# Patient Record
Sex: MALE | Race: NATIVE HAWAIIAN OR OTHER PACIFIC ISLANDER | Employment: OTHER | URBAN - METROPOLITAN AREA
[De-identification: names, ages, dates, MRNs, and addresses within clinical notes are randomized per-mention and may not be internally consistent; named-entity substitution may affect disease eponyms.]

---

## 2019-01-18 ENCOUNTER — OFFICE VISIT (OUTPATIENT)
Dept: INTERNAL MEDICINE CLINIC | Age: 49
End: 2019-01-18

## 2019-01-18 VITALS
DIASTOLIC BLOOD PRESSURE: 86 MMHG | HEIGHT: 66 IN | HEART RATE: 97 BPM | RESPIRATION RATE: 16 BRPM | SYSTOLIC BLOOD PRESSURE: 163 MMHG | TEMPERATURE: 98.7 F | OXYGEN SATURATION: 98 % | WEIGHT: 151.5 LBS | BODY MASS INDEX: 24.35 KG/M2

## 2019-01-18 DIAGNOSIS — M22.2X1 PATELLOFEMORAL SYNDROME OF RIGHT KNEE: ICD-10-CM

## 2019-01-18 DIAGNOSIS — Z23 ENCOUNTER FOR IMMUNIZATION: ICD-10-CM

## 2019-01-18 DIAGNOSIS — M76.51 PATELLAR TENDONITIS OF RIGHT KNEE: Primary | ICD-10-CM

## 2019-01-18 DIAGNOSIS — J32.0 MAXILLARY SINUSITIS, UNSPECIFIED CHRONICITY: ICD-10-CM

## 2019-01-18 RX ORDER — KETOCONAZOLE 20 MG/G
CREAM TOPICAL DAILY
COMMUNITY
End: 2019-02-22

## 2019-01-18 RX ORDER — AZITHROMYCIN 250 MG/1
TABLET, FILM COATED ORAL
Qty: 6 TAB | Refills: 0 | Status: SHIPPED | OUTPATIENT
Start: 2019-01-18 | End: 2019-01-23

## 2019-01-18 RX ORDER — METHOTREXATE 2.5 MG/1
20 TABLET ORAL
Refills: 0 | COMMUNITY
Start: 2019-01-09 | End: 2019-03-27 | Stop reason: ALTCHOICE

## 2019-01-18 RX ORDER — FOLIC ACID 1 MG/1
1 TABLET ORAL DAILY
Refills: 4 | COMMUNITY
Start: 2018-12-29 | End: 2019-06-20

## 2019-01-18 NOTE — PROGRESS NOTES
Chief Complaint   Patient presents with   [facility] Patient     Patient states that he was suffering with a cold the last week. Patient also states that he has a history of tendonitis and would like to speak with Dr. Nancy Dey about running marathon this year. he is a 51y.o. year old male who presents for evaluation of congestion for 3 weeks. no nausea and no vomiting . No congestion and productive cough. Over-the-counter remedies including none   . Hx Asthma:  no  Smoker:  no  Contacts with similar infections: no   Recent travel:no       Reviewed and agree with Nurse Note and duplicated in this note. Reviewed PmHx, RxHx, FmHx, SocHx, AllgHx and updated and dated in the chart.     Family History   Problem Relation Age of Onset    Asthma Neg Hx     Cancer Neg Hx     Diabetes Neg Hx     Heart Disease Neg Hx     Hypertension Neg Hx     Stroke Neg Hx        Past Medical History:   Diagnosis Date    Adopted       Social History     Socioeconomic History    Marital status:      Spouse name: Not on file    Number of children: Not on file    Years of education: Not on file    Highest education level: Not on file   Tobacco Use    Smoking status: Never Smoker    Smokeless tobacco: Never Used   Substance and Sexual Activity    Alcohol use: Yes     Comment: 6 BEERS WEEKLY    Drug use: No    Sexual activity: Not Currently        Review of Systems - negative except as listed above      Objective:     Vitals:    01/18/19 1500   BP: 163/86   Pulse: 97   Resp: 16   Temp: 98.7 °F (37.1 °C)   TempSrc: Oral   SpO2: 98%   Weight: 151 lb 8 oz (68.7 kg)   Height: 5' 6\" (1.676 m)       Physical Examination: General appearance - alert, well appearing, and in no distress  Eyes - pupils equal and reactive, extraocular eye movements intact  Ears - bilateral TM's and external ear canals normal  Nose - normal and patent, no erythema, discharge or polyps  Mouth - mucous membranes moist, pharynx normal without lesions  Neck - supple, no significant adenopathy  Chest - clear to auscultation, no wheezes, rales or rhonchi, symmetric air entry  Heart - normal rate, regular rhythm, normal S1, S2, no murmurs, rubs, clicks or gallops  Abdomen - soft, nontender, nondistended, no masses or organomegaly  Musculoskeletal - no joint tenderness, deformity or swelling  Extremities - right knee exam:  The patient'sright Knee  is  normal to inspection. The ROM is normal and there is flexion to Normal Effusion is: mild The joint exhibits Negative warmth and Crepitus is: mild. The Pacheco test is:  negative Joint Line Tenderness is  negative . The [facility] Test is negative  and the Lachman is  negative. The Anterior Drawer is negative. The Posterior Drawer is:  negative.  Valgus Stress (for MCL) is:  normal . Varus Stress (for LCL) is  normal  . The Chelly Test is negative and the Apprehension Sign:  negative  Patellar Grind negativet    Skin - normal coloration and turgor, no rashes, no suspicious skin lesions noted  Indications for study: right knee pain   Limited musculoskeletal ultrasound examination was performed on the anterior right knee with the following findings: Quadriceps tendon - long:  normal echogenic, linearly compact fibrillar appearance   Quadriceps tendon - trans:  normal echogenic, tessellate compact fibrillar pancake-like appearance   Suprapatellar recess - long: Mild effusion; normal appearing suprapatellar fat pads   Patellar tendon - long:  normal echogenic, linearly compact fibrillar appearance; normal appearing Hoffa fat pad, hypoechoic region noted at the origin  Patellar tendon - trans:  normal echogenic, tessellate compact fibrillar pancake-like appearance     Impression: Postsurgical changes and patellar tendinitis noted at the origin of the patellar tendon    Scanned and Interpreted by Maddison Hauser MD [facility]      Assessment/ Plan:   URI likely sinusitis  Patient will do over-the-counter recommendations and if no resolution will start antibiotic as prescribed  Patient also has right patellar tendinitis and patellofemoral syndrome  Home exercise program given to patient  Patellofemoral brace recommended for patient  Patient return to clinic for x-rays if no resolution. Follow-up Disposition:  Return if symptoms worsen or fail to improve, for Complete Physical.          I have discussed the diagnosis with the patient and the intended plan as seen in the above orders. The patient has received an after-visit summary and questions were answered concerning future plans. Medication Side Effects and Warnings were discussed with patient,  Patient Labs were reviewed and or requested, and  Patient Past Records were reviewed and or requested  yes       Pt agrees to call or return to clinic and/or go to closest ER with any worsening of symptoms. This may include, but not limited to increased fever (>100.4) with NSAIDS or Tylenol, increased edema, confusion, rash, worsening of presenting symptoms.

## 2019-02-22 ENCOUNTER — OFFICE VISIT (OUTPATIENT)
Dept: RHEUMATOLOGY | Age: 49
End: 2019-02-22

## 2019-02-22 VITALS
DIASTOLIC BLOOD PRESSURE: 90 MMHG | SYSTOLIC BLOOD PRESSURE: 142 MMHG | BODY MASS INDEX: 23.78 KG/M2 | TEMPERATURE: 99 F | WEIGHT: 148 LBS | HEART RATE: 71 BPM | RESPIRATION RATE: 16 BRPM | HEIGHT: 66 IN | OXYGEN SATURATION: 98 %

## 2019-02-22 DIAGNOSIS — L40.50 PSORIATIC ARTHRITIS (HCC): ICD-10-CM

## 2019-02-22 DIAGNOSIS — Z79.60 LONG-TERM USE OF IMMUNOSUPPRESSANT MEDICATION: Primary | ICD-10-CM

## 2019-02-22 RX ORDER — FLUOCINONIDE 1 MG/G
CREAM TOPICAL DAILY
Qty: 30 G | Refills: 5 | Status: SHIPPED | OUTPATIENT
Start: 2019-02-22 | End: 2020-06-01 | Stop reason: SDUPTHER

## 2019-02-22 RX ORDER — TRIAMCINOLONE ACETONIDE 1 MG/G
OINTMENT TOPICAL
COMMUNITY

## 2019-02-22 NOTE — PROGRESS NOTES
REASON FOR VISIT This is the initial evaluation for Mr. Jose Alejandro Burden a 51 y.o.  male for question of psoriatic arthritis. The patient is referred to the [facility] at the request of self. HISTORY OF PRESENT ILLNESS This is a 51 y.o. male. Review of records from [address] [place] [address] (Dr. Tollie Cabot):  
10/18: WBC 4.1, Hb 15.2, Plt 194, CMP Normal, CRP, ESR negative 1/16: Hep C negative, HLA b27 negative, quant gold negative 1/16: CXR normal 
Patient with hx of psoriatic arthritis on methotrexate 20 mg oral weekly. Per records the patient developed back pain in summer 2015. He also had pain, swelling and stiffness in left V digit with some pain and swelling in third toe of the left foot. Anti-inflammatories were helpful. He also developed scaliness of skin over knees. MHAQ: 0 Pain scale: 5/100 The patient notes he has done well with methotrexate 20 mg oral weekly. Occasionally he has some stiffness and soreness. No constant pain anywhere. NO stiffness except 5 minutes in the morning. No swelling in any of the joints. Psoriasis is okay. Knees is still active. He does not see a dermatologist.  He is not using topical currently. NO eye issues. He has had blood in stool lately and is having a colonoscopy soon anyway. REVIEW OF SYSTEMS A 15 point review of systems was performed and summarized below. The questionnaire was reviewed with the patient and scanned into the patient's medical record. General: denies recent weight gain, recent weight loss, fatigue, weakness, fever, night sweats Musculoskeletal: endorses  joint pain, joint swelling, morning stiffness (lasting 5 minutes)denies, muscle pain Ears:  denies ringing in ears, loss of hearing, deafness Eyes:  denies pain, redness, loss of vision, double vision, blurred vision, dryness, foreign body sensation Mouth:  denies sore tongue, oral ulcers, bleeding gums, loss of taste, dryness, increased dental caries Nose:  denies nosebleeds, loss of smell, nasal ulcers Throat:  denies frequent sore throats, hoarseness, difficulty in swallowing, pain in jaw while chewing Neck:  denies swollen glands, tender glands Cardiopulmonary:  denies pain in chest, irregular heart beat, sudden changes in heart beat, shortness of breath, difficulty breathing at night, dry cough, productive cough, coughing of blood, wheezing Gastrointestinal: endorses  blood in stoolsdenies nausea, heartburn, stomach pain relieved by food, vomiting of blood/\"coffee grounds\", jaundice, increasing constipation, persistent diarrhea,, black stools Genitourinary: denies nocturia, difficult urination, pain or burning on urination, blood in urine, cloudy urine, pus in urine, genital discharge, frequent urination, vaginal dryness, rash/ulcers, sexual difficulties Hematologic: denies anemia, bleeding tendency, blood clots Skin:  denies easy bruising, sun sensitive, rash, redness, hives, skin tightness, nodules/bumps, hair loss, color changes of hands or feet in the cold (Raynaud's), nailbed changes, mechanics hands Neurologic:  denies headaches, dizziness, muscle weakness, numbness or tingling in hands/feet, memory loss Psychiatric:  denies depression, excessive worries, PTSD, Bipolar Sleep:  denies poor sleep (5-9 hours), denies snoring, apnea, daytime somnolence, difficulty falling asleep, difficulty staying asleep PAST MEDICAL HISTORY Past Medical History:  
Diagnosis Date  Adopted  Arthritis  Psoriatic arthritis ([facility] [address] Past Surgical History:  
Procedure Laterality Date  HX GI    
 EGD  HX HEENT    
 WISDOM TEETH  
 HX ORTHOPAEDIC    
 HX TONSILLECTOMY FAMILY HISTORY Family History Adopted: Yes  
Problem Relation Age of Onset  Asthma Neg Hx  Cancer Neg Hx  Diabetes Neg Hx   
 Heart Disease Neg Hx  Hypertension Neg Hx  Stroke Neg Hx SOCIAL HISTORY Social History Tobacco Use  Smoking status: Never Smoker  Smokeless tobacco: Never Used Substance Use Topics  Alcohol use: Yes Comment: 6 BEERS WEEKLY  Drug use: No  
 
On vacations drinks more than 6 beers weekly MEDICATIONS Current Outpatient Medications Medication Sig Dispense Refill  triamcinolone acetonide (KENALOG) 0.1 % ointment Apply  to affected area two (2) times daily as needed for Skin Irritation. use thin layer  fluocinonide (VANOS) 0.1 % topical cream Apply  to affected area daily. 30 g 5  
 methotrexate (RHEUMATREX) 2.5 mg tablet Take 20 mg by mouth every Monday. 0  
 folic acid (FOLVITE) 1 mg tablet Take 1 mg by mouth daily. 4  
 omeprazole (PRILOSEC) 20 mg capsule Take 20 mg by mouth daily. ALLERGIES No Known Allergies PHYSICAL EXAMINATION Visit Vitals /90 (BP 1 Location: Right arm, BP Patient Position: Sitting) Pulse 71 Temp 99 °F (37.2 °C) Resp 16 Ht 5' 6\" (1.676 m) Wt 148 lb (67.1 kg) SpO2 98% BMI 23.89 kg/m² Body mass index is 23.89 kg/m². General: NAD HEENT: PERRL, anicteric, non-injected sclerae; oropharynx without ulcers, erythema, or exudate. Moist mucous membranes. Lymphatic: No cervical or axillary lymphadenopathy. Cardiovascular: S1, S2,no R/M/G Pulmonary: CTA b/l. No wheezes/rales/rhonchi. Abdominal: Soft,NTND, + BS. Skin: No rash, nodules, or periungual changes. Psoriatic plaques on b/l elbows, left knee and left shin Neuro: Alert; able to carry normal conversation Musculoskeletal:  
NO swollen joints Occiput to Wall test: normal 
Chest Expansion (abnormal if <2.5 cm expansion): normal 
Modified Schober test (<15 cm abnormal):  normal 
Global ROM of spine: normal 
MATT test: normal 
Enthesitis: normal 
 
DATA REVIEW Prior medical records were reviewed and if applicable are summarized as below: 
 
Labs:  
10/18: WBC 4.1, Hb 15.2, Plt 194, CMP Normal, CRP, ESR negative 1/16: Hep C negative, HLA b27 negative, quant gold negative 1/16: CXR normal 
 
Imaging: N/A 
 
ASSESSMENT AND PLAN A 51 y.o. male with hx of psoriatic arthritis on methotrexate 20 mg oral weekly and daily folic acid presents to establish care for psoriatic arthritis. Patient is doing well on this medication regimen and his arthritis is well controlled. His skin is still mildly active # Psoriatic arthritis: appears to be well controlled 
- for now continue methotrexate 20 mg oral weekly with daily folic acid - obtain xrays of hands, feet, SI joints to establish baseline - ESR, CRP # Psoriasis:  
- will prescribe topical steroid # blood in stool: 
- to get a colonoscopy next month # Alcohol use: we had a long discussion about his methotrexate use and alcohol use. He likes to drink and he drinks even more when he is socializing. This is important to him. I explained to him the detrimental effects of using methotrexate and alcohol together. He understood this. I counseled him that now we have many other medications for psoriatic arthritis which will control his disease if he prefers to not cut down on alcohol. I would prefer that he decrease his alcohol use but if the patient prefers to not do that, we can discuss other therapies such as Shaina Betzaida, cosentyx stelera etc at next visit. # Medication Toxicity Monitoring: 
- cbc, cmp every 3 months. Ordered today 
- Hepatitis B, C: ordered today - Quant gold: ordered today - Immunizations: UTD 
- bone health: will discuss at next visit # HCM: 
- flu vaccine 1/2019 The patient voiced understanding of the aforementioned assessment and plan. Summary of plan was provided in the After Visit Summary patient instructions. I also provided education about MyChart setup and utility. Mr. Chantelle Santiago has a reminder for a \"due or due soon\" health maintenance.  I have asked that he contact his primary care provider for follow-up on this health maintenance. TODAY'S ORDERS Orders Placed This Encounter  QUANTIFERON-TB GOLD PLUS  
 XR HAND RT MIN 3 V  
 XR HAND LT MIN 3 V  
 XR FOOT RT MIN 3 V  
 XR FOOT LT MIN 3 V  
 XR SI JTS MIN 3 V  
 METABOLIC PANEL, COMPREHENSIVE  
 CBC WITH AUTOMATED DIFF  
 SED RATE (ESR)  C REACTIVE PROTEIN, QT  
 CHRONIC HEPATITIS PANEL  
 triamcinolone acetonide (KENALOG) 0.1 % ointment  fluocinonide (VANOS) 0.1 % topical cream  
 
Future Appointments Date Time Provider Shannan Rodriguez 3/25/2019  3:00 PM Mica Bey MD [address]  
3/27/2019  9:30 AM Florinda Soria MD [facility] Ken Morillo MD 
 
Adult Rheumatology [facility], [address] [place] [address] Road Phone [phone] Fax [phone]

## 2019-02-22 NOTE — PROGRESS NOTES
Chief Complaint Patient presents with  Arthritis  Joint Pain  
  fingers and toes 1. Have you been to the ER, urgent care clinic since your last visit? Hospitalized since your last visit? No 
 
2. Have you seen or consulted any other health care providers outside of the [address] [place] [address] since your last visit? Include any pap smears or colon screening. Yes Where: Dr Dharmesh Duran, Rheumatology

## 2019-02-26 LAB
ALBUMIN SERPL-MCNC: 4.5 G/DL (ref 3.5–5.5)
ALBUMIN/GLOB SERPL: 2 {RATIO} (ref 1.2–2.2)
ALP SERPL-CCNC: 59 IU/L (ref 39–117)
ALT SERPL-CCNC: 16 IU/L (ref 0–44)
AST SERPL-CCNC: 16 IU/L (ref 0–40)
BASOPHILS # BLD AUTO: 0 X10E3/UL (ref 0–0.2)
BASOPHILS NFR BLD AUTO: 0 %
BILIRUB SERPL-MCNC: 0.5 MG/DL (ref 0–1.2)
BUN SERPL-MCNC: 9 MG/DL (ref 6–24)
BUN/CREAT SERPL: 9 (ref 9–20)
CALCIUM SERPL-MCNC: 9.1 MG/DL (ref 8.7–10.2)
CHLORIDE SERPL-SCNC: 104 MMOL/L (ref 96–106)
CO2 SERPL-SCNC: 27 MMOL/L (ref 20–29)
COMMENT, 144067: NORMAL
CREAT SERPL-MCNC: 0.98 MG/DL (ref 0.76–1.27)
CRP SERPL-MCNC: 0.3 MG/L (ref 0–4.9)
EOSINOPHIL # BLD AUTO: 0.1 X10E3/UL (ref 0–0.4)
EOSINOPHIL NFR BLD AUTO: 3 %
ERYTHROCYTE [DISTWIDTH] IN BLOOD BY AUTOMATED COUNT: 14 % (ref 12.3–15.4)
ERYTHROCYTE [SEDIMENTATION RATE] IN BLOOD BY WESTERGREN METHOD: 4 MM/HR (ref 0–15)
GLOBULIN SER CALC-MCNC: 2.3 G/DL (ref 1.5–4.5)
GLUCOSE SERPL-MCNC: 75 MG/DL (ref 65–99)
HBV CORE AB SERPL QL IA: NEGATIVE
HBV CORE IGM SERPL QL IA: NEGATIVE
HBV E AB SERPL QL IA: NEGATIVE
HBV E AG SERPL QL IA: NEGATIVE
HBV SURFACE AB SER QL: NON REACTIVE
HBV SURFACE AG SERPL QL IA: NEGATIVE
HCT VFR BLD AUTO: 42.7 % (ref 37.5–51)
HCV AB S/CO SERPL IA: <0.1 S/CO RATIO (ref 0–0.9)
HGB BLD-MCNC: 14.3 G/DL (ref 13–17.7)
IMM GRANULOCYTES # BLD AUTO: 0 X10E3/UL (ref 0–0.1)
IMM GRANULOCYTES NFR BLD AUTO: 0 %
LYMPHOCYTES # BLD AUTO: 1.2 X10E3/UL (ref 0.7–3.1)
LYMPHOCYTES NFR BLD AUTO: 30 %
MCH RBC QN AUTO: 32.4 PG (ref 26.6–33)
MCHC RBC AUTO-ENTMCNC: 33.5 G/DL (ref 31.5–35.7)
MCV RBC AUTO: 97 FL (ref 79–97)
MONOCYTES # BLD AUTO: 0.3 X10E3/UL (ref 0.1–0.9)
MONOCYTES NFR BLD AUTO: 7 %
NEUTROPHILS # BLD AUTO: 2.3 X10E3/UL (ref 1.4–7)
NEUTROPHILS NFR BLD AUTO: 60 %
PLATELET # BLD AUTO: 205 X10E3/UL (ref 150–379)
POTASSIUM SERPL-SCNC: 4.2 MMOL/L (ref 3.5–5.2)
PROT SERPL-MCNC: 6.8 G/DL (ref 6–8.5)
RBC # BLD AUTO: 4.42 X10E6/UL (ref 4.14–5.8)
SODIUM SERPL-SCNC: 145 MMOL/L (ref 134–144)
WBC # BLD AUTO: 3.9 X10E3/UL (ref 3.4–10.8)

## 2019-02-27 ENCOUNTER — DOCUMENTATION ONLY (OUTPATIENT)
Dept: RHEUMATOLOGY | Age: 49
End: 2019-02-27

## 2019-02-27 LAB
GAMMA INTERFERON BACKGROUND BLD IA-ACNC: 0.03 IU/ML
M TB IFN-G BLD-IMP: NEGATIVE
M TB IFN-G CD4+ BCKGRND COR BLD-ACNC: 0.05 IU/ML
MITOGEN IGNF BLD-ACNC: >10 IU/ML
QUANTIFERON INCUBATION, QF1T: NORMAL
QUANTIFERON TB2 AG: 0.03 IU/ML
SERVICE CMNT-IMP: NORMAL

## 2019-02-27 RX ORDER — TRIAMCINOLONE ACETONIDE 1 MG/G
OINTMENT TOPICAL 2 TIMES DAILY
Qty: 30 G | Refills: 3 | Status: SHIPPED | OUTPATIENT
Start: 2019-02-27 | End: 2019-03-27

## 2019-02-27 NOTE — PROGRESS NOTES
Prior Auth for Fluocinonide 0.1% Cream has been denied by [employer]. Dr. Mariangel Win notifed.

## 2019-03-25 ENCOUNTER — OFFICE VISIT (OUTPATIENT)
Dept: INTERNAL MEDICINE CLINIC | Age: 49
End: 2019-03-25

## 2019-03-25 VITALS
SYSTOLIC BLOOD PRESSURE: 141 MMHG | WEIGHT: 149.6 LBS | HEART RATE: 81 BPM | DIASTOLIC BLOOD PRESSURE: 92 MMHG | TEMPERATURE: 98.7 F | OXYGEN SATURATION: 97 % | BODY MASS INDEX: 24.15 KG/M2

## 2019-03-25 DIAGNOSIS — R03.0 ELEVATED BP WITHOUT DIAGNOSIS OF HYPERTENSION: ICD-10-CM

## 2019-03-25 DIAGNOSIS — Z12.11 COLON CANCER SCREENING: ICD-10-CM

## 2019-03-25 DIAGNOSIS — Z00.00 VISIT FOR WELL MAN HEALTH CHECK: Primary | ICD-10-CM

## 2019-03-25 NOTE — PATIENT INSTRUCTIONS
DASH diet: Healthy eating to lower your blood pressure  The DASH diet emphasizes portion size, eating a variety of foods and getting the right amount of nutrients. Discover how DASH can improve your health and lower your blood pressure. DASH stands for Dietary Approaches to Stop Hypertension. The DASH diet is a lifelong approach to healthy eating that's designed to help treat or prevent high blood pressure (hypertension). The DASH diet encourages you to reduce the sodium in your diet and eat a variety of foods rich in nutrients that help lower blood pressure, such as potassium, calcium and magnesium. By following the DASH diet, you may be able to reduce your blood pressure by a few points in just two weeks. Over time, your blood pressure could drop by eight to 14 points, which can make a significant difference in your health risks. Because the DASH diet is a healthy way of eating, it offers health benefits besides just lowering blood pressure. The DASH diet may offer protection against osteoporosis, cancer, heart disease, stroke and diabetes. And while the DASH diet is not a weight-loss program, you may indeed lose unwanted pounds because it can help guide you toward healthier meals and snacks. DASH diet: Sodium levels  A key goal of the DASH diet is reducing how much sodium you eat, since sodium can dramatically increase blood pressure in people who are sensitive to its effects. In addition to the standard DASH diet, there is also a lower sodium version of the diet. You can choose the version of the diet that meets your health needs:   Standard DASH diet. You can consume up to 2,300 milligrams (mg) of sodium a day. Lower sodium DASH diet. You can consume up to 1,500 mg of sodium a day. Both versions of the DASH diet aim to reduce the amount of sodium in your diet compared with what you might get in a more traditional diet, which can amount to a whopping 3,500 mg of sodium a day or more.  That level is far beyond the recommendation of the 2005 Dietary Guidelines for Americans of a maximum of 2,300 mg of sodium a day. Studies show that the lower sodium version of the DASH diet is especially helpful in lowering blood pressure for adults who are middle-aged or older, for -Americans and for those who already have high blood pressure. If you aren't sure which version of the DASH diet is best for you, talk to your doctor. DASH diet: What to eat  Both sodium versions of the DASH diet include lots of whole grains, fruits, vegetables and low-fat dairy products. The DASH diet also includes some fish, poultry and legumes. You can eat red meat, sweets and fats in small amounts. The DASH diet is low in saturated fat, cholesterol and total fat. Here's a look at the recommended servings from each food group for the 2,[id number]-day DASH diet. Grains (6 to 8 servings a day)  Grains include bread, cereal, rice and pasta. Examples of one serving of grains include 1 slice whole-wheat bread, 1 ounce (oz.) dry cereal, or 1/2 cup cooked cereal, rice or pasta. Focus on whole grains because they have more fiber and nutrients than do refined grains. For instance, use brown rice instead of white rice, whole-wheat pasta instead of regular pasta and whole-grain bread instead of white bread. Look for products labeled \"100 percent whole grain\" or \"100 percent whole wheat. \"   Grains are naturally low in fat, so avoid spreading on butter or adding cream and cheese sauces. Vegetables (4 to 5 servings a day)  Tomatoes, carrots, broccoli, sweet potatoes, greens and other vegetables are full of fiber, vitamins, and such minerals as potassium and magnesium. Examples of one serving include 1 cup raw leafy green vegetables or 1/2 cup cut-up raw or cooked vegetables.    Don't think of vegetables only as side dishes -- a hearty blend of vegetables served over brown rice or whole-wheat noodles can serve as the main dish for a meal. Fresh or frozen vegetables are both good choices. When buying frozen and canned vegetables, choose those labeled as low sodium or without added salt. To increase the number of servings you fit in daily, be creative. In a stir-schumacher, for instance, cut the amount of meat in half and double up on the vegetables. Fruits (4 to 5 servings a day)  Many fruits need little preparation to become a healthy part of a meal or snack. Like vegetables, they're packed with fiber, potassium and magnesium and are typically low in fat -- exceptions include avocados and coconuts. Examples of one serving include 1 medium fruit or 1/2 cup fresh, frozen or canned fruit. Have a piece of fruit with meals and one as a snack, then round out your day with a dessert of fresh fruits topped with a splash of low-fat yogurt. Leave on edible peels whenever possible. The peels of apples, pears and most fruits with pits add interesting texture to recipes and contain healthy nutrients and fiber. Remember that citrus fruits and juice, such as grapefruit, can interact with certain medications, so check with your doctor or pharmacist to see if they're OK for you. Dairy (2 to 3 servings a day)  Milk, yogurt, cheese and other dairy products are major sources of calcium, vitamin D and protein. But the key is to make sure that you choose dairy products that are low-fat or fat-free because otherwise they can be a major source of fat. Examples of one serving include 1 cup skim or 1% milk, 1 cup yogurt or 1 1/2 oz. cheese. Low-fat or fat-free frozen yogurt can help you boost the amount of dairy products you eat while offering a sweet treat. Add fruit for a healthy twist.   If you have trouble digesting dairy products, choose lactose-free products or consider taking an over-the-counter product that contains the enzyme lactase, which can reduce or prevent the symptoms of lactose intolerance.    Go easy on regular and even fat-free cheeses because they are typically high in sodium. Lean meat, poultry and fish (6 or fewer servings a day)  Meat can be a rich source of protein, B vitamins, iron and zinc. But because even lean varieties contain fat and cholesterol, don't make them a mainstay of your diet -- cut back typical meat portions by one-third or one-half and pile on the vegetables instead. Examples of one serving include 1 oz. cooked skinless poultry, seafood or lean meat, 1 egg, or 1 oz. water-packed, no-salt-added canned tuna. Trim away skin and fat from meat and then broil, grill, roast or poach instead of frying. Eat heart-healthy fish, such as salmon, herring and tuna. These types of fish are high in omega-3 fatty acids, which can help lower your total cholesterol. Nuts, seeds and legumes (4 to 5 servings a week)   Almonds, sunflower seeds, kidney beans, peas, lentils and other foods in this family are good sources of magnesium, potassium and protein. They're also full of fiber and phytochemicals, which are plant compounds that may protect against some cancers and cardiovascular disease. Serving sizes are small and are intended to be consumed weekly because these foods are high in calories. Examples of one serving include 1/3 cup (1 1/2 oz.) nuts, 2 tablespoons seeds or 1/2 cup cooked beans or peas. Nuts sometimes get a bad rap because of their fat content, but they contain healthy types of fat -- monounsaturated fat and omega-3 fatty acids. They're high in calories, however, so eat them in moderation. Try adding them to stir-fries, salads or cereals. Soybean-based products, such as tofu and tempeh, can be a good alternative to meat because they contain all of the amino acids your body needs to make a complete protein, just like meat. They also contain isoflavones, a type of natural plant compound (phytochemical) that has been shown to have some health benefits.   Fats and oils (2 to 3 servings a day)  Fat helps your body absorb essential vitamins and helps your body's immune system. But too much fat increases your risk of heart disease, diabetes and obesity. The DASH diet strives for a healthy balance by providing 30 percent or less of daily calories from fat, with a focus on the healthier unsaturated fats. Examples of one serving include 1 teaspoon soft margarine, 1 tablespoon low-fat mayonnaise or 2 tablespoons light salad dressing. Saturated fat and trans fat are the main dietary culprits in raising your blood cholesterol and increasing your risk of coronary artery disease. DASH helps keep your daily saturated fat to less than 10 percent of your total calories by limiting use of meat, butter, cheese, whole milk, cream and eggs in your diet, along with foods made from lard, solid shortenings, and palm and coconut oils. Avoid trans fat, commonly found in such processed foods as crackers, baked goods and fried items. Read food labels on margarine and salad dressing so that you can choose those that are lowest in saturated fat and free of trans fat. Sweets (5 or fewer a week)  You don't have to banish sweets entirely while following the DASH diet -- just go easy on them. Examples of one serving include 1 tablespoon sugar, jelly or jam, 1/2 cup sorbet or 1 cup (8 oz.) lemonade. When you eat sweets, choose those that are fat-free or low-fat, such as sorbets, fruit ices, jelly beans, hard candy, braulio crackers or low-fat cookies. Artificial sweeteners such as aspartame (NutraSweet, Equal) and sucralose (Splenda) may help satisfy your sweet tooth while sparing the sugar. But remember that you still must use them sensibly. It's OK to swap a diet cola for a regular cola, but not in place of a more nutritious beverage such as low-fat milk or even plain water. Cut back on added sugar, which has no nutritional value but can pack on calories. DASH diet: Alcohol and caffeine  Drinking too much alcohol can increase blood pressure.  The DASH diet recommends that men limit alcohol to two or fewer drinks a day and women one or less. The DASH diet doesn't address caffeine consumption. The influence of caffeine on blood pressure remains unclear. But caffeine can cause your blood pressure to rise at least temporarily. If you already have high blood pressure or if you think caffeine is affecting your blood pressure, talk to your doctor about your caffeine consumption. DASH diet and weight loss  The DASH diet is not designed to promote weight loss, but it can be used as part of an overall weight-loss strategy. The DASH diet is based on a diet of about 2,000 calories a day. If you're trying to lose weight, though, you may want to eat around 1,600 a day. You may need to adjust your serving goals based on your health or individual circumstances -- something your health care team can help you decide. Tips to cut back on sodium  The foods at the core of the DASH diet are naturally low in sodium. So just by following the DASH diet, you're likely to reduce your sodium intake. You also can cut back on sodium in your diet by:   Using sodium-free spices or flavorings with your food instead of salt   Not adding salt when cooking rice, pasta or hot cereal   Rinsing canned foods to remove some of the sodium   Buying foods labeled \"no salt added,\" \"sodium-free,\" \"low sodium\" or \"very low sodium\"  One teaspoon of table salt has about 2,300 mg of sodium, and 2/3 teaspoon of table salt has about 1,500 mg of sodium. When you read food labels, you may be surprised at just how much sodium some processed foods contain. Even low-fat soups, canned vegetables, ready-to-eat cereals and sliced turkey from the local deli -- all foods you may have considered healthy -- often have lots of sodium. You may not notice a difference in taste when you choose low-sodium food and beverages.  If things seem too bland, gradually introduce low-sodium foods and cut back on table salt until you reach your sodium goal. That'll give your palate time to adjust. It can take several weeks for your taste buds to get used to less salty foods. Putting the pieces of the DASH diet together   Try these strategies to get started on the DASH diet:   Change gradually. To boost your success, avoid dramatic changes in your eating approach. Instead, change one or two things at a time. If you now eat only one or two servings of fruits or vegetables a day, try to add a serving at lunch and one at dinner. Rather than switching to all whole grains, start by making one or two of your grain servings whole grains. Increasing fruits, vegetables and whole grains gradually can also help prevent bloating or diarrhea that may occur if you aren't used to eating a diet with lots of fiber. You can also try over-the-counter products to help reduce gas from beans and vegetables. Forgive yourself if you backslide. Everyone slips, especially when learning something new. Remember that changing your lifestyle is a long-term process. Find out what triggered your setback and then just  where you left off with the DASH diet. Reward successes. Reward yourself with a nonfood treat for your accomplishments. Add physical activity. To boost your blood pressure lowering efforts even more, consider increasing your physical activity in addition to following the DASH diet. Combining both the DASH diet and physical activity makes it more likely that you'll reduce your blood pressure. Get support if you need it. If you're having trouble sticking to your diet, talk to your doctor or dietitian about it. You might get some tips that will help you stick to the DASH diet. Remember, healthy eating isn't an all-or-nothing proposition. What's most important is that, on average, you eat healthier foods with plenty of variety -- both to keep your diet nutritious and to avoid boredom or extremes. And with the DASH diet, you can have both.

## 2019-03-25 NOTE — PROGRESS NOTES
No chief complaint on file. he is a 47y.o. year old male who presents for CPE. Complete Physical Exam Questions:    1. Do you follow a low fat diet?  no  2. Are you up to date on your Tdap (<10 years)? Unknown  3. Have you ever had a Pneumovax vaccine (>65)? Unknown   PCV13 Unknown   PPSV23 Unknown  4. Have you had Zoster vaccine (>60)? Not applicable  5. Have you had the HPV - Gardasil (13- 26)? Not applicable  6. Do you follow an exercise program?  no  7. Do you smoke?  no If > 65 and smoker, have you had a abdominal aortic aneurysm ultrasound screen? Unknown  8. Do you consider yourself overweight?  yes  9. Is there a family history of CAD< age 48? Unknown  10. Is there a family history of Cancer? Unknown  11. Do you know your Cancer risks? Yes  12. Have you had a colonoscopy? No  13. Have you been tested for HIV or other STI's? Unknown HIV today(18-66 y/o)? No   14. Have you had an EKG in the last five years(>50)? No  15. Have you had a PSA test done this year (50-69)? Unknown    Other complaints: none, blood in stool    Reviewed and agree with Nurse Note and duplicated in this note. Reviewed PmHx, RxHx, FmHx, SocHx, AllgHx and updated and dated in the chart.     Family History   Adopted: Yes   Problem Relation Age of Onset    Asthma Neg Hx     Cancer Neg Hx     Diabetes Neg Hx     Heart Disease Neg Hx     Hypertension Neg Hx     Stroke Neg Hx        Past Medical History:   Diagnosis Date    Adopted     Arthritis     Psoriatic arthritis ([facility] [address]       Social History     Socioeconomic History    Marital status:      Spouse name: Not on file    Number of children: Not on file    Years of education: Not on file    Highest education level: Not on file   Tobacco Use    Smoking status: Never Smoker    Smokeless tobacco: Never Used   Substance and Sexual Activity    Alcohol use: Yes     Comment: 6 BEERS WEEKLY    Drug use: No    Sexual activity: Not Currently        Review of Systems - negative except as listed above      Objective:     Vitals:    03/25/19 1458   BP: (!) 148/93   Pulse: 81   Temp: 98.7 °F (37.1 °C)   SpO2: 97%   Weight: 149 lb 9.6 oz (67.9 kg)       Physical Examination: General appearance - alert, well appearing, and in no distress  Eyes - pupils equal and reactive, extraocular eye movements intact  Ears - bilateral TM's and external ear canals normal  Nose - normal and patent, no erythema, discharge or polyps  Mouth - mucous membranes moist, pharynx normal without lesions  Neck - supple, no significant adenopathy  Chest - clear to auscultation, no wheezes, rales or rhonchi, symmetric air entry  Heart - normal rate, regular rhythm, normal S1, S2, no murmurs, rubs, clicks or gallops  Abdomen - soft, nontender, nondistended, no masses or organomegaly  Neurological - alert, oriented, normal speech, no focal findings or movement disorder noted  Musculoskeletal - no joint tenderness, deformity or swelling  Extremities - peripheral pulses normal, no pedal edema, no clubbing or cyanosis  Skin - normal coloration and turgor, no rashes, no suspicious skin lesions noted      Assessment/ Plan:   Diagnoses and all orders for this visit:    1. Visit for well man health check  -     CBC W/O DIFF  -     METABOLIC PANEL, COMPREHENSIVE  -     LIPID PANEL  -     PSA W/ REFLX FREE PSA    2. Elevated BP without diagnosis of hypertension    3. Colon cancer screening  -     REFERRAL TO GASTROENTEROLOGY           Labs to be drawn: CBC, CMP, Lipid            I have discussed the diagnosis with the patient and the intended plan as seen in the above orders. The patient has received an after-visit summary and questions were answered concerning future plans.      Medication Side Effects and Warnings were discussed with patient,  Patient Labs were reviewed and or requested, and  Patient Past Records were reviewed and or requested  yes         Pt agrees to call or return to clinic and/or go to closest ER with any worsening of symptoms. This may include, but not limited to increased fever (>100.4) with NSAIDS or Tylenol, increased edema, confusion, rash, worsening of presenting symptoms.

## 2019-03-26 DIAGNOSIS — R73.9 ELEVATED BLOOD SUGAR: Primary | ICD-10-CM

## 2019-03-26 LAB
ALBUMIN SERPL-MCNC: 4.3 G/DL (ref 3.5–5.5)
ALBUMIN/GLOB SERPL: 1.7 {RATIO} (ref 1.2–2.2)
ALP SERPL-CCNC: 57 IU/L (ref 39–117)
ALT SERPL-CCNC: 19 IU/L (ref 0–44)
AST SERPL-CCNC: 19 IU/L (ref 0–40)
BILIRUB SERPL-MCNC: 0.4 MG/DL (ref 0–1.2)
BUN SERPL-MCNC: 11 MG/DL (ref 6–24)
BUN/CREAT SERPL: 10 (ref 9–20)
CALCIUM SERPL-MCNC: 9.3 MG/DL (ref 8.7–10.2)
CHLORIDE SERPL-SCNC: 100 MMOL/L (ref 96–106)
CHOLEST SERPL-MCNC: 217 MG/DL (ref 100–199)
CO2 SERPL-SCNC: 21 MMOL/L (ref 20–29)
CREAT SERPL-MCNC: 1.08 MG/DL (ref 0.76–1.27)
ERYTHROCYTE [DISTWIDTH] IN BLOOD BY AUTOMATED COUNT: 15.1 % (ref 12.3–15.4)
GLOBULIN SER CALC-MCNC: 2.6 G/DL (ref 1.5–4.5)
GLUCOSE SERPL-MCNC: 112 MG/DL (ref 65–99)
HCT VFR BLD AUTO: 42.3 % (ref 37.5–51)
HDLC SERPL-MCNC: 82 MG/DL
HGB BLD-MCNC: 14.6 G/DL (ref 13–17.7)
LDLC SERPL CALC-MCNC: 116 MG/DL (ref 0–99)
MCH RBC QN AUTO: 32.2 PG (ref 26.6–33)
MCHC RBC AUTO-ENTMCNC: 34.5 G/DL (ref 31.5–35.7)
MCV RBC AUTO: 93 FL (ref 79–97)
PLATELET # BLD AUTO: 188 X10E3/UL (ref 150–379)
POTASSIUM SERPL-SCNC: 4.1 MMOL/L (ref 3.5–5.2)
PROT SERPL-MCNC: 6.9 G/DL (ref 6–8.5)
PSA SERPL-MCNC: 3.2 NG/ML (ref 0–4)
RBC # BLD AUTO: 4.53 X10E6/UL (ref 4.14–5.8)
REFLEX CRITERIA: NORMAL
SODIUM SERPL-SCNC: 140 MMOL/L (ref 134–144)
TRIGL SERPL-MCNC: 94 MG/DL (ref 0–149)
VLDLC SERPL CALC-MCNC: 19 MG/DL (ref 5–40)
WBC # BLD AUTO: 4.3 X10E3/UL (ref 3.4–10.8)

## 2019-03-26 NOTE — PROGRESS NOTES
Your sugars are slightly elevated. Please return to clinic at your convenience for an additional lab draw to look at your 3-month average. Your \"Bad\" cholesterol (LDL and/or triglycerides) are elevated. Please eat a healthier diet as described below. In particular avoid fried, fatty and junk foods, while increasing fiber (fruits and vegetables). If you cannot increase fiber through diet, you can supplement with metamucil as directed on bottle daily. Also, make sure you are taking 1 to 2 grams of over the counter fish oil. Increase exercise to 5 times per week of cardio lasting at least 30 min's each (biking, walking, elliptical, swimming). Lets recheck the fasting (atleast eight hours) in 6 months. Mediterranean diet: Choose this heart-healthy diet option  The Mediterranean diet is a heart-healthy eating plan combining elements of Mediterranean-style cooking. Here's how to adopt the Mediterranean diet. If you're looking for a heart-healthy eating plan, the Mediterranean diet might be right for you. The Mediterranean diet incorporates the basics of healthy eating  plus a splash of flavorful olive oil and perhaps a glass of red wine  among other components characterizing the traditional cooking style of countries bordering the [facility]. Most healthy diets include fruits, vegetables, fish and whole grains, and limit unhealthy fats. While these parts of a healthy diet remain tried-and-true, subtle variations or differences in proportions of certain foods may make a difference in your risk of heart disease. Benefits of the [address] has shown that the traditional Mediterranean diet reduces the risk of heart disease.  In fact, a recent analysis of more than 1.5 million healthy adults demonstrated that following a Mediterranean diet was associated with a reduced risk of overall and cardiovascular mortality, a reduced incidence of cancer and cancer mortality, and a reduced incidence of Parkinson's and Alzheimer's diseases. For this reason, most if not all major scientific organizations encourage healthy adults to adapt a style of eating like that of the [address] for prevention of major chronic diseases. Key components of the Mediterranean diet  The Mediterranean diet emphasizes:   Getting plenty of exercise   Eating primarily plant-based foods, such as fruits and vegetables, whole grains, legumes and nuts   Replacing butter with healthy fats such as olive oil and canola oil   Using herbs and spices instead of salt to flavor foods   Limiting red meat to no more than a few times a month   Eating fish and poultry at least twice a week   Drinking red wine in moderation (optional)   The diet also recognizes the importance of enjoying meals with family and friends. Fruits, vegetables, nuts and grains  The Mediterranean diet traditionally includes fruits, vegetables, pasta and rice. For example, residents of [facility] eat very little red meat and average nine servings a day of antioxidant-rich fruits and vegetables. The Mediterranean diet has been associated with a lower level of oxidized low-density lipoprotein (LDL) cholesterol  the \"bad\" cholesterol that's more likely to build up deposits in your arteries. Nuts are another part of a healthy Mediterranean diet. Nuts are high in fat (approximately 80 percent of their calories come from fat), but most of the fat is not saturated. Because nuts are high in calories, they should not be eaten in large amounts  generally no more than a handful a day. For the best nutrition, avoid candied or honey-roasted and heavily salted nuts. Grains in the [address] [place] [address] region are typically whole grain and usually contain very few unhealthy trans fats, and bread is an important part of the diet there.  However, throughout the [address] [place] [address] region, bread is eaten plain or dipped in olive oil  not eaten with butter or margarines, which contain saturated or trans fats.

## 2019-03-27 ENCOUNTER — OFFICE VISIT (OUTPATIENT)
Dept: RHEUMATOLOGY | Age: 49
End: 2019-03-27

## 2019-03-27 VITALS
TEMPERATURE: 99 F | HEART RATE: 86 BPM | HEIGHT: 66 IN | OXYGEN SATURATION: 99 % | DIASTOLIC BLOOD PRESSURE: 100 MMHG | WEIGHT: 147 LBS | BODY MASS INDEX: 23.63 KG/M2 | SYSTOLIC BLOOD PRESSURE: 143 MMHG | RESPIRATION RATE: 20 BRPM

## 2019-03-27 DIAGNOSIS — Z79.60 LONG-TERM USE OF IMMUNOSUPPRESSANT MEDICATION: Primary | ICD-10-CM

## 2019-03-27 DIAGNOSIS — L40.9 PSORIASIS: ICD-10-CM

## 2019-03-27 DIAGNOSIS — L40.50 PSORIATIC ARTHRITIS (HCC): ICD-10-CM

## 2019-03-27 NOTE — PROGRESS NOTES
.  Chief Complaint   Patient presents with    Arthritis     1. Have you been to the ER, urgent care clinic since your last visit? Hospitalized since your last visit? No    2. Have you seen or consulted any other health care providers outside of the [address] [place] [address] since your last visit? Include any pap smears or colon screening.  No

## 2019-03-27 NOTE — PROGRESS NOTES
REASON FOR VISIT    Patient presents for a follow up visit. HISTORY OF DISEASE: Psoriatic Arthritis    Year of diagnosis: 2015  First visit with me: 2/2019  Cumulative disease manifestations:  Dactylitis, psoriasis, back pain  Positive serologies/labs:  Negative serologies/labs: HLA B27  Other co-morbidities: excessive alcohol use  Relapses:      Past treatment history:  Prednisone:   Non-biologic DMARD: Methotrexate (2015-present)  Biologic:   Other:     HISTORY OF PRESENT ILLNESS     MHAQ: 0  Pain scale: 1/10  The patient notes the joints are doing well. He had some pain in right hand briefly and now it has resolved. He still has the psoriasis plaques on b/l knees and shins. He has been using the cream and it helps slightly. It doesn't help too much. He is taking 20 mg of methotrexate a week with daily folic acid. Alcohol is important for him. He is drinking about 7 drinks a week. REVIEW OF SYSTEMS    A comprehensive review of systems was performed and pertinent results are documented in the HPI, review of systems is otherwise non-contributory.     PAST MEDICAL HISTORY    Past Medical History:   Diagnosis Date    Adopted     Arthritis     Psoriatic arthritis ([facility] [address]         Past Surgical History:   Procedure Laterality Date    HX GI      EGD    HX HEENT      WISDOM TEETH    HX ORTHOPAEDIC      HX TONSILLECTOMY         FAMILY HISTORY    Family History   Adopted: Yes   Problem Relation Age of Onset    Asthma Neg Hx     Cancer Neg Hx     Diabetes Neg Hx     Heart Disease Neg Hx     Hypertension Neg Hx     Stroke Neg Hx        SOCIAL HISTORY    Social History     Tobacco Use    Smoking status: Never Smoker    Smokeless tobacco: Never Used   Substance Use Topics    Alcohol use: Yes     Comment: 6 BEERS WEEKLY    Drug use: No     On vacations drinks more than 6 beers weekly    MEDICATIONS    Current Outpatient Medications   Medication Sig Dispense Refill    apremilast (OTEZLA) 30 mg tab Take 30 mg by mouth two (2) times a day. Indications: Psoriasis associated with Arthritis 180 Tab 2    apremilast (OTEZLA STARTER) 10 mg (4)-20 mg (4)-30 mg(19) DsPk Take 1 Package by mouth See Admin Instructions. Indications: Psoriasis associated with Arthritis 1 Package 0    triamcinolone acetonide (KENALOG) 0.1 % ointment Apply  to affected area two (2) times daily as needed for Skin Irritation. use thin layer      fluocinonide (VANOS) 0.1 % topical cream Apply  to affected area daily. 30 g 5    folic acid (FOLVITE) 1 mg tablet Take 1 mg by mouth daily. 4    omeprazole (PRILOSEC) 20 mg capsule Take 20 mg by mouth daily. ALLERGIES    No Known Allergies    PHYSICAL EXAMINATION    Visit Vitals  BP (!) 143/100 (BP 1 Location: Left arm, BP Patient Position: Sitting)   Pulse 86   Temp 99 °F (37.2 °C)   Resp 20   Ht 5' 6\" (1.676 m)   Wt 147 lb (66.7 kg)   SpO2 99%   BMI 23.73 kg/m²     Body mass index is 23.73 kg/m². General: NAD  HEENT: PERRL, anicteric, non-injected sclerae; oropharynx without ulcers, erythema, or exudate. Moist mucous membranes. Lymphatic: No cervical or axillary lymphadenopathy. Cardiovascular: S1, S2,no R/M/G  Pulmonary: CTA b/l. No wheezes/rales/rhonchi. Abdominal: Soft,NTND, + BS. Skin: No rash, nodules, or periungual changes.  Psoriatic plaques on b/l elbows,  B/l knees and b/l shins  Neuro: Alert; able to carry normal conversation    Musculoskeletal:   No swollen joints  Occiput to Wall test: normal  Chest Expansion (abnormal if <2.5 cm expansion): normal  Modified Schober test (<15 cm abnormal):  normal  Global ROM of spine: normal  MATT test: normal  Enthesitis: normal    DATA REVIEW    Prior medical records were reviewed and if applicable are summarized as below:    Labs:   2/2019: cbc, cmp unremarkable, ESR, CRP normal, HEpB, C, quant gold negative  10/18: WBC 4.1, Hb 15.2, Plt 194, CMP Normal, CRP, ESR negative  1/16: Hep C negative, HLA b27 negative, quant gold negative    Imaging:   SI Joints (2/2019): Personally reviewed images. Normal joints  Foot xrays (2/2019): Personally reviewed images. No erosions  Hand xrays (2/2019): Personally reviewed images. + DJD. No erosions  1/16: CXR normal    ASSESSMENT AND PLAN    A 47 y.o. male with hx of psoriatic arthritis on methotrexate 20 mg oral weekly and daily folic acid presents for a follow up visit. The patient is doing well but still has skin involvement with methotrexate. Further, the patient is drinking excessive amount of alcohol and he is not willing to decrease the use of alcohol. Given this,  I think at this time, it is prudent to put him on another  Medication given concern for hepatotoxicity. Given that patient has psoriasis with dactylitis, Yamile Beverley would be an ideal option for him. # Psoriatic arthritis:  As noted above, will plan on initiating the patient on Otezla. I discussed at length all the risks and benefits of the medication including the side effects.    Velasquez Null prescribed it including the starter pack. - continue methotrexate until he gets the Yamile Beverley. # Psoriasis:   - using topicals but still with active lesions. - as noted above, will plan to initiate patient on Yamile Beverley. # blood in stool:  - to get a colonoscopy next month    # Alcohol use:   - will plan on stopping methotrexate and initiating patient on Otezla instead. - he is not willing to cut down on his use of alcohol at this time. # Medication Toxicity Monitoring:  - cbc, cmp normal  - Hepatitis B, C: negative 2/2019  - Quant gold: negative 2/2019  - Immunizations: UTD  - bone health: will discuss at next visit    # HCM:  - flu vaccine 1/2019    The patient voiced understanding of the aforementioned assessment and plan. Summary of plan was provided in the After Visit Summary patient instructions. I also provided education about MyChart setup and utility. Mr. Odilon Moreno has a reminder for a \"due or due soon\" health maintenance. I have asked that he contact his primary care provider for follow-up on this health maintenance.     TODAY'S ORDERS    Orders Placed This Encounter    apremilast (OTEZLA) 30 mg tab    apremilast (OTEZLA STARTER) 10 mg (4)-20 mg (4)-30 mg(19) DsPk       Future Appointments   Date Time Provider Shannan Jennifer   4/29/2019  4:00 PM Filiberto Sanders MD [address] [facility]   6/24/2019 10:30 AM Renato Emanuel MD [address] [facility]       Lulu Weller MD    Adult Rheumatology   [facility]  A [facility] of [facility], [address] [facility], [address]   Phone [phone]  Fax [phone]

## 2019-03-28 ENCOUNTER — TELEPHONE (OUTPATIENT)
Dept: PHARMACY | Age: 49
End: 2019-03-28

## 2019-03-28 NOTE — TELEPHONE ENCOUNTER
[facility] at [address] [facility] Update      I left a message for the patient yesterday, 3/27/19, to call our pharmacy back. He returned our call this morning, 3/28/19. Our pharmacy team let him know that he needs to call his insurance company and let them know that they are his primary insurance. He said his wife had that same issue with their insurance and verbalized understanding on what he needs to do so that we can start working on the PA for his [place]. He is going to call us back after he calls his insurance company. Rajesh France, Pharm. D.   Pharmacist in [address] [facility] at [facility] [place]: [phone]

## 2019-03-29 ENCOUNTER — DOCUMENTATION ONLY (OUTPATIENT)
Dept: PHARMACY | Age: 49
End: 2019-03-29

## 2019-03-29 NOTE — PROGRESS NOTES
[facility] at [address] [facility] Update    Date: 3/29/19    Medication: Karel CAMACHO Pending (sent to Dr. Roseline White via CoverMyMeds)    Aniceto Eisenberg, PharmD  [facility] at [address], [address] [place] [address]   [address]  phone: [phone]   fax: [phone]

## 2019-04-02 ENCOUNTER — DOCUMENTATION ONLY (OUTPATIENT)
Dept: PHARMACY | Age: 49
End: 2019-04-02

## 2019-04-02 NOTE — PROGRESS NOTES
[facility] at [address] [facility] Update    Date: 4/2/19    Medication: [facility] Starter Pack and [facility] tablets    PA Pending - faxed signed PA to insurance    On 4/1/19 - patient called us back to let us know that he had called his insurance company to let them know that they were his primary insurance. I told him we were aware of that because we re-submitted the claim to insurance and saw that PA was required, which was a different rejection than before. I told him the form was sent to Dr. Jyotsna Marcano and she is reviewing and signing it now. He said that we were all on top of it! I told him that we appreciated him updating us.      Lalo Stevens, PharmD  [facility] at [facility],  Arnol Brooke   [address] [place] [address] Avenue  phone: [phone]   fax: [phone]

## 2019-04-03 ENCOUNTER — TELEPHONE (OUTPATIENT)
Dept: RHEUMATOLOGY | Age: 49
End: 2019-04-03

## 2019-04-03 NOTE — TELEPHONE ENCOUNTER
----- Message from Keely Banda sent at 4/2/2019  1:41 PM EDT -----  Regarding: FW: Dr. Jacy Elise  From [facility]  ----- Message -----  From: Umair Angelo  Sent: 4/2/2019   1:19 PM  To: [facility] Office Pool  Subject: Dr. Ale Santos; Medicaid) is requesting a call back from Dr. Radha Gordon or nurse in reference to pt medication \"Otezla\"; has clinical questions. Sarahy Vasquez best contact [phone] services; ref [phone].

## 2019-04-03 NOTE — TELEPHONE ENCOUNTER
Returned call to prior [facility] as requested, and spoke with Dagmar Covarrubias, and she transferred the call to [place], and she transferred to [facility] department where the call was dropped. Candicea Ashish has been denied, and Dr. Fernanda Mayfield wants the an appeal done. Will notify Mitchell Campos.

## 2019-04-05 ENCOUNTER — TELEPHONE (OUTPATIENT)
Dept: PHARMACY | Age: 49
End: 2019-04-05

## 2019-04-05 NOTE — TELEPHONE ENCOUNTER
[facility] at [address] [facility] Update    Date: 4/5/19    Medication: Idalia Reina     Patient called the pharmacy and spoke with Yamilet Bustamante, Specialty Pharmacy Liaison. He was concerned about the denial letter he received about his Idalia Reina from his insurance company. She told him Dr. Hoang Me is working on an appeal for that medication with his insurance company and we would keep him updated with what we find out. He said that we were \"on top of it. \"      Tasha You PharmD  [facility] at [facility], [address] [place] [address], [address]  phone: [phone]   fax: [phone]

## 2019-04-12 ENCOUNTER — DOCUMENTATION ONLY (OUTPATIENT)
Dept: PHARMACY | Age: 49
End: 2019-04-12

## 2019-04-26 ENCOUNTER — HOSPITAL ENCOUNTER (OUTPATIENT)
Age: 49
Setting detail: OUTPATIENT SURGERY
Discharge: HOME | End: 2019-04-26
Attending: SPECIALIST | Admitting: SPECIALIST
Payer: MEDICAID

## 2019-04-26 ENCOUNTER — ANESTHESIA EVENT (OUTPATIENT)
Dept: ENDOSCOPY | Age: 49
End: 2019-04-26
Payer: MEDICAID

## 2019-04-26 ENCOUNTER — ANESTHESIA (OUTPATIENT)
Dept: ENDOSCOPY | Age: 49
End: 2019-04-26
Payer: MEDICAID

## 2019-04-26 VITALS
RESPIRATION RATE: 16 BRPM | SYSTOLIC BLOOD PRESSURE: 94 MMHG | OXYGEN SATURATION: 98 % | WEIGHT: 147 LBS | DIASTOLIC BLOOD PRESSURE: 61 MMHG | HEART RATE: 68 BPM | TEMPERATURE: 97.5 F | HEIGHT: 68 IN | BODY MASS INDEX: 22.28 KG/M2

## 2019-04-26 LAB — CEA SERPL-MCNC: 3.6 NG/ML

## 2019-04-26 PROCEDURE — 76060000032 HC ANESTHESIA 0.5 TO 1 HR: Performed by: SPECIALIST

## 2019-04-26 PROCEDURE — 82378 CARCINOEMBRYONIC ANTIGEN: CPT

## 2019-04-26 PROCEDURE — 77030013992 HC SNR POLYP ENDOSC BSC -B: Performed by: SPECIALIST

## 2019-04-26 PROCEDURE — 74011250636 HC RX REV CODE- 250/636

## 2019-04-26 PROCEDURE — 77030009426 HC FCPS BIOP ENDOSC BSC -B: Performed by: SPECIALIST

## 2019-04-26 PROCEDURE — 74011250636 HC RX REV CODE- 250/636: Performed by: SPECIALIST

## 2019-04-26 PROCEDURE — 77030010937 HC CLP LIG BSC -I: Performed by: SPECIALIST

## 2019-04-26 PROCEDURE — 77030027957 HC TBNG IRR ENDOGTR BUSS -B: Performed by: SPECIALIST

## 2019-04-26 PROCEDURE — 76040000007: Performed by: SPECIALIST

## 2019-04-26 PROCEDURE — 88305 TISSUE EXAM BY PATHOLOGIST: CPT

## 2019-04-26 PROCEDURE — 36415 COLL VENOUS BLD VENIPUNCTURE: CPT

## 2019-04-26 RX ORDER — MIDAZOLAM HYDROCHLORIDE 1 MG/ML
.25-1 INJECTION, SOLUTION INTRAMUSCULAR; INTRAVENOUS
Status: DISCONTINUED | OUTPATIENT
Start: 2019-04-26 | End: 2019-04-26 | Stop reason: HOSPADM

## 2019-04-26 RX ORDER — SODIUM CHLORIDE 9 MG/ML
INJECTION, SOLUTION INTRAVENOUS
Status: DISCONTINUED | OUTPATIENT
Start: 2019-04-26 | End: 2019-04-26 | Stop reason: HOSPADM

## 2019-04-26 RX ORDER — NALOXONE HYDROCHLORIDE 0.4 MG/ML
0.4 INJECTION, SOLUTION INTRAMUSCULAR; INTRAVENOUS; SUBCUTANEOUS
Status: DISCONTINUED | OUTPATIENT
Start: 2019-04-26 | End: 2019-04-26 | Stop reason: HOSPADM

## 2019-04-26 RX ORDER — SODIUM CHLORIDE 0.9 % (FLUSH) 0.9 %
5-40 SYRINGE (ML) INJECTION AS NEEDED
Status: DISCONTINUED | OUTPATIENT
Start: 2019-04-26 | End: 2019-04-26 | Stop reason: HOSPADM

## 2019-04-26 RX ORDER — SODIUM CHLORIDE 9 MG/ML
50 INJECTION, SOLUTION INTRAVENOUS CONTINUOUS
Status: DISCONTINUED | OUTPATIENT
Start: 2019-04-26 | End: 2019-04-26 | Stop reason: HOSPADM

## 2019-04-26 RX ORDER — DEXTROMETHORPHAN/PSEUDOEPHED 2.5-7.5/.8
1.2 DROPS ORAL
Status: DISCONTINUED | OUTPATIENT
Start: 2019-04-26 | End: 2019-04-26 | Stop reason: HOSPADM

## 2019-04-26 RX ORDER — FLUMAZENIL 0.1 MG/ML
0.2 INJECTION INTRAVENOUS
Status: DISCONTINUED | OUTPATIENT
Start: 2019-04-26 | End: 2019-04-26 | Stop reason: HOSPADM

## 2019-04-26 RX ORDER — ATROPINE SULFATE 0.1 MG/ML
0.5 INJECTION INTRAVENOUS
Status: DISCONTINUED | OUTPATIENT
Start: 2019-04-26 | End: 2019-04-26 | Stop reason: HOSPADM

## 2019-04-26 RX ORDER — FENTANYL CITRATE 50 UG/ML
200 INJECTION, SOLUTION INTRAMUSCULAR; INTRAVENOUS
Status: DISCONTINUED | OUTPATIENT
Start: 2019-04-26 | End: 2019-04-26 | Stop reason: HOSPADM

## 2019-04-26 RX ORDER — LIDOCAINE HYDROCHLORIDE 20 MG/ML
INJECTION, SOLUTION EPIDURAL; INFILTRATION; INTRACAUDAL; PERINEURAL AS NEEDED
Status: DISCONTINUED | OUTPATIENT
Start: 2019-04-26 | End: 2019-04-26 | Stop reason: HOSPADM

## 2019-04-26 RX ORDER — SODIUM CHLORIDE 0.9 % (FLUSH) 0.9 %
5-40 SYRINGE (ML) INJECTION EVERY 8 HOURS
Status: DISCONTINUED | OUTPATIENT
Start: 2019-04-26 | End: 2019-04-26 | Stop reason: HOSPADM

## 2019-04-26 RX ORDER — PROPOFOL 10 MG/ML
INJECTION, EMULSION INTRAVENOUS AS NEEDED
Status: DISCONTINUED | OUTPATIENT
Start: 2019-04-26 | End: 2019-04-26 | Stop reason: HOSPADM

## 2019-04-26 RX ORDER — EPINEPHRINE 0.1 MG/ML
1 INJECTION INTRACARDIAC; INTRAVENOUS
Status: DISCONTINUED | OUTPATIENT
Start: 2019-04-26 | End: 2019-04-26 | Stop reason: HOSPADM

## 2019-04-26 RX ADMIN — PROPOFOL 50 MG: 10 INJECTION, EMULSION INTRAVENOUS at 16:59

## 2019-04-26 RX ADMIN — PROPOFOL 20 MG: 10 INJECTION, EMULSION INTRAVENOUS at 16:42

## 2019-04-26 RX ADMIN — PROPOFOL 130 MG: 10 INJECTION, EMULSION INTRAVENOUS at 16:37

## 2019-04-26 RX ADMIN — PROPOFOL 30 MG: 10 INJECTION, EMULSION INTRAVENOUS at 16:54

## 2019-04-26 RX ADMIN — SODIUM CHLORIDE: 9 INJECTION, SOLUTION INTRAVENOUS at 16:31

## 2019-04-26 RX ADMIN — PROPOFOL 50 MG: 10 INJECTION, EMULSION INTRAVENOUS at 17:04

## 2019-04-26 RX ADMIN — PROPOFOL 50 MG: 10 INJECTION, EMULSION INTRAVENOUS at 17:08

## 2019-04-26 RX ADMIN — PROPOFOL 30 MG: 10 INJECTION, EMULSION INTRAVENOUS at 16:52

## 2019-04-26 RX ADMIN — PROPOFOL 50 MG: 10 INJECTION, EMULSION INTRAVENOUS at 17:02

## 2019-04-26 RX ADMIN — PROPOFOL 50 MG: 10 INJECTION, EMULSION INTRAVENOUS at 16:40

## 2019-04-26 RX ADMIN — PROPOFOL 30 MG: 10 INJECTION, EMULSION INTRAVENOUS at 16:49

## 2019-04-26 RX ADMIN — LIDOCAINE HYDROCHLORIDE 40 MG: 20 INJECTION, SOLUTION EPIDURAL; INFILTRATION; INTRACAUDAL; PERINEURAL at 16:37

## 2019-04-26 RX ADMIN — PROPOFOL 50 MG: 10 INJECTION, EMULSION INTRAVENOUS at 16:46

## 2019-04-26 RX ADMIN — PROPOFOL 50 MG: 10 INJECTION, EMULSION INTRAVENOUS at 16:56

## 2019-04-26 RX ADMIN — PROPOFOL 50 MG: 10 INJECTION, EMULSION INTRAVENOUS at 16:44

## 2019-04-26 RX ADMIN — PROPOFOL 50 MG: 10 INJECTION, EMULSION INTRAVENOUS at 17:06

## 2019-04-26 NOTE — H&P
Colonoscopy History and Physical      The patient was seen and examined. Date of last colonoscopy: none, Polyps  No      The airway was assessed and documented. The problem list, past medical history, and medications were reviewed. Patient Active Problem List   Diagnosis Code    ACL tear S83.519A    Psoriatic arthritis ([facility] L40.50    Psoriasis L40.9     Social History     Socioeconomic History    Marital status:      Spouse name: Not on file    Number of children: Not on file    Years of education: Not on file    Highest education level: Not on file   Occupational History    Not on file   Social Needs    Financial resource strain: Not on file    Food insecurity:     Worry: Not on file     Inability: Not on file    Transportation needs:     Medical: Not on file     Non-medical: Not on file   Tobacco Use    Smoking status: Never Smoker    Smokeless tobacco: Never Used   Substance and Sexual Activity    Alcohol use: Yes     Comment: 6 BEERS WEEKLY    Drug use: No    Sexual activity: Not Currently   Lifestyle    Physical activity:     Days per week: Not on file     Minutes per session: Not on file    Stress: Not on file   Relationships    Social connections:     Talks on phone: Not on file     Gets together: Not on file     Attends Religion service: Not on file     Active member of club or organization: Not on file     Attends meetings of clubs or organizations: Not on file     Relationship status: Not on file    Intimate partner violence:     Fear of current or ex partner: Not on file     Emotionally abused: Not on file     Physically abused: Not on file     Forced sexual activity: Not on file   Other Topics Concern    Not on file   Social History Narrative    Not on file     Past Medical History:   Diagnosis Date    Adopted     Arthritis     Psoriatic arthritis ([facility]          Prior to Admission Medications   Prescriptions Last Dose Informant Patient Reported? Taking?    apremilast (OTEZLA STARTER) 10 mg (4)-20 mg (4)-30 mg(19) DsPk   No No   Sig: Take 1 Package by mouth See Admin Instructions. Indications: Psoriasis associated with Arthritis   apremilast (OTEZLA) 30 mg tab   No No   Sig: Take 30 mg by mouth two (2) times a day. Indications: Psoriasis associated with Arthritis   fluocinonide (VANOS) 0.1 % topical cream   No No   Sig: Apply  to affected area daily. folic acid (FOLVITE) 1 mg tablet   Yes No   Sig: Take 1 mg by mouth daily. omeprazole (PRILOSEC) 20 mg capsule 4/25/2019 at Unknown time  Yes Yes   Sig: Take 20 mg by mouth daily. triamcinolone acetonide (KENALOG) 0.1 % ointment   Yes No   Sig: Apply  to affected area two (2) times daily as needed for Skin Irritation. use thin layer      Facility-Administered Medications: None       The patient was seen and examined in the endoscopy suite. The airway was assessed and docuemented. The problem list and medications were reviewed. Chief complaint, history of present illness, and review of systems and Past medical History are positive for: blood in stools    The heart, lungs and mental status were satisfactory for the administration of sedation and for the procedure. I discussed with the patient the objectives, risks, consequences and alternatives to the procedure.      Plan: Endoscopic procedure with sedation     Chayo Pittman MD   4/26/2019  4:38 PM

## 2019-04-26 NOTE — ROUTINE PROCESS
Bennie Rubio  1970  [id number]    Situation:  Verbal report received from: Alem Nuñez RN  Procedure: Procedure(s):  COLONOSCOPY  INJECTION  ENDOSCOPIC POLYPECTOMY  RESOLUTION CLIP    Background:    Preoperative diagnosis: Colon Cancer Screen  Postoperative diagnosis: 1. Sigmoid colon polyp  2. Rectal colon mass    :  Dr. Giuliana Alatorre  Assistant(s): Endoscopy Technician-1: Mitzi Presley  Endoscopy RN-1: Mona Schulz  Endoscopy RN-Relief: Danika Zimmerman RN    Specimens:   ID Type Source Tests Collected by Time Destination   1 : sigmoid colon polyp Preservative Sigmoid  Dmitry Schumacher MD 4/26/2019 1649 Pathology   2 : polyp Preservative Colon, Nicholette Chain  Dmitry Schumacher MD 4/26/2019 1705 Pathology   3 : polyp Preservative Rectum  Dmitry Schumacher MD 4/26/2019 1709 Pathology   4 : Rectal Mass Preservative Rectum  Dmitry Schumacher MD 4/26/2019 1718 Pathology     H. Pylori  no    Assessment:  Intra-procedure medications   Anesthesia gave intra-procedure sedation and medications, see anesthesia flow sheet yes    Intravenous fluids: NS@ KVO     Vital signs stable     Abdominal assessment: round and soft     Recommendation:  Discharge patient per MD order.     Family or Friend   Permission to share finding with family or friend yes

## 2019-04-26 NOTE — PROCEDURES
[address]  [address] [facility], [address] [place] [address]                 Colonoscopy Procedure Note    Indications:   See Preoperative Diagnosis above  Referring Physician: Marleny Barber MD  Anesthesia/Sedation: MAC anesthesia Propofol  Endoscopist:  Dr. Dmitri Johansen  Assistant:  Endoscopy Technician-1: Chris Yeboah  Endoscopy RN-1: Maria Teresa Roberts  Endoscopy RN-Relief: Renetta Bello RN  Preoperative diagnosis: Colon Cancer Screen  Postoperative diagnosis: 1. Sigmoid colon polyp  2. Rectal colon mass    Procedure in Detail:  Informed consent was obtained for the procedure, including sedation. Risks of perforation, hemorrhage, adverse drug reaction, and aspiration were discussed. The patient was placed in the left lateral decubitus position. Based on the pre-procedure assessment, including review of the patient's medical history, medications, allergies, and review of systems, he had been deemed to be an appropriate candidate for moderate sedation; he was therefore sedated with the medications listed above. The patient was monitored continuously with ECG tracing, pulse oximetry, blood pressure monitoring, and direct observations. A rectal examination was performed. The [id number] was inserted into the rectum and advanced under direct vision to the cecum, which was identified by the ileocecal valve and appendiceal orifice. The quality of the colonic preparation was good. A careful inspection was made as the colonoscope was withdrawn, including a retroflexed view of the rectum; findings and interventions are described below. Appropriate photodocumentation was obtained. Findings:  Rectum: Last ulcerated 6-7 cm size malignant appearing mass seen at 10 cm from anal verge, biopsies done. Eight mm polyp in distal rectum removed with hot snare. Sigmoid: Ten mm sessile polyp removed with hot snare after submucosal injection of 3 ml of normal saline.  Single clip placed at polypectomy site Four mm polyp removed with hot biopsy forceps. Descending Colon: 8 mm sessile polyp removed with hot snare  Transverse Colon: normal  Ascending Colon: normal  Cecum: normal    Specimens:    rectal mass, colon polyps    EBL: None    Complications: None; patient tolerated the procedure well. Recommendations:     - Await pathology.      - CEA level, Colorectal surgery consult, EUS next week      Signed By: Meek Maldonado MD                        April 26, 2019

## 2019-04-26 NOTE — PERIOP NOTES

## 2019-04-26 NOTE — ANESTHESIA POSTPROCEDURE EVALUATION
Procedure(s): 
COLONOSCOPY INJECTION 
ENDOSCOPIC POLYPECTOMY RESOLUTION CLIP. MAC Anesthesia Post Evaluation Multimodal analgesia: multimodal analgesia not used between 6 hours prior to anesthesia start to PACU discharge Patient location during evaluation: PACU Patient participation: complete - patient participated Level of consciousness: awake Pain score: 0 Pain management: adequate Airway patency: patent Anesthetic complications: no 
Cardiovascular status: acceptable Respiratory status: acceptable Hydration status: acceptable Comments: I have evaluated the patient and meets criteria for discharge from PACU. Jef Olson MD 
 
 
 
Vitals Value Taken Time /67 4/26/2019  5:30 PM  
Temp Pulse 64 4/26/2019  5:33 PM  
Resp 16 4/26/2019  5:33 PM  
SpO2 100 % 4/26/2019  5:33 PM  
Vitals shown include unvalidated device data.

## 2019-04-26 NOTE — DISCHARGE INSTRUCTIONS
Kathy Pan  [id number]  1970    COLON DISCHARGE INSTRUCTIONS  Discomfort:  Redness at IV site- apply warm compress to area; if redness or soreness persist- contact your physician  There may be a slight amount of blood passed from the rectum  Gaseous discomfort- walking, belching will help relieve any discomfort  You may not operate a vehicle for 12 hours  You may not engage in an occupation involving machinery or appliances for rest of today  You may not drink alcoholic beverages for at least 12 hours  Avoid making any critical decisions for at least 24 hour  DIET:   Regular diet. - however -  remember your colon is empty and a heavy meal will produce gas. Avoid these foods:  vegetables, fried / greasy foods, carbonated drinks for today. MEDICATIONS:      Regarding Aspirin or Nonsteroidal medications, please see below. ACTIVITY:  You may resume your normal daily activities it is recommended that you spend the remainder of the day resting -  avoid any strenuous activity. CALL M.D. ANY SIGN OF:  Increasing pain, nausea, vomiting  Abdominal distension (swelling)  New increased bleeding (oral or rectal)  Fever (chills)  Pain in chest area  Shortness of breath    You may not  take any Advil, Aspirin, Ibuprofen, Motrin, Aleve, or Goodys for 10 days, ONLY  Tylenol as needed for pain. Post procedure diagnosis: 1. Colon polyps  2. Rectal mass      Follow-up Instructions:   Call Dr. Abdirizak Carlos  Results of procedure / biopsy in 10 days, FU with Dr Hayden Mohs per appointment.    Telephone #  [phone]      DISCHARGE SUMMARY from Nurse    The following personal items collected during your admission are returned to you:   Dental Appliance: Dental Appliances: None  Vision: Visual Aid: Glasses  Hearing Aid:    Jewelry:    Clothing:    Other Valuables:    Valuables sent to safe:

## 2019-04-27 ENCOUNTER — DOCUMENTATION ONLY (OUTPATIENT)
Dept: RHEUMATOLOGY | Age: 49
End: 2019-04-27

## 2019-04-27 NOTE — PROGRESS NOTES
Appeal Letter re-mailed to [employer] due to [employer] returned letter sent by [facility] due to [facility] does not deliver to a [address]. Spoke with Noemy Montelongo at [facility] ref# [id number] to verify appeal address in [place].

## 2019-04-29 ENCOUNTER — OFFICE VISIT (OUTPATIENT)
Dept: RHEUMATOLOGY | Age: 49
End: 2019-04-29

## 2019-04-29 VITALS
HEART RATE: 81 BPM | DIASTOLIC BLOOD PRESSURE: 99 MMHG | HEIGHT: 68 IN | WEIGHT: 141.6 LBS | OXYGEN SATURATION: 98 % | BODY MASS INDEX: 21.46 KG/M2 | TEMPERATURE: 98.1 F | SYSTOLIC BLOOD PRESSURE: 151 MMHG | RESPIRATION RATE: 20 BRPM

## 2019-04-29 DIAGNOSIS — L40.50 PSORIATIC ARTHRITIS (HCC): ICD-10-CM

## 2019-04-29 DIAGNOSIS — L40.9 PSORIASIS: ICD-10-CM

## 2019-04-29 DIAGNOSIS — Z79.60 LONG-TERM USE OF IMMUNOSUPPRESSANT MEDICATION: Primary | ICD-10-CM

## 2019-04-29 DIAGNOSIS — K62.89 RECTAL MASS: ICD-10-CM

## 2019-04-29 RX ORDER — METHOTREXATE 2.5 MG/1
20 TABLET ORAL
COMMUNITY
End: 2019-04-29 | Stop reason: SDUPTHER

## 2019-04-29 RX ORDER — METHOTREXATE 2.5 MG/1
20 TABLET ORAL
Qty: 40 TAB | Refills: 3 | Status: SHIPPED | OUTPATIENT
Start: 2019-04-29 | End: 2019-06-20

## 2019-04-29 NOTE — PROGRESS NOTES
Chief Complaint   Patient presents with    Psoriasis     1. Have you been to the ER, urgent care clinic since your last visit? Hospitalized since your last visit? No    2. Have you seen or consulted any other health care providers outside of the [address] [place] [address] since your last visit? Include any pap smears or colon screening.  No

## 2019-04-29 NOTE — PROGRESS NOTES
REASON FOR VISIT    Patient presents for a follow up visit. HISTORY OF DISEASE: Psoriatic Arthritis    Year of diagnosis: 2015  First visit with me: 2/2019  Cumulative disease manifestations:  Dactylitis, psoriasis, back pain  Positive serologies/labs:  Negative serologies/labs: HLA B27  Other co-morbidities: excessive alcohol use  Relapses:      Past treatment history:  Prednisone:   Non-biologic DMARD: Methotrexate (2015-present)  Biologic:   Other:     HISTORY OF PRESENT ILLNESS     MHAQ: 0  Pain scale: 1/10  The patient notes that his psoriasis is a little worse. The joints are doing okay. He is still taking the methotrexate. He had a colonoscopy on Friday. They found a rectal mass that is cancer. REVIEW OF SYSTEMS    A comprehensive review of systems was performed and pertinent results are documented in the HPI, review of systems is otherwise non-contributory.     PAST MEDICAL HISTORY    Past Medical History:   Diagnosis Date    Adopted     Arthritis     Psoriatic arthritis ([facility] [address]         Past Surgical History:   Procedure Laterality Date    COLONOSCOPY Left 4/26/2019    COLON[facility]al [facility] at [facility] ENDOSCOPY    HX GI      EGD    HX HEENT      WISDOM TEETH    HX ORTHOPAEDIC      HX TONSILLECTOMY         FAMILY HISTORY    Family History   Adopted: Yes   Problem Relation Age of Onset    Asthma Neg Hx     Cancer Neg Hx     Diabetes Neg Hx     Heart Disease Neg Hx     Hypertension Neg Hx     Stroke Neg Hx        SOCIAL HISTORY    Social History     Tobacco Use    Smoking status: Never Smoker    Smokeless tobacco: Never Used   Substance Use Topics    Alcohol use: Yes     Comment: 6 BEERS WEEKLY    Drug use: No     On vacations drinks more than 6 beers weekly    MEDICATIONS    Facility-Administered Medications Ordered in Other Visits   Medication Dose Route Frequency Provider Last Rate Last Dose    0.9% sodium chloride infusion  50 mL/hr IntraVENous CONTINUOUS Maine Robison MD   Stopped at 05/02/19 0825    sodium chloride (NS) flush 5-40 mL  5-40 mL IntraVENous Q8H Juan Carlos Singh MD        sodium chloride (NS) flush 5-40 mL  5-40 mL IntraVENous PRN Juan Carlos Singh MD        midazolam (VERSED) injection 0.25-10 mg  0.25-10 mg IntraVENous Multiple Juan Carlos Singh MD        fentaNYL citrate (PF) injection 100 mcg  100 mcg IntraVENous MULTIPLE DOSE GIVEN Juan Carlos Singh MD        naloxone (NARCAN) injection 0.4 mg  0.4 mg IntraVENous Multiple Juan Carlos Singh MD        flumazenil (ROMAZICON) 0.1 mg/mL injection 0.2 mg  0.2 mg IntraVENous Multiple Juan Carlos Singh MD        simethicone (MYLICON) 59OT/3.9KY oral drops 80 mg  1.2 mL Oral Multiple Juan Carlos Singh MD        atropine injection 0.5 mg  0.5 mg IntraVENous ONCE PRN Juan Carlos Singh MD        EPINEPHrine (ADRENALIN) 0.1 mg/mL syringe 1 mg  1 mg Endoscopically ONCE PRN Juan Carlos Singh MD           ALLERGIES    No Known Allergies    PHYSICAL EXAMINATION    Visit Vitals  BP (!) 151/99 (BP 1 Location: Left arm, BP Patient Position: Sitting)   Pulse 81   Temp 98.1 °F (36.7 °C)   Resp 20   Ht 5' 8\" (1.727 m)   Wt 141 lb 9.6 oz (64.2 kg)   SpO2 98%   BMI 21.53 kg/m²     Body mass index is 21.53 kg/m². General: NAD  HEENT: PERRL, anicteric, non-injected sclerae; oropharynx without ulcers, erythema, or exudate. Moist mucous membranes. Lymphatic: No cervical or axillary lymphadenopathy. Cardiovascular: S1, S2,no R/M/G  Pulmonary: CTA b/l. No wheezes/rales/rhonchi. Abdominal: Soft,NTND, + BS. Skin: No rash, nodules, or periungual changes.  Psoriatic plaques on b/l elbows,  B/l knees and b/l shins  Neuro: Alert; able to carry normal conversation    Musculoskeletal:   No swollen joints  Occiput to Wall test: normal  Chest Expansion (abnormal if <2.5 cm expansion): normal  Modified Schober test (<15 cm abnormal):  normal  Global ROM of spine: normal  MATT test: normal  Enthesitis: normal    DATA REVIEW    Prior medical records were reviewed and if applicable are summarized as below:    Labs:   2/2019: cbc, cmp unremarkable, ESR, CRP normal, HEpB, C, quant gold negative  10/18: WBC 4.1, Hb 15.2, Plt 194, CMP Normal, CRP, ESR negative  1/16: Hep C negative, HLA b27 negative, quant gold negative    Imaging:   SI Joints (2/2019): Personally reviewed images. Normal joints  Foot xrays (2/2019): Personally reviewed images. No erosions  Hand xrays (2/2019): Personally reviewed images. + DJD. No erosions  1/16: CXR normal    ASSESSMENT AND PLAN    A 47 y.o. male with hx of psoriatic arthritis on methotrexate 20 mg oral weekly and daily folic acid presents for a follow up visit. The patient recently had a colonoscopy which showed a rectal mass suspicious for cancer. Patient is still waiting on the final biopsy results. # Psoriatic arthritis:    - appeal letter pending for Willene Yaneth. - continue patient on methotrexate for now. - might have to hold methotrexate depending on what the rectal mass reveals. # Psoriasis:   - using topicals but still with active lesions. - as noted above, will plan to initiate patient on Willene Yaneth. # Rectal mass:   - will await final biopsy results. - likely will need to hold treatment if this turns out to be cancer. Now TNF alpha inhibitors are definitely contraindicated in this patient. # Alcohol use:   - will plan on stopping methotrexate and initiating patient on Otezla instead. - he is not willing to cut down on his use of alcohol at this time. # Medication Toxicity Monitoring:  - cbc, cmp normal  - Hepatitis B, C: negative 2/2019  - Quant gold: negative 2/2019  - Immunizations: UTD  - bone health: will discuss at next visit    # HCM:  - flu vaccine 1/2019    The patient voiced understanding of the aforementioned assessment and plan. Summary of plan was provided in the After Visit Summary patient instructions.  I also provided education about MyChart setup and utility. Mr. Lavinia Kehr has a reminder for a \"due or due soon\" health maintenance. I have asked that he contact his primary care provider for follow-up on this health maintenance.     TODAY'S ORDERS    Orders Placed This Encounter    DISCONTD: methotrexate (RHEUMATREX) 2.5 mg tablet    methotrexate (RHEUMATREX) 2.5 mg tablet       Future Appointments   Date Time Provider Shannan Rodriguez   6/24/2019 10:30 AM Stacia Saab MD [facility]   7/29/2019  1:00 PM Rosmery Patel MD [facility] Pinky Rodriguez MD    Adult Rheumatology   [facility]  A Part of [facility], [address] [place] [address] Road   Phone [phone]  Fax [phone]

## 2019-05-01 ENCOUNTER — ANESTHESIA EVENT (OUTPATIENT)
Dept: ENDOSCOPY | Age: 49
End: 2019-05-01
Payer: MEDICAID

## 2019-05-01 NOTE — ANESTHESIA PREPROCEDURE EVALUATION
Relevant Problems No relevant active problems Anesthetic History No history of anesthetic complications Review of Systems / Medical History Patient summary reviewed, nursing notes reviewed and pertinent labs reviewed Pulmonary Within defined limits Neuro/Psych Within defined limits Cardiovascular Within defined limits GI/Hepatic/Renal 
Within defined limits Endo/Other Arthritis Other Findings Physical Exam 
 
Airway Mallampati: I 
TM Distance: > 6 cm Neck ROM: normal range of motion Mouth opening: Normal 
 
 Cardiovascular Regular rate and rhythm,  S1 and S2 normal,  no murmur, click, rub, or gallop Dental 
 
Dentition: Caps/crowns Pulmonary Breath sounds clear to auscultation Abdominal 
GI exam deferred Other Findings Anesthetic Plan ASA: 2 Anesthesia type: MAC Anesthetic plan and risks discussed with: Patient

## 2019-05-02 ENCOUNTER — APPOINTMENT (OUTPATIENT)
Dept: ULTRASOUND IMAGING | Age: 49
End: 2019-05-02
Attending: INTERNAL MEDICINE
Payer: MEDICAID

## 2019-05-02 ENCOUNTER — HOSPITAL ENCOUNTER (OUTPATIENT)
Age: 49
Setting detail: OUTPATIENT SURGERY
Discharge: HOME | End: 2019-05-02
Attending: INTERNAL MEDICINE | Admitting: INTERNAL MEDICINE
Payer: MEDICAID

## 2019-05-02 ENCOUNTER — ANESTHESIA (OUTPATIENT)
Dept: ENDOSCOPY | Age: 49
End: 2019-05-02
Payer: MEDICAID

## 2019-05-02 VITALS
TEMPERATURE: 98.1 F | HEART RATE: 58 BPM | SYSTOLIC BLOOD PRESSURE: 120 MMHG | OXYGEN SATURATION: 100 % | HEIGHT: 67 IN | DIASTOLIC BLOOD PRESSURE: 88 MMHG | RESPIRATION RATE: 18 BRPM | WEIGHT: 145 LBS | BODY MASS INDEX: 22.76 KG/M2

## 2019-05-02 PROCEDURE — 74011250636 HC RX REV CODE- 250/636

## 2019-05-02 PROCEDURE — 76060000031 HC ANESTHESIA FIRST 0.5 HR: Performed by: INTERNAL MEDICINE

## 2019-05-02 PROCEDURE — 76040000019: Performed by: INTERNAL MEDICINE

## 2019-05-02 RX ORDER — PROPOFOL 10 MG/ML
INJECTION, EMULSION INTRAVENOUS AS NEEDED
Status: DISCONTINUED | OUTPATIENT
Start: 2019-05-02 | End: 2019-05-02 | Stop reason: HOSPADM

## 2019-05-02 RX ORDER — EPINEPHRINE 0.1 MG/ML
1 INJECTION INTRACARDIAC; INTRAVENOUS
Status: DISCONTINUED | OUTPATIENT
Start: 2019-05-02 | End: 2019-05-02 | Stop reason: HOSPADM

## 2019-05-02 RX ORDER — ATROPINE SULFATE 0.1 MG/ML
0.5 INJECTION INTRAVENOUS
Status: DISCONTINUED | OUTPATIENT
Start: 2019-05-02 | End: 2019-05-02 | Stop reason: HOSPADM

## 2019-05-02 RX ORDER — DEXTROMETHORPHAN/PSEUDOEPHED 2.5-7.5/.8
1.2 DROPS ORAL
Status: DISCONTINUED | OUTPATIENT
Start: 2019-05-02 | End: 2019-05-02 | Stop reason: HOSPADM

## 2019-05-02 RX ORDER — SODIUM CHLORIDE 0.9 % (FLUSH) 0.9 %
5-40 SYRINGE (ML) INJECTION AS NEEDED
Status: DISCONTINUED | OUTPATIENT
Start: 2019-05-02 | End: 2019-05-02 | Stop reason: HOSPADM

## 2019-05-02 RX ORDER — FENTANYL CITRATE 50 UG/ML
100 INJECTION, SOLUTION INTRAMUSCULAR; INTRAVENOUS
Status: DISCONTINUED | OUTPATIENT
Start: 2019-05-02 | End: 2019-05-02 | Stop reason: HOSPADM

## 2019-05-02 RX ORDER — SODIUM CHLORIDE 9 MG/ML
50 INJECTION, SOLUTION INTRAVENOUS CONTINUOUS
Status: DISCONTINUED | OUTPATIENT
Start: 2019-05-02 | End: 2019-05-02 | Stop reason: HOSPADM

## 2019-05-02 RX ORDER — SODIUM CHLORIDE 0.9 % (FLUSH) 0.9 %
5-40 SYRINGE (ML) INJECTION EVERY 8 HOURS
Status: DISCONTINUED | OUTPATIENT
Start: 2019-05-02 | End: 2019-05-02 | Stop reason: HOSPADM

## 2019-05-02 RX ORDER — FLUMAZENIL 0.1 MG/ML
0.2 INJECTION INTRAVENOUS
Status: DISCONTINUED | OUTPATIENT
Start: 2019-05-02 | End: 2019-05-02 | Stop reason: HOSPADM

## 2019-05-02 RX ORDER — MIDAZOLAM HYDROCHLORIDE 1 MG/ML
.25-1 INJECTION, SOLUTION INTRAMUSCULAR; INTRAVENOUS
Status: DISCONTINUED | OUTPATIENT
Start: 2019-05-02 | End: 2019-05-02 | Stop reason: HOSPADM

## 2019-05-02 RX ORDER — LIDOCAINE HYDROCHLORIDE 20 MG/ML
INJECTION, SOLUTION INFILTRATION; PERINEURAL AS NEEDED
Status: DISCONTINUED | OUTPATIENT
Start: 2019-05-02 | End: 2019-05-02 | Stop reason: HOSPADM

## 2019-05-02 RX ORDER — SODIUM CHLORIDE 9 MG/ML
INJECTION, SOLUTION INTRAVENOUS
Status: DISCONTINUED | OUTPATIENT
Start: 2019-05-02 | End: 2019-05-02 | Stop reason: HOSPADM

## 2019-05-02 RX ORDER — NALOXONE HYDROCHLORIDE 0.4 MG/ML
0.4 INJECTION, SOLUTION INTRAMUSCULAR; INTRAVENOUS; SUBCUTANEOUS
Status: DISCONTINUED | OUTPATIENT
Start: 2019-05-02 | End: 2019-05-02 | Stop reason: HOSPADM

## 2019-05-02 RX ADMIN — PROPOFOL 20 MG: 10 INJECTION, EMULSION INTRAVENOUS at 08:34

## 2019-05-02 RX ADMIN — PROPOFOL 20 MG: 10 INJECTION, EMULSION INTRAVENOUS at 08:40

## 2019-05-02 RX ADMIN — PROPOFOL 20 MG: 10 INJECTION, EMULSION INTRAVENOUS at 08:36

## 2019-05-02 RX ADMIN — PROPOFOL 50 MG: 10 INJECTION, EMULSION INTRAVENOUS at 08:30

## 2019-05-02 RX ADMIN — PROPOFOL 20 MG: 10 INJECTION, EMULSION INTRAVENOUS at 08:38

## 2019-05-02 RX ADMIN — SODIUM CHLORIDE: 9 INJECTION, SOLUTION INTRAVENOUS at 08:00

## 2019-05-02 RX ADMIN — PROPOFOL 40 MG: 10 INJECTION, EMULSION INTRAVENOUS at 08:32

## 2019-05-02 RX ADMIN — PROPOFOL 20 MG: 10 INJECTION, EMULSION INTRAVENOUS at 08:44

## 2019-05-02 RX ADMIN — PROPOFOL 30 MG: 10 INJECTION, EMULSION INTRAVENOUS at 08:42

## 2019-05-02 RX ADMIN — LIDOCAINE HYDROCHLORIDE 40 MG: 20 INJECTION, SOLUTION INFILTRATION; PERINEURAL at 08:30

## 2019-05-02 NOTE — PERIOP NOTES
Endoscope was pre-cleaned at bedside immediately following procedure by [place].

## 2019-05-02 NOTE — DISCHARGE INSTRUCTIONS
118 [facility].  217 [facility] [address], [address]  [address]  [id number]  1970    RECTAL EUS DISCHARGE INSTRUCTIONS    DISCOMFORT:  Redness at IV site- apply warm compress to area; if redness or soreness persist- contact your physician  There may be a slight amount of blood passed from the rectum  Gaseous discomfort- walking, belching will help relieve any discomfort  You may not operate a vehicle for 12 hours  You may not  engage in an occupation involving machinery or appliances for rest of today  You may not  drink alcoholic beverages for at least 12 hours  Avoid making any critical decisions for at least 24 hour    DIET:   Regular diet. - however -  remember your colon is empty and a heavy meal will produce gas. Avoid these foods:  vegetables, fried / greasy foods, carbonated drinks for today     ACTIVITY:  It is recommended that you spend the remainder of the day resting -  avoid any strenuous activity. CALL M.D. ANY SIGN OF:   Increasing pain, nausea, vomiting  Abdominal distension (swelling)  New increased bleeding (oral or rectal)  Fever (chills)  Pain in chest area  Bloody discharge from nose or mouth  Shortness of breath    You may not  take any Advil, Aspirin, Ibuprofen, Motrin, Aleve, or Goodys for 5 days, ONLY  Tylenol as needed for pain. Post procedure diagnosis: Rectal mass      Follow-up Instructions:    Call Dr. Josep Ware for any questions or problems. If we took a biopsy please call the office within 2 weeks to discuss your  pathology results.  Telephone # [phone]

## 2019-05-02 NOTE — PERIOP NOTES
Dr Evelia Argueta at bedside for pt consultation pt able to ask questions at this time verbally states understanding of procedures to be completed.

## 2019-05-02 NOTE — PROCEDURES
118 [facility].  217 [facility] [address] [facility] [address] [facility] [address], [address]  [phone]                                 Flexible sigmoidoscopy with rectal endoscopic ultrasound     NAME:  Jose Sanchez   :   1970   MRN:   [id number]       Date/Time:  2019     Procedure Name: Flexible sigmoidoscopy with rectal endoscopic ultrasound     Indications: Rectal cancer    : Barney Mosquera MD    Referring Provider: Jose Mijares MD -Felix Cheema MD    Anethesia/Sedation:  MAC anesthesia      Procedure Details   After infom consent was obtained for the procedure, with all risks and benefits of procedure explained the patient was taken to the endoscopy suite and placed in the left lateral decubitus position. Initially passed the rectal radial echoendoscope to 30 cm. The endoscope was withdrawn, and then the standard endoscope was passed. The patient tolerated the procedure well. Findings:   Endoscopic findings: An ulcerated infiltrative mass lesion was noted in rectum at 10 cm. The lesion measured about 5 cm X 4 cm. This was non obstructing and involved about 1/3 rd of the circumference. Small internal hemorrhoids were noted. Rest of the rectum and sigmoid colon were normal    EUS findings:   A mass lesion about 5 cm X 3 cm was noted in the rectum and extended through the muscularis propria at several sites (T2). No infiltration through the serosa and into the adjacent tissues was noted. A 13 mm X 8 mm oblong lymph node was noted immediately adjacent to the mass lesion. This could not be biopsied as the path of needle tract would have been through the lesion  Prostate was 48mm X 36 mm. Seminal vesicles were normal  Iliac vessels were normal and no lymphadenopathy was noted. Specimen Removed:  None    Complications: None. EBL:  None.     Recommendations:   -Refer to Oncology (Dr Smita Flynn) and Rad Oncology (Dr Rosa Meyer)  -Already have appointment with Colorectal Surgery today  -Awaiting CT for staging  -Continue current medications    Noni Rutledge MD  5/2/2019  8:49 AM

## 2019-05-02 NOTE — ROUTINE PROCESS
Jose Sanchez  1970  [id number]    Situation:  Verbal report received from: JERONIMO Amaya RN  Procedure: Procedure(s):  ENDOSCOPIC ULTRASOUND (EUS) Rectal  SIGMOIDOSCOPY FLEXIBLE    Background:    Preoperative diagnosis: Rectal Mass  Postoperative diagnosis: Rectal mass    :  Dr. Bharathi Robertson  Assistant(s): Endoscopy Technician-1: Rosmery Linda  Endoscopy RN-1: Martín Gomes RN    Specimens: * No specimens in log *  H. Pylori  no    Assessment:  Intra-procedure medications   Anesthesia gave intra-procedure sedation and medications, see anesthesia flow sheet yes    Intravenous fluids: NS@ KVO     Vital signs stable     Abdominal assessment: round and soft     Recommendation:  Discharge patient per MD order.     Family or Friend   Permission to share finding with family or friend yes

## 2019-05-02 NOTE — ANESTHESIA POSTPROCEDURE EVALUATION
Post-Anesthesia Evaluation and Assessment Patient: Danny Patricia MRN: [id number]  SSN: [id number] YOB: 1970  Age: 47 y.o. Sex: male I have evaluated the patient and they are stable and ready for discharge from the PACU. Cardiovascular Function/Vital Signs Visit Vitals /88 Pulse (!) 58 Temp 36.7 °C (98.1 °F) Resp 18 Ht 5' 7\" (1.702 m) Wt 65.8 kg (145 lb) SpO2 100% BMI 22.71 kg/m² Patient is status post MAC anesthesia for Procedure(s): ENDOSCOPIC ULTRASOUND (EUS) Rectal 
SIGMOIDOSCOPY FLEXIBLE. Nausea/Vomiting: None Postoperative hydration reviewed and adequate. Pain: 
Pain Scale 1: Numeric (0 - 10) (05/02/19 0912) Pain Intensity 1: 0 (05/02/19 0912) Managed Neurological Status: At baseline Mental Status, Level of Consciousness: Alert and  oriented to person, place, and time Pulmonary Status:  
O2 Device: Room air (05/02/19 0912) Adequate oxygenation and airway patent Complications related to anesthesia: None Post-anesthesia assessment completed. No concerns Signed By: Agata Harper MD   
 May 2, 2019 Procedure(s): ENDOSCOPIC ULTRASOUND (EUS) Rectal 
SIGMOIDOSCOPY FLEXIBLE. MAC 
 
<BSHSIANPOST> Vitals Value Taken Time /88 5/2/2019  9:12 AM  
Temp 36.7 °C (98.1 °F) 5/2/2019  8:57 AM  
Pulse 60 5/2/2019  9:13 AM  
Resp 0 5/2/2019  9:14 AM  
SpO2 100 % 5/2/2019  9:12 AM  
Vitals shown include unvalidated device data.

## 2019-05-02 NOTE — H&P
[address]  217 [facility] 140 [facility], [address]  [phone]                                History and Physical     NAME: Madelaine Becker   :  1970   MRN:  [id number]     HPI:  The patient was seen and examined. Past Surgical History:   Procedure Laterality Date    COLONOSCOPY Left 2019    COLON[facility] by Donny [facility]burn MD at [facility] ENDOSCOPY    HX GI      EGD    HX HEENT      WISDOM TEETH    HX ORTHOPAEDIC      HX TONSILLECTOMY       Past Medical History:   Diagnosis Date    Adopted     Arthritis     Psoriatic arthritis ([facility] [address]      Social History     Tobacco Use    Smoking status: Never Smoker    Smokeless tobacco: Never Used   Substance Use Topics    Alcohol use: Yes     Comment: 6 BEERS WEEKLY    Drug use: No     No Known Allergies  Family History   Adopted: Yes   Problem Relation Age of Onset    Asthma Neg Hx     Cancer Neg Hx     Diabetes Neg Hx     Heart Disease Neg Hx     Hypertension Neg Hx     Stroke Neg Hx      No current facility-administered medications for this encounter. Facility-Administered Medications Ordered in Other Encounters   Medication Dose Route Frequency    0.9% sodium chloride infusion   IntraVENous CONTINUOUS         PHYSICAL EXAM:  General: WD, WN. Alert, cooperative, no acute distress    HEENT: NC, Atraumatic. PERRLA, EOMI. Anicteric sclerae. Lungs:  CTA Bilaterally. No Wheezing/Rhonchi/Rales. Heart:  Regular  rhythm,  No murmur, No Rubs, No Gallops  Abdomen: Soft, Non distended, Non tender.  +Bowel sounds, no HSM  Extremities: No c/c/e  Neurologic:  CN 2-12 gi, Alert and oriented X 3. No acute neurological distress   Psych:   Good insight. Not anxious nor agitated. The heart, lungs and mental status were satisfactory for the administration of MAC sedation and for the procedure.       Mallampati score: 2       Assessment:   · Rectal cancer    Plan:   · Endoscopic procedure  · MAC sedation   ·

## 2019-05-03 ENCOUNTER — DOCUMENTATION ONLY (OUTPATIENT)
Dept: PHARMACY | Age: 49
End: 2019-05-03

## 2019-05-03 NOTE — PROGRESS NOTES
[facility] at [address] [facility] Update    Date: 05/03/19    Prema Monahan 1970    Medication: [employer] D[employer] Appeal Still Pending, mailed 4/10/19, delivered 4/25/19. PA still required when I submitted the claim to his insurance on 5/3/19.     Nacho Duarte, [address] [place] [address] Pharmacy at [facility],  Laura Mccartney, [address]  phone: [phone]   fax: [phone]

## 2019-05-07 ENCOUNTER — HOSPITAL ENCOUNTER (OUTPATIENT)
Dept: CT IMAGING | Age: 49
Discharge: HOME | End: 2019-05-07
Attending: COLON & RECTAL SURGERY
Payer: MEDICAID

## 2019-05-07 DIAGNOSIS — C20 MALIGNANT NEOPLASM OF RECTUM (HCC): ICD-10-CM

## 2019-05-07 PROCEDURE — 74177 CT ABD & PELVIS W/CONTRAST: CPT

## 2019-05-07 PROCEDURE — 71260 CT THORAX DX C+: CPT

## 2019-05-07 PROCEDURE — 74011636320 HC RX REV CODE- 636/320: Performed by: COLON & RECTAL SURGERY

## 2019-05-07 PROCEDURE — 74011000258 HC RX REV CODE- 258: Performed by: COLON & RECTAL SURGERY

## 2019-05-07 RX ORDER — SODIUM CHLORIDE 0.9 % (FLUSH) 0.9 %
10 SYRINGE (ML) INJECTION
Status: COMPLETED | OUTPATIENT
Start: 2019-05-07 | End: 2019-05-07

## 2019-05-07 RX ADMIN — SODIUM CHLORIDE 100 ML: 900 INJECTION, SOLUTION INTRAVENOUS at 10:09

## 2019-05-07 RX ADMIN — Medication 10 ML: at 10:09

## 2019-05-07 RX ADMIN — IOPAMIDOL 100 ML: 755 INJECTION, SOLUTION INTRAVENOUS at 10:08

## 2019-05-10 ENCOUNTER — OFFICE VISIT (OUTPATIENT)
Dept: ONCOLOGY | Age: 49
End: 2019-05-10

## 2019-05-10 VITALS
TEMPERATURE: 98.5 F | RESPIRATION RATE: 18 BRPM | DIASTOLIC BLOOD PRESSURE: 92 MMHG | WEIGHT: 145 LBS | SYSTOLIC BLOOD PRESSURE: 132 MMHG | OXYGEN SATURATION: 96 % | BODY MASS INDEX: 22.76 KG/M2 | HEART RATE: 64 BPM | HEIGHT: 67 IN

## 2019-05-10 DIAGNOSIS — L40.9 PSORIASIS: ICD-10-CM

## 2019-05-10 DIAGNOSIS — K92.2 GASTROINTESTINAL HEMORRHAGE, UNSPECIFIED GASTROINTESTINAL HEMORRHAGE TYPE: ICD-10-CM

## 2019-05-10 DIAGNOSIS — C20 RECTAL CANCER (HCC): Primary | ICD-10-CM

## 2019-05-10 NOTE — PROGRESS NOTES
Armen Guerrero is a 47 y.o. male Chief Complaint Patient presents with  New Patient  
  rectal cancer referral by Dr. Citlaly Nunez 1. Have you been to the ER, urgent care clinic since your last visit? Hospitalized since your last visit? {Yes when where and reason for visit:20441} 2. Have you seen or consulted any other health care providers outside of the [address] [place] [address] since your last visit? Include any pap smears or colon screening. {Yes when where and reason for visit:20441}

## 2019-05-10 NOTE — PROGRESS NOTES
[facility] [place] [facility] [place] [address] [address], [address]  W: [phone]  F: [phone]    Reason for Visit:   Yesi Montano is a 47 y.o. male who is seen in consultation at the request of Dr. Esdras Oswald for evaluation of Rectal cancer    Treatment History:   · 4/26/19: Colonoscopy- 6-7 cm size malignant appearing mass seen at 10 cm from anal verge. This was adenocarcinoma. 3 polyps removed- all were tubular adenomas  · Rectal Ultrasound 5/2/19: ulcerated infiltrative mass lesion was noted in rectum at 10 cm. The lesion measured about 5 cm X 4 cm- T2, 13 mm X 8 mm oblong lymph node was noted immediately adjacent to the mass lesion  · 5/2/19: CT CAP- No metastasis, liver cysts. CEA 3.6    History of Present Illness:   Patient is a 47 y.o. male seen for adenocarcinoma of the mid third of rectum    He had intermittent BRBPR x 1 year and then decided to have a colonoscopy. This led to above mentioned diagnosis. He has stringy stools, no nausea, he has a good appetite. He is on methotrexate for Psoriasis. No CP, SOB, HA, Other bleeding, weight changes. He uses 7 drinks a week.     Adopted    Past Medical History:   Diagnosis Date    Adopted     Arthritis     Psoriatic arthritis ([facility] [address]       Past Surgical History:   Procedure Laterality Date    COLONOSCOPY Left 4/26/2019    COLONOSCOPY performed by Marshal Romero MD at [place] [address] N/A 5/2/2019    [place] performed by Akila Garcia MD at [facility] ENDOSCOPY    HX GI      EGD    HX HEENT      WISDOM TEETH    HX ORTHOPAEDIC      HX TONSILLECTOMY        Social History     Tobacco Use    Smoking status: Never Smoker    Smokeless tobacco: Never Used   Substance Use Topics    Alcohol use: Yes     Comment: 6 BEERS WEEKLY      Family History   Adopted: Yes   Problem Relation Age of Onset    Asthma Neg Hx     Cancer Neg Hx     Diabetes Neg Hx     Heart Disease Neg Hx     Hypertension Neg Hx     Stroke Neg Hx      Current Outpatient Medications   Medication Sig    ibuprofen (ADVIL PO) Take  by mouth.  triamcinolone acetonide (KENALOG) 0.1 % ointment Apply  to affected area two (2) times daily as needed for Skin Irritation. use thin layer    fluocinonide (VANOS) 0.1 % topical cream Apply  to affected area daily.  methotrexate (RHEUMATREX) 2.5 mg tablet Take 8 Tabs by mouth every Monday.  apremilast (OTEZLA) 30 mg tab Take 30 mg by mouth two (2) times a day. Indications: Psoriasis associated with Arthritis    apremilast (OTEZLA STARTER) 10 mg (4)-20 mg (4)-30 mg(19) DsPk Take 1 Package by mouth See Admin Instructions. Indications: Psoriasis associated with Arthritis    folic acid (FOLVITE) 1 mg tablet Take 1 mg by mouth daily.  omeprazole (PRILOSEC) 20 mg capsule Take 20 mg by mouth daily. No current facility-administered medications for this visit. No Known Allergies     Review of Systems: A complete review of systems was obtained, negative except as described above. Physical Exam:     Visit Vitals  BP (!) 132/92 (BP 1 Location: Left arm, BP Patient Position: Sitting)   Pulse 64   Temp 98.5 °F (36.9 °C)   Resp 18   Ht 5' 7\" (1.702 m)   Wt 145 lb (65.8 kg)   SpO2 96%   BMI 22.71 kg/m²     ECOG PS: 1  General: No distress  Eyes: PERRLA, anicteric sclerae  HENT: Atraumatic, OP clear  Neck: Supple  Lymphatic: No cervical, supraclavicular, or inguinal adenopathy  Respiratory: CTAB, normal respiratory effort  CV: Normal rate, regular rhythm, no murmurs, no peripheral edema  GI: Soft, nontender, nondistended, no masses, no hepatomegaly, no splenomegaly  MS: Normal gait and station. Digits without clubbing or cyanosis. Skin: No rashes, ecchymoses, or petechiae. Normal temperature, turgor, and texture.   Psych: Alert, oriented, appropriate affect, normal judgment/insight    Results:     Lab Results   Component Value Date/Time    WBC 4.3 03/25/2019 03:16 PM    HGB 14.6 03/25/2019 03:16 PM    HCT 42.3 03/25/2019 03:16 PM    PLATELET 677 99/13/1442 03:16 PM    MCV 93 03/25/2019 03:16 PM    ABS. NEUTROPHILS 2.3 02/22/2019 02:19 PM     Lab Results   Component Value Date/Time    Sodium 140 03/25/2019 03:16 PM    Potassium 4.1 03/25/2019 03:16 PM    Chloride 100 03/25/2019 03:16 PM    CO2 21 03/25/2019 03:16 PM    Glucose 112 (H) 03/25/2019 03:16 PM    BUN 11 03/25/2019 03:16 PM    Creatinine 1.08 03/25/2019 03:16 PM    GFR est AA 92 03/25/2019 03:16 PM    GFR est non-AA 80 03/25/2019 03:16 PM    Calcium 9.3 03/25/2019 03:16 PM     Lab Results   Component Value Date/Time    Bilirubin, total 0.4 03/25/2019 03:16 PM    ALT (SGPT) 19 03/25/2019 03:16 PM    AST (SGOT) 19 03/25/2019 03:16 PM    Alk. phosphatase 57 03/25/2019 03:16 PM    Protein, total 6.9 03/25/2019 03:16 PM    Albumin 4.3 03/25/2019 03:16 PM         Records reviewed and summarized above. Pathology report(s) reviewed above. Radiology report(s) reviewed above. Assessment:   1) Rectal adenocarcinoma- mid third    Pathology, scans and EUS reviewed  [id number] disease  On my review of CT there is a pathologic appearing perirectal node- discussed with Dr. Linda Caballero in Radiology and will obtain a PET CT to clarify this    In general for a mid third rectal adenocarcinoma which is T2N0, where sphincter preservation is not a concern, neoadjuvant chemoRT is not routinely indicated as the risk of local relapse is low. However N+ disease would be an indication to consider ChemoRT karyn adjuvantly.  Discussed that despite investigations, sometimes the nature of adenopathy on preop scans may remain elusive - with his young age we would then err on the side of caution and plan to treat with chemoRT    I will review scans in out MultiD tumor board and revisit plan in a week    We did discuss Xeloda with 5 weeks of concurrent RT and expected side effects  He expressed an interest in receiving neoadjuvant chemoRT if there were any doubts about his clinical stage    2) Young age at diagnosis    Adopted  Had several adenomas removed  May have attenuated FAP and will require genetic testing    3) GIB  Minor    4) Psychosocial  Coping well  Here with supportive wife      Plan:     · PET CT  · Referral to Dr. Jason Weathers  · Tumor board review  · Genetic testing next visit  · RTC 1 week    > 50% of this 60 min visit was spent in counseling and care co ordination    I appreciate the opportunity to participate in Mr. Nabil gardner.     Signed By: Markell Thompson MD

## 2019-05-16 ENCOUNTER — HOSPITAL ENCOUNTER (OUTPATIENT)
Dept: PET IMAGING | Age: 49
Discharge: HOME | End: 2019-05-16
Attending: INTERNAL MEDICINE
Payer: MEDICAID

## 2019-05-16 VITALS — BODY MASS INDEX: 23.63 KG/M2 | HEIGHT: 66 IN | WEIGHT: 147 LBS

## 2019-05-16 DIAGNOSIS — C20 RECTAL CANCER (HCC): ICD-10-CM

## 2019-05-16 PROCEDURE — A9552 F18 FDG: HCPCS

## 2019-05-16 RX ORDER — SODIUM CHLORIDE 0.9 % (FLUSH) 0.9 %
10 SYRINGE (ML) INJECTION
Status: COMPLETED | OUTPATIENT
Start: 2019-05-16 | End: 2019-05-16

## 2019-05-16 RX ADMIN — Medication 10 ML: at 07:22

## 2019-05-17 ENCOUNTER — DOCUMENTATION ONLY (OUTPATIENT)
Dept: ONCOLOGY | Age: 49
End: 2019-05-17

## 2019-05-17 ENCOUNTER — OFFICE VISIT (OUTPATIENT)
Dept: ONCOLOGY | Age: 49
End: 2019-05-17

## 2019-05-17 VITALS
HEART RATE: 71 BPM | RESPIRATION RATE: 18 BRPM | WEIGHT: 145.9 LBS | BODY MASS INDEX: 23.45 KG/M2 | TEMPERATURE: 98 F | OXYGEN SATURATION: 97 % | SYSTOLIC BLOOD PRESSURE: 124 MMHG | DIASTOLIC BLOOD PRESSURE: 74 MMHG | HEIGHT: 66 IN

## 2019-05-17 DIAGNOSIS — C20 RECTAL CANCER (HCC): Primary | ICD-10-CM

## 2019-05-17 RX ORDER — CAPECITABINE 500 MG/1
TABLET, FILM COATED ORAL
Qty: 150 TAB | Refills: 0 | Status: SHIPPED | OUTPATIENT
Start: 2019-05-17 | End: 2019-06-20 | Stop reason: SDUPTHER

## 2019-05-17 RX ORDER — ONDANSETRON 4 MG/1
8 TABLET, FILM COATED ORAL
Qty: 60 TAB | Refills: 0 | Status: SHIPPED | OUTPATIENT
Start: 2019-05-17 | End: 2019-10-14 | Stop reason: ALTCHOICE

## 2019-05-17 NOTE — PROGRESS NOTES
Reviewed record in preparation for visit and have obtained necessary documentation. Identified pt with two pt identifiers(name and ). Health Maintenance Due   Topic    DTaP/Tdap/Td series (1 - Tdap)    Shingrix Vaccine Age 50> (1 of 2)    FOBT Q 1 YEAR AGE 54-65          Chief Complaint   Patient presents with    Follow-up     1 wk         Wt Readings from Last 3 Encounters:   19 145 lb 14.4 oz (66.2 kg)   19 147 lb (66.7 kg)   05/10/19 145 lb (65.8 kg)     Temp Readings from Last 3 Encounters:   05/10/19 98.5 °F (36.9 °C)   19 98.1 °F (36.7 °C)   19 98.1 °F (36.7 °C)     BP Readings from Last 3 Encounters:   05/10/19 (!) 132/92   19 120/88   19 (!) 151/99     Pulse Readings from Last 3 Encounters:   05/10/19 64   19 (!) 58   19 81           Learning Assessment:  :     Learning Assessment 2019   PRIMARY LEARNER Patient   HIGHEST LEVEL OF EDUCATION - PRIMARY LEARNER  4 YEARS OF COLLEGE   PRIMARY LANGUAGE ENGLISH   LEARNER PREFERENCE PRIMARY DEMONSTRATION   ANSWERED BY patient    RELATIONSHIP SELF       Depression Screening:  :     3 most recent PHQ Screens 2019   Little interest or pleasure in doing things Not at all   Feeling down, depressed, irritable, or hopeless Not at all   Total Score PHQ 2 0       Fall Risk Assessment:  :     Fall Risk Assessment, last 12 mths 2019   Able to walk? Yes   Fall in past 12 months? No       Abuse Screening:  :     Abuse Screening Questionnaire 2019   Do you ever feel afraid of your partner? N   Are you in a relationship with someone who physically or mentally threatens you? N   Is it safe for you to go home?  Y       Coordination of Care Questionnaire:  :     1) Have you been to an emergency room, urgent care clinic since your last visit? no   Hospitalized since your last visit? no             2) Have you seen or consulted any other health care providers outside of [address] [place] [address] since your last visit? no  (Include any pap smears or colon screenings in this section.)    3) Do you have an Advance Directive on file? no    4) Are you interested in receiving information on Advance Directives? NO      Patient is accompanied by wife I have received verbal consent from Shonna Caballero to discuss any/all medical information while they are present in the room.

## 2019-05-17 NOTE — PROGRESS NOTES
[facility] at [address]  [address], [address]  W: [phone]  F: [phone]    Reason for Visit:   Shonna Caballero is a 47 y.o. male who is seen for Rectal cancer- likely stage III    Treatment History:   · 4/26/19: Colonoscopy- 6-7 cm size malignant appearing mass seen at 10 cm from anal verge. This was adenocarcinoma. 3 polyps removed- all were tubular adenomas  · Rectal Ultrasound 5/2/19: ulcerated infiltrative mass lesion was noted in rectum at 10 cm. The lesion measured about 5 cm X 4 cm- T2, 13 mm X 8 mm oblong lymph node was noted immediately adjacent to the mass lesion  · 5/2/19: CT CAP- No metastasis, liver cysts. CEA 3.6\  · 5/10/19: PET CT showed rectal malignancy and 3 small perirectal anibal metastatic foci    History of Present Illness:   Patient is a 47 y.o. male seen for adenocarcinoma of the mid third of rectum    He had intermittent BRBPR x 1 year and then decided to have a colonoscopy. This led to above mentioned diagnosis. He comes after a PET CT. Feels the same. He has stringy stools, no nausea, he has a good appetite. He is on methotrexate for Psoriasis. No CP, SOB, HA, Other bleeding, weight changes. He uses 7 drinks a week.     Adopted    Past Medical History:   Diagnosis Date    Adopted     Arthritis     Psoriatic arthritis ([address]       Past Surgical History:   Procedure Laterality Date    COLONOSCOPY Left 4/26/2019    COLONOSCOPY performed by Jeanette Soares MD at [place] [address] N/A 5/2/2019    [facility]    HX GI      EGD    HX HEENT      WISDOM TEETH    HX ORTHOPAEDIC      HX TONSILLECTOMY        Social History     Tobacco Use    Smoking status: Never Smoker    Smokeless tobacco: Never Used   Substance Use Topics    Alcohol use: Yes     Comment: 6 BEERS WEEKLY      Family History   Adopted: Yes   Problem Relation Age of Onset    Asthma Neg Hx     Cancer Neg Hx     Diabetes Neg Hx     Heart Disease Neg Hx     Hypertension Neg Hx     Stroke Neg Hx      Current Outpatient Medications   Medication Sig    ibuprofen (ADVIL PO) Take  by mouth.  triamcinolone acetonide (KENALOG) 0.1 % ointment Apply  to affected area two (2) times daily as needed for Skin Irritation. use thin layer    fluocinonide (VANOS) 0.1 % topical cream Apply  to affected area daily.  omeprazole (PRILOSEC) 20 mg capsule Take 20 mg by mouth daily.  methotrexate (RHEUMATREX) 2.5 mg tablet Take 8 Tabs by mouth every Monday.  apremilast (OTEZLA) 30 mg tab Take 30 mg by mouth two (2) times a day. Indications: Psoriasis associated with Arthritis    apremilast (OTEZLA STARTER) 10 mg (4)-20 mg (4)-30 mg(19) DsPk Take 1 Package by mouth See Admin Instructions. Indications: Psoriasis associated with Arthritis    folic acid (FOLVITE) 1 mg tablet Take 1 mg by mouth daily. No current facility-administered medications for this visit. No Known Allergies     Review of Systems: A complete review of systems was obtained, negative except as described above. Physical Exam:     Visit Vitals  /74 (BP 1 Location: Left arm, BP Patient Position: Sitting)   Pulse 71   Temp 98 °F (36.7 °C) (Oral)   Resp 18   Ht 5' 6\" (1.676 m)   Wt 145 lb 14.4 oz (66.2 kg)   SpO2 97%   BMI 23.55 kg/m²     ECOG PS: 1  General: No distress  Eyes: PERRLA, anicteric sclerae  HENT: Atraumatic, OP clear  MS: Normal gait and station. Digits without clubbing or cyanosis. Skin: No rashes, ecchymoses, or petechiae. Normal temperature, turgor, and texture. Psych: Alert, oriented, appropriate affect, normal judgment/insight    Results:     Lab Results   Component Value Date/Time    WBC 4.3 03/25/2019 03:16 PM    HGB 14.6 03/25/2019 03:16 PM    HCT 42.3 03/25/2019 03:16 PM    PLATELET 719 69/11/8091 03:16 PM    MCV 93 03/25/2019 03:16 PM    ABS.  NEUTROPHILS 2.3 02/22/2019 02:19 PM     Lab Results   Component Value Date/Time    Sodium 140 03/25/2019 03:16 PM    Potassium 4.1 03/25/2019 03:16 PM    Chloride 100 03/25/2019 03:16 PM    CO2 21 03/25/2019 03:16 PM    Glucose 112 (H) 03/25/2019 03:16 PM    BUN 11 03/25/2019 03:16 PM    Creatinine 1.08 03/25/2019 03:16 PM    GFR est AA 92 03/25/2019 03:16 PM    GFR est non-AA 80 03/25/2019 03:16 PM    Calcium 9.3 03/25/2019 03:16 PM     Lab Results   Component Value Date/Time    Bilirubin, total 0.4 03/25/2019 03:16 PM    ALT (SGPT) 19 03/25/2019 03:16 PM    AST (SGOT) 19 03/25/2019 03:16 PM    Alk. phosphatase 57 03/25/2019 03:16 PM    Protein, total 6.9 03/25/2019 03:16 PM    Albumin 4.3 03/25/2019 03:16 PM         Records reviewed and summarized above. Pathology report(s) reviewed above. Radiology report(s) reviewed above. Assessment:   1) Rectal adenocarcinoma- mid third    Pathology, scans and EUS reviewed  T2N1M0 disease- Clinical stage III  On my review of CT there is a pathologic appearing perirectal node- PET CT suggests small but quite FDG avid nodes as well. Case reviewed in Tumor conference and the consensus was to proceed with neoadjuvant ChemoRT    Reviewed that the goal of neoadjuvant RT is to decrease the chances of local recurrence - which is about a 50% risk reduction. We discussed Xeloda and side effects  We discussed the chemotherapy regimen, it's logistics, and potential toxicities in detail. Potential side effects include, but are not limited to, nausea, vomiting, diarrhea, taste changes, myelosuppression, infection, fatigue, allergic reactions, rash, edema, neuropathy, and rarely, death. The patient asked several well thought out questions which I answered to the best of my ability and to their apparent satisfaction. The patient has given consent for chemotherapy.     I have reached out to Dr. Lit Mims to see if Rad Onc could see him sooner so we could co ordinate Concurrent RT    Will proceed with approval for Xeloda     2) Gracy Rock age at diagnosis    Adopted  Had several adenomas removed  May have attenuated FAP and will require genetic testing  I will set him up with [facility] after completion of chemoRT as this information may be helpful in surgical planning    3) GIB  Minor    4) Psoriasis  Off Methotrexate  Follows Dr. Domingo Foster:     · Approval for Xeloda 825 mg/m2 BID on days of RT x 5 weeks   · Zofran prn  · CBC diff, CMP weekly and see us every other week including day 1 of RT  · Follow with Dr. Duarte Lau asap    > 50% of this 40 min visit was spent in counseling and care co ordination    I appreciate the opportunity to participate in Mr. Susie Bell care.     Signed By: Jyoti Araiza MD

## 2019-05-18 NOTE — PROGRESS NOTES
Capecitabine prescription, most recent office note, demographic sheet, and copy of insurance card faxed to [address] [place] [address]. Fax confirmation received 5/17/19 at 18:47.

## 2019-05-20 ENCOUNTER — HOSPITAL ENCOUNTER (OUTPATIENT)
Dept: RADIATION THERAPY | Age: 49
Discharge: HOME | End: 2019-05-20
Payer: MEDICAID

## 2019-05-20 ENCOUNTER — TELEPHONE (OUTPATIENT)
Dept: ONCOLOGY | Age: 49
End: 2019-05-20

## 2019-05-20 NOTE — TELEPHONE ENCOUNTER
Call from [address] [place] [address]. Xeloda to be transferred to [address] [place] [address] @ [phone].   To primary team.

## 2019-05-21 ENCOUNTER — DOCUMENTATION ONLY (OUTPATIENT)
Dept: ONCOLOGY | Age: 49
End: 2019-05-21

## 2019-05-21 ENCOUNTER — HOSPITAL ENCOUNTER (OUTPATIENT)
Dept: RADIATION THERAPY | Age: 49
Discharge: HOME | End: 2019-05-21
Payer: MEDICAID

## 2019-05-21 PROCEDURE — 77470 SPECIAL RADIATION TREATMENT: CPT

## 2019-05-21 NOTE — PROGRESS NOTES
5/17/19 at 3:00 pm - Chemotherapy education packet was given to the patient and reviewed. Consent was also obtained. All questions and concerns were addressed.

## 2019-05-21 NOTE — TELEPHONE ENCOUNTER
Called [facility] and verified the patient's ID x 2 with Jhonny Casas. Jhonny Casas stated that they received all the information required from [address] [place] [address] and that [address]  ran an insurance check and the medication was rejected because it is not on formulary and a PA needs to be started with [facility] and that [facility] will contact the office but encouraged the office to start the PA process by contacting them. Informed Jhonny Casas that this office will initiate the PA process. Jhonny Casas verbalized understanding and denied any questions or concerns.

## 2019-05-23 ENCOUNTER — HOSPITAL ENCOUNTER (OUTPATIENT)
Dept: RADIATION THERAPY | Age: 49
Discharge: HOME | End: 2019-05-23

## 2019-05-23 ENCOUNTER — HOSPITAL ENCOUNTER (OUTPATIENT)
Dept: RADIATION THERAPY | Age: 49
Discharge: HOME | End: 2019-05-23
Payer: MEDICAID

## 2019-05-23 PROCEDURE — 77334 RADIATION TREATMENT AID(S): CPT

## 2019-05-23 PROCEDURE — 77300 RADIATION THERAPY DOSE PLAN: CPT

## 2019-05-23 PROCEDURE — 77295 3-D RADIOTHERAPY PLAN: CPT

## 2019-05-23 NOTE — TELEPHONE ENCOUNTER
Received a call from [address] and spoke to a staff member with the last name Asia Santana and verified the patient's ID x 2. Ms. Asia Santana stated that the pharmacist wanted to clarify the patient's prescription and the call was transferred. Spoke to [facility] and verified the patient's ID x 2.  Verified the patient's dose per Blanca Rubin NP prescription and Chrissy verbally read it back. Also, verified per Dr. Benitez Favorite note that radiation will be for five weeks and per radiation will be Monday through Friday. Inquired when the medication will be shipped to the patient and if the PA was received. [facility] verified that they received the PA and that it is good from 5/21/19 - 5/21/20 and that they will call the patient today 5/23/19 and once they talk to the patient they will be able to ship the medication the next day. [facility] denied any further questions or concerns.

## 2019-05-24 ENCOUNTER — TELEPHONE (OUTPATIENT)
Dept: ONCOLOGY | Age: 49
End: 2019-05-24

## 2019-05-24 NOTE — TELEPHONE ENCOUNTER
Patients wife, Dawood Sin, left a voicemail and stated that she would like a call back because patient has not received chemo medication.  # [phone]

## 2019-05-24 NOTE — TELEPHONE ENCOUNTER
Called [facility] at [phone] and verified the patient's ID x 2 with Elsie Cutler a patient care advocate. Informed Maryam that this office was following up on the patient's Capecitabine. Elsie Cutler stated that they spoke with the patient around 11:30 am today 5/24/19 and that it is packed, ready, and scheduled to ship out tonight for delivery tomorrow Saturday 5/25/19 by Jose Villa.

## 2019-05-24 NOTE — TELEPHONE ENCOUNTER
Called the patient's emergency contact, Sarah Vargas, and left a message that per [facility] the medication is packed and ready to be shipped out tonight 5/24/19 for delivery tomorrow Saturday 5/25/18 and to call Dr. Ashley Terrazas office Tuesday 5/28/19 between 8:00 am and 5:00 pm with any questions or concerns.

## 2019-05-28 ENCOUNTER — HOSPITAL ENCOUNTER (OUTPATIENT)
Dept: RADIATION THERAPY | Age: 49
Discharge: HOME | End: 2019-05-28
Payer: MEDICAID

## 2019-05-29 ENCOUNTER — TELEPHONE (OUTPATIENT)
Dept: INFUSION THERAPY | Age: 49
End: 2019-05-29

## 2019-05-29 ENCOUNTER — HOSPITAL ENCOUNTER (OUTPATIENT)
Dept: RADIATION THERAPY | Age: 49
Discharge: HOME | End: 2019-05-29
Payer: MEDICAID

## 2019-05-29 PROCEDURE — 77412 RADIATION TX DELIVERY LVL 3: CPT

## 2019-05-29 NOTE — TELEPHONE ENCOUNTER
Called the patient and left a message to call Dr. Marcos Connor office back at his earliest convenience tomorrow 5/30/19 between 8:00 am and 5:00 pm.  Called to inform the patient that per Gamal Marti NP he is able to skip his Capecitabine dose on 6/14/19 since he will not be receiving radiation. Also, called to inform the patient that Dr. Candace Machuca recommends that she see him the first, third, and fifth weeks of treatment with labs every two weeks.

## 2019-05-30 ENCOUNTER — HOSPITAL ENCOUNTER (OUTPATIENT)
Dept: RADIATION THERAPY | Age: 49
Discharge: HOME | End: 2019-05-30
Payer: MEDICAID

## 2019-05-30 PROCEDURE — 77412 RADIATION TX DELIVERY LVL 3: CPT

## 2019-05-31 ENCOUNTER — HOSPITAL ENCOUNTER (OUTPATIENT)
Dept: RADIATION THERAPY | Age: 49
Discharge: HOME | End: 2019-05-31
Payer: MEDICAID

## 2019-05-31 PROCEDURE — 77412 RADIATION TX DELIVERY LVL 3: CPT

## 2019-05-31 NOTE — TELEPHONE ENCOUNTER
Received a call from the patient and verified ID x 2. Informed the patient that per Dr. Mateus Jeffers and Sonia Fuchs NP if he is not having radiation on 6/14/19 then they recommend that he skip his chemotherapy that day. Also, informed the patient that they recommend that he be seen weeks one, three, and five of treatment with labs every two weeks however since he started this week then they recommend labs and an office visit next week and that the call will be transferred to the front office staff to schedule the [facility] and office appointments. The patient verbalized understanding and denied any further questions or concerns. The call was transferred to the front office staff.

## 2019-06-03 ENCOUNTER — HOSPITAL ENCOUNTER (OUTPATIENT)
Dept: RADIATION THERAPY | Age: 49
Discharge: HOME | End: 2019-06-03
Payer: MEDICAID

## 2019-06-03 PROCEDURE — 77417 THER RADIOLOGY PORT IMAGE(S): CPT

## 2019-06-03 PROCEDURE — 77336 RADIATION PHYSICS CONSULT: CPT

## 2019-06-03 PROCEDURE — 77412 RADIATION TX DELIVERY LVL 3: CPT

## 2019-06-04 ENCOUNTER — TELEPHONE (OUTPATIENT)
Dept: RHEUMATOLOGY | Age: 49
End: 2019-06-04

## 2019-06-04 ENCOUNTER — HOSPITAL ENCOUNTER (OUTPATIENT)
Dept: RADIATION THERAPY | Age: 49
Discharge: HOME | End: 2019-06-04
Payer: MEDICAID

## 2019-06-04 DIAGNOSIS — C20 RECTAL CANCER (HCC): Primary | ICD-10-CM

## 2019-06-04 PROCEDURE — 77412 RADIATION TX DELIVERY LVL 3: CPT

## 2019-06-04 NOTE — TELEPHONE ENCOUNTER
Keyona Jackson from [facility] patient does not have an eligible plan and they will not go any further in the appeal process. If you have any quesitons please call [phone].

## 2019-06-04 NOTE — PROGRESS NOTES
Per Dr. Sergio Adkins and Judah King, NP lab orders for a CBC with differential and complete metabolic panel were ordered and faxed to the [facility] at [facility]. 6/4/19 at 12:13 pm - Fax confirmation received.

## 2019-06-05 ENCOUNTER — HOSPITAL ENCOUNTER (OUTPATIENT)
Dept: RADIATION THERAPY | Age: 49
Discharge: HOME | End: 2019-06-05
Payer: MEDICAID

## 2019-06-05 LAB
ALBUMIN SERPL-MCNC: 4.5 G/DL (ref 3.5–5.5)
ALBUMIN/GLOB SERPL: 1.8 {RATIO} (ref 1.2–2.2)
ALP SERPL-CCNC: 52 IU/L (ref 39–117)
ALT SERPL-CCNC: 15 IU/L (ref 0–44)
AST SERPL-CCNC: 14 IU/L (ref 0–40)
BASOPHILS # BLD AUTO: 0 X10E3/UL (ref 0–0.2)
BASOPHILS NFR BLD AUTO: 0 %
BILIRUB SERPL-MCNC: 0.5 MG/DL (ref 0–1.2)
BUN SERPL-MCNC: 8 MG/DL (ref 6–24)
BUN/CREAT SERPL: 10 (ref 9–20)
CALCIUM SERPL-MCNC: 9.5 MG/DL (ref 8.7–10.2)
CHLORIDE SERPL-SCNC: 101 MMOL/L (ref 96–106)
CO2 SERPL-SCNC: 26 MMOL/L (ref 20–29)
CREAT SERPL-MCNC: 0.84 MG/DL (ref 0.76–1.27)
EOSINOPHIL # BLD AUTO: 0.2 X10E3/UL (ref 0–0.4)
EOSINOPHIL NFR BLD AUTO: 7 %
ERYTHROCYTE [DISTWIDTH] IN BLOOD BY AUTOMATED COUNT: 14.3 % (ref 12.3–15.4)
GLOBULIN SER CALC-MCNC: 2.5 G/DL (ref 1.5–4.5)
GLUCOSE SERPL-MCNC: 63 MG/DL (ref 65–99)
HCT VFR BLD AUTO: 45.5 % (ref 37.5–51)
HGB BLD-MCNC: 15.1 G/DL (ref 13–17.7)
IMM GRANULOCYTES # BLD AUTO: 0 X10E3/UL (ref 0–0.1)
IMM GRANULOCYTES NFR BLD AUTO: 0 %
LYMPHOCYTES # BLD AUTO: 0.7 X10E3/UL (ref 0.7–3.1)
LYMPHOCYTES NFR BLD AUTO: 25 %
MCH RBC QN AUTO: 32.2 PG (ref 26.6–33)
MCHC RBC AUTO-ENTMCNC: 33.2 G/DL (ref 31.5–35.7)
MCV RBC AUTO: 97 FL (ref 79–97)
MONOCYTES # BLD AUTO: 0.3 X10E3/UL (ref 0.1–0.9)
MONOCYTES NFR BLD AUTO: 12 %
NEUTROPHILS # BLD AUTO: 1.5 X10E3/UL (ref 1.4–7)
NEUTROPHILS NFR BLD AUTO: 56 %
PLATELET # BLD AUTO: 169 X10E3/UL (ref 150–450)
POTASSIUM SERPL-SCNC: 4.7 MMOL/L (ref 3.5–5.2)
PROT SERPL-MCNC: 7 G/DL (ref 6–8.5)
RBC # BLD AUTO: 4.69 X10E6/UL (ref 4.14–5.8)
SODIUM SERPL-SCNC: 141 MMOL/L (ref 134–144)
WBC # BLD AUTO: 2.7 X10E3/UL (ref 3.4–10.8)

## 2019-06-05 PROCEDURE — 77412 RADIATION TX DELIVERY LVL 3: CPT

## 2019-06-06 ENCOUNTER — HOSPITAL ENCOUNTER (OUTPATIENT)
Dept: RADIATION THERAPY | Age: 49
Discharge: HOME | End: 2019-06-06
Payer: MEDICAID

## 2019-06-06 ENCOUNTER — OFFICE VISIT (OUTPATIENT)
Dept: ONCOLOGY | Age: 49
End: 2019-06-06

## 2019-06-06 VITALS
BODY MASS INDEX: 23.85 KG/M2 | HEART RATE: 75 BPM | SYSTOLIC BLOOD PRESSURE: 134 MMHG | WEIGHT: 148.4 LBS | RESPIRATION RATE: 16 BRPM | TEMPERATURE: 99.1 F | DIASTOLIC BLOOD PRESSURE: 89 MMHG | HEIGHT: 66 IN | OXYGEN SATURATION: 98 %

## 2019-06-06 DIAGNOSIS — C20 RECTAL CANCER (HCC): Primary | ICD-10-CM

## 2019-06-06 PROCEDURE — 77412 RADIATION TX DELIVERY LVL 3: CPT

## 2019-06-06 NOTE — PROGRESS NOTES
[facility] [place] at [place] [address] [address], [address]  W: [phone]  F: [phone]    Reason for Visit:   Armen Guerrero is a 47 y.o. male who is seen for Rectal cancer- likely stage III    Treatment History:   · 4/26/19: Colonoscopy- 6-7 cm size malignant appearing mass seen at 10 cm from anal verge. This was adenocarcinoma. 3 polyps removed- all were tubular adenomas  · Rectal Ultrasound 5/2/19: ulcerated infiltrative mass lesion was noted in rectum at 10 cm. The lesion measured about 5 cm X 4 cm- T2, 13 mm X 8 mm oblong lymph node was noted immediately adjacent to the mass lesion  · 5/2/19: CT CAP- No metastasis, liver cysts. CEA 3.6  · 5/10/19: PET CT showed rectal malignancy and 3 small perirectal anibal metastatic foci  · 5/28/19: Xeloda + RT     History of Present Illness:   Patient is a 47 y.o. male seen for adenocarcinoma of the mid third of rectum    He had intermittent BRBPR x 1 year and then decided to have a colonoscopy. This led to above mentioned diagnosis. Comes in today for follow-up on chemoRT in which he started on 5/28/19. He feels well thus far. Has a \"heavy\" feeling in his abdomen that is difficult for him to describe. Says the discomfort is mild in nature. Denies nausea/vomiting and having regular BMs. Denies mouth sores. No longer on MTX for psoriasis. He has a good appetite and weight is stable. He uses 7 drinks a week.     Adopted    Past Medical History:   Diagnosis Date    Adopted     Arthritis     P[address]iatic arthritis ([facility] [address]       Past Surgical History:   Procedure Laterality Date    COLONOSCOPY Left 4/26/2019    COLONOSCOPY performed by Jp Jackson MD at [facility] N/A 5/2/2019    SIGMOIDOSCOPY FLEXIBLE performed by Elian Guerrero MD at [facility] ENDOSCOPY    HX GI      EGD    HX HEENT      WISDOM TEETH    HX ORTHOPAEDIC      HX TONSILLECTOMY        Social History     Tobacco Use    Smoking status: Never Smoker    Smokeless tobacco: Never Used   Substance Use Topics    Alcohol use: Yes     Comment: 6 BEERS WEEKLY      Family History   Adopted: Yes   Problem Relation Age of Onset    Asthma Neg Hx     Cancer Neg Hx     Diabetes Neg Hx     Heart Disease Neg Hx     Hypertension Neg Hx     Stroke Neg Hx      Current Outpatient Medications   Medication Sig    capecitabine (XELODA) 500 mg tablet Take 3 tabs (1,500mg) twice daily on days of radiation    ondansetron hcl (ZOFRAN) 4 mg tablet Take 2 Tabs by mouth every eight (8) hours as needed for Nausea.  ibuprofen (ADVIL PO) Take  by mouth.  triamcinolone acetonide (KENALOG) 0.1 % ointment Apply  to affected area two (2) times daily as needed for Skin Irritation. use thin layer    fluocinonide (VANOS) 0.1 % topical cream Apply  to affected area daily.  omeprazole (PRILOSEC) 20 mg capsule Take 20 mg by mouth daily.  methotrexate (RHEUMATREX) 2.5 mg tablet Take 8 Tabs by mouth every Monday.  apremilast (OTEZLA) 30 mg tab Take 30 mg by mouth two (2) times a day. Indications: Psoriasis associated with Arthritis    apremilast (OTEZLA STARTER) 10 mg (4)-20 mg (4)-30 mg(19) DsPk Take 1 Package by mouth See Admin Instructions. Indications: Psoriasis associated with Arthritis    folic acid (FOLVITE) 1 mg tablet Take 1 mg by mouth daily. No current facility-administered medications for this visit. No Known Allergies     Review of Systems: A complete review of systems was obtained, negative except as described above. Physical Exam:     Visit Vitals  /89 (BP 1 Location: Right arm, BP Patient Position: Sitting)   Pulse 75   Temp 99.1 °F (37.3 °C) (Oral)   Resp 16   Ht 5' 6\" (1.676 m)   Wt 148 lb 6.4 oz (67.3 kg)   SpO2 98%   BMI 23.95 kg/m²     ECOG PS: 1  General: No distress  Eyes: PERRLA, anicteric sclerae  HENT: Atraumatic, OP clear  Resp: CTAB, normal respiratory effort  CV: s1s2, no LE edema  MS: Normal gait and station. Digits without clubbing or cyanosis. Skin: No rashes, ecchymoses, or petechiae. Normal temperature, turgor, and texture. Psych: Alert, oriented, appropriate affect, normal judgment/insight    Results:     Lab Results   Component Value Date/Time    WBC 2.7 (L) 06/04/2019 12:07 PM    HGB 15.1 06/04/2019 12:07 PM    HCT 45.5 06/04/2019 12:07 PM    PLATELET 843 62/65/6517 12:07 PM    MCV 97 06/04/2019 12:07 PM    ABS. NEUTROPHILS 1.5 06/04/2019 12:07 PM     Lab Results   Component Value Date/Time    Sodium 141 06/04/2019 12:07 PM    Potassium 4.7 06/04/2019 12:07 PM    Chloride 101 06/04/2019 12:07 PM    CO2 26 06/04/2019 12:07 PM    Glucose 63 (L) 06/04/2019 12:07 PM    BUN 8 06/04/2019 12:07 PM    Creatinine 0.84 06/04/2019 12:07 PM    GFR est  06/04/2019 12:07 PM    GFR est non- 06/04/2019 12:07 PM    Calcium 9.5 06/04/2019 12:07 PM     Lab Results   Component Value Date/Time    Bilirubin, total 0.5 06/04/2019 12:07 PM    ALT (SGPT) 15 06/04/2019 12:07 PM    AST (SGOT) 14 06/04/2019 12:07 PM    Alk. phosphatase 52 06/04/2019 12:07 PM    Protein, total 7.0 06/04/2019 12:07 PM    Albumin 4.5 06/04/2019 12:07 PM     Records reviewed and summarized above. Pathology report(s) reviewed above. Radiology report(s) reviewed above. Assessment:   1) Rectal adenocarcinoma- mid third    Pathology, scans and EUS reviewed  T2N1M0 disease- Clinical stage III  On my review of CT there is a pathologic appearing perirectal node- PET CT suggests small but quite FDG avid nodes as well. Case reviewed in Tumor conference and the consensus was to proceed with neoadjuvant ChemoRT    Currently on week 2 of concurrent chemoRT with Xeloda. Tolerating well thus far with mild abdominal discomfort. Advised him to call us if this worsens. Labs 6/4/19 reviewed and stable.      2) Young age at diagnosis    Adopted  Had several adenomas removed  May have attenuated FAP and will require genetic testing  I will set him up with [facility] after completion of chemoRT as this information may be helpful in surgical planning    3) GIB  Minor    4) Psoriasis  Off Methotrexate  Follows Dr. Lorelei Myles:     · Continue week 2 of chemoRT with Xeloda 825 mg/m2 BID on days of RT x 5 weeks   · Zofran prn  · CBC diff, CMP weekly, given standing orders for lab colni   · Follow with rad onc as scheduled  · See us every 2 weeks     RTC in 2 weeks    I appreciate the opportunity to participate in Mr. Amie gardner.     Signed By: Bhavana Sauer MD

## 2019-06-06 NOTE — PROGRESS NOTES
Tony Chirinos is a 47 y.o. male here for follow up of rectal cancer. 1. Have you been to the ER, urgent care clinic since your last visit?: No.  Hospitalized since your last visit?: No.    2. Have you seen or consulted any other health care providers outside of the [address] [place] [address] since your last visit?   Include any pap smears or colon screening.: No.

## 2019-06-07 ENCOUNTER — HOSPITAL ENCOUNTER (OUTPATIENT)
Dept: RADIATION THERAPY | Age: 49
Discharge: HOME | End: 2019-06-07
Payer: MEDICAID

## 2019-06-07 PROCEDURE — 77412 RADIATION TX DELIVERY LVL 3: CPT

## 2019-06-10 ENCOUNTER — DOCUMENTATION ONLY (OUTPATIENT)
Dept: ONCOLOGY | Age: 49
End: 2019-06-10

## 2019-06-10 ENCOUNTER — HOSPITAL ENCOUNTER (OUTPATIENT)
Dept: RADIATION THERAPY | Age: 49
Discharge: HOME | End: 2019-06-10
Payer: MEDICAID

## 2019-06-10 PROCEDURE — 77412 RADIATION TX DELIVERY LVL 3: CPT

## 2019-06-10 PROCEDURE — 77336 RADIATION PHYSICS CONSULT: CPT

## 2019-06-10 PROCEDURE — 77417 THER RADIOLOGY PORT IMAGE(S): CPT

## 2019-06-10 NOTE — PROGRESS NOTES
This note will not be viewable in [address] [employer]  Social Work Navigator Encounter     Patient Name:  Tiffanie Burrell     Medical History: Rectal Cancer     Advance Directives:   Patient does not have an advanced medical directive, and did not express interest in completing one today. Narrative:   Patient called to explain that he is being switched from [address] to traditional Medicaid, and his \"chemo meds are being denied. \"  This  clarified that this medication is Xeloda. Spoke with him regarding this matter, and explained that this  would confer with colleague Alan Mcdaniel, Case Management Assistant/Financial Navigator as well as the financial navigators in the infusion center and call him back tomorrow. Patient is agreeable to this plan. Sent the patient a copy of the [facility] [employer] application. Barriers to Care: Insurance Challenges/Financial Barriers     Assessment/Action:  1. Continue to meet with the patient when she returns to the clinic for ongoing assessment of the patients adjustment to her diagnosis and treatment. 2.  Ongoing psychosocial support as desired by patient.       Plan/Referral:   Financial/Medication assistance referral     Connor Coleman LCSW

## 2019-06-11 ENCOUNTER — DOCUMENTATION ONLY (OUTPATIENT)
Dept: ONCOLOGY | Age: 49
End: 2019-06-11

## 2019-06-11 ENCOUNTER — HOSPITAL ENCOUNTER (OUTPATIENT)
Dept: RADIATION THERAPY | Age: 49
Discharge: HOME | End: 2019-06-11
Payer: MEDICAID

## 2019-06-11 PROCEDURE — 77412 RADIATION TX DELIVERY LVL 3: CPT

## 2019-06-11 NOTE — PROGRESS NOTES
This note will not be viewable in [address] [employer]  Social Work Navigator Encounter     Patient Name:  Kingston Staley     Medical History: Rectal Cancer     Advance Directives: Patient does not have an advanced medical directive, and did not express interest in completing one today. Narrative:   Patient was able to speak with [address] Case Management Assistant/Financial Navigator today via phone. The patient also visited today to provide a copy of his new Medicaid card. Spoke with him during that time, and learned that his Xeloda will be covered per patient. Offered continued support to the patient as needed. Barriers to Care:   Change in insurance    Assessment/Action:  1. Continue to meet with the patient when he returns to the clinic for ongoing assessment of the patients adjustment to his diagnosis and treatment. 2.  Ongoing psychosocial support as desired by patient.       Plan/Referral:   Insurance/Entitlements referral    Miki Dye LCSW

## 2019-06-12 ENCOUNTER — HOSPITAL ENCOUNTER (OUTPATIENT)
Dept: RADIATION THERAPY | Age: 49
Discharge: HOME | End: 2019-06-12
Payer: MEDICAID

## 2019-06-12 LAB
ALBUMIN SERPL-MCNC: 4.1 G/DL (ref 3.5–5.5)
ALBUMIN/GLOB SERPL: 1.7 {RATIO} (ref 1.2–2.2)
ALP SERPL-CCNC: 48 IU/L (ref 39–117)
ALT SERPL-CCNC: 15 IU/L (ref 0–44)
AST SERPL-CCNC: 16 IU/L (ref 0–40)
BASOPHILS # BLD AUTO: 0 X10E3/UL (ref 0–0.2)
BASOPHILS NFR BLD AUTO: 0 %
BILIRUB SERPL-MCNC: 0.3 MG/DL (ref 0–1.2)
BUN SERPL-MCNC: 12 MG/DL (ref 6–24)
BUN/CREAT SERPL: 16 (ref 9–20)
CALCIUM SERPL-MCNC: 9.1 MG/DL (ref 8.7–10.2)
CHLORIDE SERPL-SCNC: 102 MMOL/L (ref 96–106)
CO2 SERPL-SCNC: 26 MMOL/L (ref 20–29)
CREAT SERPL-MCNC: 0.77 MG/DL (ref 0.76–1.27)
EOSINOPHIL # BLD AUTO: 0.2 X10E3/UL (ref 0–0.4)
EOSINOPHIL NFR BLD AUTO: 6 %
ERYTHROCYTE [DISTWIDTH] IN BLOOD BY AUTOMATED COUNT: 14.5 % (ref 12.3–15.4)
GLOBULIN SER CALC-MCNC: 2.4 G/DL (ref 1.5–4.5)
GLUCOSE SERPL-MCNC: 110 MG/DL (ref 65–99)
HCT VFR BLD AUTO: 41 % (ref 37.5–51)
HGB BLD-MCNC: 14.1 G/DL (ref 13–17.7)
IMM GRANULOCYTES # BLD AUTO: 0 X10E3/UL (ref 0–0.1)
IMM GRANULOCYTES NFR BLD AUTO: 1 %
LYMPHOCYTES # BLD AUTO: 0.5 X10E3/UL (ref 0.7–3.1)
LYMPHOCYTES NFR BLD AUTO: 16 %
MCH RBC QN AUTO: 32.6 PG (ref 26.6–33)
MCHC RBC AUTO-ENTMCNC: 34.4 G/DL (ref 31.5–35.7)
MCV RBC AUTO: 95 FL (ref 79–97)
MONOCYTES # BLD AUTO: 0.3 X10E3/UL (ref 0.1–0.9)
MONOCYTES NFR BLD AUTO: 11 %
NEUTROPHILS # BLD AUTO: 2 X10E3/UL (ref 1.4–7)
NEUTROPHILS NFR BLD AUTO: 66 %
PLATELET # BLD AUTO: 140 X10E3/UL (ref 150–450)
POTASSIUM SERPL-SCNC: 4.1 MMOL/L (ref 3.5–5.2)
PROT SERPL-MCNC: 6.5 G/DL (ref 6–8.5)
RBC # BLD AUTO: 4.33 X10E6/UL (ref 4.14–5.8)
SODIUM SERPL-SCNC: 141 MMOL/L (ref 134–144)
WBC # BLD AUTO: 3 X10E3/UL (ref 3.4–10.8)

## 2019-06-12 PROCEDURE — 77412 RADIATION TX DELIVERY LVL 3: CPT

## 2019-06-13 ENCOUNTER — HOSPITAL ENCOUNTER (OUTPATIENT)
Dept: RADIATION THERAPY | Age: 49
Discharge: HOME | End: 2019-06-13
Payer: MEDICAID

## 2019-06-13 PROCEDURE — 77412 RADIATION TX DELIVERY LVL 3: CPT

## 2019-06-14 ENCOUNTER — HOSPITAL ENCOUNTER (OUTPATIENT)
Dept: RADIATION THERAPY | Age: 49
Discharge: HOME | End: 2019-06-14
Payer: MEDICAID

## 2019-06-17 ENCOUNTER — HOSPITAL ENCOUNTER (OUTPATIENT)
Dept: RADIATION THERAPY | Age: 49
Discharge: HOME | End: 2019-06-17
Payer: MEDICAID

## 2019-06-17 PROCEDURE — 77412 RADIATION TX DELIVERY LVL 3: CPT

## 2019-06-17 PROCEDURE — 77417 THER RADIOLOGY PORT IMAGE(S): CPT

## 2019-06-18 ENCOUNTER — HOSPITAL ENCOUNTER (OUTPATIENT)
Dept: RADIATION THERAPY | Age: 49
Discharge: HOME | End: 2019-06-18
Payer: MEDICAID

## 2019-06-18 PROCEDURE — 77336 RADIATION PHYSICS CONSULT: CPT

## 2019-06-18 PROCEDURE — 77412 RADIATION TX DELIVERY LVL 3: CPT

## 2019-06-19 ENCOUNTER — HOSPITAL ENCOUNTER (OUTPATIENT)
Dept: RADIATION THERAPY | Age: 49
Discharge: HOME | End: 2019-06-19
Payer: MEDICAID

## 2019-06-19 LAB
ALBUMIN SERPL-MCNC: 4.3 G/DL (ref 3.5–5.5)
ALBUMIN/GLOB SERPL: 2 {RATIO} (ref 1.2–2.2)
ALP SERPL-CCNC: 56 IU/L (ref 39–117)
ALT SERPL-CCNC: 14 IU/L (ref 0–44)
AST SERPL-CCNC: 17 IU/L (ref 0–40)
BASOPHILS # BLD AUTO: 0 X10E3/UL (ref 0–0.2)
BASOPHILS NFR BLD AUTO: 0 %
BILIRUB SERPL-MCNC: 0.3 MG/DL (ref 0–1.2)
BUN SERPL-MCNC: 14 MG/DL (ref 6–24)
BUN/CREAT SERPL: 16 (ref 9–20)
CALCIUM SERPL-MCNC: 9.1 MG/DL (ref 8.7–10.2)
CHLORIDE SERPL-SCNC: 103 MMOL/L (ref 96–106)
CO2 SERPL-SCNC: 24 MMOL/L (ref 20–29)
CREAT SERPL-MCNC: 0.87 MG/DL (ref 0.76–1.27)
EOSINOPHIL # BLD AUTO: 0.2 X10E3/UL (ref 0–0.4)
EOSINOPHIL NFR BLD AUTO: 6 %
ERYTHROCYTE [DISTWIDTH] IN BLOOD BY AUTOMATED COUNT: 15.2 % (ref 12.3–15.4)
GLOBULIN SER CALC-MCNC: 2.2 G/DL (ref 1.5–4.5)
GLUCOSE SERPL-MCNC: 116 MG/DL (ref 65–99)
HCT VFR BLD AUTO: 43.9 % (ref 37.5–51)
HGB BLD-MCNC: 15 G/DL (ref 13–17.7)
IMM GRANULOCYTES # BLD AUTO: 0 X10E3/UL (ref 0–0.1)
IMM GRANULOCYTES NFR BLD AUTO: 1 %
LYMPHOCYTES # BLD AUTO: 0.3 X10E3/UL (ref 0.7–3.1)
LYMPHOCYTES NFR BLD AUTO: 10 %
MCH RBC QN AUTO: 33.2 PG (ref 26.6–33)
MCHC RBC AUTO-ENTMCNC: 34.2 G/DL (ref 31.5–35.7)
MCV RBC AUTO: 97 FL (ref 79–97)
MONOCYTES # BLD AUTO: 0.4 X10E3/UL (ref 0.1–0.9)
MONOCYTES NFR BLD AUTO: 13 %
NEUTROPHILS # BLD AUTO: 2.2 X10E3/UL (ref 1.4–7)
NEUTROPHILS NFR BLD AUTO: 70 %
PLATELET # BLD AUTO: 146 X10E3/UL (ref 150–450)
POTASSIUM SERPL-SCNC: 4.5 MMOL/L (ref 3.5–5.2)
PROT SERPL-MCNC: 6.5 G/DL (ref 6–8.5)
RBC # BLD AUTO: 4.52 X10E6/UL (ref 4.14–5.8)
SODIUM SERPL-SCNC: 140 MMOL/L (ref 134–144)
WBC # BLD AUTO: 3.1 X10E3/UL (ref 3.4–10.8)

## 2019-06-19 PROCEDURE — 77412 RADIATION TX DELIVERY LVL 3: CPT

## 2019-06-20 ENCOUNTER — OFFICE VISIT (OUTPATIENT)
Dept: ONCOLOGY | Age: 49
End: 2019-06-20

## 2019-06-20 ENCOUNTER — HOSPITAL ENCOUNTER (OUTPATIENT)
Dept: RADIATION THERAPY | Age: 49
Discharge: HOME | End: 2019-06-20
Payer: MEDICAID

## 2019-06-20 ENCOUNTER — DOCUMENTATION ONLY (OUTPATIENT)
Dept: PHARMACY | Age: 49
End: 2019-06-20

## 2019-06-20 VITALS
BODY MASS INDEX: 23.91 KG/M2 | HEIGHT: 66 IN | SYSTOLIC BLOOD PRESSURE: 154 MMHG | HEART RATE: 64 BPM | DIASTOLIC BLOOD PRESSURE: 90 MMHG | WEIGHT: 148.8 LBS | OXYGEN SATURATION: 98 % | TEMPERATURE: 99.5 F

## 2019-06-20 DIAGNOSIS — C20 RECTAL CANCER (HCC): Primary | ICD-10-CM

## 2019-06-20 PROCEDURE — 77412 RADIATION TX DELIVERY LVL 3: CPT

## 2019-06-20 RX ORDER — CAPECITABINE 500 MG/1
TABLET, FILM COATED ORAL
Qty: 18 TAB | Refills: 0 | Status: SHIPPED | OUTPATIENT
Start: 2019-06-20 | End: 2019-06-21 | Stop reason: SDUPTHER

## 2019-06-20 RX ORDER — PROCHLORPERAZINE MALEATE 5 MG
5 TABLET ORAL
Qty: 20 TAB | Refills: 1 | Status: SHIPPED | OUTPATIENT
Start: 2019-06-20 | End: 2019-10-14 | Stop reason: ALTCHOICE

## 2019-06-20 RX ORDER — TAMSULOSIN HYDROCHLORIDE 0.4 MG/1
0.4 CAPSULE ORAL DAILY
COMMUNITY
End: 2020-03-02

## 2019-06-20 NOTE — PROGRESS NOTES
Jose Sanchez is a 47 y.o. male  Chief Complaint   Patient presents with    Follow-up     Rectal cancer      1. Have you been to the ER, urgent care clinic since your last visit? Hospitalized since your last visit? No   2. Have you seen or consulted any other health care providers outside of the [address] [place] [address] since your last visit?   No

## 2019-06-20 NOTE — PROGRESS NOTES
[facility] [place] [facility] [place] [address] [address], [address]  W: [phone]  F: [phone]    Reason for Visit:   Angel Braden is a 47 y.o. male who is seen for Rectal cancer- likely stage III    Treatment History:   · 4/26/19: Colonoscopy- 6-7 cm size malignant appearing mass seen at 10 cm from anal verge. This was adenocarcinoma. 3 polyps removed- all were tubular adenomas  · Rectal Ultrasound 5/2/19: ulcerated infiltrative mass lesion was noted in rectum at 10 cm. The lesion measured about 5 cm X 4 cm- T2, 13 mm X 8 mm oblong lymph node was noted immediately adjacent to the mass lesion  · 5/2/19: CT CAP- No metastasis, liver cysts. CEA 3.6  · 5/10/19: PET CT showed rectal malignancy and 3 small perirectal anibal metastatic foci  · 5/28/19: Xeloda + RT     History of Present Illness:   Patient is a 47 y.o. male seen for adenocarcinoma of the mid third of rectum    He had intermittent BRBPR x 1 year and then decided to have a colonoscopy. This led to above mentioned diagnosis. Comes in today for follow-up on chemoRT in which he started on 5/28/19. Today is 17 of 28 treatments planned. Has a \"heavy\" feeling in his abdomen that is difficult for him to describe. He had some mild nausea without emesis and took zofran with no relief. Has more frequent stools since diagnosis but not diarrhea. He takes an Imodium every other day which helps. He has a good appetite and weight is stable. Denies mouth sores. Has some soreness around his rectum and using aquaphor and baby wipes which helps. He uses 7 drinks a week.     Adopted    Past Medical History:   Diagnosis Date    Adopted     Arthritis     Psoriatic arthritis ([facility] [address]       Past Surgical History:   Procedure Laterality Date    COLONOSCOPY Left 4/26/2019    COLONOSCOPY performed by Yovani Arauz [facility] at [facility] N/A 5/2/2019    SIGMOIDOSCOPY FLEXIBLE performed by Letty Patel MD at [facility] ENDOSCOPY    HX GI      EGD    HX HEENT      WISDOM TEETH    HX ORTHOPAEDIC      HX TONSILLECTOMY        Social History     Tobacco Use    Smoking status: Never Smoker    Smokeless tobacco: Never Used   Substance Use Topics    Alcohol use: Yes     Comment: 6 BEERS WEEKLY      Family History   Adopted: Yes   Problem Relation Age of Onset    Asthma Neg Hx     Cancer Neg Hx     Diabetes Neg Hx     Heart Disease Neg Hx     Hypertension Neg Hx     Stroke Neg Hx      Current Outpatient Medications   Medication Sig    tamsulosin (FLOMAX) 0.4 mg capsule Take 0.4 mg by mouth daily.  capecitabine (XELODA) 500 mg tablet Take 3 tabs (1,500mg) twice daily on days of radiation    ondansetron hcl (ZOFRAN) 4 mg tablet Take 2 Tabs by mouth every eight (8) hours as needed for Nausea.  ibuprofen (ADVIL PO) Take  by mouth.  triamcinolone acetonide (KENALOG) 0.1 % ointment Apply  to affected area two (2) times daily as needed for Skin Irritation. use thin layer    fluocinonide (VANOS) 0.1 % topical cream Apply  to affected area daily.  omeprazole (PRILOSEC) 20 mg capsule Take 20 mg by mouth daily. No current facility-administered medications for this visit. No Known Allergies     Review of Systems: A complete review of systems was obtained, negative except as described above. Physical Exam:     Visit Vitals  /90   Pulse 64   Temp 99.5 °F (37.5 °C)   Ht 5' 6\" (1.676 m)   Wt 148 lb 12.8 oz (67.5 kg)   SpO2 98%   BMI 24.02 kg/m²     ECOG PS: 1  General: No distress  Eyes: PERRLA, anicteric sclerae  HENT: Atraumatic, OP clear  Resp: CTAB, normal respiratory effort  CV: s1s2, no LE edema  MS: Normal gait and station. Digits without clubbing or cyanosis. Skin: No rashes, ecchymoses, or petechiae. Normal temperature, turgor, and texture.   Psych: Alert, oriented, appropriate affect, normal judgment/insight    Results:     Lab Results   Component Value Date/Time    WBC 3.1 (L) 06/18/2019 11:42 AM    HGB 15.0 06/18/2019 11:42 AM    HCT 43.9 06/18/2019 11:42 AM    PLATELET 725 (L) 50/24/1180 11:42 AM    MCV 97 06/18/2019 11:42 AM    ABS. NEUTROPHILS 2.2 06/18/2019 11:42 AM     Lab Results   Component Value Date/Time    Sodium 140 06/18/2019 11:42 AM    Potassium 4.5 06/18/2019 11:42 AM    Chloride 103 06/18/2019 11:42 AM    CO2 24 06/18/2019 11:42 AM    Glucose 116 (H) 06/18/2019 11:42 AM    BUN 14 06/18/2019 11:42 AM    Creatinine 0.87 06/18/2019 11:42 AM    GFR est  06/18/2019 11:42 AM    GFR est non- 06/18/2019 11:42 AM    Calcium 9.1 06/18/2019 11:42 AM     Lab Results   Component Value Date/Time    Bilirubin, total 0.3 06/18/2019 11:42 AM    ALT (SGPT) 14 06/18/2019 11:42 AM    AST (SGOT) 17 06/18/2019 11:42 AM    Alk. phosphatase 56 06/18/2019 11:42 AM    Protein, total 6.5 06/18/2019 11:42 AM    Albumin 4.3 06/18/2019 11:42 AM     Records reviewed and summarized above. Pathology report(s) reviewed above. Radiology report(s) reviewed above. Assessment:   1) Rectal adenocarcinoma- mid third    Pathology, scans and EUS reviewed  T2N1M0 disease- Clinical stage III  On my review of CT there is a pathologic appearing perirectal node- PET CT suggests small but quite FDG avid nodes as well. Case reviewed in Tumor conference and the consensus was to proceed with neoadjuvant ChemoRT    Currently 17/28 treatments of concurrent chemoRT with Xeloda. Tolerating well thus far with mild abdominal discomfort. Advised him to call us if this worsens. Labs reviewed and stable. Grade 1 nausea without relief with Zofran. Will trial Compazine.      2) Sol Saupe age at diagnosis    Adopted  Had several adenomas removed  May have attenuated FAP and will require genetic testing  I will set him up with [facility] after completion of chemoRT as this information may be helpful in surgical planning    3) GIB  Minor    4) Psoriasis  Off Methotrexate  Follows Dr. Jason Putnam:     · Continue week 4 of chemoRT with Xeloda 825 mg/m2 BID on days of RT x 5 weeks   · Zofran prn  · CBC diff, CMP weekly, given standing orders for lab colin   · Follow with rad onc as scheduled  · Trial compazine 5mg q6 hours PRN for nausea     F/u 7/5/19     I appreciate the opportunity to participate in Mr. [facility] jj.     Signed By: Umm Frausto MD

## 2019-06-20 NOTE — PROGRESS NOTES
[facility] at [address] [facility] Update    Date: 6/17/19    Armen Guerrero 1970    Medication: Kike Eriksson Pack and Maintenance Prescriptions    Patient now has Medicaid (new ins) active as of 6/1/19 and no longer has [place], where an appeal had been pending for [place].     Due to recent changes in the patient's medical history, we will pursue a new PA for [place] when clinically appropriate and we are instructed to do so by Dr. Francis Mckeon, [address] [place] [address] at [facility],  Laura Cox   [place], [address]  phone: [phone]   fax: [phone]

## 2019-06-21 ENCOUNTER — HOSPITAL ENCOUNTER (OUTPATIENT)
Dept: RADIATION THERAPY | Age: 49
Discharge: HOME | End: 2019-06-21
Payer: MEDICAID

## 2019-06-21 PROCEDURE — 77412 RADIATION TX DELIVERY LVL 3: CPT

## 2019-06-21 RX ORDER — CAPECITABINE 500 MG/1
TABLET, FILM COATED ORAL
Qty: 18 TAB | Refills: 0 | Status: SHIPPED | OUTPATIENT
Start: 2019-06-21 | End: 2019-09-10

## 2019-06-24 ENCOUNTER — HOSPITAL ENCOUNTER (OUTPATIENT)
Dept: RADIATION THERAPY | Age: 49
Discharge: HOME | End: 2019-06-24
Payer: MEDICAID

## 2019-06-24 PROCEDURE — 77412 RADIATION TX DELIVERY LVL 3: CPT

## 2019-06-24 PROCEDURE — 77417 THER RADIOLOGY PORT IMAGE(S): CPT

## 2019-06-25 ENCOUNTER — HOSPITAL ENCOUNTER (OUTPATIENT)
Dept: RADIATION THERAPY | Age: 49
Discharge: HOME | End: 2019-06-25
Payer: MEDICAID

## 2019-06-25 PROCEDURE — 77412 RADIATION TX DELIVERY LVL 3: CPT

## 2019-06-25 PROCEDURE — 77336 RADIATION PHYSICS CONSULT: CPT

## 2019-06-26 ENCOUNTER — HOSPITAL ENCOUNTER (OUTPATIENT)
Dept: RADIATION THERAPY | Age: 49
Discharge: HOME | End: 2019-06-26
Payer: MEDICAID

## 2019-06-26 LAB
ALBUMIN SERPL-MCNC: 4.5 G/DL (ref 3.5–5.5)
ALBUMIN/GLOB SERPL: 2.1 {RATIO} (ref 1.2–2.2)
ALP SERPL-CCNC: 53 IU/L (ref 39–117)
ALT SERPL-CCNC: 19 IU/L (ref 0–44)
AST SERPL-CCNC: 21 IU/L (ref 0–40)
BASOPHILS # BLD AUTO: 0 X10E3/UL (ref 0–0.2)
BASOPHILS NFR BLD AUTO: 0 %
BILIRUB SERPL-MCNC: 0.6 MG/DL (ref 0–1.2)
BUN SERPL-MCNC: 10 MG/DL (ref 6–24)
BUN/CREAT SERPL: 11 (ref 9–20)
CALCIUM SERPL-MCNC: 9.2 MG/DL (ref 8.7–10.2)
CHLORIDE SERPL-SCNC: 103 MMOL/L (ref 96–106)
CO2 SERPL-SCNC: 24 MMOL/L (ref 20–29)
CREAT SERPL-MCNC: 0.93 MG/DL (ref 0.76–1.27)
EOSINOPHIL # BLD AUTO: 0.2 X10E3/UL (ref 0–0.4)
EOSINOPHIL NFR BLD AUTO: 5 %
ERYTHROCYTE [DISTWIDTH] IN BLOOD BY AUTOMATED COUNT: 15.2 % (ref 12.3–15.4)
GLOBULIN SER CALC-MCNC: 2.1 G/DL (ref 1.5–4.5)
GLUCOSE SERPL-MCNC: 96 MG/DL (ref 65–99)
HCT VFR BLD AUTO: 39.7 % (ref 37.5–51)
HGB BLD-MCNC: 13.5 G/DL (ref 13–17.7)
IMM GRANULOCYTES # BLD AUTO: 0.1 X10E3/UL (ref 0–0.1)
IMM GRANULOCYTES NFR BLD AUTO: 1 %
LYMPHOCYTES # BLD AUTO: 0.3 X10E3/UL (ref 0.7–3.1)
LYMPHOCYTES NFR BLD AUTO: 8 %
MCH RBC QN AUTO: 32.5 PG (ref 26.6–33)
MCHC RBC AUTO-ENTMCNC: 34 G/DL (ref 31.5–35.7)
MCV RBC AUTO: 96 FL (ref 79–97)
MONOCYTES # BLD AUTO: 0.5 X10E3/UL (ref 0.1–0.9)
MONOCYTES NFR BLD AUTO: 13 %
NEUTROPHILS # BLD AUTO: 2.8 X10E3/UL (ref 1.4–7)
NEUTROPHILS NFR BLD AUTO: 73 %
PLATELET # BLD AUTO: 139 X10E3/UL (ref 150–450)
POTASSIUM SERPL-SCNC: 4.1 MMOL/L (ref 3.5–5.2)
PROT SERPL-MCNC: 6.6 G/DL (ref 6–8.5)
RBC # BLD AUTO: 4.15 X10E6/UL (ref 4.14–5.8)
SODIUM SERPL-SCNC: 140 MMOL/L (ref 134–144)
WBC # BLD AUTO: 3.9 X10E3/UL (ref 3.4–10.8)

## 2019-06-26 PROCEDURE — 77412 RADIATION TX DELIVERY LVL 3: CPT

## 2019-06-27 ENCOUNTER — HOSPITAL ENCOUNTER (OUTPATIENT)
Dept: RADIATION THERAPY | Age: 49
Discharge: HOME | End: 2019-06-27
Payer: MEDICAID

## 2019-06-27 PROCEDURE — 77412 RADIATION TX DELIVERY LVL 3: CPT

## 2019-06-28 ENCOUNTER — HOSPITAL ENCOUNTER (OUTPATIENT)
Dept: RADIATION THERAPY | Age: 49
Discharge: HOME | End: 2019-06-28
Payer: MEDICAID

## 2019-06-28 PROCEDURE — 77412 RADIATION TX DELIVERY LVL 3: CPT

## 2019-07-01 ENCOUNTER — HOSPITAL ENCOUNTER (OUTPATIENT)
Dept: RADIATION THERAPY | Age: 49
Discharge: HOME | End: 2019-07-01
Payer: MEDICAID

## 2019-07-01 PROCEDURE — 77307 TELETHX ISODOSE PLAN CPLX: CPT

## 2019-07-01 PROCEDURE — 77334 RADIATION TREATMENT AID(S): CPT

## 2019-07-01 PROCEDURE — 77412 RADIATION TX DELIVERY LVL 3: CPT

## 2019-07-02 ENCOUNTER — HOSPITAL ENCOUNTER (OUTPATIENT)
Dept: RADIATION THERAPY | Age: 49
Discharge: HOME | End: 2019-07-02
Payer: MEDICAID

## 2019-07-02 ENCOUNTER — OFFICE VISIT (OUTPATIENT)
Dept: ONCOLOGY | Age: 49
End: 2019-07-02

## 2019-07-02 VITALS
HEART RATE: 72 BPM | RESPIRATION RATE: 14 BRPM | HEIGHT: 66 IN | OXYGEN SATURATION: 98 % | DIASTOLIC BLOOD PRESSURE: 87 MMHG | TEMPERATURE: 98.4 F | WEIGHT: 147.9 LBS | SYSTOLIC BLOOD PRESSURE: 124 MMHG | BODY MASS INDEX: 23.77 KG/M2

## 2019-07-02 DIAGNOSIS — C20 RECTAL CANCER (HCC): Primary | ICD-10-CM

## 2019-07-02 DIAGNOSIS — R50.9 FEVER, UNSPECIFIED FEVER CAUSE: ICD-10-CM

## 2019-07-02 PROCEDURE — 77336 RADIATION PHYSICS CONSULT: CPT

## 2019-07-02 PROCEDURE — 77412 RADIATION TX DELIVERY LVL 3: CPT

## 2019-07-02 RX ORDER — LEVOFLOXACIN 750 MG/1
750 TABLET ORAL DAILY
Qty: 5 TAB | Refills: 0 | Status: SHIPPED | OUTPATIENT
Start: 2019-07-02 | End: 2019-07-07

## 2019-07-02 NOTE — PROGRESS NOTES
[facility] [place] [facility] [place] [address] [address], [address]  W: [phone]  F: [phone]    Reason for Visit:   Fred Patel is a 47 y.o. male who is seen for Rectal cancer- likely stage III    Treatment History:   · 4/26/19: Colonoscopy- 6-7 cm size malignant appearing mass seen at 10 cm from anal verge. This was adenocarcinoma. 3 polyps removed- all were tubular adenomas  · Rectal Ultrasound 5/2/19: ulcerated infiltrative mass lesion was noted in rectum at 10 cm. The lesion measured about 5 cm X 4 cm- T2, 13 mm X 8 mm oblong lymph node was noted immediately adjacent to the mass lesion  · 5/2/19: CT CAP- No metastasis, liver cysts. CEA 3.6  · 5/10/19: PET CT showed rectal malignancy and 3 small perirectal anibal metastatic foci  · 5/28/19: Xeloda + RT     History of Present Illness:   Patient is a 47 y.o. male seen for adenocarcinoma of the mid third of rectum    He had intermittent BRBPR x 1 year and then decided to have a colonoscopy. This led to above mentioned diagnosis. Comes in today for interm visit due to report of fever. Patient states on Sunday evening he had a temperature of 101.5 and Monday night had a temperature of 100.5. Associated with malaise and chills. Has been taking his temperature since and has had no fever. Has no mouth sores. Denies SOB, cough. Denies dysuria. BM are at baseline. Denies nausea/vomiting. No sore throat or congestion. Today is day 25 of 28 planned treatments. He uses 7 drinks a week.     Adopted    Past Medical History:   Diagnosis Date    Adopted     Arthritis     Psoriatic arthritis ([facility] [address]       Past Surgical History:   Procedure Laterality Date    COLONOSCOPY Left 4/26/2019    [facility] N/A 5/2/2019    SIGMOIDOSCOPY FLEXIBLE performed by Cleola Seip, MD at [address] HX GI      EGD    HX HEENT      WISDOM TEETH    HX ORTHOPAEDIC      HX TONSILLECTOMY        Social History     Tobacco Use    Smoking status: Never Smoker    Smokeless tobacco: Never Used   Substance Use Topics    Alcohol use: Yes     Comment: 6 BEERS WEEKLY      Family History   Adopted: Yes   Problem Relation Age of Onset    Asthma Neg Hx     Cancer Neg Hx     Diabetes Neg Hx     Heart Disease Neg Hx     Hypertension Neg Hx     Stroke Neg Hx      Current Outpatient Medications   Medication Sig    capecitabine (XELODA) 500 mg tablet Take 3 tabs (1,500mg) twice daily on days of radiation    tamsulosin (FLOMAX) 0.4 mg capsule Take 0.4 mg by mouth daily.  prochlorperazine (COMPAZINE) 5 mg tablet Take 1 Tab by mouth every six (6) hours as needed for Nausea.  ondansetron hcl (ZOFRAN) 4 mg tablet Take 2 Tabs by mouth every eight (8) hours as needed for Nausea.  triamcinolone acetonide (KENALOG) 0.1 % ointment Apply  to affected area two (2) times daily as needed for Skin Irritation. use thin layer    fluocinonide (VANOS) 0.1 % topical cream Apply  to affected area daily.  omeprazole (PRILOSEC) 20 mg capsule Take 20 mg by mouth as needed. No current facility-administered medications for this visit. No Known Allergies     Review of Systems: A complete review of systems was obtained, negative except as described above. Physical Exam:     Visit Vitals  /87 (BP 1 Location: Left arm, BP Patient Position: Sitting)   Pulse 72   Temp 98.4 °F (36.9 °C) (Oral)   Resp 14   Ht 5' 6\" (1.676 m)   Wt 147 lb 14.4 oz (67.1 kg)   SpO2 98%   BMI 23.87 kg/m²     ECOG PS: 1  General: No distress  Eyes: PERRLA, anicteric sclerae  HENT: Atraumatic, OP clear  Resp: CTAB, normal respiratory effort  CV: s1s2, no LE edema  MS: Normal gait and station. Digits without clubbing or cyanosis. Skin: No rashes, ecchymoses, or petechiae. Normal temperature, turgor, and texture.   Psych: Alert, oriented, appropriate affect, normal judgment/insight    Results: Lab Results   Component Value Date/Time    WBC 3.9 06/25/2019 11:52 AM    HGB 13.5 06/25/2019 11:52 AM    HCT 39.7 06/25/2019 11:52 AM    PLATELET 104 (L) 69/76/2566 11:52 AM    MCV 96 06/25/2019 11:52 AM    ABS. NEUTROPHILS 2.8 06/25/2019 11:52 AM     Lab Results   Component Value Date/Time    Sodium 140 06/25/2019 11:52 AM    Potassium 4.1 06/25/2019 11:52 AM    Chloride 103 06/25/2019 11:52 AM    CO2 24 06/25/2019 11:52 AM    Glucose 96 06/25/2019 11:52 AM    BUN 10 06/25/2019 11:52 AM    Creatinine 0.93 06/25/2019 11:52 AM    GFR est  06/25/2019 11:52 AM    GFR est non-AA 95 06/25/2019 11:52 AM    Calcium 9.2 06/25/2019 11:52 AM     Lab Results   Component Value Date/Time    Bilirubin, total 0.6 06/25/2019 11:52 AM    ALT (SGPT) 19 06/25/2019 11:52 AM    AST (SGOT) 21 06/25/2019 11:52 AM    Alk. phosphatase 53 06/25/2019 11:52 AM    Protein, total 6.6 06/25/2019 11:52 AM    Albumin 4.5 06/25/2019 11:52 AM     Records reviewed and summarized above. Pathology report(s) reviewed above. Radiology report(s) reviewed above. Assessment:   1) Rectal adenocarcinoma- mid third    Pathology, scans and EUS reviewed  T2N1M0 disease- Clinical stage III  On my review of CT there is a pathologic appearing perirectal node- PET CT suggests small but quite FDG avid nodes as well. Case reviewed in Tumor conference and the consensus was to proceed with neoadjuvant ChemoRT    Currently 25/28 treatments of concurrent chemoRT with Xeloda.      Tolerating well thus far with mild abdominal discomfort and now fever of unknown etiology, see #5    2) Young age at diagnosis    Adopted  Had several adenomas removed  May have attenuated FAP and will require genetic testing  I will set [facility] up with [facility] after completion of chemoRT as this information may be helpful in surgical planning    3) GIB  Minor    4) Psoriasis  Off Methotrexate  Follows Dr. Mindy Pulliam    5) Fever  Unknown etiology  No obvious source of infection  Will send levofloxacin and advised him to call us with change in symptoms     Plan:     · Continue chemoRT day 25/28 treatments with Xeloda 825 mg/m2 BID on days of RT x 5 weeks   · Zofran prn  · CBC diff, CMP weekly, given standing orders for lab colin   · Follow with rad onc as scheduled  · Compazine 5mg q6 hours PRN for nausea   · Levofloxacin 750mg x 5 days   · Advised him to call us with worsening symptoms or continued fever    F/u in 1 month    I appreciate the opportunity to participate in Mr. Zuri gardner.     Signed By: Katie Arredondo MD

## 2019-07-02 NOTE — PROGRESS NOTES
Austin Alexandra is a 47 y.o. male      Chief Complaint   Patient presents with    Rectal Cancer     1. Have you been to the ER, urgent care clinic since your last visit? Hospitalized since your last visit? No    2. Have you seen or consulted any other health care providers outside of the [address] [place] [address] since your last visit? Include any pap smears or colon screening.  No

## 2019-07-03 ENCOUNTER — HOSPITAL ENCOUNTER (OUTPATIENT)
Dept: RADIATION THERAPY | Age: 49
Discharge: HOME | End: 2019-07-03
Payer: MEDICAID

## 2019-07-03 LAB
ALBUMIN SERPL-MCNC: 4.4 G/DL (ref 3.5–5.5)
ALBUMIN/GLOB SERPL: 1.9 {RATIO} (ref 1.2–2.2)
ALP SERPL-CCNC: 57 IU/L (ref 39–117)
ALT SERPL-CCNC: 23 IU/L (ref 0–44)
AST SERPL-CCNC: 24 IU/L (ref 0–40)
BASOPHILS # BLD AUTO: 0 X10E3/UL (ref 0–0.2)
BASOPHILS NFR BLD AUTO: 1 %
BILIRUB SERPL-MCNC: 0.5 MG/DL (ref 0–1.2)
BUN SERPL-MCNC: 6 MG/DL (ref 6–24)
BUN/CREAT SERPL: 6 (ref 9–20)
CALCIUM SERPL-MCNC: 9.3 MG/DL (ref 8.7–10.2)
CHLORIDE SERPL-SCNC: 105 MMOL/L (ref 96–106)
CO2 SERPL-SCNC: 24 MMOL/L (ref 20–29)
CREAT SERPL-MCNC: 0.95 MG/DL (ref 0.76–1.27)
EOSINOPHIL # BLD AUTO: 0.3 X10E3/UL (ref 0–0.4)
EOSINOPHIL NFR BLD AUTO: 7 %
ERYTHROCYTE [DISTWIDTH] IN BLOOD BY AUTOMATED COUNT: 16.2 % (ref 12.3–15.4)
GLOBULIN SER CALC-MCNC: 2.3 G/DL (ref 1.5–4.5)
GLUCOSE SERPL-MCNC: 89 MG/DL (ref 65–99)
HCT VFR BLD AUTO: 40.1 % (ref 37.5–51)
HGB BLD-MCNC: 13.9 G/DL (ref 13–17.7)
IMM GRANULOCYTES # BLD AUTO: 0 X10E3/UL (ref 0–0.1)
IMM GRANULOCYTES NFR BLD AUTO: 0 %
LYMPHOCYTES # BLD AUTO: 0.2 X10E3/UL (ref 0.7–3.1)
LYMPHOCYTES NFR BLD AUTO: 5 %
MCH RBC QN AUTO: 33.7 PG (ref 26.6–33)
MCHC RBC AUTO-ENTMCNC: 34.7 G/DL (ref 31.5–35.7)
MCV RBC AUTO: 97 FL (ref 79–97)
MONOCYTES # BLD AUTO: 0.6 X10E3/UL (ref 0.1–0.9)
MONOCYTES NFR BLD AUTO: 14 %
NEUTROPHILS # BLD AUTO: 3.3 X10E3/UL (ref 1.4–7)
NEUTROPHILS NFR BLD AUTO: 73 %
PLATELET # BLD AUTO: 149 X10E3/UL (ref 150–450)
POTASSIUM SERPL-SCNC: 4.1 MMOL/L (ref 3.5–5.2)
PROT SERPL-MCNC: 6.7 G/DL (ref 6–8.5)
RBC # BLD AUTO: 4.13 X10E6/UL (ref 4.14–5.8)
SODIUM SERPL-SCNC: 142 MMOL/L (ref 134–144)
WBC # BLD AUTO: 4.4 X10E3/UL (ref 3.4–10.8)

## 2019-07-03 PROCEDURE — 77417 THER RADIOLOGY PORT IMAGE(S): CPT

## 2019-07-03 PROCEDURE — 77412 RADIATION TX DELIVERY LVL 3: CPT

## 2019-07-05 ENCOUNTER — HOSPITAL ENCOUNTER (OUTPATIENT)
Dept: RADIATION THERAPY | Age: 49
Discharge: HOME | End: 2019-07-05
Payer: MEDICAID

## 2019-07-05 PROCEDURE — 77412 RADIATION TX DELIVERY LVL 3: CPT

## 2019-07-24 LAB
ALBUMIN SERPL-MCNC: 4.5 G/DL (ref 3.5–5.5)
ALBUMIN/GLOB SERPL: 1.9 {RATIO} (ref 1.2–2.2)
ALP SERPL-CCNC: 55 IU/L (ref 39–117)
ALT SERPL-CCNC: 20 IU/L (ref 0–44)
AST SERPL-CCNC: 20 IU/L (ref 0–40)
BASOPHILS # BLD AUTO: 0 X10E3/UL (ref 0–0.2)
BASOPHILS NFR BLD AUTO: 0 %
BILIRUB SERPL-MCNC: 0.4 MG/DL (ref 0–1.2)
BUN SERPL-MCNC: 9 MG/DL (ref 6–24)
BUN/CREAT SERPL: 9 (ref 9–20)
CALCIUM SERPL-MCNC: 9.6 MG/DL (ref 8.7–10.2)
CHLORIDE SERPL-SCNC: 103 MMOL/L (ref 96–106)
CO2 SERPL-SCNC: 23 MMOL/L (ref 20–29)
CREAT SERPL-MCNC: 1 MG/DL (ref 0.76–1.27)
EOSINOPHIL # BLD AUTO: 0.1 X10E3/UL (ref 0–0.4)
EOSINOPHIL NFR BLD AUTO: 2 %
ERYTHROCYTE [DISTWIDTH] IN BLOOD BY AUTOMATED COUNT: 18 % (ref 12.3–15.4)
GLOBULIN SER CALC-MCNC: 2.4 G/DL (ref 1.5–4.5)
GLUCOSE SERPL-MCNC: 117 MG/DL (ref 65–99)
HCT VFR BLD AUTO: 42.7 % (ref 37.5–51)
HGB BLD-MCNC: 14.6 G/DL (ref 13–17.7)
IMM GRANULOCYTES # BLD AUTO: 0 X10E3/UL (ref 0–0.1)
IMM GRANULOCYTES NFR BLD AUTO: 0 %
LYMPHOCYTES # BLD AUTO: 0.5 X10E3/UL (ref 0.7–3.1)
LYMPHOCYTES NFR BLD AUTO: 16 %
MCH RBC QN AUTO: 33.6 PG (ref 26.6–33)
MCHC RBC AUTO-ENTMCNC: 34.2 G/DL (ref 31.5–35.7)
MCV RBC AUTO: 98 FL (ref 79–97)
MONOCYTES # BLD AUTO: 0.3 X10E3/UL (ref 0.1–0.9)
MONOCYTES NFR BLD AUTO: 11 %
NEUTROPHILS # BLD AUTO: 2.1 X10E3/UL (ref 1.4–7)
NEUTROPHILS NFR BLD AUTO: 71 %
PLATELET # BLD AUTO: 165 X10E3/UL (ref 150–450)
POTASSIUM SERPL-SCNC: 3.9 MMOL/L (ref 3.5–5.2)
PROT SERPL-MCNC: 6.9 G/DL (ref 6–8.5)
RBC # BLD AUTO: 4.34 X10E6/UL (ref 4.14–5.8)
SODIUM SERPL-SCNC: 145 MMOL/L (ref 134–144)
WBC # BLD AUTO: 3 X10E3/UL (ref 3.4–10.8)

## 2019-07-29 ENCOUNTER — OFFICE VISIT (OUTPATIENT)
Dept: RHEUMATOLOGY | Age: 49
End: 2019-07-29

## 2019-07-29 VITALS
SYSTOLIC BLOOD PRESSURE: 136 MMHG | WEIGHT: 148.8 LBS | DIASTOLIC BLOOD PRESSURE: 90 MMHG | OXYGEN SATURATION: 97 % | HEART RATE: 77 BPM | RESPIRATION RATE: 18 BRPM | BODY MASS INDEX: 23.91 KG/M2 | HEIGHT: 66 IN | TEMPERATURE: 99.1 F

## 2019-07-29 DIAGNOSIS — L40.9 PSORIASIS: Primary | ICD-10-CM

## 2019-07-29 DIAGNOSIS — C20 RECTAL CANCER (HCC): ICD-10-CM

## 2019-07-29 DIAGNOSIS — L40.50 PSORIATIC ARTHRITIS (HCC): ICD-10-CM

## 2019-07-29 NOTE — PROGRESS NOTES
Chief Complaint   Patient presents with    Arthritis    Psoriasis     1. Have you been to the ER, urgent care clinic since your last visit? Hospitalized since your last visit? No    2. Have you seen or consulted any other health care providers outside of the [address] [place] [address] since your last visit? Include any pap smears or colon screening.  No

## 2019-07-29 NOTE — PATIENT INSTRUCTIONS
Please fill out your [address] you will receive after your visit in the mail or via [address]!

## 2019-07-29 NOTE — PROGRESS NOTES
REASON FOR VISIT    Patient presents for a follow up visit. HISTORY OF DISEASE: Psoriatic Arthritis    Year of diagnosis: 2015  First visit with me: 2/2019  Cumulative disease manifestations:  Dactylitis, psoriasis, back pain  Positive serologies/labs:  Negative serologies/labs: HLA B27  Other co-morbidities: excessive alcohol use; rectal cancer on chemo + RT  Relapses:      Past treatment history:  Prednisone:   Non-biologic DMARD: Methotrexate (2015-4/2019)  Biologic:   Other:     HISTORY OF PRESENT ILLNESS     MHAQ: 0  Pain scale: 1/10  The patient has finished first round of chemo/RT. Now he will have surgery then he might do a second round. He is meeting with a surgeon in a few week. The patient notes he has felt very well through all of this. He had stopped the methotrexate. He notes his joints are doing well. He has mild aches and pains. He has mild pain when when he wakes up. Psoriasis is the same. No back pain    REVIEW OF SYSTEMS    A comprehensive review of systems was performed and pertinent results are documented in the HPI, review of systems is otherwise non-contributory.     PAST MEDICAL HISTORY    Past Medical History:   Diagnosis Date    Adopted     Arthritis     P[facility]atic arthritis ([facility] [address]         Past Surgical History:   Procedure Laterality Date    COLONOSCOPY Left 4/26/2019    COLONOSCOPY performed by Nichole Berkowitz MD at [place] [address] N/A 5/2/2019    Wilmer Yonathan performed by Kvng Owen MD at [facility] ENDOSCOPY    HX GI      EGD    HX HEENT      WISDOM TEETH    HX ORTHOPAEDIC      HX TONSILLECTOMY         FAMILY HISTORY    Family History   Adopted: Yes   Problem Relation Age of Onset    Asthma Neg Hx     Cancer Neg Hx     Diabetes Neg Hx     Heart Disease Neg Hx     Hypertension Neg Hx     Stroke Neg Hx        SOCIAL HISTORY    Social History     Tobacco Use    Smoking status: Never Smoker    Smokeless tobacco: Never Used Substance Use Topics    Alcohol use: Yes     Comment: 6 BEERS WEEKLY    Drug use: No     On vacations drinks more than 6 beers weekly    MEDICATIONS    Current Outpatient Medications   Medication Sig Dispense Refill    tamsulosin (FLOMAX) 0.4 mg capsule Take 0.4 mg by mouth daily.  prochlorperazine (COMPAZINE) 5 mg tablet Take 1 Tab by mouth every six (6) hours as needed for Nausea. 20 Tab 1    triamcinolone acetonide (KENALOG) 0.1 % ointment Apply  to affected area two (2) times daily as needed for Skin Irritation. use thin layer      fluocinonide (VANOS) 0.1 % topical cream Apply  to affected area daily. 30 g 5    omeprazole (PRILOSEC) 20 mg capsule Take 20 mg by mouth as needed.  capecitabine (XELODA) 500 mg tablet Take 3 tabs (1,500mg) twice daily on days of radiation 18 Tab 0    ondansetron hcl (ZOFRAN) 4 mg tablet Take 2 Tabs by mouth every eight (8) hours as needed for Nausea. 60 Tab 0       ALLERGIES    No Known Allergies    PHYSICAL EXAMINATION    Visit Vitals  /90 (BP 1 Location: Right arm, BP Patient Position: Sitting)   Pulse 77   Temp 99.1 °F (37.3 °C) (Oral)   Resp 18   Ht 5' 6\" (1.676 m)   Wt 148 lb 12.8 oz (67.5 kg)   SpO2 97%   BMI 24.02 kg/m²     Body mass index is 24.02 kg/m². General: NAD  HEENT: PERRL, anicteric, non-injected sclerae; oropharynx without ulcers, erythema, or exudate. Moist mucous membranes. Lymphatic: No cervical or axillary lymphadenopathy. Cardiovascular: S1, S2,no R/M/G  Pulmonary: CTA b/l. No wheezes/rales/rhonchi. Abdominal: Soft,NTND, + BS. Skin: No rash, nodules, or periungual changes.  Psoriatic plaques B/l knees and b/l shins  Neuro: Alert; able to carry normal conversation    Musculoskeletal:   No swollen joints  Occiput to Wall test: normal  Chest Expansion (abnormal if <2.5 cm expansion): normal  Modified Schober test (<15 cm abnormal):  normal  Global ROM of spine: normal  MATT test: normal  Enthesitis: normal    DATA REVIEW    Prior medical records were reviewed and if applicable are summarized as below:    Labs:   2/2019: cbc, cmp unremarkable, ESR, CRP normal, HEpB, C, quant gold negative  10/18: WBC 4.1, Hb 15.2, Plt 194, CMP Normal, CRP, ESR negative  1/16: Hep C negative, HLA b27 negative, quant gold negative    Imaging:   SI Joints (2/2019): Personally reviewed images. Normal joints  Foot xrays (2/2019): Personally reviewed images. No erosions  Hand xrays (2/2019): Personally reviewed images. + DJD. No erosions  1/16: CXR normal    Pathology:  Rectal biopsy (4/2019): well differentiated invasive colonic adenocarcinoma    ASSESSMENT AND PLAN    A 47 y.o. male with hx of psoriatic arthritis not on treatment, rectal adenocarcinoma on chemo/RT presents for a follow up visit. His arthritis is under good control on no treatment currently. He has psoriasis plaques but is not using any topicals. Given that he has active malignancy and is being treated active will hold off on psoriatic arthritis treatment for now. # Psoriatic arthritis:    - suspend therapy while has active malignancy. # Psoriasis:   - advised him to use topicals actively    # Rectal cancer:   - awaiting surgeryt. # Alcohol use:   - he is not willing to cut down on his use of alcohol at this time. # Medication Toxicity Monitoring:  - cbc, cmp normal  - Hepatitis B, C: negative 2/2019  - Quant gold: negative 2/2019  - Immunizations: UTD  - bone health: will discuss at next visit    # HCM:  - flu vaccine 1/2019    The patient voiced understanding of the aforementioned assessment and plan. Summary of plan was provided in the After Visit Summary patient instructions. I also provided education about MyChart setup and utility. Mr. Judy Frey has a reminder for a \"due or due soon\" health maintenance. I have asked that he contact his primary care provider for follow-up on this health maintenance.     TODAY'S ORDERS    No orders of the defined types were placed in this encounter.       Future Appointments   Date Time Provider Shannan Rodriguez   7/30/2019 10:45 AM Meliza Dobbs  N Melissa Russell JOY [facility]   8/5/2019 10:00 AM RAD ONC THERAPY [address]   1/28/2020 10:20 AM Beatris Chang  Pinky Rodriguez MD    Adult Rheumatology   [facility]  A [facility] of [facility], [address] [place] [address]   Phone [phone]  Fax [phone]

## 2019-07-30 ENCOUNTER — OFFICE VISIT (OUTPATIENT)
Dept: ONCOLOGY | Age: 49
End: 2019-07-30

## 2019-07-30 VITALS
HEIGHT: 66 IN | WEIGHT: 150.9 LBS | TEMPERATURE: 98.6 F | SYSTOLIC BLOOD PRESSURE: 130 MMHG | DIASTOLIC BLOOD PRESSURE: 85 MMHG | HEART RATE: 82 BPM | BODY MASS INDEX: 24.25 KG/M2 | OXYGEN SATURATION: 97 %

## 2019-07-30 DIAGNOSIS — C20 RECTAL CANCER (HCC): Primary | ICD-10-CM

## 2019-07-30 NOTE — PROGRESS NOTES
Tana Becerril is a 47 y.o. male  Chief Complaint   Patient presents with    Follow-up     rectal cancer      1. Have you been to the ER, urgent care clinic since your last visit? Hospitalized since your last visit?  no  2. Have you seen or consulted any other health care providers outside of the [address] [place] [address] since your last visit?     No

## 2019-07-30 NOTE — PROGRESS NOTES
Cancer [place] at [facility]  [address], [address]  W: [phone]  F: [phone]    Reason for Visit:   Tana Becerril is a 47 y.o. male who is seen for Rectal cancer- likely stage III    Treatment History:   · 4/26/19: Colonoscopy- 6-7 cm size malignant appearing mass seen at 10 cm from anal verge. This was adenocarcinoma. 3 polyps removed- all were tubular adenomas  · Rectal Ultrasound 5/2/19: ulcerated infiltrative mass lesion was noted in rectum at 10 cm. The lesion measured about 5 cm X 4 cm- T2, 13 mm X 8 mm oblong lymph node was noted immediately adjacent to the mass lesion  · 5/2/19: CT CAP- No metastasis, liver cysts. CEA 3.6  · 5/10/19: PET CT showed rectal malignancy and 3 small perirectal anibal metastatic foci  · 5/28/19-7/8/29: Xeloda + RT     History of Present Illness:   Patient is a 47 y.o. male seen for adenocarcinoma of the mid third of rectum    He had intermittent BRBPR x 1 year and then decided to have a colonoscopy. This led to above mentioned diagnosis. Comes in today for follow-up. He completed radiation and Xeloda on July 8th. Has appointment with Dr. Brittney Umaña on 8/21/19. He feels very well today. Takes 1 imodium a day and with this has normal BMs. Denies blood in stool. Denies nausea/vomiting. Denies fevers/chills. No mouth sores. He uses 7 drinks a week.     Adopted    Past Medical History:   Diagnosis Date    Adopted     Arthritis     Psoriatic arthritis ([facility] [address]       Past Surgical History:   Procedure Laterality Date    COLONOSCOPY Left 4/26/2019    COLONOSCOPY performed by Martinez Knox MD at [facility] N/A 5/2/2019    SIGMOIDOSCOPY FLEXIBLE performed by Armando Paige MD at [facility] ENDOSCOPY    HX GI      EGD    HX HEENT      WISDOM TEETH    HX ORTHOPAEDIC      HX TONSILLECTOMY        Social History     Tobacco Use    Smoking status: Never Smoker    Smokeless tobacco: Never Used   Substance Use Topics    Alcohol use: Yes     Comment: 6 BEERS WEEKLY      Family History   Adopted: Yes   Problem Relation Age of Onset    Asthma Neg Hx     Cancer Neg Hx     Diabetes Neg Hx     Heart Disease Neg Hx     Hypertension Neg Hx     Stroke Neg Hx      Current Outpatient Medications   Medication Sig    tamsulosin (FLOMAX) 0.4 mg capsule Take 0.4 mg by mouth daily.  prochlorperazine (COMPAZINE) 5 mg tablet Take 1 Tab by mouth every six (6) hours as needed for Nausea.  ondansetron hcl (ZOFRAN) 4 mg tablet Take 2 Tabs by mouth every eight (8) hours as needed for Nausea.  triamcinolone acetonide (KENALOG) 0.1 % ointment Apply  to affected area two (2) times daily as needed for Skin Irritation. use thin layer    fluocinonide (VANOS) 0.1 % topical cream Apply  to affected area daily.  omeprazole (PRILOSEC) 20 mg capsule Take 20 mg by mouth as needed.  capecitabine (XELODA) 500 mg tablet Take 3 tabs (1,500mg) twice daily on days of radiation     No current facility-administered medications for this visit. No Known Allergies     Review of Systems: A complete review of systems was obtained, negative except as described above. Physical Exam:     Visit Vitals  /85   Pulse 82   Temp 98.6 °F (37 °C)   Ht 5' 6\" (1.676 m)   Wt 150 lb 14.4 oz (68.4 kg)   SpO2 97%   BMI 24.36 kg/m²     ECOG PS: 1  General: No distress  Eyes: PERRLA, anicteric sclerae  HENT: Atraumatic, OP clear  Resp: CTAB, normal respiratory effort  CV: s1s2, no LE edema  MS: Normal gait and station. Digits without clubbing or cyanosis. Skin: No rashes, ecchymoses, or petechiae. Normal temperature, turgor, and texture. Psych: Alert, oriented, appropriate affect, normal judgment/insight    Results:     Lab Results   Component Value Date/Time    WBC 3.0 (L) 07/23/2019 04:37 PM    HGB 14.6 07/23/2019 04:37 PM    HCT 42.7 07/23/2019 04:37 PM    PLATELET 813 29/69/4573 04:37 PM    MCV 98 (H) 07/23/2019 04:37 PM    ABS.  NEUTROPHILS 2.1 07/23/2019 04:37 PM     Lab Results   Component Value Date/Time    Sodium 145 (H) 07/23/2019 04:37 PM    Potassium 3.9 07/23/2019 04:37 PM    Chloride 103 07/23/2019 04:37 PM    CO2 23 07/23/2019 04:37 PM    Glucose 117 (H) 07/23/2019 04:37 PM    BUN 9 07/23/2019 04:37 PM    Creatinine 1.00 07/23/2019 04:37 PM    GFR est  07/23/2019 04:37 PM    GFR est non-AA 87 07/23/2019 04:37 PM    Calcium 9.6 07/23/2019 04:37 PM     Lab Results   Component Value Date/Time    Bilirubin, total 0.4 07/23/2019 04:37 PM    ALT (SGPT) 20 07/23/2019 04:37 PM    AST (SGOT) 20 07/23/2019 04:37 PM    Alk. phosphatase 55 07/23/2019 04:37 PM    Protein, total 6.9 07/23/2019 04:37 PM    Albumin 4.5 07/23/2019 04:37 PM     Records reviewed and summarized above. Pathology report(s) reviewed above. Radiology report(s) reviewed above. Assessment:   1) Rectal adenocarcinoma- mid third    Pathology, scans and EUS reviewed  T2N1M0 disease- Clinical stage III  On my review of CT there is a pathologic appearing perirectal node- PET CT suggests small but quite FDG avid nodes as well. Case reviewed in Tumor conference and the consensus was to proceed with neoadjuvant ChemoRT. Completed 28/28 chemoRT treatments on 7/8/19 which he tolerated well     Has appointment with Dr. Martha Gonzalez on 8/21/19, surgery date TBD     Genetic testing obtained today 7/30/19    2) Ina Pass age at diagnosis    Adopted  Had several adenomas removed  May have attenuated FAP and will require genetic testing    3) GIB  Minor    4) Psoriasis  Off Methotrexate  Follows Dr. Cornelius Florence:     · Genetic testing today  · Follow with Dr. Martha Gonzalez as scheduled for anticipated surgery   · Post op CT scans to be ordered at next visit     F/u in 2 months after surgery     I appreciate the opportunity to participate in Mr. Ruth gardner.     Signed By: Dennis Leavitt MD

## 2019-09-10 ENCOUNTER — HOSPITAL ENCOUNTER (OUTPATIENT)
Dept: PREADMISSION TESTING | Age: 49
Discharge: HOME | End: 2019-09-10
Payer: MEDICAID

## 2019-09-10 VITALS
HEIGHT: 67 IN | BODY MASS INDEX: 23.7 KG/M2 | DIASTOLIC BLOOD PRESSURE: 89 MMHG | SYSTOLIC BLOOD PRESSURE: 134 MMHG | HEART RATE: 65 BPM | TEMPERATURE: 98.7 F | WEIGHT: 151 LBS

## 2019-09-10 LAB
ABO + RH BLD: NORMAL
ALBUMIN SERPL-MCNC: 3.6 G/DL (ref 3.5–5)
ALBUMIN/GLOB SERPL: 1.2 {RATIO} (ref 1.1–2.2)
ALP SERPL-CCNC: 54 U/L (ref 45–117)
ALT SERPL-CCNC: 20 U/L (ref 12–78)
ANION GAP SERPL CALC-SCNC: 7 MMOL/L (ref 5–15)
APTT PPP: 25.3 SEC (ref 22.1–32)
AST SERPL-CCNC: 13 U/L (ref 15–37)
BILIRUB SERPL-MCNC: 0.7 MG/DL (ref 0.2–1)
BLOOD GROUP ANTIBODIES SERPL: NORMAL
BUN SERPL-MCNC: 12 MG/DL (ref 6–20)
BUN/CREAT SERPL: 12 (ref 12–20)
CALCIUM SERPL-MCNC: 8.7 MG/DL (ref 8.5–10.1)
CHLORIDE SERPL-SCNC: 104 MMOL/L (ref 97–108)
CO2 SERPL-SCNC: 30 MMOL/L (ref 21–32)
CREAT SERPL-MCNC: 1.04 MG/DL (ref 0.7–1.3)
ERYTHROCYTE [DISTWIDTH] IN BLOOD BY AUTOMATED COUNT: 12.6 % (ref 11.5–14.5)
EST. AVERAGE GLUCOSE BLD GHB EST-MCNC: 108 MG/DL
GLOBULIN SER CALC-MCNC: 2.9 G/DL (ref 2–4)
GLUCOSE SERPL-MCNC: 89 MG/DL (ref 65–100)
HBA1C MFR BLD: 5.4 % (ref 4.2–6.3)
HCT VFR BLD AUTO: 43.2 % (ref 36.6–50.3)
HGB BLD-MCNC: 14.3 G/DL (ref 12.1–17)
INR PPP: 1 (ref 0.9–1.1)
MAGNESIUM SERPL-MCNC: 2.6 MG/DL (ref 1.6–2.4)
MCH RBC QN AUTO: 33.7 PG (ref 26–34)
MCHC RBC AUTO-ENTMCNC: 33.1 G/DL (ref 30–36.5)
MCV RBC AUTO: 101.9 FL (ref 80–99)
NRBC # BLD: 0 K/UL (ref 0–0.01)
NRBC BLD-RTO: 0 PER 100 WBC
PHOSPHATE SERPL-MCNC: 3.5 MG/DL (ref 2.6–4.7)
PLATELET # BLD AUTO: 146 K/UL (ref 150–400)
PMV BLD AUTO: 10.6 FL (ref 8.9–12.9)
POTASSIUM SERPL-SCNC: 4.1 MMOL/L (ref 3.5–5.1)
PROT SERPL-MCNC: 6.5 G/DL (ref 6.4–8.2)
PROTHROMBIN TIME: 9.7 SEC (ref 9–11.1)
RBC # BLD AUTO: 4.24 M/UL (ref 4.1–5.7)
SODIUM SERPL-SCNC: 141 MMOL/L (ref 136–145)
SPECIMEN EXP DATE BLD: NORMAL
THERAPEUTIC RANGE,PTTT: NORMAL SECS (ref 58–77)
WBC # BLD AUTO: 2.5 K/UL (ref 4.1–11.1)

## 2019-09-10 PROCEDURE — 85730 THROMBOPLASTIN TIME PARTIAL: CPT

## 2019-09-10 PROCEDURE — 85027 COMPLETE CBC AUTOMATED: CPT

## 2019-09-10 PROCEDURE — 80053 COMPREHEN METABOLIC PANEL: CPT

## 2019-09-10 PROCEDURE — 93005 ELECTROCARDIOGRAM TRACING: CPT

## 2019-09-10 PROCEDURE — 85610 PROTHROMBIN TIME: CPT

## 2019-09-10 PROCEDURE — 84100 ASSAY OF PHOSPHORUS: CPT

## 2019-09-10 PROCEDURE — 83036 HEMOGLOBIN GLYCOSYLATED A1C: CPT

## 2019-09-10 PROCEDURE — 83735 ASSAY OF MAGNESIUM: CPT

## 2019-09-10 PROCEDURE — 86900 BLOOD TYPING SEROLOGIC ABO: CPT

## 2019-09-10 RX ORDER — DIPHENOXYLATE HYDROCHLORIDE AND ATROPINE SULFATE 2.5; .025 MG/1; MG/1
1 TABLET ORAL
Status: ON HOLD | COMMUNITY
End: 2020-10-22

## 2019-09-10 NOTE — ADVANCED PRACTICE NURSE
Consult with Dr Denton Lima, Cardiologist, concerning preliminary EKG with diffuse slight ST elevation, patient asymptomatic with no recent illness or cardiac disease hx, no abnormal heart sounds with auscultation, PMHx reviewed. Dr Denton Lima states no further cardiac eval indicated based on EKG reading interpreted as early repolarization (normal variant).

## 2019-09-10 NOTE — PERIOP NOTES
PATIENT GIVEN WRITTEN INSTRUCTIONS TO GO TO THE IMAGING CENTER/CANCER CENTER [address] [facility] [address] TO HAVE CHEST XRAY COMPLETED. Patient verbalizes understanding of preoperative instructions:  Given skin prep chlorhexidine SOAP-given written and verbal instructions on use. Jamey Damian NP IN TO ASSESS FOR ABNORMAL EKG READING.

## 2019-09-11 LAB
ATRIAL RATE: 65 BPM
CALCULATED P AXIS, ECG09: 14 DEGREES
CALCULATED R AXIS, ECG10: 41 DEGREES
CALCULATED T AXIS, ECG11: 27 DEGREES
DIAGNOSIS, 93000: NORMAL
P-R INTERVAL, ECG05: 186 MS
Q-T INTERVAL, ECG07: 378 MS
QRS DURATION, ECG06: 96 MS
QTC CALCULATION (BEZET), ECG08: 393 MS
VENTRICULAR RATE, ECG03: 65 BPM

## 2019-09-23 ENCOUNTER — ANESTHESIA EVENT (OUTPATIENT)
Dept: SURGERY | Age: 49
DRG: 230 | End: 2019-09-23
Payer: MEDICAID

## 2019-09-24 ENCOUNTER — ANESTHESIA (OUTPATIENT)
Dept: SURGERY | Age: 49
DRG: 230 | End: 2019-09-24
Payer: MEDICAID

## 2019-09-24 ENCOUNTER — HOSPITAL ENCOUNTER (INPATIENT)
Age: 49
LOS: 8 days | Discharge: HOME HEALTH | DRG: 230 | End: 2019-10-02
Attending: COLON & RECTAL SURGERY | Admitting: COLON & RECTAL SURGERY
Payer: MEDICAID

## 2019-09-24 DIAGNOSIS — G89.18 ACUTE POST-OPERATIVE PAIN: Primary | ICD-10-CM

## 2019-09-24 PROBLEM — D69.6 THROMBOCYTOPENIA (HCC): Status: ACTIVE | Noted: 2019-09-24

## 2019-09-24 PROBLEM — C20 RECTAL CANCER (HCC): Chronic | Status: ACTIVE | Noted: 2019-05-10

## 2019-09-24 LAB
ANION GAP SERPL CALC-SCNC: 10 MMOL/L (ref 5–15)
BASOPHILS # BLD: 0 K/UL (ref 0–0.1)
BASOPHILS NFR BLD: 0 % (ref 0–1)
BUN SERPL-MCNC: 9 MG/DL (ref 6–20)
BUN/CREAT SERPL: 6 (ref 12–20)
CALCIUM SERPL-MCNC: 8.1 MG/DL (ref 8.5–10.1)
CHLORIDE SERPL-SCNC: 108 MMOL/L (ref 97–108)
CO2 SERPL-SCNC: 23 MMOL/L (ref 21–32)
CREAT SERPL-MCNC: 1.57 MG/DL (ref 0.7–1.3)
DIFFERENTIAL METHOD BLD: ABNORMAL
EOSINOPHIL # BLD: 0 K/UL (ref 0–0.4)
EOSINOPHIL NFR BLD: 0 % (ref 0–7)
ERYTHROCYTE [DISTWIDTH] IN BLOOD BY AUTOMATED COUNT: 11.8 % (ref 11.5–14.5)
GLUCOSE SERPL-MCNC: 167 MG/DL (ref 65–100)
HCT VFR BLD AUTO: 40.8 % (ref 36.6–50.3)
HGB BLD-MCNC: 13.7 G/DL (ref 12.1–17)
IMM GRANULOCYTES # BLD AUTO: 0 K/UL (ref 0–0.04)
IMM GRANULOCYTES NFR BLD AUTO: 0 % (ref 0–0.5)
LYMPHOCYTES # BLD: 0.2 K/UL (ref 0.8–3.5)
LYMPHOCYTES NFR BLD: 3 % (ref 12–49)
MAGNESIUM SERPL-MCNC: 2.4 MG/DL (ref 1.6–2.4)
MCH RBC QN AUTO: 34.1 PG (ref 26–34)
MCHC RBC AUTO-ENTMCNC: 33.6 G/DL (ref 30–36.5)
MCV RBC AUTO: 101.5 FL (ref 80–99)
MONOCYTES # BLD: 0.4 K/UL (ref 0–1)
MONOCYTES NFR BLD: 6 % (ref 5–13)
NEUTS SEG # BLD: 6.1 K/UL (ref 1.8–8)
NEUTS SEG NFR BLD: 91 % (ref 32–75)
NRBC # BLD: 0 K/UL (ref 0–0.01)
NRBC BLD-RTO: 0 PER 100 WBC
PHOSPHATE SERPL-MCNC: 3.6 MG/DL (ref 2.6–4.7)
PLATELET # BLD AUTO: 120 K/UL (ref 150–400)
PMV BLD AUTO: 10.3 FL (ref 8.9–12.9)
POTASSIUM SERPL-SCNC: 4.5 MMOL/L (ref 3.5–5.1)
RBC # BLD AUTO: 4.02 M/UL (ref 4.1–5.7)
RBC MORPH BLD: ABNORMAL
SODIUM SERPL-SCNC: 141 MMOL/L (ref 136–145)
WBC # BLD AUTO: 6.7 K/UL (ref 4.1–11.1)

## 2019-09-24 PROCEDURE — 77030008606 HC TRCR ENDOSC KII AMR -B: Performed by: COLON & RECTAL SURGERY

## 2019-09-24 PROCEDURE — 77030009527 HC GEL PRT SYS AMR -E: Performed by: COLON & RECTAL SURGERY

## 2019-09-24 PROCEDURE — 74011000258 HC RX REV CODE- 258: Performed by: COLON & RECTAL SURGERY

## 2019-09-24 PROCEDURE — 0T788DZ DILATION OF BILATERAL URETERS WITH INTRALUMINAL DEVICE, VIA NATURAL OR ARTIFICIAL OPENING ENDOSCOPIC: ICD-10-PCS | Performed by: UROLOGY

## 2019-09-24 PROCEDURE — 77030010545: Performed by: COLON & RECTAL SURGERY

## 2019-09-24 PROCEDURE — 74011000250 HC RX REV CODE- 250: Performed by: NURSE ANESTHETIST, CERTIFIED REGISTERED

## 2019-09-24 PROCEDURE — 77030010515 HC APPL ENDOCLP LIG J&J -B: Performed by: COLON & RECTAL SURGERY

## 2019-09-24 PROCEDURE — 77030002986 HC SUT PROL J&J -A: Performed by: COLON & RECTAL SURGERY

## 2019-09-24 PROCEDURE — 77030040830 HC CATH URETH FOL MDII -A: Performed by: COLON & RECTAL SURGERY

## 2019-09-24 PROCEDURE — 0DTP0ZZ RESECTION OF RECTUM, OPEN APPROACH: ICD-10-PCS | Performed by: COLON & RECTAL SURGERY

## 2019-09-24 PROCEDURE — 74011250636 HC RX REV CODE- 250/636

## 2019-09-24 PROCEDURE — 85025 COMPLETE CBC W/AUTO DIFF WBC: CPT

## 2019-09-24 PROCEDURE — 74011250636 HC RX REV CODE- 250/636: Performed by: NURSE ANESTHETIST, CERTIFIED REGISTERED

## 2019-09-24 PROCEDURE — 77030037366 HC STPLR ENDO TRI-STPLR COVD -C: Performed by: COLON & RECTAL SURGERY

## 2019-09-24 PROCEDURE — 77030031139 HC SUT VCRL2 J&J -A: Performed by: COLON & RECTAL SURGERY

## 2019-09-24 PROCEDURE — 36415 COLL VENOUS BLD VENIPUNCTURE: CPT

## 2019-09-24 PROCEDURE — 77030013567 HC DRN WND RESERV BARD -A: Performed by: COLON & RECTAL SURGERY

## 2019-09-24 PROCEDURE — 88360 TUMOR IMMUNOHISTOCHEM/MANUAL: CPT

## 2019-09-24 PROCEDURE — 74011250636 HC RX REV CODE- 250/636: Performed by: COLON & RECTAL SURGERY

## 2019-09-24 PROCEDURE — 76060000050 HC ANESTHESIA 9.5 TO 10 HR: Performed by: COLON & RECTAL SURGERY

## 2019-09-24 PROCEDURE — 77030019702 HC WRP THER MENM -C: Performed by: COLON & RECTAL SURGERY

## 2019-09-24 PROCEDURE — 77030012406 HC DRN WND PENRS BARD -A: Performed by: COLON & RECTAL SURGERY

## 2019-09-24 PROCEDURE — 77030018832 HC SOL IRR H20 ICUM -A: Performed by: COLON & RECTAL SURGERY

## 2019-09-24 PROCEDURE — 77030012770 HC TRCR OPT FX AMR -B: Performed by: COLON & RECTAL SURGERY

## 2019-09-24 PROCEDURE — 76010000186 HC OR TIME 9.5 TO 10 HR INTENSV-TIER 1: Performed by: COLON & RECTAL SURGERY

## 2019-09-24 PROCEDURE — 65270000029 HC RM PRIVATE

## 2019-09-24 PROCEDURE — 77030005546 HC CATH URETH FOL 3W BARD -A: Performed by: COLON & RECTAL SURGERY

## 2019-09-24 PROCEDURE — 74011250636 HC RX REV CODE- 250/636: Performed by: ANESTHESIOLOGY

## 2019-09-24 PROCEDURE — 77030025625 HC DEV TISS SEAL J&J -F: Performed by: COLON & RECTAL SURGERY

## 2019-09-24 PROCEDURE — 0D1B0Z4 BYPASS ILEUM TO CUTANEOUS, OPEN APPROACH: ICD-10-PCS | Performed by: COLON & RECTAL SURGERY

## 2019-09-24 PROCEDURE — 77030002933 HC SUT MCRYL J&J -A: Performed by: COLON & RECTAL SURGERY

## 2019-09-24 PROCEDURE — 84100 ASSAY OF PHOSPHORUS: CPT

## 2019-09-24 PROCEDURE — 77030026438 HC STYL ET INTUB CARD -A: Performed by: ANESTHESIOLOGY

## 2019-09-24 PROCEDURE — C1765 ADHESION BARRIER: HCPCS | Performed by: COLON & RECTAL SURGERY

## 2019-09-24 PROCEDURE — 77030020747 HC TU INSUF ENDOSC TELE -A: Performed by: COLON & RECTAL SURGERY

## 2019-09-24 PROCEDURE — 77030005537 HC CATH URETH BARD -A: Performed by: COLON & RECTAL SURGERY

## 2019-09-24 PROCEDURE — 0DBN0ZZ EXCISION OF SIGMOID COLON, OPEN APPROACH: ICD-10-PCS | Performed by: COLON & RECTAL SURGERY

## 2019-09-24 PROCEDURE — 77030002966 HC SUT PDS J&J -A: Performed by: COLON & RECTAL SURGERY

## 2019-09-24 PROCEDURE — 88309 TISSUE EXAM BY PATHOLOGIST: CPT

## 2019-09-24 PROCEDURE — 74011000250 HC RX REV CODE- 250: Performed by: COLON & RECTAL SURGERY

## 2019-09-24 PROCEDURE — 77030019927 HC TBNG IRR CYSTO BAXT -A: Performed by: COLON & RECTAL SURGERY

## 2019-09-24 PROCEDURE — 88305 TISSUE EXAM BY PATHOLOGIST: CPT

## 2019-09-24 PROCEDURE — 77030002916 HC SUT ETHLN J&J -A: Performed by: COLON & RECTAL SURGERY

## 2019-09-24 PROCEDURE — 77030018836 HC SOL IRR NACL ICUM -A: Performed by: COLON & RECTAL SURGERY

## 2019-09-24 PROCEDURE — 77030008684 HC TU ET CUF COVD -B: Performed by: ANESTHESIOLOGY

## 2019-09-24 PROCEDURE — 77030008756 HC TU IRR SUC STRY -B: Performed by: COLON & RECTAL SURGERY

## 2019-09-24 PROCEDURE — C1769 GUIDE WIRE: HCPCS | Performed by: COLON & RECTAL SURGERY

## 2019-09-24 PROCEDURE — 77030040361 HC SLV COMPR DVT MDII -B: Performed by: COLON & RECTAL SURGERY

## 2019-09-24 PROCEDURE — 77030016151 HC PROTCTR LNS DFOG COVD -B: Performed by: COLON & RECTAL SURGERY

## 2019-09-24 PROCEDURE — 83735 ASSAY OF MAGNESIUM: CPT

## 2019-09-24 PROCEDURE — C1758 CATHETER, URETERAL: HCPCS | Performed by: COLON & RECTAL SURGERY

## 2019-09-24 PROCEDURE — 77030022474 HC RELD STPLR ENDO GIA COVD -C: Performed by: COLON & RECTAL SURGERY

## 2019-09-24 PROCEDURE — 77030035174 HC STPLR ENDOSC DST EEA COVD -D: Performed by: COLON & RECTAL SURGERY

## 2019-09-24 PROCEDURE — 77030018846 HC SOL IRR STRL H20 ICUM -A: Performed by: COLON & RECTAL SURGERY

## 2019-09-24 PROCEDURE — 76210000017 HC OR PH I REC 1.5 TO 2 HR: Performed by: COLON & RECTAL SURGERY

## 2019-09-24 PROCEDURE — 77030011640 HC PAD GRND REM COVD -A: Performed by: COLON & RECTAL SURGERY

## 2019-09-24 PROCEDURE — 77030002996 HC SUT SLK J&J -A: Performed by: COLON & RECTAL SURGERY

## 2019-09-24 PROCEDURE — 77030020829: Performed by: COLON & RECTAL SURGERY

## 2019-09-24 PROCEDURE — 77030018684: Performed by: COLON & RECTAL SURGERY

## 2019-09-24 PROCEDURE — 77030034799 HC STPLR ENDOSC POLY GIA COVD -E: Performed by: COLON & RECTAL SURGERY

## 2019-09-24 PROCEDURE — 74011250637 HC RX REV CODE- 250/637: Performed by: COLON & RECTAL SURGERY

## 2019-09-24 PROCEDURE — P9045 ALBUMIN (HUMAN), 5%, 250 ML: HCPCS | Performed by: NURSE ANESTHETIST, CERTIFIED REGISTERED

## 2019-09-24 PROCEDURE — 77030013079 HC BLNKT BAIR HGGR 3M -A: Performed by: ANESTHESIOLOGY

## 2019-09-24 PROCEDURE — 77030020782 HC GWN BAIR PAWS FLX 3M -B

## 2019-09-24 PROCEDURE — 77030039266 HC ADH SKN EXOFIN S2SG -A: Performed by: COLON & RECTAL SURGERY

## 2019-09-24 PROCEDURE — 80048 BASIC METABOLIC PNL TOTAL CA: CPT

## 2019-09-24 RX ORDER — MIDAZOLAM HYDROCHLORIDE 1 MG/ML
INJECTION, SOLUTION INTRAMUSCULAR; INTRAVENOUS AS NEEDED
Status: DISCONTINUED | OUTPATIENT
Start: 2019-09-24 | End: 2019-09-24 | Stop reason: HOSPADM

## 2019-09-24 RX ORDER — NALOXONE HYDROCHLORIDE 0.4 MG/ML
0.4 INJECTION, SOLUTION INTRAMUSCULAR; INTRAVENOUS; SUBCUTANEOUS AS NEEDED
Status: DISCONTINUED | OUTPATIENT
Start: 2019-09-24 | End: 2019-10-02 | Stop reason: HOSPADM

## 2019-09-24 RX ORDER — LIDOCAINE HYDROCHLORIDE ANHYDROUS AND DEXTROSE MONOHYDRATE .8; 5 G/100ML; G/100ML
1 INJECTION, SOLUTION INTRAVENOUS CONTINUOUS
Status: DISPENSED | OUTPATIENT
Start: 2019-09-24 | End: 2019-09-26

## 2019-09-24 RX ORDER — GLYCOPYRROLATE 0.2 MG/ML
INJECTION INTRAMUSCULAR; INTRAVENOUS AS NEEDED
Status: DISCONTINUED | OUTPATIENT
Start: 2019-09-24 | End: 2019-09-24 | Stop reason: HOSPADM

## 2019-09-24 RX ORDER — CLOBETASOL PROPIONATE 0.5 MG/G
CREAM TOPICAL DAILY
Status: DISCONTINUED | OUTPATIENT
Start: 2019-09-25 | End: 2019-10-02 | Stop reason: HOSPADM

## 2019-09-24 RX ORDER — SODIUM CHLORIDE, SODIUM LACTATE, POTASSIUM CHLORIDE, CALCIUM CHLORIDE 600; 310; 30; 20 MG/100ML; MG/100ML; MG/100ML; MG/100ML
75 INJECTION, SOLUTION INTRAVENOUS CONTINUOUS
Status: DISCONTINUED | OUTPATIENT
Start: 2019-09-24 | End: 2019-09-24 | Stop reason: HOSPADM

## 2019-09-24 RX ORDER — OXYCODONE HYDROCHLORIDE 5 MG/1
5 TABLET ORAL
Status: DISCONTINUED | OUTPATIENT
Start: 2019-09-24 | End: 2019-10-02 | Stop reason: HOSPADM

## 2019-09-24 RX ORDER — SODIUM CHLORIDE 0.9 % (FLUSH) 0.9 %
5-40 SYRINGE (ML) INJECTION EVERY 8 HOURS
Status: DISCONTINUED | OUTPATIENT
Start: 2019-09-24 | End: 2019-09-24 | Stop reason: HOSPADM

## 2019-09-24 RX ORDER — TRIAMCINOLONE ACETONIDE 1 MG/G
OINTMENT TOPICAL
Status: DISCONTINUED | OUTPATIENT
Start: 2019-09-24 | End: 2019-10-02 | Stop reason: HOSPADM

## 2019-09-24 RX ORDER — TAMSULOSIN HYDROCHLORIDE 0.4 MG/1
0.4 CAPSULE ORAL DAILY
Status: DISCONTINUED | OUTPATIENT
Start: 2019-09-25 | End: 2019-10-02 | Stop reason: HOSPADM

## 2019-09-24 RX ORDER — SODIUM CHLORIDE, SODIUM LACTATE, POTASSIUM CHLORIDE, CALCIUM CHLORIDE 600; 310; 30; 20 MG/100ML; MG/100ML; MG/100ML; MG/100ML
INJECTION, SOLUTION INTRAVENOUS
Status: DISCONTINUED | OUTPATIENT
Start: 2019-09-24 | End: 2019-09-24 | Stop reason: HOSPADM

## 2019-09-24 RX ORDER — MAGNESIUM SULFATE HEPTAHYDRATE 40 MG/ML
INJECTION, SOLUTION INTRAVENOUS AS NEEDED
Status: DISCONTINUED | OUTPATIENT
Start: 2019-09-24 | End: 2019-09-24 | Stop reason: HOSPADM

## 2019-09-24 RX ORDER — FENTANYL CITRATE 50 UG/ML
50 INJECTION, SOLUTION INTRAMUSCULAR; INTRAVENOUS AS NEEDED
Status: DISCONTINUED | OUTPATIENT
Start: 2019-09-24 | End: 2019-09-24 | Stop reason: HOSPADM

## 2019-09-24 RX ORDER — KETAMINE HYDROCHLORIDE 50 MG/ML
INJECTION, SOLUTION INTRAMUSCULAR; INTRAVENOUS AS NEEDED
Status: DISCONTINUED | OUTPATIENT
Start: 2019-09-24 | End: 2019-09-24 | Stop reason: HOSPADM

## 2019-09-24 RX ORDER — DEXAMETHASONE SODIUM PHOSPHATE 4 MG/ML
INJECTION, SOLUTION INTRA-ARTICULAR; INTRALESIONAL; INTRAMUSCULAR; INTRAVENOUS; SOFT TISSUE AS NEEDED
Status: DISCONTINUED | OUTPATIENT
Start: 2019-09-24 | End: 2019-09-24 | Stop reason: HOSPADM

## 2019-09-24 RX ORDER — ACETAMINOPHEN 500 MG
1000 TABLET ORAL ONCE
Status: COMPLETED | OUTPATIENT
Start: 2019-09-24 | End: 2019-09-24

## 2019-09-24 RX ORDER — ONDANSETRON 2 MG/ML
INJECTION INTRAMUSCULAR; INTRAVENOUS AS NEEDED
Status: DISCONTINUED | OUTPATIENT
Start: 2019-09-24 | End: 2019-09-24 | Stop reason: HOSPADM

## 2019-09-24 RX ORDER — MIDAZOLAM HYDROCHLORIDE 1 MG/ML
1 INJECTION, SOLUTION INTRAMUSCULAR; INTRAVENOUS AS NEEDED
Status: DISCONTINUED | OUTPATIENT
Start: 2019-09-24 | End: 2019-09-24 | Stop reason: HOSPADM

## 2019-09-24 RX ORDER — LIDOCAINE HYDROCHLORIDE 20 MG/ML
INJECTION, SOLUTION EPIDURAL; INFILTRATION; INTRACAUDAL; PERINEURAL
Status: DISCONTINUED | OUTPATIENT
Start: 2019-09-24 | End: 2019-09-24 | Stop reason: HOSPADM

## 2019-09-24 RX ORDER — ONDANSETRON 4 MG/1
4 TABLET, ORALLY DISINTEGRATING ORAL
Status: DISCONTINUED | OUTPATIENT
Start: 2019-09-24 | End: 2019-09-29

## 2019-09-24 RX ORDER — ROCURONIUM BROMIDE 10 MG/ML
INJECTION, SOLUTION INTRAVENOUS AS NEEDED
Status: DISCONTINUED | OUTPATIENT
Start: 2019-09-24 | End: 2019-09-24 | Stop reason: HOSPADM

## 2019-09-24 RX ORDER — NEOSTIGMINE METHYLSULFATE 1 MG/ML
INJECTION INTRAVENOUS AS NEEDED
Status: DISCONTINUED | OUTPATIENT
Start: 2019-09-24 | End: 2019-09-24 | Stop reason: HOSPADM

## 2019-09-24 RX ORDER — ACETAMINOPHEN 10 MG/ML
1000 INJECTION, SOLUTION INTRAVENOUS ONCE
Status: COMPLETED | OUTPATIENT
Start: 2019-09-24 | End: 2019-09-24

## 2019-09-24 RX ORDER — LIDOCAINE HYDROCHLORIDE 10 MG/ML
0.5 INJECTION, SOLUTION EPIDURAL; INFILTRATION; INTRACAUDAL; PERINEURAL AS NEEDED
Status: DISCONTINUED | OUTPATIENT
Start: 2019-09-24 | End: 2019-09-24 | Stop reason: HOSPADM

## 2019-09-24 RX ORDER — SODIUM CHLORIDE, SODIUM LACTATE, POTASSIUM CHLORIDE, CALCIUM CHLORIDE 600; 310; 30; 20 MG/100ML; MG/100ML; MG/100ML; MG/100ML
125 INJECTION, SOLUTION INTRAVENOUS CONTINUOUS
Status: DISCONTINUED | OUTPATIENT
Start: 2019-09-24 | End: 2019-09-24 | Stop reason: HOSPADM

## 2019-09-24 RX ORDER — SODIUM CHLORIDE 9 MG/ML
INJECTION, SOLUTION INTRAVENOUS
Status: DISCONTINUED | OUTPATIENT
Start: 2019-09-24 | End: 2019-09-24 | Stop reason: HOSPADM

## 2019-09-24 RX ORDER — GABAPENTIN 300 MG/1
600 CAPSULE ORAL ONCE
Status: COMPLETED | OUTPATIENT
Start: 2019-09-24 | End: 2019-09-24

## 2019-09-24 RX ORDER — DEXMEDETOMIDINE HYDROCHLORIDE 100 UG/ML
INJECTION, SOLUTION INTRAVENOUS AS NEEDED
Status: DISCONTINUED | OUTPATIENT
Start: 2019-09-24 | End: 2019-09-24 | Stop reason: HOSPADM

## 2019-09-24 RX ORDER — ALBUMIN HUMAN 50 G/1000ML
SOLUTION INTRAVENOUS AS NEEDED
Status: DISCONTINUED | OUTPATIENT
Start: 2019-09-24 | End: 2019-09-24 | Stop reason: HOSPADM

## 2019-09-24 RX ORDER — SODIUM CHLORIDE 0.9 % (FLUSH) 0.9 %
5-40 SYRINGE (ML) INJECTION AS NEEDED
Status: DISCONTINUED | OUTPATIENT
Start: 2019-09-24 | End: 2019-09-24 | Stop reason: HOSPADM

## 2019-09-24 RX ORDER — MORPHINE SULFATE 10 MG/ML
2 INJECTION, SOLUTION INTRAMUSCULAR; INTRAVENOUS
Status: DISCONTINUED | OUTPATIENT
Start: 2019-09-24 | End: 2019-09-24 | Stop reason: HOSPADM

## 2019-09-24 RX ORDER — LIDOCAINE HYDROCHLORIDE 20 MG/ML
INJECTION, SOLUTION EPIDURAL; INFILTRATION; INTRACAUDAL; PERINEURAL AS NEEDED
Status: DISCONTINUED | OUTPATIENT
Start: 2019-09-24 | End: 2019-09-24 | Stop reason: HOSPADM

## 2019-09-24 RX ORDER — PROPOFOL 10 MG/ML
INJECTION, EMULSION INTRAVENOUS AS NEEDED
Status: DISCONTINUED | OUTPATIENT
Start: 2019-09-24 | End: 2019-09-24 | Stop reason: HOSPADM

## 2019-09-24 RX ORDER — CELECOXIB 100 MG/1
100 CAPSULE ORAL 2 TIMES DAILY
Status: DISCONTINUED | OUTPATIENT
Start: 2019-09-26 | End: 2019-09-25

## 2019-09-24 RX ORDER — ACETAMINOPHEN 10 MG/ML
INJECTION, SOLUTION INTRAVENOUS AS NEEDED
Status: DISCONTINUED | OUTPATIENT
Start: 2019-09-24 | End: 2019-09-24 | Stop reason: HOSPADM

## 2019-09-24 RX ORDER — CELECOXIB 200 MG/1
200 CAPSULE ORAL ONCE
Status: COMPLETED | OUTPATIENT
Start: 2019-09-24 | End: 2019-09-24

## 2019-09-24 RX ORDER — EPHEDRINE SULFATE/0.9% NACL/PF 50 MG/5 ML
SYRINGE (ML) INTRAVENOUS AS NEEDED
Status: DISCONTINUED | OUTPATIENT
Start: 2019-09-24 | End: 2019-09-24 | Stop reason: HOSPADM

## 2019-09-24 RX ORDER — FENTANYL CITRATE 50 UG/ML
INJECTION, SOLUTION INTRAMUSCULAR; INTRAVENOUS AS NEEDED
Status: DISCONTINUED | OUTPATIENT
Start: 2019-09-24 | End: 2019-09-24 | Stop reason: HOSPADM

## 2019-09-24 RX ORDER — KETOROLAC TROMETHAMINE 30 MG/ML
15 INJECTION, SOLUTION INTRAMUSCULAR; INTRAVENOUS EVERY 6 HOURS
Status: DISCONTINUED | OUTPATIENT
Start: 2019-09-25 | End: 2019-09-25

## 2019-09-24 RX ORDER — ENOXAPARIN SODIUM 100 MG/ML
40 INJECTION SUBCUTANEOUS EVERY 24 HOURS
Status: DISCONTINUED | OUTPATIENT
Start: 2019-09-25 | End: 2019-09-26

## 2019-09-24 RX ORDER — MIDAZOLAM HYDROCHLORIDE 1 MG/ML
1 INJECTION, SOLUTION INTRAMUSCULAR; INTRAVENOUS
Status: DISCONTINUED | OUTPATIENT
Start: 2019-09-24 | End: 2019-09-24 | Stop reason: HOSPADM

## 2019-09-24 RX ORDER — BUPIVACAINE HYDROCHLORIDE AND EPINEPHRINE 5; 5 MG/ML; UG/ML
30 INJECTION, SOLUTION EPIDURAL; INTRACAUDAL; PERINEURAL ONCE
Status: COMPLETED | OUTPATIENT
Start: 2019-09-24 | End: 2019-09-24

## 2019-09-24 RX ORDER — FENTANYL CITRATE 50 UG/ML
25 INJECTION, SOLUTION INTRAMUSCULAR; INTRAVENOUS
Status: COMPLETED | OUTPATIENT
Start: 2019-09-24 | End: 2019-09-24

## 2019-09-24 RX ORDER — PANTOPRAZOLE SODIUM 40 MG/1
40 TABLET, DELAYED RELEASE ORAL DAILY
Status: DISCONTINUED | OUTPATIENT
Start: 2019-09-25 | End: 2019-10-02 | Stop reason: HOSPADM

## 2019-09-24 RX ORDER — DEXTROSE, SODIUM CHLORIDE, SODIUM LACTATE, POTASSIUM CHLORIDE, AND CALCIUM CHLORIDE 5; .6; .31; .03; .02 G/100ML; G/100ML; G/100ML; G/100ML; G/100ML
125 INJECTION, SOLUTION INTRAVENOUS CONTINUOUS
Status: DISCONTINUED | OUTPATIENT
Start: 2019-09-24 | End: 2019-09-24 | Stop reason: HOSPADM

## 2019-09-24 RX ORDER — SODIUM CHLORIDE, SODIUM LACTATE, POTASSIUM CHLORIDE, CALCIUM CHLORIDE 600; 310; 30; 20 MG/100ML; MG/100ML; MG/100ML; MG/100ML
75 INJECTION, SOLUTION INTRAVENOUS CONTINUOUS
Status: DISPENSED | OUTPATIENT
Start: 2019-09-24 | End: 2019-09-25

## 2019-09-24 RX ORDER — ACETAMINOPHEN 10 MG/ML
INJECTION, SOLUTION INTRAVENOUS
Status: COMPLETED
Start: 2019-09-24 | End: 2019-09-24

## 2019-09-24 RX ORDER — ALVIMOPAN 12 MG/1
12 CAPSULE ORAL 2 TIMES DAILY
Status: DISCONTINUED | OUTPATIENT
Start: 2019-09-25 | End: 2019-10-01

## 2019-09-24 RX ORDER — DIPHENHYDRAMINE HYDROCHLORIDE 50 MG/ML
12.5 INJECTION, SOLUTION INTRAMUSCULAR; INTRAVENOUS AS NEEDED
Status: DISCONTINUED | OUTPATIENT
Start: 2019-09-24 | End: 2019-09-24 | Stop reason: HOSPADM

## 2019-09-24 RX ORDER — ONDANSETRON 2 MG/ML
4 INJECTION INTRAMUSCULAR; INTRAVENOUS AS NEEDED
Status: DISCONTINUED | OUTPATIENT
Start: 2019-09-24 | End: 2019-09-24 | Stop reason: HOSPADM

## 2019-09-24 RX ORDER — ACETAMINOPHEN 500 MG
1000 TABLET ORAL EVERY 6 HOURS
Status: DISCONTINUED | OUTPATIENT
Start: 2019-09-24 | End: 2019-10-02 | Stop reason: HOSPADM

## 2019-09-24 RX ORDER — ALVIMOPAN 12 MG/1
12 CAPSULE ORAL ONCE
Status: COMPLETED | OUTPATIENT
Start: 2019-09-24 | End: 2019-09-24

## 2019-09-24 RX ORDER — OXYCODONE HYDROCHLORIDE 5 MG/1
5 TABLET ORAL AS NEEDED
Status: DISCONTINUED | OUTPATIENT
Start: 2019-09-24 | End: 2019-09-24 | Stop reason: HOSPADM

## 2019-09-24 RX ORDER — ACETAMINOPHEN 325 MG/1
650 TABLET ORAL ONCE
Status: DISCONTINUED | OUTPATIENT
Start: 2019-09-24 | End: 2019-09-24 | Stop reason: ALTCHOICE

## 2019-09-24 RX ADMIN — OXYCODONE HYDROCHLORIDE 5 MG: 5 TABLET ORAL at 23:50

## 2019-09-24 RX ADMIN — GABAPENTIN 600 MG: 300 CAPSULE ORAL at 10:11

## 2019-09-24 RX ADMIN — ONDANSETRON HYDROCHLORIDE 4 MG: 2 INJECTION, SOLUTION INTRAMUSCULAR; INTRAVENOUS at 11:56

## 2019-09-24 RX ADMIN — ALBUMIN (HUMAN) 250 ML: 12.5 INJECTION, SOLUTION INTRAVENOUS at 17:00

## 2019-09-24 RX ADMIN — FENTANYL CITRATE 50 MCG: 50 INJECTION, SOLUTION INTRAMUSCULAR; INTRAVENOUS at 12:46

## 2019-09-24 RX ADMIN — LIDOCAINE HYDROCHLORIDE 100 MG: 20 INJECTION, SOLUTION EPIDURAL; INFILTRATION; INTRACAUDAL; PERINEURAL at 11:37

## 2019-09-24 RX ADMIN — ACETAMINOPHEN 1000 MG: 10 INJECTION, SOLUTION INTRAVENOUS at 22:11

## 2019-09-24 RX ADMIN — MEPERIDINE HYDROCHLORIDE 12.5 MG: 25 INJECTION INTRAMUSCULAR; INTRAVENOUS; SUBCUTANEOUS at 21:51

## 2019-09-24 RX ADMIN — CELECOXIB 200 MG: 200 CAPSULE ORAL at 10:11

## 2019-09-24 RX ADMIN — NEOSTIGMINE METHYLSULFATE 2 MG: 1 INJECTION, SOLUTION INTRAMUSCULAR; INTRAVENOUS; SUBCUTANEOUS at 20:24

## 2019-09-24 RX ADMIN — ROCURONIUM BROMIDE 50 MG: 10 SOLUTION INTRAVENOUS at 14:16

## 2019-09-24 RX ADMIN — CEFOTETAN DISODIUM 2 G: 2 INJECTION, POWDER, FOR SOLUTION INTRAMUSCULAR; INTRAVENOUS at 13:30

## 2019-09-24 RX ADMIN — ROCURONIUM BROMIDE 20 MG: 10 SOLUTION INTRAVENOUS at 13:20

## 2019-09-24 RX ADMIN — KETAMINE HYDROCHLORIDE 50 MG: 50 INJECTION, SOLUTION INTRAMUSCULAR; INTRAVENOUS at 11:38

## 2019-09-24 RX ADMIN — DEXAMETHASONE SODIUM PHOSPHATE 8 MG: 4 INJECTION, SOLUTION INTRAMUSCULAR; INTRAVENOUS at 11:59

## 2019-09-24 RX ADMIN — PHENYLEPHRINE HYDROCHLORIDE 40 MCG/MIN: 10 INJECTION INTRAVENOUS at 14:09

## 2019-09-24 RX ADMIN — ROCURONIUM BROMIDE 50 MG: 10 SOLUTION INTRAVENOUS at 15:31

## 2019-09-24 RX ADMIN — SODIUM CHLORIDE, SODIUM LACTATE, POTASSIUM CHLORIDE, AND CALCIUM CHLORIDE 75 ML/HR: 600; 310; 30; 20 INJECTION, SOLUTION INTRAVENOUS at 10:28

## 2019-09-24 RX ADMIN — SODIUM CHLORIDE, SODIUM LACTATE, POTASSIUM CHLORIDE, AND CALCIUM CHLORIDE 75 ML/HR: 600; 310; 30; 20 INJECTION, SOLUTION INTRAVENOUS at 22:08

## 2019-09-24 RX ADMIN — FENTANYL CITRATE 25 MCG: 50 INJECTION INTRAMUSCULAR; INTRAVENOUS at 22:10

## 2019-09-24 RX ADMIN — MAGNESIUM SULFATE HEPTAHYDRATE 2 G: 40 INJECTION, SOLUTION INTRAVENOUS at 11:56

## 2019-09-24 RX ADMIN — FENTANYL CITRATE 25 MCG: 50 INJECTION INTRAMUSCULAR; INTRAVENOUS at 22:00

## 2019-09-24 RX ADMIN — PROPOFOL 150 MG: 10 INJECTION, EMULSION INTRAVENOUS at 11:38

## 2019-09-24 RX ADMIN — DEXMEDETOMIDINE HYDROCHLORIDE 10 MCG: 100 INJECTION, SOLUTION, CONCENTRATE INTRAVENOUS at 14:35

## 2019-09-24 RX ADMIN — ACETAMINOPHEN 1000 MG: 10 INJECTION, SOLUTION INTRAVENOUS at 16:11

## 2019-09-24 RX ADMIN — Medication 10 MG: at 14:09

## 2019-09-24 RX ADMIN — FENTANYL CITRATE 50 MCG: 50 INJECTION, SOLUTION INTRAMUSCULAR; INTRAVENOUS at 11:20

## 2019-09-24 RX ADMIN — GLYCOPYRROLATE 0.2 MG: 0.2 INJECTION, SOLUTION INTRAMUSCULAR; INTRAVENOUS at 14:06

## 2019-09-24 RX ADMIN — MEPERIDINE HYDROCHLORIDE 12.5 MG: 25 INJECTION INTRAMUSCULAR; INTRAVENOUS; SUBCUTANEOUS at 22:13

## 2019-09-24 RX ADMIN — ROCURONIUM BROMIDE 10 MG: 10 SOLUTION INTRAVENOUS at 14:15

## 2019-09-24 RX ADMIN — SODIUM CHLORIDE, POTASSIUM CHLORIDE, SODIUM LACTATE AND CALCIUM CHLORIDE: 600; 310; 30; 20 INJECTION, SOLUTION INTRAVENOUS at 11:20

## 2019-09-24 RX ADMIN — KETOROLAC TROMETHAMINE 15 MG: 30 INJECTION, SOLUTION INTRAMUSCULAR at 23:45

## 2019-09-24 RX ADMIN — ROCURONIUM BROMIDE 50 MG: 10 SOLUTION INTRAVENOUS at 11:38

## 2019-09-24 RX ADMIN — FENTANYL CITRATE 25 MCG: 50 INJECTION INTRAMUSCULAR; INTRAVENOUS at 21:45

## 2019-09-24 RX ADMIN — CEFOTETAN DISODIUM 2 G: 2 INJECTION, POWDER, FOR SOLUTION INTRAMUSCULAR; INTRAVENOUS at 19:30

## 2019-09-24 RX ADMIN — ACETAMINOPHEN 1000 MG: 500 TABLET ORAL at 10:11

## 2019-09-24 RX ADMIN — GLYCOPYRROLATE 0.4 MG: 0.2 INJECTION, SOLUTION INTRAMUSCULAR; INTRAVENOUS at 20:24

## 2019-09-24 RX ADMIN — SODIUM CHLORIDE: 900 INJECTION, SOLUTION INTRAVENOUS at 21:05

## 2019-09-24 RX ADMIN — ALBUMIN (HUMAN) 250 ML: 12.5 INJECTION, SOLUTION INTRAVENOUS at 12:35

## 2019-09-24 RX ADMIN — ROCURONIUM BROMIDE 50 MG: 10 SOLUTION INTRAVENOUS at 16:11

## 2019-09-24 RX ADMIN — ALVIMOPAN 12 MG: 12 CAPSULE ORAL at 10:11

## 2019-09-24 RX ADMIN — ONDANSETRON HYDROCHLORIDE 4 MG: 2 INJECTION, SOLUTION INTRAMUSCULAR; INTRAVENOUS at 20:24

## 2019-09-24 RX ADMIN — DEXMEDETOMIDINE HYDROCHLORIDE 10 MCG: 100 INJECTION, SOLUTION, CONCENTRATE INTRAVENOUS at 19:52

## 2019-09-24 RX ADMIN — ROCURONIUM BROMIDE 20 MG: 10 SOLUTION INTRAVENOUS at 12:31

## 2019-09-24 RX ADMIN — Medication 10 ML: at 23:46

## 2019-09-24 RX ADMIN — FENTANYL CITRATE 25 MCG: 50 INJECTION INTRAMUSCULAR; INTRAVENOUS at 22:15

## 2019-09-24 RX ADMIN — SODIUM CHLORIDE, POTASSIUM CHLORIDE, SODIUM LACTATE AND CALCIUM CHLORIDE: 600; 310; 30; 20 INJECTION, SOLUTION INTRAVENOUS at 11:40

## 2019-09-24 RX ADMIN — MIDAZOLAM 2 MG: 1 INJECTION INTRAMUSCULAR; INTRAVENOUS at 11:20

## 2019-09-24 RX ADMIN — LIDOCAINE HYDROCHLORIDE 16.5 ML/HR: 20 INJECTION, SOLUTION EPIDURAL; INFILTRATION; INTRACAUDAL; PERINEURAL at 12:09

## 2019-09-24 RX ADMIN — ONDANSETRON 4 MG: 2 INJECTION INTRAMUSCULAR; INTRAVENOUS at 21:50

## 2019-09-24 NOTE — WOUND CARE
Stoma Marking Note:  
 
Type of ostomy scheduled: possible ileostomy by Dr. Tana Rivera Introduced self and services to patient. Patient able to verbalize knowledge of procedure consistent with consent. Stoma site marked with surgical marker in RQ Stoma site chosen is in the patients visual field and within the rectus muscle while avoiding the following: umbilicus, wrinkles, dips, skin folds, rib cage, iliac crest and belt/pants/underwear line. Site was assessed in the lying and sitting positions. Abdominal topography unremarkable. Patient understands that the final location will be determined by the surgeon and the site is only a guideline. Pt concerns discussed: if ostomy to include wife with teaching. Plan to follow after surgery for teaching if ileostomy performed. Will likely need home health care for further teaching after discharge pending surgical results. Yaritza Bhatia RN, BSN Wound, Ostomy, Continence Nurse 
office: [phone] 
pager [phone]

## 2019-09-24 NOTE — H&P
Colorectal Surgery Pre-Operative History and Physical Examination Note          NAME:  Krystal Cash   :   1970   MRN:   [id number]     Operation Date: 2019    Chief Complaint:   Rectal cancer     History of Present Illness: The patient is a 58-year-old male with rectal cancer. He had been seeing blood with bowel movements for approximately 8 months. He underwent a colonoscopy performed by Dr. Keena Vizcaino on 2019, and that procedure revealed multiple colonic and rectal polyps that were removed and a malignant-appearing rectal mass with a distal margin located at approximately 10 cm from the anal verge. The mass was biopsied. The excised polyps proved to have been tubular adenomas, and the biopsies of the rectal mass were consistent with invasive, well-differentiated adenocarcinoma. A CEA level obtained on 2019 was 3.6 ng/mL, which is slightly elevated for non-smoker. An endoscopic rectal ultrasound was performed by Dr. Bridgett Titus on 2019, and it revealed that the lesion appeared to be stage III (uT2N1). A CT scan of the abdomen and pelvis that was also performed on 2019 revealed no evidence of metastatic disease in the liver. A PET CT performed on 5/10/2019 was consistent with three small perirectal anibal metastatic foci. He received neoadjuvant chemoradiation therapy with Xeloda and a total of 5040 cGy of external beam radiation with his final radiation dose having been administered on 2019. Rigid proctosigmoidoscopy performed in the office on 2019 revealed a small area of residual disease at 10 cm from the anal verge.         PMH:  Past Medical History:   Diagnosis Date    Adopted     GERD (gastroesophageal reflux disease)     Psoriasis     Psoriatic arthritis ([address]     Rectal cancer (HCC)        PSH:  Past Surgical History:   Procedure Laterality Date    COLONOSCOPY Left 2019    COLON[facility] Rebekah Roth [facility] at [facility] SIGMOIDOSCOPY N/A 5/2/2019    SIGMOIDOSCOPY FLEXIBLE performed by Arlie Heimlich, MD at [address] HX GI      EGD    HX HEENT      WISDOM TEETH    HX ORTHOPAEDIC Right     ACL REPLACEMENT       Home Medications:  Cannot display prior to admission medications because the patient has not been admitted in this contact. Hospital Medications:  No current facility-administered medications for this encounter. Current Outpatient Medications   Medication Sig    diphenoxylate-atropine (LOMOTIL) 2.5-0.025 mg per tablet Take  by mouth four (4) times daily as needed for Diarrhea.  tamsulosin (FLOMAX) 0.4 mg capsule Take 0.4 mg by mouth daily.  prochlorperazine (COMPAZINE) 5 mg tablet Take 1 Tab by mouth every six (6) hours as needed for Nausea.  ondansetron hcl (ZOFRAN) 4 mg tablet Take 2 Tabs by mouth every eight (8) hours as needed for Nausea.  triamcinolone acetonide (KENALOG) 0.1 % ointment Apply  to affected area two (2) times daily as needed for Skin Irritation. use thin layer    fluocinonide (VANOS) 0.1 % topical cream Apply  to affected area daily.  omeprazole (PRILOSEC) 20 mg capsule Take 20 mg by mouth as needed.        Allergies:  No Known Allergies    Family History:  Family History   Adopted: Yes   Problem Relation Age of Onset    Asthma Neg Hx     Cancer Neg Hx     Diabetes Neg Hx     Heart Disease Neg Hx     Hypertension Neg Hx     Stroke Neg Hx        Social History:  Social History     Tobacco Use    Smoking status: Never Smoker    Smokeless tobacco: Never Used   Substance Use Topics    Alcohol use: Yes     Comment: 6 BEERS WEEKLY       Review of Systems:    Symptom Y/N Comments  Symptom Y/N Comments   Fever/Chills N   Chest Pain N    Cough N   Abdominal Pain N    Sputum N   Joint Pain Y    SOB/LESTER N   Pruritis/Rash     Nausea/vomit N   Tolerating PT/OT     Diarrhea N   Tolerating Diet Y    Constipation N   Other       Could NOT obtain due to:        Objective: No data found. No intake/output data recorded. No intake/output data recorded. PHYSICAL EXAMINATION:    White male in no apparent distress. The height is reportedly 58. The weight is 149.7 lb. (with clothing and footwear)   [BMI 22.8]  There is no scleral icterus. The lungs are clear to auscultation bilaterally. The heart sounds are regular in rate and rhythm with no murmurs, gallops, or rubs. The abdomen is soft, non-tender, and non-distended. There are no palpable masses or hernias. There is no appreciable organomegaly. The extremities are without edema. The patient is alert and oriented, and there are no gross neurologic deficits. Data Review   Results for Joshua Lopez (MRN [id number]) as of 9/24/2019 01:14   9/10/2019 12:00   WBC 2.5 (L)   HGB 14.3   HCT 43.2   PLATELET 537 (L)   INR 1.0   Prothrombin time 9.7   aPTT 25.3   Sodium 141   Potassium 4.1   Chloride 104   CO2 30   Glucose 89   BUN 12   Creatinine 1.04   BUN/Creatinine ratio 12   Calcium 8.7   Phosphorus 3.5   Magnesium 2.6 (H)   GFR est non-AA >60   Bilirubin, total 0.7   Albumin 3.6   ALT (SGPT) 20   AST 13 (L)   Alk. phosphatase 54   Hemoglobin A1c, (calculated) 5.4   Est. average glucose 108                       Assessment and Plan:      The patient has rectal cancer that appears to have responded well the the neoadjuvant therapy. A low anterior resection is now indicated to treat any residual primary diease, and the patient has been informed that it is likely that a temporary ileostomy will be created in order to divert the flow of stool from the coloproctostomy.      The risks of surgery have been discussed in detail including, but not limited to, bleeding (possibly requiring blood transfusion or return to the operating room), infection (wound, urinary tract, intraabdominal or pelvic, pulmonary), anastomotic leakage (possibly requiring the creation of a temporary ostomy), anastomotic stricture, injury to other organs or structures including ureters, urinary bladder, pelvic nerves, small intestine, and spleen (possibly requiring splenectomy), small bowel obstruction, hernia formation, ostomy-related complications (necrosis, retraction, obstruction, prolapse, and parastomal hernia formation), functional problems (including with genitourinary function), myocardial infarction, stroke, deep venous thrombosis, pulmonary embolism, and death. It has been explained that although a hand-assisted laparoscopic operation is planned it could become necessary to convert to the open operation. It has also been explained that a urologist will be consulted to perform the cystoscopic placement of temporary bilateral ureteral catheters in order to facilitate intraoperative identification of the ureters. The patient appeared to have understood all of the above, and he has agreed to proceed.         Principal Problem:    Rectal cancer ([facility] [address] (5/10/2019)

## 2019-09-24 NOTE — ANESTHESIA PREPROCEDURE EVALUATION
Relevant Problems   No relevant active problems       Anesthetic History   No history of anesthetic complications            Review of Systems / Medical History  Patient summary reviewed, nursing notes reviewed and pertinent labs reviewed    Pulmonary  Within defined limits                 Neuro/Psych   Within defined limits           Cardiovascular  Within defined limits                     GI/Hepatic/Renal  Within defined limits              Endo/Other        Arthritis     Other Findings              Physical Exam    Airway  Mallampati: I  TM Distance: > 6 cm  Neck ROM: normal range of motion   Mouth opening: Normal     Cardiovascular  Regular rate and rhythm,  S1 and S2 normal,  no murmur, click, rub, or gallop             Dental    Dentition: Caps/crowns     Pulmonary  Breath sounds clear to auscultation               Abdominal  GI exam deferred       Other Findings            Anesthetic Plan    ASA: 2  Anesthesia type: general            Anesthetic plan and risks discussed with: Patient

## 2019-09-25 ENCOUNTER — HOME HEALTH ADMISSION (OUTPATIENT)
Dept: HOME HEALTH SERVICES | Facility: HOME HEALTH | Age: 49
End: 2019-09-25
Payer: MEDICAID

## 2019-09-25 LAB
ANION GAP SERPL CALC-SCNC: 8 MMOL/L (ref 5–15)
BUN SERPL-MCNC: 12 MG/DL (ref 6–20)
BUN/CREAT SERPL: 7 (ref 12–20)
CALCIUM SERPL-MCNC: 8 MG/DL (ref 8.5–10.1)
CHLORIDE SERPL-SCNC: 107 MMOL/L (ref 97–108)
CO2 SERPL-SCNC: 24 MMOL/L (ref 21–32)
CREAT SERPL-MCNC: 1.65 MG/DL (ref 0.7–1.3)
ERYTHROCYTE [DISTWIDTH] IN BLOOD BY AUTOMATED COUNT: 11.9 % (ref 11.5–14.5)
GLUCOSE SERPL-MCNC: 122 MG/DL (ref 65–100)
HCT VFR BLD AUTO: 40.5 % (ref 36.6–50.3)
HGB BLD-MCNC: 13.6 G/DL (ref 12.1–17)
MAGNESIUM SERPL-MCNC: 2.4 MG/DL (ref 1.6–2.4)
MCH RBC QN AUTO: 34.1 PG (ref 26–34)
MCHC RBC AUTO-ENTMCNC: 33.6 G/DL (ref 30–36.5)
MCV RBC AUTO: 101.5 FL (ref 80–99)
NRBC # BLD: 0 K/UL (ref 0–0.01)
NRBC BLD-RTO: 0 PER 100 WBC
PHOSPHATE SERPL-MCNC: 3.8 MG/DL (ref 2.6–4.7)
PLATELET # BLD AUTO: 150 K/UL (ref 150–400)
PMV BLD AUTO: 10.9 FL (ref 8.9–12.9)
POTASSIUM SERPL-SCNC: 4.2 MMOL/L (ref 3.5–5.1)
RBC # BLD AUTO: 3.99 M/UL (ref 4.1–5.7)
SODIUM SERPL-SCNC: 139 MMOL/L (ref 136–145)
WBC # BLD AUTO: 5.3 K/UL (ref 4.1–11.1)

## 2019-09-25 PROCEDURE — 80048 BASIC METABOLIC PNL TOTAL CA: CPT

## 2019-09-25 PROCEDURE — 74011250636 HC RX REV CODE- 250/636: Performed by: COLON & RECTAL SURGERY

## 2019-09-25 PROCEDURE — 83735 ASSAY OF MAGNESIUM: CPT

## 2019-09-25 PROCEDURE — 97116 GAIT TRAINING THERAPY: CPT

## 2019-09-25 PROCEDURE — 74011250637 HC RX REV CODE- 250/637: Performed by: COLON & RECTAL SURGERY

## 2019-09-25 PROCEDURE — 85027 COMPLETE CBC AUTOMATED: CPT

## 2019-09-25 PROCEDURE — 36415 COLL VENOUS BLD VENIPUNCTURE: CPT

## 2019-09-25 PROCEDURE — 97161 PT EVAL LOW COMPLEX 20 MIN: CPT

## 2019-09-25 PROCEDURE — 84100 ASSAY OF PHOSPHORUS: CPT

## 2019-09-25 PROCEDURE — 65270000029 HC RM PRIVATE

## 2019-09-25 RX ORDER — HYDROMORPHONE HYDROCHLORIDE 1 MG/ML
1 INJECTION, SOLUTION INTRAMUSCULAR; INTRAVENOUS; SUBCUTANEOUS
Status: DISCONTINUED | OUTPATIENT
Start: 2019-09-25 | End: 2019-09-29

## 2019-09-25 RX ADMIN — ENOXAPARIN SODIUM 40 MG: 40 INJECTION SUBCUTANEOUS at 09:40

## 2019-09-25 RX ADMIN — LIDOCAINE HYDROCHLORIDE 1 MG/KG/HR: 8 INJECTION, SOLUTION INTRAVENOUS at 09:13

## 2019-09-25 RX ADMIN — HYDROMORPHONE HYDROCHLORIDE 1 MG: 1 INJECTION, SOLUTION INTRAMUSCULAR; INTRAVENOUS; SUBCUTANEOUS at 22:33

## 2019-09-25 RX ADMIN — PANTOPRAZOLE SODIUM 40 MG: 40 TABLET, DELAYED RELEASE ORAL at 09:41

## 2019-09-25 RX ADMIN — ALVIMOPAN 12 MG: 12 CAPSULE ORAL at 17:08

## 2019-09-25 RX ADMIN — ALVIMOPAN 12 MG: 12 CAPSULE ORAL at 09:40

## 2019-09-25 RX ADMIN — ACETAMINOPHEN 1000 MG: 500 TABLET ORAL at 06:04

## 2019-09-25 RX ADMIN — ACETAMINOPHEN 1000 MG: 500 TABLET ORAL at 22:34

## 2019-09-25 RX ADMIN — ACETAMINOPHEN 1000 MG: 500 TABLET ORAL at 15:41

## 2019-09-25 RX ADMIN — OXYCODONE HYDROCHLORIDE 5 MG: 5 TABLET ORAL at 20:44

## 2019-09-25 RX ADMIN — TAMSULOSIN HYDROCHLORIDE 0.4 MG: 0.4 CAPSULE ORAL at 09:41

## 2019-09-25 RX ADMIN — ACETAMINOPHEN 1000 MG: 500 TABLET ORAL at 09:41

## 2019-09-25 RX ADMIN — KETOROLAC TROMETHAMINE 15 MG: 30 INJECTION, SOLUTION INTRAMUSCULAR at 06:04

## 2019-09-25 NOTE — PROGRESS NOTES
TRANSITIONS OF CARE PLAN:   1. DESTINATION: Own Home  2. TRANSPORT: Spouse  3. ADDITIONAL SUPPORT: Family  4. DME: [employer]  5. HOME HEALTH: [facility]  6. CODE STATUS/AMD STATUS: FULL; on file  7. FOLLOW UP APPOINTMENTS: attending, pcp  8. STILL NEEDS: Diet advancement and toleration, increased ambulation, labs in AM, wound care consult, ostomy education    Reason for Admission:   Rectal Cancer                   RRAT Score:          8           Plan for utilizing home health:      [facility]; CM to submit referral via CC                    Current Advanced Directive/Advance Care Plan: FULL CODE; on file                         Transition of Care Plan:         Patient is POD#1 from cystoscopy, ureteroscopies, ureteral stent placement, laparoscopic low anterior resection, coloproctostomy, and loop ileostomy. Patient was cleared by PT for no further recommendations. Patient is being followed by wound care for new ostomy. Patient was started on low fiber diet today, 9/25. Patient has no hx of HH, [facility], or SNF. Pharmacy preference [facility]. With freedom of choice, patient selected [facility] for [facility] services, and CM submitted referral via CC. Patient is aware of need to follow up with attending within 1 week of discharge. Disposition includes discharge to own home with transport via spouse and home support from family. Care Management Interventions  PCP Verified by CM: Yes(last seen by Dr. Arnoldo Argueta in March)  Palliative Care Criteria Met (RRAT>21 & CHF Dx)?: No  Mode of Transport at Discharge:  Other (see comment)(spouse to transport)  Transition of Care Consult (CM Consult): Discharge Planning, [address]: Yes  MyChart Signup: Yes  Discharge Durable Medical Equipment: No(has DME of: [employer])  Health Maintenance Reviewed: Yes(CM met with patient, with spouse at bedside and patient alert and oriented x 4)  Physical Therapy Consult: Yes  Occupational Therapy Consult: No  Speech Therapy Consult: No  Current Support Network: Lives with Spouse, Own Home  Confirm Follow Up Transport: Self(independent in ADLS, to include driving)  Plan discussed with Pt/Family/Caregiver: Yes(spouse at bedside)  Freedom of Choice Offered: Yes([facility])  [address] Provided?: No  Discharge Location  Discharge Placement: Home with home health(lives with spouse and 2 dependent children in a 2 story home, 5 exterior steps, 2nd floor bedroom, 15 interior steps)    CRM: Josy Carrasco, MPH, CHES; Z: [phone]    15:30 -   CM notes that [facility] has accepted patient.   Information is on AVS.  CRM: Josy Carrasco, MPH, [address] [place] [address]; Z: [phone]

## 2019-09-25 NOTE — PROGRESS NOTES
Spiritual Care Partner Volunteer visited patient in [address] on 9/25/19. Documented by:   Chaplain Duffy MDiv, MACE  [phone] ([phone])

## 2019-09-25 NOTE — ANESTHESIA POSTPROCEDURE EVALUATION
Procedure(s):  HAND ASSISTED LAPAROSCOPIC LOW ANTERIOR RESECTION, MOBILIZATION OF THE SPLENIC FLEXURE, COLOPROCTOSTOMY, CREATION OF LOOPED ILLEOSTOMY  CYSTOSCOPY INSERTION URETERAL CATHETERS. general    Anesthesia Post Evaluation      Multimodal analgesia: multimodal analgesia used between 6 hours prior to anesthesia start to PACU discharge  Patient location during evaluation: PACU  Patient participation: complete - patient participated  Level of consciousness: awake  Pain score: 2  Pain management: adequate  Airway patency: patent  Anesthetic complications: no  Cardiovascular status: acceptable  Respiratory status: acceptable  Hydration status: acceptable  Comments: I have evaluated the patient and meets criteria for discharge from PACU. Fabian Ya MD  Post anesthesia nausea and vomiting:  controlled      Vitals Value Taken Time   /82 9/24/2019 10:15 PM   Temp 36.5 °C (97.7 °F) 9/24/2019  9:19 PM   Pulse 76 9/24/2019 10:19 PM   Resp 12 9/24/2019 10:19 PM   SpO2 96 % 9/24/2019 10:19 PM   Vitals shown include unvalidated device data.

## 2019-09-25 NOTE — WOUND CARE
CWOCN Ostomy Progress Note:  
 
First  Visit: POD #1 Type of Ostomy: loop ileostomy for rectal cancer Location: right quadrant Date of Surgery: 9-24-19    Surgeon: Dr. Cecily Perez Treatments: pouch not changed today. Pouch change tomorrow with wife available all day. Pouch removed, stoma and peristomal skin cleaned and assessed, new pouch prepared and applied. Findings:  
Current appliance: OR pouch Stoma size:  
Stoma color: red moist 
Characteristics: slight prolapse with red rubber negro noted through visualization of bag. Peristomal skin: Abdomen: Output: slightly bloody drainage Other:  
Pt Concerns:  
 
Teaching/Subjects covered today: discussed with patient and wife  at bedside. Encouraged pt to review handout given to patient/family and ask questions regarding material on next ostomy nurse visit. 9-25-19 : discussed- General anatomy and expectations of output - Normal & abnormal stoma characteristics & changes over time - Normal & abnormal peristomal characteristics 9-25-19: discussed -When/how to clean stoma & peristomal skin 9-25-19: discussed- When/how to empty pouch 
- Crusting technique - Shaving around stoma 9-25-19: discussed- When/how to change appliance - When to notify nurse/physician 
9-25-19 discussed and MD put in nutritional consult- Dietary guidelines 9-25-19: discussed- Flatus management - Where/how to obtain supplies - Manipulated/ practiced with : [employer] [place] / yellow / medium at bedside / 10 pouches and 5 wafers - Reviewed ADL's (bathing, mattress cover, car pillow to protect from seatbelt, etc) - ERAS I&O recording and flowsheet - Blockage s/s and electrolyte imbalance - Pharmacy notification re: meds Pt demonstrated understanding of above material covered by Gino Chávez / Ostomy  Nurse Pouches at bedside for change tomorrow. Present during visit was patient and wife. Recommendations: 1. Empty appliance when 1/3 full and PRN. Encourage patient/family to notify nurse and 
assist w/ pouch emptying to promote self-care. Change appliance twice weekly and PRN for leaking ASAP. DO NOT REINFORCE LEAKS to avoid skin breakdown. Transition of Care: 
Ostomy nurse to return and reinforce teaching tomorrow 9-26-19. Bassett Muck Patient has learned vital skills but will likely need home health care for further teaching after discharge. Home Health consult placed. Discharge plan date to be determined. Plan to continue teaching tomorrow with a pouch change / 9-26-19. Discussed care/progress with RN/ Ariella. Supplies given to patient / no ordering numbers yet. Ct Milligan RN, BSN Wound, Ostomy, Continence Nurse 
office: [phone] 
pager [phone]

## 2019-09-25 NOTE — OP NOTES
[address]  OPERATIVE REPORT    Name:  Courtland Shape  MR#:  [id number]  :  1970  ACCOUNT #:  [de-identified]  DATE OF SERVICE:  2019      PREOPERATIVE DIAGNOSIS:  Rectal cancer. POSTOPERATIVE DIAGNOSIS:  Rectal cancer. PROCEDURES PERFORMED:  1. Cystoscopy. 2.  Bilateral ureteroscopies. 3.  Bilateral ureteral stent placement. 4.  Rogers catheter placement. SURGEON:  Brandi Ambrosio MD    ASSISTANT:  None. ANESTHESIA:  General.    COMPLICATIONS:  None. SPECIMENS REMOVED:  None. IMPLANTS:  1. Right-sided 5-Paraguayan TigerTail catheter. 2.  Left-sided 4-Paraguayan whistle-tip catheter. ESTIMATED BLOOD LOSS:  Minimal.    IV FLUIDS:  600 mL. FINDINGS:  Severely narrowed ureters. INDICATION FOR PROCEDURE:  The patient is a 50-year-old male who was diagnosed with rectal cancer. He underwent adjuvant radiation therapy and is scheduled for low anterior resection by Dr. Charito Lopez. He requested that Urology place ureteral stents prior to surgery to facilitate identification of the ureters. PROCEDURE IN DETAIL:  The patient was identified and informed consent obtained, the potential risks and complications of the procedure reviewed, the patient was brought back and general anesthesia was induced. He was placed in the dorsal lithotomy position and prepped and draped in standard sterile fashion for a cystoscopic procedure. The 21-Paraguayan cystoscope was introduced through the urethra into the bladder in retrograde manner. Pan cystoscopy of bladder was performed. There was no evidence of papillary or sessile bladder tumor, hemangiomas, or carcinoma in situ. Both ureteral orifices were identified and they were in orthotopic position. The right one was intubated with a 5-Paraguayan whistle-tip catheter. The catheter progressed several centimeters up the right ureter and then stopped.   We attempted to manipulate the catheter to get into better position and get it progress further up the ureter, but it would not. We then removed the catheter and tried again and once again we could not get the catheter to progress very far up the ureter. We then removed the catheter and got a 0.035 Sensor wire which we were able to pass without difficulty up the right renal unit. We then attempted to pass the whistle-tip catheter over the wire, but again the whistle-tip catheter would only progress several centimeters into the ureter and then would not go any further. We left the right-sided wire in place and turned attention to the left side. We again tried to pass this 5-Jamaican whistle-tip catheter in the left ureteral orifice and in the left ureter, but again the catheter would not progress beyond several centimeters into the distal ureter. We passed a second wire into the left ureter and this went easily. We again tried to pass the 5-Jamaican whistle-tip catheter over the wire, but once again it would not progress into the ureter. We then attempted to pass a 5-Jamaican open-ended catheter and again we could not get the catheter to progress further. We removed the catheters, we left the wires in place and obtained the semi-rigid ureteroscope. We performed semi-rigid ureteroscopy of the right ureter. The ureter was intact. There was no false passage. The ureter did look somewhat stenotic, however, and even though we passed a second wire, we could not get the ureteroscope to pass further than the distal ureter because of the stenosis. We then turned our attention to the left ureteral orifice and performed ureteroscopy and again had the same findings of a stenotic distal ureter not allowing the ureteroscope to pass further. We obtained a 5-Jamaican 26-cm internal stent thinking that the tapered end would be able to progress more easily and we tried to get this to pass on the left side, but unfortunately it would not pass either.   We then removed that and got a 4-Jamaican whistle-tip catheter. The 4-Bahraini whistle-tip will now sit over a 0.035 wire, so we removed the whistle-tip catheter, advanced the 5-Bahraini open-ended catheter into the distal left ureter and then removed the wire and passed the 0.025 Glidewire. This progressed easily. We then obtained the 4-Bahraini whistle-tip catheter and we were finally able to get the 4-Bahraini whistle-tip catheter to go an appropriate length into the left ureter. We then turned our attention to the right ureteral orifice. The wire had been removed in the interim. We replaced the wire which progressed easily and we went to place the 5-Bahraini open-ended catheter with the plans to replace with a 0.025 Glidewire, but the 5-Bahraini open-ended catheter progressed up the ureter and into the proximal ureter. There was a minimal amount coming out of the penis, so we left that in place. We then removed the cystoscope and placed the 16-Bahraini Rogers catheter. We attached the 2 urethral catheters to the Y connector to allow drainage into the Rogers drainage bag. The patient was then prepped and draped for Dr. Valery Thomson portion of the procedure. IMPRESSION:  Rectal cancer. PLAN:  Catheters can be removed at the end of the case. Rogers catheter will probably stay for a couple of days. We will be available if needed.         Nadira Martínez MD JD/HA_I/ARIADNA_P  D:  09/24/2019 15:45  T:  09/25/2019 1:39  JOB #:  [id number]

## 2019-09-25 NOTE — PROGRESS NOTES
NUTRITION       Pt and wife visited for low fiber ed. Handouts provided for low fiber diet and diet mgmt for new loop ileostomy. Good understanding with no concerns at this time.    Gabe Ramirez RD

## 2019-09-25 NOTE — PERIOP NOTES
TRANSFER - OUT REPORT:    Verbal report given to anita (name) on Jaquelin Coma  being transferred to 6(unit) for routine post - op       Report consisted of patients Situation, Background, Assessment and   Recommendations(SBAR). 2121    Time Pre op antibiotic given:2 grams cefotan at 1330 and 1930  Anesthesia Stop time:   Rogers Present on Transfer to floor:yes  Order for Rogers on Chart: yes  Discharge Prescriptions with Chart: no     Information from the following report(s) SBAR, Kardex, OR Summary, Procedure Summary, Intake/Output, MAR, Recent Results and Cardiac Rhythm nsr was reviewed with the receiving nurse. Opportunity for questions and clarification was provided. Is the patient on 02? YES       L/Min 2       Other     Is the patient on a monitor? NO    Is the nurse transporting with the patient? NO    Surgical Waiting Area notified of patient's transfer from PACU? YES- pts wife        The following personal items collected during your admission accompanied patient upon transfer:   Dental Appliance:    Vision:    Hearing Aid:    Jewelry:    Clothing: Clothing: Other (comment)(clothing bag to pacu)  Other Valuables:  Other Valuables: Eyeglasses(glasses to pacu)  Valuables sent to safe:         Clothing bag x 1  Glasses in bag

## 2019-09-25 NOTE — PERIOP NOTES
Patient: Coralee Homans MRN: [id number]  SSN: [id number]   YOB: 1970  Age: 47 y.o. Sex: male     Patient is status post Procedure(s):  HAND ASSISTED LAPAROSCOPIC LOW ANTERIOR RESECTION, MOBILIZATION OF THE SPLENIC FLEXURE, COLOPROCTOSTOMY, CREATION OF LOOPED ILLEOSTOMY  CYSTOSCOPY INSERTION URETERAL CATHETERS. Surgeon(s) and Role:  Panel 1:     * Guadlupe Gaucher, MD - Primary  Panel 2:     * Malathi Joseph MD - Primary    Local/Dose/Irrigation:  See [facility]                  Peripheral IV 09/24/19 Left Wrist (Active)       Peripheral IV 09/24/19 Right Forearm (Active)          Gus-Tran Drain 09/24/19 Left; Lower Abdomen (Active)       Ureteral Catheter 5 (Active)       Ureteral Catheter 4 (Active)                     Dressing/Packing:  Wound Abdomen-Dressing Type: Topical skin adhesive/glue; Adhesive wound dressing (Band-Aid) (09/24/19 1900)

## 2019-09-25 NOTE — BRIEF OP NOTE
BRIEF OPERATIVE NOTE    Date of Procedure:  9/24/2019   Preoperative Diagnosis:  Rectal cancer  Postoperative Diagnosis:  Rectal cancer  Procedure:   Hand-assisted laparoscopic low anterior resection, mobilization of the splenic flexure, coloproctostomy, and creation of loop ileostomy. Surgeon:  Raffi Watkins MD  Assistants:  Mana Russo SA;  Toyin Dobbs SA  Anesthesia:  General endotracheal  Estimated Blood Loss:  200 mL  Crystalloid:  2000 mL  Albumin:  250 mL  Urine:   750 mL  Specimens:   ID Type Source Tests Collected by Time Destination   1 : Rectum and Sigmoid Colon Fresh Sigmoid  Maria Alejandra Caban MD 9/24/2019 1827 Pathology   2 : Anastomotic Fresh Abdomen  Maria Alejandra Caban MD 9/24/2019 1906 Pathology     Tubes and Drains:  10 mm Gus-Tran drain in the pelvis. Findings:  Pelvic and lower abdominal adhesions. Significant mesenteric and peritoneal edema. Ulceration of the mid rectum. Multiple small hepatic hemangiomas. No apparent metastatic disease. Complications:  None apparent. Implants:  None.

## 2019-09-25 NOTE — PROGRESS NOTES
PHYSICAL THERAPY EVALUATION/DISCHARGE  Patient: Siria Almaraz (88 y.o. male)  Date: 9/25/2019  Primary Diagnosis: Rectal cancer ([facility] [C20]  Procedure(s) (LRB):  HAND ASSISTED LAPAROSCOPIC LOW ANTERIOR RESECTION, MOBILIZATION OF THE SPLENIC FLEXURE, COLOPROCTOSTOMY, CREATION OF LOOPED ILLEOSTOMY (N/A)  CYSTOSCOPY INSERTION URETERAL CATHETERS (Bilateral) 1 Day Post-Op   Precautions: kelsey, drain         ASSESSMENT  Based on the objective data described below, the patient presents with abdominal pain and difficulty passing gas PIOD #1. Otherwise, patient is moving well with a steady gait pattern. Appears to be approaching functional baseline. Patient encouraged to ambulate 3-4 times a day with nursing or family members. Chair set up in the room to encourage OOB mobility. No further PT needs identified at this time. Please re-consult should functional status change. Functional Outcome Measure: The patient scored 0.8 m/s on the 10 MWT outcome measure which is indicative of low fall risk/ community ambulator. Other factors to consider for discharge: wife at home, previously working on his feet all day     Further skilled acute physical therapy is not indicated at this time. PLAN :  Recommendation for discharge: (in order for the patient to meet his/her long term goals)  No skilled physical therapy/ follow up rehabilitation needs identified at this time. This discharge recommendation:  Has been made in collaboration with the attending provider and/or case management    Equipment recommendations for successful discharge: none       SUBJECTIVE:   Patient stated Sorry, I may not be polite about the belching.      OBJECTIVE DATA SUMMARY:   HISTORY:    Past Medical History:   Diagnosis Date    Adopted     GERD (gastroesophageal reflux disease)     Psoriasis     Psoriatic arthritis ([facility]     Rectal cancer [facility])      Past Surgical History:   Procedure Laterality Date    COLONOSCOPY Left 4/26/2019 COLONOSCOPY performed by [facility] at [facility] N/A 5/2/2019    SIGMOIDOSCOPY FLEXIBLE performed by Arlie Heimlich, MD at [address] [place] [address] HX GI      EGD    HX HEENT      WISDOM TEETH    HX ORTHOPAEDIC Right     ACL REPLACEMENT       Prior level of function: independent community ambulator. Working full time. Drives. No falls reported. Enjoys camping with his wife  Personal factors and/or comorbidities impacting plan of care: rectal CA, R ACL repair    Home Situation  Home Environment: Private residence  # Steps to Enter: 5  Rails to Enter: Yes  Hand Rails : Bilateral  One/Two Story Residence: Two story  # of Interior Steps: 15  Interior Rails: Both  Living Alone: No  Support Systems: Family member(s), Spouse/Significant Other/Partner  Patient Expects to be Discharged to[de-identified] Private residence  Current DME Used/Available at Home: None    EXAMINATION/PRESENTATION/DECISION MAKING:   Critical Behavior:  Neurologic State: Alert  Strength:    Strength:  Within functional limits  Tone & Sensation:   Tone: Normal  Sensation: Intact    Coordination:  Coordination: Within functional limits  Vision:    Wears glasses  Functional Mobility:  Bed Mobility:  Supine to Sit: Stand-by assistance(modified log rolling)  Transfers:  Sit to Stand: Stand-by assistance  Stand to Sit: Stand-by assistance  Bed to Chair: Stand-by assistance  Balance:   Sitting: Intact  Standing: Intact  Ambulation/Gait Training:  Distance (ft): 350 Feet (ft)  Assistive Device: Gait belt  Ambulation - Level of Assistance: Stand-by assistance  Base of Support: Narrowed      Functional Measure:  10 Meter walk test:  (Specify if any supplemental oxygen is used, the type, pre, during and post sats.)    Self-Selected Or Fast-Velocity: Self Selected Velocity  Trial 1 (Time to Walk 10 Meters): 12 Seconds  Trial 2 (Time to Walk 10 Meters): 12 Seconds  Trial 3 (Time to Walk 10 Meters): 12 Seconds  Average : 12 Seconds  Score (Meters/Second): 0.8 Meters/Second           Minimal Detectable Change (MDC-90) = 0.1 m/s  Karen HIGHTOWER \"Comfortable and maximum walking speed of adults aged 20-79 years: reference values and determinants. \" Age and Agin Volume 26(1):15-9. Collins Hugo \"Age- and gender-related test performance in community-dwelling elderly people: Six-Minute Walk Test, Schwab Balance Scale, Timed Up & Go Test, and gait speeds. \" Physical Therapy: 2002 Volume 82(2):128-37. Jeane Moritz DM, Joe MARIANO, Doris Donahue JD, [facility] COLLIN \"Assessing stability and change of four performance measures: a longitudinal study evaluating outcome following total hip and knee arthroplasty. \" [facility] Musculoskeletal Disorders: 2005 Volume 6(3). Steven Baldwin, PhD; Adria Vera, PhD. Kimberly Arevalo Paper: \"Walking Speed: the Sixth Vital Sign\" Journal of Geriatric Physical Therapy: 2009 - Volume 32 - Issue 2 - p 25 . Physical Therapy Evaluation Charge Determination   History Examination Presentation Decision-Making   LOW Complexity : Zero comorbidities / personal factors that will impact the outcome / POC LOW Complexity : 1-2 Standardized tests and measures addressing body structure, function, activity limitation and / or participation in recreation  LOW Complexity : Stable, uncomplicated  LOW Complexity : FOTO score of       Based on the above components, the patient evaluation is determined to be of the following complexity level: LOW     Pain Rating:  3/10 \"feels like a balloon in his belly\"    Activity Tolerance:   Good and SpO2 stable on RA  Please refer to the flowsheet for vital signs taken during this treatment. After treatment patient left in no apparent distress:   Sitting in chair, Call bell within reach and Caregiver / family present    COMMUNICATION/EDUCATION:   The patients plan of care was discussed with: Registered Nurse and Physician.     Fall prevention education was provided and the patient/caregiver indicated understanding., Patient/family have participated as able in goal setting and plan of care. and Patient/family agree to work toward stated goals and plan of care.     Thank you for this referral.  Jewels Hanks, PT, DPT  Geriatric Clinical Specialist    Time Calculation: 24 mins

## 2019-09-25 NOTE — ROUTINE PROCESS
Bedside and Verbal shift change report given to [address] [place] [address] Road  (oncoming nurse) by Yon Vázquez RN (offgoing nurse). Report included the following information SBAR, Kardex, Procedure Summary, Intake/Output, MAR and Recent Results.

## 2019-09-25 NOTE — PROGRESS NOTES
General Daily Progress Note    Admission Date: 9/24/2019  Hospital Day 2  Post-Op Day 1  Subjective:   No nausea. Feels a bit bloated. No dyspnea. Pain control adequate. Objective:     Patient Vitals for the past 24 hrs:   BP Temp Pulse Resp SpO2 Height Weight   09/25/19 0758 156/90 100.2 °F (37.9 °C) 70 18 95 %     09/25/19 0527 (!) 142/99 99.6 °F (37.6 °C) 70 18 98 %     09/25/19 0302 145/75 99.8 °F (37.7 °C) 80 18 98 %     09/24/19 2306 154/87 98.6 °F (37 °C) 91 16 96 %     09/24/19 2245 137/80  87 15 99 %     09/24/19 2233  98 °F (36.7 °C)   98 %     09/24/19 2230 140/83  74 13 97 %     09/24/19 2215 139/82  78 12 95 %     09/24/19 2210 150/87  79 14 96 %     09/24/19 2200   78  97 %     09/24/19 2145 152/89  72 22 96 %     09/24/19 2130 133/80  78 19 97 %     09/24/19 2125 132/80  73 14 97 %     09/24/19 2120 136/75  75 25 97 %     09/24/19 2119 142/84 97.7 °F (36.5 °C) 76 22 97 %     09/24/19 0954 (!) 124/95 98.8 °F (37.1 °C) 60 17 97 %     09/24/19 0944      5' 7\" (1.702 m) 68.9 kg (151 lb 14.4 oz)     No intake/output data recorded. 09/23 1901 - 09/25 0700  In: 2000 [I.V.:2000]  Out: 8956 [Urine:1475; Drains:100]      Physical Examination:  General Appearance - No distress. Chest - Lungs clear to auscultation. Heart - Normal rate and regular rhythm. Abdomen - Somewhat distended. incisions clean, dry, intact, and without erythema. Ileostomy edematous, but viable. MIGUEL ANGEL drainage serosanguinous.  - Rogers catheter in place. Extremities - Pneumatic compression stockings in use.             Data Review   Recent Results (from the past 24 hour(s))   CBC WITH AUTOMATED DIFF    Collection Time: 09/24/19  9:27 PM   Result Value Ref Range    WBC 6.7 4.1 - 11.1 K/uL    RBC 4.02 (L) 4.10 - 5.70 M/uL    HGB 13.7 12.1 - 17.0 g/dL    HCT 40.8 36.6 - 50.3 %    .5 (H) 80.0 - 99.0 FL    MCH 34.1 (H) 26.0 - 34.0 PG    MCHC 33.6 30.0 - 36.5 g/dL    RDW 11.8 11.5 - 14.5 %    PLATELET 628 (L) 050 - 400 K/uL    MPV 10.3 8.9 - 12.9 FL    NRBC 0.0 0  WBC    ABSOLUTE NRBC 0.00 0.00 - 0.01 K/uL    NEUTROPHILS 91 (H) 32 - 75 %    LYMPHOCYTES 3 (L) 12 - 49 %    MONOCYTES 6 5 - 13 %    EOSINOPHILS 0 0 - 7 %    BASOPHILS 0 0 - 1 %    IMMATURE GRANULOCYTES 0 0.0 - 0.5 %    ABS. NEUTROPHILS 6.1 1.8 - 8.0 K/UL    ABS. LYMPHOCYTES 0.2 (L) 0.8 - 3.5 K/UL    ABS. MONOCYTES 0.4 0.0 - 1.0 K/UL    ABS. EOSINOPHILS 0.0 0.0 - 0.4 K/UL    ABS. BASOPHILS 0.0 0.0 - 0.1 K/UL    ABS. IMM.  GRANS. 0.0 0.00 - 0.04 K/UL    DF SMEAR SCANNED      RBC COMMENTS MACROCYTOSIS  1+       METABOLIC PANEL, BASIC    Collection Time: 09/24/19  9:27 PM   Result Value Ref Range    Sodium 141 136 - 145 mmol/L    Potassium 4.5 3.5 - 5.1 mmol/L    Chloride 108 97 - 108 mmol/L    CO2 23 21 - 32 mmol/L    Anion gap 10 5 - 15 mmol/L    Glucose 167 (H) 65 - 100 mg/dL    BUN 9 6 - 20 MG/DL    Creatinine 1.57 (H) 0.70 - 1.30 MG/DL    BUN/Creatinine ratio 6 (L) 12 - 20      GFR est AA 57 (L) >60 ml/min/1.73m2    GFR est non-AA 47 (L) >60 ml/min/1.73m2    Calcium 8.1 (L) 8.5 - 10.1 MG/DL   MAGNESIUM    Collection Time: 09/24/19  9:27 PM   Result Value Ref Range    Magnesium 2.4 1.6 - 2.4 mg/dL   PHOSPHORUS    Collection Time: 09/24/19  9:27 PM   Result Value Ref Range    Phosphorus 3.6 2.6 - 4.7 MG/DL   CBC W/O DIFF    Collection Time: 09/25/19  6:15 AM   Result Value Ref Range    WBC 5.3 4.1 - 11.1 K/uL    RBC 3.99 (L) 4.10 - 5.70 M/uL    HGB 13.6 12.1 - 17.0 g/dL    HCT 40.5 36.6 - 50.3 %    .5 (H) 80.0 - 99.0 FL    MCH 34.1 (H) 26.0 - 34.0 PG    MCHC 33.6 30.0 - 36.5 g/dL    RDW 11.9 11.5 - 14.5 %    PLATELET 045 929 - 673 K/uL    MPV 10.9 8.9 - 12.9 FL    NRBC 0.0 0  WBC    ABSOLUTE NRBC 0.00 0.00 - 1.93 K/uL   METABOLIC PANEL, BASIC    Collection Time: 09/25/19  6:15 AM   Result Value Ref Range    Sodium 139 136 - 145 mmol/L    Potassium 4.2 3.5 - 5.1 mmol/L    Chloride 107 97 - 108 mmol/L CO2 24 21 - 32 mmol/L    Anion gap 8 5 - 15 mmol/L    Glucose 122 (H) 65 - 100 mg/dL    BUN 12 6 - 20 MG/DL    Creatinine 1.65 (H) 0.70 - 1.30 MG/DL    BUN/Creatinine ratio 7 (L) 12 - 20      GFR est AA 54 (L) >60 ml/min/1.73m2    GFR est non-AA 44 (L) >60 ml/min/1.73m2    Calcium 8.0 (L) 8.5 - 10.1 MG/DL   MAGNESIUM    Collection Time: 09/25/19  6:15 AM   Result Value Ref Range    Magnesium 2.4 1.6 - 2.4 mg/dL   PHOSPHORUS    Collection Time: 09/25/19  6:15 AM   Result Value Ref Range    Phosphorus 3.8 2.6 - 4.7 MG/DL           Assessment:     Principal Problem:    Rectal cancer ([facility] (5/10/2019)    Active Problems: Thrombocytopenia ([facility] (9/24/2019)        1 Day Post-Op s/p hand-assisted laparoscopic low anterior resection, mobilization of the splenic flexure, coloproctostomy, and creation of loop ileostomy. Hemodynamically stable. Hemoglobin stable. Good urine output. Creatinine a bit elevated, but not sufficient to classify as acute kidney injury. At increased risk for urinary retention secondary to the nature of the operation. Plan:   Begin low fiber diet with caution. Hold Toradol and Celebrex secondary to increased creatinine. Ambulate. Incentive spirometer.

## 2019-09-26 LAB
ANION GAP SERPL CALC-SCNC: 8 MMOL/L (ref 5–15)
BASOPHILS # BLD: 0 K/UL (ref 0–0.1)
BASOPHILS NFR BLD: 0 % (ref 0–1)
BUN SERPL-MCNC: 11 MG/DL (ref 6–20)
BUN/CREAT SERPL: 9 (ref 12–20)
CALCIUM SERPL-MCNC: 8.7 MG/DL (ref 8.5–10.1)
CHLORIDE SERPL-SCNC: 108 MMOL/L (ref 97–108)
CO2 SERPL-SCNC: 25 MMOL/L (ref 21–32)
CREAT SERPL-MCNC: 1.17 MG/DL (ref 0.7–1.3)
DIFFERENTIAL METHOD BLD: ABNORMAL
EOSINOPHIL # BLD: 0 K/UL (ref 0–0.4)
EOSINOPHIL NFR BLD: 0 % (ref 0–7)
ERYTHROCYTE [DISTWIDTH] IN BLOOD BY AUTOMATED COUNT: 11.9 % (ref 11.5–14.5)
GLUCOSE SERPL-MCNC: 112 MG/DL (ref 65–100)
HCT VFR BLD AUTO: 39.6 % (ref 36.6–50.3)
HGB BLD-MCNC: 13.2 G/DL (ref 12.1–17)
IMM GRANULOCYTES # BLD AUTO: 0 K/UL
IMM GRANULOCYTES NFR BLD AUTO: 0 %
LYMPHOCYTES # BLD: 0.2 K/UL (ref 0.8–3.5)
LYMPHOCYTES NFR BLD: 4 % (ref 12–49)
MAGNESIUM SERPL-MCNC: 2.4 MG/DL (ref 1.6–2.4)
MCH RBC QN AUTO: 34.2 PG (ref 26–34)
MCHC RBC AUTO-ENTMCNC: 33.3 G/DL (ref 30–36.5)
MCV RBC AUTO: 102.6 FL (ref 80–99)
MONOCYTES # BLD: 0.4 K/UL (ref 0–1)
MONOCYTES NFR BLD: 8 % (ref 5–13)
NEUTS SEG # BLD: 4.3 K/UL (ref 1.8–8)
NEUTS SEG NFR BLD: 88 % (ref 32–75)
NRBC # BLD: 0 K/UL (ref 0–0.01)
NRBC BLD-RTO: 0 PER 100 WBC
PHOSPHATE SERPL-MCNC: 1.9 MG/DL (ref 2.6–4.7)
PLATELET # BLD AUTO: 126 K/UL (ref 150–400)
PMV BLD AUTO: 10.6 FL (ref 8.9–12.9)
POTASSIUM SERPL-SCNC: 3.9 MMOL/L (ref 3.5–5.1)
RBC # BLD AUTO: 3.86 M/UL (ref 4.1–5.7)
RBC MORPH BLD: ABNORMAL
SODIUM SERPL-SCNC: 141 MMOL/L (ref 136–145)
WBC # BLD AUTO: 4.9 K/UL (ref 4.1–11.1)

## 2019-09-26 PROCEDURE — 84100 ASSAY OF PHOSPHORUS: CPT

## 2019-09-26 PROCEDURE — 83735 ASSAY OF MAGNESIUM: CPT

## 2019-09-26 PROCEDURE — 74011250637 HC RX REV CODE- 250/637: Performed by: COLON & RECTAL SURGERY

## 2019-09-26 PROCEDURE — 74011250636 HC RX REV CODE- 250/636: Performed by: COLON & RECTAL SURGERY

## 2019-09-26 PROCEDURE — 85025 COMPLETE CBC W/AUTO DIFF WBC: CPT

## 2019-09-26 PROCEDURE — 36415 COLL VENOUS BLD VENIPUNCTURE: CPT

## 2019-09-26 PROCEDURE — 80048 BASIC METABOLIC PNL TOTAL CA: CPT

## 2019-09-26 PROCEDURE — 74011000250 HC RX REV CODE- 250: Performed by: COLON & RECTAL SURGERY

## 2019-09-26 PROCEDURE — 65270000029 HC RM PRIVATE

## 2019-09-26 RX ORDER — CLONIDINE HYDROCHLORIDE 0.1 MG/1
0.1 TABLET ORAL
Status: DISCONTINUED | OUTPATIENT
Start: 2019-09-26 | End: 2019-09-26

## 2019-09-26 RX ORDER — KETOROLAC TROMETHAMINE 30 MG/ML
15 INJECTION, SOLUTION INTRAMUSCULAR; INTRAVENOUS EVERY 6 HOURS
Status: DISCONTINUED | OUTPATIENT
Start: 2019-09-26 | End: 2019-09-27

## 2019-09-26 RX ORDER — SODIUM CHLORIDE, SODIUM LACTATE, POTASSIUM CHLORIDE, CALCIUM CHLORIDE 600; 310; 30; 20 MG/100ML; MG/100ML; MG/100ML; MG/100ML
25 INJECTION, SOLUTION INTRAVENOUS CONTINUOUS
Status: DISCONTINUED | OUTPATIENT
Start: 2019-09-26 | End: 2019-10-02 | Stop reason: HOSPADM

## 2019-09-26 RX ORDER — CLONIDINE HYDROCHLORIDE 0.1 MG/1
0.1 TABLET ORAL
Status: DISCONTINUED | OUTPATIENT
Start: 2019-09-26 | End: 2019-10-02 | Stop reason: HOSPADM

## 2019-09-26 RX ORDER — CLONIDINE HYDROCHLORIDE 0.1 MG/1
0.1 TABLET ORAL
Status: COMPLETED | OUTPATIENT
Start: 2019-09-26 | End: 2019-09-26

## 2019-09-26 RX ORDER — ENOXAPARIN SODIUM 100 MG/ML
40 INJECTION SUBCUTANEOUS EVERY 24 HOURS
Status: DISCONTINUED | OUTPATIENT
Start: 2019-09-27 | End: 2019-10-02 | Stop reason: HOSPADM

## 2019-09-26 RX ADMIN — ACETAMINOPHEN 1000 MG: 500 TABLET ORAL at 21:09

## 2019-09-26 RX ADMIN — ACETAMINOPHEN 1000 MG: 500 TABLET ORAL at 15:47

## 2019-09-26 RX ADMIN — ACETAMINOPHEN 1000 MG: 500 TABLET ORAL at 07:33

## 2019-09-26 RX ADMIN — ALVIMOPAN 12 MG: 12 CAPSULE ORAL at 08:47

## 2019-09-26 RX ADMIN — KETOROLAC TROMETHAMINE 15 MG: 30 INJECTION, SOLUTION INTRAMUSCULAR at 19:35

## 2019-09-26 RX ADMIN — CLONIDINE HYDROCHLORIDE 0.1 MG: 0.1 TABLET ORAL at 20:27

## 2019-09-26 RX ADMIN — KETOROLAC TROMETHAMINE 15 MG: 30 INJECTION, SOLUTION INTRAMUSCULAR at 08:48

## 2019-09-26 RX ADMIN — LIDOCAINE HYDROCHLORIDE 1 MG/KG/HR: 8 INJECTION, SOLUTION INTRAVENOUS at 13:11

## 2019-09-26 RX ADMIN — SODIUM CHLORIDE: 900 INJECTION, SOLUTION INTRAVENOUS at 12:30

## 2019-09-26 RX ADMIN — ALVIMOPAN 12 MG: 12 CAPSULE ORAL at 17:47

## 2019-09-26 RX ADMIN — SODIUM CHLORIDE, SODIUM LACTATE, POTASSIUM CHLORIDE, AND CALCIUM CHLORIDE 75 ML/HR: 600; 310; 30; 20 INJECTION, SOLUTION INTRAVENOUS at 22:19

## 2019-09-26 RX ADMIN — OXYCODONE HYDROCHLORIDE 5 MG: 5 TABLET ORAL at 04:10

## 2019-09-26 RX ADMIN — CLONIDINE HYDROCHLORIDE 0.1 MG: 0.1 TABLET ORAL at 16:51

## 2019-09-26 RX ADMIN — TAMSULOSIN HYDROCHLORIDE 0.4 MG: 0.4 CAPSULE ORAL at 08:47

## 2019-09-26 RX ADMIN — KETOROLAC TROMETHAMINE 15 MG: 30 INJECTION, SOLUTION INTRAMUSCULAR at 13:15

## 2019-09-26 RX ADMIN — PANTOPRAZOLE SODIUM 40 MG: 40 TABLET, DELAYED RELEASE ORAL at 08:47

## 2019-09-26 RX ADMIN — OXYCODONE HYDROCHLORIDE 5 MG: 5 TABLET ORAL at 22:19

## 2019-09-26 NOTE — PROGRESS NOTES
Rogers was discontinued today at 9 am. Patient voided 200 mL at 10:40am. Post residual bladder scan was 84 mL. Will continue to monitor for further urine occurences.

## 2019-09-26 NOTE — PROGRESS NOTES
Bladder scanned patient due to patient feeling he is having low urine output. 10mL in bladder when bladder scanned will continue to monitor output. Output since 8:30 am till 6pm is 1345 mL.

## 2019-09-26 NOTE — PROGRESS NOTES
Spiritual Care Partner Volunteer visited patient in room 516 on 9.26.19. Documented by: : Rev. Alisson Fung.  Destiny El; [facility], to contact [phone] [address] call: [phone]

## 2019-09-26 NOTE — ROUTINE PROCESS
Bedside and Verbal shift change report given to [address] [place] [address] Road (oncoming nurse) by Tyrell Lynn RN (offgoing nurse). Report included the following information SBAR, Kardex, Procedure Summary, Intake/Output, MAR and Recent Results.

## 2019-09-26 NOTE — PROGRESS NOTES
Patients blood pressure at 4:05 pm was 171/113. BP at 4:26pm was 180/110. RN paged Peter Srinivasan and he stated to give the patient 0.1 mg of Clonidine now PO. He also added a PRN dose of Clonidine Q2 hours if systolic is over 034. Will give medication now.

## 2019-09-26 NOTE — PROGRESS NOTES
9/26/19 -   LEN:  - Patient is on low fiber diet and is tolerating it well  - Patient is ambulating well  - Rogers came out today, and patient has voided on own  - Patient has had stool into ostomy bag today, 9/26  [facility] patient for [facility]; information is on AVS  CRM: Cipriano Hernandez, MPH, [address] [place] [address]; Z: [phone]

## 2019-09-26 NOTE — WOUND CARE
Wound Consult:  Ostomy Visit. Chart reviewed. Post Op day two; lap low anterior resection with diverting loop ileosotmy. Spoke with patients nurse earlier in day regarding working with patient and his wife. Patient is resting on a total care bed. Denies pain currently but feels bloated; up walking and in chair; returned to bed for teaching/appliance change. Assessment: 
RLQ ileostomy - vertically oval stoma with edema but soft; dark red to Hong Dariusz red/dusky in areas most likely due to tension/edema in abdominal wall. He has red rubber catheter running horizontally under stoma; no skin irritation around stoma, output is watery brown. Midline incision with dermal glue, well approximated and APARNA. Has a left quadrant MIGUEL ANGEL drain with serosanguinous drainage. Teaching/Treatment: We used a one piece appliance today to accommodate stoma dimensions; cut out template for them to follow; patient helped cut out opening for fit and once placed by me to show them how to get appliance under lower part of stoma, patient placed clip. We discussed hydration, fluid and electrolyte balance and signs of those being off with ileostomy, diet and blockages. We also discussed wear time, how often to change weekly if no leaks and when to empty. Suggested using one piece with ceramide backing as patient at baseline has psoriasis and will be going back on chemotherapy; the Ceramide line should be very kind to skin. Ostomy recommendations: 
Cera plus appliances for discharge. Will get item numbers together for patient. Have patient empty with support of nursing staff as much as possible and measure. Encourage po fluid intake. If electrolytes out of balance/low, add Gatoraide to meals. Continue to monitor nutrition, pain, and skin risk scale, and skin assessment. Plan: Will work with family tomorrow. Hugo Garay RN,CN Wound Healing Office [phone] Pager [phone]

## 2019-09-26 NOTE — OP NOTES
[address]   OPERATIVE REPORT    Name:  Juni Avila  MR#:  [id number]  :  1970  ACCOUNT #:  [de-identified]    DATE OF SERVICE:  2019    PREOPERATIVE DIAGNOSIS:  Rectal cancer. POSTOPERATIVE DIAGNOSIS:  Rectal cancer. PROCEDURES PERFORMED:  Hand-assisted laparoscopic low anterior resection, mobilization of the splenic flexure, coloproctostomy, and creation of loop ileostomy. SURGEON:  Kim Ramachandran MD    ASSISTANTS:  Jerilynn Cheadle, SA and Helder Golden SA.    ANESTHESIA:  General endotracheal.    SPECIMENS REMOVED:  1. Rectum and sigmoid colon. 2.  Anastomotic doughnuts. TUBES AND DRAINS:  10-mm Gus-Tran drain in the pelvis. ESTIMATED BLOOD LOSS:  200 mL. CRYSTALLOID:  2000 mL. ALBUMIN:  250 mL. URINE OUTPUT:  750 mL. COMPLICATIONS:  None apparent. IMPLANTS:  None. INDICATIONS FOR PROCEDURE:  The patient is a 21-year-old male who had been seeing blood with his bowel movements for approximately 8 months before undergoing a colonoscopy performed by Silvia Palencia MD on 2019. That procedure revealed multiple colonic and rectal polyps that were removed and a malignant-appearing rectal mass with a distal margin located at approximately 10 cm from the anal verge. The mass was biopsied and the biopsies were consistent with invasive well-differentiated adenocarcinoma. The excised polyps proved to have been tubular adenomas. A CEA level obtained on 2019 was slightly elevated for a nonsmoker (3.6 ng/mL). An endoscopic rectal ultrasound was performed by Dr. Junie Cowden on 2019, and it revealed that the lesion appeared to be stage III (uT2 N1). A CT scan of the abdomen and pelvis that was also performed on 2019 revealed no evidence of metastatic disease in the liver. A PET CT performed on 05/10/2019 was consistent with three small perirectal anibal metastatic foci.   The patient received neoadjuvant chemoradiation therapy with Xeloda and a total of 5040 cGy of external beam radiation, with his final radiation dose having been administered on 07/08/2019. Rigid proctosigmoidoscopy performed in the office on 08/21/2019 revealed a small area of residual disease at 10 cm from the anal verge. Surgical resection of the rectum and the regional nodes was indicated for treatment of any residual primary disease. The risks of the procedure were discussed in detail, and the patient agreed to proceed. OPERATIVE FINDINGS:  There were pelvic and lower abdominal adhesions that were most likely related to the radiation treatment. There was significant mesenteric and peritoneal edema. There was ulceration of the mid rectum. There were multiple small hepatic hemangiomas. There was no apparent metastatic disease. It should also be noted that during the urologic portion of the procedure, the ureters were found to be somewhat narrow. In retrospect, this narrowing was perhaps related to the edema. DESCRIPTION OF PROCEDURE:  After informed consent was obtained, the patient was taken to the operating room where standard monitoring devices were attached and adequate general endotracheal anesthesia was induced. He was then positioned in lithotomy with the lower extremities fitted with pneumatic compression stockings and supported by the padded hydraulic Shaji stirrups. The perineum was prepped and draped in the usual sterile fashion, then Antonio Akers MD performed cystoscopy and placement of bilateral ureteral catheters and a Rogers catheter. The procedure was quite difficult, and it is dictated separately. Once the urologic procedure had been completed, digital rectal examination was performed followed by the insertion of a 3-way Rogers catheter with 30 mL balloon. This catheter was then used to irrigate the rectum and sigmoid colon with several hundred milliliters of sterile water until the effluent ran clear.   The catheter was also then used to instill a Betadine solution into the rectum. The catheter was then left to gravity drainage and removed much later in the case. The patient was repositioned into a low lithotomy with a gel pad placed beneath the sacrum, the left upper extremity extended on an armboard, the right upper extremity tucked, and an orogastric tube inserted. 2 g of cefotetan were administered parenterally. The patient had undergone mechanical and antibiotic bowel preparation on the day before surgery. The abdomen and perineum were prepped and draped in the usual sterile fashion. The abdomen had also been marked in the preoperative holding area for the site for a temporary ileostomy. 0.5% bupivacaine with epinephrine was infiltrated subcutaneously prior to making skin incisions. The abdomen was entered through a periumbilical midline incision that was extended through the subcutaneous adipose tissues and the linea alba as well as through a small umbilical hernia. The GelPort was inserted, and it would function as both wound protector and wound retractor. A 12-mm port was inserted through the GelPort and used for insufflation. The 5-mm 30-degree laparoscope was then introduced and the abdomen was explored visually. Next, under direct vision, a 5-mm trocar was inserted in the suprapubic midline. The laparoscope was moved to that site. The nondominant hand was inserted through the GelPort and a 12-mm trocar was inserted through a right lower quadrant incision. The abdomen was once again explored, this time by visualization and palpation. The articulating Enseal was introduced through the right lower quadrant port. It was used for most of the intracorporeal dissection. Adhesions in the pelvis and to the abdominal wall were gradually divided using the Enseal.  Once enough of this had been done, the mesentery at the sacral promontory was accessible. Here, an opening was created in the mesentery.   Through this opening, dissection was performed superiorly and inferiorly. The inferior mesenteric artery was isolated, partially skeletonized, and then divided and ligated using a firing of the Signia stapling device with a 45-mm tan cartridge. The rectum was mobilized circumferentially using the Enseal with great care taken to identify and avoid both ureters. Neither ureter was visible, so the ureteral catheters were quite valuable in this regard. The circumferential mobilization of the rectum continued until a point was reached that was sufficiently distal to the palpable disease in the mid rectum. The mesentery here was divided using the Enseal and then the rectum itself was divided in its distal third using two firings of the Signia stapling device with a 45-mm purple cartridge. With the rectum divided, full mobilization of the left colon including takedown of the splenic flexure was performed. This again was performed using the Enseal and in this case, great care was taken to avoid injuring the small intestine and spleen. The omentum was partially  from the distal transverse colon and proximal descending colon. Once sufficient mobilization had been performed, the left colon and the rectum were extracorporealized through the [facility]. Based on the blood supply and lymphatic drainage, a point was chosen for division of the colon. This was in the region of the descending-sigmoid junction. Here, the mesentery was cleared. The intervening mesentery between the divided SHANNON and the chosen location was gradually divided using a combination of electrocautery, the Enseal, and sharp division between clamps. The sharply divided structures were ligated with 2-0 silk ties and 2-0 silk suture ligatures. The bowel was observed for viability, which appeared to be normal.  The electrocautery was then used to open the colon on the specimen side.   The retained contents were evacuated using suction, then the EEA sizers were brought onto the field. The #25 and then the #28 could each be inserted easily into the colonic lumen. The #28 EEA stapling device was therefore selected. The anvil was dipped in Betadine and inserted into the colonic lumen and advanced retrograde. The Signia stapling device with a 60-mm purple cartridge was then used to divide the bowel in the prepared area. This completed the resection of the specimen, which was then taken to the back table and opened revealing the residual tumor and an amplel distal margin. Returning to the abdomen after changing gown and gloves, the divided end of the colon was assessed. The anvil shaft was brought out through a colotomy created using the electrocautery and then the anvil was secured with a 2-0 Prolene pursestring suture. In order to increase the length for creation of the coloproctostomy, the inferior mesenteric vein was divided using sharp technique between clamps. It was then ligated with 2-0 silk ties and 2-0 silk suture ligatures. The bowel was returned to the abdominal cavity and pneumoperitoneum was restored. The pelvis was inspected and hemostasis was excellent. Exposure was somewhat difficult due to the low division and the narrow pelvis, but the staple line could be seen in the rectal stump. The assistant went to the perineum and, after gently dilating the anus, he inserted the #25 EEA sizer. The sizer was advanced without difficulty to the stapled end of the rectum. That sizer was removed and the #28 was inserted using the same technique. Next, the #28 EEA stapling device was lubricated and inserted transanally and advanced to the rectal staple line. The spike was brought out posterior to the staple line. The spike and the anvil shaft were engaged, and with care taken to prevent twisting of the colon or incorporation of any extraneous tissues, the stapler was slowly closed, then fired, opened, and removed.   Inspection of the anastomotic doughnuts revealed both to be intact. Leak testing was performed using the rigid proctosigmoidoscope introduced transanally. The colon proximal to the anastomosis was gently occluded and the proctosigmoidoscope was used to inflate the bowel. With inflation, no bubbling of air was seen and air did distend the bowel and also escaped via the anus around the scope. It should be noted, however, that the anastomosis itself was mostly no longer visible deep in the pelvis. Leak testing was performed twice, and both times there was no bubbling seen. The anastomosis was therefore judged to be airtight. It also was under no undue tension and, based on the appearance of the bowel prior to creation of the anastomosis, it was judged to have a sufficient blood supply. The pelvis was irrigated and the irrigant was mostly evacuated. The abdomen was re-explored, and hemostasis appeared to be good. There was no evidence of small bowel or other organ injury. A flat 10-mm Gus-Tran drain was placed in the pelvis and brought out through a left lower quadrant stab incision and secured to the skin with a 3-0 nylon suture. The decision had been made intraoperatively to create a loop ileostomy for temporary fecal diversion. The marked ostomy site was re-identified, and here a circular skin incision was made. The incision was extended through the subcutaneous adipose tissue and then the anterior rectus sheath was opened. The rectus muscle fibers were  bluntly using retractors, and the posterior sheath was incised with the electrocautery. The abdominal opening was made large enough to accommodate two fingers. A portion of the ileum a short distance proximal to the ileocecal valve was brought through the abdominal wall using an 18-Thai red rubber catheter inserted through a window created in the mesentery to apply gentle traction.   The bowel was oriented with the afferent portion located superiorly and the efferent portion located inferiorly. The GelPort was removed as were the trocars, then six small sheets of Seprafilm were placed on the viscera, some directly on the bowel and some on the omentum, which was draped over the bowel. None were placed directly on the anastomosis. The GelPort was removed. Per protocol, gowns and gloves were changed, and the dedicated closing instrument set was opened. The periumbilical midline fascial closure was performed using two running #1 PDS sutures. A  0-Vicryl suture was used to close the fascia at the right lower quadrant port site. The subcutaneous portions of the three wounds were irrigated with saline. The subcutaneous adipose tissue in the periumbilical midline wound was reapproximated using a 2-0 Vicryl suture. Skin closure at the three wounds was performed using subcuticular 4-0 Monocryl sutures and Exofin. Once the Exofin was  dry, the wounds were covered. The loop ileostomy was then matured using multiple 3-0 Vicryl sutures to cristela the bowel, which had been opened transversely, and create the mucocutaneous junction. The red rubber catheter, which would function as the ostomy negro, was secured using 3-0 nylon sutures. An ostomy appliance was cut to the appropriate size and shape and put in place. The ureteral catheters were removed as was the orogastric tube. The Rogers catheter was left in place. There were no apparent complications, and the patient appeared to have tolerated the procedure well. At its conclusion, he was extubated and transported in stable condition to the recovery room. All sponge, needle, and instrument counts were correct.           Irma Barron MD      [id number]  D:  09/25/2019 21:41  T:  09/26/2019 1:37  JOB #:  [id number]  CC:  MD Faith Stahl MD Marlan Glasser, MD Berlinda Hockey, MD Michae Oak, MD

## 2019-09-27 LAB
ANION GAP SERPL CALC-SCNC: 9 MMOL/L (ref 5–15)
BASOPHILS # BLD: 0 K/UL (ref 0–0.1)
BASOPHILS NFR BLD: 0 % (ref 0–1)
BUN SERPL-MCNC: 14 MG/DL (ref 6–20)
BUN/CREAT SERPL: 12 (ref 12–20)
CALCIUM SERPL-MCNC: 9.1 MG/DL (ref 8.5–10.1)
CHLORIDE SERPL-SCNC: 103 MMOL/L (ref 97–108)
CO2 SERPL-SCNC: 25 MMOL/L (ref 21–32)
CREAT SERPL-MCNC: 1.19 MG/DL (ref 0.7–1.3)
DIFFERENTIAL METHOD BLD: ABNORMAL
EOSINOPHIL # BLD: 0.1 K/UL (ref 0–0.4)
EOSINOPHIL NFR BLD: 1 % (ref 0–7)
ERYTHROCYTE [DISTWIDTH] IN BLOOD BY AUTOMATED COUNT: 11.9 % (ref 11.5–14.5)
GLUCOSE SERPL-MCNC: 109 MG/DL (ref 65–100)
HCT VFR BLD AUTO: 41.5 % (ref 36.6–50.3)
HGB BLD-MCNC: 13.9 G/DL (ref 12.1–17)
IMM GRANULOCYTES # BLD AUTO: 0.1 K/UL (ref 0–0.04)
IMM GRANULOCYTES NFR BLD AUTO: 1 % (ref 0–0.5)
LYMPHOCYTES # BLD: 0.2 K/UL (ref 0.8–3.5)
LYMPHOCYTES NFR BLD: 3 % (ref 12–49)
MCH RBC QN AUTO: 33.7 PG (ref 26–34)
MCHC RBC AUTO-ENTMCNC: 33.5 G/DL (ref 30–36.5)
MCV RBC AUTO: 100.5 FL (ref 80–99)
MONOCYTES # BLD: 0.4 K/UL (ref 0–1)
MONOCYTES NFR BLD: 6 % (ref 5–13)
NEUTS SEG # BLD: 5.6 K/UL (ref 1.8–8)
NEUTS SEG NFR BLD: 89 % (ref 32–75)
NRBC # BLD: 0 K/UL (ref 0–0.01)
NRBC BLD-RTO: 0 PER 100 WBC
PHOSPHATE SERPL-MCNC: 3.5 MG/DL (ref 2.6–4.7)
PLATELET # BLD AUTO: 139 K/UL (ref 150–400)
PMV BLD AUTO: 10.6 FL (ref 8.9–12.9)
POTASSIUM SERPL-SCNC: 3.9 MMOL/L (ref 3.5–5.1)
RBC # BLD AUTO: 4.13 M/UL (ref 4.1–5.7)
RBC MORPH BLD: ABNORMAL
SODIUM SERPL-SCNC: 137 MMOL/L (ref 136–145)
WBC # BLD AUTO: 6.4 K/UL (ref 4.1–11.1)

## 2019-09-27 PROCEDURE — 74011250636 HC RX REV CODE- 250/636: Performed by: COLON & RECTAL SURGERY

## 2019-09-27 PROCEDURE — 36415 COLL VENOUS BLD VENIPUNCTURE: CPT

## 2019-09-27 PROCEDURE — 74011250637 HC RX REV CODE- 250/637: Performed by: COLON & RECTAL SURGERY

## 2019-09-27 PROCEDURE — 65270000029 HC RM PRIVATE

## 2019-09-27 PROCEDURE — 84100 ASSAY OF PHOSPHORUS: CPT

## 2019-09-27 PROCEDURE — 85025 COMPLETE CBC W/AUTO DIFF WBC: CPT

## 2019-09-27 PROCEDURE — 80048 BASIC METABOLIC PNL TOTAL CA: CPT

## 2019-09-27 RX ORDER — HYDRALAZINE HYDROCHLORIDE 25 MG/1
50 TABLET, FILM COATED ORAL 3 TIMES DAILY
Status: DISCONTINUED | OUTPATIENT
Start: 2019-09-27 | End: 2019-10-02 | Stop reason: HOSPADM

## 2019-09-27 RX ORDER — CELECOXIB 100 MG/1
100 CAPSULE ORAL 2 TIMES DAILY
Status: DISCONTINUED | OUTPATIENT
Start: 2019-09-27 | End: 2019-10-02 | Stop reason: HOSPADM

## 2019-09-27 RX ORDER — FAMOTIDINE 20 MG/1
20 TABLET, FILM COATED ORAL
Status: DISCONTINUED | OUTPATIENT
Start: 2019-09-27 | End: 2019-10-02 | Stop reason: HOSPADM

## 2019-09-27 RX ADMIN — ENOXAPARIN SODIUM 40 MG: 40 INJECTION SUBCUTANEOUS at 09:27

## 2019-09-27 RX ADMIN — CLONIDINE HYDROCHLORIDE 0.1 MG: 0.1 TABLET ORAL at 06:06

## 2019-09-27 RX ADMIN — HYDRALAZINE HYDROCHLORIDE 50 MG: 25 TABLET, FILM COATED ORAL at 16:01

## 2019-09-27 RX ADMIN — HYDRALAZINE HYDROCHLORIDE 50 MG: 25 TABLET, FILM COATED ORAL at 22:03

## 2019-09-27 RX ADMIN — ACETAMINOPHEN 1000 MG: 500 TABLET ORAL at 16:01

## 2019-09-27 RX ADMIN — ALVIMOPAN 12 MG: 12 CAPSULE ORAL at 09:26

## 2019-09-27 RX ADMIN — ALVIMOPAN 12 MG: 12 CAPSULE ORAL at 18:23

## 2019-09-27 RX ADMIN — ACETAMINOPHEN 1000 MG: 500 TABLET ORAL at 09:26

## 2019-09-27 RX ADMIN — HYDRALAZINE HYDROCHLORIDE 50 MG: 25 TABLET, FILM COATED ORAL at 09:27

## 2019-09-27 RX ADMIN — CELECOXIB 100 MG: 100 CAPSULE ORAL at 09:26

## 2019-09-27 RX ADMIN — ACETAMINOPHEN 1000 MG: 500 TABLET ORAL at 22:03

## 2019-09-27 RX ADMIN — SODIUM CHLORIDE 500 ML: 900 INJECTION, SOLUTION INTRAVENOUS at 19:37

## 2019-09-27 RX ADMIN — FAMOTIDINE 20 MG: 20 TABLET ORAL at 22:11

## 2019-09-27 RX ADMIN — SODIUM CHLORIDE, SODIUM LACTATE, POTASSIUM CHLORIDE, AND CALCIUM CHLORIDE 75 ML/HR: 600; 310; 30; 20 INJECTION, SOLUTION INTRAVENOUS at 11:22

## 2019-09-27 RX ADMIN — CLONIDINE HYDROCHLORIDE 0.1 MG: 0.1 TABLET ORAL at 13:22

## 2019-09-27 RX ADMIN — TAMSULOSIN HYDROCHLORIDE 0.4 MG: 0.4 CAPSULE ORAL at 09:26

## 2019-09-27 RX ADMIN — ONDANSETRON 4 MG: 4 TABLET, ORALLY DISINTEGRATING ORAL at 11:22

## 2019-09-27 RX ADMIN — ACETAMINOPHEN 1000 MG: 500 TABLET ORAL at 05:59

## 2019-09-27 RX ADMIN — CLONIDINE HYDROCHLORIDE 0.1 MG: 0.1 TABLET ORAL at 11:21

## 2019-09-27 RX ADMIN — KETOROLAC TROMETHAMINE 15 MG: 30 INJECTION, SOLUTION INTRAMUSCULAR at 01:39

## 2019-09-27 RX ADMIN — PANTOPRAZOLE SODIUM 40 MG: 40 TABLET, DELAYED RELEASE ORAL at 09:27

## 2019-09-27 RX ADMIN — CELECOXIB 100 MG: 100 CAPSULE ORAL at 18:23

## 2019-09-27 RX ADMIN — OXYCODONE HYDROCHLORIDE 5 MG: 5 TABLET ORAL at 11:22

## 2019-09-27 NOTE — PROGRESS NOTES
[address] [id number]     1970  47 y.o.  male    Patient Telephone Number: [phone] (home)   [employer] Affiliation: Barbara Benitez   Language: English   Patient Active Problem List    Diagnosis Date Noted    Thrombocytopenia ([facility] [address] 09/24/2019    Gastrointestinal hemorrhage 05/10/2019    Rectal cancer ([facility] [address] 05/10/2019    Psoriatic arthritis ([facility] 03/27/2019    Psoriasis 03/27/2019    ACL tear 02/20/2013        Date: 9/27/2019            Total Time (in minutes): 40          [facility] 5E2 SURGICAL UNIT    Mental Status:   [X] Alert [  ] Jenean Barney [  ]  Confused  [  ] Minimally responsive  [  ] Sleeping    Communication Status: N/A: Please see Session Observations below. [  ] Impaired Speech [  ] Nonverbal     Physical Status:   N/A: Please see Session Observations below.    [  ] Oxygen in use [  ] Hard of Hearing [  ] Vision Impaired  [  ] Ambulatory  [  ] Ambulatory with assistance [  ] Non-ambulatory     Music Preferences, Background: Maurice Haley    Clinical Problem addressed: Pain, Socio-emotional Support    Goal(s) met in session:  Physical/Pain management (Scale of 1-10):    Pre-session rating ___________    Post-session rating __________   [X] Increased relaxation   [X] Affected breathing patterns  [  ] Decreased muscle tension   [  ] Decreased agitation  [  ] Affected heart rate    [  ] Increased alertness     Emotional/Psychological:  [  ] Increased self-expression   [  ] Decreased aggressive behavior   [  ] Decreased feelings of stress  [  ] Discussed healthy coping skills     [  ] Improved mood    [  ] Decreased withdrawn behavior     Social:  [  ] Decreased feelings of isolation/loneliness [  ] Positive social interaction   [X] Provided support and/or comfort for family/friends    Spiritual:  [X] Spiritual support    [  ] Expressed peace  [  ] Expressed kurt    [  ] Discussed beliefs    Techniques Utilized (Check all that apply):   [  ] Procedural support MT [X] Music for relaxation [  ] Patient preferred music  [  ] Ling analysis  [  ] Lindy Borosanna choice  [  ] Music for validation  [  ] Entrainment  [  ] Movement to music [  ] Guided visualization  [  ] Thera Big  [  ] Patient instrument playing [  ] Lindy Hernández writing  [  ] [facility] along   [  ] Alexis Yousif  [  ] Sensory stimulation  [  ] Active Listening  [X] Music for spiritual support [  ] Making of CDs as gifts    Session Observations:  Referred by [facility]. Mr. Thang Blanco was lying in bed with his loved one seated nearby; complained of abdominal pain. His brow was furrowed and he stated he would like to close his eyes while listening to music. Mr. Thang Blanco' loved one appeared tense, as evidenced by (AEB) tightly closed hands; she checked with Mr. Thang Blanco periodically during the music engagement to see if he was ok and wanted to continue. Mr. Thang Blanco appeared to fall asleep for a few moments while listening to guitar/vocal improvisation, and the brow tension decreased until it was no longer apparent. The tension in Mr. Thang Blanco' loved one's hands also decreased. After the session concluded, the Patient and his loved one thanked the MT. Will follow as able.     Kwasi Escalante MT-BC (Music Therapist-Board Certified)  Spiritual Care Services  Referral- based service

## 2019-09-27 NOTE — ROUTINE PROCESS
Bedside shift change report given to TRISTAN Pro (oncoming nurse) by Austen Mckee (offgoing nurse). Report included the following information SBAR, Kardex, Intake/Output and Recent Results.

## 2019-09-27 NOTE — ROUTINE PROCESS
Bedside and Verbal shift change report given to [id number] (oncoming nurse) by Vu Frost RN (offgoing nurse). Report included the following information SBAR, Kardex, Intake/Output, MAR and Recent Results.

## 2019-09-27 NOTE — WOUND CARE
Ostomy Visit: in to check on Mr. Espinoza Runkartik and continue teaching; however, patient not feeling as well this morning; his wife is at bedside. Assessment: 
RLQ ileostomy - declan red, non-tense stoma with liquid bilious dark green/brown drainage in appliance; right lower quadrant and flank with tense/tight edema; slight erythema at right side of midline incision, no as tense on this side but remains with edema. Recommendations: 
Patient denies vomiting but reports some nausea when he tries to drink fluids. Suggested to him that he may have prn Zofran and this may help; it is ordered and handed off to his nurses that this may help with nausea. Noted that I&O of ileostomy output is not complete; keeping accurate output from ostomy would be helpful; also handed off to patient's nurse who will follow up. We changed appliance yesterday using one piece and gave stoma room for edema - remains intact. Plan: Will check back on him this afternoon. Addendum: 
Back in this afternoon to check on patient; urine noted in urinal very dark - tea colored; IV  75/hr; patient taking very little po at this time; took Zofran earlier with little response per his report; remains distended/tight on right side/flank and abdomen. Will continue to work with patient and wife Monday.  
Kayleigh Shelley, RN,cWCN

## 2019-09-27 NOTE — PROGRESS NOTES
General Daily Progress Note    Admission Date: 9/24/2019  Hospital Day 4  Post-Op Day 3  Subjective:   Feels more bloated. No nausea, but prefers to stick with the liquid diet. Has been having some reflux symptoms. Passed a small amount of gas and greenish fluid from the ostomy yesterday. Objective:     Patient Vitals for the past 24 hrs:   BP Temp Pulse Resp SpO2   09/27/19 0904 (!) 148/107 99 °F (37.2 °C) 73 18 96 %   09/27/19 0606 (!) 163/116  72     09/26/19 2241 (!) 149/102 99.1 °F (37.3 °C) 82 16 97 %   09/26/19 1940 (!) 166/102       09/26/19 1803 (!) 166/119       09/26/19 1651 (!) 180/110       09/26/19 1605 (!) 171/113 99 °F (37.2 °C) 82 16 98 %     No intake/output data recorded. 09/25 1901 - 09/27 0700  In: -   Out: 4829 [Urine:3535; Drains:230]       Physical Examination:  General Appearance - Somewhat uncomfortable. Chest - Lungs clear to auscultation. Heart - Normal rate and regular rhythm. Abdomen -  Distended.  Incisions clean, dry, intact, and without erythema. Ileostomy edematous and a bit dusky, but viable. Small amount of bilious fluid in the appliance.  MIGUEL ANGEL drainage serosanguinous. Extremities - Pneumatic compression stockings in use. Data Review   Recent Results (from the past 24 hour(s))   CBC WITH AUTOMATED DIFF    Collection Time: 09/27/19  4:28 AM   Result Value Ref Range    WBC 6.4 4.1 - 11.1 K/uL    RBC 4.13 4.10 - 5.70 M/uL    HGB 13.9 12.1 - 17.0 g/dL    HCT 41.5 36.6 - 50.3 %    .5 (H) 80.0 - 99.0 FL    MCH 33.7 26.0 - 34.0 PG    MCHC 33.5 30.0 - 36.5 g/dL    RDW 11.9 11.5 - 14.5 %    PLATELET 048 (L) 163 - 400 K/uL    MPV 10.6 8.9 - 12.9 FL    NRBC 0.0 0  WBC    ABSOLUTE NRBC 0.00 0.00 - 0.01 K/uL    NEUTROPHILS 89 (H) 32 - 75 %    LYMPHOCYTES 3 (L) 12 - 49 %    MONOCYTES 6 5 - 13 %    EOSINOPHILS 1 0 - 7 %    BASOPHILS 0 0 - 1 %    IMMATURE GRANULOCYTES 1 (H) 0.0 - 0.5 %    ABS. NEUTROPHILS 5.6 1.8 - 8.0 K/UL    ABS.  LYMPHOCYTES 0.2 (L) 0.8 - 3.5 K/UL    ABS. MONOCYTES 0.4 0.0 - 1.0 K/UL    ABS. EOSINOPHILS 0.1 0.0 - 0.4 K/UL    ABS. BASOPHILS 0.0 0.0 - 0.1 K/UL    ABS. IMM. GRANS. 0.1 (H) 0.00 - 0.04 K/UL    DF SMEAR SCANNED      RBC COMMENTS NORMOCYTIC, NORMOCHROMIC     METABOLIC PANEL, BASIC    Collection Time: 09/27/19  4:28 AM   Result Value Ref Range    Sodium 137 136 - 145 mmol/L    Potassium 3.9 3.5 - 5.1 mmol/L    Chloride 103 97 - 108 mmol/L    CO2 25 21 - 32 mmol/L    Anion gap 9 5 - 15 mmol/L    Glucose 109 (H) 65 - 100 mg/dL    BUN 14 6 - 20 MG/DL    Creatinine 1.19 0.70 - 1.30 MG/DL    BUN/Creatinine ratio 12 12 - 20      GFR est AA >60 >60 ml/min/1.73m2    GFR est non-AA >60 >60 ml/min/1.73m2    Calcium 9.1 8.5 - 10.1 MG/DL   PHOSPHORUS    Collection Time: 09/27/19  4:28 AM   Result Value Ref Range    Phosphorus 3.5 2.6 - 4.7 MG/DL           Assessment:     Principal Problem:    Rectal cancer ([facility] (5/10/2019)    Active Problems: Thrombocytopenia ([facility] (9/24/2019)        3 Days Post-Op s/p hand-assisted laparoscopic low anterior resection, mobilization of the splenic flexure, coloproctostomy, and creation of loop ileostomy.     Hemodynamically stable, but more hypertensive. Hemoglobin stable. Thrombocytopenic.     Borderline urine output. Creatinine decreased compared to yesterday.     Awaiting return of GI function. Plan:   Continue low fiber diet with caution. Begin scheduled hydralazine and continue as needed clonidine for management of hypertension.     Begin scheduled Celebrex. Resume Lovenox. Monitor for bleeding.     Strict I&O.     Ostomy education.     Ambulation. Physical therapy. Incentive spirometer.

## 2019-09-27 NOTE — PROGRESS NOTES
9/27/19 -   LEN:  - Patient has had some pain and bloating  - Patient's BP was elevated 9/26  - Patient continues with ostomy education via wound care team  - Patient is ambulating and tolerating diet  - [facility] to follow patient upon discharge; information is on AVS  CRM: Sammie Olivares, MPH, [address] [place] [address]; Z: [phone]

## 2019-09-28 LAB
ANION GAP SERPL CALC-SCNC: 9 MMOL/L (ref 5–15)
BASOPHILS # BLD: 0 K/UL (ref 0–0.1)
BASOPHILS NFR BLD: 0 % (ref 0–1)
BUN SERPL-MCNC: 20 MG/DL (ref 6–20)
BUN/CREAT SERPL: 17 (ref 12–20)
CALCIUM SERPL-MCNC: 8.6 MG/DL (ref 8.5–10.1)
CHLORIDE SERPL-SCNC: 104 MMOL/L (ref 97–108)
CO2 SERPL-SCNC: 24 MMOL/L (ref 21–32)
CREAT SERPL-MCNC: 1.18 MG/DL (ref 0.7–1.3)
DIFFERENTIAL METHOD BLD: ABNORMAL
EOSINOPHIL # BLD: 0.2 K/UL (ref 0–0.4)
EOSINOPHIL NFR BLD: 4 % (ref 0–7)
ERYTHROCYTE [DISTWIDTH] IN BLOOD BY AUTOMATED COUNT: 11.5 % (ref 11.5–14.5)
GLUCOSE SERPL-MCNC: 102 MG/DL (ref 65–100)
HCT VFR BLD AUTO: 41.1 % (ref 36.6–50.3)
HGB BLD-MCNC: 13.8 G/DL (ref 12.1–17)
IMM GRANULOCYTES # BLD AUTO: 0.1 K/UL (ref 0–0.04)
IMM GRANULOCYTES NFR BLD AUTO: 1 % (ref 0–0.5)
LYMPHOCYTES # BLD: 0.3 K/UL (ref 0.8–3.5)
LYMPHOCYTES NFR BLD: 5 % (ref 12–49)
MAGNESIUM SERPL-MCNC: 2.2 MG/DL (ref 1.6–2.4)
MCH RBC QN AUTO: 33.7 PG (ref 26–34)
MCHC RBC AUTO-ENTMCNC: 33.6 G/DL (ref 30–36.5)
MCV RBC AUTO: 100.5 FL (ref 80–99)
MONOCYTES # BLD: 0.4 K/UL (ref 0–1)
MONOCYTES NFR BLD: 8 % (ref 5–13)
NEUTS SEG # BLD: 4.4 K/UL (ref 1.8–8)
NEUTS SEG NFR BLD: 82 % (ref 32–75)
NRBC # BLD: 0 K/UL (ref 0–0.01)
NRBC BLD-RTO: 0 PER 100 WBC
PHOSPHATE SERPL-MCNC: 3.7 MG/DL (ref 2.6–4.7)
PLATELET # BLD AUTO: 160 K/UL (ref 150–400)
PMV BLD AUTO: 10.6 FL (ref 8.9–12.9)
POTASSIUM SERPL-SCNC: 4 MMOL/L (ref 3.5–5.1)
RBC # BLD AUTO: 4.09 M/UL (ref 4.1–5.7)
RBC MORPH BLD: ABNORMAL
SODIUM SERPL-SCNC: 137 MMOL/L (ref 136–145)
WBC # BLD AUTO: 5.4 K/UL (ref 4.1–11.1)

## 2019-09-28 PROCEDURE — 84100 ASSAY OF PHOSPHORUS: CPT

## 2019-09-28 PROCEDURE — 80048 BASIC METABOLIC PNL TOTAL CA: CPT

## 2019-09-28 PROCEDURE — 65270000029 HC RM PRIVATE

## 2019-09-28 PROCEDURE — 83735 ASSAY OF MAGNESIUM: CPT

## 2019-09-28 PROCEDURE — 85025 COMPLETE CBC W/AUTO DIFF WBC: CPT

## 2019-09-28 PROCEDURE — 74011250636 HC RX REV CODE- 250/636: Performed by: COLON & RECTAL SURGERY

## 2019-09-28 PROCEDURE — 74011250637 HC RX REV CODE- 250/637: Performed by: COLON & RECTAL SURGERY

## 2019-09-28 PROCEDURE — 36415 COLL VENOUS BLD VENIPUNCTURE: CPT

## 2019-09-28 RX ADMIN — PANTOPRAZOLE SODIUM 40 MG: 40 TABLET, DELAYED RELEASE ORAL at 09:37

## 2019-09-28 RX ADMIN — ALVIMOPAN 12 MG: 12 CAPSULE ORAL at 09:36

## 2019-09-28 RX ADMIN — HYDRALAZINE HYDROCHLORIDE 50 MG: 25 TABLET, FILM COATED ORAL at 16:19

## 2019-09-28 RX ADMIN — ACETAMINOPHEN 1000 MG: 500 TABLET ORAL at 17:45

## 2019-09-28 RX ADMIN — OXYCODONE HYDROCHLORIDE 5 MG: 5 TABLET ORAL at 15:08

## 2019-09-28 RX ADMIN — ACETAMINOPHEN 1000 MG: 500 TABLET ORAL at 12:04

## 2019-09-28 RX ADMIN — ALVIMOPAN 12 MG: 12 CAPSULE ORAL at 17:45

## 2019-09-28 RX ADMIN — OXYCODONE HYDROCHLORIDE 5 MG: 5 TABLET ORAL at 20:26

## 2019-09-28 RX ADMIN — SODIUM CHLORIDE, SODIUM LACTATE, POTASSIUM CHLORIDE, AND CALCIUM CHLORIDE 75 ML/HR: 600; 310; 30; 20 INJECTION, SOLUTION INTRAVENOUS at 00:00

## 2019-09-28 RX ADMIN — OXYCODONE HYDROCHLORIDE 5 MG: 5 TABLET ORAL at 00:36

## 2019-09-28 RX ADMIN — ACETAMINOPHEN 1000 MG: 500 TABLET ORAL at 06:01

## 2019-09-28 RX ADMIN — TAMSULOSIN HYDROCHLORIDE 0.4 MG: 0.4 CAPSULE ORAL at 09:37

## 2019-09-28 RX ADMIN — CELECOXIB 100 MG: 100 CAPSULE ORAL at 09:37

## 2019-09-28 RX ADMIN — ENOXAPARIN SODIUM 40 MG: 40 INJECTION SUBCUTANEOUS at 09:37

## 2019-09-28 RX ADMIN — HYDRALAZINE HYDROCHLORIDE 50 MG: 25 TABLET, FILM COATED ORAL at 21:27

## 2019-09-28 RX ADMIN — SODIUM CHLORIDE, SODIUM LACTATE, POTASSIUM CHLORIDE, AND CALCIUM CHLORIDE 75 ML/HR: 600; 310; 30; 20 INJECTION, SOLUTION INTRAVENOUS at 12:35

## 2019-09-28 RX ADMIN — CELECOXIB 100 MG: 100 CAPSULE ORAL at 17:45

## 2019-09-28 RX ADMIN — HYDRALAZINE HYDROCHLORIDE 50 MG: 25 TABLET, FILM COATED ORAL at 09:37

## 2019-09-28 RX ADMIN — OXYCODONE HYDROCHLORIDE 5 MG: 5 TABLET ORAL at 05:02

## 2019-09-28 NOTE — PROGRESS NOTES
Bedside shift change report given to Navneet Jean (oncoming nurse) by Deepa Patterson RN (offgoing nurse). Report included the following information SBAR and Kardex.

## 2019-09-28 NOTE — PROGRESS NOTES
General Daily Progress Note    Admission Date: 9/24/2019  Hospital Day 5  Post-Op Day 4  Subjective:   Still feeling very full and experiencing nausea when he tries to eat or drink. Although it has been available to him, the patient has been choosing not to eat solid food and his liquid intake has also been low. Pain is reasonably controlled. Perhaps somewhat less bloated. Voiding, but the volumes are small. Objective:     Patient Vitals for the past 24 hrs:   BP Temp Pulse Resp SpO2   09/28/19 0528 130/88 97.8 °F (36.6 °C) 66 16 96 %   09/27/19 2254 122/81 99.1 °F (37.3 °C) 63 16 96 %   09/27/19 2201 129/84  68     09/27/19 1953 115/81 99.2 °F (37.3 °C) 69 16 95 %   09/27/19 1725 120/82       09/27/19 1453 (!) 144/92 98.9 °F (37.2 °C) 77 16 95 %   09/27/19 1231 (!) 153/100       09/27/19 1119 (!) 155/105 99.1 °F (37.3 °C) 73 16 95 %   09/27/19 1041 (!) 159/104  72     09/27/19 0904 (!) 148/107 99 °F (37.2 °C) 73 18 96 %     No intake/output data recorded. 09/26 1901 - 09/28 0700  In: 900 [I.V.:900]  Out: 2800 [Urine:1225; Drains:550]    Physical Examination:  General Appearance - No distress. Chest - Lungs clear to auscultation. Heart - Normal rate and regular rhythm. Abdomen -  Somewhat less distended.  Incisions clean, dry, intact, and without erythema. Ileostomy edematous and a bit dusky, but viable. Large volume of bilious fluid in the appliance.  MIGUEL ANGEL drainage serosanguinous. Extremities - Pneumatic compression stockings in use.             Data Review   Recent Results (from the past 24 hour(s))   CBC WITH AUTOMATED DIFF    Collection Time: 09/28/19  4:00 AM   Result Value Ref Range    WBC 5.4 4.1 - 11.1 K/uL    RBC 4.09 (L) 4.10 - 5.70 M/uL    HGB 13.8 12.1 - 17.0 g/dL    HCT 41.1 36.6 - 50.3 %    .5 (H) 80.0 - 99.0 FL    MCH 33.7 26.0 - 34.0 PG    MCHC 33.6 30.0 - 36.5 g/dL    RDW 11.5 11.5 - 14.5 %    PLATELET 299 903 - 606 K/uL    MPV 10.6 8.9 - 12.9 FL    NRBC 0.0 0 PER 100 WBC    ABSOLUTE NRBC 0.00 0.00 - 0.01 K/uL    NEUTROPHILS 82 (H) 32 - 75 %    LYMPHOCYTES 5 (L) 12 - 49 %    MONOCYTES 8 5 - 13 %    EOSINOPHILS 4 0 - 7 %    BASOPHILS 0 0 - 1 %    IMMATURE GRANULOCYTES 1 (H) 0.0 - 0.5 %    ABS. NEUTROPHILS 4.4 1.8 - 8.0 K/UL    ABS. LYMPHOCYTES 0.3 (L) 0.8 - 3.5 K/UL    ABS. MONOCYTES 0.4 0.0 - 1.0 K/UL    ABS. EOSINOPHILS 0.2 0.0 - 0.4 K/UL    ABS. BASOPHILS 0.0 0.0 - 0.1 K/UL    ABS. IMM. GRANS. 0.1 (H) 0.00 - 0.04 K/UL    DF SMEAR SCANNED      RBC COMMENTS NORMOCYTIC, NORMOCHROMIC     METABOLIC PANEL, BASIC    Collection Time: 09/28/19  4:00 AM   Result Value Ref Range    Sodium 137 136 - 145 mmol/L    Potassium 4.0 3.5 - 5.1 mmol/L    Chloride 104 97 - 108 mmol/L    CO2 24 21 - 32 mmol/L    Anion gap 9 5 - 15 mmol/L    Glucose 102 (H) 65 - 100 mg/dL    BUN 20 6 - 20 MG/DL    Creatinine 1.18 0.70 - 1.30 MG/DL    BUN/Creatinine ratio 17 12 - 20      GFR est AA >60 >60 ml/min/1.73m2    GFR est non-AA >60 >60 ml/min/1.73m2    Calcium 8.6 8.5 - 10.1 MG/DL   MAGNESIUM    Collection Time: 09/28/19  4:00 AM   Result Value Ref Range    Magnesium 2.2 1.6 - 2.4 mg/dL   PHOSPHORUS    Collection Time: 09/28/19  4:00 AM   Result Value Ref Range    Phosphorus 3.7 2.6 - 4.7 MG/DL           Assessment:     Principal Problem:    Rectal cancer ([facility] (5/10/2019)    Active Problems: Thrombocytopenia ([facility] (9/24/2019)        4 Days Post-Op s/p hand-assisted laparoscopic low anterior resection, mobilization of the splenic flexure, coloproctostomy, and creation of loop ileostomy.     Hemodynamically stable. Hypertension better controlled now on hydralazine. Hemoglobin stable. Thrombocytopenia resolved.     Urine output has been adequate, but the urine still appears to be concentrated. Creatinine is stable and within normal limits.     GI function is returning. Ileostomy output has increased significantly and will need to be monitored.     Pathology results were discussed yesterday evening. Plan:   Continue low fiber diet with caution.     Continue scheduled hydralazine and continue as needed clonidine for management of hypertension.     Strict I&O. Continue IV fluid.     Ostomy education.     Ambulation. Physical therapy. Incentive spirometer.

## 2019-09-29 LAB
ANION GAP SERPL CALC-SCNC: 12 MMOL/L (ref 5–15)
BASOPHILS # BLD: 0 K/UL (ref 0–0.1)
BASOPHILS NFR BLD: 1 % (ref 0–1)
BUN SERPL-MCNC: 19 MG/DL (ref 6–20)
BUN/CREAT SERPL: 20 (ref 12–20)
CALCIUM SERPL-MCNC: 8.3 MG/DL (ref 8.5–10.1)
CHLORIDE SERPL-SCNC: 104 MMOL/L (ref 97–108)
CO2 SERPL-SCNC: 21 MMOL/L (ref 21–32)
CREAT SERPL-MCNC: 0.97 MG/DL (ref 0.7–1.3)
DIFFERENTIAL METHOD BLD: ABNORMAL
EOSINOPHIL # BLD: 0.2 K/UL (ref 0–0.4)
EOSINOPHIL NFR BLD: 4 % (ref 0–7)
ERYTHROCYTE [DISTWIDTH] IN BLOOD BY AUTOMATED COUNT: 11.5 % (ref 11.5–14.5)
GLUCOSE SERPL-MCNC: 90 MG/DL (ref 65–100)
HCT VFR BLD AUTO: 41.5 % (ref 36.6–50.3)
HGB BLD-MCNC: 14 G/DL (ref 12.1–17)
IMM GRANULOCYTES # BLD AUTO: 0.1 K/UL (ref 0–0.04)
IMM GRANULOCYTES NFR BLD AUTO: 1 % (ref 0–0.5)
LYMPHOCYTES # BLD: 0.3 K/UL (ref 0.8–3.5)
LYMPHOCYTES NFR BLD: 6 % (ref 12–49)
MCH RBC QN AUTO: 34 PG (ref 26–34)
MCHC RBC AUTO-ENTMCNC: 33.7 G/DL (ref 30–36.5)
MCV RBC AUTO: 100.7 FL (ref 80–99)
MONOCYTES # BLD: 0.5 K/UL (ref 0–1)
MONOCYTES NFR BLD: 10 % (ref 5–13)
NEUTS SEG # BLD: 3.4 K/UL (ref 1.8–8)
NEUTS SEG NFR BLD: 79 % (ref 32–75)
NRBC # BLD: 0 K/UL (ref 0–0.01)
NRBC BLD-RTO: 0 PER 100 WBC
PLATELET # BLD AUTO: 171 K/UL (ref 150–400)
PMV BLD AUTO: 10.2 FL (ref 8.9–12.9)
POTASSIUM SERPL-SCNC: 4 MMOL/L (ref 3.5–5.1)
RBC # BLD AUTO: 4.12 M/UL (ref 4.1–5.7)
SODIUM SERPL-SCNC: 137 MMOL/L (ref 136–145)
WBC # BLD AUTO: 4.4 K/UL (ref 4.1–11.1)

## 2019-09-29 PROCEDURE — 80048 BASIC METABOLIC PNL TOTAL CA: CPT

## 2019-09-29 PROCEDURE — 36415 COLL VENOUS BLD VENIPUNCTURE: CPT

## 2019-09-29 PROCEDURE — 74011250636 HC RX REV CODE- 250/636: Performed by: COLON & RECTAL SURGERY

## 2019-09-29 PROCEDURE — 85025 COMPLETE CBC W/AUTO DIFF WBC: CPT

## 2019-09-29 PROCEDURE — 65270000029 HC RM PRIVATE

## 2019-09-29 PROCEDURE — 74011250637 HC RX REV CODE- 250/637: Performed by: COLON & RECTAL SURGERY

## 2019-09-29 RX ORDER — ONDANSETRON 2 MG/ML
4 INJECTION INTRAMUSCULAR; INTRAVENOUS
Status: COMPLETED | OUTPATIENT
Start: 2019-09-29 | End: 2019-09-29

## 2019-09-29 RX ORDER — ONDANSETRON 2 MG/ML
4 INJECTION INTRAMUSCULAR; INTRAVENOUS
Status: DISCONTINUED | OUTPATIENT
Start: 2019-09-29 | End: 2019-10-02 | Stop reason: HOSPADM

## 2019-09-29 RX ADMIN — ALVIMOPAN 12 MG: 12 CAPSULE ORAL at 18:27

## 2019-09-29 RX ADMIN — FAMOTIDINE 20 MG: 20 TABLET ORAL at 04:02

## 2019-09-29 RX ADMIN — HYDRALAZINE HYDROCHLORIDE 50 MG: 25 TABLET, FILM COATED ORAL at 21:08

## 2019-09-29 RX ADMIN — ONDANSETRON 4 MG: 2 INJECTION INTRAMUSCULAR; INTRAVENOUS at 08:37

## 2019-09-29 RX ADMIN — OXYCODONE HYDROCHLORIDE 5 MG: 5 TABLET ORAL at 13:08

## 2019-09-29 RX ADMIN — ONDANSETRON 4 MG: 2 INJECTION INTRAMUSCULAR; INTRAVENOUS at 11:51

## 2019-09-29 RX ADMIN — PANTOPRAZOLE SODIUM 40 MG: 40 TABLET, DELAYED RELEASE ORAL at 08:39

## 2019-09-29 RX ADMIN — ONDANSETRON 4 MG: 2 INJECTION INTRAMUSCULAR; INTRAVENOUS at 16:42

## 2019-09-29 RX ADMIN — CELECOXIB 100 MG: 100 CAPSULE ORAL at 18:27

## 2019-09-29 RX ADMIN — TAMSULOSIN HYDROCHLORIDE 0.4 MG: 0.4 CAPSULE ORAL at 08:39

## 2019-09-29 RX ADMIN — CELECOXIB 100 MG: 100 CAPSULE ORAL at 08:39

## 2019-09-29 RX ADMIN — HYDRALAZINE HYDROCHLORIDE 50 MG: 25 TABLET, FILM COATED ORAL at 08:39

## 2019-09-29 RX ADMIN — SODIUM CHLORIDE, SODIUM LACTATE, POTASSIUM CHLORIDE, AND CALCIUM CHLORIDE 75 ML/HR: 600; 310; 30; 20 INJECTION, SOLUTION INTRAVENOUS at 13:09

## 2019-09-29 RX ADMIN — ACETAMINOPHEN 1000 MG: 500 TABLET ORAL at 11:51

## 2019-09-29 RX ADMIN — SODIUM CHLORIDE, SODIUM LACTATE, POTASSIUM CHLORIDE, AND CALCIUM CHLORIDE 75 ML/HR: 600; 310; 30; 20 INJECTION, SOLUTION INTRAVENOUS at 00:53

## 2019-09-29 RX ADMIN — HYDRALAZINE HYDROCHLORIDE 50 MG: 25 TABLET, FILM COATED ORAL at 16:41

## 2019-09-29 RX ADMIN — ALVIMOPAN 12 MG: 12 CAPSULE ORAL at 08:39

## 2019-09-29 RX ADMIN — OXYCODONE HYDROCHLORIDE 5 MG: 5 TABLET ORAL at 04:00

## 2019-09-29 RX ADMIN — ACETAMINOPHEN 1000 MG: 500 TABLET ORAL at 06:08

## 2019-09-29 RX ADMIN — ACETAMINOPHEN 1000 MG: 500 TABLET ORAL at 18:27

## 2019-09-29 RX ADMIN — ACETAMINOPHEN 1000 MG: 500 TABLET ORAL at 23:57

## 2019-09-29 RX ADMIN — ENOXAPARIN SODIUM 40 MG: 40 INJECTION SUBCUTANEOUS at 10:46

## 2019-09-29 RX ADMIN — ACETAMINOPHEN 1000 MG: 500 TABLET ORAL at 00:47

## 2019-09-29 NOTE — PROGRESS NOTES
Bedside shift change report given to Edu Tate (oncoming nurse) by Jennifre Mulligan (offgoing nurse). Report included the following information SBAR.

## 2019-09-29 NOTE — PROGRESS NOTES
Bedside shift change report given to Marisol (oncoming nurse) by Juanis Plummer (offgoing nurse). Report included the following information SBAR.

## 2019-09-29 NOTE — PROGRESS NOTES
General Daily Progress Note    Admission Date: 9/24/2019  Hospital Day 6  Post-Op Day 5  Subjective:   Had a rough night secondary to pain. Still cannot eat or drink much secondary to nausea that occurs when he attempts to. Still voiding small volumes. Objective:     Patient Vitals for the past 24 hrs:   BP Temp Pulse Resp SpO2 Weight   09/29/19 0432 (!) 153/92 98.9 °F (37.2 °C) 80 20 96 % 70.8 kg (156 lb)   09/29/19 0007 139/78 99.4 °F (37.4 °C) 78 18 96 %    09/28/19 2114 (!) 145/92 99.4 °F (37.4 °C) 80 16 97 %    09/28/19 1613 146/81 98.7 °F (37.1 °C) 73 16 96 %    09/28/19 0903 123/77 98.3 °F (36.8 °C) 67 16 95 %      No intake/output data recorded. 09/27 1901 - 09/29 0700  In: 900 [I.V.:900]  Out: 3545 [Urine:1600; Drains:480]    Physical Examination:  General Appearance - No distress. Abdomen -  Distension about the same as yesterday. Incisions clean, dry, intact, and without erythema. Ileostomy edematous and a bit dusky, but viable.  Moderate volume of bilious fluid in the appliance.  MIGUEL ANGEL drainage serosanguinous. Extremities - Pneumatic compression stockings in use.             Data Review   Recent Results (from the past 24 hour(s))   METABOLIC PANEL, BASIC    Collection Time: 09/29/19  3:15 AM   Result Value Ref Range    Sodium 137 136 - 145 mmol/L    Potassium 4.0 3.5 - 5.1 mmol/L    Chloride 104 97 - 108 mmol/L    CO2 21 21 - 32 mmol/L    Anion gap 12 5 - 15 mmol/L    Glucose 90 65 - 100 mg/dL    BUN 19 6 - 20 MG/DL    Creatinine 0.97 0.70 - 1.30 MG/DL    BUN/Creatinine ratio 20 12 - 20      GFR est AA >60 >60 ml/min/1.73m2    GFR est non-AA >60 >60 ml/min/1.73m2    Calcium 8.3 (L) 8.5 - 10.1 MG/DL   CBC WITH AUTOMATED DIFF    Collection Time: 09/29/19  3:15 AM   Result Value Ref Range    WBC 4.4 4.1 - 11.1 K/uL    RBC 4.12 4.10 - 5.70 M/uL    HGB 14.0 12.1 - 17.0 g/dL    HCT 41.5 36.6 - 50.3 %    .7 (H) 80.0 - 99.0 FL    MCH 34.0 26.0 - 34.0 PG    MCHC 33.7 30.0 - 36.5 g/dL    RDW 11.5 11.5 - 14.5 %    PLATELET 069 946 - 176 K/uL    MPV 10.2 8.9 - 12.9 FL    NRBC 0.0 0  WBC    ABSOLUTE NRBC 0.00 0.00 - 0.01 K/uL    NEUTROPHILS 79 (H) 32 - 75 %    LYMPHOCYTES 6 (L) 12 - 49 %    MONOCYTES 10 5 - 13 %    EOSINOPHILS 4 0 - 7 %    BASOPHILS 1 0 - 1 %    IMMATURE GRANULOCYTES 1 (H) 0.0 - 0.5 %    ABS. NEUTROPHILS 3.4 1.8 - 8.0 K/UL    ABS. LYMPHOCYTES 0.3 (L) 0.8 - 3.5 K/UL    ABS. MONOCYTES 0.5 0.0 - 1.0 K/UL    ABS. EOSINOPHILS 0.2 0.0 - 0.4 K/UL    ABS. BASOPHILS 0.0 0.0 - 0.1 K/UL    ABS. IMM. GRANS. 0.1 (H) 0.00 - 0.04 K/UL    DF AUTOMATED             Assessment:     Principal Problem:    Rectal cancer ([facility] (5/10/2019)    Active Problems: Thrombocytopenia ([facility] (9/24/2019)        5 Days Post-Op s/p hand-assisted laparoscopic low anterior resection, mobilization of the splenic flexure, coloproctostomy, and creation of loop ileostomy.     Hemodynamically stable. Hypertension better controlled now on hydralazine. Hemoglobin stable.     Urine output has been adequate, and the urine does not appear to be as concentrated as it was yesterday. Creatinine is stable and within normal limits.     GI function is returning. Ileostomy output has been within the desired range but it is still watery. The patient has not been able to tolerate solid food so far, and even his intake of liquids has been low.     Pathology results were discussed on 9/27/2019. Plan:   Suppressive antiemetics. Continue diet with caution.     Continue scheduled hydralazine and as needed clonidine for management of hypertension.     Strict I&O. Continue IV fluid until oral intake is adequate. Remove drain.     Ostomy education.     Ambulation. Physical therapy. Incentive spirometer.

## 2019-09-29 NOTE — PROGRESS NOTES
Bedside shift change report given to [address] (oncoming nurse) by Nissa Lowery RN (offgoing nurse). Report included the following information SBAR and Kardex.

## 2019-09-30 LAB
ALBUMIN SERPL-MCNC: 2.5 G/DL (ref 3.5–5)
ALBUMIN/GLOB SERPL: 0.8 {RATIO} (ref 1.1–2.2)
ALP SERPL-CCNC: 62 U/L (ref 45–117)
ALT SERPL-CCNC: 57 U/L (ref 12–78)
ANION GAP SERPL CALC-SCNC: 9 MMOL/L (ref 5–15)
AST SERPL-CCNC: 59 U/L (ref 15–37)
BASOPHILS # BLD: 0 K/UL (ref 0–0.1)
BASOPHILS NFR BLD: 0 % (ref 0–1)
BILIRUB SERPL-MCNC: 0.3 MG/DL (ref 0.2–1)
BUN SERPL-MCNC: 13 MG/DL (ref 6–20)
BUN/CREAT SERPL: 15 (ref 12–20)
CALCIUM SERPL-MCNC: 8.1 MG/DL (ref 8.5–10.1)
CHLORIDE SERPL-SCNC: 107 MMOL/L (ref 97–108)
CO2 SERPL-SCNC: 21 MMOL/L (ref 21–32)
CREAT SERPL-MCNC: 0.88 MG/DL (ref 0.7–1.3)
DIFFERENTIAL METHOD BLD: ABNORMAL
EOSINOPHIL # BLD: 0.1 K/UL (ref 0–0.4)
EOSINOPHIL NFR BLD: 3 % (ref 0–7)
ERYTHROCYTE [DISTWIDTH] IN BLOOD BY AUTOMATED COUNT: 11.6 % (ref 11.5–14.5)
GLOBULIN SER CALC-MCNC: 3.2 G/DL (ref 2–4)
GLUCOSE SERPL-MCNC: 102 MG/DL (ref 65–100)
HCT VFR BLD AUTO: 38.4 % (ref 36.6–50.3)
HGB BLD-MCNC: 13 G/DL (ref 12.1–17)
IMM GRANULOCYTES # BLD AUTO: 0 K/UL (ref 0–0.04)
IMM GRANULOCYTES NFR BLD AUTO: 1 % (ref 0–0.5)
LYMPHOCYTES # BLD: 0.2 K/UL (ref 0.8–3.5)
LYMPHOCYTES NFR BLD: 5 % (ref 12–49)
MAGNESIUM SERPL-MCNC: 2.2 MG/DL (ref 1.6–2.4)
MCH RBC QN AUTO: 33.5 PG (ref 26–34)
MCHC RBC AUTO-ENTMCNC: 33.9 G/DL (ref 30–36.5)
MCV RBC AUTO: 99 FL (ref 80–99)
MONOCYTES # BLD: 0.6 K/UL (ref 0–1)
MONOCYTES NFR BLD: 13 % (ref 5–13)
NEUTS SEG # BLD: 3.6 K/UL (ref 1.8–8)
NEUTS SEG NFR BLD: 78 % (ref 32–75)
NRBC # BLD: 0 K/UL (ref 0–0.01)
NRBC BLD-RTO: 0 PER 100 WBC
PHOSPHATE SERPL-MCNC: 2.7 MG/DL (ref 2.6–4.7)
PLATELET # BLD AUTO: 186 K/UL (ref 150–400)
PMV BLD AUTO: 10.1 FL (ref 8.9–12.9)
POTASSIUM SERPL-SCNC: 3.8 MMOL/L (ref 3.5–5.1)
PROT SERPL-MCNC: 5.7 G/DL (ref 6.4–8.2)
RBC # BLD AUTO: 3.88 M/UL (ref 4.1–5.7)
RBC MORPH BLD: ABNORMAL
SODIUM SERPL-SCNC: 137 MMOL/L (ref 136–145)
WBC # BLD AUTO: 4.5 K/UL (ref 4.1–11.1)

## 2019-09-30 PROCEDURE — 65270000029 HC RM PRIVATE

## 2019-09-30 PROCEDURE — 36415 COLL VENOUS BLD VENIPUNCTURE: CPT

## 2019-09-30 PROCEDURE — 84100 ASSAY OF PHOSPHORUS: CPT

## 2019-09-30 PROCEDURE — 83735 ASSAY OF MAGNESIUM: CPT

## 2019-09-30 PROCEDURE — 85025 COMPLETE CBC W/AUTO DIFF WBC: CPT

## 2019-09-30 PROCEDURE — 74011250637 HC RX REV CODE- 250/637: Performed by: COLON & RECTAL SURGERY

## 2019-09-30 PROCEDURE — 80053 COMPREHEN METABOLIC PANEL: CPT

## 2019-09-30 PROCEDURE — 74011250636 HC RX REV CODE- 250/636: Performed by: COLON & RECTAL SURGERY

## 2019-09-30 RX ADMIN — HYDRALAZINE HYDROCHLORIDE 50 MG: 25 TABLET, FILM COATED ORAL at 21:54

## 2019-09-30 RX ADMIN — TAMSULOSIN HYDROCHLORIDE 0.4 MG: 0.4 CAPSULE ORAL at 09:01

## 2019-09-30 RX ADMIN — ACETAMINOPHEN 1000 MG: 500 TABLET ORAL at 06:43

## 2019-09-30 RX ADMIN — ALVIMOPAN 12 MG: 12 CAPSULE ORAL at 09:00

## 2019-09-30 RX ADMIN — ONDANSETRON 4 MG: 2 INJECTION INTRAMUSCULAR; INTRAVENOUS at 16:24

## 2019-09-30 RX ADMIN — SODIUM CHLORIDE, SODIUM LACTATE, POTASSIUM CHLORIDE, AND CALCIUM CHLORIDE 75 ML/HR: 600; 310; 30; 20 INJECTION, SOLUTION INTRAVENOUS at 16:02

## 2019-09-30 RX ADMIN — SODIUM CHLORIDE, SODIUM LACTATE, POTASSIUM CHLORIDE, AND CALCIUM CHLORIDE 75 ML/HR: 600; 310; 30; 20 INJECTION, SOLUTION INTRAVENOUS at 02:33

## 2019-09-30 RX ADMIN — CELECOXIB 100 MG: 100 CAPSULE ORAL at 18:30

## 2019-09-30 RX ADMIN — HYDRALAZINE HYDROCHLORIDE 50 MG: 25 TABLET, FILM COATED ORAL at 09:01

## 2019-09-30 RX ADMIN — HYDRALAZINE HYDROCHLORIDE 50 MG: 25 TABLET, FILM COATED ORAL at 16:24

## 2019-09-30 RX ADMIN — ENOXAPARIN SODIUM 40 MG: 40 INJECTION SUBCUTANEOUS at 11:17

## 2019-09-30 RX ADMIN — PANTOPRAZOLE SODIUM 40 MG: 40 TABLET, DELAYED RELEASE ORAL at 09:00

## 2019-09-30 RX ADMIN — FAMOTIDINE 20 MG: 20 TABLET ORAL at 02:30

## 2019-09-30 RX ADMIN — ONDANSETRON 4 MG: 2 INJECTION INTRAMUSCULAR; INTRAVENOUS at 06:43

## 2019-09-30 RX ADMIN — CELECOXIB 100 MG: 100 CAPSULE ORAL at 09:00

## 2019-09-30 RX ADMIN — ACETAMINOPHEN 1000 MG: 500 TABLET ORAL at 11:16

## 2019-09-30 RX ADMIN — ALVIMOPAN 12 MG: 12 CAPSULE ORAL at 18:30

## 2019-09-30 RX ADMIN — ACETAMINOPHEN 1000 MG: 500 TABLET ORAL at 18:30

## 2019-09-30 RX ADMIN — ONDANSETRON 4 MG: 2 INJECTION INTRAMUSCULAR; INTRAVENOUS at 11:18

## 2019-09-30 RX ADMIN — OXYCODONE HYDROCHLORIDE 5 MG: 5 TABLET ORAL at 02:30

## 2019-09-30 NOTE — PROGRESS NOTES
Bedside and Verbal shift change report given to [address] (oncoming nurse) by Sissy Nice RN (offgoing nurse). Report included the following information SBAR and Kardex.

## 2019-09-30 NOTE — WOUND CARE
WOCN Note: Follow-up visit for ostomy teaching and pouch change. Patient sitting up in the chair and emptied pouch ( 200cc liquid stool into canister). Plan to meet with patient and wife at 56 and change pouch. Transition of Care: to meet with wife and patient at 1300. 
 
1400: met with patient and wife to change pouch. Current pouch patient emptied 200cc of liquid dark stool. Patient prepared 2 piece Tate pouching system. Dr. Sameer Grajeda preference is 2 piece to visualize stoma and no need to look through bag. Patient used template to cut opening in wafer. Stoma is red, moist, nicely budded and peristomal skin is clear. Red rubber negro remains in. Cleaned stoma and peristomal skin and applied 2 piece Milroy pouching system. Pouch and clip secure. Will continue with teaching tomorrow. Edwina MA RN Wound Care Department Office: [phone] Pager: [phone]

## 2019-09-30 NOTE — PROGRESS NOTES
General Daily Progress Note    Admission Date: 9/24/2019  Hospital Day 7  Post-Op Day 6  Subjective:   Pain somewhat better. Zofran has helped him to tolerate some solid food, but he feels full right away and still feels bloated and uncomfortable. Objective:     Patient Vitals for the past 24 hrs:   BP Temp Pulse Resp SpO2 Weight   09/30/19 0834 150/86 98.6 °F (37 °C) 70 16 97 %    09/30/19 0630      70.2 kg (154 lb 11.2 oz)   09/29/19 2356 133/85 98.7 °F (37.1 °C) 81 14 97 %    09/29/19 2108 (!) 164/91  79      09/29/19 1942 137/86 98.1 °F (36.7 °C) 79 18 97 %    09/29/19 1552 148/88 99.5 °F (37.5 °C) 78 18       09/30 0701 - 09/30 1900  In: -   Out: 435 [KQHEA:058]  09/28 1901 - 09/30 0700  In: -   Out: 1354 [Urine:2325; Drains:220]      Physical Examination:  General Appearance - No distress. Abdomen -  Distension about the same as yesterday. Incisions clean, dry, intact, and without erythema. Ileostomy edematous and a bit dusky, but viable.  Moderate volume of bilious fluid, now with a small non-liquid component, in the appliance. Extremities - Pneumatic compression stockings in use. Data Review   Recent Results (from the past 24 hour(s))   CBC WITH AUTOMATED DIFF    Collection Time: 09/30/19  4:09 AM   Result Value Ref Range    WBC 4.5 4.1 - 11.1 K/uL    RBC 3.88 (L) 4.10 - 5.70 M/uL    HGB 13.0 12.1 - 17.0 g/dL    HCT 38.4 36.6 - 50.3 %    MCV 99.0 80.0 - 99.0 FL    MCH 33.5 26.0 - 34.0 PG    MCHC 33.9 30.0 - 36.5 g/dL    RDW 11.6 11.5 - 14.5 %    PLATELET 995 287 - 738 K/uL    MPV 10.1 8.9 - 12.9 FL    NRBC 0.0 0  WBC    ABSOLUTE NRBC 0.00 0.00 - 0.01 K/uL    NEUTROPHILS 78 (H) 32 - 75 %    LYMPHOCYTES 5 (L) 12 - 49 %    MONOCYTES 13 5 - 13 %    EOSINOPHILS 3 0 - 7 %    BASOPHILS 0 0 - 1 %    IMMATURE GRANULOCYTES 1 (H) 0.0 - 0.5 %    ABS. NEUTROPHILS 3.6 1.8 - 8.0 K/UL    ABS. LYMPHOCYTES 0.2 (L) 0.8 - 3.5 K/UL    ABS. MONOCYTES 0.6 0.0 - 1.0 K/UL    ABS.  EOSINOPHILS 0.1 0.0 - 0.4 K/UL    ABS. BASOPHILS 0.0 0.0 - 0.1 K/UL    ABS. IMM. GRANS. 0.0 0.00 - 0.04 K/UL    DF SMEAR SCANNED      RBC COMMENTS MACROCYTOSIS  1+       METABOLIC PANEL, COMPREHENSIVE    Collection Time: 09/30/19  4:09 AM   Result Value Ref Range    Sodium 137 136 - 145 mmol/L    Potassium 3.8 3.5 - 5.1 mmol/L    Chloride 107 97 - 108 mmol/L    CO2 21 21 - 32 mmol/L    Anion gap 9 5 - 15 mmol/L    Glucose 102 (H) 65 - 100 mg/dL    BUN 13 6 - 20 MG/DL    Creatinine 0.88 0.70 - 1.30 MG/DL    BUN/Creatinine ratio 15 12 - 20      GFR est AA >60 >60 ml/min/1.73m2    GFR est non-AA >60 >60 ml/min/1.73m2    Calcium 8.1 (L) 8.5 - 10.1 MG/DL    Bilirubin, total 0.3 0.2 - 1.0 MG/DL    ALT (SGPT) 57 12 - 78 U/L    AST (SGOT) 59 (H) 15 - 37 U/L    Alk. phosphatase 62 45 - 117 U/L    Protein, total 5.7 (L) 6.4 - 8.2 g/dL    Albumin 2.5 (L) 3.5 - 5.0 g/dL    Globulin 3.2 2.0 - 4.0 g/dL    A-G Ratio 0.8 (L) 1.1 - 2.2     MAGNESIUM    Collection Time: 09/30/19  4:09 AM   Result Value Ref Range    Magnesium 2.2 1.6 - 2.4 mg/dL   PHOSPHORUS    Collection Time: 09/30/19  4:09 AM   Result Value Ref Range    Phosphorus 2.7 2.6 - 4.7 MG/DL           Assessment:     Principal Problem:    [facility] ([facility] (5/10/2019)    Active Problems: Thrombocytopenia ([facility] (9/24/2019)        6 Days Post-Op s/p hand-assisted laparoscopic low anterior resection, mobilization of the splenic flexure, coloproctostomy, and creation of loop ileostomy.     Hemodynamically stable. Hypertension better controlled now on hydralazine. Hemoglobin stable.     Urine output has been adequate, but the urine is still dark. Creatinine is stable and within normal limits.     GI function is returning.  Ileostomy output continues to be within the desired range but it is still mostly watery. The patient's ability to tolerate solid food seems to be improving.   It appears that TPN is not yet indicated.     Pathology results were discussed on 9/27/2019.          Plan:   Continue suppressive antiemetics. Continue diet as tolerated.     Continue scheduled hydralazine and as needed clonidine for management of hypertension.     Strict I&O. Continue IV fluid until oral intake is adequate.     Ostomy education.     Ambulation. Physical therapy. Incentive spirometer.

## 2019-09-30 NOTE — PROGRESS NOTES
9/30/19 -   LEN:  - Patient had issues with nausea and pain over the weekend  - Drain was taken out 9/29  - Patient continues with low PO intake  - IVF continues  - Patient is under I&O monitoring  - Patient is voiding small amounts and stool is in the new -ostomy  - [facility] to follow patient upon discharge  CRM: Valentina Gama, MPH, [facility]; Z: [phone]

## 2019-10-01 LAB
COMMENT, HOLDF: NORMAL
ERYTHROCYTE [DISTWIDTH] IN BLOOD BY AUTOMATED COUNT: 11.5 % (ref 11.5–14.5)
HCT VFR BLD AUTO: 39.4 % (ref 36.6–50.3)
HGB BLD-MCNC: 13.1 G/DL (ref 12.1–17)
MCH RBC QN AUTO: 32.8 PG (ref 26–34)
MCHC RBC AUTO-ENTMCNC: 33.2 G/DL (ref 30–36.5)
MCV RBC AUTO: 98.7 FL (ref 80–99)
NRBC # BLD: 0 K/UL (ref 0–0.01)
NRBC BLD-RTO: 0 PER 100 WBC
PLATELET # BLD AUTO: 201 K/UL (ref 150–400)
PMV BLD AUTO: 9.9 FL (ref 8.9–12.9)
RBC # BLD AUTO: 3.99 M/UL (ref 4.1–5.7)
SAMPLES BEING HELD,HOLD: NORMAL
WBC # BLD AUTO: 4.6 K/UL (ref 4.1–11.1)

## 2019-10-01 PROCEDURE — 74011250637 HC RX REV CODE- 250/637: Performed by: COLON & RECTAL SURGERY

## 2019-10-01 PROCEDURE — 36415 COLL VENOUS BLD VENIPUNCTURE: CPT

## 2019-10-01 PROCEDURE — 85027 COMPLETE CBC AUTOMATED: CPT

## 2019-10-01 PROCEDURE — 74011250636 HC RX REV CODE- 250/636: Performed by: COLON & RECTAL SURGERY

## 2019-10-01 PROCEDURE — 65270000029 HC RM PRIVATE

## 2019-10-01 RX ORDER — SODIUM CHLORIDE 0.9 % (FLUSH) 0.9 %
SYRINGE (ML) INJECTION
Status: COMPLETED
Start: 2019-10-01 | End: 2019-10-01

## 2019-10-01 RX ADMIN — ENOXAPARIN SODIUM 40 MG: 40 INJECTION SUBCUTANEOUS at 09:14

## 2019-10-01 RX ADMIN — ACETAMINOPHEN 1000 MG: 500 TABLET ORAL at 11:13

## 2019-10-01 RX ADMIN — ACETAMINOPHEN 1000 MG: 500 TABLET ORAL at 23:59

## 2019-10-01 RX ADMIN — TAMSULOSIN HYDROCHLORIDE 0.4 MG: 0.4 CAPSULE ORAL at 09:15

## 2019-10-01 RX ADMIN — ACETAMINOPHEN 1000 MG: 500 TABLET ORAL at 00:51

## 2019-10-01 RX ADMIN — HYDRALAZINE HYDROCHLORIDE 50 MG: 25 TABLET, FILM COATED ORAL at 21:05

## 2019-10-01 RX ADMIN — SODIUM CHLORIDE, SODIUM LACTATE, POTASSIUM CHLORIDE, AND CALCIUM CHLORIDE 75 ML/HR: 600; 310; 30; 20 INJECTION, SOLUTION INTRAVENOUS at 05:20

## 2019-10-01 RX ADMIN — CELECOXIB 100 MG: 100 CAPSULE ORAL at 18:44

## 2019-10-01 RX ADMIN — ONDANSETRON 4 MG: 2 INJECTION INTRAMUSCULAR; INTRAVENOUS at 11:13

## 2019-10-01 RX ADMIN — CELECOXIB 100 MG: 100 CAPSULE ORAL at 09:15

## 2019-10-01 RX ADMIN — PANTOPRAZOLE SODIUM 40 MG: 40 TABLET, DELAYED RELEASE ORAL at 09:15

## 2019-10-01 RX ADMIN — HYDRALAZINE HYDROCHLORIDE 50 MG: 25 TABLET, FILM COATED ORAL at 09:15

## 2019-10-01 RX ADMIN — Medication 10 ML: at 07:28

## 2019-10-01 RX ADMIN — HYDRALAZINE HYDROCHLORIDE 50 MG: 25 TABLET, FILM COATED ORAL at 16:01

## 2019-10-01 RX ADMIN — ALVIMOPAN 12 MG: 12 CAPSULE ORAL at 09:15

## 2019-10-01 RX ADMIN — ACETAMINOPHEN 1000 MG: 500 TABLET ORAL at 18:44

## 2019-10-01 RX ADMIN — ONDANSETRON 4 MG: 2 INJECTION INTRAMUSCULAR; INTRAVENOUS at 07:24

## 2019-10-01 NOTE — PROGRESS NOTES
Spiritual Care Assessment/Progress Note  [facility]      NAME: Tim Lemos      MRN: [id number]  AGE: 47 y.o. SEX: male  Buddhist Affiliation: Islam   Language: English     10/1/2019     Total Time (in minutes): 5     Spiritual Assessment begun in [facility] through conversation with:         []Patient        [] Family    [] Friend(s)        Reason for Consult: Initial/Spiritual assessment, patient floor     Spiritual beliefs: (Please include comment if needed)     [] Identifies with a kurt tradition:         [] Supported by a kurt community:            [] Claims no spiritual orientation:           [] Seeking spiritual identity:                [] Adheres to an individual form of spirituality:           [x] Not able to assess:                           Identified resources for coping:      [] Prayer                               [] Music                  [] Guided Imagery     [] Family/friends                 [] Pet visits     [] Devotional reading                         [x] Unknown     [] Other:                                              Interventions offered during this visit: (See comments for more details)    Patient Interventions: Other (comment)(Music Therapy)           Plan of Care:     [] Support spiritual and/or cultural needs    [] Support AMD and/or advance care planning process      [] Support grieving process   [] Coordinate Rites and/or Rituals    [] Coordination with community clergy   [] No spiritual needs identified at this time   [] Detailed Plan of Care below (See Comments)  [] Make referral to Music Therapy  [] Make referral to Pet Therapy     [] Make referral to Addiction services  [] Make referral to [facility]  [] Make referral to Spiritual Care Partner  [] No future visits requested        [x] Follow up visits as needed     Comments:  visit for initial spiritual assessment. Staff with patient providing care.   Will continue to follow up as needed and upon request as able. Visited by Rev. Brandee Girard MDiv, [facility], [facility] paging service: [phone])

## 2019-10-01 NOTE — PROGRESS NOTES
10/1/19 -   LEN:  - Patient continues with wound care nursing education for new ostomy  - Patient is ambulating on own and is tolerating diet  - Anticipated discharge tomorrow, 10/2  - Disposition to include discharge to own home with transport via family  - DEANNE ZUR[facility] to follow patient for [facility] services  CRM: Wilmer Nathan, MPH, [facility]; Z: [phone]

## 2019-10-01 NOTE — PROGRESS NOTES
NUTRITION  Pt seen for:     [x]           Rescreen          RECOMMENDATIONS:   1. Continue current diet - continue scheduled zofran prior to meals PRN  2. Weekly weights on standing scale  SUBJECTIVE/OBJECTIVE:   Information obtained from:   patient        Pt s/p resection and loop ileostomy on 9/24. Some N/V after surgery with poor intake but has been improving. Likely d/c home tomorrow if intake continues to improve per MD notes. Pt visited today. Tolerating food with smaller portions but snacking between meals. Wife planning to bring lunch from home today. Both verbalizing understanding of low fiber diet when visited post-op. Reinforced continuation of smaller meals/snacks at home until appetite back to baseline after discharge. No questions or concerns at this time. Will continue to follow for PO intake and diet tolerance if discharge delayed. Diet:  Regular, low fiber  Supplements: Ensure Enlive BID  Intake:   [x]           Fair - improving  No data found.     Weight Changes:   []            Stable  Wt Readings from Last 10 Encounters:   10/01/19 69 kg (152 lb 3.2 oz)   09/10/19 68.5 kg (151 lb)   07/30/19 68.4 kg (150 lb 14.4 oz)   07/29/19 67.5 kg (148 lb 12.8 oz)   07/02/19 67.1 kg (147 lb 14.4 oz)   06/20/19 67.5 kg (148 lb 12.8 oz)   06/06/19 67.3 kg (148 lb 6.4 oz)   05/17/19 66.2 kg (145 lb 14.4 oz)   05/16/19 66.7 kg (147 lb)   05/10/19 65.8 kg (145 lb)     Nutrition Problems Identified:  []      None  []           Specified food preferences   [x]           Nausea/Vomiting - improving    PLAN:   []           Obtained/adjusted food preferences/tolerances and/or snacks options   []           Dislikes supplements will try a substitution   []           Modify diet for food allergies  []           Adjust texture due to difficulty chewing   [x]    Continue current diet  [x]           Educated patient  []           Add Supplements    Rescreen:  []            At Nutrition Risk           [x] Not at Nutrition Risk, rescreen per screening protocol    Reyne Spurling, RD  [address] , Pager #[phone] or [phone]

## 2019-10-01 NOTE — WOUND CARE
CWOCN Ostomy Progress Note:  
  
First  Visit: POD #7 Type of Ostomy: loop ileostomy for rectal cancer Location: right quadrant Date of Surgery: 9-24-19    Surgeon: Dr. Clovis Smith 
  
Treatments: Glory Hooper changed yesterday 9-30-19 by patient with guidance from ostomy nurse. Spoke with  this a.m. and red rubber negro will be removed today. Teaching/Subjects : patient is emptying independently.  
9-25-19 : discussed- General anatomy and expectations of output 10-1-19: Normal & abnormal stoma characteristics & changes over time 10-1-19:Normal & abnormal peristomal characteristics 9-25-19: discussed -When/how to clean stoma & peristomal skin 9-25-19: discussed- When/how to empty pouch 
- Crusting technique 10-1-19 Shaving around stoma 9-25-19: discussed- When/how to change appliance 10-1-19: When to notify nurse/physician 
9-25-19 discussed and MD put in nutritional consult- Dietary guidelines 9-25-19: discussed- Flatus management - Where/how to obtain supplies - Manipulated: patient using [place] 2 piece pouching system at present and is emptying independently - Reviewed ADL's (bathing, mattress cover, car pillow to protect from seatbelt, etc) - ERAS I&O recording and flowsheet - Blockage s/s and electrolyte imbalance - Pharmacy notification re: meds Pt demonstrated understanding of above material covered by Viktor Lash / Ostomy  Nurse Pouches at bedside / negro removed by ostomy nurse.  
  
Recommendations: 1. Empty appliance when 1/3 full and PRN. Encourage patient/family to notify nurse and 
assist w/ pouch emptying to promote self-care. Change appliance twice weekly and PRN for leaking ASAP. DO NOT REINFORCE LEAKS to avoid skin breakdown. 
  
Transition of Care: 
Ostomy nurse to return and reinforce teaching tomorrow and get ordering numbers, supplies ready for patient.  
Patient has learned vital skills but will likely need home health care for further teaching after discharge. Home Health consult placed. Discharge plan date to be determined. 
  
Plan to continue teaching tomorrow 10-2-19 Discussed care/progress with Dallas Del Valle RN, BSN Wound, Ostomy, Continence Nurse 
office: [phone] 
pager [phone]

## 2019-10-01 NOTE — PROGRESS NOTES
Problem: Falls - Risk of  Goal: *Absence of Falls  Description  Document Emilybecca Persons Fall Risk and appropriate interventions in the flowsheet. Outcome: Progressing Towards Goal  Note:   Fall Risk Interventions:  Medication Interventions: Patient to call before getting OOB, Teach patient to arise slowly    Problem: Ostomy Care  Goal: *Patient pouching appliance will fit properly and maintain integrity at least three to five days  Description  Infection control procedures (eg, clean dressings, clean gloves, hand washing, precautions to isolate wound from contamination, sterile instruments used for wound debridement) should be implemented. Outcome: Progressing Towards Goal  Pt. Takes action with ostomy appliance. Pt needs minimal help from staff in regard to emptying and cleaning appliance. Pt verbalizes and showcases understanding of when to and how to empty appliance.

## 2019-10-01 NOTE — PROGRESS NOTES
Bedside shift change report given to Gissel Palma (oncoming nurse) by Deyanira Dunn (offgoing nurse). Report included the following information SBAR.

## 2019-10-01 NOTE — PROGRESS NOTES
General Daily Progress Note    Admission Date: 9/24/2019  Hospital Day 8  Post-Op Day 7  Subjective:   Pain well controlled. Tolerating solid food better. Ate eggs, potatoes, and part of a biscuit this morning. Objective:     Patient Vitals for the past 24 hrs:   BP Temp Pulse Resp SpO2 Weight   10/01/19 0800 119/78 98.6 °F (37 °C) 72 16 98 %    10/01/19 0631      69 kg (152 lb 3.2 oz)   10/01/19 0404 115/75 98.7 °F (37.1 °C) 71 16 97 %    09/30/19 2244 127/79 98.6 °F (37 °C) 81 17 97 %    09/30/19 2152 137/88  84  98 %    09/30/19 1517 150/81 98.2 °F (36.8 °C) 75 16 98 %      10/01 0701 - 10/01 1900  In: -   Out: 547 [Urine:125]  09/29 1901 - 10/01 0700  In: 300 [P.O.:300]  Out: 1321 [Urine:3225]    Physical Examination:  General Appearance - No distress. Abdomen -  Distension somewhat less.  Incisions clean, dry, intact, and without erythema. Ileostomy edematous, but viable.  Moderate volume of bilious fluid, now with a small non-liquid component, in the appliance. Extremities - Pneumatic compression stockings in use. Data Review   Recent Results (from the past 24 hour(s))   CBC W/O DIFF    Collection Time: 10/01/19  3:53 AM   Result Value Ref Range    WBC 4.6 4.1 - 11.1 K/uL    RBC 3.99 (L) 4.10 - 5.70 M/uL    HGB 13.1 12.1 - 17.0 g/dL    HCT 39.4 36.6 - 50.3 %    MCV 98.7 80.0 - 99.0 FL    MCH 32.8 26.0 - 34.0 PG    MCHC 33.2 30.0 - 36.5 g/dL    RDW 11.5 11.5 - 14.5 %    PLATELET 429 698 - 649 K/uL    MPV 9.9 8.9 - 12.9 FL    NRBC 0.0 0  WBC    ABSOLUTE NRBC 0.00 0.00 - 0.01 K/uL   SAMPLES BEING HELD    Collection Time: 10/01/19  3:53 AM   Result Value Ref Range    SAMPLES BEING HELD 1PST     COMMENT        Add-on orders for these samples will be processed based on acceptable specimen integrity and analyte stability, which may vary by analyte. Assessment:     Principal Problem:    Rectal cancer ([facility] (5/10/2019)    Active Problems:     Thrombocytopenia ([facility] (9/24/2019)        7 Days Post-Op s/p hand-assisted laparoscopic low anterior resection, mobilization of the splenic flexure, coloproctostomy, and creation of loop ileostomy.     Hemodynamically stable. Hypertension controlled now on hydralazine. Hemoglobin stable.     Urine output has increased.     GI function is returning.  Ileostomy has now gone into a high output phase, and the volume is too high for the patient to be discharged without a high risk of dehydration.     Pathology results were discussed on 9/27/2019. Plan:   Continue suppressive antiemetics. Continue diet as tolerated; more solids than liquids.     Continue scheduled hydralazine for management of hypertension.     Strict I&O. Decrease IV rate. Monitor for oliguria.     Ostomy education. Remove ostomy \"negro. \"     Ambulation. Incentive spirometer.

## 2019-10-01 NOTE — ROUTINE PROCESS
Bedside shift change report given to Emily Boyer RN (oncoming nurse) by Durga Heard RN (offgoing nurse). Report included the following information SBAR, Kardex, Intake/Output, MAR and Recent Results.

## 2019-10-02 VITALS
BODY MASS INDEX: 23.69 KG/M2 | TEMPERATURE: 98.1 F | HEIGHT: 67 IN | RESPIRATION RATE: 16 BRPM | SYSTOLIC BLOOD PRESSURE: 125 MMHG | HEART RATE: 72 BPM | WEIGHT: 150.9 LBS | DIASTOLIC BLOOD PRESSURE: 75 MMHG | OXYGEN SATURATION: 98 %

## 2019-10-02 LAB
ANION GAP SERPL CALC-SCNC: 7 MMOL/L (ref 5–15)
BUN SERPL-MCNC: 9 MG/DL (ref 6–20)
BUN/CREAT SERPL: 9 (ref 12–20)
CALCIUM SERPL-MCNC: 8.5 MG/DL (ref 8.5–10.1)
CHLORIDE SERPL-SCNC: 110 MMOL/L (ref 97–108)
CO2 SERPL-SCNC: 23 MMOL/L (ref 21–32)
CREAT SERPL-MCNC: 0.95 MG/DL (ref 0.7–1.3)
ERYTHROCYTE [DISTWIDTH] IN BLOOD BY AUTOMATED COUNT: 11.5 % (ref 11.5–14.5)
GLUCOSE SERPL-MCNC: 110 MG/DL (ref 65–100)
HCT VFR BLD AUTO: 36.2 % (ref 36.6–50.3)
HGB BLD-MCNC: 12.3 G/DL (ref 12.1–17)
MAGNESIUM SERPL-MCNC: 2 MG/DL (ref 1.6–2.4)
MCH RBC QN AUTO: 33.5 PG (ref 26–34)
MCHC RBC AUTO-ENTMCNC: 34 G/DL (ref 30–36.5)
MCV RBC AUTO: 98.6 FL (ref 80–99)
NRBC # BLD: 0 K/UL (ref 0–0.01)
NRBC BLD-RTO: 0 PER 100 WBC
PHOSPHATE SERPL-MCNC: 3.9 MG/DL (ref 2.6–4.7)
PLATELET # BLD AUTO: 220 K/UL (ref 150–400)
PMV BLD AUTO: 9.8 FL (ref 8.9–12.9)
POTASSIUM SERPL-SCNC: 3.7 MMOL/L (ref 3.5–5.1)
RBC # BLD AUTO: 3.67 M/UL (ref 4.1–5.7)
SODIUM SERPL-SCNC: 140 MMOL/L (ref 136–145)
WBC # BLD AUTO: 4.1 K/UL (ref 4.1–11.1)

## 2019-10-02 PROCEDURE — 83735 ASSAY OF MAGNESIUM: CPT

## 2019-10-02 PROCEDURE — 36415 COLL VENOUS BLD VENIPUNCTURE: CPT

## 2019-10-02 PROCEDURE — 85027 COMPLETE CBC AUTOMATED: CPT

## 2019-10-02 PROCEDURE — 51798 US URINE CAPACITY MEASURE: CPT

## 2019-10-02 PROCEDURE — 74011250637 HC RX REV CODE- 250/637: Performed by: COLON & RECTAL SURGERY

## 2019-10-02 PROCEDURE — 74011250636 HC RX REV CODE- 250/636: Performed by: COLON & RECTAL SURGERY

## 2019-10-02 PROCEDURE — 84100 ASSAY OF PHOSPHORUS: CPT

## 2019-10-02 PROCEDURE — 80048 BASIC METABOLIC PNL TOTAL CA: CPT

## 2019-10-02 RX ORDER — HYDRALAZINE HYDROCHLORIDE 50 MG/1
50 TABLET, FILM COATED ORAL 3 TIMES DAILY
Qty: 90 TAB | Refills: 0 | Status: SHIPPED | OUTPATIENT
Start: 2019-10-02 | End: 2019-10-21 | Stop reason: ALTCHOICE

## 2019-10-02 RX ORDER — OXYCODONE HYDROCHLORIDE 5 MG/1
5 TABLET ORAL
Qty: 20 TAB | Refills: 0 | Status: SHIPPED | OUTPATIENT
Start: 2019-10-02 | End: 2019-10-07

## 2019-10-02 RX ADMIN — CELECOXIB 100 MG: 100 CAPSULE ORAL at 09:33

## 2019-10-02 RX ADMIN — ACETAMINOPHEN 1000 MG: 500 TABLET ORAL at 05:49

## 2019-10-02 RX ADMIN — ENOXAPARIN SODIUM 40 MG: 40 INJECTION SUBCUTANEOUS at 09:33

## 2019-10-02 RX ADMIN — OXYCODONE HYDROCHLORIDE 5 MG: 5 TABLET ORAL at 10:26

## 2019-10-02 RX ADMIN — HYDRALAZINE HYDROCHLORIDE 50 MG: 25 TABLET, FILM COATED ORAL at 09:33

## 2019-10-02 RX ADMIN — PANTOPRAZOLE SODIUM 40 MG: 40 TABLET, DELAYED RELEASE ORAL at 09:33

## 2019-10-02 RX ADMIN — TAMSULOSIN HYDROCHLORIDE 0.4 MG: 0.4 CAPSULE ORAL at 09:33

## 2019-10-02 NOTE — DISCHARGE SUMMARY
Discharge Summary     Patient ID:    Johana Bourgeois  [id number]  5 y.o.  1970    Admission Date: 9/24/2019    Discharge Date: 10/2/2019    Admission Diagnoses:  1. Rectal cancer  2. Thrombocytopenia    Chronic Diagnoses:    Patient Active Problem List   Diagnosis Code    ACL tear S83.519A    Psoriatic arthritis ([facility] L40.50    Psoriasis L40.9    Gastrointestinal hemorrhage K92.2    Rectal cancer (HCC) C20    Thrombocytopenia ([facility] D69.6       Discharge Diagnoses:  1. Moderately differentiated [id number] adenocarcinoma of the rectum (resected)  2. Thrombocytopenia (resolved)  3. Hypophosphatemia (corrected)  4. Hypertension (treatment begun)      Hospital Problems as of 10/2/2019 Date Reviewed: 9/24/2019          Codes Class Noted - Resolved POA    Thrombocytopenia ([facility] ICD-10-CM: D69.6  ICD-9-CM: 287.5  9/24/2019 - Present No        * (Principal) Rectal cancer (HCC) (Chronic) ICD-10-CM: C20  ICD-9-CM: 154.1  5/10/2019 - Present Yes              Procedures Performed:  1. Cystoscopy, bilateral ureteroscopies, and placement of temporary bilateral ureteral catheters was performed by Chrissy Kyle MD on 9/24/2019.  2.  Hand-assisted laparoscopic low anterior resection, mobilization of the splenic flexure, coloproctostomy, and creation of loop ileostomy was performed by Dr. Brissa Portillo on 9/24/2019. Discharge Medications:   Current Discharge Medication List      START taking these medications    Details   hydrALAZINE (APRESOLINE) 50 mg tablet Take 1 Tab by mouth three (3) times daily for 30 days. Qty: 90 Tab, Refills: 0    Comments: Hold if SBP < 110. oxyCODONE IR (ROXICODONE) 5 mg immediate release tablet Take 1 Tab by mouth every four (4) hours as needed for Pain for up to 5 days.  Max Daily Amount: 30 mg.  Qty: 20 Tab, Refills: 0    Associated Diagnoses: Acute post-operative pain         CONTINUE these medications which have NOT CHANGED    Details   triamcinolone acetonide (KENALOG) 0.1 % ointment Apply  to affected area two (2) times daily as needed for Skin Irritation. use thin layer      fluocinonide (VANOS) 0.1 % topical cream Apply  to affected area daily. Qty: 30 g, Refills: 5      omeprazole (PRILOSEC) 20 mg capsule Take 20 mg by mouth as needed. diphenoxylate-atropine (LOMOTIL) 2.5-0.025 mg per tablet Take  by mouth four (4) times daily as needed for Diarrhea.      tamsulosin (FLOMAX) 0.4 mg capsule Take 0.4 mg by mouth daily. prochlorperazine (COMPAZINE) 5 mg tablet Take 1 Tab by mouth every six (6) hours as needed for Nausea. Qty: 20 Tab, Refills: 1    Associated Diagnoses: Rectal cancer (HCC)      ondansetron hcl (ZOFRAN) 4 mg tablet Take 2 Tabs by mouth every eight (8) hours as needed for Nausea. Qty: 60 Tab, Refills: 0              Diet:  Low fiber diet for two weeks. Activity:  No driving for two weeks after the surgery date. No lifting more than 10 lb. for four weeks after the surgery date. Wound Care:  None    Ostomy Care: To be performed by the patient with that assistance of his wife and Home Health. Discharge Condition:  Improved compared to that upon admission. Disposition:  Home with home health for assistance with ileostomy education and management. Follow-up Care:  1. Dr. Juvencio Julien on 10/8/2019.  2. Consuelo Chapin MD in 1-2 weeks regarding blood pressure management  3. Dr. Joel Reece regarding additional chemotherapy.        Significant Diagnostic Studies:   Recent Labs     10/02/19  0335 10/01/19  0353   WBC 4.1 4.6   HGB 12.3 13.1   HCT 36.2* 39.4    201     Recent Labs     10/02/19  0335 09/30/19  0409    137   K 3.7 3.8   * 107   CO2 23 21   BUN 9 13   CREA 0.95 0.88   * 102*   CA 8.5 8.1*   MG 2.0 2.2   PHOS 3.9 2.7     Recent Labs     09/30/19  0409   SGOT 59*   AP 62   TBILI 0.3   TP 5.7*   ALB 2.5*   GLOB 3.2       HOSPITAL COURSE & TREATMENT RENDERED:    The patient is a 58-year-old male who had been seeing blood with his bowel movements for approximately 8 months before undergoing a colonoscopy performed by Marcio Rivera MD on 04/26/2019. That procedure revealed multiple colonic and rectal polyps that were removed and a malignant-appearing rectal mass with a distal margin located at approximately 10 cm from the anal verge. The mass was biopsied and the biopsies were consistent with invasive well-differentiated adenocarcinoma. The excised polyps proved to have been tubular adenomas. A CEA level obtained on 04/26/2019 was slightly elevated for a nonsmoker (3.6 ng/mL). An endoscopic rectal ultrasound was performed by Dr. Mariangel Puentes on 05/02/2019, and it revealed that the lesion appeared to be stage III (uT2 N1). A CT scan of the abdomen and pelvis that was also performed on 05/02/2019 revealed no evidence of metastatic disease in the liver. A PET CT performed on 05/10/2019 was consistent with three small perirectal anibal metastatic foci. The patient received neoadjuvant chemoradiation therapy with Xeloda and a total of 5040 cGy of external beam radiation, with his final radiation dose having been administered on 07/08/2019. Rigid proctosigmoidoscopy performed in the office on 08/21/2019 revealed a small area of residual disease at 10 cm from the anal verge. Surgical resection of the rectum and the regional nodes was indicated for treatment of any residual primary disease. The risks of the procedure were discussed in detail, and the patient agreed to proceed. He was taken to the operating room on 9/24/2019, and there the above-listed procedures were performed without apparent complications. Post-operatively, he was transferred from the recovery room to the floor in stable condition.     His hospital course was remarkable for a relatively slow return of gastrointestinal function, poor tolerance of solid food (that eventually resolved), a period of high ileostomy output that prolonged the hospitalization, and hypertension that required the initiation of new medications. Still, there were no apparent complications and on 97/5/7243 (POD#8) he was hemodynamically stable, afebrile, tolerating sufficient quantities of a low fiber diet and nutritional supplements, and ambulating well. He had also demonstrated a sufficient ability to manage his ileostomy, and at this point he was judged to be fit for discharge to home with home health. He appeared to have understood all discharge and follow-up instructions.         Signed:  Serafin Mccarthy MD

## 2019-10-02 NOTE — PROGRESS NOTES
General Daily Progress Note    Admission Date: 9/24/2019  Hospital Day 9  Post-Op Day 8  Subjective:   Good pain control. Feeling much better than he was two days ago. Tolerating low fiber diet. Managing ileostomy himself. Objective:     Patient Vitals for the past 24 hrs:   BP Temp Pulse Resp SpO2 Weight   10/02/19 0846 125/75 98.1 °F (36.7 °C) 72 16 98 %    10/02/19 0652      68.4 kg (150 lb 14.4 oz)   10/02/19 0002 143/77 99 °F (37.2 °C) 72 18 99 %    10/01/19 2105 135/81  78      10/01/19 1600 143/74        10/01/19 1529 128/69 98.3 °F (36.8 °C) 78 16 99 %    10/01/19 1522 119/70          No intake/output data recorded. 09/30 1901 - 10/02 0700  In: 300 [P.O.:300]  Out: 6925 [Urine:3525]      Physical Examination:  General Appearance - No distress. Lungs - Clear to auscultation. Heart - Normal rate and regular ryythm. Abdomen -  Distension less.  Incisions clean, dry, intact, and without erythema. Ileostomy edematous, but viable and functioning; ostomy \"negro\" removed. Significant amount of serous drainage from drain site wound; no evidence of infection. Extremities - Pneumatic compression stockings in use.             Data Review   Recent Results (from the past 24 hour(s))   CBC W/O DIFF    Collection Time: 10/02/19  3:35 AM   Result Value Ref Range    WBC 4.1 4.1 - 11.1 K/uL    RBC 3.67 (L) 4.10 - 5.70 M/uL    HGB 12.3 12.1 - 17.0 g/dL    HCT 36.2 (L) 36.6 - 50.3 %    MCV 98.6 80.0 - 99.0 FL    MCH 33.5 26.0 - 34.0 PG    MCHC 34.0 30.0 - 36.5 g/dL    RDW 11.5 11.5 - 14.5 %    PLATELET 410 992 - 655 K/uL    MPV 9.8 8.9 - 12.9 FL    NRBC 0.0 0  WBC    ABSOLUTE NRBC 0.00 0.00 - 0.77 K/uL   METABOLIC PANEL, BASIC    Collection Time: 10/02/19  3:35 AM   Result Value Ref Range    Sodium 140 136 - 145 mmol/L    Potassium 3.7 3.5 - 5.1 mmol/L    Chloride 110 (H) 97 - 108 mmol/L    CO2 23 21 - 32 mmol/L    Anion gap 7 5 - 15 mmol/L    Glucose 110 (H) 65 - 100 mg/dL    BUN 9 6 - 20 MG/DL    Creatinine 0.95 0.70 - 1.30 MG/DL    BUN/Creatinine ratio 9 (L) 12 - 20      GFR est AA >60 >60 ml/min/1.73m2    GFR est non-AA >60 >60 ml/min/1.73m2    Calcium 8.5 8.5 - 10.1 MG/DL   MAGNESIUM    Collection Time: 10/02/19  3:35 AM   Result Value Ref Range    Magnesium 2.0 1.6 - 2.4 mg/dL   PHOSPHORUS    Collection Time: 10/02/19  3:35 AM   Result Value Ref Range    Phosphorus 3.9 2.6 - 4.7 MG/DL           Assessment:     Principal Problem:    Rectal cancer ([facility] (5/10/2019)    Active Problems: Thrombocytopenia ([facility] (9/24/2019)        8 Days Post-Op s/p hand-assisted laparoscopic low anterior resection, mobilization of the splenic flexure, coloproctostomy, and creation of loop ileostomy. Hemodynamically stable. Blood pressure well controlled with hydralazine. Hemoglobin stable. Urine output adequate. No evidence of infection. GI function has returned. Ileostomy output is within the desired range. Plan:   Discharge to home with home health. Follow up with me on 10/8/2019. Follow up with primary physician regarding blood pressure management. Follow up with Dr. Brando Downey regarding additional chemotherapy.

## 2019-10-02 NOTE — PROGRESS NOTES
Bedside shift change report given to [address] [place] [address] Road (oncoming nurse) by Anthony RN (offgoing nurse). Report included the following information SBAR and Kardex.

## 2019-10-02 NOTE — ROUTINE PROCESS
Bedside and Verbal shift change report given to TRISTAN Campos (oncoming nurse) by Naomi Rhodes (offgoing nurse). Report included the following information SBAR, Kardex and Recent Results.

## 2019-10-02 NOTE — PROGRESS NOTES
10/2/19 -   LEN:  - Patient is ready for discharge  - Disposition is for discharge to own home with transport via spouse and home support from family  - [id number] to follow patient upon discharge; information is on AVS  CRM: Martita Munoz, MPH, [address] [place] [address]; Z: [phone]

## 2019-10-02 NOTE — WOUND CARE
CWOCN Ostomy Progress Note:  
  
First  Visit: POD #8 Type of Ostomy: loop ileostomy for rectal cancer Location: right quadrant Date of Surgery: 9-24-19    Surgeon: Dr. Madera Likes changed today by patient with guidance from ostomy nurse. Removed [place] 1 piece pouching system: stoma red moist and nicely budded. Peristomal skin is pink and applied no sting prep. Used template to cut wafer opening and applied 2 piece Coloplast [employer] [place]. Pouch is secure. Teaching/Subjects : patient is emptying independently.  
9-25-19 : discussed- General anatomy and expectations of output 10-1-19: Normal & abnormal stoma characteristics & changes over time 10-1-19:Normal & abnormal peristomal characteristics  
9-25-19: discussed -When/how to clean stoma & peristomal skin 9-25-19: discussed- When/how to empty pouch 
- Crusting technique 10-1-19 Shaving around stoma 9-25-19: discussed- When/how to change appliance 10-1-19: When to notify nurse/physician 
9-25-19 discussed and MD put in nutritional consult- Dietary guidelines 9-25-19: discussed- Flatus management 10-2-19: Where/how to obtain supplies Patient wearing 2 piece Coloplast pouching system with pinch opening. 10-2-19:Reviewed ADL's (bathing, mattress cover, car pillow to protect from seatbelt, etc) Staff nurse:  TENZIN I&O recording and flowsheet 10-2-19:Blockage s/s and electrolyte imbalance - Pharmacy notification re: meds Pt demonstrated understanding of above material covered by Daxa Arriaza / Ostomy  Nurse. Patient has all teaching information, ordering information and #'s, supplies to get home and get settled with. 
  
Recommendations: 1. Empty appliance when 1/3 full and PRN. Encourage patient/family to notify nurse and 
assist w/ pouch emptying to promote self-care. Change appliance twice weekly and PRN for leaking ASAP. DO NOT REINFORCE LEAKS to avoid skin breakdown. 
  
Transition of Care: Ostomy nurse to return and reinforce teaching tomorrow and get ordering numbers, supplies ready for patient. Patient has learned vital skills but will likely need home health care for further teaching after discharge. Home Health consult placed.  Discharge plan date to be determined. 
  
Discharge today per Dr. Joann Shannon Discussed care/progress with RN/ Daniel Gaspar, RN, BSN Wound, Ostomy, Continence Nurse 
office: [phone] 
pager [phone]

## 2019-10-02 NOTE — DISCHARGE INSTRUCTIONS
Discharge Instructions       1. Do not lift any objects weighing more than 10 pounds for 4 weeks after the surgery date. 2. Do not do any housework including vacuuming, scrubbing, or yardwork for 4 weeks after the surgery date. 3. Do not drive for two weeks after the surgery date or while taking sedating medications. 4. You should walk frequently, and you may go up and down stairs as desired. 5. You may shower. Do not take tub baths, swim, or use hot tubs for 4 weeks. 6. You have surgical glue on your incisions. It will dissolve over time. Do not scrub around incisions. 7. Follow low-fiber diet for 2 weeks more weeks. (See handbook for additional details). 8. Drink nutritional supplements 2 times per day until your diet and appetite are back to normal.     9. Multiple bowel movements are normal each day. Contact your surgeons office for any concerns. 10. Take maximum doses of Tylenol (1000 mg every 6 hours) until you no longer need pain medication. Take the oxycodone only for pain that is not adequately controlled by the Tylenol. 11. Follow up:  Return to Dr. Kathleen Gonzalez office at 10:00 AM on Tuesday 10/8/2019. Schedule an appointment with Dr. Ana Skaggs for reevaluation of blood pressure management within the next 2 weeks if possible. Follow up with Dr. Frank Starr as scheduled. 12. Please bring your ostomy supplies to the first office visit with your surgeon. 13. If you experience fever (temperature greater than 100.5), chills, vomiting, or redness around or drainage from a surgical site (other than the clear yellowish drainage from the drain site wound), please contact your surgeons office. 14. For questions regarding quality or quantity of ostomy output, refer to ERAS handbook or call surgeons office. Please see handbook for additional instructions. If you have further questions, please call your surgeons office.

## 2019-10-03 ENCOUNTER — HOME CARE VISIT (OUTPATIENT)
Dept: SCHEDULING | Facility: HOME HEALTH | Age: 49
End: 2019-10-03
Payer: MEDICAID

## 2019-10-03 VITALS
DIASTOLIC BLOOD PRESSURE: 78 MMHG | HEART RATE: 74 BPM | TEMPERATURE: 98 F | OXYGEN SATURATION: 99 % | SYSTOLIC BLOOD PRESSURE: 128 MMHG | RESPIRATION RATE: 16 BRPM

## 2019-10-03 PROCEDURE — G0299 HHS/HOSPICE OF RN EA 15 MIN: HCPCS

## 2019-10-03 PROCEDURE — 400013 HH SOC

## 2019-10-05 ENCOUNTER — HOME CARE VISIT (OUTPATIENT)
Dept: SCHEDULING | Facility: HOME HEALTH | Age: 49
End: 2019-10-05
Payer: MEDICAID

## 2019-10-05 PROCEDURE — G0300 HHS/HOSPICE OF LPN EA 15 MIN: HCPCS

## 2019-10-08 ENCOUNTER — HOME CARE VISIT (OUTPATIENT)
Dept: SCHEDULING | Facility: HOME HEALTH | Age: 49
End: 2019-10-08
Payer: MEDICAID

## 2019-10-08 VITALS
DIASTOLIC BLOOD PRESSURE: 78 MMHG | OXYGEN SATURATION: 99 % | HEART RATE: 85 BPM | RESPIRATION RATE: 16 BRPM | TEMPERATURE: 98.1 F | SYSTOLIC BLOOD PRESSURE: 122 MMHG

## 2019-10-08 PROCEDURE — G0299 HHS/HOSPICE OF RN EA 15 MIN: HCPCS

## 2019-10-09 ENCOUNTER — OFFICE VISIT (OUTPATIENT)
Dept: ONCOLOGY | Age: 49
End: 2019-10-09

## 2019-10-09 ENCOUNTER — DOCUMENTATION ONLY (OUTPATIENT)
Dept: ONCOLOGY | Age: 49
End: 2019-10-09

## 2019-10-09 VITALS
OXYGEN SATURATION: 97 % | DIASTOLIC BLOOD PRESSURE: 70 MMHG | TEMPERATURE: 97.6 F | SYSTOLIC BLOOD PRESSURE: 118 MMHG | HEART RATE: 78 BPM

## 2019-10-09 VITALS
DIASTOLIC BLOOD PRESSURE: 77 MMHG | BODY MASS INDEX: 22.55 KG/M2 | HEART RATE: 76 BPM | OXYGEN SATURATION: 97 % | RESPIRATION RATE: 16 BRPM | TEMPERATURE: 97 F | WEIGHT: 143.7 LBS | SYSTOLIC BLOOD PRESSURE: 116 MMHG | HEIGHT: 67 IN

## 2019-10-09 DIAGNOSIS — L40.50 PSORIATIC ARTHRITIS (HCC): ICD-10-CM

## 2019-10-09 DIAGNOSIS — C20 RECTAL CANCER (HCC): Primary | ICD-10-CM

## 2019-10-09 RX ORDER — DIPHENHYDRAMINE HYDROCHLORIDE 50 MG/ML
50 INJECTION, SOLUTION INTRAMUSCULAR; INTRAVENOUS AS NEEDED
Status: CANCELLED
Start: 2019-10-23

## 2019-10-09 RX ORDER — FLUOROURACIL 50 MG/ML
400 INJECTION, SOLUTION INTRAVENOUS ONCE
Status: CANCELLED
Start: 2019-10-23 | End: 2019-10-23

## 2019-10-09 RX ORDER — SODIUM CHLORIDE 0.9 % (FLUSH) 0.9 %
10 SYRINGE (ML) INJECTION AS NEEDED
Status: CANCELLED
Start: 2019-10-23

## 2019-10-09 RX ORDER — ONDANSETRON 8 MG/1
8 TABLET, ORALLY DISINTEGRATING ORAL
Qty: 30 TAB | Refills: 3 | Status: ON HOLD | OUTPATIENT
Start: 2019-10-09 | End: 2020-10-22

## 2019-10-09 RX ORDER — SODIUM CHLORIDE 0.9 % (FLUSH) 0.9 %
10 SYRINGE (ML) INJECTION AS NEEDED
Status: CANCELLED
Start: 2019-10-25

## 2019-10-09 RX ORDER — HYDROCORTISONE SODIUM SUCCINATE 100 MG/2ML
100 INJECTION, POWDER, FOR SOLUTION INTRAMUSCULAR; INTRAVENOUS AS NEEDED
Status: CANCELLED | OUTPATIENT
Start: 2019-10-23

## 2019-10-09 RX ORDER — DEXAMETHASONE 4 MG/1
8 TABLET ORAL SEE ADMIN INSTRUCTIONS
Qty: 24 TAB | Refills: 0 | Status: SHIPPED | OUTPATIENT
Start: 2019-10-09 | End: 2020-03-02

## 2019-10-09 RX ORDER — ACETAMINOPHEN 325 MG/1
650 TABLET ORAL AS NEEDED
Status: CANCELLED
Start: 2019-10-23

## 2019-10-09 RX ORDER — DEXTROSE MONOHYDRATE 50 MG/ML
25 INJECTION, SOLUTION INTRAVENOUS CONTINUOUS
Status: CANCELLED
Start: 2019-10-23

## 2019-10-09 RX ORDER — SODIUM CHLORIDE 9 MG/ML
10 INJECTION INTRAMUSCULAR; INTRAVENOUS; SUBCUTANEOUS AS NEEDED
Status: CANCELLED | OUTPATIENT
Start: 2019-10-25

## 2019-10-09 RX ORDER — ONDANSETRON 2 MG/ML
8 INJECTION INTRAMUSCULAR; INTRAVENOUS ONCE
Status: CANCELLED | OUTPATIENT
Start: 2019-10-23

## 2019-10-09 RX ORDER — EPINEPHRINE 1 MG/ML
0.3 INJECTION, SOLUTION, CONCENTRATE INTRAVENOUS AS NEEDED
Status: CANCELLED | OUTPATIENT
Start: 2019-10-23

## 2019-10-09 RX ORDER — ONDANSETRON 2 MG/ML
8 INJECTION INTRAMUSCULAR; INTRAVENOUS AS NEEDED
Status: CANCELLED | OUTPATIENT
Start: 2019-10-23

## 2019-10-09 RX ORDER — HEPARIN 100 UNIT/ML
300-500 SYRINGE INTRAVENOUS AS NEEDED
Status: CANCELLED
Start: 2019-10-23

## 2019-10-09 RX ORDER — ALBUTEROL SULFATE 0.83 MG/ML
2.5 SOLUTION RESPIRATORY (INHALATION) AS NEEDED
Status: CANCELLED
Start: 2019-10-23

## 2019-10-09 RX ORDER — SODIUM CHLORIDE 9 MG/ML
10 INJECTION INTRAMUSCULAR; INTRAVENOUS; SUBCUTANEOUS AS NEEDED
Status: CANCELLED | OUTPATIENT
Start: 2019-10-23

## 2019-10-09 RX ORDER — HEPARIN 100 UNIT/ML
300-500 SYRINGE INTRAVENOUS AS NEEDED
Status: CANCELLED
Start: 2019-10-25

## 2019-10-09 NOTE — PROGRESS NOTES
[facility] [place] [address] [address], [address]  W: [phone]  F: [phone]    Reason for Visit:   Tim Lemos is a 47 y.o. male who is seen for Rectal cancer- likely stage III- SOMMER    Treatment History:   · 4/26/19: Colonoscopy- 6-7 cm size malignant appearing mass seen at 10 cm from anal verge. This was adenocarcinoma. 3 polyps removed- all were tubular adenomas  · Rectal Ultrasound 5/2/19: ulcerated infiltrative mass lesion was noted in rectum at 10 cm. The lesion measured about 5 cm X 4 cm- T2, 13 mm X 8 mm oblong lymph node was noted immediately adjacent to the mass lesion  · 5/2/19: CT CAP- No metastasis, liver cysts. CEA 3.6  · 5/10/19: PET CT showed rectal malignancy and 3 small perirectal anibal metastatic focir  · 5/28/19-7/8/29: Xeloda + RT   · 9/24/19: LAR- [id number], 2/6 positive nodes    History of Present Illness:   Patient is a 47 y.o. male seen for adenocarcinoma of the mid third of rectum    He had intermittent BRBPR x 1 year and then decided to have a colonoscopy. This led to above mentioned diagnosis. He received neoadjuvant xeloda+ RT followed by LAR and now comes to review adjuvant chemotherapy    He has minimal pain, he has been eating well, no fevers, no bleeding. Denies nausea/vomiting. No mouth sores. He uses 7 drinks a week.     Adopted    Past Medical History:   Diagnosis Date    Adopted     GERD (gastroesophageal reflux disease)     Psoriasis     Psoriatic arthritis ([facility] [address]     Rectal cancer [facility])       Past Surgical History:   Procedure Laterality Date    COLONOSCOPY Left 4/26/2019    COLONOSCOPY performed by Juanito Reyna [facility] at [facility] N/A 5/2/2019    SIGMOIDOSCOPY FLEXIBLE performed by Gregg Torres MD at [facility] ENDOSCOPY    HX GI      EGD    HX HEENT      WISDOM TEETH    HX ORTHOPAEDIC Right     ACL REPLACEMENT      Social History     Tobacco Use    Smoking status: Never Smoker  Smokeless tobacco: Never Used   Substance Use Topics    Alcohol use: Yes     Comment: 6 BEERS WEEKLY      Family History   Adopted: Yes   Problem Relation Age of Onset    Asthma Neg Hx     Cancer Neg Hx     Diabetes Neg Hx     Heart Disease Neg Hx     Hypertension Neg Hx     Stroke Neg Hx      Current Outpatient Medications   Medication Sig    acetaminophen (TYLENOL) 500 mg tablet Take 500 mg by mouth every six (6) hours as needed for Fever or Pain.  hydrALAZINE (APRESOLINE) 50 mg tablet Take 1 Tab by mouth three (3) times daily for 30 days.  diphenoxylate-atropine (LOMOTIL) 2.5-0.025 mg per tablet Take  by mouth four (4) times daily as needed for Diarrhea.  tamsulosin (FLOMAX) 0.4 mg capsule Take 0.4 mg by mouth daily.  prochlorperazine (COMPAZINE) 5 mg tablet Take 1 Tab by mouth every six (6) hours as needed for Nausea.  ondansetron hcl (ZOFRAN) 4 mg tablet Take 2 Tabs by mouth every eight (8) hours as needed for Nausea.  triamcinolone acetonide (KENALOG) 0.1 % ointment Apply  to affected area two (2) times daily as needed for Skin Irritation. use thin layer    fluocinonide (VANOS) 0.1 % topical cream Apply  to affected area daily.  omeprazole (PRILOSEC) 20 mg capsule Take 20 mg by mouth as needed. No current facility-administered medications for this visit. No Known Allergies     Review of Systems: A complete review of systems was obtained, negative except as described above. Physical Exam:     Visit Vitals  /77 (BP 1 Location: Left arm, BP Patient Position: Sitting)   Pulse 76   Temp 97 °F (36.1 °C) (Oral)   Resp 16   Ht 5' 7\" (1.702 m)   Wt 143 lb 11.2 oz (65.2 kg)   SpO2 97%   BMI 22.51 kg/m²     ECOG PS: 1  General: No distress  Eyes: PERRLA, anicteric sclerae  HENT: Atraumatic, OP clear  Resp: CTAB, normal respiratory effort  CV: s1s2, no LE edema  MS: Normal gait and station. Digits without clubbing or cyanosis. Skin: No rashes, ecchymoses, or petechiae. Normal temperature, turgor, and texture. Psych: Alert, oriented, appropriate affect, normal judgment/insight    Results:     Lab Results   Component Value Date/Time    WBC 4.1 10/02/2019 03:35 AM    HGB 12.3 10/02/2019 03:35 AM    HCT 36.2 (L) 10/02/2019 03:35 AM    PLATELET 183 93/20/8871 03:35 AM    MCV 98.6 10/02/2019 03:35 AM    ABS. NEUTROPHILS 3.6 09/30/2019 04:09 AM     Lab Results   Component Value Date/Time    Sodium 140 10/02/2019 03:35 AM    Potassium 3.7 10/02/2019 03:35 AM    Chloride 110 (H) 10/02/2019 03:35 AM    CO2 23 10/02/2019 03:35 AM    Glucose 110 (H) 10/02/2019 03:35 AM    BUN 9 10/02/2019 03:35 AM    Creatinine 0.95 10/02/2019 03:35 AM    GFR est AA >60 10/02/2019 03:35 AM    GFR est non-AA >60 10/02/2019 03:35 AM    Calcium 8.5 10/02/2019 03:35 AM     Lab Results   Component Value Date/Time    Bilirubin, total 0.3 09/30/2019 04:09 AM    ALT (SGPT) 57 09/30/2019 04:09 AM    AST (SGOT) 59 (H) 09/30/2019 04:09 AM    Alk. phosphatase 62 09/30/2019 04:09 AM    Protein, total 5.7 (L) 09/30/2019 04:09 AM    Albumin 2.5 (L) 09/30/2019 04:09 AM    Globulin 3.2 09/30/2019 04:09 AM     Records reviewed and summarized above. Pathology report(s) reviewed  FINAL PATHOLOGIC DIAGNOSIS   1.  Rectum and sigmoid colon, laparoscopic low anterior resection:   SPECIMEN   Procedure: Low anterior resection   Macroscopic Intactness of Mesorectum: Complete   TUMOR   Tumor Site: Rectum   Histologic Type: Adenocarcinoma   Histologic Grade: G2: Moderately differentiated   Tumor Size: 2.6 cm   Tumor Deposits: Present   Number of Deposits: 1   Tumor Extension: Tumor invades through the muscularis propria into pericolorectal tissue   Macroscopic Tumor Perforation: Not identified   Lymphovascular Invasion: Not identified   Perineural Invasion: Not identified   Tumor Budding: Number of tumor buds in one hotspot field: 5   Tumor Budding Score: Intermediate score (5-9)   Treatment Effect: Present - Residual cancer with evident tumor regression, but more than single cells or   rare small groups of cancer cells (partial response, score 2)   MARGINS   Margins: All margins are uninvolved by invasive carcinoma, high- grade dysplasia, intramucosal   adenocarcinoma, and adenoma   Margins Examined: Proximal, Distal, Radial or Mesenteric   Distance of Invasive Carcinoma from Closest Margin: 25 mm   Closest Margin: Radial or Mesenteric   LYMPH NODES   Number of Lymph Nodes Involved: 2   Number of Lymph Nodes Examined: 16   PATHOLOGIC STAGE CLASSIFICATION (pTNM, AJCC 8th Edition)   Primary Tumor (pT): pT3   Regional Lymph Nodes (pN): pN1b   2. Large bowel, donuts:   Fragments of large bowel wall, negative for microscopic pathologic abnormality. Radiology report(s) reviewed above. Assessment:   1) Rectal adenocarcinoma- mid third- SOMMER    T2N1M0 disease- Clinical stage III  S/P John Adj chemoRT followed by LAR on 9/24/19  Pathology showed [id number] residual disease- stage IIIB    Reviewed surgical pathology  Had significant residual disease with practically no treatment effects  Discussed adjuvant chemotherapy as per data from colon cancer studies    The goal of adjuvant chemotherapy is to diminish recurrence by eliminating micrometastasis. The benefit of adjuvant chemotherapy is well evidenced in stage III disease where it affords a 30 percent relative reduction in the risk of disease recurrence and a 20 to 30 percent relative reduction in mortality. The duration of chemotherapy is debatable though most would administer 4 months of FOLFOX. We discussed the chemotherapy regimen, it's logistics, and potential toxicities in detail. Potential side effects include, but are not limited to, nausea, vomiting, diarrhea, taste changes, myelosuppression, infection, fatigue, allergic reactions, rash, edema, neuropathy, and rarely, death.  The patient asked several well thought out questions which I answered to the best of my ability and to their apparent satisfaction. The patient has given consent for chemotherapy. Hoping  To start within 2 weeks  Port ordered    2) Coliln Sanders age at diagnosis    Adopted  Had several adenomas removed  May have attenuated FAP and will require genetic testing  SOMMER    3) GIB  Minor    4) Psoriasis  Off Methotrexate  Follows Dr. Ligia Burden:     · Port  · CT prior to adjuvant chemo  · Approval for adjuvant FOLFOX given every 2 weeks for 8 cycles  · Dexamethasone days 2 and 3  · Prn zofran    All questions answered    > 50% if this 40 min visit spent in counseling and care co ordination      I appreciate the opportunity to participate in Mr. Severo Bilis care.     Signed By: Michael Esquivel MD

## 2019-10-09 NOTE — PROGRESS NOTES
Identified pt with two pt identifiers(name and ). Reviewed record in preparation for visit and have obtained necessary documentation. Chief Complaint   Patient presents with    Surgical Follow-up        Health Maintenance Due   Topic    DTaP/Tdap/Td series (1 - Tdap)    Shingrix Vaccine Age 50> (1 of 2)    FOBT Q 1 YEAR AGE 54-65     Influenza Age 5 to Adult        Coordination of Care Questionnaire:  :   1) Have you been to an emergency room, urgent care, or hospitalized since your last visit? If yes, where when, and reason for visit? no       2. Have seen or consulted any other health care provider since your last visit? If yes, where when, and reason for visit? NO      3) Do you have an Advanced Directive/ Living Will in place? YES  If yes, do we have a copy on file YES  If no, would you like information NO    Patient is accompanied by wife I have received verbal consent from Lori Moreno to discuss any/all medical information while they are present in the room.

## 2019-10-09 NOTE — PROGRESS NOTES
Pharmacy Note- Chemotherapy Education    Emma Mitchell is a  47 y.o.male  diagnosed with rectal cancer here today for chemotherapy education and consent. Mr. Anthony Rodrigez is being treated with mFOLFOX. Provided education on oxaliplatin, leucovorin, fluorouracil and premedications - ondansetron and dexamethasone. Side effects of chemotherapy reviewed included s/s infection, anemia, appetite changes, thromboyctopenia, fatigue, hair loss/alopecia, bone pain, skin and nail changes, diarrhea/constipation, hand/foot syndrome and nerve sensitivities (cold sensitivity and peripheral neuropathy). Patient given ways to manage these side effects and when to contact office. [address] Handout of medications provided to patient. Mr. Anthony Rodrigez verbalized understanding of the information presented and all of the patient's questions were answered.     Garrison Ramos, PharmD, BCPS, BCOP

## 2019-10-11 ENCOUNTER — HOME CARE VISIT (OUTPATIENT)
Dept: SCHEDULING | Facility: HOME HEALTH | Age: 49
End: 2019-10-11
Payer: MEDICAID

## 2019-10-11 VITALS
DIASTOLIC BLOOD PRESSURE: 76 MMHG | HEART RATE: 85 BPM | SYSTOLIC BLOOD PRESSURE: 128 MMHG | RESPIRATION RATE: 20 BRPM | TEMPERATURE: 98.4 F | OXYGEN SATURATION: 98 %

## 2019-10-11 PROCEDURE — G0300 HHS/HOSPICE OF LPN EA 15 MIN: HCPCS

## 2019-10-12 PROCEDURE — A4371 SKIN BARRIER POWDER PER OZ: HCPCS

## 2019-10-14 ENCOUNTER — HOME CARE VISIT (OUTPATIENT)
Dept: SCHEDULING | Facility: HOME HEALTH | Age: 49
End: 2019-10-14
Payer: MEDICAID

## 2019-10-14 ENCOUNTER — OFFICE VISIT (OUTPATIENT)
Dept: INTERNAL MEDICINE CLINIC | Age: 49
End: 2019-10-14

## 2019-10-14 ENCOUNTER — HOSPITAL ENCOUNTER (OUTPATIENT)
Dept: CT IMAGING | Age: 49
Discharge: HOME | End: 2019-10-14
Attending: INTERNAL MEDICINE
Payer: MEDICAID

## 2019-10-14 VITALS
RESPIRATION RATE: 16 BRPM | HEIGHT: 67 IN | DIASTOLIC BLOOD PRESSURE: 73 MMHG | WEIGHT: 146.1 LBS | SYSTOLIC BLOOD PRESSURE: 109 MMHG | HEART RATE: 71 BPM | OXYGEN SATURATION: 100 % | TEMPERATURE: 98.3 F | BODY MASS INDEX: 22.93 KG/M2

## 2019-10-14 VITALS
SYSTOLIC BLOOD PRESSURE: 120 MMHG | TEMPERATURE: 99.1 F | DIASTOLIC BLOOD PRESSURE: 74 MMHG | OXYGEN SATURATION: 97 % | RESPIRATION RATE: 16 BRPM | HEART RATE: 84 BPM

## 2019-10-14 DIAGNOSIS — I10 BENIGN ESSENTIAL HTN: Primary | ICD-10-CM

## 2019-10-14 DIAGNOSIS — C20 RECTAL CANCER (HCC): ICD-10-CM

## 2019-10-14 DIAGNOSIS — Z23 ENCOUNTER FOR IMMUNIZATION: ICD-10-CM

## 2019-10-14 PROCEDURE — G0299 HHS/HOSPICE OF RN EA 15 MIN: HCPCS

## 2019-10-14 PROCEDURE — 74177 CT ABD & PELVIS W/CONTRAST: CPT

## 2019-10-14 PROCEDURE — 74011636320 HC RX REV CODE- 636/320: Performed by: RADIOLOGY

## 2019-10-14 PROCEDURE — 71260 CT THORAX DX C+: CPT

## 2019-10-14 PROCEDURE — 74011000258 HC RX REV CODE- 258: Performed by: RADIOLOGY

## 2019-10-14 RX ORDER — HYDROCHLOROTHIAZIDE 12.5 MG/1
12.5 TABLET ORAL DAILY
Qty: 30 TAB | Refills: 0 | Status: SHIPPED | OUTPATIENT
Start: 2019-10-14 | End: 2019-11-06 | Stop reason: SDUPTHER

## 2019-10-14 RX ORDER — SODIUM CHLORIDE 0.9 % (FLUSH) 0.9 %
10 SYRINGE (ML) INJECTION
Status: COMPLETED | OUTPATIENT
Start: 2019-10-14 | End: 2019-10-14

## 2019-10-14 RX ADMIN — IOPAMIDOL 100 ML: 755 INJECTION, SOLUTION INTRAVENOUS at 11:02

## 2019-10-14 RX ADMIN — Medication 10 ML: at 11:02

## 2019-10-14 RX ADMIN — SODIUM CHLORIDE 100 ML: 900 INJECTION, SOLUTION INTRAVENOUS at 11:02

## 2019-10-14 NOTE — PROGRESS NOTES
Chief Complaint   Patient presents with    Blood Pressure Check     Pt who presents for follow up of a pre-existing problem of hypertension. Diet and Lifestyle: generally follows a low fat low cholesterol diet, generally follows a low sodium diet, exercises regularly, nonsmoker  Home BP Monitoring: is well controlled at home, ranging 128's/70's  Use of agents associated with hypertension: none. Cardiovascular ROS: taking medications as instructed, no medication side effects noted, no TIA's, no chest pain on exertion, no dyspnea on exertion, no swelling of ankles. New concerns: none. Reviewed and agree with Nurse Note and duplicated in this note. Reviewed PmHx, RxHx, FmHx, SocHx, AllgHx and updated and dated in the chart.     Family History   Adopted: Yes   Problem Relation Age of Onset    Asthma Neg Hx     Cancer Neg Hx     Diabetes Neg Hx     Heart Disease Neg Hx     Hypertension Neg Hx     Stroke Neg Hx        Past Medical History:   Diagnosis Date    Adopted     GERD (gastroesophageal reflux disease)     Psoriasis     Psoriatic arthritis ([address]     Rectal cancer (HCC)       Social History     Socioeconomic History    Marital status:      Spouse name: Not on file    Number of children: Not on file    Years of education: Not on file    Highest education level: Not on file   Tobacco Use    Smoking status: Never Smoker    Smokeless tobacco: Never Used   Substance and Sexual Activity    Alcohol use: Yes     Comment: 6 BEERS WEEKLY    Drug use: No    Sexual activity: Not Currently        Review of Systems - negative except as listed above      Objective:     Vitals:    10/14/19 1449   BP: 109/73   Pulse: 71   Resp: 16   Temp: 98.3 °F (36.8 °C)   TempSrc: Oral   SpO2: 100%   Weight: 146 lb 1.6 oz (66.3 kg)   Height: 5' 7\" (1.702 m)       Physical Examination: General appearance - alert, well appearing, and in no distress  Eyes - pupils equal and reactive, extraocular eye movements intact  Ears - bilateral TM's and external ear canals normal  Nose - normal and patent, no erythema, discharge or polyps  Mouth - mucous membranes moist, pharynx normal without lesions  Neck - supple, no significant adenopathy  Chest - clear to auscultation, no wheezes, rales or rhonchi, symmetric air entry  Heart - normal rate, regular rhythm, normal S1, S2, no murmurs, rubs, clicks or gallops  Abdomen - soft, nontender, nondistended, no masses or organomegaly  Neurological - alert, oriented, normal speech, no focal findings or movement disorder noted  Musculoskeletal - no joint tenderness, deformity or swelling  Extremities - peripheral pulses normal, no pedal edema, no clubbing or cyanosis  Skin - normal coloration and turgor, no rashes, no suspicious skin lesions noted    Assessment/ Plan:   Diagnoses and all orders for this visit:    1. Benign essential HTN    2. Encounter for immunization  -     INFLUENZA VIRUS VAC QUAD,SPLIT,PRESV FREE SYRINGE IM  -     KS IMMUNIZ ADMIN,1 SINGLE/COMB VAC/TOXOID    Other orders  -     hydroCHLOROthiazide (HYDRODIURIL) 12.5 mg tablet; Take 1 Tab by mouth daily. he will return in 2 weeks for blood pressure check          Medication Side Effects and Warnings were discussed with patient,  Patient Labs were reviewed and or requested, and  Patient Past Records were reviewed and or requested  yes       I have discussed the diagnosis with the patient and the intended plan as seen in the above orders. The patient has received an after-visit summary and questions were answered concerning future plans. Pt agrees to call or return to clinic and/or go to closest ER with any worsening of symptoms. This may include, but not limited to increased fever (>100.4) with NSAIDS or Tylenol, increased edema, confusion, rash, worsening of presenting symptoms.

## 2019-10-14 NOTE — PATIENT INSTRUCTIONS
DASH Diet: Care Instructions  Your Care Instructions    The DASH diet is an eating plan that can help lower your blood pressure. DASH stands for Dietary Approaches to Stop Hypertension. Hypertension is high blood pressure. The DASH diet focuses on eating foods that are high in calcium, potassium, and magnesium. These nutrients can lower blood pressure. The foods that are highest in these nutrients are fruits, vegetables, low-fat dairy products, nuts, seeds, and legumes. But taking calcium, potassium, and magnesium supplements instead of eating foods that are high in those nutrients does not have the same effect. The DASH diet also includes whole grains, fish, and poultry. The DASH diet is one of several lifestyle changes your doctor may recommend to lower your high blood pressure. Your doctor may also want you to decrease the amount of sodium in your diet. Lowering sodium while following the DASH diet can lower blood pressure even further than just the DASH diet alone. Follow-up care is a key part of your treatment and safety. Be sure to make and go to all appointments, and call your doctor if you are having problems. It's also a good idea to know your test results and keep a list of the medicines you take. How can you care for yourself at home? Following the DASH diet  · Eat 4 to 5 servings of fruit each day. A serving is 1 medium-sized piece of fruit, ½ cup chopped or canned fruit, 1/4 cup dried fruit, or 4 ounces (½ cup) of fruit juice. Choose fruit more often than fruit juice. · Eat 4 to 5 servings of vegetables each day. A serving is 1 cup of lettuce or raw leafy vegetables, ½ cup of chopped or cooked vegetables, or 4 ounces (½ cup) of vegetable juice. Choose vegetables more often than vegetable juice. · Get 2 to 3 servings of low-fat and fat-free dairy each day. A serving is 8 ounces of milk, 1 cup of yogurt, or 1 ½ ounces of cheese. · Eat 6 to 8 servings of grains each day.  A serving is 1 slice of bread, 1 ounce of dry cereal, or ½ cup of cooked rice, pasta, or cooked cereal. Try to choose whole-grain products as much as possible. · Limit lean meat, poultry, and fish to 2 servings each day. A serving is 3 ounces, about the size of a deck of cards. · Eat 4 to 5 servings of nuts, seeds, and legumes (cooked dried beans, lentils, and split peas) each week. A serving is 1/3 cup of nuts, 2 tablespoons of seeds, or ½ cup of cooked beans or peas. · Limit fats and oils to 2 to 3 servings each day. A serving is 1 teaspoon of vegetable oil or 2 tablespoons of salad dressing. · Limit sweets and added sugars to 5 servings or less a week. A serving is 1 tablespoon jelly or jam, ½ cup sorbet, or 1 cup of lemonade. · Eat less than 2,300 milligrams (mg) of sodium a day. If you limit your sodium to 1,500 mg a day, you can lower your blood pressure even more. Tips for success  · Start small. Do not try to make dramatic changes to your diet all at once. You might feel that you are missing out on your favorite foods and then be more likely to not follow the plan. Make small changes, and stick with them. Once those changes become habit, add a few more changes. · Try some of the following:  ? Make it a goal to eat a fruit or vegetable at every meal and at snacks. This will make it easy to get the recommended amount of fruits and vegetables each day. ? Try yogurt topped with fruit and nuts for a snack or healthy dessert. ? Add lettuce, tomato, cucumber, and onion to sandwiches. ? Combine a ready-made pizza crust with low-fat mozzarella cheese and lots of vegetable toppings. Try using tomatoes, squash, spinach, broccoli, carrots, cauliflower, and onions. ? Have a variety of cut-up vegetables with a low-fat dip as an appetizer instead of chips and dip. ? Sprinkle sunflower seeds or chopped almonds over salads. Or try adding chopped walnuts or almonds to cooked vegetables.   ? Try some vegetarian meals using beans and peas. Add garbanzo or kidney beans to salads. Make burritos and tacos with mashed brambila beans or black beans. Where can you learn more? Go to http://nila-amairani.info/. Enter Q178 in the search box to learn more about \"DASH Diet: Care Instructions. \"  Current as of: April 9, 2019  Content Version: 12.2  © 1722-4619 [employer]. Care instructions adapted under license by [employer] (which disclaims liability or warranty for this information). If you have questions about a medical condition or this instruction, always ask your healthcare professional. [address] any warranty or liability for your use of this information.

## 2019-10-15 ENCOUNTER — HOSPITAL ENCOUNTER (OUTPATIENT)
Dept: INTERVENTIONAL RADIOLOGY/VASCULAR | Age: 49
Discharge: HOME | End: 2019-10-15
Attending: INTERNAL MEDICINE | Admitting: INTERNAL MEDICINE
Payer: MEDICAID

## 2019-10-15 VITALS
TEMPERATURE: 98.4 F | HEART RATE: 84 BPM | SYSTOLIC BLOOD PRESSURE: 114 MMHG | BODY MASS INDEX: 23.46 KG/M2 | DIASTOLIC BLOOD PRESSURE: 57 MMHG | OXYGEN SATURATION: 98 % | WEIGHT: 146 LBS | HEIGHT: 66 IN | RESPIRATION RATE: 14 BRPM

## 2019-10-15 DIAGNOSIS — C20 RECTAL CANCER (HCC): ICD-10-CM

## 2019-10-15 PROCEDURE — 77030031139 HC SUT VCRL2 J&J -A

## 2019-10-15 PROCEDURE — C1751 CATH, INF, PER/CENT/MIDLINE: HCPCS

## 2019-10-15 PROCEDURE — 74011250636 HC RX REV CODE- 250/636: Performed by: RADIOLOGY

## 2019-10-15 PROCEDURE — C1894 INTRO/SHEATH, NON-LASER: HCPCS

## 2019-10-15 PROCEDURE — 74011000250 HC RX REV CODE- 250: Performed by: RADIOLOGY

## 2019-10-15 PROCEDURE — 36561 INSERT TUNNELED CV CATH: CPT

## 2019-10-15 PROCEDURE — 77030011893 HC TY CUT DN TRIS -B

## 2019-10-15 PROCEDURE — 77030039266 HC ADH SKN EXOFIN S2SG -A

## 2019-10-15 RX ORDER — LIDOCAINE HYDROCHLORIDE 10 MG/ML
10 INJECTION, SOLUTION EPIDURAL; INFILTRATION; INTRACAUDAL; PERINEURAL ONCE
Status: COMPLETED | OUTPATIENT
Start: 2019-10-15 | End: 2019-10-15

## 2019-10-15 RX ORDER — CEFAZOLIN SODIUM/WATER 2 G/20 ML
2 SYRINGE (ML) INTRAVENOUS
Status: COMPLETED | OUTPATIENT
Start: 2019-10-15 | End: 2019-10-15

## 2019-10-15 RX ORDER — FENTANYL CITRATE 50 UG/ML
25 INJECTION, SOLUTION INTRAMUSCULAR; INTRAVENOUS
Status: DISCONTINUED | OUTPATIENT
Start: 2019-10-15 | End: 2019-10-15

## 2019-10-15 RX ORDER — MIDAZOLAM HYDROCHLORIDE 1 MG/ML
5 INJECTION, SOLUTION INTRAMUSCULAR; INTRAVENOUS
Status: DISCONTINUED | OUTPATIENT
Start: 2019-10-15 | End: 2019-10-15

## 2019-10-15 RX ORDER — HEPARIN 100 UNIT/ML
300 SYRINGE INTRAVENOUS AS NEEDED
Status: DISCONTINUED | OUTPATIENT
Start: 2019-10-15 | End: 2019-10-15

## 2019-10-15 RX ORDER — SODIUM CHLORIDE 9 MG/ML
25 INJECTION, SOLUTION INTRAVENOUS ONCE
Status: COMPLETED | OUTPATIENT
Start: 2019-10-15 | End: 2019-10-15

## 2019-10-15 RX ORDER — LIDOCAINE HYDROCHLORIDE AND EPINEPHRINE 10; 10 MG/ML; UG/ML
30 INJECTION, SOLUTION INFILTRATION; PERINEURAL ONCE
Status: COMPLETED | OUTPATIENT
Start: 2019-10-15 | End: 2019-10-15

## 2019-10-15 RX ADMIN — FENTANYL CITRATE 25 MCG: 50 INJECTION INTRAMUSCULAR; INTRAVENOUS at 11:21

## 2019-10-15 RX ADMIN — FENTANYL CITRATE 25 MCG: 50 INJECTION INTRAMUSCULAR; INTRAVENOUS at 11:08

## 2019-10-15 RX ADMIN — Medication 300 UNITS: at 11:30

## 2019-10-15 RX ADMIN — SODIUM CHLORIDE 25 ML/HR: 900 INJECTION, SOLUTION INTRAVENOUS at 10:49

## 2019-10-15 RX ADMIN — FENTANYL CITRATE 25 MCG: 50 INJECTION INTRAMUSCULAR; INTRAVENOUS at 11:13

## 2019-10-15 RX ADMIN — MIDAZOLAM 1 MG: 1 INJECTION INTRAMUSCULAR; INTRAVENOUS at 11:13

## 2019-10-15 RX ADMIN — MIDAZOLAM 1 MG: 1 INJECTION INTRAMUSCULAR; INTRAVENOUS at 11:16

## 2019-10-15 RX ADMIN — Medication 2 G: at 10:53

## 2019-10-15 RX ADMIN — MIDAZOLAM 1 MG: 1 INJECTION INTRAMUSCULAR; INTRAVENOUS at 11:08

## 2019-10-15 RX ADMIN — LIDOCAINE HYDROCHLORIDE 8 ML: 10 INJECTION, SOLUTION EPIDURAL; INFILTRATION; INTRACAUDAL; PERINEURAL at 11:24

## 2019-10-15 RX ADMIN — MIDAZOLAM 1 MG: 1 INJECTION INTRAMUSCULAR; INTRAVENOUS at 11:22

## 2019-10-15 RX ADMIN — LIDOCAINE HYDROCHLORIDE AND EPINEPHRINE 5 ML: 10; 10 INJECTION, SOLUTION INFILTRATION; PERINEURAL at 11:24

## 2019-10-15 RX ADMIN — FENTANYL CITRATE 25 MCG: 50 INJECTION INTRAMUSCULAR; INTRAVENOUS at 11:16

## 2019-10-15 NOTE — PROGRESS NOTES
Escorted to angio holding prior to port placement. Wife with patient. Dr. Darden Link in to evaluate patient and obtain consent. Patient and wife verbalize understanding of procedure and plan of care for today. Consent signed by patient. Procedure completed. Skin glue to right upper chest and right lower neck dry and intact. Denies any discomfort. Taking po fluids and eating crackers. Discharge instructions reviewed with patient and wife. Both verbalize understanding odf same. Copy with patient at time of discharge.     To auto via wheelchair at 1240 pm.

## 2019-10-15 NOTE — H&P
Interventional and Vascular Radiology History and Physical    Patient: Clarissa Black 47 y.o. male       Chief Complaint: No chief complaint on file.       History of Present Illness: chemotherapy     History:    Past Medical History:   Diagnosis Date    Adopted     GERD (gastroesophageal reflux disease)     Psoriasis     [facility] ([facility]     Rectal cancer ([facility]      Family History   Adopted: Yes   Problem Relation Age of Onset    Asthma Neg Hx     Cancer Neg Hx     Diabetes Neg Hx     Heart Disease Neg Hx     Hypertension Neg Hx     Stroke Neg Hx      Social History     Socioeconomic History    Marital status:      Spouse name: Not on file    Number of children: Not on file    Years of education: Not on file    Highest education level: Not on file   Occupational History    Not on file   Social Needs    Financial resource strain: Not on file    Food insecurity:     Worry: Not on file     Inability: Not on file    Transportation needs:     Medical: Not on file     Non-medical: Not on file   Tobacco Use    Smoking status: Never Smoker    Smokeless tobacco: Never Used   Substance and Sexual Activity    Alcohol use: Yes     Comment: 6 BEERS WEEKLY    Drug use: No    Sexual activity: Not Currently   Lifestyle    Physical activity:     Days per week: Not on file     Minutes per session: Not on file    Stress: Not on file   Relationships    Social connections:     Talks on phone: Not on file     Gets together: Not on file     Attends Scientologist service: Not on file     Active member of club or organization: Not on file     Attends meetings of clubs or organizations: Not on file     Relationship status: Not on file    Intimate partner violence:     Fear of current or ex partner: Not on file     Emotionally abused: Not on file     Physically abused: Not on file     Forced sexual activity: Not on file   Other Topics Concern    Not on file   Social History Narrative    Not on file Allergies: No Known Allergies    Current Medications:  No current facility-administered medications for this encounter. Physical Exam:  Blood pressure 114/57, pulse 84, temperature 98.4 °F (36.9 °C), resp. rate 14, height 5' 6\" (1.676 m), weight 66.2 kg (146 lb), SpO2 98 %. LUNG: clear to auscultation bilaterally, HEART: regular rate and rhythm, S1, S2 normal, no murmur, click, rub or gallop      Alerts:    Hospital Problems  Date Reviewed: 10/14/2019    None          Laboratory:    No results for input(s): HGB, HCT, WBC, PLT, INR, BUN, CREA, K, CRCLT, HGBEXT, HCTEXT, PLTEXT, INREXT in the last 72 hours. No lab exists for component: PTT, PT      Plan of Care/Planned Procedure:  Risks, benefits, and alternatives reviewed with patient and he agrees to proceed with the procedure. Conscious sedation will be performed with IV fentanyl and versed.  Plan is for chest port placement       Queen Valentina MD

## 2019-10-15 NOTE — DISCHARGE INSTRUCTIONS
Side effects of sedation medications and other medications used today have been reviewed. Notify us of nausea, itching, hives, dizziness, or any unusual symptoms. Should you experience any of these significant changes, please call [phone] between the hours of 7:30 am and 10 pm or [phone] after hours. After hours, ask the  to page the X-ray Technologist, and describe the problem to the technologist.   [facility]. [facility]      General Instructions:   A port is like an implanted IV. They are usually ordered for patients who will be getting chemotherapy, but can also be used as an IV for long term antibiotics, large amounts of fluids, and/or blood products. Your blood can be drawn from your port for labs also. Those patients who do not have good veins find the ports convenient as they can get the IV they need with one stick. The port can be used long term, and the care is easy. The device is under the skin, and once the skin heals, care is minimal. All that is required is the nurse who accesses the port will need to flush it with heparinized saline after each use. Ports are usually placed in the chest wall, usually on the right side. But they can be place in the arms and in the abdomen. The skin glue used to close the pocket of your port will fall off in 7 to 10 days. Do not pick or pull at the glue. Your port is ready for use today. Because you received sedation today, do not drive or sign any legal documents for 24 hours. Home Care Instructions:   Do not bathe or swim until the skin has healed and if the port is accessed. Once it is healed, and when the port is not accessed, it is okay to bathe and swim. Restrict yourself to light activity for the first 5 days after getting the port put in, after that, resume normal activity slowly. You may resume your normal diet and medications.     Follow-Up Instructions: Please see your oncologist, or whatever physician ordered the port as he/she has requested of you. Call If: You should call your Physician and/or the Radiology Nurse if you notice redness, pus, swelling, or pain from the area of your incision. Call if you should develop a fever. The nurses who access your port will know to call your doctor if the port does not seem to be working properly. You need to tell the nurses who use the port if you should have any pain or swelling at the site during an infusion.       Patient Signature:  Date: 10/15/2019  Discharging Nurse: Siddhartha Crisostomo RN

## 2019-10-17 ENCOUNTER — HOME CARE VISIT (OUTPATIENT)
Dept: SCHEDULING | Facility: HOME HEALTH | Age: 49
End: 2019-10-17
Payer: MEDICAID

## 2019-10-17 VITALS
TEMPERATURE: 98.3 F | SYSTOLIC BLOOD PRESSURE: 120 MMHG | HEART RATE: 88 BPM | DIASTOLIC BLOOD PRESSURE: 88 MMHG | RESPIRATION RATE: 16 BRPM | OXYGEN SATURATION: 98 %

## 2019-10-17 PROCEDURE — G0299 HHS/HOSPICE OF RN EA 15 MIN: HCPCS

## 2019-10-21 ENCOUNTER — HOME CARE VISIT (OUTPATIENT)
Dept: SCHEDULING | Facility: HOME HEALTH | Age: 49
End: 2019-10-21
Payer: MEDICAID

## 2019-10-21 ENCOUNTER — OFFICE VISIT (OUTPATIENT)
Dept: INTERNAL MEDICINE CLINIC | Age: 49
End: 2019-10-21

## 2019-10-21 VITALS
OXYGEN SATURATION: 99 % | TEMPERATURE: 98.8 F | DIASTOLIC BLOOD PRESSURE: 80 MMHG | HEART RATE: 73 BPM | SYSTOLIC BLOOD PRESSURE: 122 MMHG | RESPIRATION RATE: 16 BRPM

## 2019-10-21 VITALS
SYSTOLIC BLOOD PRESSURE: 112 MMHG | RESPIRATION RATE: 16 BRPM | OXYGEN SATURATION: 100 % | BODY MASS INDEX: 23.11 KG/M2 | HEART RATE: 71 BPM | HEIGHT: 66 IN | DIASTOLIC BLOOD PRESSURE: 71 MMHG | WEIGHT: 143.8 LBS

## 2019-10-21 DIAGNOSIS — I10 BENIGN ESSENTIAL HTN: Primary | ICD-10-CM

## 2019-10-21 PROCEDURE — G0299 HHS/HOSPICE OF RN EA 15 MIN: HCPCS

## 2019-10-21 NOTE — PATIENT INSTRUCTIONS
DASH Diet: Care Instructions  Your Care Instructions    The DASH diet is an eating plan that can help lower your blood pressure. DASH stands for Dietary Approaches to Stop Hypertension. Hypertension is high blood pressure. The DASH diet focuses on eating foods that are high in calcium, potassium, and magnesium. These nutrients can lower blood pressure. The foods that are highest in these nutrients are fruits, vegetables, low-fat dairy products, nuts, seeds, and legumes. But taking calcium, potassium, and magnesium supplements instead of eating foods that are high in those nutrients does not have the same effect. The DASH diet also includes whole grains, fish, and poultry. The DASH diet is one of several lifestyle changes your doctor may recommend to lower your high blood pressure. Your doctor may also want you to decrease the amount of sodium in your diet. Lowering sodium while following the DASH diet can lower blood pressure even further than just the DASH diet alone. Follow-up care is a key part of your treatment and safety. Be sure to make and go to all appointments, and call your doctor if you are having problems. It's also a good idea to know your test results and keep a list of the medicines you take. How can you care for yourself at home? Following the DASH diet  · Eat 4 to 5 servings of fruit each day. A serving is 1 medium-sized piece of fruit, ½ cup chopped or canned fruit, 1/4 cup dried fruit, or 4 ounces (½ cup) of fruit juice. Choose fruit more often than fruit juice. · Eat 4 to 5 servings of vegetables each day. A serving is 1 cup of lettuce or raw leafy vegetables, ½ cup of chopped or cooked vegetables, or 4 ounces (½ cup) of vegetable juice. Choose vegetables more often than vegetable juice. · Get 2 to 3 servings of low-fat and fat-free dairy each day. A serving is 8 ounces of milk, 1 cup of yogurt, or 1 ½ ounces of cheese. · Eat 6 to 8 servings of grains each day.  A serving is 1 slice of bread, 1 ounce of dry cereal, or ½ cup of cooked rice, pasta, or cooked cereal. Try to choose whole-grain products as much as possible. · Limit lean meat, poultry, and fish to 2 servings each day. A serving is 3 ounces, about the size of a deck of cards. · Eat 4 to 5 servings of nuts, seeds, and legumes (cooked dried beans, lentils, and split peas) each week. A serving is 1/3 cup of nuts, 2 tablespoons of seeds, or ½ cup of cooked beans or peas. · Limit fats and oils to 2 to 3 servings each day. A serving is 1 teaspoon of vegetable oil or 2 tablespoons of salad dressing. · Limit sweets and added sugars to 5 servings or less a week. A serving is 1 tablespoon jelly or jam, ½ cup sorbet, or 1 cup of lemonade. · Eat less than 2,300 milligrams (mg) of sodium a day. If you limit your sodium to 1,500 mg a day, you can lower your blood pressure even more. Tips for success  · Start small. Do not try to make dramatic changes to your diet all at once. You might feel that you are missing out on your favorite foods and then be more likely to not follow the plan. Make small changes, and stick with them. Once those changes become habit, add a few more changes. · Try some of the following:  ? Make it a goal to eat a fruit or vegetable at every meal and at snacks. This will make it easy to get the recommended amount of fruits and vegetables each day. ? Try yogurt topped with fruit and nuts for a snack or healthy dessert. ? Add lettuce, tomato, cucumber, and onion to sandwiches. ? Combine a ready-made pizza crust with low-fat mozzarella cheese and lots of vegetable toppings. Try using tomatoes, squash, spinach, broccoli, carrots, cauliflower, and onions. ? Have a variety of cut-up vegetables with a low-fat dip as an appetizer instead of chips and dip. ? Sprinkle sunflower seeds or chopped almonds over salads. Or try adding chopped walnuts or almonds to cooked vegetables.   ? Try some vegetarian meals using beans and peas. Add garbanzo or kidney beans to salads. Make burritos and tacos with mashed brambila beans or black beans. Where can you learn more? Go to http://nila-amairani.info/. Enter B005 in the search box to learn more about \"DASH Diet: Care Instructions. \"  Current as of: April 9, 2019  Content Version: 12.2  © 9200-6762 [employer]. Care instructions adapted under license by [employer] (which disclaims liability or warranty for this information). If you have questions about a medical condition or this instruction, always ask your healthcare professional. [address] any warranty or liability for your use of this information.

## 2019-10-21 NOTE — PROGRESS NOTES
Chief Complaint   Patient presents with    Blood Pressure Check     Pt who presents for follow up of a pre-existing problem of hypertension. Diet and Lifestyle: generally follows a low fat low cholesterol diet, generally follows a low sodium diet, exercises sporadically, nonsmoker  Home BP Monitoring: is well controlled at home, ranging 120's/80's  Use of agents associated with hypertension: none. Cardiovascular ROS: taking medications as instructed, no medication side effects noted, no TIA's, no chest pain on exertion, no dyspnea on exertion, no swelling of ankles. New concerns: none. Reviewed and agree with Nurse Note and duplicated in this note. Reviewed PmHx, RxHx, FmHx, SocHx, AllgHx and updated and dated in the chart.     Family History   Adopted: Yes   Problem Relation Age of Onset    Asthma Neg Hx     Cancer Neg Hx     Diabetes Neg Hx     Heart Disease Neg Hx     Hypertension Neg Hx     Stroke Neg Hx        Past Medical History:   Diagnosis Date    Adopted     GERD (gastroesophageal reflux disease)     Psoriasis     Psoriatic arthritis (HCC)     Rectal cancer (HCC)       Social History     Socioeconomic History    Marital status:      Spouse name: Not on file    Number of children: Not on file    Years of education: Not on file    Highest education level: Not on file   Tobacco Use    Smoking status: Never Smoker    Smokeless tobacco: Never Used   Substance and Sexual Activity    Alcohol use: Yes     Comment: 6 BEERS WEEKLY    Drug use: No    Sexual activity: Not Currently        Review of Systems - negative except as listed above      Objective:     Vitals:    10/21/19 1357   BP: 112/71   Pulse: 71   Resp: 16   SpO2: 100%   Weight: 143 lb 12.8 oz (65.2 kg)   Height: 5' 6\" (1.676 m)       Physical Examination: General appearance - alert, well appearing, and in no distress  Eyes - pupils equal and reactive, extraocular eye movements intact  Ears - bilateral TM's and external ear canals normal  Nose - normal and patent, no erythema, discharge or polyps  Mouth - mucous membranes moist, pharynx normal without lesions  Neck - supple, no significant adenopathy  Chest - clear to auscultation, no wheezes, rales or rhonchi, symmetric air entry  Heart - normal rate, regular rhythm, normal S1, S2, no murmurs, rubs, clicks or gallops  Abdomen - soft, nontender, nondistended, no masses or organomegaly  Neurological - alert, oriented, normal speech, no focal findings or movement disorder noted  Musculoskeletal - no joint tenderness, deformity or swelling  Extremities - peripheral pulses normal, no pedal edema, no clubbing or cyanosis  Skin - normal coloration and turgor, no rashes, no suspicious skin lesions noted    Assessment/ Plan:   Diagnoses and all orders for this visit:    1. Benign essential HTN    We will continue blood pressure medication HCTZ 12.5 g, patient will continue check blood pressure at home if he notices any dizziness or drop in blood pressure he will call or return to clinic      Recheck in 6 months    Medication Side Effects and Warnings were discussed with patient,  Patient Labs were reviewed and or requested, and  Patient Past Records were reviewed and or requested  yes       I have discussed the diagnosis with the patient and the intended plan as seen in the above orders. The patient has received an after-visit summary and questions were answered concerning future plans. Pt agrees to call or return to clinic and/or go to closest ER with any worsening of symptoms. This may include, but not limited to increased fever (>100.4) with NSAIDS or Tylenol, increased edema, confusion, rash, worsening of presenting symptoms.

## 2019-10-23 ENCOUNTER — HOSPITAL ENCOUNTER (OUTPATIENT)
Dept: INFUSION THERAPY | Age: 49
Discharge: HOME | End: 2019-10-23
Payer: MEDICAID

## 2019-10-23 ENCOUNTER — OFFICE VISIT (OUTPATIENT)
Dept: ONCOLOGY | Age: 49
End: 2019-10-23

## 2019-10-23 VITALS
BODY MASS INDEX: 22.6 KG/M2 | HEIGHT: 67 IN | TEMPERATURE: 98 F | DIASTOLIC BLOOD PRESSURE: 84 MMHG | HEART RATE: 73 BPM | RESPIRATION RATE: 16 BRPM | SYSTOLIC BLOOD PRESSURE: 124 MMHG | WEIGHT: 144 LBS

## 2019-10-23 VITALS
HEIGHT: 67 IN | SYSTOLIC BLOOD PRESSURE: 116 MMHG | HEART RATE: 68 BPM | RESPIRATION RATE: 17 BRPM | TEMPERATURE: 98 F | BODY MASS INDEX: 22.66 KG/M2 | DIASTOLIC BLOOD PRESSURE: 82 MMHG | WEIGHT: 144.4 LBS | OXYGEN SATURATION: 98 %

## 2019-10-23 DIAGNOSIS — C20 RECTAL CANCER (HCC): Primary | ICD-10-CM

## 2019-10-23 DIAGNOSIS — Z95.828 PORT-A-CATH IN PLACE: ICD-10-CM

## 2019-10-23 LAB
ALBUMIN SERPL-MCNC: 3.3 G/DL (ref 3.5–5)
ALBUMIN/GLOB SERPL: 1.1 {RATIO} (ref 1.1–2.2)
ALP SERPL-CCNC: 60 U/L (ref 45–117)
ALT SERPL-CCNC: 25 U/L (ref 12–78)
ANION GAP SERPL CALC-SCNC: 5 MMOL/L (ref 5–15)
AST SERPL-CCNC: 12 U/L (ref 15–37)
BASOPHILS # BLD: 0 K/UL (ref 0–0.1)
BASOPHILS NFR BLD: 0 % (ref 0–1)
BILIRUB SERPL-MCNC: 0.3 MG/DL (ref 0.2–1)
BUN SERPL-MCNC: 9 MG/DL (ref 6–20)
BUN/CREAT SERPL: 10 (ref 12–20)
CALCIUM SERPL-MCNC: 9 MG/DL (ref 8.5–10.1)
CEA SERPL-MCNC: 0.9 NG/ML
CHLORIDE SERPL-SCNC: 109 MMOL/L (ref 97–108)
CO2 SERPL-SCNC: 27 MMOL/L (ref 21–32)
CREAT SERPL-MCNC: 0.91 MG/DL (ref 0.7–1.3)
DIFFERENTIAL METHOD BLD: ABNORMAL
EOSINOPHIL # BLD: 0.1 K/UL (ref 0–0.4)
EOSINOPHIL NFR BLD: 4 % (ref 0–7)
ERYTHROCYTE [DISTWIDTH] IN BLOOD BY AUTOMATED COUNT: 11.5 % (ref 11.5–14.5)
GLOBULIN SER CALC-MCNC: 3.1 G/DL (ref 2–4)
GLUCOSE SERPL-MCNC: 123 MG/DL (ref 65–100)
HCT VFR BLD AUTO: 37.5 % (ref 36.6–50.3)
HGB BLD-MCNC: 12.8 G/DL (ref 12.1–17)
IMM GRANULOCYTES # BLD AUTO: 0 K/UL (ref 0–0.04)
IMM GRANULOCYTES NFR BLD AUTO: 1 % (ref 0–0.5)
LYMPHOCYTES # BLD: 0.4 K/UL (ref 0.8–3.5)
LYMPHOCYTES NFR BLD: 12 % (ref 12–49)
MCH RBC QN AUTO: 33 PG (ref 26–34)
MCHC RBC AUTO-ENTMCNC: 34.1 G/DL (ref 30–36.5)
MCV RBC AUTO: 96.6 FL (ref 80–99)
MONOCYTES # BLD: 0.3 K/UL (ref 0–1)
MONOCYTES NFR BLD: 10 % (ref 5–13)
NEUTS SEG # BLD: 2.3 K/UL (ref 1.8–8)
NEUTS SEG NFR BLD: 74 % (ref 32–75)
NRBC # BLD: 0 K/UL (ref 0–0.01)
NRBC BLD-RTO: 0 PER 100 WBC
PLATELET # BLD AUTO: 152 K/UL (ref 150–400)
PMV BLD AUTO: 10.2 FL (ref 8.9–12.9)
POTASSIUM SERPL-SCNC: 3.9 MMOL/L (ref 3.5–5.1)
PROT SERPL-MCNC: 6.4 G/DL (ref 6.4–8.2)
RBC # BLD AUTO: 3.88 M/UL (ref 4.1–5.7)
SODIUM SERPL-SCNC: 141 MMOL/L (ref 136–145)
WBC # BLD AUTO: 3.2 K/UL (ref 4.1–11.1)

## 2019-10-23 PROCEDURE — 74011000258 HC RX REV CODE- 258: Performed by: INTERNAL MEDICINE

## 2019-10-23 PROCEDURE — 96416 CHEMO PROLONG INFUSE W/PUMP: CPT

## 2019-10-23 PROCEDURE — 96367 TX/PROPH/DG ADDL SEQ IV INF: CPT

## 2019-10-23 PROCEDURE — 74011250636 HC RX REV CODE- 250/636: Performed by: INTERNAL MEDICINE

## 2019-10-23 PROCEDURE — 96415 CHEMO IV INFUSION ADDL HR: CPT

## 2019-10-23 PROCEDURE — 96366 THER/PROPH/DIAG IV INF ADDON: CPT

## 2019-10-23 PROCEDURE — 96368 THER/DIAG CONCURRENT INF: CPT

## 2019-10-23 PROCEDURE — 96413 CHEMO IV INFUSION 1 HR: CPT

## 2019-10-23 PROCEDURE — 96411 CHEMO IV PUSH ADDL DRUG: CPT

## 2019-10-23 PROCEDURE — 80053 COMPREHEN METABOLIC PANEL: CPT

## 2019-10-23 PROCEDURE — 77030012965 HC NDL HUBR BBMI -A

## 2019-10-23 PROCEDURE — 82378 CARCINOEMBRYONIC ANTIGEN: CPT

## 2019-10-23 PROCEDURE — 36415 COLL VENOUS BLD VENIPUNCTURE: CPT

## 2019-10-23 PROCEDURE — 85025 COMPLETE CBC W/AUTO DIFF WBC: CPT

## 2019-10-23 RX ORDER — HEPARIN 100 UNIT/ML
300-500 SYRINGE INTRAVENOUS AS NEEDED
Status: DISCONTINUED | OUTPATIENT
Start: 2019-10-23 | End: 2019-10-23 | Stop reason: HOSPADM

## 2019-10-23 RX ORDER — FLUOROURACIL 50 MG/ML
400 INJECTION, SOLUTION INTRAVENOUS ONCE
Status: COMPLETED | OUTPATIENT
Start: 2019-10-23 | End: 2019-10-23

## 2019-10-23 RX ORDER — DEXTROSE MONOHYDRATE 50 MG/ML
25 INJECTION, SOLUTION INTRAVENOUS CONTINUOUS
Status: DISCONTINUED | OUTPATIENT
Start: 2019-10-23 | End: 2019-10-24 | Stop reason: HOSPADM

## 2019-10-23 RX ORDER — LIDOCAINE AND PRILOCAINE 25; 25 MG/G; MG/G
CREAM TOPICAL AS NEEDED
Qty: 30 G | Refills: 0 | Status: SHIPPED | OUTPATIENT
Start: 2019-10-23 | End: 2021-03-02

## 2019-10-23 RX ORDER — SODIUM CHLORIDE 0.9 % (FLUSH) 0.9 %
10 SYRINGE (ML) INJECTION AS NEEDED
Status: DISCONTINUED | OUTPATIENT
Start: 2019-10-23 | End: 2019-10-23 | Stop reason: HOSPADM

## 2019-10-23 RX ORDER — SODIUM CHLORIDE 9 MG/ML
10 INJECTION INTRAMUSCULAR; INTRAVENOUS; SUBCUTANEOUS AS NEEDED
Status: DISCONTINUED | OUTPATIENT
Start: 2019-10-23 | End: 2019-10-23 | Stop reason: HOSPADM

## 2019-10-23 RX ORDER — ONDANSETRON 2 MG/ML
8 INJECTION INTRAMUSCULAR; INTRAVENOUS ONCE
Status: COMPLETED | OUTPATIENT
Start: 2019-10-23 | End: 2019-10-23

## 2019-10-23 RX ADMIN — ONDANSETRON 8 MG: 2 INJECTION INTRAMUSCULAR; INTRAVENOUS at 12:26

## 2019-10-23 RX ADMIN — FLUOROURACIL 4224 MG: 50 INJECTION, SOLUTION INTRAVENOUS at 17:05

## 2019-10-23 RX ADMIN — LEUCOVORIN CALCIUM 704 MG: 350 INJECTION, POWDER, LYOPHILIZED, FOR SUSPENSION INTRAMUSCULAR; INTRAVENOUS at 14:40

## 2019-10-23 RX ADMIN — OXALIPLATIN 149.5 MG: 5 INJECTION, SOLUTION INTRAVENOUS at 14:40

## 2019-10-23 RX ADMIN — DEXTROSE MONOHYDRATE 25 ML/HR: 5 INJECTION, SOLUTION INTRAVENOUS at 12:26

## 2019-10-23 RX ADMIN — FLUOROURACIL 704 MG: 50 INJECTION, SOLUTION INTRAVENOUS at 16:58

## 2019-10-23 RX ADMIN — Medication 10 ML: at 17:29

## 2019-10-23 RX ADMIN — SODIUM CHLORIDE 10 ML: 9 INJECTION INTRAMUSCULAR; INTRAVENOUS; SUBCUTANEOUS at 10:00

## 2019-10-23 RX ADMIN — DEXAMETHASONE SODIUM PHOSPHATE 12 MG: 4 INJECTION, SOLUTION INTRAMUSCULAR; INTRAVENOUS at 13:46

## 2019-10-23 NOTE — PROGRESS NOTES
Anisa Snider is a 47 y.o. male  Chief Complaint   Patient presents with    Follow-up     rectal cancer      1. Have you been to the ER, urgent care clinic since your last visit? Hospitalized since your last visit? No    2. Have you seen or consulted any other health care providers outside of the [address] [place] [address] since your last visit? Include any pap smears or colon screening.  No

## 2019-10-23 NOTE — PROGRESS NOTES
[facility] Note:  1000Pt arrived at [facility] ambulatory and in no distress for *C1    Assessment stable, no complaints voiced. Pt has colostomy . Port placed last week. Went to MD appt. Pt given instruction on pump , trouble shooting, calling hot line, chemo spills. Voiced understanding.     Medications received:  Medications Administered     dexamethasone (DECADRON) 12 mg in 0.9% sodium chloride 50 mL, overfill volume 5 mL IVPB     Admin Date  10/23/2019 Action  Given Dose  12 mg Rate  232 mL/hr Route  IntraVENous Administered By  Eyad Hand RN          dextrose 5% infusion     Admin Date  10/23/2019 Action  New Bag Dose  25 mL/hr Rate  25 mL/hr Route  IntraVENous Administered By  Eyad Hand RN          fluorouracil (ADRUCIL) 4,224 mg in 0.9% sodium chloride 100 mL CADD Cassette     Admin Date  10/23/2019 Action  New Bag Dose  4224 mg Rate  2.2 mL/hr Route  IntraVENous Administered By  Eyad Hand RN          fluorouracil (ADRUCIL) chemo syringe 704 mg     Admin Date  10/23/2019 Action  Given Dose  704 mg Rate  281.6 mL/hr Route  IntraVENous Administered By  Eyad Hand RN          leucovorin (WELLCOVORIN) 704 mg in dextrose 5% 250 mL, overfill volume 25 mL IVPB     Admin Date  10/23/2019 Action  New Bag Dose  704 mg Rate  155.1 mL/hr Route  IntraVENous Administered By  Eyad Hand RN          ondansetron [facility]) injection 8 mg     Admin Date  10/23/2019 Action  Given Dose  8 mg Route  IntraVENous Administered By  Eyad Hand RN          oxaliplatin (ELOXATIN) 149.5 mg in dextrose 5% 250 mL, overfill volume 25 mL chemo infusion     Admin Date  10/23/2019 Action  New Bag Dose  149.5 mg Rate  152.5 mL/hr Route  IntraVENous Administered By  Eyad Hand RN          saline peripheral flush soln 10 mL     Admin Date  10/23/2019 Action  Given Dose  10 mL Route  InterCATHeter Administered By  Eyad Hand RN          sodium chloride 0.9% injection 10 mL Admin Date  10/23/2019 Action  Given Dose  10 mL Route  IntraVENous Administered By  Augustin Clay, RN                  1710 Tolerated treatment well, no adverse reaction noted. D/Cd from [facility] ambulatory and in no distress accompanied by spouse. Next appt 10/25  Visit Vitals  /84   Pulse 73   Temp 98 °F (36.7 °C)   Resp 16   Ht 5' 7.01\" (1.702 m)   Wt 65.3 kg (144 lb)   BMI 22.55 kg/m²     Recent Results (from the past 12 hour(s))   CBC WITH AUTOMATED DIFF    Collection Time: 10/23/19 10:19 AM   Result Value Ref Range    WBC 3.2 (L) 4.1 - 11.1 K/uL    RBC 3.88 (L) 4.10 - 5.70 M/uL    HGB 12.8 12.1 - 17.0 g/dL    HCT 37.5 36.6 - 50.3 %    MCV 96.6 80.0 - 99.0 FL    MCH 33.0 26.0 - 34.0 PG    MCHC 34.1 30.0 - 36.5 g/dL    RDW 11.5 11.5 - 14.5 %    PLATELET 744 744 - 758 K/uL    MPV 10.2 8.9 - 12.9 FL    NRBC 0.0 0  WBC    ABSOLUTE NRBC 0.00 0.00 - 0.01 K/uL    NEUTROPHILS 74 32 - 75 %    LYMPHOCYTES 12 12 - 49 %    MONOCYTES 10 5 - 13 %    EOSINOPHILS 4 0 - 7 %    BASOPHILS 0 0 - 1 %    IMMATURE GRANULOCYTES 1 (H) 0.0 - 0.5 %    ABS. NEUTROPHILS 2.3 1.8 - 8.0 K/UL    ABS. LYMPHOCYTES 0.4 (L) 0.8 - 3.5 K/UL    ABS. MONOCYTES 0.3 0.0 - 1.0 K/UL    ABS. EOSINOPHILS 0.1 0.0 - 0.4 K/UL    ABS. BASOPHILS 0.0 0.0 - 0.1 K/UL    ABS. IMM.  GRANS. 0.0 0.00 - 0.04 K/UL    DF AUTOMATED

## 2019-10-23 NOTE — PROGRESS NOTES
Pharmacy Note- Chemotherapy Education     Marcus Riley is a  47 y.o.male  diagnosed with rectal cancer here today for Cycle 1, Day 1 chemotherapy education. Mr. Cheryl Raygoza is being treated with mFOLFOX. Provided education on oxaliplatin, leucovorin, fluorouracil and premedications - ondansetron and dexamethasone.     Reviewed side effects of chemotherapy reviewed included s/s infection, anemia, appetite changes, thromboyctopenia, fatigue, hair loss/alopecia, bone pain, skin and nail changes, diarrhea/constipation, hand/foot syndrome and nerve sensitivities (cold sensitivity and peripheral neuropathy).     Patient given ways to manage these side effects and when to contact office.      [address] Handout of medications provided to patient.  Mr. Cheryl Raygoza verbalized understanding of the information presented and all of the patient's questions were answered.     Yaakov Delgado, PharmD, BCPS, BCOP

## 2019-10-23 NOTE — PROGRESS NOTES
[facility] [place] [address] [address], [address]  W: [phone]  F: [phone]    Reason for Visit:   Robbin Viveros is a 47 y.o. male who is seen for Rectal cancer- likely stage III- SOMMER    Treatment History:   · 4/26/19: Colonoscopy- 6-7 cm size malignant appearing mass seen at 10 cm from anal verge. This was adenocarcinoma. 3 polyps removed- all were tubular adenomas  · Rectal Ultrasound 5/2/19: ulcerated infiltrative mass lesion was noted in rectum at 10 cm. The lesion measured about 5 cm X 4 cm- T2, 13 mm X 8 mm oblong lymph node was noted immediately adjacent to the mass lesion  · 5/2/19: CT CAP- No metastasis, liver cysts. CEA 3.6  · 5/10/19: PET CT showed rectal malignancy and 3 small perirectal anibal metastatic focir  · 5/28/19-7/8/29: Xeloda + RT   · 9/24/19: LAR- [id number], 2/6 positive nodes  · 10/14/19: CT CAP with no evidence of metastatic disease, liver cysts   · 10/23/19: cycle 1 FOLFOX    History of Present Illness:   Patient is a 47 y.o. male seen for adenocarcinoma of the mid third of rectum    He had intermittent BRBPR x 1 year and then decided to have a colonoscopy. This led to above mentioned diagnosis. He received neoadjuvant xeloda+ RT followed by [facility]. Comes today for cycle 1 of FOLFOX. He feels well. Denies nausea/vomiting. Denies fevers/chills. No bleeding. Normal ileostomy output. Has no mouth sores. No aches or pains. Intermittent numbness/tingling in left arm. He uses 7 drinks a week.     Adopted    Past Medical History:   Diagnosis Date    Adopted     GERD (gastroesophageal reflux disease)     Psoriasis     Psoriatic arthritis ([facility] [address]     Rectal cancer [facility])       Past Surgical History:   Procedure Laterality Date    COLONOSCOPY Left 4/26/2019    COLONOSCOPY performed by Sherman Coto MD at [facility] N/A 5/2/2019    SIGMOIDOSCOPY FLEXIBLE performed by Mehnaz Arndt MD at [facility] ENDOSCOPY    HX GI      EGD    HX HEENT      WISDOM TEETH    HX ORTHOPAEDIC Right     ACL REPLACEMENT    IR INSERT TUNL CVC W PORT OVER 5 YEARS  10/15/2019      Social History     Tobacco Use    Smoking status: Never Smoker    Smokeless tobacco: Never Used   Substance Use Topics    Alcohol use: Yes     Comment: 6 BEERS WEEKLY      Family History   Adopted: Yes   Problem Relation Age of Onset    Asthma Neg Hx     Cancer Neg Hx     Diabetes Neg Hx     Heart Disease Neg Hx     Hypertension Neg Hx     Stroke Neg Hx      Current Outpatient Medications   Medication Sig    hydroCHLOROthiazide (HYDRODIURIL) 12.5 mg tablet Take 1 Tab by mouth daily.  dexAMETHasone (DECADRON) 4 mg tablet Take 8 mg by mouth See Admin Instructions. Take 2 tabs by mouth twice a day on days 2 and 3 after chemotherapy only    ondansetron (ZOFRAN ODT) 8 mg disintegrating tablet Take 1 Tab by mouth every eight (8) hours as needed for Nausea.  acetaminophen (TYLENOL) 500 mg tablet Take 500 mg by mouth every six (6) hours as needed for Fever or Pain.  diphenoxylate-atropine (LOMOTIL) 2.5-0.025 mg per tablet Take 1 Tab by mouth four (4) times daily as needed for Diarrhea.  tamsulosin (FLOMAX) 0.4 mg capsule Take 0.4 mg by mouth daily.  triamcinolone acetonide (KENALOG) 0.1 % ointment Apply  to affected area two (2) times daily as needed for Skin Irritation. use thin layer    fluocinonide (VANOS) 0.1 % topical cream Apply  to affected area daily. No current facility-administered medications for this visit. No Known Allergies     Review of Systems: A complete review of systems was obtained, negative except as described above.     Physical Exam:     Visit Vitals  /82   Pulse 68   Temp 98 °F (36.7 °C) (Oral)   Resp 17   Ht 5' 7\" (1.702 m)   Wt 144 lb 6.4 oz (65.5 kg)   SpO2 98%   BMI 22.62 kg/m²     ECOG PS: 1  General: No distress  Eyes: PERRLA, anicteric sclerae  HENT: Atraumatic, OP clear, PAC looks good Resp: CTAB, normal respiratory effort  CV: s1s2, no LE edema  MS: Normal gait and station. Digits without clubbing or cyanosis. Skin: No rashes, ecchymoses, or petechiae. Normal temperature, turgor, and texture. Psych: Alert, oriented, appropriate affect, normal judgment/insight    Results:     Lab Results   Component Value Date/Time    WBC 3.2 (L) 10/23/2019 10:19 AM    HGB 12.8 10/23/2019 10:19 AM    HCT 37.5 10/23/2019 10:19 AM    PLATELET 630 06/10/2057 10:19 AM    MCV 96.6 10/23/2019 10:19 AM    ABS. NEUTROPHILS 2.3 10/23/2019 10:19 AM     Lab Results   Component Value Date/Time    Sodium 140 10/02/2019 03:35 AM    Potassium 3.7 10/02/2019 03:35 AM    Chloride 110 (H) 10/02/2019 03:35 AM    CO2 23 10/02/2019 03:35 AM    Glucose 110 (H) 10/02/2019 03:35 AM    BUN 9 10/02/2019 03:35 AM    Creatinine 0.95 10/02/2019 03:35 AM    GFR est AA >60 10/02/2019 03:35 AM    GFR est non-AA >60 10/02/2019 03:35 AM    Calcium 8.5 10/02/2019 03:35 AM     Lab Results   Component Value Date/Time    Bilirubin, total 0.3 09/30/2019 04:09 AM    ALT (SGPT) 57 09/30/2019 04:09 AM    AST (SGOT) 59 (H) 09/30/2019 04:09 AM    Alk. phosphatase 62 09/30/2019 04:09 AM    Protein, total 5.7 (L) 09/30/2019 04:09 AM    Albumin 2.5 (L) 09/30/2019 04:09 AM    Globulin 3.2 09/30/2019 04:09 AM     Records reviewed and summarized above. Pathology report(s) reviewed  FINAL PATHOLOGIC DIAGNOSIS   1.  Rectum and sigmoid colon, laparoscopic low anterior resection:   SPECIMEN   Procedure: Low anterior resection   Macroscopic Intactness of Mesorectum: Complete   TUMOR   Tumor Site: Rectum   Histologic Type: Adenocarcinoma   Histologic Grade: G2: Moderately differentiated   Tumor Size: 2.6 cm   Tumor Deposits: Present   Number of Deposits: 1   Tumor Extension: Tumor invades through the muscularis propria into pericolorectal tissue   Macroscopic Tumor Perforation: Not identified   Lymphovascular Invasion: Not identified   Perineural Invasion: Not identified   Tumor Budding: Number of tumor buds in one hotspot field: 5   Tumor Budding Score: Intermediate score (5-9)   Treatment Effect: Present - Residual cancer with evident tumor regression, but more than single cells or   rare small groups of cancer cells (partial response, score 2)   MARGINS   Margins: All margins are uninvolved by invasive carcinoma, high- grade dysplasia, intramucosal   adenocarcinoma, and adenoma   Margins Examined: Proximal, Distal, Radial or Mesenteric   Distance of Invasive Carcinoma from Closest Margin: 25 mm   Closest Margin: Radial or Mesenteric   LYMPH NODES   Number of Lymph Nodes Involved: 2   Number of Lymph Nodes Examined: 16   PATHOLOGIC STAGE CLASSIFICATION (pTNM, AJCC 8th Edition)   Primary Tumor (pT): pT3   Regional Lymph Nodes (pN): pN1b   2. Large bowel, donuts:   Fragments of large bowel wall, negative for microscopic pathologic abnormality. Radiology report(s) reviewed above. CT CAP 10/14/19: IMPRESSION:  1. There are scattered small hepatic cysts without definite evidence for  metastatic disease.   2. Otherwise negative CT chest abdomen pelvis    Assessment:   1) Rectal adenocarcinoma- mid third- SOMMER    T2N1M0 disease- Clinical stage III  S/P John Adj chemoRT followed by [facility] on 9/24/19  Pathology showed [id number] residual disease- stage IIIB    Reviewed surgical pathology  Had significant residual disease with practically no treatment effects  See prior office notes in regards to discussion on adjuvant chemotherapy   Had CT 10/14/19 with no evidence of metastatic disease    Today is cycle 1 of FOLFOX x 8 planned cycles     Anti-emetics and side effects reviewed with patient today    Genetic testing negative    2) Sal[place] Mattes age at diagnosis    Adopted  Had several adenomas removed  May have attenuated FAP and will require genetic testing  SOMMER    3) GIB  Minor    4) Psoriasis  Off Methotrexate  Follows Dr. Barron Smith:     · Proceed today with cycle 1 of adjuvant FOLFOX (5FU syringe 400mg/m2, leucovorin 400mg/m2, 5FU CADD 2,400mg/m2, oxaliplatin 85mg/m2) for 8 cycles   · Labs to include CBC with diff and CMP prior to each treatment. CEA monthly. · Pre-meds to include Zofran and Dexamethasone   · Dexamethasone days 2 and 3  · Zofran PRN     RTC in 2 weeks for cycle 2    I appreciate the opportunity to participate in Mr. Severo Bilis care.     Signed By: Michael Esquivel MD

## 2019-10-24 ENCOUNTER — HOME CARE VISIT (OUTPATIENT)
Dept: SCHEDULING | Facility: HOME HEALTH | Age: 49
End: 2019-10-24
Payer: MEDICAID

## 2019-10-24 VITALS
RESPIRATION RATE: 16 BRPM | SYSTOLIC BLOOD PRESSURE: 124 MMHG | DIASTOLIC BLOOD PRESSURE: 72 MMHG | TEMPERATURE: 99.1 F | OXYGEN SATURATION: 99 % | HEART RATE: 71 BPM

## 2019-10-24 PROCEDURE — G0299 HHS/HOSPICE OF RN EA 15 MIN: HCPCS

## 2019-10-25 ENCOUNTER — HOSPITAL ENCOUNTER (OUTPATIENT)
Dept: INFUSION THERAPY | Age: 49
Discharge: HOME | End: 2019-10-25
Payer: MEDICAID

## 2019-10-25 VITALS
TEMPERATURE: 98.4 F | RESPIRATION RATE: 18 BRPM | HEART RATE: 70 BPM | SYSTOLIC BLOOD PRESSURE: 121 MMHG | DIASTOLIC BLOOD PRESSURE: 81 MMHG

## 2019-10-25 DIAGNOSIS — C20 RECTAL CANCER (HCC): Primary | ICD-10-CM

## 2019-10-25 PROCEDURE — 96523 IRRIG DRUG DELIVERY DEVICE: CPT

## 2019-10-25 PROCEDURE — 74011250636 HC RX REV CODE- 250/636: Performed by: INTERNAL MEDICINE

## 2019-10-25 RX ORDER — HEPARIN 100 UNIT/ML
300-500 SYRINGE INTRAVENOUS AS NEEDED
Status: DISCONTINUED | OUTPATIENT
Start: 2019-10-25 | End: 2019-10-26 | Stop reason: HOSPADM

## 2019-10-25 RX ORDER — SODIUM CHLORIDE 0.9 % (FLUSH) 0.9 %
10 SYRINGE (ML) INJECTION AS NEEDED
Status: DISCONTINUED | OUTPATIENT
Start: 2019-10-25 | End: 2019-10-26 | Stop reason: HOSPADM

## 2019-10-25 RX ORDER — SODIUM CHLORIDE 9 MG/ML
10 INJECTION INTRAMUSCULAR; INTRAVENOUS; SUBCUTANEOUS AS NEEDED
Status: DISCONTINUED | OUTPATIENT
Start: 2019-10-25 | End: 2019-10-26 | Stop reason: HOSPADM

## 2019-10-25 RX ADMIN — Medication 500 UNITS: at 15:45

## 2019-10-25 RX ADMIN — Medication 10 ML: at 15:45

## 2019-10-29 RX ORDER — DIPHENHYDRAMINE HYDROCHLORIDE 50 MG/ML
50 INJECTION, SOLUTION INTRAMUSCULAR; INTRAVENOUS AS NEEDED
Status: CANCELLED
Start: 2019-11-06

## 2019-10-29 RX ORDER — ACETAMINOPHEN 325 MG/1
650 TABLET ORAL AS NEEDED
Status: CANCELLED
Start: 2019-11-06

## 2019-10-29 RX ORDER — SODIUM CHLORIDE 9 MG/ML
10 INJECTION INTRAMUSCULAR; INTRAVENOUS; SUBCUTANEOUS AS NEEDED
Status: CANCELLED | OUTPATIENT
Start: 2019-11-08

## 2019-10-29 RX ORDER — ONDANSETRON 2 MG/ML
8 INJECTION INTRAMUSCULAR; INTRAVENOUS ONCE
Status: CANCELLED | OUTPATIENT
Start: 2019-11-06

## 2019-10-29 RX ORDER — HEPARIN 100 UNIT/ML
300-500 SYRINGE INTRAVENOUS AS NEEDED
Status: CANCELLED
Start: 2019-11-08

## 2019-10-29 RX ORDER — EPINEPHRINE 1 MG/ML
0.3 INJECTION, SOLUTION, CONCENTRATE INTRAVENOUS AS NEEDED
Status: CANCELLED | OUTPATIENT
Start: 2019-11-06

## 2019-10-29 RX ORDER — HYDROCORTISONE SODIUM SUCCINATE 100 MG/2ML
100 INJECTION, POWDER, FOR SOLUTION INTRAMUSCULAR; INTRAVENOUS AS NEEDED
Status: CANCELLED | OUTPATIENT
Start: 2019-11-06

## 2019-10-29 RX ORDER — SODIUM CHLORIDE 0.9 % (FLUSH) 0.9 %
10 SYRINGE (ML) INJECTION AS NEEDED
Status: CANCELLED
Start: 2019-11-06

## 2019-10-29 RX ORDER — SODIUM CHLORIDE 9 MG/ML
10 INJECTION INTRAMUSCULAR; INTRAVENOUS; SUBCUTANEOUS AS NEEDED
Status: CANCELLED | OUTPATIENT
Start: 2019-11-06

## 2019-10-29 RX ORDER — SODIUM CHLORIDE 0.9 % (FLUSH) 0.9 %
10 SYRINGE (ML) INJECTION AS NEEDED
Status: CANCELLED
Start: 2019-11-08

## 2019-10-29 RX ORDER — DEXTROSE MONOHYDRATE 50 MG/ML
25 INJECTION, SOLUTION INTRAVENOUS CONTINUOUS
Status: CANCELLED
Start: 2019-11-06

## 2019-10-29 RX ORDER — HEPARIN 100 UNIT/ML
300-500 SYRINGE INTRAVENOUS AS NEEDED
Status: CANCELLED
Start: 2019-11-06

## 2019-10-29 RX ORDER — ONDANSETRON 2 MG/ML
8 INJECTION INTRAMUSCULAR; INTRAVENOUS AS NEEDED
Status: CANCELLED | OUTPATIENT
Start: 2019-11-06

## 2019-10-29 RX ORDER — ALBUTEROL SULFATE 0.83 MG/ML
2.5 SOLUTION RESPIRATORY (INHALATION) AS NEEDED
Status: CANCELLED
Start: 2019-11-06

## 2019-10-29 RX ORDER — FLUOROURACIL 50 MG/ML
400 INJECTION, SOLUTION INTRAVENOUS ONCE
Status: CANCELLED
Start: 2019-11-06 | End: 2019-11-06

## 2019-11-06 ENCOUNTER — OFFICE VISIT (OUTPATIENT)
Dept: ONCOLOGY | Age: 49
End: 2019-11-06

## 2019-11-06 ENCOUNTER — HOSPITAL ENCOUNTER (OUTPATIENT)
Dept: INFUSION THERAPY | Age: 49
Discharge: HOME | End: 2019-11-06
Payer: MEDICAID

## 2019-11-06 VITALS
WEIGHT: 144.8 LBS | BODY MASS INDEX: 22.73 KG/M2 | RESPIRATION RATE: 16 BRPM | OXYGEN SATURATION: 97 % | HEIGHT: 67 IN | HEART RATE: 78 BPM | SYSTOLIC BLOOD PRESSURE: 125 MMHG | TEMPERATURE: 98.1 F | DIASTOLIC BLOOD PRESSURE: 76 MMHG

## 2019-11-06 VITALS
WEIGHT: 145.3 LBS | DIASTOLIC BLOOD PRESSURE: 88 MMHG | BODY MASS INDEX: 22.75 KG/M2 | TEMPERATURE: 98.2 F | OXYGEN SATURATION: 96 % | SYSTOLIC BLOOD PRESSURE: 138 MMHG | HEART RATE: 78 BPM

## 2019-11-06 DIAGNOSIS — C20 RECTAL CANCER (HCC): Primary | ICD-10-CM

## 2019-11-06 LAB
ALBUMIN SERPL-MCNC: 3.3 G/DL (ref 3.5–5)
ALBUMIN/GLOB SERPL: 1 {RATIO} (ref 1.1–2.2)
ALP SERPL-CCNC: 64 U/L (ref 45–117)
ALT SERPL-CCNC: 25 U/L (ref 12–78)
ANION GAP SERPL CALC-SCNC: 7 MMOL/L (ref 5–15)
AST SERPL-CCNC: 13 U/L (ref 15–37)
BASOPHILS # BLD: 0 K/UL (ref 0–0.1)
BASOPHILS NFR BLD: 1 % (ref 0–1)
BILIRUB SERPL-MCNC: 0.3 MG/DL (ref 0.2–1)
BUN SERPL-MCNC: 10 MG/DL (ref 6–20)
BUN/CREAT SERPL: 11 (ref 12–20)
CALCIUM SERPL-MCNC: 9.4 MG/DL (ref 8.5–10.1)
CEA SERPL-MCNC: 1.1 NG/ML
CHLORIDE SERPL-SCNC: 109 MMOL/L (ref 97–108)
CO2 SERPL-SCNC: 26 MMOL/L (ref 21–32)
CREAT SERPL-MCNC: 0.94 MG/DL (ref 0.7–1.3)
DIFFERENTIAL METHOD BLD: ABNORMAL
EOSINOPHIL # BLD: 0.1 K/UL (ref 0–0.4)
EOSINOPHIL NFR BLD: 3 % (ref 0–7)
ERYTHROCYTE [DISTWIDTH] IN BLOOD BY AUTOMATED COUNT: 11.9 % (ref 11.5–14.5)
GLOBULIN SER CALC-MCNC: 3.3 G/DL (ref 2–4)
GLUCOSE SERPL-MCNC: 127 MG/DL (ref 65–100)
HCT VFR BLD AUTO: 35.9 % (ref 36.6–50.3)
HGB BLD-MCNC: 12.2 G/DL (ref 12.1–17)
IMM GRANULOCYTES # BLD AUTO: 0 K/UL (ref 0–0.04)
IMM GRANULOCYTES NFR BLD AUTO: 0 % (ref 0–0.5)
LYMPHOCYTES # BLD: 0.4 K/UL (ref 0.8–3.5)
LYMPHOCYTES NFR BLD: 14 % (ref 12–49)
MCH RBC QN AUTO: 32.3 PG (ref 26–34)
MCHC RBC AUTO-ENTMCNC: 34 G/DL (ref 30–36.5)
MCV RBC AUTO: 95 FL (ref 80–99)
MONOCYTES # BLD: 0.3 K/UL (ref 0–1)
MONOCYTES NFR BLD: 13 % (ref 5–13)
NEUTS SEG # BLD: 1.7 K/UL (ref 1.8–8)
NEUTS SEG NFR BLD: 69 % (ref 32–75)
NRBC # BLD: 0 K/UL (ref 0–0.01)
NRBC BLD-RTO: 0 PER 100 WBC
PLATELET # BLD AUTO: 133 K/UL (ref 150–400)
PMV BLD AUTO: 9.8 FL (ref 8.9–12.9)
POTASSIUM SERPL-SCNC: 3.2 MMOL/L (ref 3.5–5.1)
PROT SERPL-MCNC: 6.6 G/DL (ref 6.4–8.2)
RBC # BLD AUTO: 3.78 M/UL (ref 4.1–5.7)
RBC MORPH BLD: ABNORMAL
SODIUM SERPL-SCNC: 142 MMOL/L (ref 136–145)
WBC # BLD AUTO: 2.5 K/UL (ref 4.1–11.1)

## 2019-11-06 PROCEDURE — 80053 COMPREHEN METABOLIC PANEL: CPT

## 2019-11-06 PROCEDURE — 74011000258 HC RX REV CODE- 258: Performed by: REGISTERED NURSE

## 2019-11-06 PROCEDURE — 96366 THER/PROPH/DIAG IV INF ADDON: CPT

## 2019-11-06 PROCEDURE — 74011250636 HC RX REV CODE- 250/636: Performed by: REGISTERED NURSE

## 2019-11-06 PROCEDURE — 82378 CARCINOEMBRYONIC ANTIGEN: CPT

## 2019-11-06 PROCEDURE — 96415 CHEMO IV INFUSION ADDL HR: CPT

## 2019-11-06 PROCEDURE — 36415 COLL VENOUS BLD VENIPUNCTURE: CPT

## 2019-11-06 PROCEDURE — 96416 CHEMO PROLONG INFUSE W/PUMP: CPT

## 2019-11-06 PROCEDURE — 77030012965 HC NDL HUBR BBMI -A

## 2019-11-06 PROCEDURE — 85025 COMPLETE CBC W/AUTO DIFF WBC: CPT

## 2019-11-06 PROCEDURE — 96375 TX/PRO/DX INJ NEW DRUG ADDON: CPT

## 2019-11-06 PROCEDURE — 96368 THER/DIAG CONCURRENT INF: CPT

## 2019-11-06 PROCEDURE — 96413 CHEMO IV INFUSION 1 HR: CPT

## 2019-11-06 PROCEDURE — 74011250637 HC RX REV CODE- 250/637: Performed by: REGISTERED NURSE

## 2019-11-06 PROCEDURE — 96411 CHEMO IV PUSH ADDL DRUG: CPT

## 2019-11-06 RX ORDER — POTASSIUM CHLORIDE 750 MG/1
40 TABLET, FILM COATED, EXTENDED RELEASE ORAL
Status: COMPLETED | OUTPATIENT
Start: 2019-11-06 | End: 2019-11-06

## 2019-11-06 RX ORDER — SODIUM CHLORIDE 0.9 % (FLUSH) 0.9 %
10 SYRINGE (ML) INJECTION AS NEEDED
Status: ACTIVE | OUTPATIENT
Start: 2019-11-06 | End: 2019-11-06

## 2019-11-06 RX ORDER — SODIUM CHLORIDE 9 MG/ML
10 INJECTION INTRAMUSCULAR; INTRAVENOUS; SUBCUTANEOUS AS NEEDED
Status: ACTIVE | OUTPATIENT
Start: 2019-11-06 | End: 2019-11-06

## 2019-11-06 RX ORDER — DEXTROSE MONOHYDRATE 50 MG/ML
25 INJECTION, SOLUTION INTRAVENOUS CONTINUOUS
Status: DISPENSED | OUTPATIENT
Start: 2019-11-06 | End: 2019-11-06

## 2019-11-06 RX ORDER — HEPARIN 100 UNIT/ML
300-500 SYRINGE INTRAVENOUS AS NEEDED
Status: ACTIVE | OUTPATIENT
Start: 2019-11-06 | End: 2019-11-06

## 2019-11-06 RX ORDER — FLUOROURACIL 50 MG/ML
400 INJECTION, SOLUTION INTRAVENOUS ONCE
Status: COMPLETED | OUTPATIENT
Start: 2019-11-06 | End: 2019-11-06

## 2019-11-06 RX ORDER — ONDANSETRON 2 MG/ML
8 INJECTION INTRAMUSCULAR; INTRAVENOUS ONCE
Status: COMPLETED | OUTPATIENT
Start: 2019-11-06 | End: 2019-11-06

## 2019-11-06 RX ADMIN — LEUCOVORIN CALCIUM 704 MG: 100 INJECTION, POWDER, LYOPHILIZED, FOR SOLUTION INTRAMUSCULAR; INTRAVENOUS at 13:37

## 2019-11-06 RX ADMIN — POTASSIUM CHLORIDE 40 MEQ: 750 TABLET, FILM COATED, EXTENDED RELEASE ORAL at 12:35

## 2019-11-06 RX ADMIN — DEXTROSE MONOHYDRATE 25 ML/HR: 5 INJECTION, SOLUTION INTRAVENOUS at 11:44

## 2019-11-06 RX ADMIN — FLUOROURACIL 4224 MG: 50 INJECTION, SOLUTION INTRAVENOUS at 16:00

## 2019-11-06 RX ADMIN — OXALIPLATIN 149.5 MG: 5 INJECTION, SOLUTION INTRAVENOUS at 13:37

## 2019-11-06 RX ADMIN — DEXAMETHASONE SODIUM PHOSPHATE 12 MG: 4 INJECTION, SOLUTION INTRAMUSCULAR; INTRAVENOUS at 12:39

## 2019-11-06 RX ADMIN — SODIUM CHLORIDE 10 ML: 9 INJECTION INTRAMUSCULAR; INTRAVENOUS; SUBCUTANEOUS at 09:19

## 2019-11-06 RX ADMIN — ONDANSETRON 8 MG: 2 INJECTION, SOLUTION INTRAMUSCULAR; INTRAVENOUS at 11:44

## 2019-11-06 RX ADMIN — Medication 10 ML: at 09:20

## 2019-11-06 RX ADMIN — FLUOROURACIL 704 MG: 50 INJECTION, SOLUTION INTRAVENOUS at 15:55

## 2019-11-06 NOTE — PROGRESS NOTES
Akilah Mccarthy is a 47 y.o. male  Chief Complaint   Patient presents with    Follow-up     Rectal Cancer     1. Have you been to the ER, urgent care clinic since your last visit? Hospitalized since your last visit? No.  2. Have you seen or consulted any other health care providers outside of the [address] [place] [address] since your last visit? Include any pap smears or colon screening.  No.

## 2019-11-06 NOTE — PROGRESS NOTES
[facility] at [place] [address] [address], [address]  W: [phone]  F: [phone]    Reason for Visit:   Sandra Rod is a 47 y.o. male who is seen for Rectal cancer- likely stage III- SOMMER    Treatment History:   · 4/26/19: Colonoscopy- 6-7 cm size malignant appearing mass seen at 10 cm from anal verge. This was adenocarcinoma. 3 polyps removed- all were tubular adenomas  · Rectal Ultrasound 5/2/19: ulcerated infiltrative mass lesion was noted in rectum at 10 cm. The lesion measured about 5 cm X 4 cm- T2, 13 mm X 8 mm oblong lymph node was noted immediately adjacent to the mass lesion  · 5/2/19: CT CAP- No metastasis, liver cysts. CEA 3.6  · 5/10/19: PET CT showed rectal malignancy and 3 small perirectal anibal metastatic focir  · 5/28/19-7/8/29: Xeloda + RT   · 9/24/19: LAR- [id number], 2/6 positive nodes  · 10/14/19: CT CAP with no evidence of metastatic disease, liver cysts   · 10/23/19: cycle 1 FOLFOX    History of Present Illness:   Patient is a 47 y.o. male seen for adenocarcinoma of the mid third of rectum    He had intermittent BRBPR x 1 year and then decided to have a colonoscopy. This led to above mentioned diagnosis. He received neoadjuvant xeloda+ RT followed by LAR. Comes today for cycle 2 of FOLFOX. He feels well, tolerated cycle 1 well. He denies nausea/vomiting. Normal ileostomy output. Has no mouth sores. No aches or pains. Denies numbness/tingling in extremities. Denies SOB, CP, cough, fevers/chills, or bleeding. He uses 7 drinks a week.     Adopted    Past Medical History:   Diagnosis Date    Adopted     GERD (gastroesophageal reflux disease)     Psoriasis     Psoriatic arthritis ([facility] [address]     Rectal cancer [facility])       Past Surgical History:   Procedure Laterality Date    COLONOSCOPY Left 4/26/2019    COLONOSCOPY performed by Enrico Adair MD at [facility] N/A 5/2/2019    SIGMOIDOSCOPY FLEXIBLE performed by Genet Canchola MD at [address] [facility] GI      EGD    HX HEENT      WISDOM TEETH    HX ORTHOPAEDIC Right     ACL REPLACEMENT    IR INSERT TUNL CVC W PORT OVER 5 YEARS  10/15/2019      Social History     Tobacco Use    Smoking status: Never Smoker    Smokeless tobacco: Never Used   Substance Use Topics    Alcohol use: Yes     Comment: 6 BEERS WEEKLY      Family History   Adopted: Yes   Problem Relation Age of Onset    Asthma Neg Hx     Cancer Neg Hx     Diabetes Neg Hx     Heart Disease Neg Hx     Hypertension Neg Hx     Stroke Neg Hx      Current Outpatient Medications   Medication Sig    hydroCHLOROthiazide (HYDRODIURIL) 12.5 mg tablet Take 1 Tab by mouth daily.  dexAMETHasone (DECADRON) 4 mg tablet Take 8 mg by mouth See Admin Instructions. Take 2 tabs by mouth twice a day on days 2 and 3 after chemotherapy only    tamsulosin (FLOMAX) 0.4 mg capsule Take 0.4 mg by mouth daily.  fluocinonide (VANOS) 0.1 % topical cream Apply  to affected area daily.  lidocaine-prilocaine (EMLA) topical cream Apply  to affected area as needed for Pain.  ondansetron (ZOFRAN ODT) 8 mg disintegrating tablet Take 1 Tab by mouth every eight (8) hours as needed for Nausea.  acetaminophen (TYLENOL) 500 mg tablet Take 500 mg by mouth every six (6) hours as needed for Fever or Pain.  diphenoxylate-atropine (LOMOTIL) 2.5-0.025 mg per tablet Take 1 Tab by mouth four (4) times daily as needed for Diarrhea.  triamcinolone acetonide (KENALOG) 0.1 % ointment Apply  to affected area two (2) times daily as needed for Skin Irritation. use thin layer     No current facility-administered medications for this visit.       Facility-Administered Medications Ordered in Other Visits   Medication Dose Route Frequency    saline peripheral flush soln 10 mL  10 mL InterCATHeter PRN    sodium chloride 0.9% injection 10 mL  10 mL IntraVENous PRN    heparin (porcine) pf 300-500 Units  300-500 Units InterCATHeter PRN No Known Allergies     Review of Systems: A complete review of systems was obtained, negative except as described above. Physical Exam:     Visit Vitals  /88 (BP 1 Location: Left arm, BP Patient Position: Sitting)   Pulse 78   Temp 98.2 °F (36.8 °C) (Oral)   Wt 145 lb 4.8 oz (65.9 kg)   SpO2 96%   BMI 22.75 kg/m²     ECOG PS: 1  General: No distress  Eyes: PERRLA, anicteric sclerae  HENT: Atraumatic, OP clear, PAC looks good   Resp: CTAB, normal respiratory effort  CV: s1s2, no LE edema  MS: Normal gait and station. Digits without clubbing or cyanosis. Skin: No rashes, ecchymoses, or petechiae. Normal temperature, turgor, and texture. Psych: Alert, oriented, appropriate affect, normal judgment/insight    Results:     Lab Results   Component Value Date/Time    WBC 3.2 (L) 10/23/2019 10:19 AM    HGB 12.8 10/23/2019 10:19 AM    HCT 37.5 10/23/2019 10:19 AM    PLATELET 046 74/33/3723 10:19 AM    MCV 96.6 10/23/2019 10:19 AM    ABS. NEUTROPHILS 2.3 10/23/2019 10:19 AM     Lab Results   Component Value Date/Time    Sodium 141 10/23/2019 10:19 AM    Potassium 3.9 10/23/2019 10:19 AM    Chloride 109 (H) 10/23/2019 10:19 AM    CO2 27 10/23/2019 10:19 AM    Glucose 123 (H) 10/23/2019 10:19 AM    BUN 9 10/23/2019 10:19 AM    Creatinine 0.91 10/23/2019 10:19 AM    GFR est AA >60 10/23/2019 10:19 AM    GFR est non-AA >60 10/23/2019 10:19 AM    Calcium 9.0 10/23/2019 10:19 AM     Lab Results   Component Value Date/Time    Bilirubin, total 0.3 10/23/2019 10:19 AM    ALT (SGPT) 25 10/23/2019 10:19 AM    AST (SGOT) 12 (L) 10/23/2019 10:19 AM    Alk. phosphatase 60 10/23/2019 10:19 AM    Protein, total 6.4 10/23/2019 10:19 AM    Albumin 3.3 (L) 10/23/2019 10:19 AM    Globulin 3.1 10/23/2019 10:19 AM     Records reviewed and summarized above. Pathology report(s) reviewed  FINAL PATHOLOGIC DIAGNOSIS   1.  Rectum and sigmoid colon, laparoscopic low anterior resection:   SPECIMEN   Procedure: Low anterior resection Macroscopic Intactness of Mesorectum: Complete   TUMOR   Tumor Site: Rectum   Histologic Type: Adenocarcinoma   Histologic Grade: G2: Moderately differentiated   Tumor Size: 2.6 cm   Tumor Deposits: Present   Number of Deposits: 1   Tumor Extension: Tumor invades through the muscularis propria into pericolorectal tissue   Macroscopic Tumor Perforation: Not identified   Lymphovascular Invasion: Not identified   Perineural Invasion: Not identified   Tumor Budding: Number of tumor buds in one hotspot field: 5   Tumor Budding Score: Intermediate score (5-9)   Treatment Effect: Present - Residual cancer with evident tumor regression, but more than single cells or   rare small groups of cancer cells (partial response, score 2)   MARGINS   Margins: All margins are uninvolved by invasive carcinoma, high- grade dysplasia, intramucosal   adenocarcinoma, and adenoma   Margins Examined: Proximal, Distal, Radial or Mesenteric   Distance of Invasive Carcinoma from Closest Margin: 25 mm   Closest Margin: Radial or Mesenteric   LYMPH NODES   Number of Lymph Nodes Involved: 2   Number of Lymph Nodes Examined: 16   PATHOLOGIC STAGE CLASSIFICATION (pTNM, AJCC 8th Edition)   Primary Tumor (pT): pT3   Regional Lymph Nodes (pN): pN1b   2. Large bowel, donuts:   Fragments of large bowel wall, negative for microscopic pathologic abnormality. Radiology report(s) reviewed above. CT CAP 10/14/19: IMPRESSION:  1. There are scattered small hepatic cysts without definite evidence for  metastatic disease.   2. Otherwise negative CT chest abdomen pelvis    Assessment:   1) Rectal adenocarcinoma- mid third- SOMMER    T2N1M0 disease- Clinical stage III  S/P John Adj chemoRT followed by LAR on 9/24/19  Pathology showed [id number] residual disease- stage IIIB    Reviewed surgical pathology  Had significant residual disease with practically no treatment effects  See prior office notes in regards to discussion on adjuvant chemotherapy   Had CT 10/14/19 with no evidence of metastatic disease    Today is cycle 2 of FOLFOX x 8 planned cycles     Tolerated cycle 1 well. Labs pending. Genetic testing negative    2) Young age at diagnosis    Adopted  Had several adenomas removed  May have attenuated FAP and will require genetic testing  SOMMER    3) GIB  Minor    4) Psoriasis  Off Methotrexate  Follows Dr. Cleaning Clamarla:     · Proceed today with cycle 2 of adjuvant FOLFOX (5FU syringe 400mg/m2, leucovorin 400mg/m2, 5FU CADD 2,400mg/m2, oxaliplatin 85mg/m2) for 8 cycles   · Labs to include CBC with diff and CMP prior to each treatment. CEA monthly. · Pre-meds to include Zofran and Dexamethasone   · Dexamethasone days 2 and 3  · Zofran PRN     RTC in 2 weeks for cycle 3    I appreciate the opportunity to participate in Mr. Miguel gardner.     Signed By: Valorie Bai MD

## 2019-11-06 NOTE — PROGRESS NOTES
Outpatient Infusion Center - Chemotherapy Progress Note    2548 Pt admit to [facility] for C2D1 Folfox ambulatory in stable condition. Assessment completed. No new concerns voiced. Port accessed with positive blood return. Labs drawn and sent for processing. Patient proceeded to scheduled MD appointment. Patient returned with no change in treatment. Results are within parameters to treat. Chemotherapy Flowsheet 11/6/2019   Cycle C2D1   Date 11/6/2019   Drug / Regimen Folfox       Visit Vitals  /78 (BP 1 Location: Left arm, BP Patient Position: Sitting)   Pulse 77   Temp 98.6 °F (37 °C)   Resp 18   Ht 5' 7.01\" (1.702 m)   Wt 65.7 kg (144 lb 12.8 oz)   SpO2 96%   BMI 22.67 kg/m²     Patient Vitals for the past 12 hrs:   Temp Pulse Resp BP SpO2   11/06/19 1604 98.1 °F (36.7 °C) 78 16 125/76 97 %   11/06/19 0912 98.6 °F (37 °C) 77 18 120/78 96 %     Recent Results (from the past 12 hour(s))   CBC WITH AUTOMATED DIFF    Collection Time: 11/06/19  9:19 AM   Result Value Ref Range    WBC 2.5 (L) 4.1 - 11.1 K/uL    RBC 3.78 (L) 4.10 - 5.70 M/uL    HGB 12.2 12.1 - 17.0 g/dL    HCT 35.9 (L) 36.6 - 50.3 %    MCV 95.0 80.0 - 99.0 FL    MCH 32.3 26.0 - 34.0 PG    MCHC 34.0 30.0 - 36.5 g/dL    RDW 11.9 11.5 - 14.5 %    PLATELET 854 (L) 152 - 400 K/uL    MPV 9.8 8.9 - 12.9 FL    NRBC 0.0 0  WBC    ABSOLUTE NRBC 0.00 0.00 - 0.01 K/uL    NEUTROPHILS 69 32 - 75 %    LYMPHOCYTES 14 12 - 49 %    MONOCYTES 13 5 - 13 %    EOSINOPHILS 3 0 - 7 %    BASOPHILS 1 0 - 1 %    IMMATURE GRANULOCYTES 0 0.0 - 0.5 %    ABS. NEUTROPHILS 1.7 (L) 1.8 - 8.0 K/UL    ABS. LYMPHOCYTES 0.4 (L) 0.8 - 3.5 K/UL    ABS. MONOCYTES 0.3 0.0 - 1.0 K/UL    ABS. EOSINOPHILS 0.1 0.0 - 0.4 K/UL    ABS. BASOPHILS 0.0 0.0 - 0.1 K/UL    ABS. IMM.  GRANS. 0.0 0.00 - 0.04 K/UL    DF SMEAR SCANNED      RBC COMMENTS NORMOCYTIC, NORMOCHROMIC     METABOLIC PANEL, COMPREHENSIVE    Collection Time: 11/06/19  9:19 AM   Result Value Ref Range    Sodium 142 136 - 145 mmol/L    Potassium 3.2 (L) 3.5 - 5.1 mmol/L    Chloride 109 (H) 97 - 108 mmol/L    CO2 26 21 - 32 mmol/L    Anion gap 7 5 - 15 mmol/L    Glucose 127 (H) 65 - 100 mg/dL    BUN 10 6 - 20 MG/DL    Creatinine 0.94 0.70 - 1.30 MG/DL    BUN/Creatinine ratio 11 (L) 12 - 20      GFR est AA >60 >60 ml/min/1.73m2    GFR est non-AA >60 >60 ml/min/1.73m2    Calcium 9.4 8.5 - 10.1 MG/DL    Bilirubin, total 0.3 0.2 - 1.0 MG/DL    ALT (SGPT) 25 12 - 78 U/L    AST (SGOT) 13 (L) 15 - 37 U/L    Alk.  phosphatase 64 45 - 117 U/L    Protein, total 6.6 6.4 - 8.2 g/dL    Albumin 3.3 (L) 3.5 - 5.0 g/dL    Globulin 3.3 2.0 - 4.0 g/dL    A-G Ratio 1.0 (L) 1.1 - 2.2     CEA    Collection Time: 11/06/19  9:19 AM   Result Value Ref Range    CEA 1.1 ng/mL     Medications:  Medications Administered     dexamethasone (DECADRON) 12 mg in 0.9% sodium chloride 50 mL, overfill volume 5 mL IVPB     Admin Date  11/06/2019 Action  Given Dose  12 mg Rate  232 mL/hr Route  IntraVENous Administered By  Marlene Golden RN          dextrose 5% infusion     Admin Date  11/06/2019 Action  New Bag Dose  25 mL/hr Rate  25 mL/hr Route  IntraVENous Administered By  Marlene Golden RN          fluorouracil (ADRUCIL) 4,224 mg in 0.9% sodium chloride 100 mL CADD Cassette     Admin Date  11/06/2019 Action  New Bag Dose  4224 mg Rate  2.2 mL/hr Route  IntraVENous Administered By  Marlene Golden RN          fluorouracil (ADRUCIL) chemo syringe 704 mg     Admin Date  11/06/2019 Action  Given Dose  704 mg Rate  281.6 mL/hr Route  IntraVENous Administered By  Marlene Golden RN          leucovorin (WELLCOVORIN) 704 mg in dextrose 5% 250 mL, overfill volume 25 mL IVPB     Admin Date  11/06/2019 Action  New Bag Dose  704 mg Rate  155.1 mL/hr Route  IntraVENous Administered By  Marlene Golden RN          ondansetron [facility]) injection 8 mg     Admin Date  11/06/2019 Action  Given Dose  8 mg Route  IntraVENous Administered By  Marlene Golden RN          oxaliplatin (ELOXATIN) 149.5 mg in dextrose 5% 250 mL, overfill volume 25 mL chemo infusion     Admin Date  11/06/2019 Action  New Bag Dose  149.5 mg Rate  152.5 mL/hr Route  IntraVENous Administered By  Ramon Garner RN          potassium chloride SR (KLOR-CON 10) tablet 40 mEq     Admin Date  11/06/2019 Action  Given Dose  40 mEq Route  Oral Administered By  Ramon Garner RN          saline peripheral flush soln 10 mL     Admin Date  11/06/2019 Action  Given Dose  10 mL Route  InterCATHeter Administered By  Ramon Garner RN          sodium chloride 0.9% injection 10 mL     Admin Date  11/06/2019 Action  Given Dose  10 mL Route  IntraVENous Administered By  Ramon Garner RN              165 Pt tolerated treatment well. Port maintained positive blood return throughout treatment, flushed with positive blood return at conclusion and throughout. Port connected to CADD pump. D/c home ambulatory in no distress.  Pt aware of next appointment scheduled for     Future Appointments   Date Time Provider Shannan Rodriguez   11/8/2019  3:00 PM [facility]   11/20/2019 10:00 AM [facility]SE [facility]   11/22/2019  3:00 PM [facility]   12/4/2019 10:00 AM [facility]ION [facility]URSE [facility]   12/6/2019  3:00 PM [facility]   1/28/2020 10:20 AM Darlin Fitch MD [facility] JOY SCHED   4/20/2020 10:30 AM Radha Ray MD [address] [place] [address]

## 2019-11-08 ENCOUNTER — HOSPITAL ENCOUNTER (OUTPATIENT)
Dept: INFUSION THERAPY | Age: 49
Discharge: HOME | End: 2019-11-08
Payer: MEDICAID

## 2019-11-08 VITALS
RESPIRATION RATE: 16 BRPM | DIASTOLIC BLOOD PRESSURE: 76 MMHG | SYSTOLIC BLOOD PRESSURE: 126 MMHG | HEART RATE: 78 BPM | TEMPERATURE: 98 F

## 2019-11-08 DIAGNOSIS — C20 RECTAL CANCER (HCC): Primary | ICD-10-CM

## 2019-11-08 PROCEDURE — 96523 IRRIG DRUG DELIVERY DEVICE: CPT

## 2019-11-08 RX ORDER — HEPARIN 100 UNIT/ML
300-500 SYRINGE INTRAVENOUS AS NEEDED
Status: DISCONTINUED | OUTPATIENT
Start: 2019-11-08 | End: 2019-11-09 | Stop reason: HOSPADM

## 2019-11-08 RX ORDER — SODIUM CHLORIDE 9 MG/ML
10 INJECTION INTRAMUSCULAR; INTRAVENOUS; SUBCUTANEOUS AS NEEDED
Status: DISCONTINUED | OUTPATIENT
Start: 2019-11-08 | End: 2019-11-09 | Stop reason: HOSPADM

## 2019-11-08 RX ORDER — SODIUM CHLORIDE 0.9 % (FLUSH) 0.9 %
10 SYRINGE (ML) INJECTION AS NEEDED
Status: DISCONTINUED | OUTPATIENT
Start: 2019-11-08 | End: 2019-11-09 | Stop reason: HOSPADM

## 2019-11-08 NOTE — PROGRESS NOTES
[facility] Short Visit Note:    4098:  Pt arrived ambulatory and in no distress for CADD PUMP removal and port flush. tolerated well. D/Cd from [facility] ambulatory and in no distress. Next visit 11-20-19.     Visit Vitals  /76   Pulse 78   Temp 98 °F (36.7 °C)   Resp 16

## 2019-11-12 RX ORDER — ACETAMINOPHEN 325 MG/1
650 TABLET ORAL AS NEEDED
Status: CANCELLED
Start: 2019-11-20

## 2019-11-12 RX ORDER — DEXTROSE MONOHYDRATE 50 MG/ML
25 INJECTION, SOLUTION INTRAVENOUS CONTINUOUS
Status: CANCELLED
Start: 2019-12-18

## 2019-11-12 RX ORDER — HEPARIN 100 UNIT/ML
300-500 SYRINGE INTRAVENOUS AS NEEDED
Status: CANCELLED
Start: 2019-12-18

## 2019-11-12 RX ORDER — SODIUM CHLORIDE 9 MG/ML
10 INJECTION INTRAMUSCULAR; INTRAVENOUS; SUBCUTANEOUS AS NEEDED
Status: CANCELLED | OUTPATIENT
Start: 2019-12-20

## 2019-11-12 RX ORDER — HEPARIN 100 UNIT/ML
300-500 SYRINGE INTRAVENOUS AS NEEDED
Status: CANCELLED
Start: 2019-11-20

## 2019-11-12 RX ORDER — SODIUM CHLORIDE 9 MG/ML
10 INJECTION INTRAMUSCULAR; INTRAVENOUS; SUBCUTANEOUS AS NEEDED
Status: CANCELLED | OUTPATIENT
Start: 2019-12-06

## 2019-11-12 RX ORDER — DEXTROSE MONOHYDRATE 50 MG/ML
25 INJECTION, SOLUTION INTRAVENOUS CONTINUOUS
Status: CANCELLED
Start: 2019-11-20

## 2019-11-12 RX ORDER — DIPHENHYDRAMINE HYDROCHLORIDE 50 MG/ML
50 INJECTION, SOLUTION INTRAMUSCULAR; INTRAVENOUS AS NEEDED
Status: CANCELLED
Start: 2019-11-20

## 2019-11-12 RX ORDER — ONDANSETRON 2 MG/ML
8 INJECTION INTRAMUSCULAR; INTRAVENOUS AS NEEDED
Status: CANCELLED | OUTPATIENT
Start: 2019-11-20

## 2019-11-12 RX ORDER — SODIUM CHLORIDE 0.9 % (FLUSH) 0.9 %
10 SYRINGE (ML) INJECTION AS NEEDED
Status: CANCELLED
Start: 2019-12-06

## 2019-11-12 RX ORDER — SODIUM CHLORIDE 0.9 % (FLUSH) 0.9 %
10 SYRINGE (ML) INJECTION AS NEEDED
Status: CANCELLED
Start: 2019-11-20

## 2019-11-12 RX ORDER — FLUOROURACIL 50 MG/ML
400 INJECTION, SOLUTION INTRAVENOUS ONCE
Status: CANCELLED
Start: 2019-11-20 | End: 2019-11-20

## 2019-11-12 RX ORDER — HEPARIN 100 UNIT/ML
300-500 SYRINGE INTRAVENOUS AS NEEDED
Status: CANCELLED
Start: 2019-12-20

## 2019-11-12 RX ORDER — ALBUTEROL SULFATE 0.83 MG/ML
2.5 SOLUTION RESPIRATORY (INHALATION) AS NEEDED
Status: CANCELLED
Start: 2019-12-18

## 2019-11-12 RX ORDER — SODIUM CHLORIDE 0.9 % (FLUSH) 0.9 %
10 SYRINGE (ML) INJECTION AS NEEDED
Status: CANCELLED
Start: 2019-12-18

## 2019-11-12 RX ORDER — ACETAMINOPHEN 325 MG/1
650 TABLET ORAL AS NEEDED
Status: CANCELLED
Start: 2019-12-18

## 2019-11-12 RX ORDER — HEPARIN 100 UNIT/ML
300-500 SYRINGE INTRAVENOUS AS NEEDED
Status: CANCELLED
Start: 2019-12-06

## 2019-11-12 RX ORDER — HYDROCORTISONE SODIUM SUCCINATE 100 MG/2ML
100 INJECTION, POWDER, FOR SOLUTION INTRAMUSCULAR; INTRAVENOUS AS NEEDED
Status: CANCELLED | OUTPATIENT
Start: 2019-11-20

## 2019-11-12 RX ORDER — DIPHENHYDRAMINE HYDROCHLORIDE 50 MG/ML
50 INJECTION, SOLUTION INTRAMUSCULAR; INTRAVENOUS AS NEEDED
Status: CANCELLED
Start: 2019-12-18

## 2019-11-12 RX ORDER — SODIUM CHLORIDE 9 MG/ML
10 INJECTION INTRAMUSCULAR; INTRAVENOUS; SUBCUTANEOUS AS NEEDED
Status: CANCELLED | OUTPATIENT
Start: 2019-12-18

## 2019-11-12 RX ORDER — SODIUM CHLORIDE 9 MG/ML
10 INJECTION INTRAMUSCULAR; INTRAVENOUS; SUBCUTANEOUS AS NEEDED
Status: CANCELLED | OUTPATIENT
Start: 2019-11-20

## 2019-11-12 RX ORDER — FLUOROURACIL 50 MG/ML
400 INJECTION, SOLUTION INTRAVENOUS ONCE
Status: CANCELLED
Start: 2019-12-11

## 2019-11-12 RX ORDER — HYDROCORTISONE SODIUM SUCCINATE 100 MG/2ML
100 INJECTION, POWDER, FOR SOLUTION INTRAMUSCULAR; INTRAVENOUS AS NEEDED
Status: CANCELLED | OUTPATIENT
Start: 2019-12-18

## 2019-11-12 RX ORDER — ONDANSETRON 2 MG/ML
8 INJECTION INTRAMUSCULAR; INTRAVENOUS ONCE
Status: CANCELLED | OUTPATIENT
Start: 2019-12-18

## 2019-11-12 RX ORDER — ALBUTEROL SULFATE 0.83 MG/ML
2.5 SOLUTION RESPIRATORY (INHALATION) AS NEEDED
Status: CANCELLED
Start: 2019-11-20

## 2019-11-12 RX ORDER — ONDANSETRON 2 MG/ML
8 INJECTION INTRAMUSCULAR; INTRAVENOUS ONCE
Status: CANCELLED | OUTPATIENT
Start: 2019-11-20

## 2019-11-12 RX ORDER — SODIUM CHLORIDE 0.9 % (FLUSH) 0.9 %
10 SYRINGE (ML) INJECTION AS NEEDED
Status: CANCELLED
Start: 2019-12-20

## 2019-11-12 RX ORDER — ONDANSETRON 2 MG/ML
8 INJECTION INTRAMUSCULAR; INTRAVENOUS AS NEEDED
Status: CANCELLED | OUTPATIENT
Start: 2019-12-18

## 2019-11-12 RX ORDER — EPINEPHRINE 1 MG/ML
0.3 INJECTION, SOLUTION, CONCENTRATE INTRAVENOUS AS NEEDED
Status: CANCELLED | OUTPATIENT
Start: 2019-12-18

## 2019-11-12 RX ORDER — EPINEPHRINE 1 MG/ML
0.3 INJECTION, SOLUTION, CONCENTRATE INTRAVENOUS AS NEEDED
Status: CANCELLED | OUTPATIENT
Start: 2019-11-20

## 2019-11-20 ENCOUNTER — OFFICE VISIT (OUTPATIENT)
Dept: ONCOLOGY | Age: 49
End: 2019-11-20

## 2019-11-20 ENCOUNTER — HOSPITAL ENCOUNTER (OUTPATIENT)
Dept: INFUSION THERAPY | Age: 49
Discharge: HOME | End: 2019-11-20
Payer: MEDICAID

## 2019-11-20 VITALS
HEIGHT: 67 IN | TEMPERATURE: 98.8 F | RESPIRATION RATE: 16 BRPM | WEIGHT: 145.8 LBS | HEART RATE: 74 BPM | SYSTOLIC BLOOD PRESSURE: 119 MMHG | DIASTOLIC BLOOD PRESSURE: 85 MMHG | BODY MASS INDEX: 22.88 KG/M2 | OXYGEN SATURATION: 98 %

## 2019-11-20 VITALS — WEIGHT: 146.8 LBS | BODY MASS INDEX: 23.04 KG/M2 | HEIGHT: 67 IN

## 2019-11-20 DIAGNOSIS — L40.50 PSORIATIC ARTHRITIS (HCC): ICD-10-CM

## 2019-11-20 DIAGNOSIS — C20 RECTAL CANCER (HCC): Primary | ICD-10-CM

## 2019-11-20 DIAGNOSIS — D69.6 THROMBOCYTOPENIA (HCC): ICD-10-CM

## 2019-11-20 LAB
ALBUMIN SERPL-MCNC: 3.4 G/DL (ref 3.5–5)
ALBUMIN/GLOB SERPL: 1.2 {RATIO} (ref 1.1–2.2)
ALP SERPL-CCNC: 63 U/L (ref 45–117)
ALT SERPL-CCNC: 20 U/L (ref 12–78)
ANION GAP SERPL CALC-SCNC: 5 MMOL/L (ref 5–15)
AST SERPL-CCNC: 13 U/L (ref 15–37)
BASOPHILS # BLD: 0 K/UL (ref 0–0.1)
BASOPHILS NFR BLD: 1 % (ref 0–1)
BILIRUB SERPL-MCNC: 0.6 MG/DL (ref 0.2–1)
BUN SERPL-MCNC: 17 MG/DL (ref 6–20)
BUN/CREAT SERPL: 18 (ref 12–20)
CALCIUM SERPL-MCNC: 8.7 MG/DL (ref 8.5–10.1)
CEA SERPL-MCNC: 1.2 NG/ML
CHLORIDE SERPL-SCNC: 108 MMOL/L (ref 97–108)
CO2 SERPL-SCNC: 27 MMOL/L (ref 21–32)
CREAT SERPL-MCNC: 0.97 MG/DL (ref 0.7–1.3)
DIFFERENTIAL METHOD BLD: ABNORMAL
EOSINOPHIL # BLD: 0.1 K/UL (ref 0–0.4)
EOSINOPHIL NFR BLD: 3 % (ref 0–7)
ERYTHROCYTE [DISTWIDTH] IN BLOOD BY AUTOMATED COUNT: 13 % (ref 11.5–14.5)
GLOBULIN SER CALC-MCNC: 2.8 G/DL (ref 2–4)
GLUCOSE SERPL-MCNC: 98 MG/DL (ref 65–100)
HCT VFR BLD AUTO: 36 % (ref 36.6–50.3)
HGB BLD-MCNC: 12.2 G/DL (ref 12.1–17)
IMM GRANULOCYTES # BLD AUTO: 0 K/UL (ref 0–0.04)
IMM GRANULOCYTES NFR BLD AUTO: 1 % (ref 0–0.5)
LYMPHOCYTES # BLD: 0.3 K/UL (ref 0.8–3.5)
LYMPHOCYTES NFR BLD: 18 % (ref 12–49)
MCH RBC QN AUTO: 31.9 PG (ref 26–34)
MCHC RBC AUTO-ENTMCNC: 33.9 G/DL (ref 30–36.5)
MCV RBC AUTO: 94 FL (ref 80–99)
MONOCYTES # BLD: 0.3 K/UL (ref 0–1)
MONOCYTES NFR BLD: 16 % (ref 5–13)
NEUTS SEG # BLD: 1.1 K/UL (ref 1.8–8)
NEUTS SEG NFR BLD: 61 % (ref 32–75)
NRBC # BLD: 0 K/UL (ref 0–0.01)
NRBC BLD-RTO: 0 PER 100 WBC
PLATELET # BLD AUTO: 114 K/UL (ref 150–400)
PMV BLD AUTO: 9.8 FL (ref 8.9–12.9)
POTASSIUM SERPL-SCNC: 4.1 MMOL/L (ref 3.5–5.1)
PROT SERPL-MCNC: 6.2 G/DL (ref 6.4–8.2)
RBC # BLD AUTO: 3.83 M/UL (ref 4.1–5.7)
RBC MORPH BLD: ABNORMAL
SODIUM SERPL-SCNC: 140 MMOL/L (ref 136–145)
WBC # BLD AUTO: 1.8 K/UL (ref 4.1–11.1)

## 2019-11-20 PROCEDURE — 77030012965 HC NDL HUBR BBMI -A

## 2019-11-20 PROCEDURE — 36415 COLL VENOUS BLD VENIPUNCTURE: CPT

## 2019-11-20 PROCEDURE — 82378 CARCINOEMBRYONIC ANTIGEN: CPT

## 2019-11-20 PROCEDURE — 36591 DRAW BLOOD OFF VENOUS DEVICE: CPT

## 2019-11-20 PROCEDURE — 80053 COMPREHEN METABOLIC PANEL: CPT

## 2019-11-20 PROCEDURE — 85025 COMPLETE CBC W/AUTO DIFF WBC: CPT

## 2019-11-20 NOTE — PROGRESS NOTES
Siria Almaraz is a 47 y.o. male  Chief Complaint   Patient presents with    Follow-up     rectal cancer     1. Have you been to the ER, urgent care clinic since your last visit? Hospitalized since your last visit? No    2. Have you seen or consulted any other health care providers outside of the [address] [place] [address] since your last visit? Include any pap smears or colon screening.  No

## 2019-11-20 NOTE — PROGRESS NOTES
[facility] at [place] [address] [address], [address]  W: [phone]  F: [phone]    Reason for Visit:   Mary Ellen Balderrama is a 47 y.o. male who is seen for Rectal cancer- likely stage III- SOMMER    Treatment History:   · 4/26/19: Colonoscopy- 6-7 cm size malignant appearing mass seen at 10 cm from anal verge. This was adenocarcinoma. 3 polyps removed- all were tubular adenomas  · Rectal Ultrasound 5/2/19: ulcerated infiltrative mass lesion was noted in rectum at 10 cm. The lesion measured about 5 cm X 4 cm- T2, 13 mm X 8 mm oblong lymph node was noted immediately adjacent to the mass lesion  · 5/2/19: CT CAP- No metastasis, liver cysts. CEA 3.6  · 5/10/19: PET CT showed rectal malignancy and 3 small perirectal anibal metastatic focir  · 5/28/19-7/8/29: Xeloda + RT   · 9/24/19: LAR- [id number], 2/6 positive nodes  · 10/14/19: CT CAP with no evidence of metastatic disease, liver cysts   · 10/23/19: cycle 1 FOLFOX    History of Present Illness:   Patient is a 47 y.o. male seen for adenocarcinoma of the mid third of rectum    He had intermittent BRBPR x 1 year and then decided to have a colonoscopy. This led to above mentioned diagnosis. He received neoadjuvant xeloda+ RT followed by LAR. Comes today for cycle 3 of FOLFOX. He has had no change in stool consistency, no mouth sores, he has no nausea, he did have sine tingling and numbness for a week, no pain. He has good energy. He uses 7 drinks a week.     Adopted    Past Medical History:   Diagnosis Date    Adopted     GERD (gastroesophageal reflux disease)     Psoriasis     Psoriatic arthritis ([facility] [address]     Rectal [facility])       Past Surgical History:   Procedure Laterality Date    COLONOSCOPY Left 4/26/2019    COLONOSCOPY performed by Bria Angela MD at [address] N/A 5/2/2019    SIGMOIDOSCOPY FLEXIBLE performed by Ruth Ann Hernandez MD at [address] [facility]      EGD    HX HEENT      WISDOM TEETH    HX ORTHOPAEDIC Right     ACL REPLACEMENT    IR INSERT TUNL CVC W PORT OVER 5 YEARS  10/15/2019      Social History     Tobacco Use    Smoking status: Never Smoker    Smokeless tobacco: Never Used   Substance Use Topics    Alcohol use: Yes     Comment: 6 BEERS WEEKLY      Family History   Adopted: Yes   Problem Relation Age of Onset    Asthma Neg Hx     Cancer Neg Hx     Diabetes Neg Hx     Heart Disease Neg Hx     Hypertension Neg Hx     Stroke Neg Hx      Current Outpatient Medications   Medication Sig    hydroCHLOROthiazide (HYDRODIURIL) 12.5 mg tablet TAKE 1 TABLET BY MOUTH EVERY DAY    lidocaine-prilocaine (EMLA) topical cream Apply  to affected area as needed for Pain.  dexAMETHasone (DECADRON) 4 mg tablet Take 8 mg by mouth See Admin Instructions. Take 2 tabs by mouth twice a day on days 2 and 3 after chemotherapy only    ondansetron (ZOFRAN ODT) 8 mg disintegrating tablet Take 1 Tab by mouth every eight (8) hours as needed for Nausea.  acetaminophen (TYLENOL) 500 mg tablet Take 500 mg by mouth every six (6) hours as needed for Fever or Pain.  diphenoxylate-atropine (LOMOTIL) 2.5-0.025 mg per tablet Take 1 Tab by mouth four (4) times daily as needed for Diarrhea.  tamsulosin (FLOMAX) 0.4 mg capsule Take 0.4 mg by mouth daily.  triamcinolone acetonide (KENALOG) 0.1 % ointment Apply  to affected area two (2) times daily as needed for Skin Irritation. use thin layer    fluocinonide (VANOS) 0.1 % topical cream Apply  to affected area daily. No current facility-administered medications for this visit. No Known Allergies     Review of Systems: A complete review of systems was obtained, negative except as described above.     Physical Exam:     Visit Vitals  /85 (BP 1 Location: Left arm)   Pulse 74   Temp 98.8 °F (37.1 °C)   Resp 16   Ht 5' 7\" (1.702 m)   Wt 145 lb 12.8 oz (66.1 kg)   SpO2 98%   BMI 22.84 kg/m²     ECOG PS: 1  General: No distress  Eyes: PERRLA, anicteric sclerae  HENT: Atraumatic, OP clear, PAC looks good   Resp: CTAB, normal respiratory effort  CV: s1s2, no LE edema  MS: Normal gait and station. Digits without clubbing or cyanosis. Skin: No rashes, ecchymoses, or petechiae. Normal temperature, turgor, and texture. Psych: Alert, oriented, appropriate affect, normal judgment/insight    Results:     Lab Results   Component Value Date/Time    WBC 1.8 (L) 11/20/2019 10:19 AM    HGB 12.2 11/20/2019 10:19 AM    HCT 36.0 (L) 11/20/2019 10:19 AM    PLATELET 259 (L) 03/93/6227 10:19 AM    MCV 94.0 11/20/2019 10:19 AM    ABS. NEUTROPHILS PENDING 11/20/2019 10:19 AM     Lab Results   Component Value Date/Time    Sodium 142 11/06/2019 09:19 AM    Potassium 3.2 (L) 11/06/2019 09:19 AM    Chloride 109 (H) 11/06/2019 09:19 AM    CO2 26 11/06/2019 09:19 AM    Glucose 127 (H) 11/06/2019 09:19 AM    BUN 10 11/06/2019 09:19 AM    Creatinine 0.94 11/06/2019 09:19 AM    GFR est AA >60 11/06/2019 09:19 AM    GFR est non-AA >60 11/06/2019 09:19 AM    Calcium 9.4 11/06/2019 09:19 AM     Lab Results   Component Value Date/Time    Bilirubin, total 0.3 11/06/2019 09:19 AM    ALT (SGPT) 25 11/06/2019 09:19 AM    AST (SGOT) 13 (L) 11/06/2019 09:19 AM    Alk. phosphatase 64 11/06/2019 09:19 AM    Protein, total 6.6 11/06/2019 09:19 AM    Albumin 3.3 (L) 11/06/2019 09:19 AM    Globulin 3.3 11/06/2019 09:19 AM     Records reviewed and summarized above. Pathology report(s) reviewed  FINAL PATHOLOGIC DIAGNOSIS   1.  Rectum and sigmoid colon, laparoscopic low anterior resection:   SPECIMEN   Procedure: Low anterior resection   Macroscopic Intactness of Mesorectum: Complete   TUMOR   Tumor Site: Rectum   Histologic Type: Adenocarcinoma   Histologic Grade: G2: Moderately differentiated   Tumor Size: 2.6 cm   Tumor Deposits: Present   Number of Deposits: 1   Tumor Extension: Tumor invades through the muscularis propria into pericolorectal tissue   Macroscopic Tumor Perforation: Not identified   Lymphovascular Invasion: Not identified   Perineural Invasion: Not identified   Tumor Budding: Number of tumor buds in one hotspot field: 5   Tumor Budding Score: Intermediate score (5-9)   Treatment Effect: Present - Residual cancer with evident tumor regression, but more than single cells or   rare small groups of cancer cells (partial response, score 2)   MARGINS   Margins: All margins are uninvolved by invasive carcinoma, high- grade dysplasia, intramucosal   adenocarcinoma, and adenoma   Margins Examined: Proximal, Distal, Radial or Mesenteric   Distance of Invasive Carcinoma from Closest Margin: 25 mm   Closest Margin: Radial or Mesenteric   LYMPH NODES   Number of Lymph Nodes Involved: 2   Number of Lymph Nodes Examined: 16   PATHOLOGIC STAGE CLASSIFICATION (pTNM, AJCC 8th Edition)   Primary Tumor (pT): pT3   Regional Lymph Nodes (pN): pN1b   2. Large bowel, donuts:   Fragments of large bowel wall, negative for microscopic pathologic abnormality. Radiology report(s) reviewed above. CT CAP 10/14/19: IMPRESSION:  1. There are scattered small hepatic cysts without definite evidence for  metastatic disease.   2. Otherwise negative CT chest abdomen pelvis    Assessment:   1) Rectal adenocarcinoma- mid third- SOMMER    T2N1M0 disease- Clinical stage III  S/P John Adj chemoRT followed by LAR on 9/24/19  Pathology showed [id number] residual disease- stage IIIB    Reviewed surgical pathology  Had significant residual disease with practically no treatment effects  See prior office notes in regards to discussion on adjuvant chemotherapy   Had CT 10/14/19 with no evidence of metastatic disease    Today is cycle 3 of FOLFOX x 8 planned cycles     Will delay by 1 week as ANC is 80    Genetic testing negative    2) Allegra Smith age at diagnosis    Adopted  Had several adenomas removed  May have attenuated FAP and will require genetic testing  SOMMER    3) GIB  Minor    4) Psoriasis  Off Methotrexate  Follows Dr. Tatum Velez    5) Neutropenia  Chemotherapy induced    Plan:     · Delay cycle 3 of adjuvant FOLFOX by 1 week (5FU syringe 400mg/m2, leucovorin 400mg/m2, 5FU CADD 2,400mg/m2, oxaliplatin 85mg/m2) for 8 cycles   · Labs to include CBC with diff and CMP prior to each treatment. CEA monthly. · Pre-meds to include Zofran and Dexamethasone   · Dexamethasone days 2 and 3  · Zofran PRN     RTC in 3 weeks for cycle 4 and then every 2 weeks    I appreciate the opportunity to participate in Mr. Jay Brar jj.     Signed By: Beth Guerra MD

## 2019-11-20 NOTE — PROGRESS NOTES
1020 Pt admit to [facility] for C3 FOLFOX HELD ambulatory in stable condition. Assessment completed. No new concerns voiced. Port accessed and flushed with positive blood return. Labs drawn per order and sent for processing. Patient to MD office. Patient returned to [place] [address], treatment held today, rescheduled in one week. Visit Vitals  Ht 5' 7\" (1.702 m)   Wt 66.6 kg (146 lb 12.8 oz)   BMI 22.99 kg/m²             1200 Pt tolerated treatment well. Port maintained positive blood return throughout treatment, flushed with positive blood return at conclusion and port heparinized and de-accessed per protocol. D/c home ambulatory in no distress. Pt aware of next [facility] appointment scheduled for 11/27/19. Recent Results (from the past 12 hour(s))   CBC WITH AUTOMATED DIFF    Collection Time: 11/20/19 10:19 AM   Result Value Ref Range    WBC 1.8 (L) 4.1 - 11.1 K/uL    RBC 3.83 (L) 4.10 - 5.70 M/uL    HGB 12.2 12.1 - 17.0 g/dL    HCT 36.0 (L) 36.6 - 50.3 %    MCV 94.0 80.0 - 99.0 FL    MCH 31.9 26.0 - 34.0 PG    MCHC 33.9 30.0 - 36.5 g/dL    RDW 13.0 11.5 - 14.5 %    PLATELET 684 (L) 962 - 400 K/uL    MPV 9.8 8.9 - 12.9 FL    NRBC 0.0 0  WBC    ABSOLUTE NRBC 0.00 0.00 - 0.01 K/uL    NEUTROPHILS 61 32 - 75 %    LYMPHOCYTES 18 12 - 49 %    MONOCYTES 16 (H) 5 - 13 %    EOSINOPHILS 3 0 - 7 %    BASOPHILS 1 0 - 1 %    IMMATURE GRANULOCYTES 1 (H) 0.0 - 0.5 %    ABS. NEUTROPHILS 1.1 (L) 1.8 - 8.0 K/UL    ABS. LYMPHOCYTES 0.3 (L) 0.8 - 3.5 K/UL    ABS. MONOCYTES 0.3 0.0 - 1.0 K/UL    ABS. EOSINOPHILS 0.1 0.0 - 0.4 K/UL    ABS. BASOPHILS 0.0 0.0 - 0.1 K/UL    ABS. IMM.  GRANS. 0.0 0.00 - 0.04 K/UL    DF SMEAR SCANNED      RBC COMMENTS NORMOCYTIC, NORMOCHROMIC     METABOLIC PANEL, COMPREHENSIVE    Collection Time: 11/20/19 10:19 AM   Result Value Ref Range    Sodium 140 136 - 145 mmol/L    Potassium 4.1 3.5 - 5.1 mmol/L    Chloride 108 97 - 108 mmol/L    CO2 27 21 - 32 mmol/L    Anion gap 5 5 - 15 mmol/L    Glucose 98 65 - 100 mg/dL BUN 17 6 - 20 MG/DL    Creatinine 0.97 0.70 - 1.30 MG/DL    BUN/Creatinine ratio 18 12 - 20      GFR est AA >60 >60 ml/min/1.73m2    GFR est non-AA >60 >60 ml/min/1.73m2    Calcium 8.7 8.5 - 10.1 MG/DL    Bilirubin, total 0.6 0.2 - 1.0 MG/DL    ALT (SGPT) 20 12 - 78 U/L    AST (SGOT) 13 (L) 15 - 37 U/L    Alk.  phosphatase 63 45 - 117 U/L    Protein, total 6.2 (L) 6.4 - 8.2 g/dL    Albumin 3.4 (L) 3.5 - 5.0 g/dL    Globulin 2.8 2.0 - 4.0 g/dL    A-G Ratio 1.2 1.1 - 2.2     CEA    Collection Time: 11/20/19 10:19 AM   Result Value Ref Range    CEA 1.2 ng/mL

## 2019-11-22 ENCOUNTER — APPOINTMENT (OUTPATIENT)
Dept: INFUSION THERAPY | Age: 49
End: 2019-11-22
Payer: MEDICAID

## 2019-11-25 ENCOUNTER — DOCUMENTATION ONLY (OUTPATIENT)
Dept: ONCOLOGY | Age: 49
End: 2019-11-25

## 2019-11-25 RX ORDER — DEXTROSE MONOHYDRATE 50 MG/ML
25 INJECTION, SOLUTION INTRAVENOUS CONTINUOUS
Status: CANCELLED
Start: 2019-11-27

## 2019-11-25 RX ORDER — ACETAMINOPHEN 325 MG/1
650 TABLET ORAL AS NEEDED
Status: CANCELLED
Start: 2019-11-27

## 2019-11-25 RX ORDER — EPINEPHRINE 1 MG/ML
0.3 INJECTION, SOLUTION, CONCENTRATE INTRAVENOUS AS NEEDED
Status: CANCELLED | OUTPATIENT
Start: 2019-11-27

## 2019-11-25 RX ORDER — SODIUM CHLORIDE 9 MG/ML
10 INJECTION INTRAMUSCULAR; INTRAVENOUS; SUBCUTANEOUS AS NEEDED
Status: CANCELLED | OUTPATIENT
Start: 2019-11-27

## 2019-11-25 RX ORDER — ALBUTEROL SULFATE 0.83 MG/ML
2.5 SOLUTION RESPIRATORY (INHALATION) AS NEEDED
Status: CANCELLED
Start: 2019-11-27

## 2019-11-25 RX ORDER — DIPHENHYDRAMINE HYDROCHLORIDE 50 MG/ML
50 INJECTION, SOLUTION INTRAMUSCULAR; INTRAVENOUS AS NEEDED
Status: CANCELLED
Start: 2019-11-27

## 2019-11-25 RX ORDER — ONDANSETRON 2 MG/ML
8 INJECTION INTRAMUSCULAR; INTRAVENOUS AS NEEDED
Status: CANCELLED | OUTPATIENT
Start: 2019-11-27

## 2019-11-25 RX ORDER — SODIUM CHLORIDE 0.9 % (FLUSH) 0.9 %
10 SYRINGE (ML) INJECTION AS NEEDED
Status: CANCELLED
Start: 2019-11-27

## 2019-11-25 RX ORDER — HYDROCORTISONE SODIUM SUCCINATE 100 MG/2ML
100 INJECTION, POWDER, FOR SOLUTION INTRAMUSCULAR; INTRAVENOUS AS NEEDED
Status: CANCELLED | OUTPATIENT
Start: 2019-11-27

## 2019-11-25 RX ORDER — ONDANSETRON 2 MG/ML
8 INJECTION INTRAMUSCULAR; INTRAVENOUS ONCE
Status: CANCELLED | OUTPATIENT
Start: 2019-11-27

## 2019-11-25 RX ORDER — HEPARIN 100 UNIT/ML
300-500 SYRINGE INTRAVENOUS AS NEEDED
Status: CANCELLED
Start: 2019-11-27

## 2019-11-25 RX ORDER — FLUOROURACIL 50 MG/ML
400 INJECTION, SOLUTION INTRAVENOUS ONCE
Status: CANCELLED
Start: 2019-11-27 | End: 2019-11-27

## 2019-11-25 NOTE — PROGRESS NOTES
Pt OPIC apts need adjustment per beacon dates. KEEP chemo this week as scheduled, it is subsequent infusions that dates are incorrect.

## 2019-11-26 ENCOUNTER — DOCUMENTATION ONLY (OUTPATIENT)
Dept: ONCOLOGY | Age: 49
End: 2019-11-26

## 2019-11-26 ENCOUNTER — HOSPITAL ENCOUNTER (OUTPATIENT)
Dept: INFUSION THERAPY | Age: 49
Discharge: HOME | End: 2019-11-26
Payer: MEDICAID

## 2019-11-26 VITALS
TEMPERATURE: 98.3 F | HEART RATE: 90 BPM | RESPIRATION RATE: 18 BRPM | DIASTOLIC BLOOD PRESSURE: 81 MMHG | SYSTOLIC BLOOD PRESSURE: 126 MMHG

## 2019-11-26 LAB
ALBUMIN SERPL-MCNC: 3.7 G/DL (ref 3.5–5)
ALBUMIN/GLOB SERPL: 1.3 {RATIO} (ref 1.1–2.2)
ALP SERPL-CCNC: 73 U/L (ref 45–117)
ALT SERPL-CCNC: 21 U/L (ref 12–78)
ANION GAP SERPL CALC-SCNC: 8 MMOL/L (ref 5–15)
AST SERPL-CCNC: 15 U/L (ref 15–37)
BILIRUB SERPL-MCNC: 0.4 MG/DL (ref 0.2–1)
BUN SERPL-MCNC: 11 MG/DL (ref 6–20)
BUN/CREAT SERPL: 11 (ref 12–20)
CALCIUM SERPL-MCNC: 8.7 MG/DL (ref 8.5–10.1)
CHLORIDE SERPL-SCNC: 102 MMOL/L (ref 97–108)
CO2 SERPL-SCNC: 27 MMOL/L (ref 21–32)
CREAT SERPL-MCNC: 0.96 MG/DL (ref 0.7–1.3)
GLOBULIN SER CALC-MCNC: 2.9 G/DL (ref 2–4)
GLUCOSE SERPL-MCNC: 126 MG/DL (ref 65–100)
POTASSIUM SERPL-SCNC: 3.3 MMOL/L (ref 3.5–5.1)
PROT SERPL-MCNC: 6.6 G/DL (ref 6.4–8.2)
SODIUM SERPL-SCNC: 137 MMOL/L (ref 136–145)

## 2019-11-26 PROCEDURE — 80053 COMPREHEN METABOLIC PANEL: CPT

## 2019-11-26 PROCEDURE — 85025 COMPLETE CBC W/AUTO DIFF WBC: CPT

## 2019-11-26 PROCEDURE — 36415 COLL VENOUS BLD VENIPUNCTURE: CPT

## 2019-11-26 NOTE — PROGRESS NOTES
Outpatient Infusion Center Short Visit Progress Note    1304 Pt admit to [facility] for pre-chemo lab draw ambulatory in stable condition. Assessment completed. No new concerns voiced. Labs drawn peripherally and sent for processing. Please review pending lab results in [address] [place] [address]. Patient Vitals for the past 12 hrs:   Temp Pulse Resp BP   11/26/19 1724 98.3 °F (36.8 °C) 90 18 126/81         1730 Pt tolerated treatment well. D/c home ambulatory in no distress. Pt aware of next appointment scheduled for 11/27/2019.

## 2019-11-27 ENCOUNTER — HOSPITAL ENCOUNTER (OUTPATIENT)
Dept: INFUSION THERAPY | Age: 49
End: 2019-11-27
Payer: MEDICAID

## 2019-11-27 LAB
BASOPHILS # BLD: 0 K/UL (ref 0–0.1)
BASOPHILS NFR BLD: 1 % (ref 0–1)
DIFFERENTIAL METHOD BLD: ABNORMAL
EOSINOPHIL # BLD: 0.1 K/UL (ref 0–0.4)
EOSINOPHIL NFR BLD: 4 % (ref 0–7)
ERYTHROCYTE [DISTWIDTH] IN BLOOD BY AUTOMATED COUNT: 14.2 % (ref 11.5–14.5)
HCT VFR BLD AUTO: 38.2 % (ref 36.6–50.3)
HGB BLD-MCNC: 13 G/DL (ref 12.1–17)
IMM GRANULOCYTES # BLD AUTO: 0 K/UL (ref 0–0.04)
IMM GRANULOCYTES NFR BLD AUTO: 2 % (ref 0–0.5)
LYMPHOCYTES # BLD: 0.4 K/UL (ref 0.8–3.5)
LYMPHOCYTES NFR BLD: 28 % (ref 12–49)
MCH RBC QN AUTO: 32.5 PG (ref 26–34)
MCHC RBC AUTO-ENTMCNC: 34 G/DL (ref 30–36.5)
MCV RBC AUTO: 95.5 FL (ref 80–99)
MONOCYTES # BLD: 0.4 K/UL (ref 0–1)
MONOCYTES NFR BLD: 29 % (ref 5–13)
NEUTS SEG # BLD: 0.5 K/UL (ref 1.8–8)
NEUTS SEG NFR BLD: 36 % (ref 32–75)
NRBC # BLD: 0 K/UL (ref 0–0.01)
NRBC BLD-RTO: 0 PER 100 WBC
PATH REV BLD -IMP: ABNORMAL
PLATELET # BLD AUTO: 150 K/UL (ref 150–400)
PMV BLD AUTO: 10 FL (ref 8.9–12.9)
RBC # BLD AUTO: 4 M/UL (ref 4.1–5.7)
RBC MORPH BLD: ABNORMAL
WBC # BLD AUTO: 1.4 K/UL (ref 4.1–11.1)
WBC MORPH BLD: ABNORMAL

## 2019-11-27 NOTE — PROGRESS NOTES
His ANC is still only 500    We would have to delay FOLFOX by 1 week  Next cycle onwards eliminate 5FU bolus  Add Neulasta    Please inform patient first thing in am so he does not travel

## 2019-11-27 NOTE — PROGRESS NOTES
11/27/2019    0815: called patient and confirmed x2 identifiers   Informed patient that [address] and plan is to delay treatment 1 week and add neulasta to regimen     Patient verbalized understanding   No new questions or concerns at this time

## 2019-11-27 NOTE — PROGRESS NOTES
Called patient, he had just talked to Dayan 4.   Schedulers-please re-schedule chemo to 12/4/19-call pt to confirm time/date  Front office-place on my schedule 12/4 at pt request to discuss neulasta/recent labs in more detail

## 2019-11-29 ENCOUNTER — APPOINTMENT (OUTPATIENT)
Dept: INFUSION THERAPY | Age: 49
End: 2019-11-29
Payer: MEDICAID

## 2019-12-04 ENCOUNTER — OFFICE VISIT (OUTPATIENT)
Dept: ONCOLOGY | Age: 49
End: 2019-12-04

## 2019-12-04 ENCOUNTER — APPOINTMENT (OUTPATIENT)
Dept: INFUSION THERAPY | Age: 49
End: 2019-12-04
Payer: MEDICAID

## 2019-12-04 ENCOUNTER — HOSPITAL ENCOUNTER (OUTPATIENT)
Dept: INFUSION THERAPY | Age: 49
Discharge: HOME | End: 2019-12-04
Payer: MEDICAID

## 2019-12-04 VITALS
HEIGHT: 67 IN | HEART RATE: 77 BPM | BODY MASS INDEX: 23.31 KG/M2 | DIASTOLIC BLOOD PRESSURE: 85 MMHG | WEIGHT: 148.5 LBS | SYSTOLIC BLOOD PRESSURE: 125 MMHG | TEMPERATURE: 98.3 F

## 2019-12-04 VITALS — DIASTOLIC BLOOD PRESSURE: 82 MMHG | SYSTOLIC BLOOD PRESSURE: 129 MMHG | HEART RATE: 73 BPM | TEMPERATURE: 97 F

## 2019-12-04 DIAGNOSIS — C20 RECTAL CANCER (HCC): Primary | ICD-10-CM

## 2019-12-04 DIAGNOSIS — D69.6 THROMBOCYTOPENIA (HCC): ICD-10-CM

## 2019-12-04 LAB
ALBUMIN SERPL-MCNC: 3.5 G/DL (ref 3.5–5)
ALBUMIN/GLOB SERPL: 1.2 {RATIO} (ref 1.1–2.2)
ALP SERPL-CCNC: 65 U/L (ref 45–117)
ALT SERPL-CCNC: 22 U/L (ref 12–78)
ANION GAP SERPL CALC-SCNC: 5 MMOL/L (ref 5–15)
AST SERPL-CCNC: 15 U/L (ref 15–37)
BASOPHILS # BLD: 0 K/UL (ref 0–0.1)
BASOPHILS NFR BLD: 0 % (ref 0–1)
BILIRUB SERPL-MCNC: 0.5 MG/DL (ref 0.2–1)
BUN SERPL-MCNC: 12 MG/DL (ref 6–20)
BUN/CREAT SERPL: 13 (ref 12–20)
CALCIUM SERPL-MCNC: 9.1 MG/DL (ref 8.5–10.1)
CHLORIDE SERPL-SCNC: 109 MMOL/L (ref 97–108)
CO2 SERPL-SCNC: 27 MMOL/L (ref 21–32)
CREAT SERPL-MCNC: 0.96 MG/DL (ref 0.7–1.3)
DIFFERENTIAL METHOD BLD: ABNORMAL
EOSINOPHIL # BLD: 0.1 K/UL (ref 0–0.4)
EOSINOPHIL NFR BLD: 3 % (ref 0–7)
ERYTHROCYTE [DISTWIDTH] IN BLOOD BY AUTOMATED COUNT: 14.7 % (ref 11.5–14.5)
GLOBULIN SER CALC-MCNC: 2.9 G/DL (ref 2–4)
GLUCOSE SERPL-MCNC: 91 MG/DL (ref 65–100)
HCT VFR BLD AUTO: 38.1 % (ref 36.6–50.3)
HGB BLD-MCNC: 12.9 G/DL (ref 12.1–17)
IMM GRANULOCYTES # BLD AUTO: 0 K/UL (ref 0–0.04)
IMM GRANULOCYTES NFR BLD AUTO: 1 % (ref 0–0.5)
LYMPHOCYTES # BLD: 0.4 K/UL (ref 0.8–3.5)
LYMPHOCYTES NFR BLD: 16 % (ref 12–49)
MCH RBC QN AUTO: 32.5 PG (ref 26–34)
MCHC RBC AUTO-ENTMCNC: 33.9 G/DL (ref 30–36.5)
MCV RBC AUTO: 96 FL (ref 80–99)
MONOCYTES # BLD: 0.4 K/UL (ref 0–1)
MONOCYTES NFR BLD: 14 % (ref 5–13)
NEUTS SEG # BLD: 1.9 K/UL (ref 1.8–8)
NEUTS SEG NFR BLD: 66 % (ref 32–75)
NRBC # BLD: 0 K/UL (ref 0–0.01)
NRBC BLD-RTO: 0 PER 100 WBC
PLATELET # BLD AUTO: 122 K/UL (ref 150–400)
PMV BLD AUTO: 9.9 FL (ref 8.9–12.9)
POTASSIUM SERPL-SCNC: 3.8 MMOL/L (ref 3.5–5.1)
PROT SERPL-MCNC: 6.4 G/DL (ref 6.4–8.2)
RBC # BLD AUTO: 3.97 M/UL (ref 4.1–5.7)
RBC MORPH BLD: ABNORMAL
SODIUM SERPL-SCNC: 141 MMOL/L (ref 136–145)
WBC # BLD AUTO: 2.8 K/UL (ref 4.1–11.1)

## 2019-12-04 PROCEDURE — 96375 TX/PRO/DX INJ NEW DRUG ADDON: CPT

## 2019-12-04 PROCEDURE — 96413 CHEMO IV INFUSION 1 HR: CPT

## 2019-12-04 PROCEDURE — 96415 CHEMO IV INFUSION ADDL HR: CPT

## 2019-12-04 PROCEDURE — 74011000258 HC RX REV CODE- 258: Performed by: REGISTERED NURSE

## 2019-12-04 PROCEDURE — 77030012965 HC NDL HUBR BBMI -A

## 2019-12-04 PROCEDURE — 80053 COMPREHEN METABOLIC PANEL: CPT

## 2019-12-04 PROCEDURE — 74011250636 HC RX REV CODE- 250/636: Performed by: REGISTERED NURSE

## 2019-12-04 PROCEDURE — 36415 COLL VENOUS BLD VENIPUNCTURE: CPT

## 2019-12-04 PROCEDURE — 96368 THER/DIAG CONCURRENT INF: CPT

## 2019-12-04 PROCEDURE — 85025 COMPLETE CBC W/AUTO DIFF WBC: CPT

## 2019-12-04 PROCEDURE — 96416 CHEMO PROLONG INFUSE W/PUMP: CPT

## 2019-12-04 RX ORDER — ALBUTEROL SULFATE 0.83 MG/ML
2.5 SOLUTION RESPIRATORY (INHALATION) AS NEEDED
Status: CANCELLED
Start: 2020-01-02

## 2019-12-04 RX ORDER — HEPARIN 100 UNIT/ML
300-500 SYRINGE INTRAVENOUS AS NEEDED
Status: CANCELLED
Start: 2020-01-04

## 2019-12-04 RX ORDER — SODIUM CHLORIDE 0.9 % (FLUSH) 0.9 %
10 SYRINGE (ML) INJECTION AS NEEDED
Status: CANCELLED
Start: 2020-01-02

## 2019-12-04 RX ORDER — DEXTROSE MONOHYDRATE 50 MG/ML
25 INJECTION, SOLUTION INTRAVENOUS CONTINUOUS
Status: CANCELLED
Start: 2019-12-04

## 2019-12-04 RX ORDER — SODIUM CHLORIDE 9 MG/ML
10 INJECTION INTRAMUSCULAR; INTRAVENOUS; SUBCUTANEOUS AS NEEDED
Status: CANCELLED | OUTPATIENT
Start: 2020-01-02

## 2019-12-04 RX ORDER — SODIUM CHLORIDE 0.9 % (FLUSH) 0.9 %
10 SYRINGE (ML) INJECTION AS NEEDED
Status: ACTIVE | OUTPATIENT
Start: 2019-12-04 | End: 2019-12-04

## 2019-12-04 RX ORDER — SODIUM CHLORIDE 9 MG/ML
10 INJECTION INTRAMUSCULAR; INTRAVENOUS; SUBCUTANEOUS AS NEEDED
Status: CANCELLED | OUTPATIENT
Start: 2020-01-04

## 2019-12-04 RX ORDER — ONDANSETRON 2 MG/ML
8 INJECTION INTRAMUSCULAR; INTRAVENOUS AS NEEDED
Status: CANCELLED | OUTPATIENT
Start: 2019-12-04

## 2019-12-04 RX ORDER — DEXTROSE MONOHYDRATE 50 MG/ML
25 INJECTION, SOLUTION INTRAVENOUS CONTINUOUS
Status: CANCELLED
Start: 2020-01-02

## 2019-12-04 RX ORDER — HEPARIN 100 UNIT/ML
300-500 SYRINGE INTRAVENOUS AS NEEDED
Status: ACTIVE | OUTPATIENT
Start: 2019-12-04 | End: 2019-12-04

## 2019-12-04 RX ORDER — ONDANSETRON 2 MG/ML
8 INJECTION INTRAMUSCULAR; INTRAVENOUS ONCE
Status: COMPLETED | OUTPATIENT
Start: 2019-12-04 | End: 2019-12-04

## 2019-12-04 RX ORDER — ONDANSETRON 2 MG/ML
8 INJECTION INTRAMUSCULAR; INTRAVENOUS ONCE
Status: CANCELLED | OUTPATIENT
Start: 2019-12-04

## 2019-12-04 RX ORDER — HEPARIN 100 UNIT/ML
300-500 SYRINGE INTRAVENOUS AS NEEDED
Status: CANCELLED
Start: 2019-12-04

## 2019-12-04 RX ORDER — DIPHENHYDRAMINE HYDROCHLORIDE 50 MG/ML
50 INJECTION, SOLUTION INTRAMUSCULAR; INTRAVENOUS AS NEEDED
Status: CANCELLED
Start: 2020-01-02

## 2019-12-04 RX ORDER — DIPHENHYDRAMINE HYDROCHLORIDE 50 MG/ML
50 INJECTION, SOLUTION INTRAMUSCULAR; INTRAVENOUS AS NEEDED
Status: CANCELLED
Start: 2019-12-04

## 2019-12-04 RX ORDER — SODIUM CHLORIDE 9 MG/ML
10 INJECTION INTRAMUSCULAR; INTRAVENOUS; SUBCUTANEOUS AS NEEDED
Status: CANCELLED | OUTPATIENT
Start: 2019-12-04

## 2019-12-04 RX ORDER — SODIUM CHLORIDE 0.9 % (FLUSH) 0.9 %
10 SYRINGE (ML) INJECTION AS NEEDED
Status: CANCELLED
Start: 2019-12-04

## 2019-12-04 RX ORDER — SODIUM CHLORIDE 9 MG/ML
10 INJECTION INTRAMUSCULAR; INTRAVENOUS; SUBCUTANEOUS AS NEEDED
Status: ACTIVE | OUTPATIENT
Start: 2019-12-04 | End: 2019-12-04

## 2019-12-04 RX ORDER — EPINEPHRINE 1 MG/ML
0.3 INJECTION, SOLUTION, CONCENTRATE INTRAVENOUS AS NEEDED
Status: CANCELLED | OUTPATIENT
Start: 2019-12-04

## 2019-12-04 RX ORDER — ACETAMINOPHEN 325 MG/1
650 TABLET ORAL AS NEEDED
Status: CANCELLED
Start: 2019-12-04

## 2019-12-04 RX ORDER — DEXTROSE MONOHYDRATE 50 MG/ML
25 INJECTION, SOLUTION INTRAVENOUS CONTINUOUS
Status: DISPENSED | OUTPATIENT
Start: 2019-12-04 | End: 2019-12-04

## 2019-12-04 RX ORDER — ALBUTEROL SULFATE 0.83 MG/ML
2.5 SOLUTION RESPIRATORY (INHALATION) AS NEEDED
Status: CANCELLED
Start: 2019-12-04

## 2019-12-04 RX ORDER — ONDANSETRON 2 MG/ML
8 INJECTION INTRAMUSCULAR; INTRAVENOUS ONCE
Status: CANCELLED | OUTPATIENT
Start: 2020-01-02

## 2019-12-04 RX ORDER — SODIUM CHLORIDE 0.9 % (FLUSH) 0.9 %
10 SYRINGE (ML) INJECTION AS NEEDED
Status: CANCELLED
Start: 2020-01-04

## 2019-12-04 RX ORDER — ONDANSETRON 2 MG/ML
8 INJECTION INTRAMUSCULAR; INTRAVENOUS AS NEEDED
Status: CANCELLED | OUTPATIENT
Start: 2020-01-02

## 2019-12-04 RX ORDER — EPINEPHRINE 1 MG/ML
0.3 INJECTION, SOLUTION, CONCENTRATE INTRAVENOUS AS NEEDED
Status: CANCELLED | OUTPATIENT
Start: 2020-01-02

## 2019-12-04 RX ORDER — ACETAMINOPHEN 325 MG/1
650 TABLET ORAL AS NEEDED
Status: CANCELLED
Start: 2020-01-02

## 2019-12-04 RX ORDER — HYDROCORTISONE SODIUM SUCCINATE 100 MG/2ML
100 INJECTION, POWDER, FOR SOLUTION INTRAMUSCULAR; INTRAVENOUS AS NEEDED
Status: CANCELLED | OUTPATIENT
Start: 2020-01-02

## 2019-12-04 RX ORDER — HEPARIN 100 UNIT/ML
300-500 SYRINGE INTRAVENOUS AS NEEDED
Status: CANCELLED
Start: 2020-01-02

## 2019-12-04 RX ORDER — HYDROCORTISONE SODIUM SUCCINATE 100 MG/2ML
100 INJECTION, POWDER, FOR SOLUTION INTRAMUSCULAR; INTRAVENOUS AS NEEDED
Status: CANCELLED | OUTPATIENT
Start: 2019-12-04

## 2019-12-04 RX ADMIN — DEXTROSE MONOHYDRATE 25 ML/HR: 5 INJECTION, SOLUTION INTRAVENOUS at 12:42

## 2019-12-04 RX ADMIN — DEXAMETHASONE SODIUM PHOSPHATE 12 MG: 4 INJECTION, SOLUTION INTRA-ARTICULAR; INTRALESIONAL; INTRAMUSCULAR; INTRAVENOUS; SOFT TISSUE at 12:42

## 2019-12-04 RX ADMIN — DEXTROSE MONOHYDRATE 704 MG: 50 INJECTION, SOLUTION INTRAVENOUS at 13:13

## 2019-12-04 RX ADMIN — OXALIPLATIN 149.5 MG: 5 INJECTION, SOLUTION INTRAVENOUS at 13:13

## 2019-12-04 RX ADMIN — ONDANSETRON 8 MG: 2 INJECTION, SOLUTION INTRAMUSCULAR; INTRAVENOUS at 12:42

## 2019-12-04 RX ADMIN — FLUOROURACIL 4224 MG: 50 INJECTION, SOLUTION INTRAVENOUS at 15:22

## 2019-12-04 NOTE — PROGRESS NOTES
Samanta Bustillos is a 47 y.o. male  Chief Complaint   Patient presents with    Follow-up     rectal cancer     1. Have you been to the ER, urgent care clinic since your last visit? Hospitalized since your last visit? No    2. Have you seen or consulted any other health care providers outside of the [address] [place] [address] since your last visit? Include any pap smears or colon screening.  No

## 2019-12-04 NOTE — PROGRESS NOTES
[facility] [place] at [place] [address] [address], [address]  W: [phone]  F: [phone]    Reason for Visit:   Amador Mao is a 47 y.o. male who is seen for Rectal cancer- likely stage III- SOMMER    Treatment History:   · 4/26/19: Colonoscopy- 6-7 cm size malignant appearing mass seen at 10 cm from anal verge. This was adenocarcinoma. 3 polyps removed- all were tubular adenomas  · Rectal Ultrasound 5/2/19: ulcerated infiltrative mass lesion was noted in rectum at 10 cm. The lesion measured about 5 cm X 4 cm- T2, 13 mm X 8 mm oblong lymph node was noted immediately adjacent to the mass lesion  · 5/2/19: CT CAP- No metastasis, liver cysts. CEA 3.6  · 5/10/19: PET CT showed rectal malignancy and 3 small perirectal anibal metastatic focir  · 5/28/19-7/8/29: Xeloda + RT   · 9/24/19: LAR- [id number], 2/6 positive nodes  · 10/14/19: CT CAP with no evidence of metastatic disease, liver cysts   · 10/23/19: cycle 1 FOLFOX    History of Present Illness:   Patient is a 47 y.o. male seen for adenocarcinoma of the mid third of rectum    He had intermittent BRBPR x 1 year and then decided to have a colonoscopy. This led to above mentioned diagnosis. He received neoadjuvant xeloda+ RT followed by [facility]. Comes today for cycle 3 of FOLFOX. He feels well. He has had no change in stool consistency, no mouth sores, he has no nausea, no numbness/tinglng, no pain. He has good energy. Denies fevers/chills, SOB, CP, cough, or bleeding. He uses 7 drinks a week.     Adopted    Past Medical History:   Diagnosis Date    Adopted     GERD (gastroesophageal reflux disease)     Psoriasis     Psoriatic arthritis ([facility] [address]     Rectal cancer [facility])       Past Surgical History:   Procedure Laterality Date    COLONOSCOPY Left 4/26/2019    COLONOSCOPY performed by Hannah Tracy MD at [facility] 49 N/A 5/2/2019    SIGMOIDOSCOPY FLEXIBLE performed by Sejal Grover MD at [facility] ENDOSCOPY    HX GI      EGD    HX HEENT      WISDOM TEETH    HX ORTHOPAEDIC Right     ACL REPLACEMENT    IR INSERT TUNL CVC W PORT OVER 5 YEARS  10/15/2019      Social History     Tobacco Use    Smoking status: Never Smoker    Smokeless tobacco: Never Used   Substance Use Topics    Alcohol use: Yes     Comment: 6 BEERS WEEKLY      Family History   Adopted: Yes   Problem Relation Age of Onset    Asthma Neg Hx     Cancer Neg Hx     Diabetes Neg Hx     Heart Disease Neg Hx     Hypertension Neg Hx     Stroke Neg Hx      Current Outpatient Medications   Medication Sig    hydroCHLOROthiazide (HYDRODIURIL) 12.5 mg tablet TAKE 1 TABLET BY MOUTH EVERY DAY    lidocaine-prilocaine (EMLA) topical cream Apply  to affected area as needed for Pain.  dexAMETHasone (DECADRON) 4 mg tablet Take 8 mg by mouth See Admin Instructions. Take 2 tabs by mouth twice a day on days 2 and 3 after chemotherapy only    ondansetron (ZOFRAN ODT) 8 mg disintegrating tablet Take 1 Tab by mouth every eight (8) hours as needed for Nausea.  acetaminophen (TYLENOL) 500 mg tablet Take 500 mg by mouth every six (6) hours as needed for Fever or Pain.  diphenoxylate-atropine (LOMOTIL) 2.5-0.025 mg per tablet Take 1 Tab by mouth four (4) times daily as needed for Diarrhea.  tamsulosin (FLOMAX) 0.4 mg capsule Take 0.4 mg by mouth daily.  triamcinolone acetonide (KENALOG) 0.1 % ointment Apply  to affected area two (2) times daily as needed for Skin Irritation. use thin layer    fluocinonide (VANOS) 0.1 % topical cream Apply  to affected area daily. No current facility-administered medications for this visit. No Known Allergies     Review of Systems: A complete review of systems was obtained, negative except as described above.     Physical Exam:     Visit Vitals  /85 (BP 1 Location: Left arm)   Pulse 77   Temp 98.3 °F (36.8 °C)   Ht 5' 7\" (1.702 m)   Wt 148 lb 8 oz (67.4 kg)   BMI 23.26 kg/m²     ECOG PS: 1  General: No distress  Eyes: PERRLA, anicteric sclerae  HENT: Atraumatic, OP clear, PAC looks good   Resp: CTAB, normal respiratory effort  CV: s1s2, no LE edema  MS: Normal gait and station. Digits without clubbing or cyanosis. Skin: No rashes, ecchymoses, or petechiae. Normal temperature, turgor, and texture. Psych: Alert, oriented, appropriate affect, normal judgment/insight    Results:     Lab Results   Component Value Date/Time    WBC 2.8 (L) 12/04/2019 10:09 AM    HGB 12.9 12/04/2019 10:09 AM    HCT 38.1 12/04/2019 10:09 AM    PLATELET 478 (L) 88/22/9583 10:09 AM    MCV 96.0 12/04/2019 10:09 AM    ABS. NEUTROPHILS 1.9 12/04/2019 10:09 AM     Lab Results   Component Value Date/Time    Sodium 137 11/26/2019 05:29 PM    Potassium 3.3 (L) 11/26/2019 05:29 PM    Chloride 102 11/26/2019 05:29 PM    CO2 27 11/26/2019 05:29 PM    Glucose 126 (H) 11/26/2019 05:29 PM    BUN 11 11/26/2019 05:29 PM    Creatinine 0.96 11/26/2019 05:29 PM    GFR est AA >60 11/26/2019 05:29 PM    GFR est non-AA >60 11/26/2019 05:29 PM    Calcium 8.7 11/26/2019 05:29 PM     Lab Results   Component Value Date/Time    Bilirubin, total 0.4 11/26/2019 05:29 PM    ALT (SGPT) 21 11/26/2019 05:29 PM    AST (SGOT) 15 11/26/2019 05:29 PM    Alk. phosphatase 73 11/26/2019 05:29 PM    Protein, total 6.6 11/26/2019 05:29 PM    Albumin 3.7 11/26/2019 05:29 PM    Globulin 2.9 11/26/2019 05:29 PM     Records reviewed and summarized above. Pathology report(s) reviewed  FINAL PATHOLOGIC DIAGNOSIS   1.  Rectum and sigmoid colon, laparoscopic low anterior resection:   SPECIMEN   Procedure: Low anterior resection   Macroscopic Intactness of Mesorectum: Complete   TUMOR   Tumor Site: Rectum   Histologic Type: Adenocarcinoma   Histologic Grade: G2: Moderately differentiated   Tumor Size: 2.6 cm   Tumor Deposits: Present   Number of Deposits: 1   Tumor Extension: Tumor invades through the muscularis propria into pericolorectal tissue   Macroscopic Tumor Perforation: Not identified   Lymphovascular Invasion: Not identified   Perineural Invasion: Not identified   Tumor Budding: Number of tumor buds in one hotspot field: 5   Tumor Budding Score: Intermediate score (5-9)   Treatment Effect: Present - Residual cancer with evident tumor regression, but more than single cells or   rare small groups of cancer cells (partial response, score 2)   MARGINS   Margins: All margins are uninvolved by invasive carcinoma, high- grade dysplasia, intramucosal   adenocarcinoma, and adenoma   Margins Examined: Proximal, Distal, Radial or Mesenteric   Distance of Invasive Carcinoma from Closest Margin: 25 mm   Closest Margin: Radial or Mesenteric   LYMPH NODES   Number of Lymph Nodes Involved: 2   Number of Lymph Nodes Examined: 16   PATHOLOGIC STAGE CLASSIFICATION (pTNM, AJCC 8th Edition)   Primary Tumor (pT): pT3   Regional Lymph Nodes (pN): pN1b   2. Large bowel, donuts:   Fragments of large bowel wall, negative for microscopic pathologic abnormality. Radiology report(s) reviewed above. CT CAP 10/14/19: IMPRESSION:  1. There are scattered small hepatic cysts without definite evidence for  metastatic disease. 2. Otherwise negative CT chest abdomen pelvis    Assessment:   1) Rectal adenocarcinoma- mid third- SOMMER    T2N1M0 disease- Clinical stage III  S/P John Adj chemoRT followed by [facility] on 9/24/19  Pathology showed urP1M0q residual disease- stage IIIB    Reviewed surgical pathology  Had significant residual disease with practically no treatment effects  See prior office notes in regards to discussion on adjuvant chemotherapy   Had CT 10/14/19 with no evidence of metastatic disease    Today is cycle 3 of FOLFOX x 8 planned cycles     Had grade 3 neutropenia after cycle 2 requiring a 2 week delay. Starting with cycle 3 today we will drop 5FU bolus and add neulasta given high risk for grade IV neutropenia.      Genetic testing negative    2) Young age at diagnosis    Adopted  Had several adenomas removed  May have attenuated FAP and will require genetic testing  SOMMER    3) GIB  Minor    4) Psoriasis  Off Methotrexate  Follows Dr. Hugo Foster    5) Neutropenia  Chemotherapy induced  See #1    Plan:     · Proceed today with cycle 3 of adjuvant FOLFOX (DROP 5FU bolus, leucovorin 400mg/m2, 5FU CADD 2,400mg/m2, oxaliplatin 85mg/m2) for 8 cycles   · Labs to include CBC with diff and CMP prior to each treatment. CEA monthly. · Pre-meds to include Zofran and Dexamethasone   · Dexamethasone days 2 and 3  · Zofran PRN  · Neulasta OP due to high risk for grade IV neutropenia     RTC in 2 weeks for cycle 4  Seen in conjunction with Ghislaine Ham NP    I appreciate the opportunity to participate in [facility].     Signed By: Renetta Tovar MD

## 2019-12-04 NOTE — PROGRESS NOTES
Chemotherapy Visit  Note    1000 Pt arrived to [facility] ambulatory and in no distressed for cycle 3, Modified Folfox. Assessment completed, no new concerns voiced at this time. Port accessed without difficulty, brisk blood return. Labs collected and sent for processing. Port flushed and capped. Pt went to oncology appointment. Patient Vitals for the past 12 hrs:   Temp Pulse BP   12/04/19 1511 -- 73 129/82   12/04/19 0958 97 °F (36.1 °C) 77 (!) 136/95     Medications Administered     dexamethasone (DECADRON) 12 mg in 0.9% sodium chloride 50 mL, overfill volume 5 mL IVPB     Admin Date  12/04/2019 Action  Given Dose  12 mg Rate  232 mL/hr Route  IntraVENous Administered By  Suzy Morataya RN          dextrose 5% infusion     Admin Date  12/04/2019 Action  New Bag Dose  25 mL/hr Rate  25 mL/hr Route  IntraVENous Administered By  Suzy Morataya RN          fluorouracil (ADRUCIL) 4,224 mg in 0.9% sodium chloride 100 mL CADD Cassette     Admin Date  12/04/2019 Action  New Bag Dose  4224 mg Rate  2.2 mL/hr Route  IntraVENous Administered By  Suzy Morataya RN          leucovorin (WELLCOVORIN) 704 mg in dextrose 5% 250 mL, overfill volume 25 mL IVPB     Admin Date  12/04/2019 Action  New Bag Dose  704 mg Rate  155.1 mL/hr Route  IntraVENous Administered By  Suzy Morataya RN          ondansetron [facility]) injection 8 mg     Admin Date  12/04/2019 Action  Given Dose  8 mg Route  IntraVENous Administered By  Suzy Morataya RN          oxaliplatin (ELOXATIN) 149.5 mg in dextrose 5% 250 mL, overfill volume 25 mL chemo infusion     Admin Date  12/04/2019 Action  New Bag Dose  149.5 mg Rate  152.5 mL/hr Route  IntraVENous Administered By  Suzy Morataya RN              Chemotherapy Flowsheet 12/4/2019   Cycle C3   Date 12/4/2019   Drug / Regimen Folfox   Pre Meds given   Notes given     1530 Pt tolerated treatment well. Port maintained brisk blood return throughout entire treatment.  Port flushed, brisk blood return, pt connected to 5FU pump. D/C ambulatory home in no distress, pt aware of next appointment on 12/6.      Future Appointments   Date Time Provider Shannan Jennifer   12/6/2019  3:00 PM [facility]   12/18/2019 10:00 AM [facility] FT CHAIR 2 [facility]   12/20/2019  3:00 PM [facility]   1/1/2020 10:00 AM [facility]ION [facility]URSE [facility]   1/3/2020  3:00 PM BREMO INFUSION NURSE 4 [facility]   1/15/2020 10:00 AM BREMO INFUSION NURSE 4 [facility]   1/17/2020  3:00 PM BREMO INFUSION NURSE 2 [facility]   1/28/2020 10:20 AM Celi Barbosa MD [address] [place] [address]   1/29/2020 10:00 AM [facility]   4/20/2020 10:30 AM Tanner Kendrick MD [address] [facility]

## 2019-12-05 ENCOUNTER — TELEPHONE (OUTPATIENT)
Dept: ONCOLOGY | Age: 49
End: 2019-12-05

## 2019-12-05 NOTE — TELEPHONE ENCOUNTER
Called [facility] at [phone] and verified the patient's ID x 2 with Yris Crawford and the call was transferred to [facility] in the authorization department and the call was transferred to [facility] in the pharmacy authorization department. Explained to [facility] that the patient is scheduled for Neulasta tomorrow 12/6/19 and [facility] authorization department stated it has not been approved and asked if this case could go to nurse review for approval today 12/5/19 and [facility] stated that nurse review is not available however the case will be \"tagged as urgent\" and to call [phone] and select the option for medical injectables when the patient is in the office tomorrow. Informed Marce that [facility] authorization department will be made aware of the above information. 12/5/19 at 2:39 pm - Called Reinaldoehia with [facility] and informed her of the above information and Nellia stated she will call [facility] tomorrow 12/6/19. Notes: Case number is [id number] and code is [id number].

## 2019-12-06 ENCOUNTER — HOSPITAL ENCOUNTER (OUTPATIENT)
Dept: INFUSION THERAPY | Age: 49
Discharge: HOME | End: 2019-12-06
Payer: MEDICAID

## 2019-12-06 ENCOUNTER — APPOINTMENT (OUTPATIENT)
Dept: INFUSION THERAPY | Age: 49
End: 2019-12-06

## 2019-12-06 VITALS
TEMPERATURE: 98.9 F | HEART RATE: 82 BPM | RESPIRATION RATE: 16 BRPM | DIASTOLIC BLOOD PRESSURE: 74 MMHG | SYSTOLIC BLOOD PRESSURE: 132 MMHG

## 2019-12-06 DIAGNOSIS — C20 RECTAL CANCER (HCC): Primary | ICD-10-CM

## 2019-12-06 PROCEDURE — 96377 APPLICATON ON-BODY INJECTOR: CPT

## 2019-12-06 PROCEDURE — 74011250636 HC RX REV CODE- 250/636: Performed by: REGISTERED NURSE

## 2019-12-06 RX ORDER — SODIUM CHLORIDE 9 MG/ML
10 INJECTION INTRAMUSCULAR; INTRAVENOUS; SUBCUTANEOUS AS NEEDED
Status: DISCONTINUED | OUTPATIENT
Start: 2019-12-06 | End: 2019-12-07 | Stop reason: HOSPADM

## 2019-12-06 RX ORDER — SODIUM CHLORIDE 0.9 % (FLUSH) 0.9 %
10 SYRINGE (ML) INJECTION AS NEEDED
Status: DISCONTINUED | OUTPATIENT
Start: 2019-12-06 | End: 2019-12-07 | Stop reason: HOSPADM

## 2019-12-06 RX ORDER — HEPARIN 100 UNIT/ML
300-500 SYRINGE INTRAVENOUS AS NEEDED
Status: DISCONTINUED | OUTPATIENT
Start: 2019-12-06 | End: 2019-12-07 | Stop reason: HOSPADM

## 2019-12-06 RX ADMIN — Medication 10 ML: at 16:04

## 2019-12-06 RX ADMIN — HEPARIN 500 UNITS: 100 SYRINGE at 16:04

## 2019-12-06 RX ADMIN — PEGFILGRASTIM 6 MG: KIT SUBCUTANEOUS at 16:00

## 2019-12-06 NOTE — PROGRESS NOTES
[facility] SHORT CONSULT VISIT    7310  Pt arrived at [facility] ambulatory and in no distress for Pump Disconnect & Neulasta. Assessment completed, pt had no complaints. Education provided to patient about Neulasta On Body Injector including: side effects, how/when to remove the device, as well as what to do in the event of device malfunction. Opportunity for questions was provided and all questions were answered. Patient verbalized understanding. 5FU pump not infusion upon arrival.     Medications received:  Medications Administered     heparin (porcine) pf 300-500 Units     Admin Date  12/06/2019 Action  Given Dose  500 Units Route  InterCATHeter Administered By  Velvet Ching          pegfilgrastim (NEULASTA) wearable SQ injector 6 mg     Admin Date  12/06/2019 Action  Given Dose  6 mg Route  SubCUTAneous Administered By  Velvet Ching          saline peripheral flush soln 10 mL     Admin Date  12/06/2019 Action  Given Dose  10 mL Route  InterCATHeter Administered By  Radha Moreno de-accessed and flushed per protocol. Positive blood return noted. 1605  D/C'd from [facility] ambulatory and in no distress.      Future Appointments   Date Time Provider Shannan Rodriguez   12/18/2019 10:00 AM BREMO FT CHAIR 2 [facility]   12/20/2019  3:00 PM BREMO INFUSION NURSE 1 [facility]   1/2/2020  9:00 AM BREMO PHLEBOTOMIST [facility]   1/4/2020 11:00 AM BREMO INFUSION NURSE 3 [facility]   1/15/2020 10:00 AM BREMO INFUSION NURSE 4 [facility]   1/17/2020  3:00 PM BREMO INFUSION NURSE 2 [facility]   1/28/2020 10:20 AM Pina Ring MD [address] [place] [address]   1/29/2020 10:00 AM BREMO INFUSION NURSE 4 [facility]   4/20/2020 10:30 AM George Garcia MD [address] [facility]

## 2019-12-11 ENCOUNTER — APPOINTMENT (OUTPATIENT)
Dept: INFUSION THERAPY | Age: 49
End: 2019-12-11

## 2019-12-11 ENCOUNTER — HOSPITAL ENCOUNTER (EMERGENCY)
Age: 49
Discharge: HOME | End: 2019-12-11
Attending: EMERGENCY MEDICINE
Payer: MEDICAID

## 2019-12-11 ENCOUNTER — APPOINTMENT (OUTPATIENT)
Dept: CT IMAGING | Age: 49
End: 2019-12-11
Attending: EMERGENCY MEDICINE
Payer: MEDICAID

## 2019-12-11 VITALS
OXYGEN SATURATION: 99 % | RESPIRATION RATE: 16 BRPM | HEIGHT: 67 IN | WEIGHT: 146.3 LBS | HEART RATE: 69 BPM | TEMPERATURE: 98 F | BODY MASS INDEX: 22.96 KG/M2 | SYSTOLIC BLOOD PRESSURE: 155 MMHG | DIASTOLIC BLOOD PRESSURE: 87 MMHG

## 2019-12-11 DIAGNOSIS — N20.0 KIDNEY STONE: Primary | ICD-10-CM

## 2019-12-11 LAB
ALBUMIN SERPL-MCNC: 3.6 G/DL (ref 3.5–5)
ALBUMIN/GLOB SERPL: 1 {RATIO} (ref 1.1–2.2)
ALP SERPL-CCNC: 170 U/L (ref 45–117)
ALT SERPL-CCNC: 20 U/L (ref 12–78)
ANION GAP SERPL CALC-SCNC: 8 MMOL/L (ref 5–15)
APPEARANCE UR: ABNORMAL
AST SERPL-CCNC: 16 U/L (ref 15–37)
BACTERIA URNS QL MICRO: NEGATIVE /HPF
BASOPHILS # BLD: 0 K/UL (ref 0–0.1)
BASOPHILS NFR BLD: 0 % (ref 0–1)
BILIRUB SERPL-MCNC: 0.4 MG/DL (ref 0.2–1)
BILIRUB UR QL: NEGATIVE
BUN SERPL-MCNC: 21 MG/DL (ref 6–20)
BUN/CREAT SERPL: 14 (ref 12–20)
CALCIUM SERPL-MCNC: 9.3 MG/DL (ref 8.5–10.1)
CHLORIDE SERPL-SCNC: 104 MMOL/L (ref 97–108)
CO2 SERPL-SCNC: 25 MMOL/L (ref 21–32)
COLOR UR: ABNORMAL
COMMENT, HOLDF: NORMAL
CREAT SERPL-MCNC: 1.47 MG/DL (ref 0.7–1.3)
DIFFERENTIAL METHOD BLD: ABNORMAL
EOSINOPHIL # BLD: 0 K/UL (ref 0–0.4)
EOSINOPHIL NFR BLD: 0 % (ref 0–7)
EPITH CASTS URNS QL MICRO: ABNORMAL /LPF
ERYTHROCYTE [DISTWIDTH] IN BLOOD BY AUTOMATED COUNT: 14.3 % (ref 11.5–14.5)
GLOBULIN SER CALC-MCNC: 3.6 G/DL (ref 2–4)
GLUCOSE SERPL-MCNC: 138 MG/DL (ref 65–100)
GLUCOSE UR STRIP.AUTO-MCNC: NEGATIVE MG/DL
HCT VFR BLD AUTO: 40.1 % (ref 36.6–50.3)
HGB BLD-MCNC: 14 G/DL (ref 12.1–17)
HGB UR QL STRIP: NEGATIVE
HYALINE CASTS URNS QL MICRO: ABNORMAL /LPF (ref 0–5)
IMM GRANULOCYTES # BLD AUTO: 0 K/UL
IMM GRANULOCYTES NFR BLD AUTO: 0 %
KETONES UR QL STRIP.AUTO: NEGATIVE MG/DL
LEUKOCYTE ESTERASE UR QL STRIP.AUTO: NEGATIVE
LYMPHOCYTES # BLD: 0.6 K/UL (ref 0.8–3.5)
LYMPHOCYTES NFR BLD: 2 % (ref 12–49)
MCH RBC QN AUTO: 33.4 PG (ref 26–34)
MCHC RBC AUTO-ENTMCNC: 34.9 G/DL (ref 30–36.5)
MCV RBC AUTO: 95.7 FL (ref 80–99)
MONOCYTES # BLD: 1.4 K/UL (ref 0–1)
MONOCYTES NFR BLD: 5 % (ref 5–13)
NEUTS BAND NFR BLD MANUAL: 7 % (ref 0–6)
NEUTS SEG # BLD: 26.2 K/UL (ref 1.8–8)
NEUTS SEG NFR BLD: 86 % (ref 32–75)
NITRITE UR QL STRIP.AUTO: NEGATIVE
NRBC # BLD: 0.04 K/UL (ref 0–0.01)
NRBC BLD-RTO: 0.1 PER 100 WBC
PH UR STRIP: 5.5 [PH] (ref 5–8)
PLATELET # BLD AUTO: 100 K/UL (ref 150–400)
PMV BLD AUTO: 10.5 FL (ref 8.9–12.9)
POTASSIUM SERPL-SCNC: 3.8 MMOL/L (ref 3.5–5.1)
PROT SERPL-MCNC: 7.2 G/DL (ref 6.4–8.2)
PROT UR STRIP-MCNC: 30 MG/DL
RBC # BLD AUTO: 4.19 M/UL (ref 4.1–5.7)
RBC #/AREA URNS HPF: ABNORMAL /HPF (ref 0–5)
RBC MORPH BLD: ABNORMAL
RBC MORPH BLD: ABNORMAL
SAMPLES BEING HELD,HOLD: NORMAL
SODIUM SERPL-SCNC: 137 MMOL/L (ref 136–145)
SP GR UR REFRACTOMETRY: 1.02 (ref 1–1.03)
UROBILINOGEN UR QL STRIP.AUTO: 0.2 EU/DL (ref 0.2–1)
WBC # BLD AUTO: 28.2 K/UL (ref 4.1–11.1)
WBC MORPH BLD: ABNORMAL
WBC URNS QL MICRO: ABNORMAL /HPF (ref 0–4)

## 2019-12-11 PROCEDURE — 85025 COMPLETE CBC W/AUTO DIFF WBC: CPT

## 2019-12-11 PROCEDURE — 81001 URINALYSIS AUTO W/SCOPE: CPT

## 2019-12-11 PROCEDURE — 74011000258 HC RX REV CODE- 258: Performed by: RADIOLOGY

## 2019-12-11 PROCEDURE — 74011250636 HC RX REV CODE- 250/636: Performed by: EMERGENCY MEDICINE

## 2019-12-11 PROCEDURE — 80053 COMPREHEN METABOLIC PANEL: CPT

## 2019-12-11 PROCEDURE — 96374 THER/PROPH/DIAG INJ IV PUSH: CPT

## 2019-12-11 PROCEDURE — 74011636320 HC RX REV CODE- 636/320: Performed by: RADIOLOGY

## 2019-12-11 PROCEDURE — 99284 EMERGENCY DEPT VISIT MOD MDM: CPT

## 2019-12-11 PROCEDURE — 36415 COLL VENOUS BLD VENIPUNCTURE: CPT

## 2019-12-11 PROCEDURE — 74011250637 HC RX REV CODE- 250/637: Performed by: EMERGENCY MEDICINE

## 2019-12-11 PROCEDURE — 74177 CT ABD & PELVIS W/CONTRAST: CPT

## 2019-12-11 RX ORDER — MORPHINE SULFATE 2 MG/ML
4 INJECTION, SOLUTION INTRAMUSCULAR; INTRAVENOUS ONCE
Status: COMPLETED | OUTPATIENT
Start: 2019-12-11 | End: 2019-12-11

## 2019-12-11 RX ORDER — HYDROCODONE BITARTRATE AND ACETAMINOPHEN 5; 325 MG/1; MG/1
1 TABLET ORAL ONCE
Status: COMPLETED | OUTPATIENT
Start: 2019-12-11 | End: 2019-12-11

## 2019-12-11 RX ORDER — SODIUM CHLORIDE 0.9 % (FLUSH) 0.9 %
10 SYRINGE (ML) INJECTION
Status: COMPLETED | OUTPATIENT
Start: 2019-12-11 | End: 2019-12-11

## 2019-12-11 RX ORDER — HYDROCODONE BITARTRATE AND ACETAMINOPHEN 5; 325 MG/1; MG/1
1 TABLET ORAL
Qty: 18 TAB | Refills: 0 | Status: SHIPPED | OUTPATIENT
Start: 2019-12-11 | End: 2019-12-14

## 2019-12-11 RX ORDER — TAMSULOSIN HYDROCHLORIDE 0.4 MG/1
0.4 CAPSULE ORAL
Status: COMPLETED | OUTPATIENT
Start: 2019-12-11 | End: 2019-12-11

## 2019-12-11 RX ADMIN — SODIUM CHLORIDE 100 ML: 900 INJECTION, SOLUTION INTRAVENOUS at 04:42

## 2019-12-11 RX ADMIN — HYDROCODONE BITARTRATE AND ACETAMINOPHEN 1 TABLET: 5; 325 TABLET ORAL at 05:32

## 2019-12-11 RX ADMIN — MORPHINE SULFATE 4 MG: 2 INJECTION, SOLUTION INTRAMUSCULAR; INTRAVENOUS at 03:15

## 2019-12-11 RX ADMIN — SODIUM CHLORIDE 1000 ML: 900 INJECTION, SOLUTION INTRAVENOUS at 03:14

## 2019-12-11 RX ADMIN — IOPAMIDOL 100 ML: 755 INJECTION, SOLUTION INTRAVENOUS at 04:42

## 2019-12-11 RX ADMIN — IOHEXOL 50 ML: 240 INJECTION, SOLUTION INTRATHECAL; INTRAVASCULAR; INTRAVENOUS; ORAL at 03:25

## 2019-12-11 RX ADMIN — TAMSULOSIN HYDROCHLORIDE 0.4 MG: 0.4 CAPSULE ORAL at 05:32

## 2019-12-11 RX ADMIN — Medication 10 ML: at 04:42

## 2019-12-11 NOTE — DISCHARGE INSTRUCTIONS
Patient Education        Kidney Stone: Care Instructions  Your Care Instructions    Kidney stones are formed when salts, minerals, and other substances normally found in the urine clump together. They can be as small as grains of sand or, rarely, as large as golf balls. While the stone is traveling through the ureter, which is the tube that carries urine from the kidney to the bladder, you will probably feel pain. The pain may be mild or very severe. You may also have some blood in your urine. As soon as the stone reaches the bladder, any intense pain should go away. If a stone is too large to pass on its own, you may need a medical procedure to help you pass the stone. The doctor has checked you carefully, but problems can develop later. If you notice any problems or new symptoms, get medical treatment right away. Follow-up care is a key part of your treatment and safety. Be sure to make and go to all appointments, and call your doctor if you are having problems. It's also a good idea to know your test results and keep a list of the medicines you take. How can you care for yourself at home? · Drink plenty of fluids, enough so that your urine is light yellow or clear like water. If you have kidney, heart, or liver disease and have to limit fluids, talk with your doctor before you increase the amount of fluids you drink. · Take pain medicines exactly as directed. Call your doctor if you think you are having a problem with your medicine. ? If the doctor gave you a prescription medicine for pain, take it as prescribed. ? If you are not taking a prescription pain medicine, ask your doctor if you can take an over-the-counter medicine. Read and follow all instructions on the label. · Your doctor may ask you to strain your urine so that you can collect your kidney stone when it passes. You can use a kitchen strainer or a tea strainer to catch the stone.  Store it in a plastic bag until you see your doctor again.  Preventing future kidney stones  Some changes in your diet may help prevent kidney stones. Depending on the cause of your stones, your doctor may recommend that you:  · Drink plenty of fluids, enough so that your urine is light yellow or clear like water. If you have kidney, heart, or liver disease and have to limit fluids, talk with your doctor before you increase the amount of fluids you drink. · Limit coffee, tea, and alcohol. Also avoid grapefruit juice. · Do not take more than the recommended daily dose of vitamins C and D.  · Avoid antacids such as Gaviscon, Maalox, Mylanta, or Tums. · Limit the amount of salt (sodium) in your diet. · Eat a balanced diet that is not too high in protein. · Limit foods that are high in a substance called oxalate, which can cause kidney stones. These foods include dark green vegetables, rhubarb, chocolate, wheat bran, nuts, cranberries, and beans. When should you call for help? Call your doctor now or seek immediate medical care if:    · You cannot keep down fluids.     · Your pain gets worse.     · You have a fever or chills.     · You have new or worse pain in your back just below your rib cage (the flank area).     · You have new or more blood in your urine.    Watch closely for changes in your health, and be sure to contact your doctor if:    · You do not get better as expected. Where can you learn more? Go to http://nila-amairani.info/. Enter V675 in the search box to learn more about \"Kidney Stone: Care Instructions. \"  Current as of: October 31, 2018  Content Version: 12.2  © 5111-5465 "[employer]". Care instructions adapted under license by [employer] (which disclaims liability or warranty for this information).  If you have questions about a medical condition or this instruction, always ask your healthcare professional. [address] any warranty or liability for your use of this information.

## 2019-12-11 NOTE — ED PROVIDER NOTES
47 y.o. male with past medical history significant for Psoriatic Arthritis, GERD, rectal CA, and psoriasis who presents from home via spouse with chief complaint of abdominal pain. Patient states he is having abdominal pain and noticed his illiostomy has not been putting out a normal amount of output. He states he had a big dinner this evening and believes his pain is r/t that. Patient states he has felt minimal movement in his abdomin since 8pm, which was also the last time he noticed normal output. Patient states he is nauseated, but has not vomited. Patient states his pain is \"8/10\" and he took 1 tablet of Oxycodone @ 2200 without any relief. Patient states he drink two alcoholic drinks. There are no other acute medical concerns at this time. Social hx: Never Smoker, + EtOH use, No illicit drug use  PCP: Jose Niño MD  Oncology: Dr. Catina Andrade    Note written by Joe Lagunas, as dictated by Melissa Arthur MD 3:06 AM      The history is provided by the patient. No  was used.         Past Medical History:   Diagnosis Date    Adopted     GERD (gastroesophageal reflux disease)     Psoriasis     Psoriatic arthritis ([facility] [address]     Rectal cancer [facility])        Past Surgical History:   Procedure Laterality Date    COLONOSCOPY Left 4/26/2019    COLONOSCOPY performed by Mamie Saavedra MD at [facility] 49 N/A 5/2/2019    [facility] Croissant performed by Thi Buitrago MD at [facility] ENDOSCOPY    HX GI      EGD    HX HEENT      WISDOM TEETH    HX ORTHOPAEDIC Right     ACL REPLACEMENT    IR INSERT TUNL CVC W PORT OVER 5 YEARS  10/15/2019         Family History:   Adopted: Yes   Problem Relation Age of Onset    Asthma Neg Hx     Cancer Neg Hx     Diabetes Neg Hx     Heart Disease Neg Hx     Hypertension Neg Hx     Stroke Neg Hx        Social History     Socioeconomic History    Marital status:      Spouse name: Not on file    Number of children: Not on file    Years of education: Not on file    Highest education level: Not on file   Occupational History    Not on file   Social Needs    Financial resource strain: Not on file    Food insecurity:     Worry: Not on file     Inability: Not on file    Transportation needs:     Medical: Not on file     Non-medical: Not on file   Tobacco Use    Smoking status: Never Smoker    Smokeless tobacco: Never Used   Substance and Sexual Activity    Alcohol use: Yes     Comment: 6 BEERS WEEKLY    Drug use: No    Sexual activity: Not Currently   Lifestyle    Physical activity:     Days per week: Not on file     Minutes per session: Not on file    Stress: Not on file   Relationships    Social connections:     Talks on phone: Not on file     Gets together: Not on file     Attends Sikhism service: Not on file     Active member of club or organization: Not on file     Attends meetings of clubs or organizations: Not on file     Relationship status: Not on file    Intimate partner violence:     Fear of current or ex partner: Not on file     Emotionally abused: Not on file     Physically abused: Not on file     Forced sexual activity: Not on file   Other Topics Concern    Not on file   Social History Narrative    Not on file         ALLERGIES: Patient has no known allergies. Review of Systems   Constitutional: Negative for chills and fever. HENT: Negative for congestion, nosebleeds and sore throat. Eyes: Negative for pain and discharge. Respiratory: Negative for cough and shortness of breath. Cardiovascular: Negative for chest pain and palpitations. Gastrointestinal: Positive for abdominal pain and nausea. Negative for constipation and vomiting. Genitourinary: Negative for decreased urine volume, dysuria, flank pain and urgency. Musculoskeletal: Negative for gait problem and myalgias. Skin: Negative for rash and wound.    Neurological: Negative for seizures and syncope. Hematological: Does not bruise/bleed easily. Psychiatric/Behavioral: Negative for confusion, self-injury and suicidal ideas. Vitals:    12/11/19 0147   BP: (!) 188/105   Pulse: 73   Resp: 16   Temp: 98.7 °F (37.1 °C)   SpO2: 100%   Weight: 66.4 kg (146 lb 4.8 oz)   Height: 5' 7\" (1.702 m)            Physical Exam  Vitals signs and nursing note reviewed. Constitutional:       Appearance: He is well-developed. HENT:      Head: Normocephalic and atraumatic. Eyes:      Pupils: Pupils are equal, round, and reactive to light. Neck:      Musculoskeletal: Normal range of motion and neck supple. Cardiovascular:      Rate and Rhythm: Normal rate and regular rhythm. Heart sounds: Normal heart sounds. Pulmonary:      Effort: Pulmonary effort is normal. No respiratory distress. Breath sounds: Normal breath sounds. No wheezing. Abdominal:      General: Bowel sounds are decreased. Palpations: Abdomen is soft. Tenderness: There is no tenderness. There is no guarding or rebound. Comments: iliostomy present - pink in color. Empty bag, decreased bowls sounds, abdomin soft    Musculoskeletal: Normal range of motion. Skin:     General: Skin is warm and dry. Neurological:      Mental Status: He is alert and oriented to person, place, and time. Psychiatric:         Behavior: Behavior normal.        Note written by Joe Little, as dictated by Carey Grover MD 3:06 AM    MDM  Number of Diagnoses or Management Options  Kidney stone:   Diagnosis management comments:  58-year-old male with past medical history of colorectal cancer and recent ileostomy September 2019Presents with complaints of a right-sided abdominal pain and decreased ostomy output since 8 PM.  Patient reports he has attempted all advised interventions to increase ostomy output. No other complaints. Denies any recent fever, chills, nausea, vomiting, chest pain, shortness of breath.   Patient is afebrile, nontoxic, well-appearing, hemodynamically stable, abdomen soft with decreased bowel sounds, ostomy is pink appears patent, bag empty. Plan IV fluid hydration, CBC/CMP/UA, CT abdomen pelvis. Labs remarkable for slightly elevated creatinine at 1.46 (baseline 0.9)  CT shows 8 mm ureteral stone at right UVJ  UA unremarkable       Amount and/or Complexity of Data Reviewed  Clinical lab tests: ordered and reviewed  Tests in the radiology section of CPT®: ordered and reviewed  Independent visualization of images, tracings, or specimens: yes    Patient Progress  Patient progress: improved         Procedures   5:30 AM  Discussed results with patient. Patient reports improvement in pain. Patient reports he has known ureteral stenosis following radiation therapy for his colorectal cancer. Required ureteral stenting during surgery. No current stents in place. Patient reports he feels much improved. I will discuss with urology to ensure follow-up today. 6:30 AM  Awaiting callback from urology. 7:00 AM  Discussed results with patient. Patient reports history of ureteral scarring secondary to radiation therapy. Patient reports pain somewhat improved. Given slightly elevated creatinine and 8 mm ureteral stone will consult urology to ensure follow-up. 7:30 AM  Ashlee Yousif MD spoke with Dr. Haven Loyd, Consult for Urology. Discussed available diagnostic tests and clinical findings. He/She is in agreement with care plans as outlined. He/she advises discharge and follow-up with urology today in office.

## 2019-12-11 NOTE — ED TRIAGE NOTES
Patient arrives to the ED with c/o abdo pain since @ 2130 tonight, patient states he has an ileostomy that he thinks  is clogged. + nausea, no other complaint noted.  Hx rectal cancer

## 2019-12-13 ENCOUNTER — APPOINTMENT (OUTPATIENT)
Dept: INFUSION THERAPY | Age: 49
End: 2019-12-13

## 2019-12-18 ENCOUNTER — HOSPITAL ENCOUNTER (OUTPATIENT)
Dept: INFUSION THERAPY | Age: 49
Discharge: HOME | End: 2019-12-18
Payer: MEDICAID

## 2019-12-18 VITALS
SYSTOLIC BLOOD PRESSURE: 134 MMHG | HEART RATE: 66 BPM | DIASTOLIC BLOOD PRESSURE: 82 MMHG | RESPIRATION RATE: 18 BRPM | BODY MASS INDEX: 23.17 KG/M2 | TEMPERATURE: 98.5 F | WEIGHT: 147.6 LBS | HEIGHT: 67 IN

## 2019-12-18 DIAGNOSIS — K76.9 LIVER LESION: Primary | ICD-10-CM

## 2019-12-18 DIAGNOSIS — C20 RECTAL CANCER (HCC): Primary | ICD-10-CM

## 2019-12-18 DIAGNOSIS — C20 RECTAL CANCER (HCC): ICD-10-CM

## 2019-12-18 LAB
ALBUMIN SERPL-MCNC: 3.4 G/DL (ref 3.5–5)
ALBUMIN/GLOB SERPL: 1 {RATIO} (ref 1.1–2.2)
ALP SERPL-CCNC: 102 U/L (ref 45–117)
ALT SERPL-CCNC: 23 U/L (ref 12–78)
ANION GAP SERPL CALC-SCNC: 6 MMOL/L (ref 5–15)
AST SERPL-CCNC: 18 U/L (ref 15–37)
BASOPHILS # BLD: 0 K/UL (ref 0–0.1)
BASOPHILS NFR BLD: 0 % (ref 0–1)
BILIRUB SERPL-MCNC: 0.4 MG/DL (ref 0.2–1)
BUN SERPL-MCNC: 14 MG/DL (ref 6–20)
BUN/CREAT SERPL: 12 (ref 12–20)
CALCIUM SERPL-MCNC: 8.7 MG/DL (ref 8.5–10.1)
CHLORIDE SERPL-SCNC: 109 MMOL/L (ref 97–108)
CO2 SERPL-SCNC: 25 MMOL/L (ref 21–32)
CREAT SERPL-MCNC: 1.17 MG/DL (ref 0.7–1.3)
DIFFERENTIAL METHOD BLD: ABNORMAL
EOSINOPHIL # BLD: 0.1 K/UL (ref 0–0.4)
EOSINOPHIL NFR BLD: 1 % (ref 0–7)
ERYTHROCYTE [DISTWIDTH] IN BLOOD BY AUTOMATED COUNT: 14.7 % (ref 11.5–14.5)
GLOBULIN SER CALC-MCNC: 3.3 G/DL (ref 2–4)
GLUCOSE SERPL-MCNC: 114 MG/DL (ref 65–100)
HCT VFR BLD AUTO: 35.9 % (ref 36.6–50.3)
HGB BLD-MCNC: 12.3 G/DL (ref 12.1–17)
IMM GRANULOCYTES # BLD AUTO: 0 K/UL
IMM GRANULOCYTES NFR BLD AUTO: 0 %
LYMPHOCYTES # BLD: 0.2 K/UL (ref 0.8–3.5)
LYMPHOCYTES NFR BLD: 2 % (ref 12–49)
MCH RBC QN AUTO: 33 PG (ref 26–34)
MCHC RBC AUTO-ENTMCNC: 34.3 G/DL (ref 30–36.5)
MCV RBC AUTO: 96.2 FL (ref 80–99)
METAMYELOCYTES NFR BLD MANUAL: 2 %
MONOCYTES # BLD: 0.2 K/UL (ref 0–1)
MONOCYTES NFR BLD: 2 % (ref 5–13)
NEUTS BAND NFR BLD MANUAL: 1 % (ref 0–6)
NEUTS SEG # BLD: 8.7 K/UL (ref 1.8–8)
NEUTS SEG NFR BLD: 92 % (ref 32–75)
NRBC # BLD: 0.02 K/UL (ref 0–0.01)
NRBC BLD-RTO: 0.2 PER 100 WBC
PLATELET # BLD AUTO: 134 K/UL (ref 150–400)
PLATELET COMMENTS,PCOM: ABNORMAL
PMV BLD AUTO: 10.7 FL (ref 8.9–12.9)
POTASSIUM SERPL-SCNC: 3.4 MMOL/L (ref 3.5–5.1)
PROT SERPL-MCNC: 6.7 G/DL (ref 6.4–8.2)
RBC # BLD AUTO: 3.73 M/UL (ref 4.1–5.7)
RBC MORPH BLD: ABNORMAL
RBC MORPH BLD: ABNORMAL
SODIUM SERPL-SCNC: 140 MMOL/L (ref 136–145)
WBC # BLD AUTO: 9.3 K/UL (ref 4.1–11.1)
WBC MORPH BLD: ABNORMAL

## 2019-12-18 PROCEDURE — 74011250636 HC RX REV CODE- 250/636: Performed by: REGISTERED NURSE

## 2019-12-18 PROCEDURE — 96415 CHEMO IV INFUSION ADDL HR: CPT

## 2019-12-18 PROCEDURE — 96413 CHEMO IV INFUSION 1 HR: CPT

## 2019-12-18 PROCEDURE — 96416 CHEMO PROLONG INFUSE W/PUMP: CPT

## 2019-12-18 PROCEDURE — 96375 TX/PRO/DX INJ NEW DRUG ADDON: CPT

## 2019-12-18 PROCEDURE — 85025 COMPLETE CBC W/AUTO DIFF WBC: CPT

## 2019-12-18 PROCEDURE — 74011000258 HC RX REV CODE- 258: Performed by: REGISTERED NURSE

## 2019-12-18 PROCEDURE — 36415 COLL VENOUS BLD VENIPUNCTURE: CPT

## 2019-12-18 PROCEDURE — 80053 COMPREHEN METABOLIC PANEL: CPT

## 2019-12-18 PROCEDURE — 77030012965 HC NDL HUBR BBMI -A

## 2019-12-18 PROCEDURE — 74011250637 HC RX REV CODE- 250/637: Performed by: REGISTERED NURSE

## 2019-12-18 RX ORDER — SODIUM CHLORIDE 9 MG/ML
10 INJECTION INTRAMUSCULAR; INTRAVENOUS; SUBCUTANEOUS AS NEEDED
Status: ACTIVE | OUTPATIENT
Start: 2019-12-18 | End: 2019-12-18

## 2019-12-18 RX ORDER — HEPARIN 100 UNIT/ML
300-500 SYRINGE INTRAVENOUS AS NEEDED
Status: ACTIVE | OUTPATIENT
Start: 2019-12-18 | End: 2019-12-18

## 2019-12-18 RX ORDER — ONDANSETRON 2 MG/ML
8 INJECTION INTRAMUSCULAR; INTRAVENOUS ONCE
Status: COMPLETED | OUTPATIENT
Start: 2019-12-18 | End: 2019-12-18

## 2019-12-18 RX ORDER — SODIUM CHLORIDE 0.9 % (FLUSH) 0.9 %
10 SYRINGE (ML) INJECTION AS NEEDED
Status: ACTIVE | OUTPATIENT
Start: 2019-12-18 | End: 2019-12-18

## 2019-12-18 RX ORDER — POTASSIUM CHLORIDE 750 MG/1
20 TABLET, FILM COATED, EXTENDED RELEASE ORAL
Status: COMPLETED | OUTPATIENT
Start: 2019-12-18 | End: 2019-12-18

## 2019-12-18 RX ORDER — DEXTROSE MONOHYDRATE 50 MG/ML
25 INJECTION, SOLUTION INTRAVENOUS CONTINUOUS
Status: DISCONTINUED | OUTPATIENT
Start: 2019-12-18 | End: 2019-12-19 | Stop reason: HOSPADM

## 2019-12-18 RX ADMIN — ONDANSETRON 8 MG: 2 INJECTION, SOLUTION INTRAMUSCULAR; INTRAVENOUS at 12:59

## 2019-12-18 RX ADMIN — DEXTROSE MONOHYDRATE 25 ML/HR: 5 INJECTION, SOLUTION INTRAVENOUS at 12:43

## 2019-12-18 RX ADMIN — FLUOROURACIL 4224 MG: 50 INJECTION, SOLUTION INTRAVENOUS at 16:35

## 2019-12-18 RX ADMIN — DEXAMETHASONE SODIUM PHOSPHATE 12 MG: 4 INJECTION, SOLUTION INTRA-ARTICULAR; INTRALESIONAL; INTRAMUSCULAR; INTRAVENOUS; SOFT TISSUE at 13:45

## 2019-12-18 RX ADMIN — POTASSIUM CHLORIDE 20 MEQ: 750 TABLET, FILM COATED, EXTENDED RELEASE ORAL at 13:30

## 2019-12-18 RX ADMIN — OXALIPLATIN 149.5 MG: 5 INJECTION, SOLUTION INTRAVENOUS at 14:14

## 2019-12-18 RX ADMIN — Medication 10 ML: at 16:38

## 2019-12-18 NOTE — PROGRESS NOTES
Cancer [place] at [place] [address] [address]  W: [phone]  F: [phone]    Reason for Visit:   Sandra Rod is a 47 y.o. male who is seen for Rectal cancer- likely stage III- SOMMER    Treatment History:   · 4/26/19: Colonoscopy- 6-7 cm size malignant appearing mass seen at 10 cm from anal verge. This was adenocarcinoma. 3 polyps removed- all were tubular adenomas  · Rectal Ultrasound 5/2/19: ulcerated infiltrative mass lesion was noted in rectum at 10 cm. The lesion measured about 5 cm X 4 cm- T2, 13 mm X 8 mm oblong lymph node was noted immediately adjacent to the mass lesion  · 5/2/19: CT CAP- No metastasis, liver cysts. CEA 3.6  · 5/10/19: PET CT showed rectal malignancy and 3 small perirectal anibal metastatic focir  · 5/28/19-7/8/29: Xeloda + RT   · 9/24/19: LAR- [id number], 2/6 positive nodes  · 10/14/19: CT CAP with no evidence of metastatic disease, liver cysts   · 10/23/19: cycle 1 FOLFOX  · 12/11/19: CT AP shows heterogeneous enhancement and hypodensity of liver     History of Present Illness:   Patient is a 47 y.o. male seen for adenocarcinoma of the mid third of rectum    He had intermittent BRBPR x 1 year and then decided to have a colonoscopy. This led to above mentioned diagnosis. He received neoadjuvant xeloda+ RT followed by [facility]. Comes today for cycle 4 of FOLFOX. He as recently in the ER on 12/11/19 with abdominal pain and found to have right hydroureteronephrosis due to 8mm calculus. He has since been seen by urology with removal of calculus. Abdominal pain has improved since then and taking ibuprofen as needed which helps. CT also showed heterogeneous enhancement and hypodensity of liver. Otherwise he feels well. He has had no change in stool consistency, no mouth sores, he has no nausea, no numbness/tinglng. He has good energy. Denies fevers/chills, SOB, CP, cough, or bleeding. He uses 7 drinks a week.     Adopted    Past Medical History:   Diagnosis Date    Adopted     GERD (gastroesophageal reflux disease)     Psoriasis     Psoriatic arthritis ([address]     Rectal cancer [facility])       Past Surgical History:   Procedure Laterality Date    COLONOSCOPY Left 4/26/2019    COL[facility]COPY performed by [facility]ks [facility] at [facility] N/A 5/2/2019    SIGMOIDOSCOPY FLEXIBLE performed by Tay Tran MD at [address] HX GI      EGD    HX HEENT      WISDOM TEETH    HX ORTHOPAEDIC Right     ACL REPLACEMENT    IR INSERT TUNL CVC W PORT OVER 5 YEARS  10/15/2019      Social History     Tobacco Use    Smoking status: Never Smoker    Smokeless tobacco: Never Used   Substance Use Topics    Alcohol use: Yes     Comment: 6 BEERS WEEKLY      Family History   Adopted: Yes   Problem Relation Age of Onset    Asthma Neg Hx     Cancer Neg Hx     Diabetes Neg Hx     Heart Disease Neg Hx     Hypertension Neg Hx     Stroke Neg Hx      Current Outpatient Medications   Medication Sig    hydroCHLOROthiazide (HYDRODIURIL) 12.5 mg tablet TAKE 1 TABLET BY MOUTH EVERY DAY    lidocaine-prilocaine (EMLA) topical cream Apply  to affected area as needed for Pain.  dexAMETHasone (DECADRON) 4 mg tablet Take 8 mg by mouth See Admin Instructions. Take 2 tabs by mouth twice a day on days 2 and 3 after chemotherapy only    ondansetron (ZOFRAN ODT) 8 mg disintegrating tablet Take 1 Tab by mouth every eight (8) hours as needed for Nausea.  acetaminophen (TYLENOL) 500 mg tablet Take 500 mg by mouth every six (6) hours as needed for Fever or Pain.  diphenoxylate-atropine (LOMOTIL) 2.5-0.025 mg per tablet Take 1 Tab by mouth four (4) times daily as needed for Diarrhea.  tamsulosin (FLOMAX) 0.4 mg capsule Take 0.4 mg by mouth daily.  triamcinolone acetonide (KENALOG) 0.1 % ointment Apply  to affected area two (2) times daily as needed for Skin Irritation.  use thin layer    fluocinonide (VANOS) 0.1 % topical cream Apply to affected area daily. Current Facility-Administered Medications   Medication Dose Route Frequency    saline peripheral flush soln 10 mL  10 mL InterCATHeter PRN    0.9% sodium chloride injection 10 mL  10 mL IntraVENous PRN    heparin (porcine) pf 300-500 Units  300-500 Units InterCATHeter PRN    dextrose 5% infusion  25 mL/hr IntraVENous CONTINUOUS    oxaliplatin (ELOXATIN) 149.5 mg in dextrose 5% 250 mL, overfill volume 25 mL chemo infusion  85 mg/m2 (Treatment Plan Recorded) IntraVENous ONCE    fluorouracil (ADRUCIL) 4,224 mg in 0.9% sodium chloride 100 mL CADD Cassette  2,400 mg/m2 (Treatment Plan Recorded) IntraVENous ONCE      No Known Allergies     Review of Systems: A complete review of systems was obtained, negative except as described above. Physical Exam:     Visit Vitals  /81 (BP 1 Location: Left arm, BP Patient Position: At rest)   Pulse 72   Temp 98.6 °F (37 °C)   Resp 18   Ht 5' 7\" (1.702 m)   Wt 147 lb 9.6 oz (67 kg)   BMI 23.12 kg/m²     ECOG PS: 1  General: No distress  Eyes: PERRLA, anicteric sclerae  HENT: Atraumatic, OP clear, PAC looks good   Resp: CTAB, normal respiratory effort  CV: s1s2, no LE edema  MS: Normal gait and station. Digits without clubbing or cyanosis. Skin: No rashes, ecchymoses, or petechiae. Normal temperature, turgor, and texture. Psych: Alert, oriented, appropriate affect, normal judgment/insight    Results:     Lab Results   Component Value Date/Time    WBC 9.3 12/18/2019 10:12 AM    HGB 12.3 12/18/2019 10:12 AM    HCT 35.9 (L) 12/18/2019 10:12 AM    PLATELET 335 (L) 03/39/1153 10:12 AM    MCV 96.2 12/18/2019 10:12 AM    ABS.  NEUTROPHILS 8.7 (H) 12/18/2019 10:12 AM     Lab Results   Component Value Date/Time    Sodium 140 12/18/2019 10:12 AM    Potassium 3.4 (L) 12/18/2019 10:12 AM    Chloride 109 (H) 12/18/2019 10:12 AM    CO2 25 12/18/2019 10:12 AM    Glucose 114 (H) 12/18/2019 10:12 AM    BUN 14 12/18/2019 10:12 AM    Creatinine 1.17 12/18/2019 10:12 AM    GFR est AA >60 12/18/2019 10:12 AM    GFR est non-AA >60 12/18/2019 10:12 AM    Calcium 8.7 12/18/2019 10:12 AM     Lab Results   Component Value Date/Time    Bilirubin, total 0.4 12/18/2019 10:12 AM    ALT (SGPT) 23 12/18/2019 10:12 AM    AST (SGOT) 18 12/18/2019 10:12 AM    Alk. phosphatase 102 12/18/2019 10:12 AM    Protein, total 6.7 12/18/2019 10:12 AM    Albumin 3.4 (L) 12/18/2019 10:12 AM    Globulin 3.3 12/18/2019 10:12 AM     Records reviewed and summarized above. Pathology report(s) reviewed  FINAL PATHOLOGIC DIAGNOSIS   1. Rectum and sigmoid colon, laparoscopic low anterior resection:   SPECIMEN   Procedure: Low anterior resection   Macroscopic Intactness of Mesorectum: Complete   TUMOR   Tumor Site: Rectum   Histologic Type: Adenocarcinoma   Histologic Grade: G2: Moderately differentiated   Tumor Size: 2.6 cm   Tumor Deposits: Present   Number of Deposits: 1   Tumor Extension: Tumor invades through the muscularis propria into pericolorectal tissue   Macroscopic Tumor Perforation: Not identified   Lymphovascular Invasion: Not identified   Perineural Invasion: Not identified   Tumor Budding: Number of tumor buds in one hotspot field: 5   Tumor Budding Score: Intermediate score (5-9)   Treatment Effect: Present - Residual cancer with evident tumor regression, but more than single cells or   rare small groups of cancer cells (partial response, score 2)   MARGINS   Margins: All margins are uninvolved by invasive carcinoma, high- grade dysplasia, intramucosal   adenocarcinoma, and adenoma   Margins Examined: Proximal, Distal, Radial or Mesenteric   Distance of Invasive Carcinoma from Closest Margin: 25 mm   Closest Margin: Radial or Mesenteric   LYMPH NODES   Number of Lymph Nodes Involved: 2   Number of Lymph Nodes Examined: 16   PATHOLOGIC STAGE CLASSIFICATION (pTNM, AJCC 8th Edition)   Primary Tumor (pT): pT3   Regional Lymph Nodes (pN): pN1b   2.  Large bowel, donuts:   Fragments of large bowel wall, negative for microscopic pathologic abnormality. Radiology report(s) reviewed above. CT CAP 10/14/19: IMPRESSION:  1. There are scattered small hepatic cysts without definite evidence for  metastatic disease. 2. Otherwise negative CT chest abdomen pelvis    CT AP 12/11/19: IMPRESSION:     1. Right hydroureteronephrosis due to an 8 mm calculus at the ureterovesicular  junction. 2. Vague areas of heterogeneous enhancement and hypodensity in the liver, raises  concern for metastases, though dedicated 3 phase liver CT or liver protocol MRI  may be considered as follow-up. Numerous small hypodensities in the liver likely  cysts, unchanged. 3. Rectal anastomosis and presacral edema, treatment related. Right lower  quadrant ileostomy. No bowel obstruction    Assessment:   1) Rectal adenocarcinoma- mid third- SOMMER    T2N1M0 disease- Clinical stage III  S/P John Adj chemoRT followed by [facility] on 9/24/19  Pathology showed [id number] residual disease- stage IIIB    Reviewed surgical pathology  Had significant residual disease with practically no treatment effects  See prior office notes in regards to discussion on adjuvant chemotherapy   Had CT 10/14/19 with no evidence of metastatic disease    Today is cycle 4 of FOLFOX x 8 planned cycles     Had grade 3 neutropenia after cycle 2 requiring a 2 week delay. Starting with cycle 3 today we will drop 5FU bolus and add neulasta given high risk for grade IV neutropenia.      Genetic testing negative    CT in ER on 12/11 shows suspicious area of liver however will obtain MRI to evaluate     2) Young age at diagnosis    Adopted  Had several adenomas removed  May have attenuated FAP and will require genetic testing  SOMMER    3) GIB  Minor    4) Psoriasis  Off Methotrexate  Follows Dr. Emiliana Hendricks    5) Neutropenia  Chemotherapy induced  See #1    6) Abdominal pain  Due to calculus noted   Advised he should not take ibuprofen as he had some thrombocytopenia and risk of bleeding He will use norco he has at home as needed     Plan:     · Proceed today with cycle 4 of adjuvant FOLFOX (DROP 5FU bolus, leucovorin 400mg/m2, 5FU CADD 2,400mg/m2, oxaliplatin 85mg/m2) for 8 cycles   · Labs to include CBC with diff and CMP prior to each treatment. CEA monthly. · Pre-meds to include Zofran and Dexamethasone   · Dexamethasone days 2 and 3  · Zofran PRN  · Neulasta OP due to high risk for grade IV neutropenia   · MRI of abd    RTC in 2 weeks for cycle 5    I appreciate the opportunity to participate in [facility].     Signed By: Jaxon Mcfarlane NP

## 2019-12-18 NOTE — PROGRESS NOTES
Outpatient Infusion Center - Chemotherapy Progress Note    1000 Pt admit to [facility] for modified FOLFOX ambulatory in stable condition. Assessment completed. No new concerns voiced. Pt was recently in the ED with kidney stones. Port accessed with positive blood return. Labs drawn and sent for processing. IV attached to D5W at [facility].      Chemotherapy Flowsheet 12/18/2019   Cycle C4   Date 12/18/2019   Drug / Regimen Folfox   Pre Meds given   Notes given       Visit Vitals  /82 (BP 1 Location: Left leg, BP Patient Position: At rest)   Pulse 66   Temp 98.5 °F (36.9 °C)   Resp 18   Ht 5' 7\" (1.702 m)   Wt 67 kg (147 lb 9.6 oz)   BMI 23.12 kg/m²       Medications:  Medications Administered     dexamethasone (DECADRON) 12 mg in 0.9% sodium chloride 50 mL, overfill volume 5 mL IVPB     Admin Date  12/18/2019 Action  Given Dose  12 mg Rate  232 mL/hr Route  IntraVENous Administered By  Adalgisa Coelho RN          dextrose 5% infusion     Admin Date  12/18/2019 Action  New Bag Dose  25 mL/hr Rate  25 mL/hr Route  IntraVENous Administered By  Adalgisa Coelho RN          fluorouracil (ADRUCIL) 4,224 mg in 0.9% sodium chloride 100 mL CADD Cassette     Admin Date  12/18/2019 Action  New Bag Dose  4224 mg Rate  2.2 mL/hr Route  IntraVENous Administered By  Adalgisa Coelho RN          ondansetron [facility]) injection 8 mg     Admin Date  12/18/2019 Action  Given Dose  8 mg Route  IntraVENous Administered By  Adalgisa Coelho RN          oxaliplatin (ELOXATIN) 149.5 mg in dextrose 5% 250 mL, overfill volume 25 mL chemo infusion     Admin Date  12/18/2019 Action  New Bag Dose  149.5 mg Rate  152.5 mL/hr Route  IntraVENous Administered By  Adalgisa Coelho RN          potassium chloride SR (KLOR-CON 10) tablet 20 mEq     Admin Date  12/18/2019 Action  Given Dose  20 mEq Route  Oral Administered By  Adalgisa Coelho RN          saline peripheral flush soln 10 mL     Admin Date  12/18/2019 Action  Given Dose  10 mL Route  InterCATHeter Administered By  Jakob Christian, RN                0227 Pt tolerated treatment well. Port maintained positive blood return throughout treatment, flushed with positive blood return at conclusion. D/c home ambulatory in no distress. Pt aware of next appointment scheduled for 12/20/19 for pump disconnect. Recent Results (from the past 12 hour(s))   CBC WITH AUTOMATED DIFF    Collection Time: 12/18/19 10:12 AM   Result Value Ref Range    WBC 9.3 4.1 - 11.1 K/uL    RBC 3.73 (L) 4.10 - 5.70 M/uL    HGB 12.3 12.1 - 17.0 g/dL    HCT 35.9 (L) 36.6 - 50.3 %    MCV 96.2 80.0 - 99.0 FL    MCH 33.0 26.0 - 34.0 PG    MCHC 34.3 30.0 - 36.5 g/dL    RDW 14.7 (H) 11.5 - 14.5 %    PLATELET 312 (L) 803 - 400 K/uL    MPV 10.7 8.9 - 12.9 FL    NRBC 0.2 (H) 0  WBC    ABSOLUTE NRBC 0.02 (H) 0.00 - 0.01 K/uL    NEUTROPHILS 92 (H) 32 - 75 %    BAND NEUTROPHILS 1 0 - 6 %    LYMPHOCYTES 2 (L) 12 - 49 %    MONOCYTES 2 (L) 5 - 13 %    EOSINOPHILS 1 0 - 7 %    BASOPHILS 0 0 - 1 %    METAMYELOCYTES 2 (H) 0 %    IMMATURE GRANULOCYTES 0 %    ABS. NEUTROPHILS 8.7 (H) 1.8 - 8.0 K/UL    ABS. LYMPHOCYTES 0.2 (L) 0.8 - 3.5 K/UL    ABS. MONOCYTES 0.2 0.0 - 1.0 K/UL    ABS. EOSINOPHILS 0.1 0.0 - 0.4 K/UL    ABS. BASOPHILS 0.0 0.0 - 0.1 K/UL    ABS. IMM.  GRANS. 0.0 K/UL    DF MANUAL      PLATELET COMMENTS Large Platelets      RBC COMMENTS ANISOCYTOSIS  1+        RBC COMMENTS MACROCYTOSIS  1+        WBC COMMENTS TOXIC GRANULATION     METABOLIC PANEL, COMPREHENSIVE    Collection Time: 12/18/19 10:12 AM   Result Value Ref Range    Sodium 140 136 - 145 mmol/L    Potassium 3.4 (L) 3.5 - 5.1 mmol/L    Chloride 109 (H) 97 - 108 mmol/L    CO2 25 21 - 32 mmol/L    Anion gap 6 5 - 15 mmol/L    Glucose 114 (H) 65 - 100 mg/dL    BUN 14 6 - 20 MG/DL    Creatinine 1.17 0.70 - 1.30 MG/DL    BUN/Creatinine ratio 12 12 - 20      GFR est AA >60 >60 ml/min/1.73m2    GFR est non-AA >60 >60 ml/min/1.73m2    Calcium 8.7 8.5 - 10.1 MG/DL    Bilirubin, total 0.4 0.2 - 1.0 MG/DL    ALT (SGPT) 23 12 - 78 U/L    AST (SGOT) 18 15 - 37 U/L    Alk.  phosphatase 102 45 - 117 U/L    Protein, total 6.7 6.4 - 8.2 g/dL    Albumin 3.4 (L) 3.5 - 5.0 g/dL    Globulin 3.3 2.0 - 4.0 g/dL    A-G Ratio 1.0 (L) 1.1 - 2.2

## 2019-12-19 ENCOUNTER — DOCUMENTATION ONLY (OUTPATIENT)
Dept: ONCOLOGY | Age: 49
End: 2019-12-19

## 2019-12-19 PROBLEM — D70.9 NEUTROPENIA (HCC): Status: ACTIVE | Noted: 2019-12-19

## 2019-12-19 NOTE — PROGRESS NOTES
Called pt insurance company as he showed me a letter of neulasta denial in [address] yesterday. Additional clinical information was given however rep stated turn around time was 14 days.

## 2019-12-20 ENCOUNTER — TELEPHONE (OUTPATIENT)
Dept: ONCOLOGY | Age: 49
End: 2019-12-20

## 2019-12-20 ENCOUNTER — HOSPITAL ENCOUNTER (OUTPATIENT)
Dept: INFUSION THERAPY | Age: 49
Discharge: HOME | End: 2019-12-20
Payer: MEDICAID

## 2019-12-20 VITALS
RESPIRATION RATE: 18 BRPM | TEMPERATURE: 98 F | DIASTOLIC BLOOD PRESSURE: 86 MMHG | HEART RATE: 77 BPM | SYSTOLIC BLOOD PRESSURE: 124 MMHG

## 2019-12-20 PROCEDURE — 96523 IRRIG DRUG DELIVERY DEVICE: CPT

## 2019-12-20 NOTE — TELEPHONE ENCOUNTER
Called patient regarding neulasta as he had received a denial letter. I spoke with insurance company yesterday (see previous documentation) who said decision will take up to 14 days. Explained to him there is no guaranetee they will \"back pay\" for neulasta if he receives injection today. Explained his ANGELES were 8.7 on 12/18 so we have some buffer plus 5FU bolus has been dropped. We agreed to not administer neulasta today and see if it can be approved next treatment IF needed. He had no other questions/concerns. He will also try and call his insurance company.

## 2019-12-20 NOTE — PROGRESS NOTES
OPIC Short Note                       Date: 2019    Name: Joaquina Gonzalez    MRN: [id number]         : 1970    1530 Pt admit to [facility] for removal of CADD pump ambulatory in stable condition. Assessment completed. No new concerns voiced. Mr. Henry Adame vitals were reviewed prior to treatment. Patient Vitals for the past 12 hrs:   Temp Pulse Resp BP   19 1530 98 °F (36.7 °C) 77 18 124/86       Port with positive blood return flushed, heparinized and de-accessed per protocol. 1540 Pt tolerated treatment well. D/c home ambulatory in no distress.      Future Appointments   Date Time Provider Shannan Rodriguez   2019  9:30 AM [facility] INFUSION NURSE 1 [facility]   2020  9:00 AM BREMO PHLEBOTOMIST [facility]   2020  4:15 PM [facility] MRI 1 [facility]   2020 11:00 AM BREMO INFUSION NURSE 3 [facility]   1/15/2020 10:00 AM [facility] INFUSION NURSE 4 [facility]   2020  3:00 PM [facility] INFUSION NURSE 2 [facility]   2020 10:20 AM Yohana Stone MD [address] [place] [address]   2020 10:00 AM BREMO INFUSION NURSE 4 [facility]   2020 10:30 AM Lionel Lennon MD [facility] [address], RN  2019  3:53 PM

## 2019-12-22 ENCOUNTER — HOSPITAL ENCOUNTER (OUTPATIENT)
Dept: INFUSION THERAPY | Age: 49
Discharge: HOME | End: 2019-12-22

## 2019-12-26 ENCOUNTER — APPOINTMENT (OUTPATIENT)
Dept: INFUSION THERAPY | Age: 49
End: 2019-12-26

## 2019-12-28 ENCOUNTER — APPOINTMENT (OUTPATIENT)
Dept: INFUSION THERAPY | Age: 49
End: 2019-12-28

## 2020-01-02 ENCOUNTER — HOSPITAL ENCOUNTER (OUTPATIENT)
Dept: INFUSION THERAPY | Age: 50
Discharge: HOME | End: 2020-01-02
Payer: MEDICAID

## 2020-01-02 ENCOUNTER — OFFICE VISIT (OUTPATIENT)
Dept: ONCOLOGY | Age: 50
End: 2020-01-02

## 2020-01-02 VITALS
DIASTOLIC BLOOD PRESSURE: 82 MMHG | SYSTOLIC BLOOD PRESSURE: 120 MMHG | HEIGHT: 67 IN | TEMPERATURE: 98 F | WEIGHT: 149.6 LBS | OXYGEN SATURATION: 98 % | HEART RATE: 69 BPM | BODY MASS INDEX: 23.48 KG/M2

## 2020-01-02 VITALS
HEART RATE: 68 BPM | SYSTOLIC BLOOD PRESSURE: 131 MMHG | WEIGHT: 150.2 LBS | DIASTOLIC BLOOD PRESSURE: 81 MMHG | HEIGHT: 67 IN | BODY MASS INDEX: 23.57 KG/M2 | TEMPERATURE: 98.6 F | RESPIRATION RATE: 16 BRPM

## 2020-01-02 DIAGNOSIS — C20 RECTAL CANCER (HCC): Primary | ICD-10-CM

## 2020-01-02 DIAGNOSIS — D69.6 THROMBOCYTOPENIA (HCC): ICD-10-CM

## 2020-01-02 LAB
ALBUMIN SERPL-MCNC: 3.4 G/DL (ref 3.5–5)
ALBUMIN/GLOB SERPL: 1.1 {RATIO} (ref 1.1–2.2)
ALP SERPL-CCNC: 66 U/L (ref 45–117)
ALT SERPL-CCNC: 38 U/L (ref 12–78)
ANION GAP SERPL CALC-SCNC: 4 MMOL/L (ref 5–15)
AST SERPL-CCNC: 24 U/L (ref 15–37)
BASOPHILS # BLD: 0 K/UL (ref 0–0.1)
BASOPHILS NFR BLD: 1 % (ref 0–1)
BILIRUB SERPL-MCNC: 0.5 MG/DL (ref 0.2–1)
BUN SERPL-MCNC: 10 MG/DL (ref 6–20)
BUN/CREAT SERPL: 11 (ref 12–20)
CALCIUM SERPL-MCNC: 8.7 MG/DL (ref 8.5–10.1)
CEA SERPL-MCNC: 1.5 NG/ML
CHLORIDE SERPL-SCNC: 108 MMOL/L (ref 97–108)
CO2 SERPL-SCNC: 26 MMOL/L (ref 21–32)
CREAT SERPL-MCNC: 0.94 MG/DL (ref 0.7–1.3)
DIFFERENTIAL METHOD BLD: ABNORMAL
EOSINOPHIL # BLD: 0.1 K/UL (ref 0–0.4)
EOSINOPHIL NFR BLD: 3 % (ref 0–7)
ERYTHROCYTE [DISTWIDTH] IN BLOOD BY AUTOMATED COUNT: 15.4 % (ref 11.5–14.5)
GLOBULIN SER CALC-MCNC: 3 G/DL (ref 2–4)
GLUCOSE SERPL-MCNC: 119 MG/DL (ref 65–100)
HCT VFR BLD AUTO: 36 % (ref 36.6–50.3)
HGB BLD-MCNC: 12.1 G/DL (ref 12.1–17)
IMM GRANULOCYTES # BLD AUTO: 0 K/UL (ref 0–0.04)
IMM GRANULOCYTES NFR BLD AUTO: 1 % (ref 0–0.5)
LYMPHOCYTES # BLD: 0.3 K/UL (ref 0.8–3.5)
LYMPHOCYTES NFR BLD: 9 % (ref 12–49)
MCH RBC QN AUTO: 33 PG (ref 26–34)
MCHC RBC AUTO-ENTMCNC: 33.6 G/DL (ref 30–36.5)
MCV RBC AUTO: 98.1 FL (ref 80–99)
MONOCYTES # BLD: 0.4 K/UL (ref 0–1)
MONOCYTES NFR BLD: 16 % (ref 5–13)
NEUTS SEG # BLD: 2 K/UL (ref 1.8–8)
NEUTS SEG NFR BLD: 70 % (ref 32–75)
NRBC # BLD: 0 K/UL (ref 0–0.01)
NRBC BLD-RTO: 0 PER 100 WBC
PLATELET # BLD AUTO: 110 K/UL (ref 150–400)
PMV BLD AUTO: 11 FL (ref 8.9–12.9)
POTASSIUM SERPL-SCNC: 4 MMOL/L (ref 3.5–5.1)
PROT SERPL-MCNC: 6.4 G/DL (ref 6.4–8.2)
RBC # BLD AUTO: 3.67 M/UL (ref 4.1–5.7)
RBC MORPH BLD: ABNORMAL
SODIUM SERPL-SCNC: 138 MMOL/L (ref 136–145)
WBC # BLD AUTO: 2.8 K/UL (ref 4.1–11.1)

## 2020-01-02 PROCEDURE — 96375 TX/PRO/DX INJ NEW DRUG ADDON: CPT

## 2020-01-02 PROCEDURE — 74011000258 HC RX REV CODE- 258: Performed by: REGISTERED NURSE

## 2020-01-02 PROCEDURE — 74011000250 HC RX REV CODE- 250: Performed by: REGISTERED NURSE

## 2020-01-02 PROCEDURE — 74011250636 HC RX REV CODE- 250/636: Performed by: REGISTERED NURSE

## 2020-01-02 PROCEDURE — 96413 CHEMO IV INFUSION 1 HR: CPT

## 2020-01-02 PROCEDURE — 36415 COLL VENOUS BLD VENIPUNCTURE: CPT

## 2020-01-02 PROCEDURE — 77030012965 HC NDL HUBR BBMI -A

## 2020-01-02 PROCEDURE — 96415 CHEMO IV INFUSION ADDL HR: CPT

## 2020-01-02 PROCEDURE — 82378 CARCINOEMBRYONIC ANTIGEN: CPT

## 2020-01-02 PROCEDURE — 85025 COMPLETE CBC W/AUTO DIFF WBC: CPT

## 2020-01-02 PROCEDURE — 80053 COMPREHEN METABOLIC PANEL: CPT

## 2020-01-02 PROCEDURE — 96416 CHEMO PROLONG INFUSE W/PUMP: CPT

## 2020-01-02 RX ORDER — DEXTROSE MONOHYDRATE 50 MG/ML
25 INJECTION, SOLUTION INTRAVENOUS CONTINUOUS
Status: DISPENSED | OUTPATIENT
Start: 2020-01-02 | End: 2020-01-02

## 2020-01-02 RX ORDER — SODIUM CHLORIDE 9 MG/ML
10 INJECTION INTRAMUSCULAR; INTRAVENOUS; SUBCUTANEOUS AS NEEDED
Status: ACTIVE | OUTPATIENT
Start: 2020-01-02 | End: 2020-01-02

## 2020-01-02 RX ORDER — ONDANSETRON 2 MG/ML
8 INJECTION INTRAMUSCULAR; INTRAVENOUS ONCE
Status: COMPLETED | OUTPATIENT
Start: 2020-01-02 | End: 2020-01-02

## 2020-01-02 RX ORDER — SODIUM CHLORIDE 0.9 % (FLUSH) 0.9 %
10 SYRINGE (ML) INJECTION AS NEEDED
Status: ACTIVE | OUTPATIENT
Start: 2020-01-02 | End: 2020-01-02

## 2020-01-02 RX ORDER — HEPARIN 100 UNIT/ML
300-500 SYRINGE INTRAVENOUS AS NEEDED
Status: ACTIVE | OUTPATIENT
Start: 2020-01-02 | End: 2020-01-02

## 2020-01-02 RX ADMIN — Medication 10 ML: at 11:12

## 2020-01-02 RX ADMIN — DEXTROSE MONOHYDRATE 25 ML/HR: 5 INJECTION, SOLUTION INTRAVENOUS at 11:10

## 2020-01-02 RX ADMIN — DEXAMETHASONE SODIUM PHOSPHATE 12 MG: 4 INJECTION, SOLUTION INTRA-ARTICULAR; INTRALESIONAL; INTRAMUSCULAR; INTRAVENOUS; SOFT TISSUE at 11:22

## 2020-01-02 RX ADMIN — SODIUM CHLORIDE 10 ML: 9 INJECTION INTRAMUSCULAR; INTRAVENOUS; SUBCUTANEOUS at 14:17

## 2020-01-02 RX ADMIN — FLUOROURACIL 4224 MG: 50 INJECTION, SOLUTION INTRAVENOUS at 14:10

## 2020-01-02 RX ADMIN — OXALIPLATIN 149.5 MG: 5 INJECTION, SOLUTION INTRAVENOUS at 11:58

## 2020-01-02 RX ADMIN — ONDANSETRON 8 MG: 2 INJECTION, SOLUTION INTRAMUSCULAR; INTRAVENOUS at 11:10

## 2020-01-02 RX ADMIN — SODIUM CHLORIDE 10 ML: 9 INJECTION INTRAMUSCULAR; INTRAVENOUS; SUBCUTANEOUS at 11:22

## 2020-01-02 NOTE — PROGRESS NOTES
Bennie Rubio is a 47 y.o. male   Chief Complaint   Patient presents with    Follow-up     rectal cancer     1. Have you been to the ER, urgent care clinic since your last visit? Hospitalized since your last visit? Yes kidney stones 12/11/2019  2. Have you seen or consulted any other health care providers outside of the [address] [place] [address] since your last visit? Include any pap smears or colon screening.  no

## 2020-01-02 NOTE — PROGRESS NOTES
Trice Oliverchelyyg Ferguson 36 Note:  1921OT arrived at [facility] ambulatory and in no distress for C5. Assessment stable, no new complaints voiced. He gets some cold sensitivity and it was a little longer this time. Also  Had episode of rt hand contracture. Went to MD appt. Medications received:  Medications Administered     0.9% sodium chloride injection 10 mL     Admin Date  01/02/2020 Action  Given Dose  10 mL Route  IntraVENous Administered By  Sierra Pablo RN           Admin Date  01/02/2020 Action  Given Dose  10 mL Route  IntraVENous Administered By  Sierra Pablo RN          dexamethasone (DECADRON) 12 mg in 0.9% sodium chloride 50 mL, overfill volume 5 mL IVPB     Admin Date  01/02/2020 Action  Given Dose  12 mg Rate  232 mL/hr Route  IntraVENous Administered By  Sierra Pablo RN          dextrose 5% infusion     Admin Date  01/02/2020 Action  New Bag Dose  25 mL/hr Rate  25 mL/hr Route  IntraVENous Administered By  Sierra Pablo RN          fluorouracil (ADRUCIL) 4,224 mg in 0.9% sodium chloride 100 mL CADD Cassette     Admin Date  01/02/2020 Action  New Bag Dose  4224 mg Rate  2.2 mL/hr Route  IntraVENous Administered By  Sierra Pablo RN          ondansetron [facility]) injection 8 mg     Admin Date  01/02/2020 Action  Given Dose  8 mg Route  IntraVENous Administered By  Sierra Pablo RN          oxaliplatin (ELOXATIN) 149.5 mg in dextrose 5% 250 mL, overfill volume 25 mL chemo infusion     Admin Date  01/02/2020 Action  New Bag Dose  149.5 mg Rate  152.5 mL/hr Route  IntraVENous Administered By  Sierra Pablo RN          saline peripheral flush soln 10 mL     Admin Date  01/02/2020 Action  Given Dose  10 mL Route  InterCATHeter Administered By  Sierra Pablo RN                  6269 Tolerated treatment well, no adverse reaction noted. D/Cd from [facility] ambulatory and in no distress accompanied by wife.   Next appt 1/4  1200 and 1/6 at 0830  Visit Vitals  BP (P) 129/87 Pulse (P) 71   Temp (P) 97.8 °F (36.6 °C)   Resp (P) 16   Ht (P) 5' 7\" (1.702 m)   Wt (P) 68.1 kg (150 lb 3.2 oz)   BMI (P) 23.52 kg/m²

## 2020-01-02 NOTE — PROGRESS NOTES
[facility] [place] [address] [address]  W: [phone]  F: [phone]    Reason for Visit:   Nikolas Diehl is a 47 y.o. male who is seen for Rectal cancer- likely stage III- SOMMER    Treatment History:   · 4/26/19: Colonoscopy- 6-7 cm size malignant appearing mass seen at 10 cm from anal verge. This was adenocarcinoma. 3 polyps removed- all were tubular adenomas  · Rectal Ultrasound 5/2/19: ulcerated infiltrative mass lesion was noted in rectum at 10 cm. The lesion measured about 5 cm X 4 cm- T2, 13 mm X 8 mm oblong lymph node was noted immediately adjacent to the mass lesion  · 5/2/19: CT CAP- No metastasis, liver cysts. CEA 3.6  · 5/10/19: PET CT showed rectal malignancy and 3 small perirectal anibal metastatic focir  · 5/28/19-7/8/29: Xeloda + RT   · 9/24/19: LAR- [id number], 2/6 positive nodes  · 10/14/19: CT CAP with no evidence of metastatic disease, liver cysts   · 10/23/19: cycle 1 FOLFOX    History of Present Illness:   Patient is a 47 y.o. male seen for adenocarcinoma of the mid third of rectum    He had intermittent BRBPR x 1 year and then decided to have a colonoscopy. This led to above mentioned diagnosis. He received neoadjuvant xeloda+ RT followed by LAR. Comes today for cycle 5 of FOLFOX. He feels well. He has had no change in stool consistency, no mouth sores, he has no nausea, no pain. He has some fatigue and LESTER with walking up steps. Denies fevers/chills, SOB, CP, cough, or bleeding. Reports intermittent numbness/tingling for 3-4 days after chemo then this subsides. He uses 7 drinks a week.     Adopted    Past Medical History:   Diagnosis Date    Adopted     GERD (gastroesophageal reflux disease)     Psoriasis     Psoriatic arthritis ([facility] [address]     Rectal cancer [facility])       Past Surgical History:   Procedure Laterality Date    COLONOSCOPY Left 4/26/2019    COLONOSCOPY performed by Bharath Thompson MD at [facility] SIGMOIDOSCOPY N/A 5/2/2019    SIGMOIDOSCOPY FLEXIBLE performed by Maribell Vigil MD at [address] [facility]      EGD    HX HEENT      WISDOM TEETH    HX ORTHOPAEDIC Right     ACL REPLACEMENT    IR INSERT TUNL CVC W PORT OVER 5 YEARS  10/15/2019      Social History     Tobacco Use    Smoking status: Never Smoker    Smokeless tobacco: Never Used   Substance Use Topics    Alcohol use: Yes     Comment: 6 BEERS WEEKLY      Family History   Adopted: Yes   Problem Relation Age of Onset    Asthma Neg Hx     Cancer Neg Hx     Diabetes Neg Hx     Heart Disease Neg Hx     Hypertension Neg Hx     Stroke Neg Hx      Current Outpatient Medications   Medication Sig    hydroCHLOROthiazide (HYDRODIURIL) 12.5 mg tablet TAKE 1 TABLET BY MOUTH EVERY DAY    lidocaine-prilocaine (EMLA) topical cream Apply  to affected area as needed for Pain.  dexAMETHasone (DECADRON) 4 mg tablet Take 8 mg by mouth See Admin Instructions. Take 2 tabs by mouth twice a day on days 2 and 3 after chemotherapy only    ondansetron (ZOFRAN ODT) 8 mg disintegrating tablet Take 1 Tab by mouth every eight (8) hours as needed for Nausea.  acetaminophen (TYLENOL) 500 mg tablet Take 500 mg by mouth every six (6) hours as needed for Fever or Pain.  diphenoxylate-atropine (LOMOTIL) 2.5-0.025 mg per tablet Take 1 Tab by mouth four (4) times daily as needed for Diarrhea.  tamsulosin (FLOMAX) 0.4 mg capsule Take 0.4 mg by mouth daily.  triamcinolone acetonide (KENALOG) 0.1 % ointment Apply  to affected area two (2) times daily as needed for Skin Irritation. use thin layer    fluocinonide (VANOS) 0.1 % topical cream Apply  to affected area daily. No current facility-administered medications for this visit. No Known Allergies     Review of Systems: A complete review of systems was obtained, negative except as described above.     Physical Exam:     Visit Vitals  /82 (BP 1 Location: Right arm)   Pulse 69   Temp 98 °F (36.7 °C)   Ht 5' 7\" (1.702 m)   Wt 149 lb 9.6 oz (67.9 kg)   SpO2 98%   BMI 23.43 kg/m²     ECOG PS: 1  General: No distress  Eyes: PERRLA, anicteric sclerae  HENT: Atraumatic, OP clear, PAC looks good   MS: Normal gait and station. Digits without clubbing or cyanosis. Skin: No rashes, ecchymoses, or petechiae. Normal temperature, turgor, and texture. Psych: Alert, oriented, appropriate affect, normal judgment/insight    Results:     Lab Results   Component Value Date/Time    WBC 9.3 12/18/2019 10:12 AM    HGB 12.3 12/18/2019 10:12 AM    HCT 35.9 (L) 12/18/2019 10:12 AM    PLATELET 526 (L) 36/88/0485 10:12 AM    MCV 96.2 12/18/2019 10:12 AM    ABS. NEUTROPHILS 8.7 (H) 12/18/2019 10:12 AM     Lab Results   Component Value Date/Time    Sodium 140 12/18/2019 10:12 AM    Potassium 3.4 (L) 12/18/2019 10:12 AM    Chloride 109 (H) 12/18/2019 10:12 AM    CO2 25 12/18/2019 10:12 AM    Glucose 114 (H) 12/18/2019 10:12 AM    BUN 14 12/18/2019 10:12 AM    Creatinine 1.17 12/18/2019 10:12 AM    GFR est AA >60 12/18/2019 10:12 AM    GFR est non-AA >60 12/18/2019 10:12 AM    Calcium 8.7 12/18/2019 10:12 AM     Lab Results   Component Value Date/Time    Bilirubin, total 0.4 12/18/2019 10:12 AM    ALT (SGPT) 23 12/18/2019 10:12 AM    AST (SGOT) 18 12/18/2019 10:12 AM    Alk. phosphatase 102 12/18/2019 10:12 AM    Protein, total 6.7 12/18/2019 10:12 AM    Albumin 3.4 (L) 12/18/2019 10:12 AM    Globulin 3.3 12/18/2019 10:12 AM     Records reviewed and summarized above. Pathology report(s) reviewed  FINAL PATHOLOGIC DIAGNOSIS   1.  Rectum and sigmoid colon, laparoscopic low anterior resection:   SPECIMEN   Procedure: Low anterior resection   Macroscopic Intactness of Mesorectum: Complete   TUMOR   Tumor Site: Rectum   Histologic Type: Adenocarcinoma   Histologic Grade: G2: Moderately differentiated   Tumor Size: 2.6 cm   Tumor Deposits: Present   Number of Deposits: 1   Tumor Extension: Tumor invades through the muscularis propria into pericolorectal tissue   Macroscopic Tumor Perforation: Not identified   Lymphovascular Invasion: Not identified   Perineural Invasion: Not identified   Tumor Budding: Number of tumor buds in one hotspot field: 5   Tumor Budding Score: Intermediate score (5-9)   Treatment Effect: Present - Residual cancer with evident tumor regression, but more than single cells or   rare small groups of cancer cells (partial response, score 2)   MARGINS   Margins: All margins are uninvolved by invasive carcinoma, high- grade dysplasia, intramucosal   adenocarcinoma, and adenoma   Margins Examined: Proximal, Distal, Radial or Mesenteric   Distance of Invasive Carcinoma from Closest Margin: 25 mm   Closest Margin: Radial or Mesenteric   LYMPH NODES   Number of Lymph Nodes Involved: 2   Number of Lymph Nodes Examined: 16   PATHOLOGIC STAGE CLASSIFICATION (pTNM, AJCC 8th Edition)   Primary Tumor (pT): pT3   Regional Lymph Nodes (pN): pN1b   2. Large bowel, donuts:   Fragments of large bowel wall, negative for microscopic pathologic abnormality. Radiology report(s) reviewed above. CT CAP 10/14/19: IMPRESSION:  1. There are scattered small hepatic cysts without definite evidence for  metastatic disease. 2. Otherwise negative CT chest abdomen pelvis    Assessment:   1) Rectal adenocarcinoma- mid third- SOMMER    T2N1M0 disease- Clinical stage III  S/P John Adj chemoRT followed by [facility] on 9/24/19  Pathology showed [id number] residual disease- stage IIIB    Reviewed surgical pathology  Had significant residual disease with practically no treatment effects  See prior office notes in regards to discussion on adjuvant chemotherapy   Had CT 10/14/19 with no evidence of metastatic disease    Today is cycle 5 of FOLFOX x 8 planned cycles     Had grade 3 neutropenia after cycle 2 requiring a 2 week delay. Starting with cycle 3 today we will drop 5FU bolus and add neulasta given high risk for grade IV neutropenia.  Neulasta was not approved moving forward and udenyca was approved in place. He was unable to receive udenyca injection with cycle 4 due to person travel plans. Labs stable today. Will proceed with udenyca starting with cycle 5. Genetic testing negative    2) [employer] age at diagnosis    Adopted  Had several adenomas removed  May have attenuated FAP and will require genetic testing  SOMMER    3) GIB  Minor    4) Psoriasis  Off Methotrexate  Follows Dr. Nela Velazquez    5) Neutropenia  Chemotherapy induced  See #1    6) Thrombocytopenia  Grade 1  Stable today    Plan:     · Proceed today with cycle 5 of adjuvant FOLFOX (DROP 5FU bolus, leucovorin 400mg/m2, 5FU CADD 2,400mg/m2, oxaliplatin 85mg/m2) for 8 cycles   · Labs to include CBC with diff and CMP prior to each treatment. CEA monthly. · Pre-meds to include Zofran and Dexamethasone   · Dexamethasone days 2 and 3  · Zofran PRN  · Udenyca due to high risk for grade IV neutropenia     RTC in 2 weeks for cycle 6  Seen in conjunction with Kali Chandler NP    I appreciate the opportunity to participate in [facility].     Signed By: Valentine Uriarte MD

## 2020-01-04 ENCOUNTER — HOSPITAL ENCOUNTER (OUTPATIENT)
Dept: INFUSION THERAPY | Age: 50
Discharge: HOME | End: 2020-01-04
Payer: MEDICAID

## 2020-01-04 VITALS
SYSTOLIC BLOOD PRESSURE: 127 MMHG | TEMPERATURE: 98 F | RESPIRATION RATE: 18 BRPM | DIASTOLIC BLOOD PRESSURE: 81 MMHG | HEART RATE: 74 BPM

## 2020-01-04 DIAGNOSIS — C20 RECTAL CANCER (HCC): Primary | ICD-10-CM

## 2020-01-04 PROCEDURE — 96523 IRRIG DRUG DELIVERY DEVICE: CPT

## 2020-01-04 PROCEDURE — 74011250636 HC RX REV CODE- 250/636: Performed by: REGISTERED NURSE

## 2020-01-04 RX ORDER — SODIUM CHLORIDE 0.9 % (FLUSH) 0.9 %
10 SYRINGE (ML) INJECTION AS NEEDED
Status: ACTIVE | OUTPATIENT
Start: 2020-01-04 | End: 2020-01-04

## 2020-01-04 RX ORDER — HEPARIN 100 UNIT/ML
300-500 SYRINGE INTRAVENOUS AS NEEDED
Status: ACTIVE | OUTPATIENT
Start: 2020-01-04 | End: 2020-01-04

## 2020-01-04 RX ORDER — SODIUM CHLORIDE 9 MG/ML
10 INJECTION INTRAMUSCULAR; INTRAVENOUS; SUBCUTANEOUS AS NEEDED
Status: ACTIVE | OUTPATIENT
Start: 2020-01-04 | End: 2020-01-04

## 2020-01-04 RX ADMIN — Medication 10 ML: at 11:57

## 2020-01-04 RX ADMIN — Medication 500 UNITS: at 11:57

## 2020-01-04 NOTE — PROGRESS NOTES
[facility] Progress Note    Date: 2020    Name: Hyacinth Brennan    MRN: [id number]         : 1970    1155. Mr. Arianne Chen Arrived ambulatory and in no distress for Pump Removal.  Assessment was completed, no acute issues at this time, no new complaints voiced. CADD completed, patient reported it completed at 1145- 100 ml infused per order. Pump removed by LYNDA Bernard RN. Mr. Tara Moore vitals were reviewed. Visit Vitals  /81 (BP 1 Location: Left arm, BP Patient Position: Sitting)   Pulse 74   Temp 98 °F (36.7 °C)   Resp 18         1205. Mr. Arianne Chen tolerated treatment well and was discharged from [place] [address] in stable condition. Port de-accessed, flushed & heparinized per protocol. He is to return on 2020 for his next appointment.     Avril Morales RN  2020

## 2020-01-05 ENCOUNTER — APPOINTMENT (OUTPATIENT)
Dept: INFUSION THERAPY | Age: 50
End: 2020-01-05
Payer: MEDICAID

## 2020-01-06 ENCOUNTER — HOSPITAL ENCOUNTER (OUTPATIENT)
Dept: INFUSION THERAPY | Age: 50
Discharge: HOME | End: 2020-01-06
Payer: MEDICAID

## 2020-01-06 ENCOUNTER — HOSPITAL ENCOUNTER (OUTPATIENT)
Dept: MRI IMAGING | Age: 50
Discharge: HOME | End: 2020-01-06
Attending: REGISTERED NURSE
Payer: MEDICAID

## 2020-01-06 VITALS
TEMPERATURE: 99 F | SYSTOLIC BLOOD PRESSURE: 120 MMHG | HEART RATE: 73 BPM | RESPIRATION RATE: 18 BRPM | DIASTOLIC BLOOD PRESSURE: 75 MMHG

## 2020-01-06 DIAGNOSIS — C20 RECTAL CANCER (HCC): Primary | ICD-10-CM

## 2020-01-06 DIAGNOSIS — C20 RECTAL CANCER (HCC): ICD-10-CM

## 2020-01-06 DIAGNOSIS — K76.9 LIVER LESION: ICD-10-CM

## 2020-01-06 PROCEDURE — A9585 GADOBUTROL INJECTION: HCPCS | Performed by: REGISTERED NURSE

## 2020-01-06 PROCEDURE — 74183 MRI ABD W/O CNTR FLWD CNTR: CPT

## 2020-01-06 PROCEDURE — 74011250636 HC RX REV CODE- 250/636: Performed by: REGISTERED NURSE

## 2020-01-06 PROCEDURE — 96372 THER/PROPH/DIAG INJ SC/IM: CPT

## 2020-01-06 PROCEDURE — 74011000258 HC RX REV CODE- 258: Performed by: REGISTERED NURSE

## 2020-01-06 RX ORDER — SODIUM CHLORIDE 0.9 % (FLUSH) 0.9 %
10 SYRINGE (ML) INJECTION
Status: COMPLETED | OUTPATIENT
Start: 2020-01-06 | End: 2020-01-06

## 2020-01-06 RX ADMIN — GADOBUTROL 6 ML: 604.72 INJECTION INTRAVENOUS at 20:30

## 2020-01-06 RX ADMIN — SODIUM CHLORIDE 100 ML: 900 INJECTION, SOLUTION INTRAVENOUS at 20:31

## 2020-01-06 RX ADMIN — PEGFILGRASTIM-CBQV 6 MG: 6 INJECTION, SOLUTION SUBCUTANEOUS at 09:09

## 2020-01-06 RX ADMIN — Medication 10 ML: at 20:31

## 2020-01-06 NOTE — PROGRESS NOTES
[facility] Short Visit Progress Note    0900 Pt admit to [facility] for Udenyca ambulatory in stable condition. Assessment completed. No new concerns voiced. Visit Vitals  /75 (BP 1 Location: Right arm, BP Patient Position: At rest)   Pulse 73   Temp 99 °F (37.2 °C)   Resp 18       Medications:  Udenyca SQ left arm    0910 Pt tolerated treatment well. D/c home ambulatory in no distress.  Pt aware of next appointment scheduled for 1/15/20

## 2020-01-07 NOTE — PROGRESS NOTES
Spiritual Care Partner Volunteer visited patient in [facility] 2 on 1.07.2020. Documented by: : Rev. Anastasia Ribeiro.  Ariana Ramirez; [facility], to contact [phone] [address] call: [phone]

## 2020-01-08 ENCOUNTER — APPOINTMENT (OUTPATIENT)
Dept: INFUSION THERAPY | Age: 50
End: 2020-01-08

## 2020-01-08 ENCOUNTER — TELEPHONE (OUTPATIENT)
Dept: ONCOLOGY | Age: 50
End: 2020-01-08

## 2020-01-08 NOTE — TELEPHONE ENCOUNTER
Called patient in reference to my chart message he sent today regarding fever of 100 today. States currently he feels much better, re-checked temp and back to normal. He has follow-up with urology tomorrow. No aches or pains currently. He will continue to monitor temperature and call us back if fever recurs.

## 2020-01-08 NOTE — TELEPHONE ENCOUNTER
Called patient per NP Jessy request   Patient answered and confirmed x2 identifiers     Provided patient with MRI results   Patient verbalized understanding   No new questions or concerns at this time

## 2020-01-10 ENCOUNTER — APPOINTMENT (OUTPATIENT)
Dept: INFUSION THERAPY | Age: 50
End: 2020-01-10

## 2020-01-13 RX ORDER — DIPHENHYDRAMINE HYDROCHLORIDE 50 MG/ML
50 INJECTION, SOLUTION INTRAMUSCULAR; INTRAVENOUS AS NEEDED
Status: CANCELLED
Start: 2020-02-19

## 2020-01-13 RX ORDER — SODIUM CHLORIDE 9 MG/ML
10 INJECTION INTRAMUSCULAR; INTRAVENOUS; SUBCUTANEOUS AS NEEDED
Status: CANCELLED | OUTPATIENT
Start: 2020-02-07

## 2020-01-13 RX ORDER — EPINEPHRINE 1 MG/ML
0.3 INJECTION, SOLUTION, CONCENTRATE INTRAVENOUS AS NEEDED
Status: CANCELLED | OUTPATIENT
Start: 2020-02-19

## 2020-01-13 RX ORDER — DIPHENHYDRAMINE HYDROCHLORIDE 50 MG/ML
50 INJECTION, SOLUTION INTRAMUSCULAR; INTRAVENOUS AS NEEDED
Status: CANCELLED
Start: 2020-01-15

## 2020-01-13 RX ORDER — ONDANSETRON 2 MG/ML
8 INJECTION INTRAMUSCULAR; INTRAVENOUS AS NEEDED
Status: CANCELLED | OUTPATIENT
Start: 2020-02-19

## 2020-01-13 RX ORDER — DEXTROSE MONOHYDRATE 50 MG/ML
25 INJECTION, SOLUTION INTRAVENOUS CONTINUOUS
Status: CANCELLED
Start: 2020-02-05

## 2020-01-13 RX ORDER — SODIUM CHLORIDE 0.9 % (FLUSH) 0.9 %
10 SYRINGE (ML) INJECTION AS NEEDED
Status: CANCELLED
Start: 2020-01-24

## 2020-01-13 RX ORDER — SODIUM CHLORIDE 9 MG/ML
10 INJECTION INTRAMUSCULAR; INTRAVENOUS; SUBCUTANEOUS AS NEEDED
Status: CANCELLED | OUTPATIENT
Start: 2020-02-21

## 2020-01-13 RX ORDER — DEXTROSE MONOHYDRATE 50 MG/ML
25 INJECTION, SOLUTION INTRAVENOUS CONTINUOUS
Status: CANCELLED
Start: 2020-02-19

## 2020-01-13 RX ORDER — ONDANSETRON 2 MG/ML
8 INJECTION INTRAMUSCULAR; INTRAVENOUS AS NEEDED
Status: CANCELLED | OUTPATIENT
Start: 2020-01-15

## 2020-01-13 RX ORDER — HEPARIN 100 UNIT/ML
300-500 SYRINGE INTRAVENOUS AS NEEDED
Status: CANCELLED
Start: 2020-02-07

## 2020-01-13 RX ORDER — SODIUM CHLORIDE 0.9 % (FLUSH) 0.9 %
10 SYRINGE (ML) INJECTION AS NEEDED
Status: CANCELLED
Start: 2020-01-15

## 2020-01-13 RX ORDER — ALBUTEROL SULFATE 0.83 MG/ML
2.5 SOLUTION RESPIRATORY (INHALATION) AS NEEDED
Status: CANCELLED
Start: 2020-02-05

## 2020-01-13 RX ORDER — SODIUM CHLORIDE 9 MG/ML
10 INJECTION INTRAMUSCULAR; INTRAVENOUS; SUBCUTANEOUS AS NEEDED
Status: CANCELLED | OUTPATIENT
Start: 2020-01-15

## 2020-01-13 RX ORDER — EPINEPHRINE 1 MG/ML
0.3 INJECTION, SOLUTION, CONCENTRATE INTRAVENOUS AS NEEDED
Status: CANCELLED | OUTPATIENT
Start: 2020-02-05

## 2020-01-13 RX ORDER — SODIUM CHLORIDE 9 MG/ML
10 INJECTION INTRAMUSCULAR; INTRAVENOUS; SUBCUTANEOUS AS NEEDED
Status: CANCELLED | OUTPATIENT
Start: 2020-02-19

## 2020-01-13 RX ORDER — HYDROCORTISONE SODIUM SUCCINATE 100 MG/2ML
100 INJECTION, POWDER, FOR SOLUTION INTRAMUSCULAR; INTRAVENOUS AS NEEDED
Status: CANCELLED | OUTPATIENT
Start: 2020-02-19

## 2020-01-13 RX ORDER — HEPARIN 100 UNIT/ML
300-500 SYRINGE INTRAVENOUS AS NEEDED
Status: CANCELLED
Start: 2020-02-21

## 2020-01-13 RX ORDER — SODIUM CHLORIDE 9 MG/ML
10 INJECTION INTRAMUSCULAR; INTRAVENOUS; SUBCUTANEOUS AS NEEDED
Status: CANCELLED | OUTPATIENT
Start: 2020-01-24

## 2020-01-13 RX ORDER — ONDANSETRON 2 MG/ML
8 INJECTION INTRAMUSCULAR; INTRAVENOUS ONCE
Status: CANCELLED | OUTPATIENT
Start: 2020-01-15

## 2020-01-13 RX ORDER — DEXTROSE MONOHYDRATE 50 MG/ML
25 INJECTION, SOLUTION INTRAVENOUS CONTINUOUS
Status: CANCELLED
Start: 2020-01-15

## 2020-01-13 RX ORDER — ALBUTEROL SULFATE 0.83 MG/ML
2.5 SOLUTION RESPIRATORY (INHALATION) AS NEEDED
Status: CANCELLED
Start: 2020-01-15

## 2020-01-13 RX ORDER — SODIUM CHLORIDE 0.9 % (FLUSH) 0.9 %
10 SYRINGE (ML) INJECTION AS NEEDED
Status: CANCELLED
Start: 2020-02-21

## 2020-01-13 RX ORDER — HEPARIN 100 UNIT/ML
300-500 SYRINGE INTRAVENOUS AS NEEDED
Status: CANCELLED
Start: 2020-01-15

## 2020-01-13 RX ORDER — ALBUTEROL SULFATE 0.83 MG/ML
2.5 SOLUTION RESPIRATORY (INHALATION) AS NEEDED
Status: CANCELLED
Start: 2020-02-19

## 2020-01-13 RX ORDER — ONDANSETRON 2 MG/ML
8 INJECTION INTRAMUSCULAR; INTRAVENOUS AS NEEDED
Status: CANCELLED | OUTPATIENT
Start: 2020-02-05

## 2020-01-13 RX ORDER — HEPARIN 100 UNIT/ML
300-500 SYRINGE INTRAVENOUS AS NEEDED
Status: CANCELLED
Start: 2020-01-24

## 2020-01-13 RX ORDER — HYDROCORTISONE SODIUM SUCCINATE 100 MG/2ML
100 INJECTION, POWDER, FOR SOLUTION INTRAMUSCULAR; INTRAVENOUS AS NEEDED
Status: CANCELLED | OUTPATIENT
Start: 2020-02-05

## 2020-01-13 RX ORDER — ACETAMINOPHEN 325 MG/1
650 TABLET ORAL AS NEEDED
Status: CANCELLED
Start: 2020-02-19

## 2020-01-13 RX ORDER — ONDANSETRON 2 MG/ML
8 INJECTION INTRAMUSCULAR; INTRAVENOUS ONCE
Status: CANCELLED | OUTPATIENT
Start: 2020-02-05

## 2020-01-13 RX ORDER — ACETAMINOPHEN 325 MG/1
650 TABLET ORAL AS NEEDED
Status: CANCELLED
Start: 2020-01-15

## 2020-01-13 RX ORDER — ACETAMINOPHEN 325 MG/1
650 TABLET ORAL AS NEEDED
Status: CANCELLED
Start: 2020-02-05

## 2020-01-13 RX ORDER — SODIUM CHLORIDE 9 MG/ML
10 INJECTION INTRAMUSCULAR; INTRAVENOUS; SUBCUTANEOUS AS NEEDED
Status: CANCELLED | OUTPATIENT
Start: 2020-02-05

## 2020-01-13 RX ORDER — EPINEPHRINE 1 MG/ML
0.3 INJECTION, SOLUTION, CONCENTRATE INTRAVENOUS AS NEEDED
Status: CANCELLED | OUTPATIENT
Start: 2020-01-15

## 2020-01-13 RX ORDER — DIPHENHYDRAMINE HYDROCHLORIDE 50 MG/ML
50 INJECTION, SOLUTION INTRAMUSCULAR; INTRAVENOUS AS NEEDED
Status: CANCELLED
Start: 2020-02-05

## 2020-01-13 RX ORDER — HYDROCORTISONE SODIUM SUCCINATE 100 MG/2ML
100 INJECTION, POWDER, FOR SOLUTION INTRAMUSCULAR; INTRAVENOUS AS NEEDED
Status: CANCELLED | OUTPATIENT
Start: 2020-01-15

## 2020-01-13 RX ORDER — SODIUM CHLORIDE 0.9 % (FLUSH) 0.9 %
10 SYRINGE (ML) INJECTION AS NEEDED
Status: CANCELLED
Start: 2020-02-07

## 2020-01-13 RX ORDER — HEPARIN 100 UNIT/ML
300-500 SYRINGE INTRAVENOUS AS NEEDED
Status: CANCELLED
Start: 2020-02-19

## 2020-01-13 RX ORDER — SODIUM CHLORIDE 0.9 % (FLUSH) 0.9 %
10 SYRINGE (ML) INJECTION AS NEEDED
Status: CANCELLED
Start: 2020-02-05

## 2020-01-13 RX ORDER — ONDANSETRON 2 MG/ML
8 INJECTION INTRAMUSCULAR; INTRAVENOUS ONCE
Status: CANCELLED | OUTPATIENT
Start: 2020-02-19

## 2020-01-13 RX ORDER — SODIUM CHLORIDE 0.9 % (FLUSH) 0.9 %
10 SYRINGE (ML) INJECTION AS NEEDED
Status: CANCELLED
Start: 2020-02-19

## 2020-01-13 RX ORDER — HEPARIN 100 UNIT/ML
300-500 SYRINGE INTRAVENOUS AS NEEDED
Status: CANCELLED
Start: 2020-02-05

## 2020-01-15 ENCOUNTER — OFFICE VISIT (OUTPATIENT)
Dept: ONCOLOGY | Age: 50
End: 2020-01-15

## 2020-01-15 ENCOUNTER — HOSPITAL ENCOUNTER (OUTPATIENT)
Dept: INFUSION THERAPY | Age: 50
Discharge: HOME | End: 2020-01-15
Payer: MEDICAID

## 2020-01-15 VITALS
DIASTOLIC BLOOD PRESSURE: 88 MMHG | WEIGHT: 151.9 LBS | HEIGHT: 67 IN | HEART RATE: 69 BPM | SYSTOLIC BLOOD PRESSURE: 145 MMHG | OXYGEN SATURATION: 98 % | BODY MASS INDEX: 23.84 KG/M2 | TEMPERATURE: 98.3 F

## 2020-01-15 DIAGNOSIS — C20 RECTAL CANCER (HCC): Primary | ICD-10-CM

## 2020-01-15 DIAGNOSIS — D70.9 NEUTROPENIA, UNSPECIFIED TYPE (HCC): ICD-10-CM

## 2020-01-15 DIAGNOSIS — D69.6 THROMBOCYTOPENIA (HCC): ICD-10-CM

## 2020-01-15 LAB
ALBUMIN SERPL-MCNC: 3.2 G/DL (ref 3.5–5)
ALBUMIN/GLOB SERPL: 1.1 {RATIO} (ref 1.1–2.2)
ALP SERPL-CCNC: 115 U/L (ref 45–117)
ALT SERPL-CCNC: 27 U/L (ref 12–78)
ANION GAP SERPL CALC-SCNC: 7 MMOL/L (ref 5–15)
AST SERPL-CCNC: 21 U/L (ref 15–37)
BASOPHILS # BLD: 0 K/UL (ref 0–0.1)
BASOPHILS NFR BLD: 0 % (ref 0–1)
BILIRUB SERPL-MCNC: 0.4 MG/DL (ref 0.2–1)
BUN SERPL-MCNC: 9 MG/DL (ref 6–20)
BUN/CREAT SERPL: 9 (ref 12–20)
CALCIUM SERPL-MCNC: 8.7 MG/DL (ref 8.5–10.1)
CEA SERPL-MCNC: 1.6 NG/ML
CHLORIDE SERPL-SCNC: 108 MMOL/L (ref 97–108)
CO2 SERPL-SCNC: 26 MMOL/L (ref 21–32)
CREAT SERPL-MCNC: 0.99 MG/DL (ref 0.7–1.3)
DIFFERENTIAL METHOD BLD: ABNORMAL
EOSINOPHIL # BLD: 0 K/UL (ref 0–0.4)
EOSINOPHIL NFR BLD: 0 % (ref 0–7)
ERYTHROCYTE [DISTWIDTH] IN BLOOD BY AUTOMATED COUNT: 15.4 % (ref 11.5–14.5)
GLOBULIN SER CALC-MCNC: 2.9 G/DL (ref 2–4)
GLUCOSE SERPL-MCNC: 145 MG/DL (ref 65–100)
HAV IGM SER QL: NONREACTIVE
HBV CORE IGM SER QL: NONREACTIVE
HBV SURFACE AG SER QL: <0.1 INDEX
HBV SURFACE AG SER QL: NEGATIVE
HCT VFR BLD AUTO: 34.7 % (ref 36.6–50.3)
HCV AB SERPL QL IA: NONREACTIVE
HCV COMMENT,HCGAC: NORMAL
HGB BLD-MCNC: 11.5 G/DL (ref 12.1–17)
HIV 1+2 AB+HIV1 P24 AG SERPL QL IA: NONREACTIVE
HIV12 RESULT COMMENT, HHIVC: NORMAL
IMM GRANULOCYTES # BLD AUTO: 0 K/UL
IMM GRANULOCYTES NFR BLD AUTO: 0 %
LYMPHOCYTES # BLD: 0.4 K/UL (ref 0.8–3.5)
LYMPHOCYTES NFR BLD: 5 % (ref 12–49)
MCH RBC QN AUTO: 33.6 PG (ref 26–34)
MCHC RBC AUTO-ENTMCNC: 33.1 G/DL (ref 30–36.5)
MCV RBC AUTO: 101.5 FL (ref 80–99)
MONOCYTES # BLD: 0.8 K/UL (ref 0–1)
MONOCYTES NFR BLD: 10 % (ref 5–13)
NEUTS BAND NFR BLD MANUAL: 4 % (ref 0–6)
NEUTS SEG # BLD: 7.2 K/UL (ref 1.8–8)
NEUTS SEG NFR BLD: 81 % (ref 32–75)
NRBC # BLD: 0 K/UL (ref 0–0.01)
NRBC BLD-RTO: 0 PER 100 WBC
PLATELET # BLD AUTO: 45 K/UL (ref 150–400)
PMV BLD AUTO: 11.7 FL (ref 8.9–12.9)
POTASSIUM SERPL-SCNC: 3.9 MMOL/L (ref 3.5–5.1)
PROT SERPL-MCNC: 6.1 G/DL (ref 6.4–8.2)
RBC # BLD AUTO: 3.42 M/UL (ref 4.1–5.7)
RBC MORPH BLD: ABNORMAL
RBC MORPH BLD: ABNORMAL
SODIUM SERPL-SCNC: 141 MMOL/L (ref 136–145)
SP1: NORMAL
SP2: NORMAL
SP3: NORMAL
WBC # BLD AUTO: 8.4 K/UL (ref 4.1–11.1)

## 2020-01-15 PROCEDURE — 82378 CARCINOEMBRYONIC ANTIGEN: CPT

## 2020-01-15 PROCEDURE — 85025 COMPLETE CBC W/AUTO DIFF WBC: CPT

## 2020-01-15 PROCEDURE — 86022 PLATELET ANTIBODIES: CPT

## 2020-01-15 PROCEDURE — 80053 COMPREHEN METABOLIC PANEL: CPT

## 2020-01-15 PROCEDURE — 36415 COLL VENOUS BLD VENIPUNCTURE: CPT

## 2020-01-15 PROCEDURE — 87389 HIV-1 AG W/HIV-1&-2 AB AG IA: CPT

## 2020-01-15 PROCEDURE — 77030012965 HC NDL HUBR BBMI -A

## 2020-01-15 PROCEDURE — 36591 DRAW BLOOD OFF VENOUS DEVICE: CPT

## 2020-01-15 PROCEDURE — 80074 ACUTE HEPATITIS PANEL: CPT

## 2020-01-15 RX ORDER — HYDROCORTISONE SODIUM SUCCINATE 100 MG/2ML
100 INJECTION, POWDER, FOR SOLUTION INTRAMUSCULAR; INTRAVENOUS AS NEEDED
Status: CANCELLED | OUTPATIENT
Start: 2020-01-22

## 2020-01-15 RX ORDER — ACETAMINOPHEN 325 MG/1
650 TABLET ORAL AS NEEDED
Status: CANCELLED
Start: 2020-01-22

## 2020-01-15 RX ORDER — ONDANSETRON 2 MG/ML
8 INJECTION INTRAMUSCULAR; INTRAVENOUS ONCE
Status: CANCELLED | OUTPATIENT
Start: 2020-01-22

## 2020-01-15 RX ORDER — DEXTROSE MONOHYDRATE 50 MG/ML
25 INJECTION, SOLUTION INTRAVENOUS CONTINUOUS
Status: CANCELLED
Start: 2020-01-22

## 2020-01-15 RX ORDER — ALBUTEROL SULFATE 0.83 MG/ML
2.5 SOLUTION RESPIRATORY (INHALATION) AS NEEDED
Status: CANCELLED
Start: 2020-01-22

## 2020-01-15 RX ORDER — OXYBUTYNIN CHLORIDE 5 MG/1
5 TABLET ORAL 2 TIMES DAILY
COMMUNITY
End: 2021-04-09

## 2020-01-15 RX ORDER — EPINEPHRINE 1 MG/ML
0.3 INJECTION, SOLUTION, CONCENTRATE INTRAVENOUS AS NEEDED
Status: CANCELLED | OUTPATIENT
Start: 2020-01-22

## 2020-01-15 RX ORDER — DIPHENHYDRAMINE HYDROCHLORIDE 50 MG/ML
50 INJECTION, SOLUTION INTRAMUSCULAR; INTRAVENOUS AS NEEDED
Status: CANCELLED
Start: 2020-01-22

## 2020-01-15 RX ORDER — HEPARIN 100 UNIT/ML
300-500 SYRINGE INTRAVENOUS AS NEEDED
Status: CANCELLED
Start: 2020-01-22

## 2020-01-15 RX ORDER — SODIUM CHLORIDE 0.9 % (FLUSH) 0.9 %
10 SYRINGE (ML) INJECTION AS NEEDED
Status: CANCELLED
Start: 2020-01-22

## 2020-01-15 RX ORDER — SODIUM CHLORIDE 9 MG/ML
10 INJECTION INTRAMUSCULAR; INTRAVENOUS; SUBCUTANEOUS AS NEEDED
Status: CANCELLED | OUTPATIENT
Start: 2020-01-22

## 2020-01-15 RX ORDER — ONDANSETRON 2 MG/ML
8 INJECTION INTRAMUSCULAR; INTRAVENOUS AS NEEDED
Status: CANCELLED | OUTPATIENT
Start: 2020-01-22

## 2020-01-15 RX ORDER — TADALAFIL 5 MG/1
5 TABLET ORAL DAILY
COMMUNITY

## 2020-01-15 NOTE — PROGRESS NOTES
[facility] at [address] [address] [facility], [address]  W: [phone]  F: [phone]    Reason for Visit:   Tony Chirinos is a 47 y.o. male who is seen for Rectal cancer- likely stage III- SOMMER    Treatment History:   · 4/26/19: Colonoscopy- 6-7 cm size malignant appearing mass seen at 10 cm from anal verge. This was adenocarcinoma. 3 polyps removed- all were tubular adenomas  · Rectal Ultrasound 5/2/19: ulcerated infiltrative mass lesion was noted in rectum at 10 cm. The lesion measured about 5 cm X 4 cm- T2, 13 mm X 8 mm oblong lymph node was noted immediately adjacent to the mass lesion  · 5/2/19: CT CAP- No metastasis, liver cysts. CEA 3.6  · 5/10/19: PET CT showed rectal malignancy and 3 small perirectal anibal metastatic focir  · 5/28/19-7/8/29: Xeloda + RT   · 9/24/19: LAR- [id number], 2/6 positive nodes  · 10/14/19: CT CAP with no evidence of metastatic disease, liver cysts   · 10/23/19: cycle 1 FOLFOX    History of Present Illness:   Patient is a 47 y.o. male seen for adenocarcinoma of the mid third of rectum    He had intermittent BRBPR x 1 year and then decided to have a colonoscopy. This led to above mentioned diagnosis. He received neoadjuvant xeloda+ RT followed by LAR. Comes today for cycle 6 of FOLFOX. He feels well. Denies nausea/vomiting and no change in stool consistency. He has no pain. 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 Denies fevers/chills, SOB, CP, cough, or bleeding. He cut himself shaving this morning but bleeding stopped. Reports intermittent numbness/tingling for 3-4 days after chemo then this subsides. He uses 7 drinks a week.     Adopted    Past Medical History:   Diagnosis Date    Adopted     GERD (gastroesophageal reflux disease)     Psoriasis     Psoriatic arthritis ([address]     Rectal cancer [facility])       Past Surgical History:   Procedure Laterality Date    COLONOSCOPY Left 4/26/2019 COLONOSCOPY performed by Ute Burroughs MD at [facility] N/A 5/2/2019    [address] performed by Wilmer Ferrera MD at [address] HX GI      EGD    HX HEENT      WISDOM TEETH    HX ORTHOPAEDIC Right     ACL REPLACEMENT    IR INSERT TUNL CVC W PORT OVER 5 YEARS  10/15/2019      Social History     Tobacco Use    Smoking status: Never Smoker    Smokeless tobacco: Never Used   Substance Use Topics    Alcohol use: Yes     Comment: 6 BEERS WEEKLY      Family History   Adopted: Yes   Problem Relation Age of Onset    Asthma Neg Hx     Cancer Neg Hx     Diabetes Neg Hx     Heart Disease Neg Hx     Hypertension Neg Hx     Stroke Neg Hx      Current Outpatient Medications   Medication Sig    tadalafil (CIALIS) 5 mg tablet Take 5 mg by mouth.  oxybutynin (DITROPAN) 5 mg tablet Take 5 mg by mouth two (2) times a day.  hydroCHLOROthiazide (HYDRODIURIL) 12.5 mg tablet TAKE 1 TABLET BY MOUTH EVERY DAY    lidocaine-prilocaine (EMLA) topical cream Apply  to affected area as needed for Pain.  dexAMETHasone (DECADRON) 4 mg tablet Take 8 mg by mouth See Admin Instructions. Take 2 tabs by mouth twice a day on days 2 and 3 after chemotherapy only    ondansetron (ZOFRAN ODT) 8 mg disintegrating tablet Take 1 Tab by mouth every eight (8) hours as needed for Nausea.  acetaminophen (TYLENOL) 500 mg tablet Take 500 mg by mouth every six (6) hours as needed for Fever or Pain.  diphenoxylate-atropine (LOMOTIL) 2.5-0.025 mg per tablet Take 1 Tab by mouth four (4) times daily as needed for Diarrhea.  tamsulosin (FLOMAX) 0.4 mg capsule Take 0.4 mg by mouth daily.  triamcinolone acetonide (KENALOG) 0.1 % ointment Apply  to affected area two (2) times daily as needed for Skin Irritation. use thin layer    fluocinonide (VANOS) 0.1 % topical cream Apply  to affected area daily. No current facility-administered medications for this visit.        No Known Allergies     Review of Systems: A complete review of systems was obtained, negative except as described above. Physical Exam:     Visit Vitals  /88   Pulse 69   Temp 98.3 °F (36.8 °C)   Ht 5' 7\" (1.702 m)   Wt 151 lb 14.4 oz (68.9 kg)   SpO2 98%   BMI 23.79 kg/m²     ECOG PS: 1  General: No distress  Eyes: PERRLA, anicteric sclerae  HENT: Atraumatic, OP clear, PAC looks good   Resp: CTAB, normal respiratory effort  CV: s1s2, no LE edema   MS: Normal gait and station. Digits without clubbing or cyanosis. Skin: No rashes, ecchymoses, or petechiae. Normal temperature, turgor, and texture. Psych: Alert, oriented, appropriate affect, normal judgment/insight    Results:     Lab Results   Component Value Date/Time    WBC 8.4 01/15/2020 10:17 AM    HGB 11.5 (L) 01/15/2020 10:17 AM    HCT 34.7 (L) 01/15/2020 10:17 AM    PLATELET 45 (LL) 48/28/2961 10:17 AM    .5 (H) 01/15/2020 10:17 AM    ABS. NEUTROPHILS PENDING 01/15/2020 10:17 AM     Lab Results   Component Value Date/Time    Sodium 138 01/02/2020 09:26 AM    Potassium 4.0 01/02/2020 09:26 AM    Chloride 108 01/02/2020 09:26 AM    CO2 26 01/02/2020 09:26 AM    Glucose 119 (H) 01/02/2020 09:26 AM    BUN 10 01/02/2020 09:26 AM    Creatinine 0.94 01/02/2020 09:26 AM    GFR est AA >60 01/02/2020 09:26 AM    GFR est non-AA >60 01/02/2020 09:26 AM    Calcium 8.7 01/02/2020 09:26 AM     Lab Results   Component Value Date/Time    Bilirubin, total 0.5 01/02/2020 09:26 AM    ALT (SGPT) 38 01/02/2020 09:26 AM    AST (SGOT) 24 01/02/2020 09:26 AM    Alk. phosphatase 66 01/02/2020 09:26 AM    Protein, total 6.4 01/02/2020 09:26 AM    Albumin 3.4 (L) 01/02/2020 09:26 AM    Globulin 3.0 01/02/2020 09:26 AM     Records reviewed and summarized above. Pathology report(s) reviewed  FINAL PATHOLOGIC DIAGNOSIS   1.  Rectum and sigmoid colon, laparoscopic low anterior resection:   SPECIMEN   Procedure: Low anterior resection   Macroscopic Intactness of Mesorectum: Complete TUMOR   Tumor Site: Rectum   Histologic Type: Adenocarcinoma   Histologic Grade: G2: Moderately differentiated   Tumor Size: 2.6 cm   Tumor Deposits: Present   Number of Deposits: 1   Tumor Extension: Tumor invades through the muscularis propria into pericolorectal tissue   Macroscopic Tumor Perforation: Not identified   Lymphovascular Invasion: Not identified   Perineural Invasion: Not identified   Tumor Budding: Number of tumor buds in one hotspot field: 5   Tumor Budding Score: Intermediate score (5-9)   Treatment Effect: Present - Residual cancer with evident tumor regression, but more than single cells or   rare small groups of cancer cells (partial response, score 2)   MARGINS   Margins: All margins are uninvolved by invasive carcinoma, high- grade dysplasia, intramucosal   adenocarcinoma, and adenoma   Margins Examined: Proximal, Distal, Radial or Mesenteric   Distance of Invasive Carcinoma from Closest Margin: 25 mm   Closest Margin: Radial or Mesenteric   LYMPH NODES   Number of Lymph Nodes Involved: 2   Number of Lymph Nodes Examined: 16   PATHOLOGIC STAGE CLASSIFICATION (pTNM, AJCC 8th Edition)   Primary Tumor (pT): pT3   Regional Lymph Nodes (pN): pN1b   2. Large bowel, donuts:   Fragments of large bowel wall, negative for microscopic pathologic abnormality. Radiology report(s) reviewed above. CT CAP 10/14/19: IMPRESSION:  1. There are scattered small hepatic cysts without definite evidence for  metastatic disease.   2. Otherwise negative CT chest abdomen pelvis    Assessment:   1) Rectal adenocarcinoma- mid third- SOMMER    T2N1M0 disease- Clinical stage III  S/P John Adj chemoRT followed by LAR on 9/24/19  Pathology showed [id number] residual disease- stage IIIB  Had significant residual disease with practically no treatment effects  See prior office notes in regards to discussion on adjuvant chemotherapy     Today is cycle 6 of FOLFOX x 8 planned cycles     Had grade 3 neutropenia after cycle 2 requiring a 2 week delay. Starting with cycle 3 we dropped 5FU bolus and added udenyca  given high risk for grade IV neutropenia. Today PLT are 45K today, grade 3 thrombocytopenia. Will delay treatment by 1 week and plan to DR oxaliplatin to 65 mg/m2. If platelets remain below 75k or platelets drop to below 75k despite oxaliplatin dose reduction then will reduce 5FU CADD by 20%. For any delay of chemotherapy by > 2 weeks will stop adjuvant treatment    Will also check hepatitis panel today, anti plt AB, and HIV to rule out other causes    Genetic testing negative    2) Young age at diagnosis    Adopted  Had several adenomas removed  May have attenuated FAP and will require genetic testing  SOMMER    3) GIB  Minor    4) Psoriasis  Off Methotrexate  Follows Dr. Edgar Varela    5) Neutropenia  Chemotherapy induced  See #1    6) Thrombocytopenia  Grade 3  See #1     Plan:     · Hold cycle 6 of adjuvant FOLFOX & re-schedule for 1 week  · Will DR oxaliplatin to 65 mg/m2 starting next week with cycle 6   · HIV, Hep panel, anti-PLT AB added today    RTC in 1 week     I appreciate the opportunity to participate in Mr. Lundberg Flies care.   Seen in conjunction with Sonia Fuchs NP    Signed By: Piper Pineda MD

## 2020-01-15 NOTE — PROGRESS NOTES
Maxime Kaiser is a 47 y.o. male  Chief Complaint   Patient presents with    Follow-up     rectal cancer     1. Have you been to the ER, urgent care clinic since your last visit? Hospitalized since your last visit? 2. Have you seen or consulted any other health care providers outside of the [address] [place] [address] since your last visit? Include any pap smears or colon screening.

## 2020-01-15 NOTE — PROGRESS NOTES
21  Pt admit to [facility] for [address] ambulatory in stable condition. Assessment completed. No new concerns voiced. Port accessed and flushed with positive blood return. Labs drawn per order and sent for processing. 1115 Pt tolerated treatment well. Port maintained positive blood return throughout treatment, flushed with positive blood return at conclusion and port heparinized and de-accessed per protocol. D/c home ambulatory in no distress. Pt aware of next [facility] appointment scheduled for 1/22/20. Recent Results (from the past 12 hour(s))   CBC WITH AUTOMATED DIFF    Collection Time: 01/15/20 10:17 AM   Result Value Ref Range    WBC 8.4 4.1 - 11.1 K/uL    RBC 3.42 (L) 4.10 - 5.70 M/uL    HGB 11.5 (L) 12.1 - 17.0 g/dL    HCT 34.7 (L) 36.6 - 50.3 %    .5 (H) 80.0 - 99.0 FL    MCH 33.6 26.0 - 34.0 PG    MCHC 33.1 30.0 - 36.5 g/dL    RDW 15.4 (H) 11.5 - 14.5 %    PLATELET 45 (LL) 939 - 400 K/uL    MPV 11.7 8.9 - 12.9 FL    NRBC 0.0 0  WBC    ABSOLUTE NRBC 0.00 0.00 - 0.01 K/uL    NEUTROPHILS 81 (H) 32 - 75 %    BAND NEUTROPHILS 4 0 - 6 %    LYMPHOCYTES 5 (L) 12 - 49 %    MONOCYTES 10 5 - 13 %    EOSINOPHILS 0 0 - 7 %    BASOPHILS 0 0 - 1 %    IMMATURE GRANULOCYTES 0 %    ABS. NEUTROPHILS 7.2 1.8 - 8.0 K/UL    ABS. LYMPHOCYTES 0.4 (L) 0.8 - 3.5 K/UL    ABS. MONOCYTES 0.8 0.0 - 1.0 K/UL    ABS. EOSINOPHILS 0.0 0.0 - 0.4 K/UL    ABS. BASOPHILS 0.0 0.0 - 0.1 K/UL    ABS. IMM.  GRANS. 0.0 K/UL    DF MANUAL      RBC COMMENTS ANISOCYTOSIS  1+        RBC COMMENTS MACROCYTOSIS  1+       METABOLIC PANEL, COMPREHENSIVE    Collection Time: 01/15/20 10:17 AM   Result Value Ref Range    Sodium 141 136 - 145 mmol/L    Potassium 3.9 3.5 - 5.1 mmol/L    Chloride 108 97 - 108 mmol/L    CO2 26 21 - 32 mmol/L    Anion gap 7 5 - 15 mmol/L    Glucose 145 (H) 65 - 100 mg/dL    BUN 9 6 - 20 MG/DL    Creatinine 0.99 0.70 - 1.30 MG/DL    BUN/Creatinine ratio 9 (L) 12 - 20      GFR est AA >60 >60 ml/min/1.73m2    GFR est non-AA >60 >60 ml/min/1.73m2    Calcium 8.7 8.5 - 10.1 MG/DL    Bilirubin, total 0.4 0.2 - 1.0 MG/DL    ALT (SGPT) 27 12 - 78 U/L    AST (SGOT) 21 15 - 37 U/L    Alk. phosphatase 115 45 - 117 U/L    Protein, total 6.1 (L) 6.4 - 8.2 g/dL    Albumin 3.2 (L) 3.5 - 5.0 g/dL    Globulin 2.9 2.0 - 4.0 g/dL    A-G Ratio 1.1 1.1 - 2.2     CEA    Collection Time: 01/15/20 10:17 AM   Result Value Ref Range    CEA 1.6 ng/mL   HEPATITIS PANEL, ACUTE    Collection Time: 01/15/20 10:17 AM   Result Value Ref Range    Hepatitis A, IgM NONREACTIVE NR      __          Hepatitis B surface Ag <0.10 Index    Hep B surface Ag Interp. NEGATIVE  NEG      __          Hepatitis B core, IgM NONREACTIVE NR      __          Hep C  virus Ab Interp.  NONREACTIVE NR      Hep C  virus Ab comment Method used is Siemens Advia Centaur     HIV 1/2 AG/AB, 4TH GENERATION,W RFLX CONFIRM    Collection Time: 01/15/20 11:15 AM   Result Value Ref Range    HIV 1/2 Interpretation NONREACTIVE NR      HIV 1/2 result comment SEE NOTE

## 2020-01-17 ENCOUNTER — APPOINTMENT (OUTPATIENT)
Dept: INFUSION THERAPY | Age: 50
End: 2020-01-17
Payer: MEDICAID

## 2020-01-18 ENCOUNTER — HOSPITAL ENCOUNTER (OUTPATIENT)
Dept: INFUSION THERAPY | Age: 50
End: 2020-01-18
Payer: MEDICAID

## 2020-01-19 LAB
PLAT GP IA/IIA IGG BLD QL IA: NEGATIVE
PLAT GP IB/IX IGG BLD QL IA: NEGATIVE
PLAT GP IIB/IIIA IGG BLD QL IA: NEGATIVE

## 2020-01-22 ENCOUNTER — APPOINTMENT (OUTPATIENT)
Dept: INFUSION THERAPY | Age: 50
End: 2020-01-22

## 2020-01-22 ENCOUNTER — OFFICE VISIT (OUTPATIENT)
Dept: ONCOLOGY | Age: 50
End: 2020-01-22

## 2020-01-22 ENCOUNTER — HOSPITAL ENCOUNTER (OUTPATIENT)
Dept: INFUSION THERAPY | Age: 50
Discharge: HOME | End: 2020-01-22
Payer: MEDICAID

## 2020-01-22 VITALS
HEART RATE: 79 BPM | HEIGHT: 67 IN | OXYGEN SATURATION: 99 % | DIASTOLIC BLOOD PRESSURE: 85 MMHG | TEMPERATURE: 98.5 F | BODY MASS INDEX: 23.7 KG/M2 | SYSTOLIC BLOOD PRESSURE: 134 MMHG | WEIGHT: 151 LBS

## 2020-01-22 VITALS
DIASTOLIC BLOOD PRESSURE: 77 MMHG | SYSTOLIC BLOOD PRESSURE: 133 MMHG | HEART RATE: 71 BPM | BODY MASS INDEX: 23.73 KG/M2 | TEMPERATURE: 98.5 F | RESPIRATION RATE: 16 BRPM | HEIGHT: 67 IN | WEIGHT: 151.2 LBS

## 2020-01-22 DIAGNOSIS — C20 RECTAL CANCER (HCC): Primary | ICD-10-CM

## 2020-01-22 DIAGNOSIS — D69.6 THROMBOCYTOPENIA (HCC): ICD-10-CM

## 2020-01-22 LAB
ALBUMIN SERPL-MCNC: 3.6 G/DL (ref 3.5–5)
ALBUMIN/GLOB SERPL: 1.2 {RATIO} (ref 1.1–2.2)
ALP SERPL-CCNC: 82 U/L (ref 45–117)
ALT SERPL-CCNC: 28 U/L (ref 12–78)
ANION GAP SERPL CALC-SCNC: 6 MMOL/L (ref 5–15)
AST SERPL-CCNC: 17 U/L (ref 15–37)
BASOPHILS # BLD: 0.1 K/UL (ref 0–0.1)
BASOPHILS NFR BLD: 1 % (ref 0–1)
BILIRUB SERPL-MCNC: 0.6 MG/DL (ref 0.2–1)
BUN SERPL-MCNC: 10 MG/DL (ref 6–20)
BUN/CREAT SERPL: 10 (ref 12–20)
CALCIUM SERPL-MCNC: 8.8 MG/DL (ref 8.5–10.1)
CEA SERPL-MCNC: 1.5 NG/ML
CHLORIDE SERPL-SCNC: 104 MMOL/L (ref 97–108)
CO2 SERPL-SCNC: 27 MMOL/L (ref 21–32)
CREAT SERPL-MCNC: 0.97 MG/DL (ref 0.7–1.3)
DIFFERENTIAL METHOD BLD: ABNORMAL
EOSINOPHIL # BLD: 0.1 K/UL (ref 0–0.4)
EOSINOPHIL NFR BLD: 2 % (ref 0–7)
ERYTHROCYTE [DISTWIDTH] IN BLOOD BY AUTOMATED COUNT: 15 % (ref 11.5–14.5)
GLOBULIN SER CALC-MCNC: 2.9 G/DL (ref 2–4)
GLUCOSE SERPL-MCNC: 160 MG/DL (ref 65–100)
HCT VFR BLD AUTO: 36.6 % (ref 36.6–50.3)
HGB BLD-MCNC: 12.1 G/DL (ref 12.1–17)
IMM GRANULOCYTES # BLD AUTO: 0.1 K/UL (ref 0–0.04)
IMM GRANULOCYTES NFR BLD AUTO: 1 % (ref 0–0.5)
LYMPHOCYTES # BLD: 0.4 K/UL (ref 0.8–3.5)
LYMPHOCYTES NFR BLD: 7 % (ref 12–49)
MCH RBC QN AUTO: 33.5 PG (ref 26–34)
MCHC RBC AUTO-ENTMCNC: 33.1 G/DL (ref 30–36.5)
MCV RBC AUTO: 101.4 FL (ref 80–99)
MONOCYTES # BLD: 0.5 K/UL (ref 0–1)
MONOCYTES NFR BLD: 10 % (ref 5–13)
NEUTS SEG # BLD: 4 K/UL (ref 1.8–8)
NEUTS SEG NFR BLD: 79 % (ref 32–75)
NRBC # BLD: 0 K/UL (ref 0–0.01)
NRBC BLD-RTO: 0 PER 100 WBC
PLATELET # BLD AUTO: 129 K/UL (ref 150–400)
PMV BLD AUTO: 10.4 FL (ref 8.9–12.9)
POTASSIUM SERPL-SCNC: 3.4 MMOL/L (ref 3.5–5.1)
PROT SERPL-MCNC: 6.5 G/DL (ref 6.4–8.2)
RBC # BLD AUTO: 3.61 M/UL (ref 4.1–5.7)
RBC MORPH BLD: ABNORMAL
SODIUM SERPL-SCNC: 137 MMOL/L (ref 136–145)
WBC # BLD AUTO: 5.2 K/UL (ref 4.1–11.1)

## 2020-01-22 PROCEDURE — 85025 COMPLETE CBC W/AUTO DIFF WBC: CPT

## 2020-01-22 PROCEDURE — 74011250636 HC RX REV CODE- 250/636: Performed by: REGISTERED NURSE

## 2020-01-22 PROCEDURE — 96413 CHEMO IV INFUSION 1 HR: CPT

## 2020-01-22 PROCEDURE — 80053 COMPREHEN METABOLIC PANEL: CPT

## 2020-01-22 PROCEDURE — 96375 TX/PRO/DX INJ NEW DRUG ADDON: CPT

## 2020-01-22 PROCEDURE — 36415 COLL VENOUS BLD VENIPUNCTURE: CPT

## 2020-01-22 PROCEDURE — 82378 CARCINOEMBRYONIC ANTIGEN: CPT

## 2020-01-22 PROCEDURE — 74011250637 HC RX REV CODE- 250/637: Performed by: REGISTERED NURSE

## 2020-01-22 PROCEDURE — 96416 CHEMO PROLONG INFUSE W/PUMP: CPT

## 2020-01-22 PROCEDURE — 74011000258 HC RX REV CODE- 258: Performed by: REGISTERED NURSE

## 2020-01-22 PROCEDURE — 96415 CHEMO IV INFUSION ADDL HR: CPT

## 2020-01-22 PROCEDURE — 96417 CHEMO IV INFUS EACH ADDL SEQ: CPT

## 2020-01-22 PROCEDURE — 77030012965 HC NDL HUBR BBMI -A

## 2020-01-22 RX ORDER — SODIUM CHLORIDE 0.9 % (FLUSH) 0.9 %
10 SYRINGE (ML) INJECTION AS NEEDED
Status: ACTIVE | OUTPATIENT
Start: 2020-01-22 | End: 2020-01-22

## 2020-01-22 RX ORDER — ONDANSETRON 2 MG/ML
8 INJECTION INTRAMUSCULAR; INTRAVENOUS ONCE
Status: COMPLETED | OUTPATIENT
Start: 2020-01-22 | End: 2020-01-22

## 2020-01-22 RX ORDER — DIPHENHYDRAMINE HYDROCHLORIDE 50 MG/ML
50 INJECTION, SOLUTION INTRAMUSCULAR; INTRAVENOUS AS NEEDED
Status: CANCELLED
Start: 2020-01-22

## 2020-01-22 RX ORDER — SODIUM CHLORIDE 9 MG/ML
10 INJECTION INTRAMUSCULAR; INTRAVENOUS; SUBCUTANEOUS AS NEEDED
Status: ACTIVE | OUTPATIENT
Start: 2020-01-22 | End: 2020-01-22

## 2020-01-22 RX ORDER — POTASSIUM CHLORIDE 750 MG/1
40 TABLET, FILM COATED, EXTENDED RELEASE ORAL
Status: COMPLETED | OUTPATIENT
Start: 2020-01-22 | End: 2020-01-22

## 2020-01-22 RX ORDER — ALBUTEROL SULFATE 0.83 MG/ML
2.5 SOLUTION RESPIRATORY (INHALATION) AS NEEDED
Status: CANCELLED
Start: 2020-01-22

## 2020-01-22 RX ORDER — ACETAMINOPHEN 325 MG/1
650 TABLET ORAL AS NEEDED
Status: CANCELLED
Start: 2020-01-22

## 2020-01-22 RX ORDER — DEXTROSE MONOHYDRATE 50 MG/ML
25 INJECTION, SOLUTION INTRAVENOUS CONTINUOUS
Status: DISPENSED | OUTPATIENT
Start: 2020-01-22 | End: 2020-01-22

## 2020-01-22 RX ORDER — ONDANSETRON 2 MG/ML
8 INJECTION INTRAMUSCULAR; INTRAVENOUS AS NEEDED
Status: CANCELLED | OUTPATIENT
Start: 2020-01-22

## 2020-01-22 RX ORDER — HYDROCORTISONE SODIUM SUCCINATE 100 MG/2ML
100 INJECTION, POWDER, FOR SOLUTION INTRAMUSCULAR; INTRAVENOUS AS NEEDED
Status: CANCELLED | OUTPATIENT
Start: 2020-01-22

## 2020-01-22 RX ORDER — HEPARIN 100 UNIT/ML
300-500 SYRINGE INTRAVENOUS AS NEEDED
Status: ACTIVE | OUTPATIENT
Start: 2020-01-22 | End: 2020-01-22

## 2020-01-22 RX ORDER — EPINEPHRINE 1 MG/ML
0.3 INJECTION, SOLUTION, CONCENTRATE INTRAVENOUS AS NEEDED
Status: CANCELLED | OUTPATIENT
Start: 2020-01-22

## 2020-01-22 RX ADMIN — ONDANSETRON 8 MG: 2 INJECTION INTRAMUSCULAR; INTRAVENOUS at 10:37

## 2020-01-22 RX ADMIN — Medication 10 ML: at 08:20

## 2020-01-22 RX ADMIN — DEXTROSE MONOHYDRATE 25 ML/HR: 5 INJECTION, SOLUTION INTRAVENOUS at 10:37

## 2020-01-22 RX ADMIN — OXALIPLATIN 114.5 MG: 5 INJECTION, SOLUTION INTRAVENOUS at 11:05

## 2020-01-22 RX ADMIN — POTASSIUM CHLORIDE 40 MEQ: 750 TABLET, EXTENDED RELEASE ORAL at 09:52

## 2020-01-22 RX ADMIN — FLUOROURACIL 4224 MG: 50 INJECTION, SOLUTION INTRAVENOUS at 13:13

## 2020-01-22 RX ADMIN — DEXAMETHASONE SODIUM PHOSPHATE 12 MG: 4 INJECTION, SOLUTION INTRA-ARTICULAR; INTRALESIONAL; INTRAMUSCULAR; INTRAVENOUS; SOFT TISSUE at 10:38

## 2020-01-22 NOTE — PROGRESS NOTES
Corrie Runner is a 47 y.o. male  Chief Complaint   Patient presents with    Follow-up     rectal cancer     1. Have you been to the ER, urgent care clinic since your last visit? Hospitalized since your last visit? No    2. Have you seen or consulted any other health care providers outside of the [address] [place] [address] since your last visit? Include any pap smears or colon screening. No

## 2020-01-22 NOTE — PROGRESS NOTES
[facility] Progress Note    Date: 2020    Name: Marcus Riley    MRN: [id number]         : 1970    Mr. Cheryl Raygoza Arrived ambulatory and in no distress for cycle 6 day 1 of Folfox regimen. Assessment was completed, no acute issues at this time, no new complaints voiced. Patient denies any trouble with bleeding over the past week. Port accessed without difficulty, labs drawn and processed. Port capped and patient left the department for oncology appointment. Chemotherapy Flowsheet 2020   Cycle C6D1   Date 2020   Drug / Regimen Oxaliplatin/Fluorouracil CADD   Dosage 112mg/4224mg   Time Up 1105   Time Down D/C with CADD pump   Pre Hydration none   Post Hydration none   Pre Meds given   Notes given         Mr. Bradshaw's vitals were reviewed. Patient Vitals for the past 12 hrs:   Temp Pulse Resp BP   20 1315 98.5 °F (36.9 °C) 71 16 133/77   20 0829 98.3 °F (36.8 °C) 76 18 127/84         Lab results were obtained and reviewed. Recent Results (from the past 12 hour(s))   CBC WITH AUTOMATED DIFF    Collection Time: 20  8:13 AM   Result Value Ref Range    WBC 5.2 4.1 - 11.1 K/uL    RBC 3.61 (L) 4.10 - 5.70 M/uL    HGB 12.1 12.1 - 17.0 g/dL    HCT 36.6 36.6 - 50.3 %    .4 (H) 80.0 - 99.0 FL    MCH 33.5 26.0 - 34.0 PG    MCHC 33.1 30.0 - 36.5 g/dL    RDW 15.0 (H) 11.5 - 14.5 %    PLATELET 868 (L) 446 - 400 K/uL    MPV 10.4 8.9 - 12.9 FL    NRBC 0.0 0  WBC    ABSOLUTE NRBC 0.00 0.00 - 0.01 K/uL    NEUTROPHILS 79 (H) 32 - 75 %    LYMPHOCYTES 7 (L) 12 - 49 %    MONOCYTES 10 5 - 13 %    EOSINOPHILS 2 0 - 7 %    BASOPHILS 1 0 - 1 %    IMMATURE GRANULOCYTES 1 (H) 0.0 - 0.5 %    ABS. NEUTROPHILS 4.0 1.8 - 8.0 K/UL    ABS. LYMPHOCYTES 0.4 (L) 0.8 - 3.5 K/UL    ABS. MONOCYTES 0.5 0.0 - 1.0 K/UL    ABS. EOSINOPHILS 0.1 0.0 - 0.4 K/UL    ABS. BASOPHILS 0.1 0.0 - 0.1 K/UL    ABS. IMM.  GRANS. 0.1 (H) 0.00 - 0.04 K/UL    DF SMEAR SCANNED      RBC COMMENTS ANISOCYTOSIS  1+ METABOLIC PANEL, COMPREHENSIVE    Collection Time: 01/22/20  8:13 AM   Result Value Ref Range    Sodium 137 136 - 145 mmol/L    Potassium 3.4 (L) 3.5 - 5.1 mmol/L    Chloride 104 97 - 108 mmol/L    CO2 27 21 - 32 mmol/L    Anion gap 6 5 - 15 mmol/L    Glucose 160 (H) 65 - 100 mg/dL    BUN 10 6 - 20 MG/DL    Creatinine 0.97 0.70 - 1.30 MG/DL    BUN/Creatinine ratio 10 (L) 12 - 20      GFR est AA >60 >60 ml/min/1.73m2    GFR est non-AA >60 >60 ml/min/1.73m2    Calcium 8.8 8.5 - 10.1 MG/DL    Bilirubin, total 0.6 0.2 - 1.0 MG/DL    ALT (SGPT) 28 12 - 78 U/L    AST (SGOT) 17 15 - 37 U/L    Alk.  phosphatase 82 45 - 117 U/L    Protein, total 6.5 6.4 - 8.2 g/dL    Albumin 3.6 3.5 - 5.0 g/dL    Globulin 2.9 2.0 - 4.0 g/dL    A-G Ratio 1.2 1.1 - 2.2     CEA    Collection Time: 01/22/20  8:13 AM   Result Value Ref Range    CEA 1.5 ng/mL       Medications Administered     dexamethasone (DECADRON) 12 mg in 0.9% sodium chloride 50 mL, overfill volume 5 mL IVPB     Admin Date  01/22/2020 Action  Given Dose  12 mg Rate  232 mL/hr Route  IntraVENous Administered By  Raquel Patel          dextrose 5% infusion     Admin Date  01/22/2020 Action  New Bag Dose  25 mL/hr Rate  25 mL/hr Route  IntraVENous Administered By  Raquel Patel          fluorouraciL (ADRUCIL) 4,224 mg in 0.9% sodium chloride 100 mL CADD Cassette     Admin Date  01/22/2020 Action  New Bag Dose  4224 mg Rate  2.2 mL/hr Route  IntraVENous Administered By  Raquel Patel          ondansetron [facility]) injection 8 mg     Admin Date  01/22/2020 Action  Given Dose  8 mg Route  IntraVENous Administered By  Raquel Patel          oxaliplatin (ELOXATIN) 114.5 mg in dextrose 5% 250 mL, overfill volume 25 mL chemo infusion     Admin Date  01/22/2020 Action  New Bag Dose  114.5 mg Rate  149 mL/hr Route  IntraVENous Administered By  Raquel Patel          potassium chloride SR (KLOR-CON 10) tablet 40 mEq     Admin Date  01/22/2020 Action  Given Dose  40 mEq Route  Oral Administered By  Nick Oliveira          saline peripheral flush soln 10 mL     Admin Date  01/22/2020 Action  Given Dose  10 mL Route  InterCATHeter Administered By  Nick Oliveira                  Mr. Drew Marcum tolerated treatment well. Port maintained positive blood return throughout the course of treatment. He was discharged with CADD pump infusing properly at 1315. He is aware of his next appointment on 1/24/20.     Future Appointments   Date Time Provider Shannan Jennifer   1/24/2020  5:00 PM H2 GREGORIA FASTRACK [facility]   1/25/2020 10:00 AM G2 GREGORIA FASTRACK [facility]   1/28/2020 10:20 AM Pina Ring MD [address] [place] [address]   2/5/2020  8:00 AM C1 [facility] [address] [facility] [facility]   2/5/2020  8:45 AM HÉCTOR Dailey [address]   2/7/2020  5:00 PM G1 GREGORIA FASTRACK [facility]   2/9/2020 10:00 AM G2 GREGORIA FASTRACK [facility]   2/19/2020  8:00 AM C1 [facility] [address] [facility]   2/21/2020  5:00 PM G1 GREGORIA FASTRACK [facility]   2/23/2020 10:00 AM G2 GREGORIA FASTRACK [facility]   4/20/2020 10:30 AM George Garcia MD [facility]  January 22, 2020

## 2020-01-22 NOTE — PROGRESS NOTES
[facility] [place] [address] [address], [address]  W: [phone]  F: [phone]    Reason for Visit:   Nikolas Diehl is a 47 y.o. male who is seen for Rectal cancer- likely stage III- SOMMER    Treatment History:   · 4/26/19: Colonoscopy- 6-7 cm size malignant appearing mass seen at 10 cm from anal verge. This was adenocarcinoma. 3 polyps removed- all were tubular adenomas  · Rectal Ultrasound 5/2/19: ulcerated infiltrative mass lesion was noted in rectum at 10 cm. The lesion measured about 5 cm X 4 cm- T2, 13 mm X 8 mm oblong lymph node was noted immediately adjacent to the mass lesion  · 5/2/19: CT CAP- No metastasis, liver cysts. CEA 3.6  · 5/10/19: PET CT showed rectal malignancy and 3 small perirectal anibal metastatic focir  · 5/28/19-7/8/29: Xeloda + RT   · 9/24/19: LAR- [id number], 2/6 positive nodes  · 10/14/19: CT CAP with no evidence of metastatic disease, liver cysts   · 10/23/19: cycle 1 FOLFOX  · 12/11/19 CT AP: hypodensity in liver, otherwise KATHLEEN   · 1/6/20 MRI abd: no liver mets    History of Present Illness:   Patient is a 47 y.o. male seen for adenocarcinoma of the mid third of rectum    He had intermittent BRBPR x 1 year and then decided to have a colonoscopy. This led to above mentioned diagnosis. He received neoadjuvant xeloda+ RT followed by LAR. Now on adjuvant therapy with FOLFOX. Comes today for cycle 6 of FOLFOX. Treated was delayed 1 week due to grade 3 thrombocytopenia. He feels well. Denies nausea/vomiting and no change in stool consistency. He has no pain. 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 Denies fevers/chills, SOB, CP, cough, or bleeding. Reports intermittent numbness/tingling for 3-4 days after chemo then this subsides. He uses 7 drinks a week.     Adopted    Past Medical History:   Diagnosis Date    Adopted     GERD (gastroesophageal reflux disease)     Psoriasis     Psoriatic arthritis ([address]     Rectal cancer [facility])       Past Surgical History:   Procedure Laterality Date    COLONOSCOPY Left 4/26/2019    [facility]    Gary Sneddon performed by Inderjit oGnsales MD at [address] [facility] GI      EGD    HX HEENT      WISDOM TEETH    HX ORTHOPAEDIC Right     ACL REPLACEMENT    IR INSERT TUNL CVC W PORT OVER 5 YEARS  10/15/2019      Social History     Tobacco Use    Smoking status: Never Smoker    Smokeless tobacco: Never Used   Substance Use Topics    Alcohol use: Yes     Comment: 6 BEERS WEEKLY      Family History   Adopted: Yes   Problem Relation Age of Onset    Asthma Neg Hx     Cancer Neg Hx     Diabetes Neg Hx     Heart Disease Neg Hx     Hypertension Neg Hx     Stroke Neg Hx      Current Outpatient Medications   Medication Sig    tadalafil (CIALIS) 5 mg tablet Take 5 mg by mouth.  oxybutynin (DITROPAN) 5 mg tablet Take 5 mg by mouth two (2) times a day.  hydroCHLOROthiazide (HYDRODIURIL) 12.5 mg tablet TAKE 1 TABLET BY MOUTH EVERY DAY    lidocaine-prilocaine (EMLA) topical cream Apply  to affected area as needed for Pain.  dexAMETHasone (DECADRON) 4 mg tablet Take 8 mg by mouth See Admin Instructions. Take 2 tabs by mouth twice a day on days 2 and 3 after chemotherapy only    ondansetron (ZOFRAN ODT) 8 mg disintegrating tablet Take 1 Tab by mouth every eight (8) hours as needed for Nausea.  acetaminophen (TYLENOL) 500 mg tablet Take 500 mg by mouth every six (6) hours as needed for Fever or Pain.  diphenoxylate-atropine (LOMOTIL) 2.5-0.025 mg per tablet Take 1 Tab by mouth four (4) times daily as needed for Diarrhea.  tamsulosin (FLOMAX) 0.4 mg capsule Take 0.4 mg by mouth daily.  triamcinolone acetonide (KENALOG) 0.1 % ointment Apply  to affected area two (2) times daily as needed for Skin Irritation.  use thin layer    fluocinonide (VANOS) 0.1 % topical cream Apply  to affected area daily. No current facility-administered medications for this visit. No Known Allergies     Review of Systems: A complete review of systems was obtained, negative except as described above. Physical Exam:     Visit Vitals  /85 (BP 1 Location: Left arm)   Pulse 79   Temp 98.5 °F (36.9 °C)   Ht 5' 7\" (1.702 m)   Wt 151 lb (68.5 kg)   SpO2 99%   BMI 23.65 kg/m²     ECOG PS: 1  General: No distress  Eyes: PERRLA, anicteric sclerae  HENT: Atraumatic, OP clear, PAC looks good   Resp: CTAB, normal respiratory effort  CV: s1s2, no LE edema   MS: Normal gait and station. Digits without clubbing or cyanosis. Skin: No rashes, ecchymoses, or petechiae. Normal temperature, turgor, and texture. Psych: Alert, oriented, appropriate affect, normal judgment/insight    Results:     Lab Results   Component Value Date/Time    WBC 8.4 01/15/2020 10:17 AM    HGB 11.5 (L) 01/15/2020 10:17 AM    HCT 34.7 (L) 01/15/2020 10:17 AM    PLATELET 45 (LL) 61/93/1568 10:17 AM    .5 (H) 01/15/2020 10:17 AM    ABS. NEUTROPHILS 7.2 01/15/2020 10:17 AM     Lab Results   Component Value Date/Time    Sodium 141 01/15/2020 10:17 AM    Potassium 3.9 01/15/2020 10:17 AM    Chloride 108 01/15/2020 10:17 AM    CO2 26 01/15/2020 10:17 AM    Glucose 145 (H) 01/15/2020 10:17 AM    BUN 9 01/15/2020 10:17 AM    Creatinine 0.99 01/15/2020 10:17 AM    GFR est AA >60 01/15/2020 10:17 AM    GFR est non-AA >60 01/15/2020 10:17 AM    Calcium 8.7 01/15/2020 10:17 AM     Lab Results   Component Value Date/Time    Bilirubin, total 0.4 01/15/2020 10:17 AM    ALT (SGPT) 27 01/15/2020 10:17 AM    AST (SGOT) 21 01/15/2020 10:17 AM    Alk. phosphatase 115 01/15/2020 10:17 AM    Protein, total 6.1 (L) 01/15/2020 10:17 AM    Albumin 3.2 (L) 01/15/2020 10:17 AM    Globulin 2.9 01/15/2020 10:17 AM     Records reviewed and summarized above. Pathology report(s) reviewed  FINAL PATHOLOGIC DIAGNOSIS   1.  Rectum and sigmoid colon, laparoscopic low anterior resection:   SPECIMEN   Procedure: Low anterior resection   Macroscopic Intactness of Mesorectum: Complete   TUMOR   Tumor Site: Rectum   Histologic Type: Adenocarcinoma   Histologic Grade: G2: Moderately differentiated   Tumor Size: 2.6 cm   Tumor Deposits: Present   Number of Deposits: 1   Tumor Extension: Tumor invades through the muscularis propria into pericolorectal tissue   Macroscopic Tumor Perforation: Not identified   Lymphovascular Invasion: Not identified   Perineural Invasion: Not identified   Tumor Budding: Number of tumor buds in one hotspot field: 5   Tumor Budding Score: Intermediate score (5-9)   Treatment Effect: Present - Residual cancer with evident tumor regression, but more than single cells or   rare small groups of cancer cells (partial response, score 2)   MARGINS   Margins: All margins are uninvolved by invasive carcinoma, high- grade dysplasia, intramucosal   adenocarcinoma, and adenoma   Margins Examined: Proximal, Distal, Radial or Mesenteric   Distance of Invasive Carcinoma from Closest Margin: 25 mm   Closest Margin: Radial or Mesenteric   LYMPH NODES   Number of Lymph Nodes Involved: 2   Number of Lymph Nodes Examined: 16   PATHOLOGIC STAGE CLASSIFICATION (pTNM, AJCC 8th Edition)   Primary Tumor (pT): pT3   Regional Lymph Nodes (pN): pN1b   2. Large bowel, donuts:   Fragments of large bowel wall, negative for microscopic pathologic abnormality. Radiology report(s) reviewed above. CT CAP 10/14/19: IMPRESSION:  1. There are scattered small hepatic cysts without definite evidence for  metastatic disease. 2. Otherwise negative CT chest abdomen pelvis    CT CAP 12/11/19: IMPRESSION:  1. Right hydroureteronephrosis due to an 8 mm calculus at the ureterovesicular  junction.   2. Vague areas of heterogeneous enhancement and hypodensity in the liver, raises  concern for metastases, though dedicated 3 phase liver CT or liver protocol MRI  may be considered as follow-up. Numerous small hypodensities in the liver likely  cysts, unchanged. 3. Rectal anastomosis and presacral edema, treatment related. Right lower  quadrant ileostomy. No bowel obstruction    MRI abd 1/6/20: no hepatic mets     Assessment:   1) Rectal adenocarcinoma- mid third- SOMMER    T2N1M0 disease- Clinical stage III  S/P John Adj chemoRT followed by LAR on 9/24/19  Pathology showed [id number] residual disease- stage IIIB  Had significant residual disease with practically no treatment effects  Adjuvant FOLFOX started 10/23/19    Today is cycle 6 of FOLFOX x 8 planned cycles     Had grade 3 neutropenia after cycle 2 requiring a 2 week delay. Starting with cycle 3 we dropped 5FU bolus and added udenyca given high risk for grade IV neutropenia. Had grade 3 thrombocytopenia for cycle 6 requiring 1 week delay. Has grade 1 thrombocytopenia today hence will proceed with treatment with a DR of oxaliplatin to 65 mg/m2. Moving forward if platelets remain below 75k or platelets drop to below 75k despite oxaliplatin dose reduction then will reduce 5FU CADD by 20%. For any delay of chemotherapy by > 2 weeks will stop adjuvant treatment    CT imaging after 4 cycles of FOLFOX showed hypodensities in the liver but otherwise KATHLEEN. MRI obtained thereafter showed no evidence of hepatic mets.      Genetic testing negative    2) [employer] age at diagnosis  Adopted  Had several adenomas removed  May have attenuated FAP and will require genetic testing  SOMMER    3) GIB  Minor    4) Psoriasis  Off Methotrexate  Follows Dr. Ren Muir    5) Neutropenia  Chemotherapy induced  See #1    6) Thrombocytopenia  Grade 3 after 5 cycles  Grade 1 today  See #1 regarding    HIV, hep, anti-plt AB negative     Plan:     · Proceed today with cycle 6 of adjuvant FOLFOX (5FU CADD 2,400mg/m2, Oxaliplatin DR to 65mg/m2 given gr 3 thrombcytopenia) given every 2 weeks x 8 cycles   · Labs to include CBC with diff and CMP prior to each infusion; CEA monthly  · Pre-meds to include Dexamethasone and Zofran  · Dexamethasone 8mg daily days 2 & 3 of chemotherapy   · Zofran PRN   · Udencya given high risk for grade IV neutropenia    RTC in 2 weeks for cycle 7    I appreciate the opportunity to participate in Jona Cordelia Mix jj.   Seen in conjunction with Marquis Xavier NP    Signed By: Lesa Zimmerman MD

## 2020-01-24 ENCOUNTER — HOSPITAL ENCOUNTER (OUTPATIENT)
Dept: INFUSION THERAPY | Age: 50
Discharge: HOME | End: 2020-01-24
Payer: MEDICAID

## 2020-01-24 ENCOUNTER — APPOINTMENT (OUTPATIENT)
Dept: INFUSION THERAPY | Age: 50
End: 2020-01-24

## 2020-01-24 VITALS
TEMPERATURE: 98.2 F | RESPIRATION RATE: 16 BRPM | HEART RATE: 79 BPM | DIASTOLIC BLOOD PRESSURE: 80 MMHG | SYSTOLIC BLOOD PRESSURE: 126 MMHG

## 2020-01-24 PROCEDURE — 96523 IRRIG DRUG DELIVERY DEVICE: CPT

## 2020-01-24 NOTE — PROGRESS NOTES
[facility] Short Note                       Date: 2020    Name: Clarissa Black    MRN: [id number]         : 1970    1415 Pt admit to [facility] for removal of CADD ambulatory in stable condition. Assessment completed. No new concerns voiced. Mr. Reji Winter vitals were reviewed prior to treatment. Patient Vitals for the past 12 hrs:   Temp Pulse Resp BP   20 1415 98.2 °F (36.8 °C) 79 16 126/80       Port with positive blood return flushed, heparinized and de-accessed per protocol. 1425 Pt tolerated treatment well. D/c home ambulatory in no distress.      Future Appointments   Date Time Provider Shannan Rodriguez   2020 10:00 AM G2 GREG[facility]IA [address]   2020 10:20 AM Nikki Saucedo MD [address] [place] [address]   2020  8:00 AM C1 [facility] [address] [facility] H   2020  8:45 AM HÉCTOR [address]   2020  5:00 PM G1 GREG[facility]IA [facility]   2020 10:00 AM G2 GREGORIA [facility]ACK [facility]   2020  8:00 AM C1 [facility] [address] [facility]   2020  5:00 PM G1 GREGORIA [facility]ACK [facility]   2020 10:00 AM G2 GREGORIA [facility]   2020 10:30 AM Fortino Iraheta MD [facility] [address] Jacques Rodriguez, RN  2020  6:03 PM

## 2020-01-25 ENCOUNTER — HOSPITAL ENCOUNTER (OUTPATIENT)
Dept: INFUSION THERAPY | Age: 50
Discharge: HOME | End: 2020-01-25
Payer: MEDICAID

## 2020-01-25 VITALS
TEMPERATURE: 98.8 F | HEART RATE: 62 BPM | RESPIRATION RATE: 16 BRPM | DIASTOLIC BLOOD PRESSURE: 82 MMHG | SYSTOLIC BLOOD PRESSURE: 122 MMHG

## 2020-01-25 DIAGNOSIS — C20 RECTAL CANCER (HCC): ICD-10-CM

## 2020-01-25 PROCEDURE — 74011250636 HC RX REV CODE- 250/636: Performed by: REGISTERED NURSE

## 2020-01-25 PROCEDURE — 96372 THER/PROPH/DIAG INJ SC/IM: CPT

## 2020-01-25 RX ADMIN — PEGFILGRASTIM-CBQV 6 MG: 6 INJECTION, SOLUTION SUBCUTANEOUS at 11:00

## 2020-01-25 NOTE — PROGRESS NOTES
[facility] [place] Progress Note    Date: 2020    Name: Ingris Mcclain    MRN: [id number]         : 1970    Mr. Amada Becerra Arrived ambulatory and in no distress for Udenyca Regimen. Assessment was completed, no acute issues at this time, no new complaints voiced. Chemo completed yesterday 20 at 11:00. Mr. Chuck Moralez vitals were reviewed. Visit Vitals  /82   Pulse 62   Temp 98.8 °F (37.1 °C)   Resp 16         Medications:  Medications Administered     pegfilgrastim-cbqv (UDENYCA) injection 6 mg     Admin Date  2020 Action  Given Dose  6 mg Route  SubCUTAneous Administered By  Blaine Nova RN                Mr. Amada Becerra tolerated treatment well and was discharged from [place] [address] in stable condition. He is to return on 20 at 8:00 for his next appointment.     Nazanin Hall RN  2020

## 2020-01-28 ENCOUNTER — OFFICE VISIT (OUTPATIENT)
Dept: RHEUMATOLOGY | Age: 50
End: 2020-01-28

## 2020-01-28 VITALS
BODY MASS INDEX: 23.45 KG/M2 | TEMPERATURE: 98 F | RESPIRATION RATE: 16 BRPM | SYSTOLIC BLOOD PRESSURE: 144 MMHG | HEIGHT: 67 IN | OXYGEN SATURATION: 98 % | WEIGHT: 149.4 LBS | DIASTOLIC BLOOD PRESSURE: 98 MMHG | HEART RATE: 78 BPM

## 2020-01-28 DIAGNOSIS — L40.9 PSORIASIS: Primary | ICD-10-CM

## 2020-01-28 DIAGNOSIS — C20 RECTAL CANCER (HCC): ICD-10-CM

## 2020-01-28 DIAGNOSIS — L40.50 PSORIATIC ARTHRITIS (HCC): ICD-10-CM

## 2020-01-28 NOTE — PATIENT INSTRUCTIONS
Please fill out your [address] you will receive after your visit in the mail or via [address]

## 2020-01-29 ENCOUNTER — APPOINTMENT (OUTPATIENT)
Dept: INFUSION THERAPY | Age: 50
End: 2020-01-29
Payer: MEDICAID

## 2020-01-31 ENCOUNTER — APPOINTMENT (OUTPATIENT)
Dept: INFUSION THERAPY | Age: 50
End: 2020-01-31
Payer: MEDICAID

## 2020-01-31 RX ORDER — HYDROCHLOROTHIAZIDE 12.5 MG/1
TABLET ORAL
Qty: 30 TAB | Refills: 2 | Status: SHIPPED | OUTPATIENT
Start: 2020-01-31 | End: 2020-05-18

## 2020-02-01 ENCOUNTER — APPOINTMENT (OUTPATIENT)
Dept: INFUSION THERAPY | Age: 50
End: 2020-02-01
Payer: MEDICAID

## 2020-02-05 ENCOUNTER — OFFICE VISIT (OUTPATIENT)
Dept: ONCOLOGY | Age: 50
End: 2020-02-05

## 2020-02-05 ENCOUNTER — HOSPITAL ENCOUNTER (OUTPATIENT)
Dept: INFUSION THERAPY | Age: 50
Discharge: HOME | End: 2020-02-05
Payer: MEDICAID

## 2020-02-05 VITALS
HEIGHT: 67 IN | HEART RATE: 79 BPM | TEMPERATURE: 98.6 F | OXYGEN SATURATION: 97 % | DIASTOLIC BLOOD PRESSURE: 85 MMHG | BODY MASS INDEX: 23.7 KG/M2 | WEIGHT: 151 LBS | SYSTOLIC BLOOD PRESSURE: 130 MMHG

## 2020-02-05 DIAGNOSIS — Z95.828 PORT-A-CATH IN PLACE: ICD-10-CM

## 2020-02-05 DIAGNOSIS — D69.6 THROMBOCYTOPENIA (HCC): ICD-10-CM

## 2020-02-05 DIAGNOSIS — D70.9 NEUTROPENIA, UNSPECIFIED TYPE (HCC): ICD-10-CM

## 2020-02-05 DIAGNOSIS — C20 RECTAL CANCER (HCC): Primary | ICD-10-CM

## 2020-02-05 DIAGNOSIS — Z51.11 ENCOUNTER FOR ANTINEOPLASTIC CHEMOTHERAPY: ICD-10-CM

## 2020-02-05 LAB
ALBUMIN SERPL-MCNC: 3.5 G/DL (ref 3.5–5)
ALBUMIN/GLOB SERPL: 1 {RATIO} (ref 1.1–2.2)
ALP SERPL-CCNC: 103 U/L (ref 45–117)
ALT SERPL-CCNC: 31 U/L (ref 12–78)
ANION GAP SERPL CALC-SCNC: 6 MMOL/L (ref 5–15)
AST SERPL-CCNC: 25 U/L (ref 15–37)
BASOPHILS # BLD: 0 K/UL (ref 0–0.1)
BASOPHILS NFR BLD: 0 % (ref 0–1)
BILIRUB SERPL-MCNC: 0.3 MG/DL (ref 0.2–1)
BUN SERPL-MCNC: 11 MG/DL (ref 6–20)
BUN/CREAT SERPL: 10 (ref 12–20)
CALCIUM SERPL-MCNC: 9.1 MG/DL (ref 8.5–10.1)
CHLORIDE SERPL-SCNC: 105 MMOL/L (ref 97–108)
CO2 SERPL-SCNC: 26 MMOL/L (ref 21–32)
CREAT SERPL-MCNC: 1.05 MG/DL (ref 0.7–1.3)
DIFFERENTIAL METHOD BLD: ABNORMAL
EOSINOPHIL # BLD: 0.1 K/UL (ref 0–0.4)
EOSINOPHIL NFR BLD: 1 % (ref 0–7)
ERYTHROCYTE [DISTWIDTH] IN BLOOD BY AUTOMATED COUNT: 14.3 % (ref 11.5–14.5)
GLOBULIN SER CALC-MCNC: 3.5 G/DL (ref 2–4)
GLUCOSE SERPL-MCNC: 164 MG/DL (ref 65–100)
HCT VFR BLD AUTO: 38.1 % (ref 36.6–50.3)
HGB BLD-MCNC: 12.9 G/DL (ref 12.1–17)
IMM GRANULOCYTES # BLD AUTO: 0.1 K/UL (ref 0–0.04)
IMM GRANULOCYTES NFR BLD AUTO: 1 % (ref 0–0.5)
LYMPHOCYTES # BLD: 0.4 K/UL (ref 0.8–3.5)
LYMPHOCYTES NFR BLD: 5 % (ref 12–49)
MCH RBC QN AUTO: 34.4 PG (ref 26–34)
MCHC RBC AUTO-ENTMCNC: 33.9 G/DL (ref 30–36.5)
MCV RBC AUTO: 101.6 FL (ref 80–99)
MONOCYTES # BLD: 0.4 K/UL (ref 0–1)
MONOCYTES NFR BLD: 5 % (ref 5–13)
NEUTS SEG # BLD: 7.7 K/UL (ref 1.8–8)
NEUTS SEG NFR BLD: 88 % (ref 32–75)
NRBC # BLD: 0 K/UL (ref 0–0.01)
NRBC BLD-RTO: 0 PER 100 WBC
PLATELET # BLD AUTO: 102 K/UL (ref 150–400)
PMV BLD AUTO: 11.4 FL (ref 8.9–12.9)
POTASSIUM SERPL-SCNC: 3.5 MMOL/L (ref 3.5–5.1)
PROT SERPL-MCNC: 7 G/DL (ref 6.4–8.2)
RBC # BLD AUTO: 3.75 M/UL (ref 4.1–5.7)
RBC MORPH BLD: ABNORMAL
RBC MORPH BLD: ABNORMAL
SODIUM SERPL-SCNC: 137 MMOL/L (ref 136–145)
WBC # BLD AUTO: 8.7 K/UL (ref 4.1–11.1)

## 2020-02-05 PROCEDURE — 96413 CHEMO IV INFUSION 1 HR: CPT

## 2020-02-05 PROCEDURE — 74011000258 HC RX REV CODE- 258: Performed by: REGISTERED NURSE

## 2020-02-05 PROCEDURE — 85025 COMPLETE CBC W/AUTO DIFF WBC: CPT

## 2020-02-05 PROCEDURE — 36415 COLL VENOUS BLD VENIPUNCTURE: CPT

## 2020-02-05 PROCEDURE — 77030012965 HC NDL HUBR BBMI -A

## 2020-02-05 PROCEDURE — 96375 TX/PRO/DX INJ NEW DRUG ADDON: CPT

## 2020-02-05 PROCEDURE — 96415 CHEMO IV INFUSION ADDL HR: CPT

## 2020-02-05 PROCEDURE — 80053 COMPREHEN METABOLIC PANEL: CPT

## 2020-02-05 PROCEDURE — 74011250636 HC RX REV CODE- 250/636: Performed by: REGISTERED NURSE

## 2020-02-05 PROCEDURE — 96416 CHEMO PROLONG INFUSE W/PUMP: CPT

## 2020-02-05 RX ORDER — SODIUM CHLORIDE 9 MG/ML
10 INJECTION INTRAMUSCULAR; INTRAVENOUS; SUBCUTANEOUS AS NEEDED
Status: ACTIVE | OUTPATIENT
Start: 2020-02-05 | End: 2020-02-05

## 2020-02-05 RX ORDER — DEXTROSE MONOHYDRATE 50 MG/ML
25 INJECTION, SOLUTION INTRAVENOUS CONTINUOUS
Status: DISPENSED | OUTPATIENT
Start: 2020-02-05 | End: 2020-02-05

## 2020-02-05 RX ORDER — HEPARIN 100 UNIT/ML
300-500 SYRINGE INTRAVENOUS AS NEEDED
Status: ACTIVE | OUTPATIENT
Start: 2020-02-05 | End: 2020-02-05

## 2020-02-05 RX ORDER — SODIUM CHLORIDE 0.9 % (FLUSH) 0.9 %
10 SYRINGE (ML) INJECTION AS NEEDED
Status: DISPENSED | OUTPATIENT
Start: 2020-02-05 | End: 2020-02-05

## 2020-02-05 RX ORDER — ONDANSETRON 2 MG/ML
8 INJECTION INTRAMUSCULAR; INTRAVENOUS ONCE
Status: COMPLETED | OUTPATIENT
Start: 2020-02-05 | End: 2020-02-05

## 2020-02-05 RX ADMIN — DEXAMETHASONE SODIUM PHOSPHATE 12 MG: 4 INJECTION, SOLUTION INTRA-ARTICULAR; INTRALESIONAL; INTRAMUSCULAR; INTRAVENOUS; SOFT TISSUE at 10:30

## 2020-02-05 RX ADMIN — SODIUM CHLORIDE 10 ML: 9 INJECTION INTRAMUSCULAR; INTRAVENOUS; SUBCUTANEOUS at 08:15

## 2020-02-05 RX ADMIN — ONDANSETRON 8 MG: 2 INJECTION INTRAMUSCULAR; INTRAVENOUS at 09:55

## 2020-02-05 RX ADMIN — DEXTROSE MONOHYDRATE 25 ML/HR: 5 INJECTION, SOLUTION INTRAVENOUS at 09:50

## 2020-02-05 RX ADMIN — FLUOROURACIL 4224 MG: 50 INJECTION, SOLUTION INTRAVENOUS at 12:54

## 2020-02-05 RX ADMIN — Medication 10 ML: at 08:20

## 2020-02-05 RX ADMIN — OXALIPLATIN 114.5 MG: 5 INJECTION, SOLUTION INTRAVENOUS at 10:50

## 2020-02-05 NOTE — PROGRESS NOTES
[facility] - Chemotherapy Progress Note    3790 Pt admit to [facility] for Folfox ambulatory in stable condition. Assessment completed. No new concerns voiced. Port accessed per protocol with positive blood return, labs drawn and sent for processing.     0830 pt upstairs for MD appt    0940 pt back to [facility] for tx    Chemotherapy Flowsheet 2/5/2020   Cycle C7   Date 2/5/2020   Drug / Regimen Folfox   Dosage -   Time Up -   Time Down -   Pre Hydration -   Post Hydration -   Pre Meds given   Notes given         Visit Vitals  BP (!) (P) 142/93   Pulse (P) 86   Temp (P) 98.6 °F (37 °C)   Resp (P) 18   Ht (P) 5' 7\" (1.702 m)   Wt (P) 68.5 kg (151 lb)   BMI (P) 23.65 kg/m²       Medications:  Medications Administered     0.9% sodium chloride injection 10 mL     Admin Date  02/05/2020 Action  Given Dose  10 mL Route  IntraVENous Administered By  Filbert Real A          dexamethasone (DECADRON) 12 mg in 0.9% sodium chloride 50 mL, overfill volume 5 mL IVPB     Admin Date  02/05/2020 Action  Given Dose  12 mg Rate  232 mL/hr Route  IntraVENous Administered By  Filbert Real A          dextrose 5% infusion     Admin Date  02/05/2020 Action  New Bag Dose  25 mL/hr Rate  25 mL/hr Route  IntraVENous Administered By  Filbert Real A          fluorouraciL (ADRUCIL) 4,224 mg in 0.9% sodium chloride 100 mL CADD Cassette     Admin Date  02/05/2020 Action  New Bag Dose  4224 mg Rate  2.2 mL/hr Route  IntraVENous Administered By  Filbert Real A          ondansetron [facility]) injection 8 mg     Admin Date  02/05/2020 Action  Given Dose  8 mg Route  IntraVENous Administered By  Filbert Real A          oxaliplatin (ELOXATIN) 114.5 mg in dextrose 5% 250 mL, overfill volume 25 mL chemo infusion     Admin Date  02/05/2020 Action  New Bag Dose  114.5 mg Rate  149 mL/hr Route  IntraVENous Administered By  Filbert Real A          saline peripheral flush soln 10 mL     Admin Date  02/05/2020 Action  Given Dose  10 mL Route  InterCATHeter Administered By  Roberto Mendes                  1300 Pt tolerated treatment well. Port maintained positive blood return throughout treatment, CADD pump connected to port and programmed to run at 2.2cc/hr x 46 hrs,  D/c home ambulatory in no distress. Pt aware of next appointment scheduled for 2/7/20.

## 2020-02-05 NOTE — PROGRESS NOTES
[facility] [place] [address] [address], [address]  W: [phone]  F: [phone]    Reason for Visit:   Karrie Allen is a 47 y.o. male who is seen for Rectal cancer- likely stage III- SOMMER    Treatment History:   · 4/26/19: Colonoscopy- 6-7 cm size malignant appearing mass seen at 10 cm from anal verge. This was adenocarcinoma. 3 polyps removed- all were tubular adenomas  · Rectal Ultrasound 5/2/19: ulcerated infiltrative mass lesion was noted in rectum at 10 cm. The lesion measured about 5 cm X 4 cm- T2, 13 mm X 8 mm oblong lymph node was noted immediately adjacent to the mass lesion  · 5/2/19: CT CAP- No metastasis, liver cysts. CEA 3.6  · 5/10/19: PET CT showed rectal malignancy and 3 small perirectal anibal metastatic focir  · 5/28/19-7/8/29: Xeloda + RT   · 9/24/19: LAR- [id number], 2/6 positive nodes  · 10/14/19: CT CAP with no evidence of metastatic disease, liver cysts   · 10/23/19: cycle 1 FOLFOX  · 12/11/19 CT AP: hypodensity in liver, otherwise KATHLEEN   · 1/6/20 MRI abd: no liver mets    History of Present Illness:   Patient is a 47 y.o. male seen for adenocarcinoma of the mid third of rectum    He had intermittent BRBPR x 1 year and then decided to have a colonoscopy. This led to above mentioned diagnosis. He received neoadjuvant xeloda+ RT followed by LAR. Now on adjuvant therapy with FOLFOX. Comes today for cycle 7 of FOLFOX. He feels well. Cold sensitivity was reduced d/t oxaliplatin DR. He denies nausea/vomiting and no change in stool consistency. He has no pain. 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 Denies fevers/chills, SOB, CP, cough, or bleeding. Has a bad taste in his mouth at times but no mouth sores. He uses 7 drinks a week.     Adopted    Past Medical History:   Diagnosis Date    Adopted     GERD (gastroesophageal reflux disease)     Psoriasis     Psoriatic arthritis ([facility] [address]     Rectal cancer [facility])       Past Surgical History:   Procedure Laterality Date    COLONOSCOPY Left 4/26/2019    COLONOSCOPY performed by Charlie Barney MD at [facility] N/A 5/2/2019    SIGMOIDOSCOPY FLEXIBLE performed by Star Man MD at [address] [facility]      EGD    HX HEENT      WISDOM TEETH    HX ORTHOPAEDIC Right     ACL REPLACEMENT    IR INSERT TUNL CVC W PORT OVER 5 YEARS  10/15/2019      Social History     Tobacco Use    Smoking status: Never Smoker    Smokeless tobacco: Never Used   Substance Use Topics    Alcohol use: Yes     Comment: 6 BEERS WEEKLY      Family History   Adopted: Yes   Problem Relation Age of Onset    Asthma Neg Hx     Cancer Neg Hx     Diabetes Neg Hx     Heart Disease Neg Hx     Hypertension Neg Hx     Stroke Neg Hx      Current Outpatient Medications   Medication Sig    hydroCHLOROthiazide (HYDRODIURIL) 12.5 mg tablet TAKE 1 TABLET BY MOUTH EVERY DAY    tadalafil (CIALIS) 5 mg tablet Take 5 mg by mouth.  oxybutynin (DITROPAN) 5 mg tablet Take 5 mg by mouth two (2) times a day.  lidocaine-prilocaine (EMLA) topical cream Apply  to affected area as needed for Pain.  dexAMETHasone (DECADRON) 4 mg tablet Take 8 mg by mouth See Admin Instructions. Take 2 tabs by mouth twice a day on days 2 and 3 after chemotherapy only    ondansetron (ZOFRAN ODT) 8 mg disintegrating tablet Take 1 Tab by mouth every eight (8) hours as needed for Nausea.  acetaminophen (TYLENOL) 500 mg tablet Take 500 mg by mouth every six (6) hours as needed for Fever or Pain.  diphenoxylate-atropine (LOMOTIL) 2.5-0.025 mg per tablet Take 1 Tab by mouth four (4) times daily as needed for Diarrhea.  tamsulosin (FLOMAX) 0.4 mg capsule Take 0.4 mg by mouth daily.  triamcinolone acetonide (KENALOG) 0.1 % ointment Apply  to affected area two (2) times daily as needed for Skin Irritation.  use thin layer    fluocinonide (VANOS) 0.1 % topical cream Apply  to affected area daily. No current facility-administered medications for this visit. Facility-Administered Medications Ordered in Other Visits   Medication Dose Route Frequency    saline peripheral flush soln 10 mL  10 mL InterCATHeter PRN    0.9% sodium chloride injection 10 mL  10 mL IntraVENous PRN    heparin (porcine) pf 300-500 Units  300-500 Units InterCATHeter PRN      No Known Allergies     Review of Systems: A complete review of systems was obtained, negative except as described above. Physical Exam:     Visit Vitals  /85 (BP 1 Location: Left arm)   Pulse 79   Temp 98.6 °F (37 °C)   Ht 5' 7\" (1.702 m)   Wt 151 lb (68.5 kg)   SpO2 97%   BMI 23.65 kg/m²     ECOG PS: 1  General: No distress  Eyes: PERRLA, anicteric sclerae  HENT: Atraumatic, OP clear, PAC looks good   Resp: CTAB, normal respiratory effort  CV: s1s2, no LE edema   MS: Normal gait and station. Digits without clubbing or cyanosis. Skin: No rashes, ecchymoses, or petechiae. Normal temperature, turgor, and texture. Psych: Alert, oriented, appropriate affect, normal judgment/insight    Results:     Lab Results   Component Value Date/Time    WBC 8.7 02/05/2020 08:23 AM    HGB 12.9 02/05/2020 08:23 AM    HCT 38.1 02/05/2020 08:23 AM    PLATELET 731 (L) 29/16/7069 08:23 AM    .6 (H) 02/05/2020 08:23 AM    ABS. NEUTROPHILS PENDING 02/05/2020 08:23 AM     Lab Results   Component Value Date/Time    Sodium 137 01/22/2020 08:13 AM    Potassium 3.4 (L) 01/22/2020 08:13 AM    Chloride 104 01/22/2020 08:13 AM    CO2 27 01/22/2020 08:13 AM    Glucose 160 (H) 01/22/2020 08:13 AM    BUN 10 01/22/2020 08:13 AM    Creatinine 0.97 01/22/2020 08:13 AM    GFR est AA >60 01/22/2020 08:13 AM    GFR est non-AA >60 01/22/2020 08:13 AM    Calcium 8.8 01/22/2020 08:13 AM     Lab Results   Component Value Date/Time    Bilirubin, total 0.6 01/22/2020 08:13 AM    ALT (SGPT) 28 01/22/2020 08:13 AM    AST (SGOT) 17 01/22/2020 08:13 AM    Alk.  phosphatase 82 01/22/2020 08:13 AM    Protein, total 6.5 01/22/2020 08:13 AM    Albumin 3.6 01/22/2020 08:13 AM    Globulin 2.9 01/22/2020 08:13 AM     Records reviewed and summarized above. Pathology report(s) reviewed  FINAL PATHOLOGIC DIAGNOSIS   1. Rectum and sigmoid colon, laparoscopic low anterior resection:   SPECIMEN   Procedure: Low anterior resection   Macroscopic Intactness of Mesorectum: Complete   TUMOR   Tumor Site: Rectum   Histologic Type: Adenocarcinoma   Histologic Grade: G2: Moderately differentiated   Tumor Size: 2.6 cm   Tumor Deposits: Present   Number of Deposits: 1   Tumor Extension: Tumor invades through the muscularis propria into pericolorectal tissue   Macroscopic Tumor Perforation: Not identified   Lymphovascular Invasion: Not identified   Perineural Invasion: Not identified   Tumor Budding: Number of tumor buds in one hotspot field: 5   Tumor Budding Score: Intermediate score (5-9)   Treatment Effect: Present - Residual cancer with evident tumor regression, but more than single cells or   rare small groups of cancer cells (partial response, score 2)   MARGINS   Margins: All margins are uninvolved by invasive carcinoma, high- grade dysplasia, intramucosal   adenocarcinoma, and adenoma   Margins Examined: Proximal, Distal, Radial or Mesenteric   Distance of Invasive Carcinoma from Closest Margin: 25 mm   Closest Margin: Radial or Mesenteric   LYMPH NODES   Number of Lymph Nodes Involved: 2   Number of Lymph Nodes Examined: 16   PATHOLOGIC STAGE CLASSIFICATION (pTNM, AJCC 8th Edition)   Primary Tumor (pT): pT3   Regional Lymph Nodes (pN): pN1b   2. Large bowel, donuts:   Fragments of large bowel wall, negative for microscopic pathologic abnormality. Radiology report(s) reviewed above. CT CAP 10/14/19: IMPRESSION:  1. There are scattered small hepatic cysts without definite evidence for  metastatic disease. 2. Otherwise negative CT chest abdomen pelvis    CT CAP 12/11/19: IMPRESSION:  1. Right hydroureteronephrosis due to an 8 mm calculus at the ureterovesicular  junction. 2. Vague areas of heterogeneous enhancement and hypodensity in the liver, raises  concern for metastases, though dedicated 3 phase liver CT or liver protocol MRI  may be considered as follow-up. Numerous small hypodensities in the liver likely  cysts, unchanged. 3. Rectal anastomosis and presacral edema, treatment related. Right lower  quadrant ileostomy. No bowel obstruction    MRI abd 1/6/20: no hepatic mets     Assessment:   1) Rectal adenocarcinoma- mid third- SOMMER    T2N1M0 disease- Clinical stage III  S/P John Adj chemoRT followed by LAR on 9/24/19  Pathology showed [id number] residual disease- stage IIIB  Had significant residual disease with practically no treatment effects  Adjuvant FOLFOX started 10/23/19    Today is cycle 7 of FOLFOX x 8 planned cycles     Had grade 3 neutropenia after cycle 2 requiring a 2 week delay. Starting with cycle 3 we dropped 5FU bolus and added udenyca given high risk for grade IV neutropenia. Had grade 3 thrombocytopenia for cycle 6 requiring 1 week delay and oxaliplatin was DR to 65 mg/m2. Grade 1 today so will proceed with treatment. Moving forward if platelets remain below 75k or platelets drop to below 75k despite oxaliplatin dose reduction then will reduce 5FU CADD by 20%. For any delay of chemotherapy by > 2 weeks will stop adjuvant treatment    CT imaging after 4 cycles of FOLFOX showed hypodensities in the liver but otherwise KATHLEEN. MRI obtained thereafter showed no evidence of hepatic mets.      Genetic testing negative    2) Young age at diagnosis  Adopted  Had several adenomas removed  May have attenuated FAP and will require genetic testing  SOMMER    3) GIB  Minor    4) Psoriasis  Off Methotrexate  Follows Dr. Benito Perry    5) Neutropenia  Chemotherapy induced  See #1    6) Thrombocytopenia  Grade 3 after 5 cycles  Grade 1 today  See #1 regarding    HIV, hep, anti-plt AB negative Plan:     · Proceed today with cycle 7 of adjuvant FOLFOX (5FU CADD 2,400mg/m2, Oxaliplatin DR to 65mg/m2 given gr 3 thrombcytopenia) given every 2 weeks x 8 cycles   · Labs to include CBC with diff and CMP prior to each infusion; CEA monthly  · Pre-meds to include Dexamethasone and Zofran  · Dexamethasone 8mg daily days 2 & 3 of chemotherapy   · Zofran PRN   · Udencya given high risk for grade IV neutropenia    RTC in 2 weeks for cycle 8    I appreciate the opportunity to participate in Mr. Walters Jose Antonio gardner.   Seen in conjunction with Olivia Morgan NP    Signed By: Cliff Goetz MD

## 2020-02-07 ENCOUNTER — HOSPITAL ENCOUNTER (OUTPATIENT)
Dept: INFUSION THERAPY | Age: 50
Discharge: HOME | End: 2020-02-07
Payer: MEDICAID

## 2020-02-07 VITALS
DIASTOLIC BLOOD PRESSURE: 72 MMHG | RESPIRATION RATE: 18 BRPM | TEMPERATURE: 98.3 F | SYSTOLIC BLOOD PRESSURE: 122 MMHG | HEART RATE: 77 BPM

## 2020-02-07 PROCEDURE — 96523 IRRIG DRUG DELIVERY DEVICE: CPT

## 2020-02-07 NOTE — PROGRESS NOTES
[facility] Short Note                       Date: 2020    Name: Olga Ramirez    MRN: [id number]         : 1970    1600 Pt [facility] removal of CaDD pump ambulatory in stable condition. Assessment completed. No new concerns voiced. Mr. Bisi Donovan vitals were reviewed prior to treatment. Patient Vitals for the past 12 hrs:   Temp Pulse Resp BP   20 1600 98.3 °F (36.8 °C) 77 18 122/72       Port with positive blood return flushed, heparinized and de-accessed per protocol. 1610 Pt tolerated treatment well. D/c home ambulatory in no distress.      Future Appointments   Date Time Provider Shannan Rodriguez   2020 10:00 AM [facility]2 [facility]    2020  8:00 AM C1 GREGORIA MED [address] [place] [address]   2020  8:15 AM [facility]   2020  5:00 PM G1 [facility]    2020 10:00 AM G2 [facility]    2020 10:30 AM Tanner Kendrick MD [facility]   2020  9:00 AM Freedom Fitzpatrick DO [address] [place] [address] 75, RN  2020  5:29 PM

## 2020-02-09 ENCOUNTER — HOSPITAL ENCOUNTER (OUTPATIENT)
Dept: INFUSION THERAPY | Age: 50
Discharge: HOME | End: 2020-02-09
Payer: MEDICAID

## 2020-02-09 VITALS
DIASTOLIC BLOOD PRESSURE: 88 MMHG | SYSTOLIC BLOOD PRESSURE: 142 MMHG | RESPIRATION RATE: 18 BRPM | HEART RATE: 106 BPM | TEMPERATURE: 99 F

## 2020-02-09 DIAGNOSIS — C20 RECTAL CANCER (HCC): Primary | ICD-10-CM

## 2020-02-09 PROCEDURE — 96372 THER/PROPH/DIAG INJ SC/IM: CPT

## 2020-02-09 PROCEDURE — 74011250636 HC RX REV CODE- 250/636: Performed by: REGISTERED NURSE

## 2020-02-09 RX ADMIN — PEGFILGRASTIM-CBQV 6 MG: 6 INJECTION, SOLUTION SUBCUTANEOUS at 10:42

## 2020-02-12 ENCOUNTER — APPOINTMENT (OUTPATIENT)
Dept: INFUSION THERAPY | Age: 50
End: 2020-02-12
Payer: MEDICAID

## 2020-02-14 ENCOUNTER — APPOINTMENT (OUTPATIENT)
Dept: INFUSION THERAPY | Age: 50
End: 2020-02-14
Payer: MEDICAID

## 2020-02-15 ENCOUNTER — APPOINTMENT (OUTPATIENT)
Dept: INFUSION THERAPY | Age: 50
End: 2020-02-15
Payer: MEDICAID

## 2020-02-19 ENCOUNTER — HOSPITAL ENCOUNTER (OUTPATIENT)
Dept: INFUSION THERAPY | Age: 50
Discharge: HOME | End: 2020-02-19
Payer: MEDICAID

## 2020-02-19 ENCOUNTER — OFFICE VISIT (OUTPATIENT)
Dept: ONCOLOGY | Age: 50
End: 2020-02-19

## 2020-02-19 VITALS
RESPIRATION RATE: 16 BRPM | HEIGHT: 67 IN | TEMPERATURE: 98 F | WEIGHT: 154 LBS | OXYGEN SATURATION: 97 % | DIASTOLIC BLOOD PRESSURE: 80 MMHG | BODY MASS INDEX: 24.17 KG/M2 | SYSTOLIC BLOOD PRESSURE: 130 MMHG | HEART RATE: 80 BPM

## 2020-02-19 VITALS
OXYGEN SATURATION: 98 % | SYSTOLIC BLOOD PRESSURE: 148 MMHG | HEIGHT: 67 IN | WEIGHT: 154 LBS | TEMPERATURE: 98.1 F | HEART RATE: 82 BPM | DIASTOLIC BLOOD PRESSURE: 75 MMHG | BODY MASS INDEX: 24.17 KG/M2

## 2020-02-19 DIAGNOSIS — Z95.828 PORT-A-CATH IN PLACE: ICD-10-CM

## 2020-02-19 DIAGNOSIS — C20 RECTAL CANCER (HCC): Primary | ICD-10-CM

## 2020-02-19 DIAGNOSIS — Z51.11 ENCOUNTER FOR ANTINEOPLASTIC CHEMOTHERAPY: ICD-10-CM

## 2020-02-19 LAB
ALBUMIN SERPL-MCNC: 3.4 G/DL (ref 3.5–5)
ALBUMIN/GLOB SERPL: 1 {RATIO} (ref 1.1–2.2)
ALP SERPL-CCNC: 137 U/L (ref 45–117)
ALT SERPL-CCNC: 27 U/L (ref 12–78)
ANION GAP SERPL CALC-SCNC: 4 MMOL/L (ref 5–15)
AST SERPL-CCNC: 20 U/L (ref 15–37)
BASOPHILS # BLD: 0.1 K/UL (ref 0–0.1)
BASOPHILS NFR BLD: 1 % (ref 0–1)
BILIRUB SERPL-MCNC: 0.3 MG/DL (ref 0.2–1)
BUN SERPL-MCNC: 11 MG/DL (ref 6–20)
BUN/CREAT SERPL: 11 (ref 12–20)
CALCIUM SERPL-MCNC: 9 MG/DL (ref 8.5–10.1)
CEA SERPL-MCNC: 2.1 NG/ML
CHLORIDE SERPL-SCNC: 107 MMOL/L (ref 97–108)
CO2 SERPL-SCNC: 27 MMOL/L (ref 21–32)
CREAT SERPL-MCNC: 0.96 MG/DL (ref 0.7–1.3)
DIFFERENTIAL METHOD BLD: ABNORMAL
EOSINOPHIL # BLD: 0.1 K/UL (ref 0–0.4)
EOSINOPHIL NFR BLD: 1 % (ref 0–7)
ERYTHROCYTE [DISTWIDTH] IN BLOOD BY AUTOMATED COUNT: 14 % (ref 11.5–14.5)
GLOBULIN SER CALC-MCNC: 3.4 G/DL (ref 2–4)
GLUCOSE SERPL-MCNC: 141 MG/DL (ref 65–100)
HCT VFR BLD AUTO: 37.6 % (ref 36.6–50.3)
HGB BLD-MCNC: 12.6 G/DL (ref 12.1–17)
IMM GRANULOCYTES # BLD AUTO: 0.4 K/UL (ref 0–0.04)
IMM GRANULOCYTES NFR BLD AUTO: 3 % (ref 0–0.5)
LYMPHOCYTES # BLD: 0.4 K/UL (ref 0.8–3.5)
LYMPHOCYTES NFR BLD: 3 % (ref 12–49)
MCH RBC QN AUTO: 33.9 PG (ref 26–34)
MCHC RBC AUTO-ENTMCNC: 33.5 G/DL (ref 30–36.5)
MCV RBC AUTO: 101.1 FL (ref 80–99)
MONOCYTES # BLD: 0.6 K/UL (ref 0–1)
MONOCYTES NFR BLD: 5 % (ref 5–13)
NEUTS SEG # BLD: 10.8 K/UL (ref 1.8–8)
NEUTS SEG NFR BLD: 87 % (ref 32–75)
NRBC # BLD: 0 K/UL (ref 0–0.01)
NRBC BLD-RTO: 0 PER 100 WBC
PLATELET # BLD AUTO: 80 K/UL (ref 150–400)
PMV BLD AUTO: 11.9 FL (ref 8.9–12.9)
POTASSIUM SERPL-SCNC: 3.7 MMOL/L (ref 3.5–5.1)
PROT SERPL-MCNC: 6.8 G/DL (ref 6.4–8.2)
RBC # BLD AUTO: 3.72 M/UL (ref 4.1–5.7)
RBC MORPH BLD: ABNORMAL
RBC MORPH BLD: ABNORMAL
SODIUM SERPL-SCNC: 138 MMOL/L (ref 136–145)
WBC # BLD AUTO: 12.4 K/UL (ref 4.1–11.1)

## 2020-02-19 PROCEDURE — 74011250636 HC RX REV CODE- 250/636: Performed by: REGISTERED NURSE

## 2020-02-19 PROCEDURE — 77030012965 HC NDL HUBR BBMI -A

## 2020-02-19 PROCEDURE — 74011000258 HC RX REV CODE- 258: Performed by: REGISTERED NURSE

## 2020-02-19 PROCEDURE — 96413 CHEMO IV INFUSION 1 HR: CPT

## 2020-02-19 PROCEDURE — 82378 CARCINOEMBRYONIC ANTIGEN: CPT

## 2020-02-19 PROCEDURE — 74011000250 HC RX REV CODE- 250: Performed by: REGISTERED NURSE

## 2020-02-19 PROCEDURE — 85025 COMPLETE CBC W/AUTO DIFF WBC: CPT

## 2020-02-19 PROCEDURE — 36415 COLL VENOUS BLD VENIPUNCTURE: CPT

## 2020-02-19 PROCEDURE — 96375 TX/PRO/DX INJ NEW DRUG ADDON: CPT

## 2020-02-19 PROCEDURE — 80053 COMPREHEN METABOLIC PANEL: CPT

## 2020-02-19 PROCEDURE — 96416 CHEMO PROLONG INFUSE W/PUMP: CPT

## 2020-02-19 PROCEDURE — 96415 CHEMO IV INFUSION ADDL HR: CPT

## 2020-02-19 RX ORDER — DEXTROSE MONOHYDRATE 50 MG/ML
25 INJECTION, SOLUTION INTRAVENOUS CONTINUOUS
Status: DISPENSED | OUTPATIENT
Start: 2020-02-19 | End: 2020-02-19

## 2020-02-19 RX ORDER — SODIUM CHLORIDE 0.9 % (FLUSH) 0.9 %
10 SYRINGE (ML) INJECTION AS NEEDED
Status: DISPENSED | OUTPATIENT
Start: 2020-02-19 | End: 2020-02-19

## 2020-02-19 RX ORDER — SODIUM CHLORIDE 9 MG/ML
10 INJECTION INTRAMUSCULAR; INTRAVENOUS; SUBCUTANEOUS AS NEEDED
Status: ACTIVE | OUTPATIENT
Start: 2020-02-19 | End: 2020-02-19

## 2020-02-19 RX ORDER — ONDANSETRON 2 MG/ML
8 INJECTION INTRAMUSCULAR; INTRAVENOUS ONCE
Status: COMPLETED | OUTPATIENT
Start: 2020-02-19 | End: 2020-02-19

## 2020-02-19 RX ORDER — HEPARIN 100 UNIT/ML
300-500 SYRINGE INTRAVENOUS AS NEEDED
Status: ACTIVE | OUTPATIENT
Start: 2020-02-19 | End: 2020-02-19

## 2020-02-19 RX ADMIN — FLUOROURACIL 3379 MG: 50 INJECTION, SOLUTION INTRAVENOUS at 14:15

## 2020-02-19 RX ADMIN — DEXTROSE MONOHYDRATE 25 ML/HR: 5 INJECTION, SOLUTION INTRAVENOUS at 10:28

## 2020-02-19 RX ADMIN — Medication 10 ML: at 08:28

## 2020-02-19 RX ADMIN — DEXAMETHASONE SODIUM PHOSPHATE 12 MG: 4 INJECTION, SOLUTION INTRA-ARTICULAR; INTRALESIONAL; INTRAMUSCULAR; INTRAVENOUS; SOFT TISSUE at 11:15

## 2020-02-19 RX ADMIN — ONDANSETRON 8 MG: 2 INJECTION INTRAMUSCULAR; INTRAVENOUS at 10:31

## 2020-02-19 RX ADMIN — DEXTROSE MONOHYDRATE 114.5 MG: 50 INJECTION, SOLUTION INTRAVENOUS at 11:35

## 2020-02-19 RX ADMIN — Medication 10 ML: at 08:27

## 2020-02-19 RX ADMIN — SODIUM CHLORIDE 10 ML: 9 INJECTION INTRAMUSCULAR; INTRAVENOUS; SUBCUTANEOUS at 08:20

## 2020-02-19 NOTE — PROGRESS NOTES
[facility] [place] VISIT NOTE    0810  Pt arrived at [place] ambulatory and in no distress for C8 FOLFOX. Assessment completed, pt denies any complaints today. Blood pressure 136/80, pulse 79, temperature 98 °F (36.7 °C), resp. rate 16, height 5' 7\" (1.702 m), weight 69.9 kg (154 lb), SpO2 97 %. Right chest port accessed with 0.75 in carroll no difficulty. Positive blood return noted and labs drawn. Patient proceeded to MD clinic visit. Lab results are within treatment parameters. OK to treat with platelet level 80. 5FU CIV pump dose reduced by 20% per Maite Deras NP. Recent Results (from the past 12 hour(s))   CBC WITH AUTOMATED DIFF    Collection Time: 02/19/20  8:22 AM   Result Value Ref Range    WBC 12.4 (H) 4.1 - 11.1 K/uL    RBC 3.72 (L) 4.10 - 5.70 M/uL    HGB 12.6 12.1 - 17.0 g/dL    HCT 37.6 36.6 - 50.3 %    .1 (H) 80.0 - 99.0 FL    MCH 33.9 26.0 - 34.0 PG    MCHC 33.5 30.0 - 36.5 g/dL    RDW 14.0 11.5 - 14.5 %    PLATELET 80 (L) 224 - 400 K/uL    MPV 11.9 8.9 - 12.9 FL    NRBC 0.0 0  WBC    ABSOLUTE NRBC 0.00 0.00 - 0.01 K/uL    NEUTROPHILS 87 (H) 32 - 75 %    LYMPHOCYTES 3 (L) 12 - 49 %    MONOCYTES 5 5 - 13 %    EOSINOPHILS 1 0 - 7 %    BASOPHILS 1 0 - 1 %    IMMATURE GRANULOCYTES 3 (H) 0.0 - 0.5 %    ABS. NEUTROPHILS 10.8 (H) 1.8 - 8.0 K/UL    ABS. LYMPHOCYTES 0.4 (L) 0.8 - 3.5 K/UL    ABS. MONOCYTES 0.6 0.0 - 1.0 K/UL    ABS. EOSINOPHILS 0.1 0.0 - 0.4 K/UL    ABS. BASOPHILS 0.1 0.0 - 0.1 K/UL    ABS. IMM.  GRANS. 0.4 (H) 0.00 - 0.04 K/UL    DF SMEAR SCANNED      RBC COMMENTS MACROCYTOSIS  1+        RBC COMMENTS TEARDROP CELLS  1+       METABOLIC PANEL, COMPREHENSIVE    Collection Time: 02/19/20  8:22 AM   Result Value Ref Range    Sodium 138 136 - 145 mmol/L    Potassium 3.7 3.5 - 5.1 mmol/L    Chloride 107 97 - 108 mmol/L    CO2 27 21 - 32 mmol/L    Anion gap 4 (L) 5 - 15 mmol/L    Glucose 141 (H) 65 - 100 mg/dL    BUN 11 6 - 20 MG/DL    Creatinine 0.96 0.70 - 1.30 MG/DL    BUN/Creatinine ratio 11 (L) 12 - 20      GFR est AA >60 >60 ml/min/1.73m2    GFR est non-AA >60 >60 ml/min/1.73m2    Calcium 9.0 8.5 - 10.1 MG/DL    Bilirubin, total 0.3 0.2 - 1.0 MG/DL    ALT (SGPT) 27 12 - 78 U/L    AST (SGOT) 20 15 - 37 U/L    Alk. phosphatase 137 (H) 45 - 117 U/L    Protein, total 6.8 6.4 - 8.2 g/dL    Albumin 3.4 (L) 3.5 - 5.0 g/dL    Globulin 3.4 2.0 - 4.0 g/dL    A-G Ratio 1.0 (L) 1.1 - 2.2       Medications received:  D5 IV @ kvo  Zofran IVP  Dexamethasone IV  Oxaliplatin IV  5FU CIV pump (20% dose reduction today)    Blood pressure 130/80, pulse 80, temperature 98 °F (36.7 °C), resp. rate 16, height 5' 7\" (1.702 m), weight 69.9 kg (154 lb), SpO2 97 %. Tolerated treatment well, no adverse reaction noted. Port connected and secured to 5FU pump for at home infusion. Positive blood return noted. 1420  D/C'd from [facility] ambulatory and in no distress accompanied by his wife.  Next appointment is 2/21/20 at [address] [place] [address],[address].

## 2020-02-19 NOTE — PROGRESS NOTES
Maxime Kaiser is a 47 y.o. male  Chief Complaint   Patient presents with    Follow-up     rectal cancer     1. Have you been to the ER, urgent care clinic since your last visit? Hospitalized since your last visit? No    2. Have you seen or consulted any other health care providers outside of the [address] [place] [address] since your last visit? Include any pap smears or colon screening.   No

## 2020-02-19 NOTE — PROGRESS NOTES
[facility] [place] at [address], [id number] [place] [address] Road, [address] [place] [address]  W: [phone]  F: [phone]    Reason for Visit:   Anitha Torres is a 47 y.o. male who is seen for Rectal cancer- likely stage III- SOMMER    Treatment History:   · 4/26/19: Colonoscopy- 6-7 cm size malignant appearing mass seen at 10 cm from anal verge. This was adenocarcinoma. 3 polyps removed- all were tubular adenomas  · Rectal Ultrasound 5/2/19: ulcerated infiltrative mass lesion was noted in rectum at 10 cm. The lesion measured about 5 cm X 4 cm- T2, 13 mm X 8 mm oblong lymph node was noted immediately adjacent to the mass lesion  · 5/2/19: CT CAP- No metastasis, liver cysts. CEA 3.6  · 5/10/19: PET CT showed rectal malignancy and 3 small perirectal anibal metastatic focir  · 5/28/19-7/8/29: Xeloda + RT   · 9/24/19: LAR- [id number], 2/6 positive nodes  · 10/14/19: CT CAP with no evidence of metastatic disease, liver cysts   · 10/23/19: cycle 1 FOLFOX  · 12/11/19 CT AP: hypodensity in liver, otherwise KATHLEEN   · 1/6/20 MRI abd: no liver mets    History of Present Illness:   Patient is a 47 y.o. male seen for adenocarcinoma of the mid third of rectum    He had intermittent BRBPR x 1 year and then decided to have a colonoscopy. This led to above mentioned diagnosis. He received neoadjuvant xeloda+ RT followed by LAR. Now on adjuvant therapy with FOLFOX. Comes today for cycle 8 of FOLFOX. He feels well. He has cold sensitivity that comes and goes after treatment, lingered a bit longer this past cycle. He is not dropping things and it is not painful. He denies nausea/vomiting and no change in stool consistency. 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 Denies fevers/chills, SOB, CP, cough, or bleeding. Has a bad taste in his mouth at times but no mouth sores. He has ostomy reversal surgery on March 17th. He uses 7 drinks a week.     Adopted    Past Medical History: Diagnosis Date    Adopted     GERD (gastroesophageal reflux disease)     Psoriasis     P[facility]iatic arthritis ([facility] [address]     Rectal cancer [facility])       Past Surgical History:   Procedure Laterality Date    COLONOSCOPY Left 4/26/2019    [facility]    SIGMOIDOSCOPY FLEXIBLE performed by Ermias Villanueva MD at [address] HX GI      EGD    HX HEENT      WISDOM TEETH    HX ORTHOPAEDIC Right     ACL REPLACEMENT    IR INSERT TUNL CVC W PORT OVER 5 YEARS  10/15/2019      Social History     Tobacco Use    Smoking status: Never Smoker    Smokeless tobacco: Never Used   Substance Use Topics    Alcohol use: Yes     Comment: 6 BEERS WEEKLY      Family History   Adopted: Yes   Problem Relation Age of Onset    Asthma Neg Hx     Cancer Neg Hx     Diabetes Neg Hx     Heart Disease Neg Hx     Hypertension Neg Hx     Stroke Neg Hx      Current Outpatient Medications   Medication Sig    hydroCHLOROthiazide (HYDRODIURIL) 12.5 mg tablet TAKE 1 TABLET BY MOUTH EVERY DAY    tadalafil (CIALIS) 5 mg tablet Take 5 mg by mouth.  oxybutynin (DITROPAN) 5 mg tablet Take 5 mg by mouth two (2) times a day.  lidocaine-prilocaine (EMLA) topical cream Apply  to affected area as needed for Pain.  dexAMETHasone (DECADRON) 4 mg tablet Take 8 mg by mouth See Admin Instructions. Take 2 tabs by mouth twice a day on days 2 and 3 after chemotherapy only    ondansetron (ZOFRAN ODT) 8 mg disintegrating tablet Take 1 Tab by mouth every eight (8) hours as needed for Nausea.  acetaminophen (TYLENOL) 500 mg tablet Take 500 mg by mouth every six (6) hours as needed for Fever or Pain.  diphenoxylate-atropine (LOMOTIL) 2.5-0.025 mg per tablet Take 1 Tab by mouth four (4) times daily as needed for Diarrhea.  tamsulosin (FLOMAX) 0.4 mg capsule Take 0.4 mg by mouth daily.     triamcinolone acetonide (KENALOG) 0.1 % ointment Apply  to affected area two (2) times daily as needed for Skin Irritation. use thin layer    fluocinonide (VANOS) 0.1 % topical cream Apply  to affected area daily. No current facility-administered medications for this visit. Facility-Administered Medications Ordered in Other Visits   Medication Dose Route Frequency    saline peripheral flush soln 10 mL  10 mL InterCATHeter PRN    0.9% sodium chloride injection 10 mL  10 mL IntraVENous PRN    heparin (porcine) pf 300-500 Units  300-500 Units InterCATHeter PRN      No Known Allergies     Review of Systems: A complete review of systems was obtained, negative except as described above. Physical Exam:     Visit Vitals  /75 (BP 1 Location: Left arm)   Pulse 82   Temp 98.1 °F (36.7 °C)   Ht 5' 7\" (1.702 m)   Wt 154 lb (69.9 kg)   SpO2 98%   BMI 24.12 kg/m²     ECOG PS: 1  General: No distress  Eyes: PERRLA, anicteric sclerae  HENT: Atraumatic, OP clear, PAC looks good   Resp: CTAB, normal respiratory effort  CV: s1s2, no LE edema   MS: Normal gait and station. Digits without clubbing or cyanosis. Skin: No rashes, ecchymoses, or petechiae. Normal temperature, turgor, and texture. Psych: Alert, oriented, appropriate affect, normal judgment/insight    Results:     Lab Results   Component Value Date/Time    WBC 8.7 02/05/2020 08:23 AM    HGB 12.9 02/05/2020 08:23 AM    HCT 38.1 02/05/2020 08:23 AM    PLATELET 100 (L) 08/12/1149 08:23 AM    .6 (H) 02/05/2020 08:23 AM    ABS.  NEUTROPHILS 7.7 02/05/2020 08:23 AM     Lab Results   Component Value Date/Time    Sodium 137 02/05/2020 08:23 AM    Potassium 3.5 02/05/2020 08:23 AM    Chloride 105 02/05/2020 08:23 AM    CO2 26 02/05/2020 08:23 AM    Glucose 164 (H) 02/05/2020 08:23 AM    BUN 11 02/05/2020 08:23 AM    Creatinine 1.05 02/05/2020 08:23 AM    GFR est AA >60 02/05/2020 08:23 AM    GFR est non-AA >60 02/05/2020 08:23 AM    Calcium 9.1 02/05/2020 08:23 AM     Lab Results   Component Value Date/Time    Bilirubin, total 0.3 02/05/2020 08:23 AM    ALT (SGPT) 31 02/05/2020 08:23 AM    AST (SGOT) 25 02/05/2020 08:23 AM    Alk. phosphatase 103 02/05/2020 08:23 AM    Protein, total 7.0 02/05/2020 08:23 AM    Albumin 3.5 02/05/2020 08:23 AM    Globulin 3.5 02/05/2020 08:23 AM     Records reviewed and summarized above. Pathology report(s) reviewed  FINAL PATHOLOGIC DIAGNOSIS   1. Rectum and sigmoid colon, laparoscopic low anterior resection:   SPECIMEN   Procedure: Low anterior resection   Macroscopic Intactness of Mesorectum: Complete   TUMOR   Tumor Site: Rectum   Histologic Type: Adenocarcinoma   Histologic Grade: G2: Moderately differentiated   Tumor Size: 2.6 cm   Tumor Deposits: Present   Number of Deposits: 1   Tumor Extension: Tumor invades through the muscularis propria into pericolorectal tissue   Macroscopic Tumor Perforation: Not identified   Lymphovascular Invasion: Not identified   Perineural Invasion: Not identified   Tumor Budding: Number of tumor buds in one hotspot field: 5   Tumor Budding Score: Intermediate score (5-9)   Treatment Effect: Present - Residual cancer with evident tumor regression, but more than single cells or   rare small groups of cancer cells (partial response, score 2)   MARGINS   Margins: All margins are uninvolved by invasive carcinoma, high- grade dysplasia, intramucosal   adenocarcinoma, and adenoma   Margins Examined: Proximal, Distal, Radial or Mesenteric   Distance of Invasive Carcinoma from Closest Margin: 25 mm   Closest Margin: Radial or Mesenteric   LYMPH NODES   Number of Lymph Nodes Involved: 2   Number of Lymph Nodes Examined: 16   PATHOLOGIC STAGE CLASSIFICATION (pTNM, AJCC 8th Edition)   Primary Tumor (pT): pT3   Regional Lymph Nodes (pN): pN1b   2. Large bowel, donuts:   Fragments of large bowel wall, negative for microscopic pathologic abnormality. Radiology report(s) reviewed above. CT CAP 10/14/19: IMPRESSION:  1.  There are scattered small hepatic cysts without definite evidence for  metastatic disease. 2. Otherwise negative CT chest abdomen pelvis    CT CAP 12/11/19: IMPRESSION:  1. Right hydroureteronephrosis due to an 8 mm calculus at the ureterovesicular  junction. 2. Vague areas of heterogeneous enhancement and hypodensity in the liver, raises  concern for metastases, though dedicated 3 phase liver CT or liver protocol MRI  may be considered as follow-up. Numerous small hypodensities in the liver likely  cysts, unchanged. 3. Rectal anastomosis and presacral edema, treatment related. Right lower  quadrant ileostomy. No bowel obstruction    MRI abd 1/6/20: no hepatic mets     Assessment:   1) Rectal adenocarcinoma- mid third- SOMMER  Genetic testing: negative     T2N1M0 disease- Clinical stage III  S/P John Adj chemoRT followed by LAR on 9/24/19  Pathology showed [id number] residual disease- stage IIIB  Had significant residual disease with practically no treatment effects  Adjuvant FOLFOX started 10/23/19    Today is cycle 8 of FOLFOX x 8 planned cycles     Had grade 3 neutropenia after cycle 2 requiring a 2 week delay. Starting with cycle 3 we dropped 5FU bolus and added udenyca given high risk for grade IV neutropenia. Had grade 3 thrombocytopenia for cycle 6 requiring 1 week delay and oxaliplatin was DR to 65 mg/m2. Grade 1 today at 80K. Will DR 5FU CADD 20% and proceed with treatment. CT imaging after 4 cycles of FOLFOX showed hypodensities in the liver but otherwise KATHLEEN. MRI obtained thereafter showed no evidence of hepatic mets. He has ostomy reversal surgery scheduled in ~4 weeks. Will repeat imaging in 2 months.      2) Phoebe Mccoychanell age at diagnosis  Adopted  Had several adenomas removed  May have attenuated FAP and will require genetic testing  SOMMER    3) GIB  Minor    4) Psoriasis  Off Methotrexate  Follows Dr. Kaylee Virgen    5) Neutropenia  Chemotherapy induced  See #1    6) Thrombocytopenia  Grade 3 after 5 cycles  Grade 1 today  See #1 regarding    HIV, hep, anti-plt AB negative     Plan:     · Proceed today with cycle 8 of adjuvant FOLFOX (5FU CADD DR 20% starting today with cycle 8 to 1920mg/m2, Oxaliplatin DR to 65mg/m2 d/t thrombocytopenia) given every 2 weeks x 8 cycles   · Labs to include CBC with diff and CMP prior to each infusion; CEA monthly  · Pre-meds to include Dexamethasone and Zofran  · Dexamethasone 8mg daily days 2 & 3 of chemotherapy   · Zofran PRN   · Udencya given high risk for grade IV neutropenia  · Ostomy reversal surgery in ~4 weeks  · Repeat imaging in 2 months   · Port to be removed after imaging complete by IR   · Port flush monthly until removed     RTC in 2 months for scan review     I appreciate the opportunity to participate in Mr. Holloway Manuel gardner.   Seen in conjunction with Ansley Cordero NP    Signed By: Bhavana Sauer MD

## 2020-02-21 ENCOUNTER — APPOINTMENT (OUTPATIENT)
Dept: INFUSION THERAPY | Age: 50
End: 2020-02-21
Payer: MEDICAID

## 2020-02-21 ENCOUNTER — HOSPITAL ENCOUNTER (OUTPATIENT)
Dept: INFUSION THERAPY | Age: 50
Discharge: HOME | End: 2020-02-21
Payer: MEDICAID

## 2020-02-21 VITALS
RESPIRATION RATE: 16 BRPM | OXYGEN SATURATION: 97 % | TEMPERATURE: 97.6 F | DIASTOLIC BLOOD PRESSURE: 74 MMHG | HEART RATE: 82 BPM | SYSTOLIC BLOOD PRESSURE: 141 MMHG

## 2020-02-21 DIAGNOSIS — C20 RECTAL CANCER (HCC): Primary | ICD-10-CM

## 2020-02-21 PROCEDURE — 96523 IRRIG DRUG DELIVERY DEVICE: CPT

## 2020-02-21 PROCEDURE — 74011250636 HC RX REV CODE- 250/636: Performed by: REGISTERED NURSE

## 2020-02-21 RX ORDER — SODIUM CHLORIDE 0.9 % (FLUSH) 0.9 %
10 SYRINGE (ML) INJECTION AS NEEDED
Status: DISCONTINUED | OUTPATIENT
Start: 2020-02-21 | End: 2020-02-22 | Stop reason: HOSPADM

## 2020-02-21 RX ORDER — HEPARIN 100 UNIT/ML
300-500 SYRINGE INTRAVENOUS AS NEEDED
Status: DISCONTINUED | OUTPATIENT
Start: 2020-02-21 | End: 2020-02-22 | Stop reason: HOSPADM

## 2020-02-21 RX ORDER — SODIUM CHLORIDE 9 MG/ML
10 INJECTION INTRAMUSCULAR; INTRAVENOUS; SUBCUTANEOUS AS NEEDED
Status: DISCONTINUED | OUTPATIENT
Start: 2020-02-21 | End: 2020-02-22 | Stop reason: HOSPADM

## 2020-02-21 RX ADMIN — Medication 10 ML: at 14:22

## 2020-02-21 RX ADMIN — Medication 500 UNITS: at 14:22

## 2020-02-23 ENCOUNTER — HOSPITAL ENCOUNTER (OUTPATIENT)
Dept: INFUSION THERAPY | Age: 50
Discharge: HOME | End: 2020-02-23
Payer: MEDICAID

## 2020-02-23 VITALS
HEART RATE: 64 BPM | RESPIRATION RATE: 16 BRPM | SYSTOLIC BLOOD PRESSURE: 127 MMHG | TEMPERATURE: 98 F | DIASTOLIC BLOOD PRESSURE: 82 MMHG | OXYGEN SATURATION: 98 %

## 2020-02-23 DIAGNOSIS — C20 RECTAL CANCER (HCC): Primary | ICD-10-CM

## 2020-02-23 PROCEDURE — 74011250636 HC RX REV CODE- 250/636: Performed by: REGISTERED NURSE

## 2020-02-23 PROCEDURE — 96372 THER/PROPH/DIAG INJ SC/IM: CPT

## 2020-02-23 RX ADMIN — PEGFILGRASTIM-CBQV 6 MG: 6 INJECTION, SOLUTION SUBCUTANEOUS at 10:56

## 2020-02-23 NOTE — PROGRESS NOTES
Outpatient Infusion Center Short Visit Progress Note    3294 Patient admitted to [facility] for [facility] ambulatory in stable condition. Assessment completed. No new concerns voiced. Vital Signs:  Visit Vitals  /82 (BP 1 Location: Right arm, BP Patient Position: Sitting)   Pulse 64   Temp 98 °F (36.7 °C)   Resp 16   SpO2 98%        Medications:  Medications Administered     pegfilgrastim-cbqv (UDENYCA) injection 6 mg     Admin Date  02/23/2020 Action  Given Dose  6 mg Route  SubCUTAneous Administered By  Mleissa Lewis A              Bandage placed over site. Patient tolerated treatment well. Patient discharged from [place] [address] ambulatory in no distress at 1100. Patient aware of next appointment.     Future Appointments   Date Time Provider Shannan Rodriguez   2/24/2020 10:30 AM [facility] XR OP 2 [facility]   3/2/2020 10:30 AM [facility] PAT EXAM RM 1 [facility]   3/18/2020  8:30 AM H1 GREGORIA FASTRACK [facility]   3/30/2020  9:30 AM [facility] CT ER 1 [facility]   4/15/2020  8:30 AM G1 GREGORIA FASTRACK [facility]   4/15/2020  8:45 AM HÉCTOR Hartley [id number]   4/20/2020 10:30 AM Juwan Thomson MD [facility] MAIN JOY SCHED   5/13/2020  8:30 AM G1 GREGORIA FASTRACK [facility]   5/19/2020  9:00 AM MD Pippa Barraza [id number]   6/10/2020  8:30 AM G1 GREGORIA FASTRACK [facility]

## 2020-02-24 ENCOUNTER — HOSPITAL ENCOUNTER (OUTPATIENT)
Dept: GENERAL RADIOLOGY | Age: 50
Discharge: HOME | End: 2020-02-24
Attending: COLON & RECTAL SURGERY
Payer: MEDICAID

## 2020-02-24 DIAGNOSIS — Z85.048 PERSONAL HISTORY OF RECTAL CANCER: ICD-10-CM

## 2020-02-24 DIAGNOSIS — C20 RECTAL CANCER (HCC): ICD-10-CM

## 2020-02-24 DIAGNOSIS — Z20.828 EXPOSURE TO THE FLU: Primary | ICD-10-CM

## 2020-02-24 DIAGNOSIS — D84.9 IMMUNOCOMPROMISED STATE (HCC): ICD-10-CM

## 2020-02-24 PROCEDURE — 74270 X-RAY XM COLON 1CNTRST STD: CPT

## 2020-02-24 PROCEDURE — 74011636320 HC RX REV CODE- 636/320: Performed by: INTERNAL MEDICINE

## 2020-02-24 RX ORDER — SODIUM CHLORIDE 0.9 % (FLUSH) 0.9 %
5-10 SYRINGE (ML) INJECTION AS NEEDED
Status: CANCELLED | OUTPATIENT
Start: 2020-04-15

## 2020-02-24 RX ORDER — SODIUM CHLORIDE 9 MG/ML
10 INJECTION INTRAMUSCULAR; INTRAVENOUS; SUBCUTANEOUS AS NEEDED
Status: CANCELLED | OUTPATIENT
Start: 2020-03-18

## 2020-02-24 RX ORDER — HEPARIN 100 UNIT/ML
500 SYRINGE INTRAVENOUS AS NEEDED
Status: CANCELLED | OUTPATIENT
Start: 2020-03-18

## 2020-02-24 RX ORDER — SODIUM CHLORIDE 9 MG/ML
10 INJECTION INTRAMUSCULAR; INTRAVENOUS; SUBCUTANEOUS AS NEEDED
Status: CANCELLED | OUTPATIENT
Start: 2020-04-15

## 2020-02-24 RX ORDER — SODIUM CHLORIDE 0.9 % (FLUSH) 0.9 %
5-10 SYRINGE (ML) INJECTION AS NEEDED
Status: CANCELLED | OUTPATIENT
Start: 2020-03-18

## 2020-02-24 RX ORDER — OSELTAMIVIR PHOSPHATE 75 MG/1
75 CAPSULE ORAL DAILY
Qty: 5 CAP | Refills: 0 | Status: SHIPPED | OUTPATIENT
Start: 2020-02-24 | End: 2020-02-29

## 2020-02-24 RX ORDER — HEPARIN 100 UNIT/ML
500 SYRINGE INTRAVENOUS AS NEEDED
Status: CANCELLED | OUTPATIENT
Start: 2020-04-15

## 2020-02-24 RX ADMIN — DIATRIZOATE MEGLUMINE AND DIATRIZOATE SODIUM 120 ML: 660; 100 LIQUID ORAL; RECTAL at 11:01

## 2020-02-24 RX ADMIN — IOTHALAMATE MEGLUMINE 250 ML: 172 INJECTION URETERAL at 11:02

## 2020-03-02 ENCOUNTER — HOSPITAL ENCOUNTER (OUTPATIENT)
Dept: PREADMISSION TESTING | Age: 50
Discharge: HOME | End: 2020-03-02
Payer: MEDICAID

## 2020-03-02 VITALS
TEMPERATURE: 98.3 F | WEIGHT: 153 LBS | DIASTOLIC BLOOD PRESSURE: 78 MMHG | OXYGEN SATURATION: 98 % | HEIGHT: 67 IN | SYSTOLIC BLOOD PRESSURE: 138 MMHG | HEART RATE: 72 BPM | RESPIRATION RATE: 16 BRPM | BODY MASS INDEX: 24.01 KG/M2

## 2020-03-02 LAB
ANION GAP SERPL CALC-SCNC: 6 MMOL/L (ref 5–15)
BASOPHILS # BLD: 0 K/UL (ref 0–0.1)
BASOPHILS NFR BLD: 0 % (ref 0–1)
BUN SERPL-MCNC: 9 MG/DL (ref 6–20)
BUN/CREAT SERPL: 11 (ref 12–20)
CALCIUM SERPL-MCNC: 8.7 MG/DL (ref 8.5–10.1)
CHLORIDE SERPL-SCNC: 107 MMOL/L (ref 97–108)
CO2 SERPL-SCNC: 27 MMOL/L (ref 21–32)
CREAT SERPL-MCNC: 0.83 MG/DL (ref 0.7–1.3)
DIFFERENTIAL METHOD BLD: ABNORMAL
EOSINOPHIL # BLD: 0.1 K/UL (ref 0–0.4)
EOSINOPHIL NFR BLD: 1 % (ref 0–7)
ERYTHROCYTE [DISTWIDTH] IN BLOOD BY AUTOMATED COUNT: 14 % (ref 11.5–14.5)
GLUCOSE SERPL-MCNC: 91 MG/DL (ref 65–100)
HCT VFR BLD AUTO: 39.9 % (ref 36.6–50.3)
HGB BLD-MCNC: 13 G/DL (ref 12.1–17)
IMM GRANULOCYTES # BLD AUTO: 0.1 K/UL (ref 0–0.04)
IMM GRANULOCYTES NFR BLD AUTO: 1 % (ref 0–0.5)
LYMPHOCYTES # BLD: 0.5 K/UL (ref 0.8–3.5)
LYMPHOCYTES NFR BLD: 4 % (ref 12–49)
MCH RBC QN AUTO: 33.3 PG (ref 26–34)
MCHC RBC AUTO-ENTMCNC: 32.6 G/DL (ref 30–36.5)
MCV RBC AUTO: 102.3 FL (ref 80–99)
MONOCYTES # BLD: 1.1 K/UL (ref 0–1)
MONOCYTES NFR BLD: 8 % (ref 5–13)
NEUTS SEG # BLD: 11.6 K/UL (ref 1.8–8)
NEUTS SEG NFR BLD: 86 % (ref 32–75)
NRBC # BLD: 0 K/UL (ref 0–0.01)
NRBC BLD-RTO: 0 PER 100 WBC
PLATELET # BLD AUTO: 82 K/UL (ref 150–400)
PMV BLD AUTO: 11.7 FL (ref 8.9–12.9)
POTASSIUM SERPL-SCNC: 3.5 MMOL/L (ref 3.5–5.1)
RBC # BLD AUTO: 3.9 M/UL (ref 4.1–5.7)
RBC MORPH BLD: ABNORMAL
SODIUM SERPL-SCNC: 140 MMOL/L (ref 136–145)
WBC # BLD AUTO: 13.4 K/UL (ref 4.1–11.1)
WBC MORPH BLD: ABNORMAL

## 2020-03-02 PROCEDURE — 93005 ELECTROCARDIOGRAM TRACING: CPT

## 2020-03-02 PROCEDURE — 80048 BASIC METABOLIC PNL TOTAL CA: CPT

## 2020-03-02 PROCEDURE — 36415 COLL VENOUS BLD VENIPUNCTURE: CPT

## 2020-03-02 PROCEDURE — 85025 COMPLETE CBC W/AUTO DIFF WBC: CPT

## 2020-03-02 RX ORDER — IBUPROFEN 200 MG
TABLET ORAL
COMMUNITY
End: 2020-03-22

## 2020-03-03 LAB
ATRIAL RATE: 62 BPM
CALCULATED P AXIS, ECG09: 18 DEGREES
CALCULATED R AXIS, ECG10: 39 DEGREES
CALCULATED T AXIS, ECG11: 25 DEGREES
DIAGNOSIS, 93000: NORMAL
P-R INTERVAL, ECG05: 178 MS
Q-T INTERVAL, ECG07: 380 MS
QRS DURATION, ECG06: 96 MS
QTC CALCULATION (BEZET), ECG08: 385 MS
VENTRICULAR RATE, ECG03: 62 BPM

## 2020-03-04 ENCOUNTER — APPOINTMENT (OUTPATIENT)
Dept: INFUSION THERAPY | Age: 50
End: 2020-03-04

## 2020-03-17 ENCOUNTER — ANESTHESIA (OUTPATIENT)
Dept: SURGERY | Age: 50
DRG: 230 | End: 2020-03-17
Payer: MEDICAID

## 2020-03-17 ENCOUNTER — HOSPITAL ENCOUNTER (INPATIENT)
Age: 50
LOS: 5 days | Discharge: HOME | DRG: 230 | End: 2020-03-22
Attending: COLON & RECTAL SURGERY | Admitting: COLON & RECTAL SURGERY
Payer: MEDICAID

## 2020-03-17 ENCOUNTER — ANESTHESIA EVENT (OUTPATIENT)
Dept: SURGERY | Age: 50
DRG: 230 | End: 2020-03-17
Payer: MEDICAID

## 2020-03-17 DIAGNOSIS — G89.18 ACUTE POST-OPERATIVE PAIN: Primary | ICD-10-CM

## 2020-03-17 PROBLEM — Z93.2 ILEOSTOMY PRESENT (HCC): Chronic | Status: ACTIVE | Noted: 2020-03-17

## 2020-03-17 LAB
ANION GAP SERPL CALC-SCNC: 8 MMOL/L (ref 5–15)
BASOPHILS # BLD: 0 K/UL (ref 0–0.1)
BASOPHILS NFR BLD: 0 % (ref 0–1)
BUN SERPL-MCNC: 8 MG/DL (ref 6–20)
BUN/CREAT SERPL: 6 (ref 12–20)
CALCIUM SERPL-MCNC: 8.9 MG/DL (ref 8.5–10.1)
CHLORIDE SERPL-SCNC: 106 MMOL/L (ref 97–108)
CO2 SERPL-SCNC: 25 MMOL/L (ref 21–32)
CREAT SERPL-MCNC: 1.3 MG/DL (ref 0.7–1.3)
DIFFERENTIAL METHOD BLD: ABNORMAL
EOSINOPHIL # BLD: 0.1 K/UL (ref 0–0.4)
EOSINOPHIL NFR BLD: 1 % (ref 0–7)
ERYTHROCYTE [DISTWIDTH] IN BLOOD BY AUTOMATED COUNT: 13.1 % (ref 11.5–14.5)
GLUCOSE SERPL-MCNC: 107 MG/DL (ref 65–100)
HCT VFR BLD AUTO: 42.6 % (ref 36.6–50.3)
HGB BLD-MCNC: 14.2 G/DL (ref 12.1–17)
IMM GRANULOCYTES # BLD AUTO: 0 K/UL (ref 0–0.04)
IMM GRANULOCYTES NFR BLD AUTO: 0 % (ref 0–0.5)
LYMPHOCYTES # BLD: 0.2 K/UL (ref 0.8–3.5)
LYMPHOCYTES NFR BLD: 2 % (ref 12–49)
MAGNESIUM SERPL-MCNC: 2.4 MG/DL (ref 1.6–2.4)
MCH RBC QN AUTO: 33.8 PG (ref 26–34)
MCHC RBC AUTO-ENTMCNC: 33.3 G/DL (ref 30–36.5)
MCV RBC AUTO: 101.4 FL (ref 80–99)
MONOCYTES # BLD: 0.3 K/UL (ref 0–1)
MONOCYTES NFR BLD: 3 % (ref 5–13)
NEUTS SEG # BLD: 10.4 K/UL (ref 1.8–8)
NEUTS SEG NFR BLD: 94 % (ref 32–75)
NRBC # BLD: 0 K/UL (ref 0–0.01)
NRBC BLD-RTO: 0 PER 100 WBC
PHOSPHATE SERPL-MCNC: 3.5 MG/DL (ref 2.6–4.7)
PLATELET # BLD AUTO: 118 K/UL (ref 150–400)
PLATELET # BLD AUTO: 121 K/UL (ref 150–400)
PMV BLD AUTO: 10.9 FL (ref 8.9–12.9)
POTASSIUM SERPL-SCNC: 3.2 MMOL/L (ref 3.5–5.1)
RBC # BLD AUTO: 4.2 M/UL (ref 4.1–5.7)
RBC MORPH BLD: ABNORMAL
SODIUM SERPL-SCNC: 139 MMOL/L (ref 136–145)
WBC # BLD AUTO: 11 K/UL (ref 4.1–11.1)

## 2020-03-17 PROCEDURE — 74011000250 HC RX REV CODE- 250: Performed by: COLON & RECTAL SURGERY

## 2020-03-17 PROCEDURE — 77030008684 HC TU ET CUF COVD -B: Performed by: NURSE ANESTHETIST, CERTIFIED REGISTERED

## 2020-03-17 PROCEDURE — 74011000250 HC RX REV CODE- 250

## 2020-03-17 PROCEDURE — 74011250636 HC RX REV CODE- 250/636: Performed by: ANESTHESIOLOGY

## 2020-03-17 PROCEDURE — 77030011640 HC PAD GRND REM COVD -A: Performed by: COLON & RECTAL SURGERY

## 2020-03-17 PROCEDURE — 77030002966 HC SUT PDS J&J -A: Performed by: COLON & RECTAL SURGERY

## 2020-03-17 PROCEDURE — 77030009979 HC RELD STPLR TCR J&J -C: Performed by: COLON & RECTAL SURGERY

## 2020-03-17 PROCEDURE — 74011000250 HC RX REV CODE- 250: Performed by: NURSE ANESTHETIST, CERTIFIED REGISTERED

## 2020-03-17 PROCEDURE — 77030019702 HC WRP THER MENM -C: Performed by: COLON & RECTAL SURGERY

## 2020-03-17 PROCEDURE — 74011250636 HC RX REV CODE- 250/636: Performed by: NURSE ANESTHETIST, CERTIFIED REGISTERED

## 2020-03-17 PROCEDURE — 85049 AUTOMATED PLATELET COUNT: CPT

## 2020-03-17 PROCEDURE — 77030031139 HC SUT VCRL2 J&J -A: Performed by: COLON & RECTAL SURGERY

## 2020-03-17 PROCEDURE — 36415 COLL VENOUS BLD VENIPUNCTURE: CPT

## 2020-03-17 PROCEDURE — 80048 BASIC METABOLIC PNL TOTAL CA: CPT

## 2020-03-17 PROCEDURE — 77030026438 HC STYL ET INTUB CARD -A: Performed by: NURSE ANESTHETIST, CERTIFIED REGISTERED

## 2020-03-17 PROCEDURE — 74011000258 HC RX REV CODE- 258: Performed by: COLON & RECTAL SURGERY

## 2020-03-17 PROCEDURE — 77030036731 HC STPLR ENDOSC J&J -F: Performed by: COLON & RECTAL SURGERY

## 2020-03-17 PROCEDURE — 88304 TISSUE EXAM BY PATHOLOGIST: CPT

## 2020-03-17 PROCEDURE — 77030018836 HC SOL IRR NACL ICUM -A: Performed by: COLON & RECTAL SURGERY

## 2020-03-17 PROCEDURE — 74011250637 HC RX REV CODE- 250/637: Performed by: COLON & RECTAL SURGERY

## 2020-03-17 PROCEDURE — 77030018846 HC SOL IRR STRL H20 ICUM -A: Performed by: COLON & RECTAL SURGERY

## 2020-03-17 PROCEDURE — 77030002996 HC SUT SLK J&J -A: Performed by: COLON & RECTAL SURGERY

## 2020-03-17 PROCEDURE — 76210000016 HC OR PH I REC 1 TO 1.5 HR: Performed by: COLON & RECTAL SURGERY

## 2020-03-17 PROCEDURE — 65270000032 HC RM SEMIPRIVATE

## 2020-03-17 PROCEDURE — 85025 COMPLETE CBC W/AUTO DIFF WBC: CPT

## 2020-03-17 PROCEDURE — 76010000131 HC OR TIME 2 TO 2.5 HR: Performed by: COLON & RECTAL SURGERY

## 2020-03-17 PROCEDURE — 83735 ASSAY OF MAGNESIUM: CPT

## 2020-03-17 PROCEDURE — 74011000250 HC RX REV CODE- 250: Performed by: ANESTHESIOLOGY

## 2020-03-17 PROCEDURE — 74011250636 HC RX REV CODE- 250/636: Performed by: COLON & RECTAL SURGERY

## 2020-03-17 PROCEDURE — 76060000035 HC ANESTHESIA 2 TO 2.5 HR: Performed by: COLON & RECTAL SURGERY

## 2020-03-17 PROCEDURE — 77030005513 HC CATH URETH FOL11 MDII -B: Performed by: COLON & RECTAL SURGERY

## 2020-03-17 PROCEDURE — 84100 ASSAY OF PHOSPHORUS: CPT

## 2020-03-17 PROCEDURE — 77030040361 HC SLV COMPR DVT MDII -B: Performed by: COLON & RECTAL SURGERY

## 2020-03-17 RX ORDER — SODIUM CHLORIDE 9 MG/ML
50 INJECTION, SOLUTION INTRAVENOUS CONTINUOUS
Status: DISCONTINUED | OUTPATIENT
Start: 2020-03-17 | End: 2020-03-17 | Stop reason: HOSPADM

## 2020-03-17 RX ORDER — BUPIVACAINE HYDROCHLORIDE AND EPINEPHRINE 5; 5 MG/ML; UG/ML
30 INJECTION, SOLUTION EPIDURAL; INTRACAUDAL; PERINEURAL ONCE
Status: COMPLETED | OUTPATIENT
Start: 2020-03-17 | End: 2020-03-17

## 2020-03-17 RX ORDER — ENOXAPARIN SODIUM 100 MG/ML
40 INJECTION SUBCUTANEOUS EVERY 24 HOURS
Status: DISCONTINUED | OUTPATIENT
Start: 2020-03-18 | End: 2020-03-20

## 2020-03-17 RX ORDER — LABETALOL HYDROCHLORIDE 5 MG/ML
10 INJECTION, SOLUTION INTRAVENOUS ONCE
Status: COMPLETED | OUTPATIENT
Start: 2020-03-17 | End: 2020-03-17

## 2020-03-17 RX ORDER — SODIUM CHLORIDE 0.9 % (FLUSH) 0.9 %
5-40 SYRINGE (ML) INJECTION AS NEEDED
Status: DISCONTINUED | OUTPATIENT
Start: 2020-03-17 | End: 2020-03-17 | Stop reason: HOSPADM

## 2020-03-17 RX ORDER — LIDOCAINE HYDROCHLORIDE 10 MG/ML
0.1 INJECTION, SOLUTION EPIDURAL; INFILTRATION; INTRACAUDAL; PERINEURAL AS NEEDED
Status: DISCONTINUED | OUTPATIENT
Start: 2020-03-17 | End: 2020-03-17 | Stop reason: HOSPADM

## 2020-03-17 RX ORDER — LABETALOL HYDROCHLORIDE 5 MG/ML
5 INJECTION, SOLUTION INTRAVENOUS
Status: DISCONTINUED | OUTPATIENT
Start: 2020-03-17 | End: 2020-03-22 | Stop reason: HOSPADM

## 2020-03-17 RX ORDER — HYDROCHLOROTHIAZIDE 25 MG/1
12.5 TABLET ORAL DAILY
Status: DISCONTINUED | OUTPATIENT
Start: 2020-03-18 | End: 2020-03-22 | Stop reason: HOSPADM

## 2020-03-17 RX ORDER — KETOROLAC TROMETHAMINE 30 MG/ML
15 INJECTION, SOLUTION INTRAMUSCULAR; INTRAVENOUS EVERY 6 HOURS
Status: COMPLETED | OUTPATIENT
Start: 2020-03-17 | End: 2020-03-18

## 2020-03-17 RX ORDER — ONDANSETRON 2 MG/ML
INJECTION INTRAMUSCULAR; INTRAVENOUS AS NEEDED
Status: DISCONTINUED | OUTPATIENT
Start: 2020-03-17 | End: 2020-03-17 | Stop reason: HOSPADM

## 2020-03-17 RX ORDER — FENTANYL CITRATE 50 UG/ML
INJECTION, SOLUTION INTRAMUSCULAR; INTRAVENOUS AS NEEDED
Status: DISCONTINUED | OUTPATIENT
Start: 2020-03-17 | End: 2020-03-17 | Stop reason: HOSPADM

## 2020-03-17 RX ORDER — PROPOFOL 10 MG/ML
INJECTION, EMULSION INTRAVENOUS AS NEEDED
Status: DISCONTINUED | OUTPATIENT
Start: 2020-03-17 | End: 2020-03-17 | Stop reason: HOSPADM

## 2020-03-17 RX ORDER — OXYBUTYNIN CHLORIDE 5 MG/1
5 TABLET ORAL 2 TIMES DAILY
Status: DISCONTINUED | OUTPATIENT
Start: 2020-03-17 | End: 2020-03-22 | Stop reason: HOSPADM

## 2020-03-17 RX ORDER — PANTOPRAZOLE SODIUM 40 MG/1
40 TABLET, DELAYED RELEASE ORAL
Status: DISCONTINUED | OUTPATIENT
Start: 2020-03-18 | End: 2020-03-22 | Stop reason: HOSPADM

## 2020-03-17 RX ORDER — SODIUM CHLORIDE 0.9 % (FLUSH) 0.9 %
5-40 SYRINGE (ML) INJECTION EVERY 8 HOURS
Status: DISCONTINUED | OUTPATIENT
Start: 2020-03-17 | End: 2020-03-17 | Stop reason: HOSPADM

## 2020-03-17 RX ORDER — HYDROCODONE BITARTRATE AND ACETAMINOPHEN 5; 325 MG/1; MG/1
1 TABLET ORAL AS NEEDED
Status: DISCONTINUED | OUTPATIENT
Start: 2020-03-17 | End: 2020-03-17 | Stop reason: HOSPADM

## 2020-03-17 RX ORDER — DIPHENHYDRAMINE HYDROCHLORIDE 50 MG/ML
12.5 INJECTION, SOLUTION INTRAMUSCULAR; INTRAVENOUS AS NEEDED
Status: DISCONTINUED | OUTPATIENT
Start: 2020-03-17 | End: 2020-03-17 | Stop reason: HOSPADM

## 2020-03-17 RX ORDER — SODIUM CHLORIDE, SODIUM LACTATE, POTASSIUM CHLORIDE, CALCIUM CHLORIDE 600; 310; 30; 20 MG/100ML; MG/100ML; MG/100ML; MG/100ML
75 INJECTION, SOLUTION INTRAVENOUS CONTINUOUS
Status: DISCONTINUED | OUTPATIENT
Start: 2020-03-17 | End: 2020-03-17 | Stop reason: HOSPADM

## 2020-03-17 RX ORDER — ONDANSETRON 4 MG/1
4 TABLET, ORALLY DISINTEGRATING ORAL
Status: DISCONTINUED | OUTPATIENT
Start: 2020-03-17 | End: 2020-03-22 | Stop reason: HOSPADM

## 2020-03-17 RX ORDER — LIDOCAINE HYDROCHLORIDE 20 MG/ML
INJECTION, SOLUTION EPIDURAL; INFILTRATION; INTRACAUDAL; PERINEURAL AS NEEDED
Status: DISCONTINUED | OUTPATIENT
Start: 2020-03-17 | End: 2020-03-17 | Stop reason: HOSPADM

## 2020-03-17 RX ORDER — MIDAZOLAM HYDROCHLORIDE 1 MG/ML
1 INJECTION, SOLUTION INTRAMUSCULAR; INTRAVENOUS AS NEEDED
Status: DISCONTINUED | OUTPATIENT
Start: 2020-03-17 | End: 2020-03-17 | Stop reason: HOSPADM

## 2020-03-17 RX ORDER — DEXAMETHASONE SODIUM PHOSPHATE 4 MG/ML
INJECTION, SOLUTION INTRA-ARTICULAR; INTRALESIONAL; INTRAMUSCULAR; INTRAVENOUS; SOFT TISSUE AS NEEDED
Status: DISCONTINUED | OUTPATIENT
Start: 2020-03-17 | End: 2020-03-17 | Stop reason: HOSPADM

## 2020-03-17 RX ORDER — NALOXONE HYDROCHLORIDE 0.4 MG/ML
0.4 INJECTION, SOLUTION INTRAMUSCULAR; INTRAVENOUS; SUBCUTANEOUS AS NEEDED
Status: DISCONTINUED | OUTPATIENT
Start: 2020-03-17 | End: 2020-03-22 | Stop reason: HOSPADM

## 2020-03-17 RX ORDER — ACETAMINOPHEN 500 MG
1000 TABLET ORAL EVERY 6 HOURS
Status: DISCONTINUED | OUTPATIENT
Start: 2020-03-17 | End: 2020-03-22 | Stop reason: HOSPADM

## 2020-03-17 RX ORDER — MORPHINE SULFATE 10 MG/ML
2 INJECTION, SOLUTION INTRAMUSCULAR; INTRAVENOUS
Status: DISCONTINUED | OUTPATIENT
Start: 2020-03-17 | End: 2020-03-17 | Stop reason: HOSPADM

## 2020-03-17 RX ORDER — FENTANYL CITRATE 50 UG/ML
50 INJECTION, SOLUTION INTRAMUSCULAR; INTRAVENOUS AS NEEDED
Status: DISCONTINUED | OUTPATIENT
Start: 2020-03-17 | End: 2020-03-17 | Stop reason: HOSPADM

## 2020-03-17 RX ORDER — SODIUM CHLORIDE, SODIUM LACTATE, POTASSIUM CHLORIDE, CALCIUM CHLORIDE 600; 310; 30; 20 MG/100ML; MG/100ML; MG/100ML; MG/100ML
125 INJECTION, SOLUTION INTRAVENOUS CONTINUOUS
Status: DISCONTINUED | OUTPATIENT
Start: 2020-03-17 | End: 2020-03-17 | Stop reason: HOSPADM

## 2020-03-17 RX ORDER — NEOSTIGMINE METHYLSULFATE 1 MG/ML
INJECTION, SOLUTION INTRAVENOUS AS NEEDED
Status: DISCONTINUED | OUTPATIENT
Start: 2020-03-17 | End: 2020-03-17 | Stop reason: HOSPADM

## 2020-03-17 RX ORDER — HYDROMORPHONE HYDROCHLORIDE 1 MG/ML
0.5 INJECTION, SOLUTION INTRAMUSCULAR; INTRAVENOUS; SUBCUTANEOUS
Status: DISCONTINUED | OUTPATIENT
Start: 2020-03-17 | End: 2020-03-18

## 2020-03-17 RX ORDER — HYDROMORPHONE HYDROCHLORIDE 1 MG/ML
0.2 INJECTION, SOLUTION INTRAMUSCULAR; INTRAVENOUS; SUBCUTANEOUS
Status: DISCONTINUED | OUTPATIENT
Start: 2020-03-17 | End: 2020-03-17 | Stop reason: HOSPADM

## 2020-03-17 RX ORDER — LABETALOL HYDROCHLORIDE 5 MG/ML
INJECTION, SOLUTION INTRAVENOUS
Status: COMPLETED
Start: 2020-03-17 | End: 2020-03-17

## 2020-03-17 RX ORDER — SUCCINYLCHOLINE CHLORIDE 20 MG/ML
INJECTION INTRAMUSCULAR; INTRAVENOUS AS NEEDED
Status: DISCONTINUED | OUTPATIENT
Start: 2020-03-17 | End: 2020-03-17 | Stop reason: HOSPADM

## 2020-03-17 RX ORDER — CLONIDINE HYDROCHLORIDE 0.1 MG/1
0.1 TABLET ORAL
Status: DISCONTINUED | OUTPATIENT
Start: 2020-03-17 | End: 2020-03-22 | Stop reason: HOSPADM

## 2020-03-17 RX ORDER — HYDROMORPHONE HYDROCHLORIDE 2 MG/ML
INJECTION, SOLUTION INTRAMUSCULAR; INTRAVENOUS; SUBCUTANEOUS AS NEEDED
Status: DISCONTINUED | OUTPATIENT
Start: 2020-03-17 | End: 2020-03-17 | Stop reason: HOSPADM

## 2020-03-17 RX ORDER — SODIUM CHLORIDE, SODIUM LACTATE, POTASSIUM CHLORIDE, CALCIUM CHLORIDE 600; 310; 30; 20 MG/100ML; MG/100ML; MG/100ML; MG/100ML
75 INJECTION, SOLUTION INTRAVENOUS CONTINUOUS
Status: DISCONTINUED | OUTPATIENT
Start: 2020-03-17 | End: 2020-03-18

## 2020-03-17 RX ORDER — ONDANSETRON 2 MG/ML
4 INJECTION INTRAMUSCULAR; INTRAVENOUS AS NEEDED
Status: DISCONTINUED | OUTPATIENT
Start: 2020-03-17 | End: 2020-03-17 | Stop reason: HOSPADM

## 2020-03-17 RX ORDER — SODIUM CHLORIDE 9 MG/ML
25 INJECTION, SOLUTION INTRAVENOUS CONTINUOUS
Status: DISCONTINUED | OUTPATIENT
Start: 2020-03-17 | End: 2020-03-17 | Stop reason: HOSPADM

## 2020-03-17 RX ORDER — GLYCOPYRROLATE 0.2 MG/ML
INJECTION INTRAMUSCULAR; INTRAVENOUS AS NEEDED
Status: DISCONTINUED | OUTPATIENT
Start: 2020-03-17 | End: 2020-03-17 | Stop reason: HOSPADM

## 2020-03-17 RX ORDER — CELECOXIB 100 MG/1
100 CAPSULE ORAL 2 TIMES DAILY
Status: DISCONTINUED | OUTPATIENT
Start: 2020-03-18 | End: 2020-03-22 | Stop reason: HOSPADM

## 2020-03-17 RX ORDER — MIDAZOLAM HYDROCHLORIDE 1 MG/ML
0.5 INJECTION, SOLUTION INTRAMUSCULAR; INTRAVENOUS
Status: DISCONTINUED | OUTPATIENT
Start: 2020-03-17 | End: 2020-03-17 | Stop reason: HOSPADM

## 2020-03-17 RX ORDER — MIDAZOLAM HYDROCHLORIDE 1 MG/ML
INJECTION, SOLUTION INTRAMUSCULAR; INTRAVENOUS AS NEEDED
Status: DISCONTINUED | OUTPATIENT
Start: 2020-03-17 | End: 2020-03-17 | Stop reason: HOSPADM

## 2020-03-17 RX ORDER — FENTANYL CITRATE 50 UG/ML
25 INJECTION, SOLUTION INTRAMUSCULAR; INTRAVENOUS
Status: DISCONTINUED | OUTPATIENT
Start: 2020-03-17 | End: 2020-03-17 | Stop reason: HOSPADM

## 2020-03-17 RX ORDER — OXYCODONE HYDROCHLORIDE 5 MG/1
5-10 TABLET ORAL
Status: DISCONTINUED | OUTPATIENT
Start: 2020-03-17 | End: 2020-03-22 | Stop reason: HOSPADM

## 2020-03-17 RX ORDER — ROCURONIUM BROMIDE 10 MG/ML
INJECTION, SOLUTION INTRAVENOUS AS NEEDED
Status: DISCONTINUED | OUTPATIENT
Start: 2020-03-17 | End: 2020-03-17 | Stop reason: HOSPADM

## 2020-03-17 RX ADMIN — FENTANYL CITRATE 25 MCG: 50 INJECTION INTRAMUSCULAR; INTRAVENOUS at 20:00

## 2020-03-17 RX ADMIN — LABETALOL HYDROCHLORIDE 5 MG: 5 INJECTION INTRAVENOUS at 19:40

## 2020-03-17 RX ADMIN — ROCURONIUM BROMIDE 5 MG: 10 SOLUTION INTRAVENOUS at 16:57

## 2020-03-17 RX ADMIN — FENTANYL CITRATE 50 MCG: 50 INJECTION, SOLUTION INTRAMUSCULAR; INTRAVENOUS at 17:11

## 2020-03-17 RX ADMIN — SODIUM CHLORIDE, POTASSIUM CHLORIDE, SODIUM LACTATE AND CALCIUM CHLORIDE: 600; 310; 30; 20 INJECTION, SOLUTION INTRAVENOUS at 16:28

## 2020-03-17 RX ADMIN — PROPOFOL 50 MG: 10 INJECTION, EMULSION INTRAVENOUS at 18:39

## 2020-03-17 RX ADMIN — HYDROMORPHONE HYDROCHLORIDE 0.5 MG: 2 INJECTION, SOLUTION INTRAMUSCULAR; INTRAVENOUS; SUBCUTANEOUS at 17:17

## 2020-03-17 RX ADMIN — FENTANYL CITRATE 25 MCG: 50 INJECTION INTRAMUSCULAR; INTRAVENOUS at 19:45

## 2020-03-17 RX ADMIN — SODIUM CHLORIDE, SODIUM LACTATE, POTASSIUM CHLORIDE, AND CALCIUM CHLORIDE 75 ML/HR: 600; 310; 30; 20 INJECTION, SOLUTION INTRAVENOUS at 19:30

## 2020-03-17 RX ADMIN — PROPOFOL 150 MG: 10 INJECTION, EMULSION INTRAVENOUS at 16:57

## 2020-03-17 RX ADMIN — FENTANYL CITRATE 25 MCG: 50 INJECTION INTRAMUSCULAR; INTRAVENOUS at 19:55

## 2020-03-17 RX ADMIN — ROCURONIUM BROMIDE 45 MG: 10 SOLUTION INTRAVENOUS at 17:08

## 2020-03-17 RX ADMIN — CEFOTETAN DISODIUM 2 G: 2 INJECTION, POWDER, FOR SOLUTION INTRAMUSCULAR; INTRAVENOUS at 17:15

## 2020-03-17 RX ADMIN — MEPERIDINE HYDROCHLORIDE 12.5 MG: 25 INJECTION INTRAMUSCULAR; INTRAVENOUS; SUBCUTANEOUS at 19:44

## 2020-03-17 RX ADMIN — FENTANYL CITRATE 50 MCG: 50 INJECTION, SOLUTION INTRAMUSCULAR; INTRAVENOUS at 16:57

## 2020-03-17 RX ADMIN — MIDAZOLAM 2 MG: 1 INJECTION INTRAMUSCULAR; INTRAVENOUS at 16:52

## 2020-03-17 RX ADMIN — LABETALOL HYDROCHLORIDE 5 MG: 5 INJECTION INTRAVENOUS at 19:30

## 2020-03-17 RX ADMIN — SUCCINYLCHOLINE CHLORIDE 180 MG: 20 INJECTION, SOLUTION INTRAMUSCULAR; INTRAVENOUS at 16:58

## 2020-03-17 RX ADMIN — HYDROMORPHONE HYDROCHLORIDE 0.5 MG: 2 INJECTION, SOLUTION INTRAMUSCULAR; INTRAVENOUS; SUBCUTANEOUS at 19:12

## 2020-03-17 RX ADMIN — LIDOCAINE HYDROCHLORIDE 100 MG: 20 INJECTION, SOLUTION EPIDURAL; INFILTRATION; INTRACAUDAL; PERINEURAL at 16:57

## 2020-03-17 RX ADMIN — MEPERIDINE HYDROCHLORIDE 12.5 MG: 25 INJECTION INTRAMUSCULAR; INTRAVENOUS; SUBCUTANEOUS at 19:54

## 2020-03-17 RX ADMIN — GLYCOPYRROLATE 0.4 MG: 0.2 INJECTION, SOLUTION INTRAMUSCULAR; INTRAVENOUS at 18:54

## 2020-03-17 RX ADMIN — OXYBUTYNIN CHLORIDE 5 MG: 5 TABLET ORAL at 21:26

## 2020-03-17 RX ADMIN — LABETALOL HYDROCHLORIDE 10 MG: 5 INJECTION, SOLUTION INTRAVENOUS at 19:22

## 2020-03-17 RX ADMIN — ACETAMINOPHEN 1000 MG: 500 TABLET ORAL at 21:25

## 2020-03-17 RX ADMIN — ONDANSETRON HYDROCHLORIDE 4 MG: 2 INJECTION, SOLUTION INTRAMUSCULAR; INTRAVENOUS at 18:39

## 2020-03-17 RX ADMIN — Medication 2 MG: at 18:54

## 2020-03-17 RX ADMIN — LABETALOL HYDROCHLORIDE 10 MG: 5 INJECTION INTRAVENOUS at 19:22

## 2020-03-17 RX ADMIN — KETOROLAC TROMETHAMINE 15 MG: 30 INJECTION, SOLUTION INTRAMUSCULAR at 21:26

## 2020-03-17 RX ADMIN — ONDANSETRON 4 MG: 2 INJECTION, SOLUTION INTRAMUSCULAR; INTRAVENOUS at 19:44

## 2020-03-17 RX ADMIN — DEXAMETHASONE SODIUM PHOSPHATE 8 MG: 4 INJECTION, SOLUTION INTRAMUSCULAR; INTRAVENOUS at 17:15

## 2020-03-17 NOTE — ANESTHESIA PREPROCEDURE EVALUATION
Relevant Problems   No relevant active problems       Anesthetic History   No history of anesthetic complications            Review of Systems / Medical History  Patient summary reviewed, nursing notes reviewed and pertinent labs reviewed    Pulmonary  Within defined limits                 Neuro/Psych   Within defined limits           Cardiovascular  Within defined limits                     GI/Hepatic/Renal  Within defined limits   GERD           Endo/Other        Arthritis and cancer     Other Findings            Physical Exam    Airway  Mallampati: II  TM Distance: > 6 cm  Neck ROM: normal range of motion   Mouth opening: Normal     Cardiovascular  Regular rate and rhythm,  S1 and S2 normal,  no murmur, click, rub, or gallop             Dental  No notable dental hx       Pulmonary  Breath sounds clear to auscultation               Abdominal  GI exam deferred       Other Findings            Anesthetic Plan    ASA: 2  Anesthesia type: general          Induction: Intravenous  Anesthetic plan and risks discussed with: Patient

## 2020-03-17 NOTE — BRIEF OP NOTE
BRIEF OPERATIVE NOTE    Date of Procedure:  3/17/2020   Preoperative Diagnosis:  Temporary ileostomy  Postoperative Diagnosis:  Temporary ileostomy  Procedure:  Ileostomy closure with resection and anastomosis. Surgeon:  Jes Montgomery MD  Assistant:  Rod Osborn. [place], [place]  Anesthesia:  General endotracheal  Estimated Blood Loss:  15 mL  Crystalloid:  700 mL  Urine:    400 mL  Specimens:   ID Type Source Tests Collected by Time Destination   ILEOSTOMY Fresh Colon  Chintan Corbin MD 3/17/2020 1821 Pathology     Tubes and Drains:  None. Findings:  Healthy ileum. Complications: none apparent. Implants:  None.

## 2020-03-17 NOTE — H&P
Colorectal Surgery Pre-Operative History and Physical Examination Note          NAME:  Tarsha Carrasco   :   1970   MRN:   [id number]     Operation Date: 3/17/2020    Chief Complaint: Temporary ileostomy     History of Present Illness: The patient is a 69-year-old male who underwent a hand-assisted laparoscopic low anterior resection, mobilization of the splenic flexure, coloproctostomy, and creation of loop ileostomy on 2019 for treatment of what proved to have been a moderately differentiated [id number] adenocarcinoma of the rectum for which he had also received neoadjuvant chemoradiation. He subsequently received a course of adjuvant chemotherapy that has now been completed. A Gastrografin enema performed on 2020 revealed no evidence of an anastomotic leak. PMH:  Past Medical History:   Diagnosis Date    Adopted     GERD (gastroesophageal reflux disease)     Ill-defined condition     CHEMO-LAST ON 2020    Psoriasis     P[facility]iatic arthritis ([facility] [address]     Rectal cancer ([place] [address]     Moderately differentiated [id number] adenocarcinoma of the rectum. Treated with neoadjuvant chemoradiation, resection, and adjuvant chemotherapy. PSH:  Past Surgical History:   Procedure Laterality Date    COLONOSCOPY Left 2019    COLONOSCOPY [facility] N[facility]A 2019    SIGMOIDOSCOPY FLEXIBLE performed by Maine Robison MD at [address] HX GI      EGD    HX HEENT      WISDOM TEETH    HX ORTHOPAEDIC Right     ACL REPLACEMENT    HX OTHER SURGICAL  2019    Hand-assisted laparoscopic low anterior resection, mobilization of the splenic flexure, coloproctostomy, and creation of loop ileostomy; Dr. Akila Dye.     HX UROLOGICAL  2019    CYSTOSCOPY FOR REMOVAL OF KIDNEY STONES WITH HOLMIUM LAZER    HX UROLOGICAL  2019    Cystoscopy and placement of bilateral temporary ureteral catheters; Silvia Condon MD.   Alirio Hinojosa VASCULAR ACCESS      INSERTION OF PORT-A-CATH RT SIDE OF CHEST. FOR CHEMO-LAST ON 2/19/2020    IR INSERT TUNL CVC W PORT OVER 5 YEARS  10/15/2019       Home Medications:  Cannot display prior to admission medications because the patient has not been admitted in this contact. Hospital Medications:  No current facility-administered medications for this encounter. Current Outpatient Medications   Medication Sig    ibuprofen (ADVIL) 200 mg tablet Take  by mouth every eight (8) hours as needed for Pain.  hydroCHLOROthiazide (HYDRODIURIL) 12.5 mg tablet TAKE 1 TABLET BY MOUTH EVERY DAY    tadalafil (CIALIS) 5 mg tablet Take 5 mg by mouth.  oxybutynin (DITROPAN) 5 mg tablet Take 5 mg by mouth two (2) times a day.  lidocaine-prilocaine (EMLA) topical cream Apply  to affected area as needed for Pain.  ondansetron (ZOFRAN ODT) 8 mg disintegrating tablet Take 1 Tab by mouth every eight (8) hours as needed for Nausea.  acetaminophen (TYLENOL) 500 mg tablet Take 500 mg by mouth every six (6) hours as needed for Fever or Pain.  diphenoxylate-atropine (LOMOTIL) 2.5-0.025 mg per tablet Take 1 Tab by mouth four (4) times daily as needed for Diarrhea.  triamcinolone acetonide (KENALOG) 0.1 % ointment Apply  to affected area two (2) times daily as needed for Skin Irritation. use thin layer    fluocinonide (VANOS) 0.1 % topical cream Apply  to affected area daily. Allergies:  No Known Allergies    Family History:  Family History   Adopted: Yes   Problem Relation Age of Onset    Asthma Neg Hx     Cancer Neg Hx     Diabetes Neg Hx     Heart Disease Neg Hx     Hypertension Neg Hx     Stroke Neg Hx        Social History:  Social History     Tobacco Use    Smoking status: Never Smoker    Smokeless tobacco: Never Used   Substance Use Topics    Alcohol use:  Yes     Alcohol/week: 6.0 standard drinks     Types: 6 Cans of beer per week     Comment: 6 BEERS WEEKLY       Review of Systems:    Symptom Y/N Comments  Symptom Y/N Comments   Fever/Chills N   Chest Pain N    Cough N   Abdominal Pain N    Sputum N   Joint Pain     SOB/LESTER N   Pruritis/Rash     Nausea/vomit N   Tolerating PT/OT     Diarrhea N   Tolerating Diet Y    Constipation N   Other       Could NOT obtain due to:        Objective:   No data found. No intake/output data recorded. No intake/output data recorded. PHYSICAL EXAMINATION:    GENERAL:  No distress. HEENT:  Anicteric. LUNGS:  Clear to auscultation bilaterally. HEART:  Regular rate and rhythm with no murmurs, gallops, or rubs. ABDOMEN:  Healthy-appearing right lower quadrant ileostomy. Well healed scars. EXTREMITIES:  No edema. NEURO:  Alert and oriented. Data Review    3/2/2020 12:00   WBC 13.4 (H)   HGB 13.0   HCT 39.9   PLATELET 82 (L)   Sodium 140   Potassium 3.5   Chloride 107   CO2 27   Glucose 91   BUN 9   Creatinine 0.83   Calcium 8.7   GFR est non-AA >60                       Assessment and Plan:      The ileostomy is no longer needed, and its closure is therefore indicated. The risks of the operation have been discussed, and the patient has agreed to proceed.     Principal Problem:    Ileostomy present ([facility] [address] (3/17/2020)

## 2020-03-18 ENCOUNTER — APPOINTMENT (OUTPATIENT)
Dept: INFUSION THERAPY | Age: 50
End: 2020-03-18

## 2020-03-18 LAB
ANION GAP SERPL CALC-SCNC: 9 MMOL/L (ref 5–15)
BUN SERPL-MCNC: 9 MG/DL (ref 6–20)
BUN/CREAT SERPL: 10 (ref 12–20)
CALCIUM SERPL-MCNC: 8.4 MG/DL (ref 8.5–10.1)
CHLORIDE SERPL-SCNC: 106 MMOL/L (ref 97–108)
CO2 SERPL-SCNC: 22 MMOL/L (ref 21–32)
CREAT SERPL-MCNC: 0.9 MG/DL (ref 0.7–1.3)
ERYTHROCYTE [DISTWIDTH] IN BLOOD BY AUTOMATED COUNT: 12.7 % (ref 11.5–14.5)
GLUCOSE SERPL-MCNC: 112 MG/DL (ref 65–100)
HCT VFR BLD AUTO: 36.1 % (ref 36.6–50.3)
HGB BLD-MCNC: 12.3 G/DL (ref 12.1–17)
MAGNESIUM SERPL-MCNC: 2.2 MG/DL (ref 1.6–2.4)
MCH RBC QN AUTO: 34.4 PG (ref 26–34)
MCHC RBC AUTO-ENTMCNC: 34.1 G/DL (ref 30–36.5)
MCV RBC AUTO: 100.8 FL (ref 80–99)
NRBC # BLD: 0 K/UL (ref 0–0.01)
NRBC BLD-RTO: 0 PER 100 WBC
PHOSPHATE SERPL-MCNC: 4.2 MG/DL (ref 2.6–4.7)
PLATELET # BLD AUTO: 130 K/UL (ref 150–400)
PMV BLD AUTO: 10.7 FL (ref 8.9–12.9)
POTASSIUM SERPL-SCNC: 3.8 MMOL/L (ref 3.5–5.1)
RBC # BLD AUTO: 3.58 M/UL (ref 4.1–5.7)
SODIUM SERPL-SCNC: 137 MMOL/L (ref 136–145)
WBC # BLD AUTO: 6.7 K/UL (ref 4.1–11.1)

## 2020-03-18 PROCEDURE — 80048 BASIC METABOLIC PNL TOTAL CA: CPT

## 2020-03-18 PROCEDURE — 85027 COMPLETE CBC AUTOMATED: CPT

## 2020-03-18 PROCEDURE — 84100 ASSAY OF PHOSPHORUS: CPT

## 2020-03-18 PROCEDURE — 74011250637 HC RX REV CODE- 250/637: Performed by: COLON & RECTAL SURGERY

## 2020-03-18 PROCEDURE — 65270000032 HC RM SEMIPRIVATE

## 2020-03-18 PROCEDURE — 83735 ASSAY OF MAGNESIUM: CPT

## 2020-03-18 PROCEDURE — 0DBB0ZZ EXCISION OF ILEUM, OPEN APPROACH: ICD-10-PCS | Performed by: COLON & RECTAL SURGERY

## 2020-03-18 PROCEDURE — 36415 COLL VENOUS BLD VENIPUNCTURE: CPT

## 2020-03-18 PROCEDURE — 74011250636 HC RX REV CODE- 250/636: Performed by: COLON & RECTAL SURGERY

## 2020-03-18 RX ORDER — HEPARIN 100 UNIT/ML
500 SYRINGE INTRAVENOUS AS NEEDED
Status: DISCONTINUED | OUTPATIENT
Start: 2020-03-18 | End: 2020-03-22 | Stop reason: HOSPADM

## 2020-03-18 RX ADMIN — OXYBUTYNIN CHLORIDE 5 MG: 5 TABLET ORAL at 17:02

## 2020-03-18 RX ADMIN — ACETAMINOPHEN 1000 MG: 500 TABLET ORAL at 15:44

## 2020-03-18 RX ADMIN — SODIUM CHLORIDE, SODIUM LACTATE, POTASSIUM CHLORIDE, AND CALCIUM CHLORIDE 75 ML/HR: 600; 310; 30; 20 INJECTION, SOLUTION INTRAVENOUS at 17:02

## 2020-03-18 RX ADMIN — ACETAMINOPHEN 1000 MG: 500 TABLET ORAL at 21:10

## 2020-03-18 RX ADMIN — KETOROLAC TROMETHAMINE 15 MG: 30 INJECTION, SOLUTION INTRAMUSCULAR at 08:25

## 2020-03-18 RX ADMIN — CELECOXIB 100 MG: 100 CAPSULE ORAL at 21:10

## 2020-03-18 RX ADMIN — ENOXAPARIN SODIUM 40 MG: 40 INJECTION SUBCUTANEOUS at 09:32

## 2020-03-18 RX ADMIN — HYDROCHLOROTHIAZIDE 12.5 MG: 25 TABLET ORAL at 08:25

## 2020-03-18 RX ADMIN — OXYBUTYNIN CHLORIDE 5 MG: 5 TABLET ORAL at 08:25

## 2020-03-18 RX ADMIN — ACETAMINOPHEN 1000 MG: 500 TABLET ORAL at 08:25

## 2020-03-18 RX ADMIN — KETOROLAC TROMETHAMINE 15 MG: 30 INJECTION, SOLUTION INTRAMUSCULAR at 15:44

## 2020-03-18 RX ADMIN — PANTOPRAZOLE SODIUM 40 MG: 40 TABLET, DELAYED RELEASE ORAL at 06:58

## 2020-03-18 RX ADMIN — KETOROLAC TROMETHAMINE 15 MG: 30 INJECTION, SOLUTION INTRAMUSCULAR at 03:58

## 2020-03-18 RX ADMIN — ACETAMINOPHEN 1000 MG: 500 TABLET ORAL at 03:58

## 2020-03-18 NOTE — PROGRESS NOTES
NUTRITION education brief       Pt visited for low fiber education s/p ileostomy closure. Diet advanced to low fiber this morning so just getting started on second tray. Does not like Ensure Enlive but agreeable to trial of Ensure Clear as alternative during recovery phase. Has been eating well once discharged since last admit and appears well nourished. Some wt gain noted. Low fiber reinforced but with good understanding from initial surgery. Handouts provided. No questions at this time.      Wt Readings from Last 10 Encounters:   03/18/20 68.9 kg (151 lb 14.4 oz)   03/02/20 69.4 kg (153 lb)   02/19/20 69.9 kg (154 lb)   02/19/20 69.9 kg (154 lb)   02/05/20 (P) 68.5 kg (151 lb)   02/05/20 68.5 kg (151 lb)   01/28/20 67.8 kg (149 lb 6.4 oz)   01/22/20 68.6 kg (151 lb 3.2 oz)   01/22/20 68.5 kg (151 lb)   01/15/20 68.9 kg (151 lb 14.4 oz)     Jose Villarreal, RD

## 2020-03-18 NOTE — PROGRESS NOTES
Order to have Port-a-cath flushed. Called to [address] and they will send a nurse today to flush.

## 2020-03-18 NOTE — PROGRESS NOTES
Problem: Falls - Risk of  Goal: *Absence of Falls  Description: Document Ap Cordero Fall Risk and appropriate interventions in the flowsheet. Outcome: Progressing Towards Goal  Note: Fall Risk Interventions:            Medication Interventions: Patient to call before getting OOB, Teach patient to arise slowly                   Problem: Patient Education: Go to Patient Education Activity  Goal: Patient/Family Education  Outcome: Progressing Towards Goal     Problem: Pressure Injury - Risk of  Goal: *Prevention of pressure injury  Description: Document Chavo Scale and appropriate interventions in the flowsheet. Outcome: Progressing Towards Goal  Note: Pressure Injury Interventions:             Activity Interventions: Increase time out of bed, Pressure redistribution bed/mattress(bed type)         Nutrition Interventions: Document food/fluid/supplement intake                     Problem: Patient Education: Go to Patient Education Activity  Goal: Patient/Family Education  Outcome: Progressing Towards Goal

## 2020-03-18 NOTE — ANESTHESIA POSTPROCEDURE EVALUATION
Procedure(s):  CLOSURE OF LOOP ILEOSTOMY. general    Anesthesia Post Evaluation      Multimodal analgesia: multimodal analgesia used between 6 hours prior to anesthesia start to PACU discharge  Patient location during evaluation: bedside  Patient participation: waiting for patient participation  Level of consciousness: awake  Pain management: adequate  Airway patency: patent  Anesthetic complications: no  Cardiovascular status: acceptable  Respiratory status: unassisted  Hydration status: acceptable  Comments: Post-Anesthesia Evaluation and Assessment    I have evaluated the patient and they are ready for PACU discharge. Patient: Jose Sanchez MRN: [id number]  SSN: [id number]   YOB: 1970  Age: 45 y.o. Sex: male      Cardiovascular Function/Vital Signs  BP (!) 159/93   Pulse 80   Temp 36.9 °C (98.5 °F)   Resp 12   Ht 5' 7\" (1.702 m)   Wt 68.9 kg (152 lb)   SpO2 99%   BMI 23.81 kg/m²     Patient is status post General anesthesia for Procedure(s):  CLOSURE OF LOOP ILEOSTOMY. Nausea/Vomiting: None    Postoperative hydration reviewed and adequate. Pain:  Pain Scale 1: Numeric (0 - 10) (03/17/20 2000)  Pain Intensity 1: 0 (03/17/20 2000)   Managed    Neurological Status:   Neuro (WDL): Within Defined Limits (03/17/20 2000)  Neuro  LUE Motor Response: Purposeful (03/17/20 2000)  LLE Motor Response: Purposeful (03/17/20 2000)  RUE Motor Response: Purposeful (03/17/20 2000)  RLE Motor Response: Purposeful (03/17/20 2000)   At baseline    Mental Status, Level of Consciousness: Alert and  oriented to person, place, and time    Pulmonary Status:   O2 Device: Nasal cannula (03/17/20 2000)   Adequate oxygenation and airway patent    Complications related to anesthesia: None    Post-anesthesia assessment completed.  No concerns    Signed By: Anthony Santos MD    March 17, 2020                   Vitals Value Taken Time   /93 3/17/2020  8:15 PM   Temp 36.8 °C (98.2 °F) 3/17/2020  8:00 PM Pulse 80 3/17/2020  8:28 PM   Resp 10 3/17/2020  8:28 PM   SpO2 99 % 3/17/2020  8:28 PM   Vitals shown include unvalidated device data.

## 2020-03-18 NOTE — PROGRESS NOTES
Physical Therapy  3/18/2020    Orders received and acknowledged. Chart reviewed and cleared by RN. Pt has been up ad gray in hallway and managing own IV pole. Endorses no therapy needs and encouraged to continue ambulating 3-5x throughout the day for continued healing. Will sign off as pt with no acute therapy needs.     Thank Jose Tobias, PT, DPT

## 2020-03-18 NOTE — PROGRESS NOTES
General Daily Progress Note    Admission Date: 3/17/2020  Hospital Day 2  Post-Op Day 1  Subjective:   Good pain control. No nausea. Tolerating small quantities of solid food. Passed a small amount of flatus and some bloody mucous this afternoon. No dyspnea. Voiding well. Ambulating well. Objective:     Patient Vitals for the past 24 hrs:   BP Temp Pulse Resp SpO2 Weight   03/18/20 1525 128/87 98.4 °F (36.9 °C) 64 14 97 % --   03/18/20 1228 124/77 -- 68 -- -- --   03/18/20 0739 (!) 130/92 98.1 °F (36.7 °C) 69 14 99 % --   03/18/20 0422 128/76 97.7 °F (36.5 °C) 71 12 98 % 68.9 kg (151 lb 14.4 oz)   03/17/20 2345 124/78 99.2 °F (37.3 °C) 92 12 98 % --   03/17/20 2245 127/81 98.5 °F (36.9 °C) 87 12 98 % --   03/17/20 2145 155/88 97.7 °F (36.5 °C) 75 12 99 % --   03/17/20 2045 (!) 159/93 98.5 °F (36.9 °C) 80 12 99 % --   03/17/20 2015 (!) 132/93 -- 79 11 99 % --   03/17/20 2000 (!) 143/92 98.2 °F (36.8 °C) 74 11 99 % --   03/17/20 1951 (!) 153/96 -- 71 11 99 % --   03/17/20 1945 (!) 170/100 -- 67 11 100 % --   03/17/20 1939 (!) 165/99 -- 65 10 100 % --   03/17/20 1930 (!) 157/101 -- 68 14 99 % --   03/17/20 1925 -- -- 71 12 100 % --   03/17/20 1920 (!) 155/93 -- 67 12 100 % --   03/17/20 1915 (!) 139/104 -- 70 12 100 % --     No intake/output data recorded. 03/17 0701 - 03/18 1900  In: 737.5 [I.V.:737.5]  Out: 2665 [Urine:2650]      Physical Examination:  General Appearance - No distress. Abdomen -  Non-distended. Dressing clean, dry, and intact. Data Review   Recent Results (from the past 24 hour(s))   CBC WITH AUTOMATED DIFF    Collection Time: 03/17/20  7:21 PM   Result Value Ref Range    WBC 11.0 4.1 - 11.1 K/uL    RBC 4.20 4. 10 - 5.70 M/uL    HGB 14.2 12.1 - 17.0 g/dL    HCT 42.6 36.6 - 50.3 %    .4 (H) 80.0 - 99.0 FL    MCH 33.8 26.0 - 34.0 PG    MCHC 33.3 30.0 - 36.5 g/dL    RDW 13.1 11.5 - 14.5 %    PLATELET 158 (L) 084 - 400 K/uL    MPV 10.9 8.9 - 12.9 FL    NRBC 0.0 0  WBC    ABSOLUTE NRBC 0.00 0.00 - 0.01 K/uL    NEUTROPHILS 94 (H) 32 - 75 %    LYMPHOCYTES 2 (L) 12 - 49 %    MONOCYTES 3 (L) 5 - 13 %    EOSINOPHILS 1 0 - 7 %    BASOPHILS 0 0 - 1 %    IMMATURE GRANULOCYTES 0 0.0 - 0.5 %    ABS. NEUTROPHILS 10.4 (H) 1.8 - 8.0 K/UL    ABS. LYMPHOCYTES 0.2 (L) 0.8 - 3.5 K/UL    ABS. MONOCYTES 0.3 0.0 - 1.0 K/UL    ABS. EOSINOPHILS 0.1 0.0 - 0.4 K/UL    ABS. BASOPHILS 0.0 0.0 - 0.1 K/UL    ABS. IMM.  GRANS. 0.0 0.00 - 0.04 K/UL    DF SMEAR SCANNED      RBC COMMENTS NORMOCYTIC, NORMOCHROMIC     METABOLIC PANEL, BASIC    Collection Time: 03/17/20  7:21 PM   Result Value Ref Range    Sodium 139 136 - 145 mmol/L    Potassium 3.2 (L) 3.5 - 5.1 mmol/L    Chloride 106 97 - 108 mmol/L    CO2 25 21 - 32 mmol/L    Anion gap 8 5 - 15 mmol/L    Glucose 107 (H) 65 - 100 mg/dL    BUN 8 6 - 20 MG/DL    Creatinine 1.30 0.70 - 1.30 MG/DL    BUN/Creatinine ratio 6 (L) 12 - 20      GFR est AA >60 >60 ml/min/1.73m2    GFR est non-AA 58 (L) >60 ml/min/1.73m2    Calcium 8.9 8.5 - 10.1 MG/DL   MAGNESIUM    Collection Time: 03/17/20  7:21 PM   Result Value Ref Range    Magnesium 2.4 1.6 - 2.4 mg/dL   PHOSPHORUS    Collection Time: 03/17/20  7:21 PM   Result Value Ref Range    Phosphorus 3.5 2.6 - 4.7 MG/DL   CBC W/O DIFF    Collection Time: 03/18/20  4:10 AM   Result Value Ref Range    WBC 6.7 4.1 - 11.1 K/uL    RBC 3.58 (L) 4.10 - 5.70 M/uL    HGB 12.3 12.1 - 17.0 g/dL    HCT 36.1 (L) 36.6 - 50.3 %    .8 (H) 80.0 - 99.0 FL    MCH 34.4 (H) 26.0 - 34.0 PG    MCHC 34.1 30.0 - 36.5 g/dL    RDW 12.7 11.5 - 14.5 %    PLATELET 069 (L) 228 - 400 K/uL    MPV 10.7 8.9 - 12.9 FL    NRBC 0.0 0  WBC    ABSOLUTE NRBC 0.00 0.00 - 7.83 K/uL   METABOLIC PANEL, BASIC    Collection Time: 03/18/20  4:10 AM   Result Value Ref Range    Sodium 137 136 - 145 mmol/L    Potassium 3.8 3.5 - 5.1 mmol/L    Chloride 106 97 - 108 mmol/L    CO2 22 21 - 32 mmol/L    Anion gap 9 5 - 15 mmol/L    Glucose 112 (H) 65 - 100 mg/dL BUN 9 6 - 20 MG/DL    Creatinine 0.90 0.70 - 1.30 MG/DL    BUN/Creatinine ratio 10 (L) 12 - 20      GFR est AA >60 >60 ml/min/1.73m2    GFR est non-AA >60 >60 ml/min/1.73m2    Calcium 8.4 (L) 8.5 - 10.1 MG/DL   MAGNESIUM    Collection Time: 03/18/20  4:10 AM   Result Value Ref Range    Magnesium 2.2 1.6 - 2.4 mg/dL   PHOSPHORUS    Collection Time: 03/18/20  4:10 AM   Result Value Ref Range    Phosphorus 4.2 2.6 - 4.7 MG/DL           Assessment:     Principal Problem:    Ileostomy present ([facility] [address] (3/17/2020)        1 Day Post-Op s/p ileostomy takedown with resection and anastomosis. Hemodynamically stable. Hemoglobin acceptable. Thrombocytopenia stable. Good urine output and normal creatinine. Awaiting return of GI function. Plan:   Continue low fiber diet begun earlier today. Ambulate. Incentive spirometer.

## 2020-03-18 NOTE — PROGRESS NOTES
1623 Received the ok from Dr. Chip Veras to utilize pt's port. Placed a call to Dr. Haven Friend office for order to keep port accessed. 1653 Received call back from Dr. [place]. Pt's port may remain accessed.

## 2020-03-18 NOTE — PROGRESS NOTES
Bedside shift change report given to TRISTAN Morris and Von Leroy RN (oncoming nurse) by Johan Foreman RN (offgoing nurse). Report included the following information SBAR, Kardex, Intake/Output and MAR.

## 2020-03-18 NOTE — PROGRESS NOTES
Bedside shift change report given to Alfa RN (oncoming nurse) by Devika Stone RN (offgoing nurse). Report included the following information SBAR, Kardex, Intake/Output and MAR.

## 2020-03-19 LAB
ANION GAP SERPL CALC-SCNC: 6 MMOL/L (ref 5–15)
BASOPHILS # BLD: 0 K/UL (ref 0–0.1)
BASOPHILS NFR BLD: 0 % (ref 0–1)
BUN SERPL-MCNC: 10 MG/DL (ref 6–20)
BUN/CREAT SERPL: 10 (ref 12–20)
CALCIUM SERPL-MCNC: 8.6 MG/DL (ref 8.5–10.1)
CHLORIDE SERPL-SCNC: 109 MMOL/L (ref 97–108)
CO2 SERPL-SCNC: 26 MMOL/L (ref 21–32)
CREAT SERPL-MCNC: 0.97 MG/DL (ref 0.7–1.3)
DIFFERENTIAL METHOD BLD: ABNORMAL
EOSINOPHIL # BLD: 0.1 K/UL (ref 0–0.4)
EOSINOPHIL NFR BLD: 1 % (ref 0–7)
ERYTHROCYTE [DISTWIDTH] IN BLOOD BY AUTOMATED COUNT: 13.2 % (ref 11.5–14.5)
GLUCOSE SERPL-MCNC: 106 MG/DL (ref 65–100)
HCT VFR BLD AUTO: 37.2 % (ref 36.6–50.3)
HGB BLD-MCNC: 12.5 G/DL (ref 12.1–17)
IMM GRANULOCYTES # BLD AUTO: 0 K/UL (ref 0–0.04)
IMM GRANULOCYTES NFR BLD AUTO: 0 % (ref 0–0.5)
LYMPHOCYTES # BLD: 0.3 K/UL (ref 0.8–3.5)
LYMPHOCYTES NFR BLD: 3 % (ref 12–49)
MCH RBC QN AUTO: 33.9 PG (ref 26–34)
MCHC RBC AUTO-ENTMCNC: 33.6 G/DL (ref 30–36.5)
MCV RBC AUTO: 100.8 FL (ref 80–99)
MONOCYTES # BLD: 0.5 K/UL (ref 0–1)
MONOCYTES NFR BLD: 5 % (ref 5–13)
NEUTS SEG # BLD: 8.4 K/UL (ref 1.8–8)
NEUTS SEG NFR BLD: 91 % (ref 32–75)
NRBC # BLD: 0 K/UL (ref 0–0.01)
NRBC BLD-RTO: 0 PER 100 WBC
PLATELET # BLD AUTO: 115 K/UL (ref 150–400)
PMV BLD AUTO: 10.7 FL (ref 8.9–12.9)
POTASSIUM SERPL-SCNC: 3.6 MMOL/L (ref 3.5–5.1)
RBC # BLD AUTO: 3.69 M/UL (ref 4.1–5.7)
RBC MORPH BLD: ABNORMAL
RBC MORPH BLD: ABNORMAL
SODIUM SERPL-SCNC: 141 MMOL/L (ref 136–145)
WBC # BLD AUTO: 9.3 K/UL (ref 4.1–11.1)

## 2020-03-19 PROCEDURE — 80048 BASIC METABOLIC PNL TOTAL CA: CPT

## 2020-03-19 PROCEDURE — 77030008027

## 2020-03-19 PROCEDURE — 65270000032 HC RM SEMIPRIVATE

## 2020-03-19 PROCEDURE — 36415 COLL VENOUS BLD VENIPUNCTURE: CPT

## 2020-03-19 PROCEDURE — 74011250636 HC RX REV CODE- 250/636: Performed by: COLON & RECTAL SURGERY

## 2020-03-19 PROCEDURE — 74011250637 HC RX REV CODE- 250/637: Performed by: COLON & RECTAL SURGERY

## 2020-03-19 PROCEDURE — 85025 COMPLETE CBC W/AUTO DIFF WBC: CPT

## 2020-03-19 RX ORDER — OXYCODONE HYDROCHLORIDE 5 MG/1
5 TABLET ORAL
Qty: 20 TAB | Refills: 0 | Status: SHIPPED | OUTPATIENT
Start: 2020-03-19 | End: 2020-03-24

## 2020-03-19 RX ADMIN — CELECOXIB 100 MG: 100 CAPSULE ORAL at 09:36

## 2020-03-19 RX ADMIN — CELECOXIB 100 MG: 100 CAPSULE ORAL at 18:32

## 2020-03-19 RX ADMIN — OXYBUTYNIN CHLORIDE 5 MG: 5 TABLET ORAL at 18:32

## 2020-03-19 RX ADMIN — PANTOPRAZOLE SODIUM 40 MG: 40 TABLET, DELAYED RELEASE ORAL at 06:49

## 2020-03-19 RX ADMIN — ACETAMINOPHEN 1000 MG: 500 TABLET ORAL at 16:06

## 2020-03-19 RX ADMIN — ENOXAPARIN SODIUM 40 MG: 40 INJECTION SUBCUTANEOUS at 09:41

## 2020-03-19 RX ADMIN — ACETAMINOPHEN 1000 MG: 500 TABLET ORAL at 22:01

## 2020-03-19 RX ADMIN — CLONIDINE HYDROCHLORIDE 0.1 MG: 0.1 TABLET ORAL at 11:24

## 2020-03-19 RX ADMIN — HYDROCHLOROTHIAZIDE 12.5 MG: 25 TABLET ORAL at 09:40

## 2020-03-19 RX ADMIN — ACETAMINOPHEN 1000 MG: 500 TABLET ORAL at 04:08

## 2020-03-19 RX ADMIN — ACETAMINOPHEN 1000 MG: 500 TABLET ORAL at 09:36

## 2020-03-19 RX ADMIN — OXYBUTYNIN CHLORIDE 5 MG: 5 TABLET ORAL at 09:36

## 2020-03-19 NOTE — OP NOTES
[address]  OPERATIVE REPORT    Name:  Harley Medrano  MR#:  [id number]  :  1970  ACCOUNT #:  [de-identified]    DATE OF SERVICE:  2020    PREOPERATIVE DIAGNOSIS:  Temporary ileostomy. POSTOPERATIVE DIAGNOSIS:  Temporary ileostomy. PROCEDURE PERFORMED:  Ileostomy closure with resection and anastomosis. SURGEON:  MD Charmaine Ansari. [place], [place]    ANESTHESIA:  General endotracheal.    SPECIMENS REMOVED:  Ileostomy. TUBES AND DRAINS:  None. ESTIMATED BLOOD LOSS:  15 mL. IV FLUIDS:  Crystalloid 700 mL. URINE OUTPUT:  400 mL. COMPLICATIONS:  None apparent. IMPLANTS:  None. 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 He has subsequently received a course of adjuvant chemotherapy that has now been completed. A Gastrografin enema performed on 2020 revealed no evidence of an anastomotic leak. The ileostomy no longer being needed, closure was indicated. The risks were discussed in detail, and the patient agreed to proceed. OPERATIVE FINDINGS:  The ileum appeared to be healthy. DESCRIPTION OF PROCEDURE:  After informed consent was obtained, the patient was taken to the operating room where standard monitoring devices were attached and adequate general endotracheal anesthesia was induced. He was positioned supine on the operating table with the lower extremities fitted with pneumatic compression stockings. Using standard sterile technique, a Rogers catheter was placed in the bladder.   The ileostomy was closed with 2-0 silk pursestring sutures, then the abdomen was prepped and draped in usual sterile fashion. 2 g of cefotetan were administered parenterally. The area around the ileostomy was infiltrated with 0.5% bupivacaine with epinephrine. A transversely oriented circumscribing skin incision was made around the ileostomy using the #15 scalpel followed by the electrocautery. Gradually, dissection was carried down adjacent to the bowel to the level of the fascia with great care taken not to injure the bowel. The adhesions between the bowel and the abdominal fascia were then carefully divided using a combination of electrocautery and sharp dissection with scissors. The bowel was freed and delivered sufficiently from the abdominal cavity. It was inspected and appeared to be healthy. Some dissection was performed to separate the two limbs of the loop. The mesentery adjacent to the ileostomy itself was divided segmentally between clamps, and the divided structures were ligated with 2-0 silk ties and 2-0 silk sutures. The loop was then divided to increase the mobility. Each limb was opened using the electrocautery and the contents, which were scant, were evacuated with suction. The IFEOMA-75 stapling device with a blue cartridge was brought onto the field. It was then used to create a side-to-side (functional end-to-end) ileoileostomy. The longitudinal firing was performed with care to avoid the incorporation of the mesentery or any extraneous tissues into the anastomosis. Inspection of the anastomosis through the open end of the bowel revealed the staple lines to be hemostatic. Next, a transverse firing of the IFEOMA-75 stapling device with a blue cartridge was used to close the remaining defect. This also completed the amputation of the ileostomy which was sent as a surgical specimen.   3-0 silk seromuscular sutures were used to reinforce the crotch of the anastomosis, to dunk the corners of the transverse staple line, and to selectively improve hemostasis along the transverse staple line. Upon completion, inspection and palpation revealed the anastomosis to be apparently well vascularized, widely patent, and under no undue tension. The bowel was returned to the abdominal cavity. The fascial closure was then performed longitudinally using two running #1 PDS sutures. The wound was copiously irrigated with saline, and running 2-0 Vicryl suture was placed transversely to reapproximate the subcutaneous adipose tissue. The skin was loosely closed with staples, but the gaps and the depth of the wound was insufficient for the insertion of winsome. The wound was covered with 4 x 4's and tape. There were no apparent complications, and the patient appeared to have tolerated the procedure well. At its conclusion, the Rogers catheter was removed and he was extubated and transported in stable condition to the recovery room. All sponge, needle, and instrument counts were correct.         William Stewart MD      [id number]  D:  03/18/2020 23:51  T:  03/19/2020 4:40  JOB #:  [id number]  CC:  MD Reddy Domínguez MD Larence Mediate, MD Beau Cora, MD Jerelene Cornell, MD

## 2020-03-19 NOTE — ROUTINE PROCESS
Bedside and Verbal shift change report given to Alberto Zee (oncoming nurse) by Esperanza Horton RN (offgoing nurse). Report included the following information SBAR, Kardex, Procedure Summary, Intake/Output, MAR and Recent Results.

## 2020-03-19 NOTE — PROGRESS NOTES
RN spoke with Dr. Aspen Kline to notify of Temp 100.5. MD instructed to postpone discharge and continue to monitor patient until after dinner. Will continue to monitor patient and will contact MD this evening to clarify plans for discharge. Dr. Aspen Kline also gave telephone orders to de-access patient's port prior to discharge. Will proceed as instructed.

## 2020-03-19 NOTE — PROGRESS NOTES
General Daily Progress Note    Admission Date: 3/17/2020  Hospital Day 3  Post-Op Day 2  Subjective:   Feeling well enough to go home (probably). Has had bowel movement. Not passing much gas. Objective:     Patient Vitals for the past 24 hrs:   BP Temp Pulse Resp SpO2   03/19/20 0920 (!) 151/92 99.7 °F (37.6 °C) 76 -- 97 %   03/18/20 2341 141/75 99.2 °F (37.3 °C) 70 14 96 %   03/18/20 1525 128/87 98.4 °F (36.9 °C) 64 14 97 %   03/18/20 1228 124/77 -- 68 -- --     No intake/output data recorded. 03/17 1901 - 03/19 0700  In: 37.5 [I.V.:37.5]  Out: 1850 [Urine:1850]    Physical Examination:  General Appearance - No distress. Abdomen -  Non-distended. Incision, clean dry, intact, and without erythema. Data Review   Recent Results (from the past 24 hour(s))   CBC WITH AUTOMATED DIFF    Collection Time: 03/19/20  4:12 AM   Result Value Ref Range    WBC 9.3 4.1 - 11.1 K/uL    RBC 3.69 (L) 4.10 - 5.70 M/uL    HGB 12.5 12.1 - 17.0 g/dL    HCT 37.2 36.6 - 50.3 %    .8 (H) 80.0 - 99.0 FL    MCH 33.9 26.0 - 34.0 PG    MCHC 33.6 30.0 - 36.5 g/dL    RDW 13.2 11.5 - 14.5 %    PLATELET 026 (L) 342 - 400 K/uL    MPV 10.7 8.9 - 12.9 FL    NRBC 0.0 0  WBC    ABSOLUTE NRBC 0.00 0.00 - 0.01 K/uL    NEUTROPHILS 91 (H) 32 - 75 %    LYMPHOCYTES 3 (L) 12 - 49 %    MONOCYTES 5 5 - 13 %    EOSINOPHILS 1 0 - 7 %    BASOPHILS 0 0 - 1 %    IMMATURE GRANULOCYTES 0 0.0 - 0.5 %    ABS. NEUTROPHILS 8.4 (H) 1.8 - 8.0 K/UL    ABS. LYMPHOCYTES 0.3 (L) 0.8 - 3.5 K/UL    ABS. MONOCYTES 0.5 0.0 - 1.0 K/UL    ABS. EOSINOPHILS 0.1 0.0 - 0.4 K/UL    ABS. BASOPHILS 0.0 0.0 - 0.1 K/UL    ABS. IMM.  GRANS. 0.0 0.00 - 0.04 K/UL    DF SMEAR SCANNED      RBC COMMENTS MACROCYTOSIS  1+        RBC COMMENTS NORMOCYTIC, NORMOCHROMIC     METABOLIC PANEL, BASIC    Collection Time: 03/19/20  4:12 AM   Result Value Ref Range    Sodium 141 136 - 145 mmol/L    Potassium 3.6 3.5 - 5.1 mmol/L    Chloride 109 (H) 97 - 108 mmol/L    CO2 26 21 - 32 mmol/L    Anion gap 6 5 - 15 mmol/L    Glucose 106 (H) 65 - 100 mg/dL    BUN 10 6 - 20 MG/DL    Creatinine 0.97 0.70 - 1.30 MG/DL    BUN/Creatinine ratio 10 (L) 12 - 20      GFR est AA >60 >60 ml/min/1.73m2    GFR est non-AA >60 >60 ml/min/1.73m2    Calcium 8.6 8.5 - 10.1 MG/DL           Assessment:     Principal Problem:    Ileostomy present ([facility] [address] (3/17/2020)        2 Days Post-Op s/p ileostomy takedown with resection and anastomosis.     Hemodynamically stable. Hemoglobin acceptable. Thrombocytopenia stable. Good urine output and normal creatinine. One low grade temperature elevation, but no leukocytosis or visible evidence of wound infection.     GI function is returning. Patient meets criteria for discharge, but I would prefer to wait until this afternoon to let him go. Plan:   Discharge to home after lunch if no setback by then. Follow-up in 10 to 14 days.

## 2020-03-19 NOTE — PROGRESS NOTES
Bedside shift change report given to Mehnaz Mcdaniel RN (oncoming nurse) by Mia Hoffmann RN (offgoing nurse). Report included the following information SBAR, Kardex, Intake/Output and MAR.

## 2020-03-19 NOTE — PROGRESS NOTES
LEN:  -home today    Reason for Admission: Temporary ileostomy                     RUR Score:    10                 Plan for utilizing home health:      n/a    PCP: First and Last name:  Eileen Haskins   Name of Practice:    Are you a current patient: Yes/No: yes   Approximate date of last visit:                     Current Advanced Directive/Advance Care Plan: On file, spouse Melchor Jacques is medical agent                         Transition of Care Plan:    Home today with no needs                    Care Management Interventions  PCP Verified by CM: Yes  Palliative Care Criteria Met (RRAT>21 & CHF Dx)?: No  MyChart Signup: No  Discharge Durable Medical Equipment: No  Health Maintenance Reviewed: Yes  Physical Therapy Consult: Yes  Occupational Therapy Consult: No  Speech Therapy Consult: No  Current Support Network: Own Home  Discharge Location  Discharge Placement: Home with family assistance    Reji Foster

## 2020-03-20 ENCOUNTER — APPOINTMENT (OUTPATIENT)
Dept: GENERAL RADIOLOGY | Age: 50
DRG: 230 | End: 2020-03-20
Attending: COLON & RECTAL SURGERY
Payer: MEDICAID

## 2020-03-20 ENCOUNTER — APPOINTMENT (OUTPATIENT)
Dept: CT IMAGING | Age: 50
DRG: 230 | End: 2020-03-20
Attending: COLON & RECTAL SURGERY
Payer: MEDICAID

## 2020-03-20 LAB
ANION GAP SERPL CALC-SCNC: 10 MMOL/L (ref 5–15)
APPEARANCE UR: CLEAR
BACTERIA URNS QL MICRO: NEGATIVE /HPF
BASOPHILS # BLD: 0 K/UL (ref 0–0.1)
BASOPHILS NFR BLD: 0 % (ref 0–1)
BILIRUB UR QL: NEGATIVE
BUN SERPL-MCNC: 7 MG/DL (ref 6–20)
BUN/CREAT SERPL: 10 (ref 12–20)
CALCIUM SERPL-MCNC: 8.5 MG/DL (ref 8.5–10.1)
CHLORIDE SERPL-SCNC: 103 MMOL/L (ref 97–108)
CO2 SERPL-SCNC: 22 MMOL/L (ref 21–32)
COLOR UR: ABNORMAL
CREAT SERPL-MCNC: 0.68 MG/DL (ref 0.7–1.3)
DIFFERENTIAL METHOD BLD: ABNORMAL
EOSINOPHIL # BLD: 0.1 K/UL (ref 0–0.4)
EOSINOPHIL NFR BLD: 1 % (ref 0–7)
EPITH CASTS URNS QL MICRO: ABNORMAL /LPF
ERYTHROCYTE [DISTWIDTH] IN BLOOD BY AUTOMATED COUNT: 12.9 % (ref 11.5–14.5)
GLUCOSE SERPL-MCNC: 105 MG/DL (ref 65–100)
GLUCOSE UR STRIP.AUTO-MCNC: NEGATIVE MG/DL
HCT VFR BLD AUTO: 39.8 % (ref 36.6–50.3)
HGB BLD-MCNC: 13.5 G/DL (ref 12.1–17)
HGB UR QL STRIP: NEGATIVE
HYALINE CASTS URNS QL MICRO: ABNORMAL /LPF (ref 0–5)
IMM GRANULOCYTES # BLD AUTO: 0 K/UL (ref 0–0.04)
IMM GRANULOCYTES NFR BLD AUTO: 0 % (ref 0–0.5)
KETONES UR QL STRIP.AUTO: 40 MG/DL
LACTATE SERPL-SCNC: 1.2 MMOL/L (ref 0.4–2)
LEUKOCYTE ESTERASE UR QL STRIP.AUTO: NEGATIVE
LYMPHOCYTES # BLD: 0.3 K/UL (ref 0.8–3.5)
LYMPHOCYTES NFR BLD: 2 % (ref 12–49)
MAGNESIUM SERPL-MCNC: 2.1 MG/DL (ref 1.6–2.4)
MCH RBC QN AUTO: 33.5 PG (ref 26–34)
MCHC RBC AUTO-ENTMCNC: 33.9 G/DL (ref 30–36.5)
MCV RBC AUTO: 98.8 FL (ref 80–99)
MONOCYTES # BLD: 0.9 K/UL (ref 0–1)
MONOCYTES NFR BLD: 7 % (ref 5–13)
NEUTS SEG # BLD: 11.8 K/UL (ref 1.8–8)
NEUTS SEG NFR BLD: 90 % (ref 32–75)
NITRITE UR QL STRIP.AUTO: NEGATIVE
NRBC # BLD: 0 K/UL (ref 0–0.01)
NRBC BLD-RTO: 0 PER 100 WBC
PH UR STRIP: 6.5 [PH] (ref 5–8)
PHOSPHATE SERPL-MCNC: 3.1 MG/DL (ref 2.6–4.7)
PLATELET # BLD AUTO: 133 K/UL (ref 150–400)
PMV BLD AUTO: 11 FL (ref 8.9–12.9)
POTASSIUM SERPL-SCNC: 3.4 MMOL/L (ref 3.5–5.1)
PROT UR STRIP-MCNC: ABNORMAL MG/DL
RBC # BLD AUTO: 4.03 M/UL (ref 4.1–5.7)
RBC #/AREA URNS HPF: ABNORMAL /HPF (ref 0–5)
RBC MORPH BLD: ABNORMAL
SODIUM SERPL-SCNC: 135 MMOL/L (ref 136–145)
SP GR UR REFRACTOMETRY: 1.02 (ref 1–1.03)
UA: UC IF INDICATED,UAUC: ABNORMAL
UROBILINOGEN UR QL STRIP.AUTO: 0.2 EU/DL (ref 0.2–1)
WBC # BLD AUTO: 13.1 K/UL (ref 4.1–11.1)
WBC URNS QL MICRO: ABNORMAL /HPF (ref 0–4)

## 2020-03-20 PROCEDURE — 74011000258 HC RX REV CODE- 258: Performed by: RADIOLOGY

## 2020-03-20 PROCEDURE — 74011250637 HC RX REV CODE- 250/637: Performed by: COLON & RECTAL SURGERY

## 2020-03-20 PROCEDURE — 84100 ASSAY OF PHOSPHORUS: CPT

## 2020-03-20 PROCEDURE — 74177 CT ABD & PELVIS W/CONTRAST: CPT

## 2020-03-20 PROCEDURE — 83735 ASSAY OF MAGNESIUM: CPT

## 2020-03-20 PROCEDURE — 36415 COLL VENOUS BLD VENIPUNCTURE: CPT

## 2020-03-20 PROCEDURE — 65270000032 HC RM SEMIPRIVATE

## 2020-03-20 PROCEDURE — 85025 COMPLETE CBC W/AUTO DIFF WBC: CPT

## 2020-03-20 PROCEDURE — 74011000258 HC RX REV CODE- 258: Performed by: COLON & RECTAL SURGERY

## 2020-03-20 PROCEDURE — 83605 ASSAY OF LACTIC ACID: CPT

## 2020-03-20 PROCEDURE — 74011250636 HC RX REV CODE- 250/636: Performed by: COLON & RECTAL SURGERY

## 2020-03-20 PROCEDURE — 81001 URINALYSIS AUTO W/SCOPE: CPT

## 2020-03-20 PROCEDURE — 80048 BASIC METABOLIC PNL TOTAL CA: CPT

## 2020-03-20 PROCEDURE — 71046 X-RAY EXAM CHEST 2 VIEWS: CPT

## 2020-03-20 PROCEDURE — 74011636320 HC RX REV CODE- 636/320: Performed by: RADIOLOGY

## 2020-03-20 RX ORDER — ENOXAPARIN SODIUM 100 MG/ML
40 INJECTION SUBCUTANEOUS EVERY 24 HOURS
Status: DISCONTINUED | OUTPATIENT
Start: 2020-03-21 | End: 2020-03-22 | Stop reason: HOSPADM

## 2020-03-20 RX ORDER — SODIUM CHLORIDE 0.9 % (FLUSH) 0.9 %
10 SYRINGE (ML) INJECTION
Status: COMPLETED | OUTPATIENT
Start: 2020-03-20 | End: 2020-03-20

## 2020-03-20 RX ADMIN — PIPERACILLIN AND TAZOBACTAM 3.38 G: 3; .375 INJECTION, POWDER, LYOPHILIZED, FOR SOLUTION INTRAVENOUS at 09:08

## 2020-03-20 RX ADMIN — IOHEXOL 50 ML: 240 INJECTION, SOLUTION INTRATHECAL; INTRAVASCULAR; INTRAVENOUS; ORAL at 12:00

## 2020-03-20 RX ADMIN — CELECOXIB 100 MG: 100 CAPSULE ORAL at 17:09

## 2020-03-20 RX ADMIN — PANTOPRAZOLE SODIUM 40 MG: 40 TABLET, DELAYED RELEASE ORAL at 07:26

## 2020-03-20 RX ADMIN — PIPERACILLIN AND TAZOBACTAM 3.38 G: 3; .375 INJECTION, POWDER, LYOPHILIZED, FOR SOLUTION INTRAVENOUS at 14:07

## 2020-03-20 RX ADMIN — IOPAMIDOL 100 ML: 755 INJECTION, SOLUTION INTRAVENOUS at 12:00

## 2020-03-20 RX ADMIN — PIPERACILLIN AND TAZOBACTAM 3.38 G: 3; .375 INJECTION, POWDER, LYOPHILIZED, FOR SOLUTION INTRAVENOUS at 22:00

## 2020-03-20 RX ADMIN — CELECOXIB 100 MG: 100 CAPSULE ORAL at 09:08

## 2020-03-20 RX ADMIN — Medication 10 ML: at 12:00

## 2020-03-20 RX ADMIN — SODIUM CHLORIDE 1000 ML: 900 INJECTION, SOLUTION INTRAVENOUS at 09:20

## 2020-03-20 RX ADMIN — ACETAMINOPHEN 1000 MG: 500 TABLET ORAL at 09:08

## 2020-03-20 RX ADMIN — ACETAMINOPHEN 1000 MG: 500 TABLET ORAL at 15:38

## 2020-03-20 RX ADMIN — SODIUM CHLORIDE 100 ML: 900 INJECTION, SOLUTION INTRAVENOUS at 12:00

## 2020-03-20 RX ADMIN — OXYBUTYNIN CHLORIDE 5 MG: 5 TABLET ORAL at 09:08

## 2020-03-20 RX ADMIN — ACETAMINOPHEN 1000 MG: 500 TABLET ORAL at 04:55

## 2020-03-20 RX ADMIN — OXYBUTYNIN CHLORIDE 5 MG: 5 TABLET ORAL at 17:09

## 2020-03-20 RX ADMIN — HYDROCHLOROTHIAZIDE 12.5 MG: 25 TABLET ORAL at 09:15

## 2020-03-20 NOTE — PROGRESS NOTES
I have read and agree with the assessment and documentation of [facility], RN.

## 2020-03-20 NOTE — PROGRESS NOTES
General Daily Progress Note    Admission Date: 3/17/2020  Hospital Day 4  Post-Op Day 3  Subjective:   Feels bloated, but not as much as yesterday evening. Still not much of an appetite. Little pain. Passing loose stool, but little or no gas. No dyspnea or cough. No dysuria. Objective:     Patient Vitals for the past 24 hrs:   BP Temp Pulse Resp SpO2 Weight   03/19/20 2346 155/90 98.8 °F (37.1 °C) 72 16 95 % --   03/19/20 1828 (!) 157/91 98.9 °F (37.2 °C) 72 16 96 % --   03/19/20 1634 -- 100 °F (37.8 °C) -- -- -- --   03/19/20 1611 (!) 151/93 -- 75 14 96 % --   03/19/20 1511 (!) 142/94 99 °F (37.2 °C) 73 16 96 % --   03/19/20 1228 (!) 155/93 99.4 °F (37.4 °C) 78 -- 96 % --   03/19/20 1117 (!) 162/105 (!) 100.5 °F (38.1 °C) 68 16 97 % --   03/19/20 0920 (!) 151/92 99.7 °F (37.6 °C) 76 16 97 % 68.5 kg (151 lb)     No intake/output data recorded. No intake/output data recorded. Physical Examination:  General Appearance - No distress. Abdomen -  Somewhat distended but soft and appropriately tender. No rebound tenderness or guarding. No palpable masses.  Incision, clean dry, intact, and without erythema. Data Review   Recent Results (from the past 24 hour(s))   CBC WITH AUTOMATED DIFF    Collection Time: 03/20/20  4:51 AM   Result Value Ref Range    WBC 13.1 (H) 4.1 - 11.1 K/uL    RBC 4.03 (L) 4.10 - 5.70 M/uL    HGB 13.5 12.1 - 17.0 g/dL    HCT 39.8 36.6 - 50.3 %    MCV 98.8 80.0 - 99.0 FL    MCH 33.5 26.0 - 34.0 PG    MCHC 33.9 30.0 - 36.5 g/dL    RDW 12.9 11.5 - 14.5 %    PLATELET 342 (L) 713 - 400 K/uL    MPV 11.0 8.9 - 12.9 FL    NRBC 0.0 0  WBC    ABSOLUTE NRBC 0.00 0.00 - 0.01 K/uL    NEUTROPHILS 90 (H) 32 - 75 %    LYMPHOCYTES 2 (L) 12 - 49 %    MONOCYTES 7 5 - 13 %    EOSINOPHILS 1 0 - 7 %    BASOPHILS 0 0 - 1 %    IMMATURE GRANULOCYTES 0 0.0 - 0.5 %    ABS. NEUTROPHILS 11.8 (H) 1.8 - 8.0 K/UL    ABS. LYMPHOCYTES 0.3 (L) 0.8 - 3.5 K/UL    ABS.  MONOCYTES 0.9 0.0 - 1.0 K/UL ABS. EOSINOPHILS 0.1 0.0 - 0.4 K/UL    ABS. BASOPHILS 0.0 0.0 - 0.1 K/UL    ABS. IMM. GRANS. 0.0 0.00 - 0.04 K/UL    DF SMEAR SCANNED      RBC COMMENTS MACROCYTOSIS  1+       METABOLIC PANEL, BASIC    Collection Time: 03/20/20  4:51 AM   Result Value Ref Range    Sodium 135 (L) 136 - 145 mmol/L    Potassium 3.4 (L) 3.5 - 5.1 mmol/L    Chloride 103 97 - 108 mmol/L    CO2 22 21 - 32 mmol/L    Anion gap 10 5 - 15 mmol/L    Glucose 105 (H) 65 - 100 mg/dL    BUN 7 6 - 20 MG/DL    Creatinine 0.68 (L) 0.70 - 1.30 MG/DL    BUN/Creatinine ratio 10 (L) 12 - 20      GFR est AA >60 >60 ml/min/1.73m2    GFR est non-AA >60 >60 ml/min/1.73m2    Calcium 8.5 8.5 - 10.1 MG/DL   MAGNESIUM    Collection Time: 03/20/20  4:51 AM   Result Value Ref Range    Magnesium 2.1 1.6 - 2.4 mg/dL   PHOSPHORUS    Collection Time: 03/20/20  4:51 AM   Result Value Ref Range    Phosphorus 3.1 2.6 - 4.7 MG/DL           Assessment:     Principal Problem:    Ileostomy present ([address] (3/17/2020)        3 Days Post-Op s/p ileostomy takedown with resection and anastomosis.     Hemodynamically stable. Hemoglobin acceptable. Thrombocytopenia stable. Good urine output and normal creatinine. GI function is returning. Discharge yesterday was cancelled secondary to fever and bloatedness. Temperature has been better, but today there is a significant leukocytosis. The wound could be infected, but there is no external evidence of that. Similarly, he does not have the physical findings of an anastomotic leak or other intraabdominal catastrophe. Plan:   CXR. Urinalysis. CT scan of abdomen and pelvis to evaluate for wound infection or intraabdominal source of the leukocytosis. Begin Zosyn.

## 2020-03-20 NOTE — ROUTINE PROCESS
Bedside shift change report given to Grace (oncoming nurse) by Velvet Kiran and Adriana Pelletier RN (offgoing nurse). Report included the following information SBAR, Kardex, Intake/Output and MAR.

## 2020-03-21 LAB
ALBUMIN SERPL-MCNC: 2.6 G/DL (ref 3.5–5)
ALBUMIN/GLOB SERPL: 0.7 {RATIO} (ref 1.1–2.2)
ALP SERPL-CCNC: 74 U/L (ref 45–117)
ALT SERPL-CCNC: 16 U/L (ref 12–78)
ANION GAP SERPL CALC-SCNC: 9 MMOL/L (ref 5–15)
AST SERPL-CCNC: 13 U/L (ref 15–37)
BASOPHILS # BLD: 0 K/UL (ref 0–0.1)
BASOPHILS NFR BLD: 0 % (ref 0–1)
BILIRUB SERPL-MCNC: 0.5 MG/DL (ref 0.2–1)
BUN SERPL-MCNC: 6 MG/DL (ref 6–20)
BUN/CREAT SERPL: 8 (ref 12–20)
CALCIUM SERPL-MCNC: 8.6 MG/DL (ref 8.5–10.1)
CHLORIDE SERPL-SCNC: 103 MMOL/L (ref 97–108)
CO2 SERPL-SCNC: 23 MMOL/L (ref 21–32)
CREAT SERPL-MCNC: 0.73 MG/DL (ref 0.7–1.3)
DIFFERENTIAL METHOD BLD: ABNORMAL
EOSINOPHIL # BLD: 0.2 K/UL (ref 0–0.4)
EOSINOPHIL NFR BLD: 2 % (ref 0–7)
ERYTHROCYTE [DISTWIDTH] IN BLOOD BY AUTOMATED COUNT: 12.9 % (ref 11.5–14.5)
GLOBULIN SER CALC-MCNC: 3.6 G/DL (ref 2–4)
GLUCOSE SERPL-MCNC: 103 MG/DL (ref 65–100)
HCT VFR BLD AUTO: 38.4 % (ref 36.6–50.3)
HGB BLD-MCNC: 13.3 G/DL (ref 12.1–17)
IMM GRANULOCYTES # BLD AUTO: 0 K/UL (ref 0–0.04)
IMM GRANULOCYTES NFR BLD AUTO: 0 % (ref 0–0.5)
LACTATE SERPL-SCNC: 0.7 MMOL/L (ref 0.4–2)
LYMPHOCYTES # BLD: 0.3 K/UL (ref 0.8–3.5)
LYMPHOCYTES NFR BLD: 3 % (ref 12–49)
MAGNESIUM SERPL-MCNC: 2 MG/DL (ref 1.6–2.4)
MCH RBC QN AUTO: 33.8 PG (ref 26–34)
MCHC RBC AUTO-ENTMCNC: 34.6 G/DL (ref 30–36.5)
MCV RBC AUTO: 97.5 FL (ref 80–99)
MONOCYTES # BLD: 0.7 K/UL (ref 0–1)
MONOCYTES NFR BLD: 7 % (ref 5–13)
NEUTS SEG # BLD: 8.1 K/UL (ref 1.8–8)
NEUTS SEG NFR BLD: 88 % (ref 32–75)
NRBC # BLD: 0 K/UL (ref 0–0.01)
NRBC BLD-RTO: 0 PER 100 WBC
PHOSPHATE SERPL-MCNC: 2.6 MG/DL (ref 2.6–4.7)
PLATELET # BLD AUTO: 139 K/UL (ref 150–400)
PMV BLD AUTO: 10.7 FL (ref 8.9–12.9)
POTASSIUM SERPL-SCNC: 3.2 MMOL/L (ref 3.5–5.1)
PROT SERPL-MCNC: 6.2 G/DL (ref 6.4–8.2)
RBC # BLD AUTO: 3.94 M/UL (ref 4.1–5.7)
RBC MORPH BLD: ABNORMAL
SODIUM SERPL-SCNC: 135 MMOL/L (ref 136–145)
WBC # BLD AUTO: 9.3 K/UL (ref 4.1–11.1)

## 2020-03-21 PROCEDURE — 65270000032 HC RM SEMIPRIVATE

## 2020-03-21 PROCEDURE — 83605 ASSAY OF LACTIC ACID: CPT

## 2020-03-21 PROCEDURE — 74011250637 HC RX REV CODE- 250/637: Performed by: COLON & RECTAL SURGERY

## 2020-03-21 PROCEDURE — 85025 COMPLETE CBC W/AUTO DIFF WBC: CPT

## 2020-03-21 PROCEDURE — 36415 COLL VENOUS BLD VENIPUNCTURE: CPT

## 2020-03-21 PROCEDURE — 74011000258 HC RX REV CODE- 258: Performed by: COLON & RECTAL SURGERY

## 2020-03-21 PROCEDURE — 83735 ASSAY OF MAGNESIUM: CPT

## 2020-03-21 PROCEDURE — 84100 ASSAY OF PHOSPHORUS: CPT

## 2020-03-21 PROCEDURE — 80053 COMPREHEN METABOLIC PANEL: CPT

## 2020-03-21 PROCEDURE — 74011250636 HC RX REV CODE- 250/636: Performed by: COLON & RECTAL SURGERY

## 2020-03-21 RX ORDER — POTASSIUM CHLORIDE 750 MG/1
40 TABLET, FILM COATED, EXTENDED RELEASE ORAL 2 TIMES DAILY
Status: COMPLETED | OUTPATIENT
Start: 2020-03-21 | End: 2020-03-21

## 2020-03-21 RX ADMIN — HYDROCHLOROTHIAZIDE 12.5 MG: 25 TABLET ORAL at 09:43

## 2020-03-21 RX ADMIN — PIPERACILLIN AND TAZOBACTAM 3.38 G: 3; .375 INJECTION, POWDER, LYOPHILIZED, FOR SOLUTION INTRAVENOUS at 05:40

## 2020-03-21 RX ADMIN — PIPERACILLIN AND TAZOBACTAM 3.38 G: 3; .375 INJECTION, POWDER, LYOPHILIZED, FOR SOLUTION INTRAVENOUS at 21:00

## 2020-03-21 RX ADMIN — ACETAMINOPHEN 1000 MG: 500 TABLET ORAL at 09:47

## 2020-03-21 RX ADMIN — CELECOXIB 100 MG: 100 CAPSULE ORAL at 17:51

## 2020-03-21 RX ADMIN — ENOXAPARIN SODIUM 40 MG: 40 INJECTION SUBCUTANEOUS at 09:49

## 2020-03-21 RX ADMIN — CELECOXIB 100 MG: 100 CAPSULE ORAL at 09:49

## 2020-03-21 RX ADMIN — PIPERACILLIN AND TAZOBACTAM 3.38 G: 3; .375 INJECTION, POWDER, LYOPHILIZED, FOR SOLUTION INTRAVENOUS at 13:42

## 2020-03-21 RX ADMIN — ACETAMINOPHEN 1000 MG: 500 TABLET ORAL at 20:59

## 2020-03-21 RX ADMIN — POTASSIUM CHLORIDE 40 MEQ: 750 TABLET, FILM COATED, EXTENDED RELEASE ORAL at 17:52

## 2020-03-21 RX ADMIN — PANTOPRAZOLE SODIUM 40 MG: 40 TABLET, DELAYED RELEASE ORAL at 06:57

## 2020-03-21 RX ADMIN — OXYBUTYNIN CHLORIDE 5 MG: 5 TABLET ORAL at 09:49

## 2020-03-21 RX ADMIN — POTASSIUM CHLORIDE 40 MEQ: 750 TABLET, FILM COATED, EXTENDED RELEASE ORAL at 13:42

## 2020-03-21 RX ADMIN — ACETAMINOPHEN 1000 MG: 500 TABLET ORAL at 16:37

## 2020-03-21 RX ADMIN — OXYBUTYNIN CHLORIDE 5 MG: 5 TABLET ORAL at 17:51

## 2020-03-21 RX ADMIN — ACETAMINOPHEN 1000 MG: 500 TABLET ORAL at 03:54

## 2020-03-21 NOTE — PROGRESS NOTES
General Daily Progress Note    Admission Date: 3/17/2020  Hospital Day 5  Post-Op Day 4  Subjective:   Abdomen feels a bit less bloated than it did yesterday. Has passed small amounts of flatus and loose stool. Objective:     Patient Vitals for the past 24 hrs:   BP Temp Pulse Resp SpO2   03/21/20 0753 151/83 98.6 °F (37 °C) 93 13 97 %   03/21/20 0421 (!) 147/91 98.9 °F (37.2 °C) 80 16 96 %   03/21/20 0106 154/88 99.1 °F (37.3 °C) 86 16 97 %   03/20/20 1516 152/90 98 °F (36.7 °C) 76 16 99 %     No intake/output data recorded. 03/19 1901 - 03/21 0700  In: 830 [P.O.:830]  Out: -       Physical Examination:  General Appearance - No distress. Abdomen -  Somewhat distended but soft and only mildly tender. No rebound tenderness or guarding. No palpable masses.  Incision, clean dry, intact, and without erythema; firm compression results in no drainage.             Data Review   Recent Results (from the past 24 hour(s))   LACTIC ACID    Collection Time: 03/20/20  1:16 PM   Result Value Ref Range    Lactic acid 1.2 0.4 - 2.0 MMOL/L   URINALYSIS W/ REFLEX CULTURE    Collection Time: 03/20/20  5:10 PM   Result Value Ref Range    Color YELLOW/STRAW      Appearance CLEAR CLEAR      Specific gravity 1.021 1.003 - 1.030      pH (UA) 6.5 5.0 - 8.0      Protein TRACE (A) NEG mg/dL    Glucose NEGATIVE  NEG mg/dL    Ketone 40 (A) NEG mg/dL    Bilirubin NEGATIVE  NEG      Blood NEGATIVE  NEG      Urobilinogen 0.2 0.2 - 1.0 EU/dL    Nitrites NEGATIVE  NEG      Leukocyte Esterase NEGATIVE  NEG      UA:UC IF INDICATED CULTURE NOT INDICATED BY UA RESULT CNI      WBC 0-4 0 - 4 /hpf    RBC 0-5 0 - 5 /hpf    Epithelial cells FEW FEW /lpf    Bacteria NEGATIVE  NEG /hpf    Hyaline cast 0-2 0 - 5 /lpf   CBC WITH AUTOMATED DIFF    Collection Time: 03/21/20  4:03 AM   Result Value Ref Range    WBC 9.3 4.1 - 11.1 K/uL    RBC 3.94 (L) 4.10 - 5.70 M/uL    HGB 13.3 12.1 - 17.0 g/dL    HCT 38.4 36.6 - 50.3 %    MCV 97.5 80.0 - 99.0 FL    MCH 33.8 26.0 - 34.0 PG    MCHC 34.6 30.0 - 36.5 g/dL    RDW 12.9 11.5 - 14.5 %    PLATELET 567 (L) 130 - 400 K/uL    MPV 10.7 8.9 - 12.9 FL    NRBC 0.0 0  WBC    ABSOLUTE NRBC 0.00 0.00 - 0.01 K/uL    NEUTROPHILS 88 (H) 32 - 75 %    LYMPHOCYTES 3 (L) 12 - 49 %    MONOCYTES 7 5 - 13 %    EOSINOPHILS 2 0 - 7 %    BASOPHILS 0 0 - 1 %    IMMATURE GRANULOCYTES 0 0.0 - 0.5 %    ABS. NEUTROPHILS 8.1 (H) 1.8 - 8.0 K/UL    ABS. LYMPHOCYTES 0.3 (L) 0.8 - 3.5 K/UL    ABS. MONOCYTES 0.7 0.0 - 1.0 K/UL    ABS. EOSINOPHILS 0.2 0.0 - 0.4 K/UL    ABS. BASOPHILS 0.0 0.0 - 0.1 K/UL    ABS. IMM. GRANS. 0.0 0.00 - 0.04 K/UL    DF SMEAR SCANNED      RBC COMMENTS MACROCYTOSIS  1+       METABOLIC PANEL, COMPREHENSIVE    Collection Time: 03/21/20  4:03 AM   Result Value Ref Range    Sodium 135 (L) 136 - 145 mmol/L    Potassium 3.2 (L) 3.5 - 5.1 mmol/L    Chloride 103 97 - 108 mmol/L    CO2 23 21 - 32 mmol/L    Anion gap 9 5 - 15 mmol/L    Glucose 103 (H) 65 - 100 mg/dL    BUN 6 6 - 20 MG/DL    Creatinine 0.73 0.70 - 1.30 MG/DL    BUN/Creatinine ratio 8 (L) 12 - 20      GFR est AA >60 >60 ml/min/1.73m2    GFR est non-AA >60 >60 ml/min/1.73m2    Calcium 8.6 8.5 - 10.1 MG/DL    Bilirubin, total 0.5 0.2 - 1.0 MG/DL    ALT (SGPT) 16 12 - 78 U/L    AST (SGOT) 13 (L) 15 - 37 U/L    Alk. phosphatase 74 45 - 117 U/L    Protein, total 6.2 (L) 6.4 - 8.2 g/dL    Albumin 2.6 (L) 3.5 - 5.0 g/dL    Globulin 3.6 2.0 - 4.0 g/dL    A-G Ratio 0.7 (L) 1.1 - 2.2     MAGNESIUM    Collection Time: 03/21/20  4:03 AM   Result Value Ref Range    Magnesium 2.0 1.6 - 2.4 mg/dL   PHOSPHORUS    Collection Time: 03/21/20  4:03 AM   Result Value Ref Range    Phosphorus 2.6 2.6 - 4.7 MG/DL   LACTIC ACID    Collection Time: 03/21/20  4:03 AM   Result Value Ref Range    Lactic acid 0.7 0.4 - 2.0 MMOL/L     CXR (3/20/2020):  No pneumonia. CT scan of Abdomen and Pelvis (3/20/2020): No pneumoperitoneum or abnormal fluid collection.   Possible thinning of the small intestine suggestive of ischemia. Assessment:     Principal Problem:    Ileostomy present ([address] (3/17/2020)        4 Days Post-Op s/p ileostomy takedown with resection and anastomosis.     Hemodynamically stable. Hemoglobin acceptable. Thrombocytopenia stable. Good urine output and normal creatinine. Zosyn was begun on 3/20/2020; not sure what I am treating if anything. Leukocytosis yesterday (WBC 13.1 K/uL) has resolved (WBC 9.3 K/uL today). CT scan did not support a diagnosis of either anastomotic leak of wound infection. Normal lactate levels argue against sepsis of significant intestinal ischemia. Urinalysis was negative. Hypokalemic.     GI function is returning.         Plan:   Begin full liquid diet with caution. Replace potassium. Continue Zosyn. Repeat labs tomorrow. Ambulate. Incentive spirometer.

## 2020-03-21 NOTE — PROGRESS NOTES
Bedside and Verbal shift change report given to Robbi Lloyd RN (oncoming nurse) by Maryellen Edmonds RN (offgoing nurse). Report included the following information SBAR, Kardex, Procedure Summary and Intake/Output.

## 2020-03-22 VITALS
WEIGHT: 145.6 LBS | HEART RATE: 75 BPM | SYSTOLIC BLOOD PRESSURE: 130 MMHG | DIASTOLIC BLOOD PRESSURE: 87 MMHG | HEIGHT: 67 IN | OXYGEN SATURATION: 98 % | TEMPERATURE: 98.8 F | BODY MASS INDEX: 22.85 KG/M2 | RESPIRATION RATE: 16 BRPM

## 2020-03-22 LAB
ANION GAP SERPL CALC-SCNC: 6 MMOL/L (ref 5–15)
BASOPHILS # BLD: 0 K/UL (ref 0–0.1)
BASOPHILS NFR BLD: 0 % (ref 0–1)
BUN SERPL-MCNC: 6 MG/DL (ref 6–20)
BUN/CREAT SERPL: 7 (ref 12–20)
CALCIUM SERPL-MCNC: 8.6 MG/DL (ref 8.5–10.1)
CHLORIDE SERPL-SCNC: 107 MMOL/L (ref 97–108)
CO2 SERPL-SCNC: 25 MMOL/L (ref 21–32)
CREAT SERPL-MCNC: 0.89 MG/DL (ref 0.7–1.3)
DIFFERENTIAL METHOD BLD: ABNORMAL
EOSINOPHIL # BLD: 0.4 K/UL (ref 0–0.4)
EOSINOPHIL NFR BLD: 6 % (ref 0–7)
ERYTHROCYTE [DISTWIDTH] IN BLOOD BY AUTOMATED COUNT: 12.8 % (ref 11.5–14.5)
GLUCOSE SERPL-MCNC: 99 MG/DL (ref 65–100)
HCT VFR BLD AUTO: 36.5 % (ref 36.6–50.3)
HGB BLD-MCNC: 12.4 G/DL (ref 12.1–17)
IMM GRANULOCYTES # BLD AUTO: 0.1 K/UL (ref 0–0.04)
IMM GRANULOCYTES NFR BLD AUTO: 1 % (ref 0–0.5)
LYMPHOCYTES # BLD: 0.3 K/UL (ref 0.8–3.5)
LYMPHOCYTES NFR BLD: 5 % (ref 12–49)
MCH RBC QN AUTO: 33.4 PG (ref 26–34)
MCHC RBC AUTO-ENTMCNC: 34 G/DL (ref 30–36.5)
MCV RBC AUTO: 98.4 FL (ref 80–99)
MONOCYTES # BLD: 0.7 K/UL (ref 0–1)
MONOCYTES NFR BLD: 10 % (ref 5–13)
NEUTS SEG # BLD: 5.2 K/UL (ref 1.8–8)
NEUTS SEG NFR BLD: 78 % (ref 32–75)
NRBC # BLD: 0 K/UL (ref 0–0.01)
NRBC BLD-RTO: 0 PER 100 WBC
PLATELET # BLD AUTO: 149 K/UL (ref 150–400)
PLATELET COMMENTS,PCOM: ABNORMAL
PMV BLD AUTO: 10.5 FL (ref 8.9–12.9)
POTASSIUM SERPL-SCNC: 3.5 MMOL/L (ref 3.5–5.1)
RBC # BLD AUTO: 3.71 M/UL (ref 4.1–5.7)
RBC MORPH BLD: ABNORMAL
SODIUM SERPL-SCNC: 138 MMOL/L (ref 136–145)
WBC # BLD AUTO: 6.7 K/UL (ref 4.1–11.1)

## 2020-03-22 PROCEDURE — 74011000258 HC RX REV CODE- 258: Performed by: COLON & RECTAL SURGERY

## 2020-03-22 PROCEDURE — 36415 COLL VENOUS BLD VENIPUNCTURE: CPT

## 2020-03-22 PROCEDURE — 74011250636 HC RX REV CODE- 250/636: Performed by: COLON & RECTAL SURGERY

## 2020-03-22 PROCEDURE — 80048 BASIC METABOLIC PNL TOTAL CA: CPT

## 2020-03-22 PROCEDURE — 85025 COMPLETE CBC W/AUTO DIFF WBC: CPT

## 2020-03-22 PROCEDURE — 74011250637 HC RX REV CODE- 250/637: Performed by: COLON & RECTAL SURGERY

## 2020-03-22 RX ORDER — AMOXICILLIN AND CLAVULANATE POTASSIUM 875; 125 MG/1; MG/1
1 TABLET, FILM COATED ORAL 2 TIMES DAILY
Qty: 14 TAB | Refills: 0 | Status: SHIPPED | OUTPATIENT
Start: 2020-03-22 | End: 2020-03-29

## 2020-03-22 RX ADMIN — ACETAMINOPHEN 1000 MG: 500 TABLET ORAL at 08:15

## 2020-03-22 RX ADMIN — CELECOXIB 100 MG: 100 CAPSULE ORAL at 08:15

## 2020-03-22 RX ADMIN — PANTOPRAZOLE SODIUM 40 MG: 40 TABLET, DELAYED RELEASE ORAL at 06:49

## 2020-03-22 RX ADMIN — ACETAMINOPHEN 1000 MG: 500 TABLET ORAL at 03:14

## 2020-03-22 RX ADMIN — ENOXAPARIN SODIUM 40 MG: 40 INJECTION SUBCUTANEOUS at 10:09

## 2020-03-22 RX ADMIN — OXYBUTYNIN CHLORIDE 5 MG: 5 TABLET ORAL at 08:15

## 2020-03-22 RX ADMIN — HYDROCHLOROTHIAZIDE 12.5 MG: 25 TABLET ORAL at 08:14

## 2020-03-22 RX ADMIN — PIPERACILLIN AND TAZOBACTAM 3.38 G: 3; .375 INJECTION, POWDER, LYOPHILIZED, FOR SOLUTION INTRAVENOUS at 06:49

## 2020-03-22 NOTE — PROGRESS NOTES
Bedside and Verbal shift change report given to Barbara Mckeon, RN and Taylor Huggins RN (oncoming nurse) by Brianna Monk RN (offgoing nurse). Report included the following information SBAR, Kardex, Intake/Output, MAR and Med Rec Status.

## 2020-03-22 NOTE — PROGRESS NOTES
Appointment Information  The following appointments have been successfully scheduled:    Date/time Monday, March 30, 2020 02:00 PM  Patient Kaitlin Alcala 1970 (78ZO M) #[id number]  Department [facility]  Appointment type Transitional Care  Provider Merlin Morillo

## 2020-03-22 NOTE — ROUTINE PROCESS
Bedside shift change report given to Jovon Jerome (oncoming nurse) by Abhilash Izaguirre (offgoing nurse). Report included the following information SBAR, Kardex, Intake/Output and MAR.

## 2020-03-22 NOTE — DISCHARGE INSTRUCTIONS
Discharge Instructions      1. Do not lift any objects weighing more than 10 pounds for 4 weeks after the surgery date. 2. Do not do any housework including vacuuming, scrubbing or yardwork for 4 weeks after the surgery date. 3. Do not drive for 2 weeks after the surgery date or while taking sedating medications. 4. You should walk frequently and you should go up and down stairs as desired. 5. You may shower, but do not submerge your abdomen. Do not take tub baths, swim, or use hot tubs for the next 4 weeks. 6. Your incision is stapled closed bt intended to allow for a small amount of drainage in the beginning. You should keep it covered with a clean dry pad, changed daily and as needed, until it has been dry and closed for at least 24 hours. You may shower with it covered then immediately replace the dressing after the shower. 7. Continue the low fiber diet for the next 2 weeks. (See handbook for additional details). 8. Drink nutritional supplements 2 times per day until your diet and appetite are back to normal.     9. It can be normal to have multiple bowel movements each day. Contact your surgeons office for any concerns. 10. Take maximum doses of Tylenol (1000 mg every 6 hours) until you do not need pain medication any more. You may take other pain medication as prescribed in addition to the Tylenol (but not in place of it) if you have already had as much Tylenol as you are allowed to have and your pain is not controlled well enough. Take the antibiotic this evening and then twice per day starting tomorrow until it is gone. 11. Follow up:  Please return to Dr. Eliseo Gomes office at 9:30 AM on 3/30/2020. 12. If you experience fever (temperature greater than 100.5), chills, vomiting, redness around an incision, or infected-looking drainage from an incision, please contact your surgeons office.

## 2020-03-22 NOTE — DISCHARGE SUMMARY
Discharge Summary     Patient ID:    Lynne Felix  [id number]  86 y.o.  1970    Admission Date: 3/17/2020    Discharge Date: 3/22/2020    Admission Diagnosis:  Temporary ileostomy    Chronic Diagnoses:    Patient Active Problem List   Diagnosis Code    ACL tear S83.519A    Psoriatic arthritis (HCC) L40.50    Psoriasis L40.9    Gastrointestinal hemorrhage K92.2    Rectal cancer (HCC) C20    Thrombocytopenia (HCC) D69.6    Neutropenia ([facility] [address] D70.9    Port-A-Cath in place Z95.828    Encounter for antineoplastic chemotherapy Z51.11    Ileostomy present ([facility] [address] Z93.2       Discharge Diagnosis:  1. Temporary ileostomy (closed)  2. Leukocytosis (resolved)  3. Hypokalemia (corrected)        Hospital Problems as of 3/22/2020 Date Reviewed: 3/17/2020          Codes Class Noted - Resolved POA    * (Principal) Ileostomy present ([facility] [address] (Chronic) ICD-10-CM: Z93.2  ICD-9-CM: V44.2  3/17/2020 - Present Yes              Procedures Performed:  Ileostomy closure with resection and anastomosis was performed by Dr. Sheree Treadwell on 3/17/2020. Discharge Medications:   Current Discharge Medication List      START taking these medications    Details   amoxicillin-clavulanate (AUGMENTIN) 875-125 mg per tablet Take 1 Tab by mouth two (2) times a day for 14 doses. Qty: 14 Tab, Refills: 0      oxyCODONE IR (ROXICODONE) 5 mg immediate release tablet Take 1 Tab by mouth every four (4) hours as needed for Pain for up to 5 days. Max Daily Amount: 30 mg.  Qty: 20 Tab, Refills: 0    Associated Diagnoses: Acute post-operative pain         CONTINUE these medications which have NOT CHANGED    Details   hydroCHLOROthiazide (HYDRODIURIL) 12.5 mg tablet TAKE 1 TABLET BY MOUTH EVERY DAY  Qty: 30 Tab, Refills: 2      tadalafil (CIALIS) 5 mg tablet Take 5 mg by mouth. oxybutynin (DITROPAN) 5 mg tablet Take 5 mg by mouth two (2) times a day.       diphenoxylate-atropine (LOMOTIL) 2.5-0.025 mg per tablet Take 1 Tab by mouth four (4) times daily as needed for Diarrhea.      triamcinolone acetonide (KENALOG) 0.1 % ointment Apply  to affected area two (2) times daily as needed for Skin Irritation. use thin layer      fluocinonide (VANOS) 0.1 % topical cream Apply  to affected area daily. Qty: 30 g, Refills: 5      lidocaine-prilocaine (EMLA) topical cream Apply  to affected area as needed for Pain. Qty: 30 g, Refills: 0    Associated Diagnoses: Rectal cancer ([address] Port-A-Cath in place      ondansetron (ZOFRAN ODT) 8 mg disintegrating tablet Take 1 Tab by mouth every eight (8) hours as needed for Nausea. Qty: 30 Tab, Refills: 3         STOP taking these medications       ibuprofen (ADVIL) 200 mg tablet Comments:   Reason for Stopping:         acetaminophen (TYLENOL) 500 mg tablet Comments:   Reason for Stopping:                Diet:  Low fiber diet for two weeks. Activity:  No driving for two weeks after the surgery date, and no lifting more than 10 lb. for four weeks. Stair climbing is permitted and frequent walking is encouraged. Wound Care:  Dry dressing to incision changed daily and as needed until the wound has been dry and closed for at least 48 hours. Discharge Condition:  Improved compared to that uponadmission    Disposition:  Home    Follow-up Care:  1. Dr. Nickolas Phillips on 3/30/2020  2.  Dewey Red MD in 1-2 weeks      Significant Diagnostic Studies:   Recent Labs     03/22/20  0325 03/21/20  0403   WBC 6.7 9.3   HGB 12.4 13.3   HCT 36.5* 38.4   * 139*     Recent Labs     03/22/20  0325 03/21/20  0403 03/20/20  0451    135* 135*   K 3.5 3.2* 3.4*    103 103   CO2 25 23 22   BUN 6 6 7   CREA 0.89 0.73 0.68*   GLU 99 103* 105*   CA 8.6 8.6 8.5   MG  --  2.0 2.1   PHOS  --  2.6 3.1     Recent Labs     03/21/20  0403   SGOT 13*   AP 74   TBILI 0.5   TP 6.2*   ALB 2.6*   GLOB 3.6       HOSPITAL COURSE & TREATMENT RENDERED:     The patient is a 67-year-old male who underwent a hand-assisted laparoscopic low anterior resection, mobilization of the splenic flexure, coloproctostomy, and creation of loop ileostomy on 09/24/2019 for treatment of what proved to have been a moderately differentiated [id number] adenocarcinoma of the rectum for which he had also received neoadjuvant chemoradiation. He has subsequently received a course of adjuvant chemotherapy that has now been completed. A Gastrografin enema performed on 02/24/2020 revealed no evidence of an anastomotic leak. The ileostomy no longer being needed, closure was indicated. The risks were discussed in detail, and the patient agreed to proceed. He was taken to the operating room on 3/17/2020, and there the above-listed procedure was performed. Post-operatively, he was transferred from the holding area to the floor in stable condition. His hospital course was primarily remarkable for a low grade fever and malaise on POD#2 that were followed by abdominal distension and a leukocytosis (WBC 13.1K/uL). He was started on Zosyn, and chest radiographs, a urinalysis, and a CT scan of the abdomen and pelvis were obtained. These studies were non-diagnostic, but the leukocytosis had resolved by the next day and the fevers did not recur. The wound never exhibited any signs of infection. On 3/22/2020 (POD#5), he was hemodynamically stable, afebrile, tolerating a low fiber diet, passing flatus and stool, voiding well, and ambulating well. There was no physical evidence of infection or other complication, and he was judged to be fit for discharge to home. His condition upon discharge was improved compared to that upon admission, and he appeared to have understood all discharge and follow-up instructions.         Signed:  Zoila Mcdaniel MD

## 2020-03-22 NOTE — PROGRESS NOTES
General Daily Progress Note    Admission Date: 3/17/2020  Hospital Day 6  Post-Op Day 5  Subjective:   Less distension. Passing more gas. Probably had 5 small loose stools yesterday. Objective:     Patient Vitals for the past 24 hrs:   BP Temp Pulse Resp SpO2 Weight   03/22/20 0748 130/87 98.8 °F (37.1 °C) 75 16 98 % --   03/22/20 0643 -- -- -- -- -- 66 kg (145 lb 9.6 oz)   03/22/20 0450 133/78 98 °F (36.7 °C) 80 14 98 % --   03/21/20 2339 130/77 98.2 °F (36.8 °C) 82 14 99 % --   03/21/20 1531 (!) 145/93 98.7 °F (37.1 °C) 86 14 96 % --     No intake/output data recorded. No intake/output data recorded. Physical Examination:  General Appearance - No distress. Abdomen -  A bit less distended. Soft and only mildly tender.  No rebound tenderness or guarding.  No palpable masses.  Incision, clean dry, intact, and without erythema; firm compression results in no drainage. Data Review   Recent Results (from the past 24 hour(s))   CBC WITH AUTOMATED DIFF    Collection Time: 03/22/20  3:25 AM   Result Value Ref Range    WBC 6.7 4.1 - 11.1 K/uL    RBC 3.71 (L) 4.10 - 5.70 M/uL    HGB 12.4 12.1 - 17.0 g/dL    HCT 36.5 (L) 36.6 - 50.3 %    MCV 98.4 80.0 - 99.0 FL    MCH 33.4 26.0 - 34.0 PG    MCHC 34.0 30.0 - 36.5 g/dL    RDW 12.8 11.5 - 14.5 %    PLATELET 736 (L) 280 - 400 K/uL    MPV 10.5 8.9 - 12.9 FL    NRBC 0.0 0  WBC    ABSOLUTE NRBC 0.00 0.00 - 0.01 K/uL    NEUTROPHILS 78 (H) 32 - 75 %    LYMPHOCYTES 5 (L) 12 - 49 %    MONOCYTES 10 5 - 13 %    EOSINOPHILS 6 0 - 7 %    BASOPHILS 0 0 - 1 %    IMMATURE GRANULOCYTES 1 (H) 0.0 - 0.5 %    ABS. NEUTROPHILS 5.2 1.8 - 8.0 K/UL    ABS. LYMPHOCYTES 0.3 (L) 0.8 - 3.5 K/UL    ABS. MONOCYTES 0.7 0.0 - 1.0 K/UL    ABS. EOSINOPHILS 0.4 0.0 - 0.4 K/UL    ABS. BASOPHILS 0.0 0.0 - 0.1 K/UL    ABS. IMM.  GRANS. 0.1 (H) 0.00 - 0.04 K/UL    DF SMEAR SCANNED      PLATELET COMMENTS Large Platelets      RBC COMMENTS MACROCYTOSIS  1+       METABOLIC PANEL, BASIC Collection Time: 03/22/20  3:25 AM   Result Value Ref Range    Sodium 138 136 - 145 mmol/L    Potassium 3.5 3.5 - 5.1 mmol/L    Chloride 107 97 - 108 mmol/L    CO2 25 21 - 32 mmol/L    Anion gap 6 5 - 15 mmol/L    Glucose 99 65 - 100 mg/dL    BUN 6 6 - 20 MG/DL    Creatinine 0.89 0.70 - 1.30 MG/DL    BUN/Creatinine ratio 7 (L) 12 - 20      GFR est AA >60 >60 ml/min/1.73m2    GFR est non-AA >60 >60 ml/min/1.73m2    Calcium 8.6 8.5 - 10.1 MG/DL     CXR (3/20/2020):  No pneumonia.     CT scan of Abdomen and Pelvis (3/20/2020): No pneumoperitoneum or abnormal fluid collection. Possible thinning of the small intestine suggestive of ischemia. Assessment:     Principal Problem:    Ileostomy present ([address] (3/17/2020)        5 Days Post-Op s/p ileostomy takedown with resection and anastomosis.     Hemodynamically stable. Hemoglobin acceptable. Thrombocytopenia decreased. Good urine output and normal creatinine.     Zosyn was begun on 3/20/2020; not sure what I am treating if anything. Leukocytosis (WBC 13.1 K/uL on 3/20/2020) has been resolved since 3/21/2020. CT scan did not support a diagnosis of either anastomotic leak of wound infection. Normal lactate levels argued against sepsis or significant intestinal ischemia. Urinalysis was negative.     Hypokalemia has been corrected.     GI function is returning. The patient appears likely to be fit for discharge this afternoon. Plan:   Continue low fiber diet that was begun with today's breakfast.  Discharge to home on Augmentin this afternoon if no setback after having had lung. Follow up with me on 3/30/2020.

## 2020-03-30 ENCOUNTER — HOSPITAL ENCOUNTER (OUTPATIENT)
Dept: CT IMAGING | Age: 50
Discharge: HOME | End: 2020-03-30
Attending: REGISTERED NURSE
Payer: MEDICAID

## 2020-03-30 DIAGNOSIS — C20 RECTAL CANCER (HCC): ICD-10-CM

## 2020-03-30 PROCEDURE — 71260 CT THORAX DX C+: CPT

## 2020-03-30 PROCEDURE — 74177 CT ABD & PELVIS W/CONTRAST: CPT

## 2020-03-30 PROCEDURE — 74011636320 HC RX REV CODE- 636/320: Performed by: RADIOLOGY

## 2020-03-30 PROCEDURE — 74011000258 HC RX REV CODE- 258: Performed by: RADIOLOGY

## 2020-03-30 RX ORDER — SODIUM CHLORIDE 0.9 % (FLUSH) 0.9 %
10 SYRINGE (ML) INJECTION
Status: COMPLETED | OUTPATIENT
Start: 2020-03-30 | End: 2020-03-30

## 2020-03-30 RX ADMIN — IOPAMIDOL 100 ML: 755 INJECTION, SOLUTION INTRAVENOUS at 10:10

## 2020-03-30 RX ADMIN — SODIUM CHLORIDE 100 ML: 900 INJECTION, SOLUTION INTRAVENOUS at 10:10

## 2020-03-30 RX ADMIN — IOHEXOL 50 ML: 240 INJECTION, SOLUTION INTRATHECAL; INTRAVASCULAR; INTRAVENOUS; ORAL at 10:10

## 2020-03-30 RX ADMIN — Medication 10 ML: at 10:10

## 2020-03-31 ENCOUNTER — DOCUMENTATION ONLY (OUTPATIENT)
Dept: ONCOLOGY | Age: 50
End: 2020-03-31

## 2020-04-15 ENCOUNTER — VIRTUAL VISIT (OUTPATIENT)
Dept: ONCOLOGY | Age: 50
End: 2020-04-15

## 2020-04-15 ENCOUNTER — APPOINTMENT (OUTPATIENT)
Dept: INFUSION THERAPY | Age: 50
End: 2020-04-15

## 2020-04-15 DIAGNOSIS — D70.9 NEUTROPENIA, UNSPECIFIED TYPE (HCC): ICD-10-CM

## 2020-04-15 DIAGNOSIS — D69.6 THROMBOCYTOPENIA (HCC): ICD-10-CM

## 2020-04-15 DIAGNOSIS — C20 RECTAL CANCER (HCC): Primary | ICD-10-CM

## 2020-04-15 NOTE — PROGRESS NOTES
[facility] [place] [address], [address], [place]: [phone]  F: [phone]    Reason for Visit:   Ernst Azul is a 45 y.o. male who is seen by synchronous (real-time) audio-video technology on 4/15/2020 for follow up of Rectal cancer- likely stage III- SOMMER    Treatment History:   · 4/26/19: Colonoscopy- 6-7 cm size malignant appearing mass seen at 10 cm from anal verge. This was adenocarcinoma. 3 polyps removed- all were tubular adenomas  · Rectal Ultrasound 5/2/19: ulcerated infiltrative mass lesion was noted in rectum at 10 cm. The lesion measured about 5 cm X 4 cm- T2, 13 mm X 8 mm oblong lymph node was noted immediately adjacent to the mass lesion  · 5/2/19: CT CAP- No metastasis, liver cysts. CEA 3.6  · 5/10/19: PET CT showed rectal malignancy and 3 small perirectal anibal metastatic focir  · 5/28/19-7/8/29: Xeloda + RT   · 9/24/19: LAR-  [id number], 2/6 positive nodes  · 10/14/19: CT CAP with no evidence of metastatic disease, liver cysts   · 10/23/19- 2/19/2020-: 8 cycles of  FOLFOX. Several delays and dose reductions due to cytopenias  · 12/11/19 CT AP: hypodensity in liver, otherwise KATHLEEN   · 1/6/20 MRI abd: no liver mets  · 3/30/3030: CT KATHLEEN    History of Present Illness:   Patient is a 45 y.o. male seen for adenocarcinoma of the mid third of rectum    He had intermittent BRBPR x 1 year and then decided to have a colonoscopy. This led to above mentioned diagnosis. He received neoadjuvant xeloda+ RT followed by LAR. Completed 8 cycles of FOLFOX, had a colostomy take down and comes with scans . He is recovering, he has unpredictable BMs, getting better, has no pain, no bleeding, no nausea, still has neuropathy. He has numbness and tingling in his fingers and toes. He has no pain. He has no falls. He uses 7 drinks a week.     Adopted    Past Medical History:   Diagnosis Date    Adopted     GERD (gastroesophageal reflux disease)     Ill-defined condition     CHEMO-LAST ON 2/19/2020    Psoriasis     Psoriatic arthritis ([facility] [place] [address]     Rectal cancer (HCC)     Moderately differentiated [id number] adenocarcinoma of the rectum. Treated with neoadjuvant chemoradiation, resection, and adjuvant chemotherapy. Past Surgical History:   Procedure Laterality Date    COLONOSCOPY Left 4/26/2019    COLONOSCOPY performed by Jacob Veras MD at [facility] N/A 5/2/2019    SIGMOIDOSCOPY FLEXIBLE performed by Shawn Heredia MD at [address] [facility] GI      EGD    HX HEENT      WISDOM TEETH    HX ORTHOPAEDIC Right     ACL REPLACEMENT    HX OTHER SURGICAL  09/24/2019    Hand-assisted laparoscopic low anterior resection, mobilization of the splenic flexure, coloproctostomy, and creation of loop ileostomy; Dr. Kizzy Joyce.  HX UROLOGICAL  12/2019    CYSTOSCOPY FOR REMOVAL OF KIDNEY STONES WITH HOLMIUM LAZER    HX UROLOGICAL  09/24/2019    Cystoscopy and placement of bilateral temporary ureteral catheters; Maria Luisa Grant MD.    HX VASCULAR ACCESS      INSERTION OF PORT-A-CATH RT SIDE OF CHEST. FOR CHEMO-LAST ON 2/19/2020    IR INSERT TUNL CVC W PORT OVER 5 YEARS  10/15/2019      Social History     Tobacco Use    Smoking status: Never Smoker    Smokeless tobacco: Never Used   Substance Use Topics    Alcohol use: Yes     Alcohol/week: 6.0 standard drinks     Types: 6 Cans of beer per week     Comment: 6 BEERS WEEKLY      Family History   Adopted: Yes   Problem Relation Age of Onset    Asthma Neg Hx     Cancer Neg Hx     Diabetes Neg Hx     Heart Disease Neg Hx     Hypertension Neg Hx     Stroke Neg Hx      Current Outpatient Medications   Medication Sig    hydroCHLOROthiazide (HYDRODIURIL) 12.5 mg tablet TAKE 1 TABLET BY MOUTH EVERY DAY    tadalafil (CIALIS) 5 mg tablet Take 5 mg by mouth.  oxybutynin (DITROPAN) 5 mg tablet Take 5 mg by mouth two (2) times a day.     diphenoxylate-atropine (LOMOTIL) 2.5-0.025 mg per tablet Take 1 Tab by mouth four (4) times daily as needed for Diarrhea.  triamcinolone acetonide (KENALOG) 0.1 % ointment Apply  to affected area two (2) times daily as needed for Skin Irritation. use thin layer    fluocinonide (VANOS) 0.1 % topical cream Apply  to affected area daily.  lidocaine-prilocaine (EMLA) topical cream Apply  to affected area as needed for Pain.  ondansetron (ZOFRAN ODT) 8 mg disintegrating tablet Take 1 Tab by mouth every eight (8) hours as needed for Nausea. No current facility-administered medications for this visit. No Known Allergies     Review of Systems: A complete review of systems was obtained, negative except as described above. Physical Exam:       Due to this being a TeleHealth evaluation, many elements of the physical examination are unable to be assessed. Constitutional: Appears well-developed and well-nourished in no apparent distress   Mental status: Alert and awake, Oriented to person/place/time, Able to follow commands  Eyes: EOM normal, Sclera normal, No visible ocular discharge  HENT: Normocephalic, atraumatic; Mouth/Throat: Moist mucous membranes, External Ears normal  Neck: No visualized mass  Pulmonary/Chest: Respiratory effort normal, No visualized signs of difficulty breathing or respiratory distress   Musculoskeletal: Normal gait with no signs of ataxia, Normal range of motion of neck  Skin: No significant exanthematous lesions or discoloration noted on facial skin  Psychiatric: Normal affect, normal judgment/insight. No hallucinations       Results:     Lab Results   Component Value Date/Time    WBC 6.7 03/22/2020 03:25 AM    HGB 12.4 03/22/2020 03:25 AM    HCT 36.5 (L) 03/22/2020 03:25 AM    PLATELET 295 (L) 81/30/5587 03:25 AM    MCV 98.4 03/22/2020 03:25 AM    ABS.  NEUTROPHILS 5.2 03/22/2020 03:25 AM     Lab Results   Component Value Date/Time    Sodium 138 03/22/2020 03:25 AM    Potassium 3.5 03/22/2020 03:25 AM    Chloride 107 03/22/2020 03:25 AM    CO2 25 03/22/2020 03:25 AM    Glucose 99 03/22/2020 03:25 AM    BUN 6 03/22/2020 03:25 AM    Creatinine 0.89 03/22/2020 03:25 AM    GFR est AA >60 03/22/2020 03:25 AM    GFR est non-AA >60 03/22/2020 03:25 AM    Calcium 8.6 03/22/2020 03:25 AM     Lab Results   Component Value Date/Time    Bilirubin, total 0.5 03/21/2020 04:03 AM    ALT (SGPT) 16 03/21/2020 04:03 AM    AST (SGOT) 13 (L) 03/21/2020 04:03 AM    Alk. phosphatase 74 03/21/2020 04:03 AM    Protein, total 6.2 (L) 03/21/2020 04:03 AM    Albumin 2.6 (L) 03/21/2020 04:03 AM    Globulin 3.6 03/21/2020 04:03 AM     Records reviewed and summarized above. Pathology report(s) reviewed  FINAL PATHOLOGIC DIAGNOSIS   1. Rectum and sigmoid colon, laparoscopic low anterior resection:   SPECIMEN   Procedure: Low anterior resection   Macroscopic Intactness of Mesorectum: Complete   TUMOR   Tumor Site: Rectum   Histologic Type: Adenocarcinoma   Histologic Grade: G2: Moderately differentiated   Tumor Size: 2.6 cm   Tumor Deposits: Present   Number of Deposits: 1   Tumor Extension: Tumor invades through the muscularis propria into pericolorectal tissue   Macroscopic Tumor Perforation: Not identified   Lymphovascular Invasion: Not identified   Perineural Invasion: Not identified   Tumor Budding: Number of tumor buds in one hotspot field: 5   Tumor Budding Score: Intermediate score (5-9)   Treatment Effect: Present - Residual cancer with evident tumor regression, but more than single cells or   rare small groups of cancer cells (partial response, score 2)   MARGINS   Margins:  All margins are uninvolved by invasive carcinoma, high- grade dysplasia, intramucosal   adenocarcinoma, and adenoma   Margins Examined: Proximal, Distal, Radial or Mesenteric   Distance of Invasive Carcinoma from Closest Margin: 25 mm   Closest Margin: Radial or Mesenteric   LYMPH NODES   Number of Lymph Nodes Involved: 2   Number of Lymph Nodes Examined: 16   PATHOLOGIC STAGE CLASSIFICATION (pTNM, AJCC 8th Edition)   Primary Tumor (pT): pT3   Regional Lymph Nodes (pN): pN1b   2. Large bowel, donuts:   Fragments of large bowel wall, negative for microscopic pathologic abnormality. Radiology report(s) reviewed above. CT CAP 10/14/19: IMPRESSION:  1. There are scattered small hepatic cysts without definite evidence for  metastatic disease. 2. Otherwise negative CT chest abdomen pelvis    CT CAP 12/11/19: IMPRESSION:  1. Right hydroureteronephrosis due to an 8 mm calculus at the ureterovesicular  junction. 2. Vague areas of heterogeneous enhancement and hypodensity in the liver, raises  concern for metastases, though dedicated 3 phase liver CT or liver protocol MRI  may be considered as follow-up. Numerous small hypodensities in the liver likely  cysts, unchanged. 3. Rectal anastomosis and presacral edema, treatment related. Right lower  quadrant ileostomy. No bowel obstruction    MRI abd 1/6/20: no hepatic mets     CT abdomen 3/30/2020  IMPRESSION  IMPRESSION:     1. Mild dilatation, thinning of the wall, and poor enhancement of the bowel just  proximal to the loop ileostomy closure nearly to the distal anastomosis. Clinical significance is uncertain. Ischemic changes should be considered.   2. Small amount of postoperative fluid in the right paracolic gutter without  drainable fluid collection  Assessment:   1) Rectal adenocarcinoma- mid third- SOMMER  Genetic testing: negative     T2N1M0 disease- Clinical stage III  S/P John Adj chemoRT followed by [facility] on 9/24/19  Pathology showed [id number] residual disease- stage IIIB  Had significant residual disease with practically no treatment effects  Adjuvant FOLFOX x 8 until 2/2020- several delays and reductions due to cytopenias  CT 3/30/20 reviewed personally and is KATHLEEN    Discussed surveillance    H NP and labs every 3-6 months for 2 years then every 6 months for 5 years  CT every 6-12 months for 5 years  Colonoscopy at 1 year- to be done by Dr. Khalil Session    2) Jeanna Britt age at diagnosis  Adopted  Had several adenomas removed  May have attenuated FAP and will require genetic testing  SOMMER    3) Psoriasis  Off Methotrexate  Follows Dr. Nathan Sanchez    4) Neutropenia  Chemotherapy induced  See #1    5) Thrombocytopenia  Chemo induced  HIV, hep, anti-plt AB negative     Plan:     · Surveillance  · Port flush and CEA every 3 months  · RTC 3 months  · Colonoscopy this year      I appreciate the opportunity to participate in Mr. Shalonda gardner. I was in the office while conducting this encounter. The patient was at his home. Consent:  He and/or his healthcare decision maker is aware that this patient-initiated Telehealth encounter is a billable service, with coverage as determined by his insurance carrier. He is aware that he may receive a bill and has provided verbal consent to proceed: Yes    Pursuant to the emergency declaration under the [address] and the [facility] waiver authority and the [employer] Act, this Virtual  Visit was conducted, with patient's (and/or legal guardian's) consent, to reduce the patient's risk of exposure to COVID-19 and provide necessary medical care. Services were provided through a video synchronous discussion virtually to substitute for in-person clinic visit.       Signed By: Blanca Cervantes MD

## 2020-04-15 NOTE — PROGRESS NOTES
Renetta Hamlin is a 45 y.o. male   Chief Complaint   Patient presents with    Follow-up     rectal cancer   1. Have you been to the ER, urgent care clinic since your last visit? Hospitalized since your last visit? No    2. Have you seen or consulted any other health care providers outside of the [address] [place] [address] since your last visit? Include any pap smears or colon screening.  No

## 2020-04-15 NOTE — PROGRESS NOTES
Called dr. Josh Romero office and spoke with Marisol Marie , she will call patient to schedule for colonoscopy

## 2020-04-20 ENCOUNTER — VIRTUAL VISIT (OUTPATIENT)
Dept: INTERNAL MEDICINE CLINIC | Age: 50
End: 2020-04-20

## 2020-04-20 DIAGNOSIS — M76.51 PATELLAR TENDONITIS OF RIGHT KNEE: ICD-10-CM

## 2020-04-20 DIAGNOSIS — I10 BENIGN ESSENTIAL HTN: Primary | ICD-10-CM

## 2020-04-20 NOTE — PROGRESS NOTES
Consent: Brina Gallardo, who was seen by synchronous (real-time) audio-video technology, and/or his healthcare decision maker, is aware that this patient-initiated, Telehealth encounter on 4/20/2020 is a billable service, with coverage as determined by his insurance carrier. He is aware that he may receive a bill and has provided verbal consent to proceed: Yes. Assessment & Plan:   Diagnoses and all orders for this visit:    1. Benign essential HTN    2. Patellar tendonitis of right knee    Patient will continue take blood pressure meds and take BP log. Patient will email BP log in next week. Currently well controlled. Patient continue home exercise program 5 days a week and if no resolution return to clinic for further imaging spoke with patient about prolotherapy as an option            I spent at least 15 minutes with this established patient, and >50% of the time was spent counseling and/or coordinating care regarding htn  [id number]  Subjective:   Brina Gallardo is a 45 y.o. male who was seen for No chief complaint on file. Blood pressure medication. Patient states that today his blood pressure was 140/90 but usually runs in the 120s over 80s. He is currently under heme-onc treatment for rectal cancer. Patient states his medications are taken daily with no side effects. Denies any chest pain shortness of breath LESTER  Patient also states that he is following up on his right knee. Patient states that his knee pain is now much better. He admits that he has not been doing home exercise program from last year. Patient states that he has exercises still and will start doing these 5 days a week. Prior to Admission medications    Medication Sig Start Date End Date Taking? Authorizing Provider   hydroCHLOROthiazide (HYDRODIURIL) 12.5 mg tablet TAKE 1 TABLET BY MOUTH EVERY DAY 1/31/20   Dipika Gates MD   tadalafil (CIALIS) 5 mg tablet Take 5 mg by mouth.     Provider, Historical   oxybutynin (DITROPAN) 5 mg tablet Take 5 mg by mouth two (2) times a day. Provider, Historical   lidocaine-prilocaine (EMLA) topical cream Apply  to affected area as needed for Pain. 10/23/19   Martine Bar NP   ondansetron (ZOFRAN ODT) 8 mg disintegrating tablet Take 1 Tab by mouth every eight (8) hours as needed for Nausea. 10/9/19   Jesika Hughes MD   diphenoxylate-atropine (LOMOTIL) 2.5-0.025 mg per tablet Take 1 Tab by mouth four (4) times daily as needed for Diarrhea. Provider, Historical   triamcinolone acetonide (KENALOG) 0.1 % ointment Apply  to affected area two (2) times daily as needed for Skin Irritation. use thin layer    Provider, Historical   fluocinonide (VANOS) 0.1 % topical cream Apply  to affected area daily. 2/22/19   Ira Tong MD     No Known Allergies    Current Outpatient Medications   Medication Sig Dispense Refill    hydroCHLOROthiazide (HYDRODIURIL) 12.5 mg tablet TAKE 1 TABLET BY MOUTH EVERY DAY 30 Tab 2    tadalafil (CIALIS) 5 mg tablet Take 5 mg by mouth.  oxybutynin (DITROPAN) 5 mg tablet Take 5 mg by mouth two (2) times a day.  lidocaine-prilocaine (EMLA) topical cream Apply  to affected area as needed for Pain. 30 g 0    ondansetron (ZOFRAN ODT) 8 mg disintegrating tablet Take 1 Tab by mouth every eight (8) hours as needed for Nausea. 30 Tab 3    diphenoxylate-atropine (LOMOTIL) 2.5-0.025 mg per tablet Take 1 Tab by mouth four (4) times daily as needed for Diarrhea.  triamcinolone acetonide (KENALOG) 0.1 % ointment Apply  to affected area two (2) times daily as needed for Skin Irritation. use thin layer      fluocinonide (VANOS) 0.1 % topical cream Apply  to affected area daily.  30 g 5     No Known Allergies  Past Medical History:   Diagnosis Date    Adopted     GERD (gastroesophageal reflux disease)     Ill-defined condition     CHEMO-LAST ON 2/19/2020    Psoriasis     [facility] [address] [address]     Rectal cancer [address] [address]     Moderately differentiated [id number] adenocarcinoma of the rectum. Treated with neoadjuvant chemoradiation, resection, and adjuvant chemotherapy. Past Surgical History:   Procedure Laterality Date    COLONOSCOPY Left 4/26/2019    COLONOSCOPY performed by Cory Hwang MD at [facility] 46 N/A 5/2/2019    SIGMOIDOSCOPY FLEXIBLE performed by Felicity Ortega MD at [address] HX GI      EGD    HX HEENT      WISDOM TEETH    HX ORTHOPAEDIC Right     ACL REPLACEMENT    HX OTHER SURGICAL  09/24/2019    Hand-assisted laparoscopic low anterior resection, mobilization of the splenic flexure, coloproctostomy, and creation of loop ileostomy; Dr. Supriya Matute.  HX UROLOGICAL  12/2019    CYSTOSCOPY FOR REMOVAL OF KIDNEY STONES WITH HOLMIUM LAZER    HX UROLOGICAL  09/24/2019    Cystoscopy and placement of bilateral temporary ureteral catheters; Melissa Lowery MD.    HX VASCULAR ACCESS      INSERTION OF PORT-A-CATH RT SIDE OF CHEST. FOR CHEMO-LAST ON 2/19/2020    IR INSERT TUNL CVC W PORT OVER 5 YEARS  10/15/2019       ROS        Objective:   Vital Signs: (As obtained by patient/caregiver at home)  There were no vitals taken for this visit.      [INSTRUCTIONS:  \"[x]\" Indicates a positive item  \"[]\" Indicates a negative item  -- DELETE ALL ITEMS NOT EXAMINED]    Constitutional: [x] Appears well-developed and well-nourished [x] No apparent distress      [] Abnormal -     Mental status: [x] Alert and awake  [x] Oriented to person/place/time [x] Able to follow commands    [] Abnormal -     Eyes:   EOM    [x]  Normal    [] Abnormal -   Sclera  [x]  Normal    [] Abnormal -          Discharge [x]  None visible   [] Abnormal -     HENT: [x] Normocephalic, atraumatic  [] Abnormal -   [x] Mouth/Throat: Mucous membranes are moist    External Ears [x] Normal  [] Abnormal -    Neck: [x] No visualized mass [] Abnormal -     Pulmonary/Chest: [x] Respiratory effort normal   [x] No visualized signs of difficulty breathing or respiratory distress        [] Abnormal -      Musculoskeletal:   [x] Normal gait with no signs of ataxia         [x] Normal range of motion of neck        [] Abnormal -     Neurological:        [x] No Facial Asymmetry (Cranial nerve 7 motor function) (limited exam due to video visit)          [x] No gaze palsy        [] Abnormal -          Skin:        [x] No significant exanthematous lesions or discoloration noted on facial skin         [] Abnormal -            Psychiatric:       [x] Normal Affect [] Abnormal -        [x] No Hallucinations    Other pertinent observable physical exam findings:-        We discussed the expected course, resolution and complications of the diagnosis(es) in detail. Medication risks, benefits, costs, interactions, and alternatives were discussed as indicated. I advised him to contact the office if his condition worsens, changes or fails to improve as anticipated. He expressed understanding with the diagnosis(es) and plan. Antione Chavez is a 45 y.o. male being evaluated by a video visit encounter for concerns as above. A caregiver was present when appropriate. Due to this being a TeleHealth encounter ([facility]), evaluation of the following organ systems was limited: Vitals/Constitutional/EENT/Resp/CV/GI//MS/Neuro/Skin/Heme-Lymph-Imm. Pursuant to the emergency declaration under the [facility], 1135 waiver authority and the [employer] Act, this Virtual  Visit was conducted, with patient's (and/or legal guardian's) consent, to reduce the patient's risk of exposure to COVID-19 and provide necessary medical care. Services were provided through a video synchronous discussion virtually to substitute for in-person clinic visit. Patient and provider were located at their individual homes.         Jose A Mcdaniel MD

## 2020-05-06 RX ORDER — SODIUM CHLORIDE 9 MG/ML
10 INJECTION INTRAMUSCULAR; INTRAVENOUS; SUBCUTANEOUS AS NEEDED
Status: CANCELLED | OUTPATIENT
Start: 2020-05-13

## 2020-05-06 RX ORDER — SODIUM CHLORIDE 0.9 % (FLUSH) 0.9 %
5-10 SYRINGE (ML) INJECTION AS NEEDED
Status: CANCELLED | OUTPATIENT
Start: 2020-08-05

## 2020-05-06 RX ORDER — HEPARIN 100 UNIT/ML
500 SYRINGE INTRAVENOUS AS NEEDED
Status: CANCELLED | OUTPATIENT
Start: 2021-01-20

## 2020-05-06 RX ORDER — SODIUM CHLORIDE 0.9 % (FLUSH) 0.9 %
5-10 SYRINGE (ML) INJECTION AS NEEDED
Status: CANCELLED | OUTPATIENT
Start: 2020-05-13

## 2020-05-06 RX ORDER — SODIUM CHLORIDE 9 MG/ML
10 INJECTION INTRAMUSCULAR; INTRAVENOUS; SUBCUTANEOUS AS NEEDED
Status: CANCELLED | OUTPATIENT
Start: 2021-01-20

## 2020-05-06 RX ORDER — HEPARIN 100 UNIT/ML
500 SYRINGE INTRAVENOUS AS NEEDED
Status: CANCELLED | OUTPATIENT
Start: 2020-10-28

## 2020-05-06 RX ORDER — HEPARIN 100 UNIT/ML
500 SYRINGE INTRAVENOUS AS NEEDED
Status: CANCELLED | OUTPATIENT
Start: 2020-05-13

## 2020-05-06 RX ORDER — SODIUM CHLORIDE 0.9 % (FLUSH) 0.9 %
5-10 SYRINGE (ML) INJECTION AS NEEDED
Status: CANCELLED | OUTPATIENT
Start: 2020-10-28

## 2020-05-06 RX ORDER — HEPARIN 100 UNIT/ML
500 SYRINGE INTRAVENOUS AS NEEDED
Status: CANCELLED | OUTPATIENT
Start: 2020-08-05

## 2020-05-06 RX ORDER — SODIUM CHLORIDE 9 MG/ML
10 INJECTION INTRAMUSCULAR; INTRAVENOUS; SUBCUTANEOUS AS NEEDED
Status: CANCELLED | OUTPATIENT
Start: 2020-10-28

## 2020-05-06 RX ORDER — SODIUM CHLORIDE 9 MG/ML
10 INJECTION INTRAMUSCULAR; INTRAVENOUS; SUBCUTANEOUS AS NEEDED
Status: CANCELLED | OUTPATIENT
Start: 2020-08-05

## 2020-05-06 RX ORDER — SODIUM CHLORIDE 0.9 % (FLUSH) 0.9 %
5-10 SYRINGE (ML) INJECTION AS NEEDED
Status: CANCELLED | OUTPATIENT
Start: 2021-01-20

## 2020-05-13 ENCOUNTER — HOSPITAL ENCOUNTER (OUTPATIENT)
Dept: INFUSION THERAPY | Age: 50
Discharge: HOME | End: 2020-05-13
Payer: MEDICAID

## 2020-05-13 VITALS
RESPIRATION RATE: 18 BRPM | SYSTOLIC BLOOD PRESSURE: 132 MMHG | HEART RATE: 79 BPM | DIASTOLIC BLOOD PRESSURE: 85 MMHG | TEMPERATURE: 96.7 F

## 2020-05-13 DIAGNOSIS — C20 RECTAL CANCER (HCC): Primary | ICD-10-CM

## 2020-05-13 LAB
ALBUMIN SERPL-MCNC: 3.8 G/DL (ref 3.5–5)
ALBUMIN/GLOB SERPL: 1.4 {RATIO} (ref 1.1–2.2)
ALP SERPL-CCNC: 60 U/L (ref 45–117)
ALT SERPL-CCNC: 27 U/L (ref 12–78)
ANION GAP SERPL CALC-SCNC: 3 MMOL/L (ref 5–15)
AST SERPL-CCNC: 20 U/L (ref 15–37)
BASOPHILS # BLD: 0 K/UL (ref 0–0.1)
BASOPHILS NFR BLD: 1 % (ref 0–1)
BILIRUB SERPL-MCNC: 0.5 MG/DL (ref 0.2–1)
BUN SERPL-MCNC: 14 MG/DL (ref 6–20)
BUN/CREAT SERPL: 18 (ref 12–20)
CALCIUM SERPL-MCNC: 9.2 MG/DL (ref 8.5–10.1)
CEA SERPL-MCNC: 1.4 NG/ML
CHLORIDE SERPL-SCNC: 107 MMOL/L (ref 97–108)
CO2 SERPL-SCNC: 28 MMOL/L (ref 21–32)
CREAT SERPL-MCNC: 0.8 MG/DL (ref 0.7–1.3)
DIFFERENTIAL METHOD BLD: ABNORMAL
EOSINOPHIL # BLD: 0.1 K/UL (ref 0–0.4)
EOSINOPHIL NFR BLD: 3 % (ref 0–7)
ERYTHROCYTE [DISTWIDTH] IN BLOOD BY AUTOMATED COUNT: 11.8 % (ref 11.5–14.5)
GLOBULIN SER CALC-MCNC: 2.8 G/DL (ref 2–4)
GLUCOSE SERPL-MCNC: 134 MG/DL (ref 65–100)
HCT VFR BLD AUTO: 40.4 % (ref 36.6–50.3)
HGB BLD-MCNC: 13.7 G/DL (ref 12.1–17)
IMM GRANULOCYTES # BLD AUTO: 0 K/UL (ref 0–0.04)
IMM GRANULOCYTES NFR BLD AUTO: 0 % (ref 0–0.5)
LYMPHOCYTES # BLD: 0.3 K/UL (ref 0.8–3.5)
LYMPHOCYTES NFR BLD: 11 % (ref 12–49)
MCH RBC QN AUTO: 32.1 PG (ref 26–34)
MCHC RBC AUTO-ENTMCNC: 33.9 G/DL (ref 30–36.5)
MCV RBC AUTO: 94.6 FL (ref 80–99)
MONOCYTES # BLD: 0.3 K/UL (ref 0–1)
MONOCYTES NFR BLD: 9 % (ref 5–13)
NEUTS SEG # BLD: 2.3 K/UL (ref 1.8–8)
NEUTS SEG NFR BLD: 76 % (ref 32–75)
NRBC # BLD: 0 K/UL (ref 0–0.01)
NRBC BLD-RTO: 0 PER 100 WBC
PLATELET # BLD AUTO: 156 K/UL (ref 150–400)
PMV BLD AUTO: 10 FL (ref 8.9–12.9)
POTASSIUM SERPL-SCNC: 3.6 MMOL/L (ref 3.5–5.1)
PROT SERPL-MCNC: 6.6 G/DL (ref 6.4–8.2)
RBC # BLD AUTO: 4.27 M/UL (ref 4.1–5.7)
RBC MORPH BLD: ABNORMAL
SODIUM SERPL-SCNC: 138 MMOL/L (ref 136–145)
WBC # BLD AUTO: 3 K/UL (ref 4.1–11.1)

## 2020-05-13 PROCEDURE — 74011250636 HC RX REV CODE- 250/636: Performed by: REGISTERED NURSE

## 2020-05-13 PROCEDURE — 36591 DRAW BLOOD OFF VENOUS DEVICE: CPT

## 2020-05-13 PROCEDURE — 80053 COMPREHEN METABOLIC PANEL: CPT

## 2020-05-13 PROCEDURE — 85025 COMPLETE CBC W/AUTO DIFF WBC: CPT

## 2020-05-13 PROCEDURE — 82378 CARCINOEMBRYONIC ANTIGEN: CPT

## 2020-05-13 PROCEDURE — 77030012965 HC NDL HUBR BBMI -A

## 2020-05-13 RX ORDER — SODIUM CHLORIDE 9 MG/ML
10 INJECTION INTRAMUSCULAR; INTRAVENOUS; SUBCUTANEOUS AS NEEDED
Status: ACTIVE | OUTPATIENT
Start: 2020-05-13 | End: 2020-05-13

## 2020-05-13 RX ORDER — SODIUM CHLORIDE 0.9 % (FLUSH) 0.9 %
5-10 SYRINGE (ML) INJECTION AS NEEDED
Status: DISPENSED | OUTPATIENT
Start: 2020-05-13 | End: 2020-05-13

## 2020-05-13 RX ORDER — HEPARIN 100 UNIT/ML
500 SYRINGE INTRAVENOUS AS NEEDED
Status: ACTIVE | OUTPATIENT
Start: 2020-05-13 | End: 2020-05-13

## 2020-05-13 RX ADMIN — Medication 10 ML: at 08:35

## 2020-05-13 RX ADMIN — HEPARIN 500 UNITS: 100 SYRINGE at 08:35

## 2020-05-18 RX ORDER — HYDROCHLOROTHIAZIDE 12.5 MG/1
TABLET ORAL
Qty: 30 TAB | Refills: 2 | Status: SHIPPED | OUTPATIENT
Start: 2020-05-18 | End: 2020-08-26

## 2020-06-01 ENCOUNTER — TELEPHONE (OUTPATIENT)
Dept: RHEUMATOLOGY | Age: 50
End: 2020-06-01

## 2020-06-01 ENCOUNTER — VIRTUAL VISIT (OUTPATIENT)
Dept: RHEUMATOLOGY | Age: 50
End: 2020-06-01

## 2020-06-01 DIAGNOSIS — L40.50 PSORIATIC ARTHRITIS (HCC): ICD-10-CM

## 2020-06-01 DIAGNOSIS — Z79.60 LONG-TERM USE OF IMMUNOSUPPRESSANT MEDICATION: ICD-10-CM

## 2020-06-01 DIAGNOSIS — L40.9 PSORIASIS: Primary | ICD-10-CM

## 2020-06-01 DIAGNOSIS — C20 RECTAL CANCER (HCC): ICD-10-CM

## 2020-06-01 RX ORDER — SULFASALAZINE 500 MG/1
1000 TABLET, DELAYED RELEASE ORAL 2 TIMES DAILY
Qty: 180 TAB | Refills: 1 | Status: SHIPPED | OUTPATIENT
Start: 2020-06-01 | End: 2020-10-01

## 2020-06-01 RX ORDER — FLUOCINONIDE 1 MG/G
CREAM TOPICAL DAILY
Qty: 30 G | Refills: 5 | Status: SHIPPED | OUTPATIENT
Start: 2020-06-01 | End: 2021-10-12 | Stop reason: SDUPTHER

## 2020-06-01 NOTE — PATIENT INSTRUCTIONS
Please fill out your [address] you will receive after your visit in the mail or via [address]!

## 2020-06-01 NOTE — PROGRESS NOTES
REASON FOR VISIT    Andie Copeland is a 45 y.o. male who was seen by synchronous (real-time) audio-video technology on 6/1/2020. HISTORY OF DISEASE: Psoriatic Arthritis    Year of diagnosis: 2015  First visit with me: 2/2019  Cumulative disease manifestations:  Dactylitis, psoriasis, back pain  Positive serologies/labs:  Negative serologies/labs: HLA B27  Other co-morbidities: excessive alcohol use; rectal cancer on chemo + RT + surgery  Relapses:      Past treatment history:  Prednisone:   Non-biologic DMARD: Methotrexate (2015-4/2019)  Biologic:   Other:     HISTORY OF PRESENT ILLNESS     The patient notes he is doing well. He is in remission right now. It appears that the psoriasis is returning. The joints are okay but the fingers are stiff in the morning. He has numbness in fingers from chemo side effects. He had DIP stiffness for about 5 minutes or so. NO swelling or pain. REVIEW OF SYSTEMS    A comprehensive review of systems was performed and pertinent results are documented in the HPI, review of systems is otherwise non-contributory. PAST MEDICAL HISTORY    Past Medical History:   Diagnosis Date    Adopted     GERD (gastroesophageal reflux disease)     Ill-defined condition     CHEMO-LAST ON 2/19/2020    Psoriasis     P[facility]iatic arthritis ([facility] [address]     Rectal cancer ([facility] [address]     Moderately differentiated [id number] adenocarcinoma of the rectum. Treated with neoadjuvant chemoradiation, resection, and adjuvant chemotherapy.         Past Surgical History:   Procedure Laterality Date    COLONOSCOPY Left 4/26/2019    COLONOSCOPY performed by Estefania Holliday MD at [facility] N/A 5/2/2019    SIGMOIDOSCOPY FLEXIBLE performed by Primitivo Snow MD at [address] HX GI      EGD    HX HEENT      WISDOM TEETH    HX ORTHOPAEDIC Right     ACL REPLACEMENT    HX OTHER SURGICAL  09/24/2019    Hand-assisted laparoscopic low anterior resection, mobilization of the splenic flexure, coloproctostomy, and creation of loop ileostomy; Dr. Juan Luis Guillen.  HX UROLOGICAL  12/2019    CYSTOSCOPY FOR REMOVAL OF KIDNEY STONES WITH HOLMIUM LAZER    HX UROLOGICAL  09/24/2019    Cystoscopy and placement of bilateral temporary ureteral catheters; Monik Vann MD.    HX VASCULAR ACCESS      INSERTION OF PORT-A-CATH RT SIDE OF CHEST. FOR CHEMO-LAST ON 2/19/2020    IR INSERT TUNL CVC W PORT OVER 5 YEARS  10/15/2019       FAMILY HISTORY    Family History   Adopted: Yes   Problem Relation Age of Onset    Asthma Neg Hx     Cancer Neg Hx     Diabetes Neg Hx     Heart Disease Neg Hx     Hypertension Neg Hx     Stroke Neg Hx        SOCIAL HISTORY    Social History     Tobacco Use    Smoking status: Never Smoker    Smokeless tobacco: Never Used   Substance Use Topics    Alcohol use: Yes     Alcohol/week: 6.0 standard drinks     Types: 6 Cans of beer per week     Comment: 6 BEERS WEEKLY    Drug use: No     On vacations drinks more than 6 beers weekly    MEDICATIONS    Current Outpatient Medications   Medication Sig Dispense Refill    fluocinonide (VANOS) 0.1 % topical cream Apply  to affected area daily. 30 g 5    sulfaSALAzine EC (AZULFIDINE) 500 mg EC tablet Take 2 Tabs by mouth two (2) times a day. START 1 tab twice daily for 7 days then 2 tabs 2 times a day if tolerated 180 Tab 1    hydroCHLOROthiazide (HYDRODIURIL) 12.5 mg tablet TAKE 1 TABLET BY MOUTH EVERY DAY 30 Tab 2    tadalafil (CIALIS) 5 mg tablet Take 5 mg by mouth.  oxybutynin (DITROPAN) 5 mg tablet Take 5 mg by mouth two (2) times a day.  triamcinolone acetonide (KENALOG) 0.1 % ointment Apply  to affected area two (2) times daily as needed for Skin Irritation. use thin layer      lidocaine-prilocaine (EMLA) topical cream Apply  to affected area as needed for Pain. 30 g 0    ondansetron (ZOFRAN ODT) 8 mg disintegrating tablet Take 1 Tab by mouth every eight (8) hours as needed for Nausea.  30 Tab 3    diphenoxylate-atropine (LOMOTIL) 2.5-0.025 mg per tablet Take 1 Tab by mouth four (4) times daily as needed for Diarrhea. ALLERGIES    No Known Allergies    PHYSICAL EXAMINATION    General: NAD, looks well, normal respiratory effort  HEENT: PERRL, anicteric, non-injected sclerae; lids without inflammation  Pulmonary: Normal respirations, no retractions, coughing  Skin: left shins with 2 large psoriatic plaques; left shin with a smaller plaque  Neuro: Alert; able to carry normal conversation, cognition, affect normal    Musculoskeletal:   Grossly normal joints    Due to this being a TeleHealth evaluation, many elements of the physical examination are unable to be assessed. DATA REVIEW    Prior medical records were reviewed and if applicable are summarized as below:    Labs:   2/2019: cbc, cmp unremarkable, ESR, CRP normal, HEpB, C, quant gold negative  10/18: WBC 4.1, Hb 15.2, Plt 194, CMP Normal, CRP, ESR negative  1/16: Hep C negative, HLA b27 negative, quant gold negative    Imaging:   SI Joints (2/2019): Personally reviewed images. Normal joints  Foot xrays (2/2019): Personally reviewed images. No erosions  Hand xrays (2/2019): Personally reviewed images. + DJD. No erosions  1/16: CXR normal    Pathology:  Rectal biopsy (4/2019): well differentiated invasive colonic adenocarcinoma    ASSESSMENT AND PLAN    A 45 y.o. male with hx of psoriatic arthritis not on treatment, rectal adenocarcinoma on chemo/RT presents for a follow up visit. Patient is currently in remission with rectal cancer. He has had some flares of his psoriasis and it appears joints are starting to flare too. Given this, I will start him on DMARD therapy. I would like to avoid biologics at this time. Given alcohol use, will avoid methotrexate. Given his recent GI issues, will avoid leflunomide. Therefore, I will start him on sulfasalazine.      # Psoriatic arthritis:    - will start with sulfasalazine 1000 mg oral daily then increase to 2000 mg oral daily in 2 weeks if tolerated. - I explained the risks and benefits of sulfasalazine and answered all the questions in detail. # Psoriasis:   - sulfasalazine as above  - counseled him to be more compliant with topical therapy    # Rectal cancer:   - in remission for now    # Alcohol use:   - he is not willing to cut down on his use of alcohol at this time. # Medication Toxicity Monitoring:  - cbc, cmp at next visit  - Hepatitis B, C: negative 2/2019  - Quant gold: negative 2/2019  - Immunizations: UTD  - bone health: will discuss at next visit    # HCM:  - flu vaccine 1/2019    RTC in 4 weeks virtual visit    The patient voiced understanding of the aforementioned assessment and plan. Summary of plan was provided in the After Visit Summary patient instructions. I also provided education about MyChart setup and utility. Mr. Cadence Pizano has a reminder for a \"due or due soon\" health maintenance. I have asked that he contact his primary care provider for follow-up on this health maintenance. TODAY'S ORDERS    Orders Placed This Encounter    fluocinonide (VANOS) 0.1 % topical cream    sulfaSALAzine EC (AZULFIDINE) 500 mg EC tablet       Future Appointments   Date Time Provider Shannan Rodriguez   7/9/2020 10:30 AM Mark Puentes MD [facility] [id number]   8/5/2020  8:30 AM G1 GREGORIA FASTRACK [facility]   10/28/2020  8:30 AM G1 GREGORIA FASTRACK [facility]   1/20/2021  8:30 AM G1 GREGORIA FASTRACK [facility]      We discussed the expected course, resolution and complications of the diagnosis(es) in detail. Medication risks, benefits, costs, interactions, and alternatives were discussed as indicated. I advised him to contact the office if his condition worsens, changes or fails to improve as anticipated. He expressed understanding with the diagnosis(es) and plan.          Pursuant to the emergency declaration under the [address], 1135 waiver authority and the Coronavirus Preparedness and Response Supplemental Appropriations Act, this Virtual  Visit was conducted, with patient's consent, to reduce the patient's risk of exposure to COVID-19 and provide continuity of care for an established patient. Services were provided through a video synchronous discussion virtually to substitute for in-person clinic visit.       Helder Campuzano MD    Adult Rheumatology   [facility]  A Part of [facility], [address]   Phone [phone]  Fax [phone]

## 2020-06-01 NOTE — TELEPHONE ENCOUNTER
Approvedtoday  PA Case: [id number], Status: Approved, Coverage Starts on: 6/1/2020 12:00:00 AM, Coverage Ends on: 6/1/2021 12:00:00 AM. Toni Mosquera

## 2020-06-02 ENCOUNTER — DOCUMENTATION ONLY (OUTPATIENT)
Dept: RHEUMATOLOGY | Age: 50
End: 2020-06-02

## 2020-06-10 ENCOUNTER — APPOINTMENT (OUTPATIENT)
Dept: INFUSION THERAPY | Age: 50
End: 2020-06-10

## 2020-06-30 ENCOUNTER — VIRTUAL VISIT (OUTPATIENT)
Dept: RHEUMATOLOGY | Age: 50
End: 2020-06-30

## 2020-06-30 DIAGNOSIS — Z79.60 LONG-TERM USE OF IMMUNOSUPPRESSANT MEDICATION: ICD-10-CM

## 2020-06-30 DIAGNOSIS — C20 RECTAL CANCER (HCC): ICD-10-CM

## 2020-06-30 DIAGNOSIS — L40.50 PSORIATIC ARTHRITIS (HCC): ICD-10-CM

## 2020-06-30 DIAGNOSIS — L40.9 PSORIASIS: Primary | ICD-10-CM

## 2020-06-30 NOTE — PATIENT INSTRUCTIONS
Please fill out your [address] you will receive after your visit in the mail or via [phone]!

## 2020-06-30 NOTE — PROGRESS NOTES
REASON FOR VISIT    Gavino Herrera is a 45 y.o. male who was seen by synchronous (real-time) audio-video technology on 6/30/2020. HISTORY OF DISEASE: Psoriatic Arthritis    Year of diagnosis: 2015  First visit with me: 2/2019  Cumulative disease manifestations:  Dactylitis, psoriasis, back pain  Positive serologies/labs:  Negative serologies/labs: HLA B27  Other co-morbidities: excessive alcohol use; rectal cancer on chemo + RT + surgery  Relapses:      Past treatment history:  Prednisone:   Non-biologic DMARD: Methotrexate (2015-4/2019), Sulfasalazine 2000 mg oral daily (6/2020-present)  Biologic:   Other:     HISTORY OF PRESENT ILLNESS     The patient is doing well. He thinks sulfasalazine has slightly helped. He is using topicals and that has helped a lot with his psoriasis. His joint pains are improved as well. REVIEW OF SYSTEMS    A comprehensive review of systems was performed and pertinent results are documented in the HPI, review of systems is otherwise non-contributory. PAST MEDICAL HISTORY    Past Medical History:   Diagnosis Date    Adopted     GERD (gastroesophageal reflux disease)     Ill-defined condition     CHEMO-LAST ON 2/19/2020    Psoriasis     [facility]     Rectal cancer ([facility]     Moderately differentiated [id number] adenocarcinoma of the rectum. Treated with neoadjuvant chemoradiation, resection, and adjuvant chemotherapy.         Past Surgical History:   Procedure Laterality Date    COLONOSCOPY Left 4/26/2019    COLONOSCOPY performed by Letty Danielle MD at [facility] 49 N/A 5/2/2019    SIGMOIDOSCOPY FLEXIBLE performed by Nolen Brittle, MD at [address] [facility]X GI      EGD    HX HEENT      WISDOM TEETH    HX ORTHOPAEDIC Right     ACL REPLACEMENT    HX OTHER SURGICAL  09/24/2019    Hand-assisted laparoscopic low anterior resection, mobilization of the splenic flexure, coloproctostomy, and creation of loop ileostomy;  Lopez Patel.  HX UROLOGICAL  12/2019    CYSTOSCOPY FOR REMOVAL OF KIDNEY STONES WITH HOLMIUM LAZER    HX UROLOGICAL  09/24/2019    Cystoscopy and placement of bilateral temporary ureteral catheters; Lori Mckeon MD.    HX VASCULAR ACCESS      INSERTION OF PORT-A-CATH RT SIDE OF CHEST. FOR CHEMO-LAST ON 2/19/2020    IR INSERT TUNL CVC W PORT OVER 5 YEARS  10/15/2019       FAMILY HISTORY    Family History   Adopted: Yes   Problem Relation Age of Onset    Asthma Neg Hx     Cancer Neg Hx     Diabetes Neg Hx     Heart Disease Neg Hx     Hypertension Neg Hx     Stroke Neg Hx        SOCIAL HISTORY    Social History     Tobacco Use    Smoking status: Never Smoker    Smokeless tobacco: Never Used   Substance Use Topics    Alcohol use: Yes     Alcohol/week: 6.0 standard drinks     Types: 6 Cans of beer per week     Comment: 6 BEERS WEEKLY    Drug use: No     On vacations drinks more than 6 beers weekly    MEDICATIONS    Current Outpatient Medications   Medication Sig Dispense Refill    fluocinonide (VANOS) 0.1 % topical cream Apply  to affected area daily. 30 g 5    sulfaSALAzine EC (AZULFIDINE) 500 mg EC tablet Take 2 Tabs by mouth two (2) times a day. START 1 tab twice daily for 7 days then 2 tabs 2 times a day if tolerated 180 Tab 1    hydroCHLOROthiazide (HYDRODIURIL) 12.5 mg tablet TAKE 1 TABLET BY MOUTH EVERY DAY 30 Tab 2    tadalafil (CIALIS) 5 mg tablet Take 5 mg by mouth.  oxybutynin (DITROPAN) 5 mg tablet Take 5 mg by mouth two (2) times a day.  triamcinolone acetonide (KENALOG) 0.1 % ointment Apply  to affected area two (2) times daily as needed for Skin Irritation. use thin layer      lidocaine-prilocaine (EMLA) topical cream Apply  to affected area as needed for Pain. 30 g 0    ondansetron (ZOFRAN ODT) 8 mg disintegrating tablet Take 1 Tab by mouth every eight (8) hours as needed for Nausea.  30 Tab 3    diphenoxylate-atropine (LOMOTIL) 2.5-0.025 mg per tablet Take 1 Tab by mouth four (4) times daily as needed for Diarrhea. ALLERGIES    No Known Allergies    PHYSICAL EXAMINATION    General: NAD, looks well, normal respiratory effort  HEENT: PERRL, anicteric, non-injected sclerae; lids without inflammation  Pulmonary: Normal respirations, no retractions, coughing  Skin: psoriatic plaques smaller  Neuro: Alert; able to carry normal conversation, cognition, affect normal    Musculoskeletal:   Grossly normal joints    Due to this being a TeleHealth evaluation, many elements of the physical examination are unable to be assessed. DATA REVIEW    Prior medical records were reviewed and if applicable are summarized as below:    Labs:   2/2019: cbc, cmp unremarkable, ESR, CRP normal, HEpB, C, quant gold negative  10/18: WBC 4.1, Hb 15.2, Plt 194, CMP Normal, CRP, ESR negative  1/16: Hep C negative, HLA b27 negative, quant gold negative    Imaging:   SI Joints (2/2019): Personally reviewed images. Normal joints  Foot xrays (2/2019): Personally reviewed images. No erosions  Hand xrays (2/2019): Personally reviewed images. + DJD. No erosions  1/16: CXR normal    Pathology:  Rectal biopsy (4/2019): well differentiated invasive colonic adenocarcinoma    ASSESSMENT AND PLAN    A 45 y.o. male with hx of psoriatic arthritis on sulfasalazine 2000 mg oral daily, rectal adenocarcinoma on chemo/RT presents for a follow up visit. The patient is tolerating current regimen and I will continue this for now. # Psoriatic arthritis:    - continue sulfasalazine 2000 mg oral daily    # Psoriasis:   - sulfasalazine as above  - counseled him to be more compliant with topical therapy    # Rectal cancer:   - in remission for now    # Alcohol use:   - he is not willing to cut down on his use of alcohol at this time.      # Medication Toxicity Monitoring:  - cbc, cmp at next visit  - Hepatitis B, C: negative 2/2019  - Quant gold: negative 2/2019  - Immunizations: UTD  - bone health: will discuss at next visit    # HCM:  - flu vaccine 1/2019    RTC in 3 months    The patient voiced understanding of the aforementioned assessment and plan. Summary of plan was provided in the After Visit Summary patient instructions. I also provided education about MyChart setup and utility. Mr. Alvaro Ansari has a reminder for a \"due or due soon\" health maintenance. I have asked that he contact his primary care provider for follow-up on this health maintenance. TODAY'S ORDERS    No orders of the defined types were placed in this encounter. Future Appointments   Date Time Provider Shannan Rodriguez   7/9/2020 10:30 AM MD Pippa Sanchez [id number]   8/5/2020  8:30 AM G1 GREGORIA FASTRACK [facility]   10/28/2020  8:30 AM G1 GREGORIA FASTRACK [facility]   1/20/2021  8:30 AM G1 GREGORIA FASTRACK [facility]     We discussed the expected course, resolution and complications of the diagnosis(es) in detail. Medication risks, benefits, costs, interactions, and alternatives were discussed as indicated. I advised him to contact the office if his condition worsens, changes or fails to improve as anticipated. He expressed understanding with the diagnosis(es) and plan. Pursuant to the emergency declaration under the [facility] [place]note 2[facility], [facility] waiver authority and the [employer] Act, this Virtual  Visit was conducted, with patient's consent, to reduce the patient's risk of exposure to COVID-19 and provide continuity of care for an established patient. Services were provided through a video synchronous discussion virtually to substitute for in-person clinic visit.       Issac Leyden, MD    Adult Rheumatology   [facility]  [facility] of [facility], [address] [place] [address] Road   Phone [phone]  Fax [phone]

## 2020-07-09 ENCOUNTER — VIRTUAL VISIT (OUTPATIENT)
Dept: ONCOLOGY | Age: 50
End: 2020-07-09

## 2020-07-09 DIAGNOSIS — D70.9 NEUTROPENIA, UNSPECIFIED TYPE (HCC): ICD-10-CM

## 2020-07-09 DIAGNOSIS — C20 RECTAL CANCER (HCC): Primary | ICD-10-CM

## 2020-07-09 DIAGNOSIS — L40.50 PSORIATIC ARTHRITIS (HCC): ICD-10-CM

## 2020-07-09 NOTE — PROGRESS NOTES
Cancer [place] at [place] [address] [address], [address]  W: [phone]  F: [phone]    Reason for Visit:   Jered Faulkner is a 45 y.o. male who is seen by synchronous (real-time) audio-video technology on 7/9/2020 for follow up of Rectal cancer- likely stage III- SOMMER    Treatment History:   · 4/26/19: Colonoscopy- 6-7 cm size malignant appearing mass seen at 10 cm from anal verge. This was adenocarcinoma. 3 polyps removed- all were tubular adenomas  · Rectal Ultrasound 5/2/19: ulcerated infiltrative mass lesion was noted in rectum at 10 cm. The lesion measured about 5 cm X 4 cm- T2, 13 mm X 8 mm oblong lymph node was noted immediately adjacent to the mass lesion  · 5/2/19: CT CAP- No metastasis, liver cysts. CEA 3.6  · 5/10/19: PET CT showed rectal malignancy and 3 small perirectal anibal metastatic focir  · 5/28/19-7/8/29: Xeloda + RT   · 9/24/19: LAR-  [id number], 2/6 positive nodes  · 10/14/19: CT CAP with no evidence of metastatic disease, liver cysts   · 10/23/19- 2/19/2020-: 8 cycles of  FOLFOX. Several delays and dose reductions due to cytopenias  · 12/11/19 CT AP: hypodensity in liver, otherwise KATHLEEN   · 1/6/20 MRI abd: no liver mets  · 3/30/3030: CT KATHLEEN    History of Present Illness:   Patient is a 45 y.o. male seen for adenocarcinoma of the mid third of rectum    He had intermittent BRBPR x 1 year and then decided to have a colonoscopy. This led to above mentioned diagnosis. He received neoadjuvant xeloda+ RT followed by L[facility]. Completed 8 cycles of FOLFOX, had a colostomy and is on surveillance. He has noted some fatigue, but is not remarkably different, no bleeding, no abdominal pain, he is now on Sufasalazine for the psoriatic arthritis. No nausea, still has numbness in his fingers and toes. , no pain  He has no falls. He uses 7 drinks a week.     Adopted    Past Medical History:   Diagnosis Date    Adopted     GERD (gastroesophageal reflux disease)  Ill-defined condition     CHEMO-LAST ON 2/19/2020    Psoriasis     [facility] [address] [facility]     Rectal cancer ([facility])     Moderately differentiated [id number] adenocarcinoma of the rectum. Treated with neoadjuvant chemoradiation, resection, and adjuvant chemotherapy. Past Surgical History:   Procedure Laterality Date    COLONOSCOPY Left 4/26/2019    COLONOSCOPY performed by Kay Vance MD at [facility] 49 N/A 5/2/2019    SIGMOIDOSCOPY FLEXIBLE performed by Jeanne Raygoza MD at [address] [facility] GI      EGD    HX HEENT      WISDOM TEETH    HX ORTHOPAEDIC Right     ACL REPLACEMENT    HX OTHER SURGICAL  09/24/2019    Hand-assisted laparoscopic low anterior resection, mobilization of the splenic flexure, coloproctostomy, and creation of loop ileostomy; Dr. Gisel Walsh.  HX UROLOGICAL  12/2019    CYSTOSCOPY FOR REMOVAL OF KIDNEY STONES WITH HOLMIUM LAZER    HX UROLOGICAL  09/24/2019    Cystoscopy and placement of bilateral temporary ureteral catheters; Mary De Anda MD.    HX VASCULAR ACCESS      INSERTION OF PORT-A-CATH RT SIDE OF CHEST. FOR CHEMO-LAST ON 2/19/2020    IR INSERT TUNL CVC W PORT OVER 5 YEARS  10/15/2019      Social History     Tobacco Use    Smoking status: Never Smoker    Smokeless tobacco: Never Used   Substance Use Topics    Alcohol use: Yes     Alcohol/week: 6.0 standard drinks     Types: 6 Cans of beer per week     Comment: 6 BEERS WEEKLY      Family History   Adopted: Yes   Problem Relation Age of Onset    Asthma Neg Hx     Cancer Neg Hx     Diabetes Neg Hx     Heart Disease Neg Hx     Hypertension Neg Hx     Stroke Neg Hx      Current Outpatient Medications   Medication Sig    sulfaSALAzine EC (AZULFIDINE) 500 mg EC tablet Take 2 Tabs by mouth two (2) times a day.  START 1 tab twice daily for 7 days then 2 tabs 2 times a day if tolerated    hydroCHLOROthiazide (HYDRODIURIL) 12.5 mg tablet TAKE 1 TABLET BY MOUTH EVERY DAY    tadalafil (CIALIS) 5 mg tablet Take 5 mg by mouth.  fluocinonide (VANOS) 0.1 % topical cream Apply  to affected area daily.  oxybutynin (DITROPAN) 5 mg tablet Take 5 mg by mouth two (2) times a day.  lidocaine-prilocaine (EMLA) topical cream Apply  to affected area as needed for Pain.  ondansetron (ZOFRAN ODT) 8 mg disintegrating tablet Take 1 Tab by mouth every eight (8) hours as needed for Nausea.  diphenoxylate-atropine (LOMOTIL) 2.5-0.025 mg per tablet Take 1 Tab by mouth four (4) times daily as needed for Diarrhea.  triamcinolone acetonide (KENALOG) 0.1 % ointment Apply  to affected area two (2) times daily as needed for Skin Irritation. use thin layer     No current facility-administered medications for this visit. No Known Allergies     Review of Systems: A complete review of systems was obtained, negative except as described above. Physical Exam:       Due to this being a TeleHealth evaluation, many elements of the physical examination are unable to be assessed. Constitutional: Appears well-developed and well-nourished in no apparent distress   Mental status: Alert and awake, Oriented to person/place/time, Able to follow commands  Eyes: EOM normal, Sclera normal, No visible ocular discharge  HENT: Normocephalic, atraumatic; Mouth/Throat: Moist mucous membranes, External Ears normal  Neck: No visualized mass  Skin: No significant exanthematous lesions or discoloration noted on facial skin  Psychiatric: Normal affect, normal judgment/insight. No hallucinations       Results:     Lab Results   Component Value Date/Time    WBC 3.0 (L) 05/13/2020 08:32 AM    HGB 13.7 05/13/2020 08:32 AM    HCT 40.4 05/13/2020 08:32 AM    PLATELET 990 31/73/6556 08:32 AM    MCV 94.6 05/13/2020 08:32 AM    ABS.  NEUTROPHILS 2.3 05/13/2020 08:32 AM     Lab Results   Component Value Date/Time    Sodium 138 05/13/2020 08:32 AM    Potassium 3.6 05/13/2020 08:32 AM    Chloride 107 05/13/2020 08:32 AM    CO2 28 05/13/2020 08:32 AM    Glucose 134 (H) 05/13/2020 08:32 AM    BUN 14 05/13/2020 08:32 AM    Creatinine 0.80 05/13/2020 08:32 AM    GFR est AA >60 05/13/2020 08:32 AM    GFR est non-AA >60 05/13/2020 08:32 AM    Calcium 9.2 05/13/2020 08:32 AM     Lab Results   Component Value Date/Time    Bilirubin, total 0.5 05/13/2020 08:32 AM    ALT (SGPT) 27 05/13/2020 08:32 AM    Alk. phosphatase 60 05/13/2020 08:32 AM    Protein, total 6.6 05/13/2020 08:32 AM    Albumin 3.8 05/13/2020 08:32 AM    Globulin 2.8 05/13/2020 08:32 AM     Records reviewed and summarized above. Pathology report(s) reviewed  FINAL PATHOLOGIC DIAGNOSIS   1. Rectum and sigmoid colon, laparoscopic low anterior resection:   SPECIMEN   Procedure: Low anterior resection   Macroscopic Intactness of Mesorectum: Complete   TUMOR   Tumor Site: Rectum   Histologic Type: Adenocarcinoma   Histologic Grade: G2: Moderately differentiated   Tumor Size: 2.6 cm   Tumor Deposits: Present   Number of Deposits: 1   Tumor Extension: Tumor invades through the muscularis propria into pericolorectal tissue   Macroscopic Tumor Perforation: Not identified   Lymphovascular Invasion: Not identified   Perineural Invasion: Not identified   Tumor Budding: Number of tumor buds in one hotspot field: 5   Tumor Budding Score: Intermediate score (5-9)   Treatment Effect: Present - Residual cancer with evident tumor regression, but more than single cells or   rare small groups of cancer cells (partial response, score 2)   MARGINS   Margins:  All margins are uninvolved by invasive carcinoma, high- grade dysplasia, intramucosal   adenocarcinoma, and adenoma   Margins Examined: Proximal, Distal, Radial or Mesenteric   Distance of Invasive Carcinoma from Closest Margin: 25 mm   Closest Margin: Radial or Mesenteric   LYMPH NODES   Number of Lymph Nodes Involved: 2   Number of Lymph Nodes Examined: 16   PATHOLOGIC STAGE CLASSIFICATION (pTNM, AJCC 8th Edition)   Primary Tumor (pT): pT3 Regional Lymph Nodes (pN): pN1b   2. Large bowel, donuts:   Fragments of large bowel wall, negative for microscopic pathologic abnormality. Radiology report(s) reviewed above. CT CAP 10/14/19: IMPRESSION:  1. There are scattered small hepatic cysts without definite evidence for  metastatic disease. 2. Otherwise negative CT chest abdomen pelvis    CT CAP 12/11/19: IMPRESSION:  1. Right hydroureteronephrosis due to an 8 mm calculus at the ureterovesicular  junction. 2. Vague areas of heterogeneous enhancement and hypodensity in the liver, raises  concern for metastases, though dedicated 3 phase liver CT or liver protocol MRI  may be considered as follow-up. Numerous small hypodensities in the liver likely  cysts, unchanged. 3. Rectal anastomosis and presacral edema, treatment related. Right lower  quadrant ileostomy. No bowel obstruction    MRI abd 1/6/20: no hepatic mets     CT abdomen 3/30/2020  IMPRESSION  IMPRESSION:     1. Mild dilatation, thinning of the wall, and poor enhancement of the bowel just  proximal to the loop ileostomy closure nearly to the distal anastomosis. Clinical significance is uncertain. Ischemic changes should be considered.   2. Small amount of postoperative fluid in the right paracolic gutter without  drainable fluid collection  Assessment:   1) Rectal adenocarcinoma- mid third- SOMMER  Genetic testing: negative     T2N1M0 disease- Clinical stage III  S/P John Adj chemoRT followed by [facility] on 9/24/19  Pathology showed [id number] residual disease- stage IIIB  Had significant residual disease with practically no treatment effects  Adjuvant FOLFOX x 8 until 2/2020- several delays and reductions due to cytopenias  CT 3/30/20  KATHLEEN  ROS and labs unremarkable     Discussed surveillance    H NP and labs every 3-6 months for 2 years then every 6 months for 5 years  CT every 6-12 months for 5 years  Colonoscopy at 1 year- to be done by Dr. 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 Yahir Pierce in sep 2020- he will call and schedule  · Genetic testing next visit        I appreciate the opportunity to participate in Mr. Wayne gardner. I was in the office while conducting this encounter. The patient was at his home. Consent:  He and/or his healthcare decision maker is aware that this patient-initiated Telehealth encounter is a billable service, with coverage as determined by his insurance carrier. He is aware that he may receive a bill and has provided verbal consent to proceed: Yes    Pursuant to the emergency declaration under the [address] and the [facility], [address] waiver authority and the [employer] General Act, this Virtual  Visit was conducted, with patient's (and/or legal guardian's) consent, to reduce the patient's risk of exposure to COVID-19 and provide necessary medical care. Services were provided through a video synchronous discussion virtually to substitute for in-person clinic visit.       Signed By: Michael Vides MD

## 2020-07-09 NOTE — PROGRESS NOTES
Augustin Delgado is a 45 y.o. male  Chief Complaint   Patient presents with    Follow-up     Rectal Cancer      1. Have you been to the ER, urgent care clinic since your last visit? Hospitalized since your last visit? No    2. Have you seen or consulted any other health care providers outside of the [address] [place] [address] since your last visit? Include any pap smears or colon screening.  No

## 2020-07-13 ENCOUNTER — HOSPITAL ENCOUNTER (OUTPATIENT)
Dept: PREADMISSION TESTING | Age: 50
Discharge: HOME | End: 2020-07-13
Payer: MEDICAID

## 2020-07-13 DIAGNOSIS — U07.1 COVID-19: ICD-10-CM

## 2020-07-13 PROCEDURE — 87635 SARS-COV-2 COVID-19 AMP PRB: CPT

## 2020-07-15 LAB — SARS-COV-2, COV2NT: NOT DETECTED

## 2020-07-15 NOTE — PROGRESS NOTES
Do you have a personal history of COVID-19 within the past 28 days? If Yes, What was the method of testing: clinical assumption or test result? Yes, negative result    Have you had close contact with a known to be positive COVID-19 patient within the past 14 days? 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 Do you live in a SNF, adult home or other institutional setting?  no  If Yes, have they experienced a flood of COVID-19 positive patients?     In the past 2-14 days have you had any of the following symptoms    Cough no   New onset Shortness of breath or difficulty breathing  no    Or at least two of these symptoms:   Fever greater than 100 F  no   Chills no   Repeated shaking with chills no   Muscle pain no   Headache  no   Sore throat no   New loss of taste or smell  no   New onset diarrhea  No    Travel negative

## 2020-07-16 ENCOUNTER — HOSPITAL ENCOUNTER (OUTPATIENT)
Dept: INTERVENTIONAL RADIOLOGY/VASCULAR | Age: 50
Discharge: HOME | End: 2020-07-16
Attending: INTERNAL MEDICINE | Admitting: RADIOLOGY
Payer: MEDICAID

## 2020-07-16 VITALS
BODY MASS INDEX: 23.23 KG/M2 | TEMPERATURE: 98.7 F | HEIGHT: 67 IN | DIASTOLIC BLOOD PRESSURE: 78 MMHG | SYSTOLIC BLOOD PRESSURE: 122 MMHG | HEART RATE: 57 BPM | RESPIRATION RATE: 12 BRPM | OXYGEN SATURATION: 100 % | WEIGHT: 148 LBS

## 2020-07-16 DIAGNOSIS — C20 RECTAL CANCER (HCC): ICD-10-CM

## 2020-07-16 PROCEDURE — 74011250636 HC RX REV CODE- 250/636

## 2020-07-16 PROCEDURE — 77030011893 HC TY CUT DN TRIS -B

## 2020-07-16 PROCEDURE — 77030010507 HC ADH SKN DERMBND J&J -B

## 2020-07-16 PROCEDURE — 74011250636 HC RX REV CODE- 250/636: Performed by: RADIOLOGY

## 2020-07-16 PROCEDURE — 77030031139 HC SUT VCRL2 J&J -A

## 2020-07-16 PROCEDURE — 36589 REMOVAL TUNNELED CV CATH: CPT

## 2020-07-16 PROCEDURE — 74011000250 HC RX REV CODE- 250: Performed by: RADIOLOGY

## 2020-07-16 RX ORDER — LIDOCAINE HYDROCHLORIDE AND EPINEPHRINE 10; 10 MG/ML; UG/ML
10 INJECTION, SOLUTION INFILTRATION; PERINEURAL ONCE
Status: DISCONTINUED | OUTPATIENT
Start: 2020-07-16 | End: 2020-07-16

## 2020-07-16 RX ORDER — MIDAZOLAM HYDROCHLORIDE 1 MG/ML
5 INJECTION, SOLUTION INTRAMUSCULAR; INTRAVENOUS
Status: DISCONTINUED | OUTPATIENT
Start: 2020-07-16 | End: 2020-07-16

## 2020-07-16 RX ORDER — FENTANYL CITRATE 50 UG/ML
100 INJECTION, SOLUTION INTRAMUSCULAR; INTRAVENOUS
Status: DISCONTINUED | OUTPATIENT
Start: 2020-07-16 | End: 2020-07-16

## 2020-07-16 RX ORDER — SODIUM CHLORIDE 9 MG/ML
25 INJECTION, SOLUTION INTRAVENOUS CONTINUOUS
Status: DISCONTINUED | OUTPATIENT
Start: 2020-07-16 | End: 2020-07-16

## 2020-07-16 RX ORDER — LIDOCAINE HYDROCHLORIDE 20 MG/ML
20 INJECTION, SOLUTION INFILTRATION; PERINEURAL ONCE
Status: COMPLETED | OUTPATIENT
Start: 2020-07-16 | End: 2020-07-16

## 2020-07-16 RX ORDER — MIDAZOLAM HYDROCHLORIDE 1 MG/ML
INJECTION, SOLUTION INTRAMUSCULAR; INTRAVENOUS
Status: COMPLETED
Start: 2020-07-16 | End: 2020-07-16

## 2020-07-16 RX ADMIN — FENTANYL CITRATE 25 MCG: 50 INJECTION INTRAMUSCULAR; INTRAVENOUS at 08:30

## 2020-07-16 RX ADMIN — MIDAZOLAM HYDROCHLORIDE 1 MG: 1 INJECTION, SOLUTION INTRAMUSCULAR; INTRAVENOUS at 08:22

## 2020-07-16 RX ADMIN — FENTANYL CITRATE 25 MCG: 50 INJECTION INTRAMUSCULAR; INTRAVENOUS at 08:20

## 2020-07-16 RX ADMIN — FENTANYL CITRATE 25 MCG: 50 INJECTION INTRAMUSCULAR; INTRAVENOUS at 08:12

## 2020-07-16 RX ADMIN — FENTANYL CITRATE 25 MCG: 50 INJECTION INTRAMUSCULAR; INTRAVENOUS at 08:15

## 2020-07-16 RX ADMIN — MIDAZOLAM HYDROCHLORIDE 1 MG: 1 INJECTION, SOLUTION INTRAMUSCULAR; INTRAVENOUS at 08:12

## 2020-07-16 RX ADMIN — SODIUM CHLORIDE 25 ML/HR: 900 INJECTION, SOLUTION INTRAVENOUS at 08:03

## 2020-07-16 RX ADMIN — LIDOCAINE HYDROCHLORIDE 10 ML: 20 INJECTION, SOLUTION INFILTRATION; PERINEURAL at 08:22

## 2020-07-16 RX ADMIN — MIDAZOLAM HYDROCHLORIDE 1 MG: 1 INJECTION, SOLUTION INTRAMUSCULAR; INTRAVENOUS at 08:15

## 2020-07-16 NOTE — H&P
Interventional and Vascular Radiology History and Physical    Patient: Dari Current 45 y.o. male       Chief Complaint: No chief complaint on file. History of Present Illness: finished treatment     History:    Past Medical History:   Diagnosis Date    Adopted     GERD (gastroesophageal reflux disease)     Ill-defined condition     CHEMO-LAST ON 2/19/2020    Psoriasis     [facility] ([facility]     [facility]     Moderately differentiated [id number] adenocarcinoma of the rectum. Treated with neoadjuvant chemoradiation, resection, and adjuvant chemotherapy. Family History   Adopted: Yes   Problem Relation Age of Onset    Asthma Neg Hx     Cancer Neg Hx     Diabetes Neg Hx     Heart Disease Neg Hx     Hypertension Neg Hx     Stroke Neg Hx      Social History     Socioeconomic History    Marital status:      Spouse name: Not on file    Number of children: Not on file    Years of education: Not on file    Highest education level: Not on file   Occupational History    Not on file   Social Needs    Financial resource strain: Not on file    Food insecurity     Worry: Not on file     Inability: Not on file    Transportation needs     Medical: Not on file     Non-medical: Not on file   Tobacco Use    Smoking status: Never Smoker    Smokeless tobacco: Never Used   Substance and Sexual Activity    Alcohol use:  Yes     Alcohol/week: 6.0 standard drinks     Types: 6 Cans of beer per week     Comment: 6 BEERS WEEKLY    Drug use: No    Sexual activity: Not Currently   Lifestyle    Physical activity     Days per week: Not on file     Minutes per session: Not on file    Stress: Not on file   Relationships    Social connections     Talks on phone: Not on file     Gets together: Not on file     Attends Mos[employer] service: Not on file     Active member of club or organization: Not on file     Attends meetings of clubs or organizations: Not on file     Relationship status: Not on file    Intimate partner violence     Fear of current or ex partner: Not on file     Emotionally abused: Not on file     Physically abused: Not on file     Forced sexual activity: Not on file   Other Topics Concern    Not on file   Social History Narrative    Not on file       Allergies: No Known Allergies    Current Medications:  No current facility-administered medications for this encounter. Physical Exam:  Blood pressure 122/78, pulse (!) 57, temperature 98.7 °F (37.1 °C), resp. rate 12, height 5' 7\" (1.702 m), weight 67.1 kg (148 lb), SpO2 100 %. LUNG: clear to auscultation bilaterally, HEART: regular rate and rhythm, S1, S2 normal, no murmur, click, rub or gallop      Alerts:    Hospital Problems  Date Reviewed: 7/9/2020    None          Laboratory:    No results for input(s): HGB, HCT, WBC, PLT, INR, BUN, CREA, K, CRCLT, HGBEXT, HCTEXT, PLTEXT, INREXT in the last 72 hours. No lab exists for component: PTT, PT      Plan of Care/Planned Procedure:  Risks, benefits, and alternatives reviewed with patient and he agrees to proceed with the procedure. Conscious sedation will be performed with IV fentanyl and versed.  Plan is for chest port removal       Michael Brennan MD

## 2020-07-16 NOTE — PROGRESS NOTES
Discharge instructions reviewed with patient with good understanding. Patient signed hard copy of discharge instructions and copy given to patient upon leaving. Skin glue to right upper chest remains dry and intact. IV removed. Patient taken to car via wheelchair with nurse at side. Patient discharged stable.

## 2020-07-16 NOTE — PROGRESS NOTES
Pt arrives ambulatory to angio department accompanied by self for Angio procedure. All assessments completed and consent was reviewed. Education given was regarding procedure, conscious sedation, post-procedure care and  management/follow-up. Opportunity for questions was provided and all questions and concerns were addressed. Reviewed sedation plan to include medicating under conscious sedation using versed and fentanyl IV. Reviewed potential side effects with patient and possible interactions with patient's current meds Pt educated on moderate sedation and its purpose; medications and side effects described and patient verbalized understanding. Dr. Avery Perez in to talk with patient about port removal. Consent obtained and signed.

## 2020-08-12 ENCOUNTER — APPOINTMENT (OUTPATIENT)
Dept: INFUSION THERAPY | Age: 50
End: 2020-08-12

## 2020-08-26 RX ORDER — HYDROCHLOROTHIAZIDE 12.5 MG/1
TABLET ORAL
Qty: 90 TAB | Refills: 0 | Status: SHIPPED | OUTPATIENT
Start: 2020-08-26 | End: 2020-10-02

## 2020-10-02 RX ORDER — HYDROCHLOROTHIAZIDE 12.5 MG/1
TABLET ORAL
Qty: 90 TAB | Refills: 0 | Status: SHIPPED | OUTPATIENT
Start: 2020-10-02 | End: 2021-03-02

## 2020-10-05 ENCOUNTER — TELEPHONE (OUTPATIENT)
Dept: ONCOLOGY | Age: 50
End: 2020-10-05

## 2020-10-05 NOTE — TELEPHONE ENCOUNTER
Called patient and confirmed x2 identifiers   Informed patient [facility] appointments would be cancelled   Plan would to have labs done in [facility] prior to follow-up     Patient verbalized understanding   No new questions or concerns at this time

## 2020-10-07 ENCOUNTER — HOSPITAL ENCOUNTER (OUTPATIENT)
Dept: CT IMAGING | Age: 50
Discharge: HOME | End: 2020-10-07
Attending: INTERNAL MEDICINE
Payer: MEDICAID

## 2020-10-07 DIAGNOSIS — C20 RECTAL CANCER (HCC): ICD-10-CM

## 2020-10-07 PROCEDURE — 2709999900 HC NON-CHARGEABLE SUPPLY

## 2020-10-07 PROCEDURE — 74011000258 HC RX REV CODE- 258: Performed by: RADIOLOGY

## 2020-10-07 PROCEDURE — 74177 CT ABD & PELVIS W/CONTRAST: CPT

## 2020-10-07 PROCEDURE — 74011000636 HC RX REV CODE- 636: Performed by: RADIOLOGY

## 2020-10-07 RX ORDER — SODIUM CHLORIDE 0.9 % (FLUSH) 0.9 %
10 SYRINGE (ML) INJECTION
Status: COMPLETED | OUTPATIENT
Start: 2020-10-07 | End: 2020-10-07

## 2020-10-07 RX ADMIN — Medication 10 ML: at 14:28

## 2020-10-07 RX ADMIN — IOPAMIDOL 100 ML: 755 INJECTION, SOLUTION INTRAVENOUS at 14:28

## 2020-10-07 RX ADMIN — SODIUM CHLORIDE 100 ML: 900 INJECTION, SOLUTION INTRAVENOUS at 14:27

## 2020-10-08 LAB
ALBUMIN SERPL-MCNC: 4.4 G/DL (ref 3.8–4.9)
ALBUMIN/GLOB SERPL: 2.1 {RATIO} (ref 1.2–2.2)
ALP SERPL-CCNC: 52 IU/L (ref 39–117)
ALT SERPL-CCNC: 16 IU/L (ref 0–44)
AST SERPL-CCNC: 19 IU/L (ref 0–40)
BASOPHILS # BLD AUTO: 0 X10E3/UL (ref 0–0.2)
BASOPHILS NFR BLD AUTO: 1 %
BILIRUB SERPL-MCNC: 0.5 MG/DL (ref 0–1.2)
BUN SERPL-MCNC: 10 MG/DL (ref 6–24)
BUN/CREAT SERPL: 11 (ref 9–20)
CALCIUM SERPL-MCNC: 9.4 MG/DL (ref 8.7–10.2)
CEA SERPL-MCNC: 2.6 NG/ML (ref 0–4.7)
CHLORIDE SERPL-SCNC: 99 MMOL/L (ref 96–106)
CO2 SERPL-SCNC: 25 MMOL/L (ref 20–29)
CREAT SERPL-MCNC: 0.95 MG/DL (ref 0.76–1.27)
EOSINOPHIL # BLD AUTO: 0.2 X10E3/UL (ref 0–0.4)
EOSINOPHIL NFR BLD AUTO: 5 %
ERYTHROCYTE [DISTWIDTH] IN BLOOD BY AUTOMATED COUNT: 12.6 % (ref 11.6–15.4)
GLOBULIN SER CALC-MCNC: 2.1 G/DL (ref 1.5–4.5)
GLUCOSE SERPL-MCNC: 84 MG/DL (ref 65–99)
HCT VFR BLD AUTO: 40.4 % (ref 37.5–51)
HGB BLD-MCNC: 13.9 G/DL (ref 13–17.7)
IMM GRANULOCYTES # BLD AUTO: 0 X10E3/UL (ref 0–0.1)
IMM GRANULOCYTES NFR BLD AUTO: 0 %
LYMPHOCYTES # BLD AUTO: 0.6 X10E3/UL (ref 0.7–3.1)
LYMPHOCYTES NFR BLD AUTO: 16 %
MCH RBC QN AUTO: 33.8 PG (ref 26.6–33)
MCHC RBC AUTO-ENTMCNC: 34.4 G/DL (ref 31.5–35.7)
MCV RBC AUTO: 98 FL (ref 79–97)
MONOCYTES # BLD AUTO: 0.4 X10E3/UL (ref 0.1–0.9)
MONOCYTES NFR BLD AUTO: 12 %
NEUTROPHILS # BLD AUTO: 2.4 X10E3/UL (ref 1.4–7)
NEUTROPHILS NFR BLD AUTO: 66 %
PLATELET # BLD AUTO: 167 X10E3/UL (ref 150–450)
POTASSIUM SERPL-SCNC: 3.5 MMOL/L (ref 3.5–5.2)
PROT SERPL-MCNC: 6.5 G/DL (ref 6–8.5)
RBC # BLD AUTO: 4.11 X10E6/UL (ref 4.14–5.8)
SODIUM SERPL-SCNC: 137 MMOL/L (ref 134–144)
WBC # BLD AUTO: 3.6 X10E3/UL (ref 3.4–10.8)

## 2020-10-15 ENCOUNTER — OFFICE VISIT (OUTPATIENT)
Dept: ONCOLOGY | Age: 50
End: 2020-10-15
Payer: MEDICAID

## 2020-10-15 VITALS
HEART RATE: 89 BPM | HEIGHT: 67 IN | OXYGEN SATURATION: 96 % | DIASTOLIC BLOOD PRESSURE: 84 MMHG | BODY MASS INDEX: 22.6 KG/M2 | SYSTOLIC BLOOD PRESSURE: 151 MMHG | TEMPERATURE: 97.6 F | WEIGHT: 144 LBS

## 2020-10-15 DIAGNOSIS — T45.1X5A CHEMOTHERAPY-INDUCED NEUROPATHY (HCC): ICD-10-CM

## 2020-10-15 DIAGNOSIS — C20 RECTAL CANCER (HCC): Primary | ICD-10-CM

## 2020-10-15 DIAGNOSIS — Z08 ENCOUNTER FOR FOLLOW-UP SURVEILLANCE OF COLON CANCER: ICD-10-CM

## 2020-10-15 DIAGNOSIS — G62.0 CHEMOTHERAPY-INDUCED NEUROPATHY (HCC): ICD-10-CM

## 2020-10-15 DIAGNOSIS — Z85.038 ENCOUNTER FOR FOLLOW-UP SURVEILLANCE OF COLON CANCER: ICD-10-CM

## 2020-10-15 PROCEDURE — 99214 OFFICE O/P EST MOD 30 MIN: CPT | Performed by: INTERNAL MEDICINE

## 2020-10-15 NOTE — PROGRESS NOTES
Marilee Poon is a 45 y.o. male  Chief Complaint   Patient presents with    Follow-up     rectal cancer     1. Have you been to the ER, urgent care clinic since your last visit? Hospitalized since your last visit? No    2. Have you seen or consulted any other health care providers outside of the [address] [place] [address] since your last visit? Include any pap smears or colon screening.  no

## 2020-10-15 NOTE — PROGRESS NOTES
[facility] [place] [address] [address], [address]  W: [phone]  F: [phone]    Reason for Visit:   Hilton Briones is a 45 y.o. male who is seen in follow-up for Rectal cancer- likely stage III- SOMMER    Treatment History:   · 4/26/19: Colonoscopy- 6-7 cm size malignant appearing mass seen at 10 cm from anal verge. This was adenocarcinoma. 3 polyps removed- all were tubular adenomas  · Rectal Ultrasound 5/2/19: ulcerated infiltrative mass lesion was noted in rectum at 10 cm. The lesion measured about 5 cm X 4 cm- T2, 13 mm X 8 mm oblong lymph node was noted immediately adjacent to the mass lesion  · 5/2/19: CT CAP- No metastasis, liver cysts. CEA 3.6  · 5/10/19: PET CT showed rectal malignancy and 3 small perirectal anibal metastatic focir  · 5/28/19-7/8/29: Xeloda + RT   · 9/24/19: [facility]-  [id number], 2/6 positive nodes  · 10/14/19: CT CAP with no evidence of metastatic disease, liver cysts   · 10/23/19- 2/19/2020-: 8 cycles of  FOLFOX. Several delays and dose reductions due to cytopenias  · 12/11/19 CT AP: hypodensity in liver, otherwise KATHLEEN   · 1/6/20 MRI abd: no liver mets  · 3/30/2020: CT KATHLEEN  · 10/7/2020: CT KATHLEEN     History of Present Illness:   Patient is a 45 y.o. male seen for adenocarcinoma of the mid third of rectum    He had intermittent BRBPR x 1 year and then decided to have a colonoscopy. This led to above mentioned diagnosis. He received neoadjuvant xeloda+ RT followed by [facility]. Completed 8 cycles of FOLFOX, had a colostomy and is on surveillance. He comes today for follow-up and scan/lab review. He feels well. He has some lingering neuropathy in his feet that is not too bothersome to him. He has a colonoscopy scheduled next week. He denies nausea/vomiting, diarrhea/constipation. No other complaints today. He uses 7 drinks a week.     Adopted    Past Medical History:   Diagnosis Date    Adopted     GERD (gastroesophageal reflux disease)     Ill-defined condition     CHEMO-LAST ON 2/19/2020    Psoriasis     Psoriatic arthritis ([facility] [address]     Rectal cancer (HCC)     Moderately differentiated [id number] adenocarcinoma of the rectum. Treated with neoadjuvant chemoradiation, resection, and adjuvant chemotherapy. Past Surgical History:   Procedure Laterality Date    COLONOSCOPY Left 4/26/2019    COLONOSCOPY performed by Santa Becerril MD at [facility] N/A 5/2/2019    SIGMOIDOSCOPY FLEXIBLE performed by Dontae Saldaña MD at [address] HX GI      EGD    HX HEENT      WISDOM TEETH    HX ORTHOPAEDIC Right     ACL REPLACEMENT    HX OTHER SURGICAL  09/24/2019    Hand-assisted laparoscopic low anterior resection, mobilization of the splenic flexure, coloproctostomy, and creation of loop ileostomy; Dr. Barrington Jeong.  HX UROLOGICAL  12/2019    CYSTOSCOPY FOR REMOVAL OF KIDNEY STONES WITH HOLMIUM LAZER    HX UROLOGICAL  09/24/2019    Cystoscopy and placement of bilateral temporary ureteral catheters; Sylvie Daly MD.    HX VASCULAR ACCESS      INSERTION OF PORT-A-CATH RT SIDE OF CHEST. FOR CHEMO-LAST ON 2/19/2020    IR INSERT TUNL CVC W PORT OVER 5 YEARS  10/15/2019    IR REMOVE TUNL CVAD W/O PORT / PUMP  7/16/2020      Social History     Tobacco Use    Smoking status: Never Smoker    Smokeless tobacco: Never Used   Substance Use Topics    Alcohol use: Yes     Alcohol/week: 6.0 standard drinks     Types: 6 Cans of beer per week     Comment: 6 BEERS WEEKLY      Family History   Adopted: Yes   Problem Relation Age of Onset    Asthma Neg Hx     Cancer Neg Hx     Diabetes Neg Hx     Heart Disease Neg Hx     Hypertension Neg Hx     Stroke Neg Hx      Current Outpatient Medications   Medication Sig    hydroCHLOROthiazide (HYDRODIURIL) 12.5 mg tablet TAKE 1 TABLET BY MOUTH EVERY DAY    sulfaSALAzine EC (AZULFIDINE) 500 mg EC tablet Take 2 Tabs by mouth two (2) times a day for 30 days.     fluocinonide (VANOS) 0.1 % topical cream Apply  to affected area daily.  tadalafil (CIALIS) 5 mg tablet Take 5 mg by mouth.  oxybutynin (DITROPAN) 5 mg tablet Take 5 mg by mouth two (2) times a day.  lidocaine-prilocaine (EMLA) topical cream Apply  to affected area as needed for Pain.  ondansetron (ZOFRAN ODT) 8 mg disintegrating tablet Take 1 Tab by mouth every eight (8) hours as needed for Nausea.  diphenoxylate-atropine (LOMOTIL) 2.5-0.025 mg per tablet Take 1 Tab by mouth four (4) times daily as needed for Diarrhea.  triamcinolone acetonide (KENALOG) 0.1 % ointment Apply  to affected area two (2) times daily as needed for Skin Irritation. use thin layer     No current facility-administered medications for this visit. No Known Allergies     Review of Systems: A complete review of systems was obtained, negative except as described above. Physical Exam:     Constitutional: Appears well-developed and well-nourished in no apparent distress   Mental status: Alert and awake, Oriented to person/place/time, Able to follow commands  Eyes: EOM normal, Sclera normal, No visible ocular discharge  HENT: Normocephalic, atraumatic; Mouth/Throat: Moist mucous membranes, External Ears normal  Neck: No visualized mass  Skin: No significant exanthematous lesions or discoloration noted on facial skin  Psychiatric: Normal affect, normal judgment/insight. No hallucinations     Results:     Lab Results   Component Value Date/Time    WBC 3.6 10/07/2020 02:47 PM    HGB 13.9 10/07/2020 02:47 PM    HCT 40.4 10/07/2020 02:47 PM    PLATELET 087 88/41/4295 02:47 PM    MCV 98 (H) 10/07/2020 02:47 PM    ABS.  NEUTROPHILS 2.4 10/07/2020 02:47 PM     Lab Results   Component Value Date/Time    Sodium 137 10/07/2020 02:47 PM    Potassium 3.5 10/07/2020 02:47 PM    Chloride 99 10/07/2020 02:47 PM    CO2 25 10/07/2020 02:47 PM    Glucose 84 10/07/2020 02:47 PM    BUN 10 10/07/2020 02:47 PM    Creatinine 0.95 10/07/2020 02:47 PM    GFR est  10/07/2020 02:47 PM    GFR est non-AA 92 10/07/2020 02:47 PM    Calcium 9.4 10/07/2020 02:47 PM     Lab Results   Component Value Date/Time    Bilirubin, total 0.5 10/07/2020 02:47 PM    ALT (SGPT) 16 10/07/2020 02:47 PM    Alk. phosphatase 52 10/07/2020 02:47 PM    Protein, total 6.5 10/07/2020 02:47 PM    Albumin 4.4 10/07/2020 02:47 PM    Globulin 2.8 05/13/2020 08:32 AM     Records reviewed and summarized above. Pathology report(s) reviewed  FINAL PATHOLOGIC DIAGNOSIS   1. Rectum and sigmoid colon, laparoscopic low anterior resection:   SPECIMEN   Procedure: Low anterior resection   Macroscopic Intactness of Mesorectum: Complete   TUMOR   Tumor Site: Rectum   Histologic Type: Adenocarcinoma   Histologic Grade: G2: Moderately differentiated   Tumor Size: 2.6 cm   Tumor Deposits: Present   Number of Deposits: 1   Tumor Extension: Tumor invades through the muscularis propria into pericolorectal tissue   Macroscopic Tumor Perforation: Not identified   Lymphovascular Invasion: Not identified   Perineural Invasion: Not identified   Tumor Budding: Number of tumor buds in one hotspot field: 5   Tumor Budding Score: Intermediate score (5-9)   Treatment Effect: Present - Residual cancer with evident tumor regression, but more than single cells or   rare small groups of cancer cells (partial response, score 2)   MARGINS   Margins: All margins are uninvolved by invasive carcinoma, high- grade dysplasia, intramucosal   adenocarcinoma, and adenoma   Margins Examined: Proximal, Distal, Radial or Mesenteric   Distance of Invasive Carcinoma from Closest Margin: 25 mm   Closest Margin: Radial or Mesenteric   LYMPH NODES   Number of Lymph Nodes Involved: 2   Number of Lymph Nodes Examined: 16   PATHOLOGIC STAGE CLASSIFICATION (pTNM, AJCC 8th Edition)   Primary Tumor (pT): pT3   Regional Lymph Nodes (pN): pN1b   2. Large bowel, donuts:   Fragments of large bowel wall, negative for microscopic pathologic abnormality. Radiology report(s) reviewed above. CT CAP 10/14/19: IMPRESSION:  1. There are scattered small hepatic cysts without definite evidence for  metastatic disease. 2. Otherwise negative CT chest abdomen pelvis    CT CAP 12/11/19: IMPRESSION:  1. Right hydroureteronephrosis due to an 8 mm calculus at the ureterovesicular  junction. 2. Vague areas of heterogeneous enhancement and hypodensity in the liver, raises  concern for metastases, though dedicated 3 phase liver CT or liver protocol MRI  may be considered as follow-up. Numerous small hypodensities in the liver likely  cysts, unchanged. 3. Rectal anastomosis and presacral edema, treatment related. Right lower  quadrant ileostomy. No bowel obstruction    MRI abd 1/6/20: no hepatic mets     CT abdomen 3/30/2020  IMPRESSION  IMPRESSION:     1. Mild dilatation, thinning of the wall, and poor enhancement of the bowel just  proximal to the loop ileostomy closure nearly to the distal anastomosis. Clinical significance is uncertain. Ischemic changes should be considered. 2. Small amount of postoperative fluid in the right paracolic gutter without  drainable fluid collection    CT AP 10/7/20: IMPRESSION:  No evidence of recurrent or metastatic disease.     Assessment:   1) Rectal adenocarcinoma- mid third- SOMMER  Genetic testing: negative     T2N1M0 disease- Clinical stage III  S/P John Adj chemoRT followed by [facility] on 9/24/19  Pathology showed [id number] residual disease- stage IIIB  Had significant residual disease with practically no treatment effects  Adjuvant FOLFOX x 8 until 2/2020- several delays and reductions due to cytopenias  CT 10/7/20 KATHLEEN  ROS and labs unremarkable     Discussed surveillance    H NP and labs every 3-6 months for 2 years then every 6 months for 5 years  CT every 6-12 months for 5 years  Colonoscopy at 1 year- to be done by Dr. 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 Karon Farrell which is scheduled next week  · Refer to [facility] genetics     RTC in 3 months     I appreciate the opportunity to participate in Mr. Deysi gardner.         Signed By: Naomi Rincon MD

## 2020-10-19 ENCOUNTER — DOCUMENTATION ONLY (OUTPATIENT)
Dept: ONCOLOGY | Age: 50
End: 2020-10-19

## 2020-10-19 NOTE — PROGRESS NOTES
Call [facility] genetics for referral , spoke with Nathaniel Vazquez , will call back to office and to patient to schedule appt for pt

## 2020-10-21 ENCOUNTER — HOSPITAL ENCOUNTER (OUTPATIENT)
Dept: PREADMISSION TESTING | Age: 50
Discharge: HOME | End: 2020-10-21
Payer: MEDICAID

## 2020-10-21 ENCOUNTER — TRANSCRIBE ORDER (OUTPATIENT)
Dept: REGISTRATION | Age: 50
End: 2020-10-21

## 2020-10-21 DIAGNOSIS — Z01.812 PRE-PROCEDURE LAB EXAM: Primary | ICD-10-CM

## 2020-10-21 DIAGNOSIS — Z01.812 PRE-PROCEDURE LAB EXAM: ICD-10-CM

## 2020-10-21 PROCEDURE — 87635 SARS-COV-2 COVID-19 AMP PRB: CPT

## 2020-10-22 ENCOUNTER — ANESTHESIA (OUTPATIENT)
Dept: ENDOSCOPY | Age: 50
End: 2020-10-22
Payer: MEDICAID

## 2020-10-22 ENCOUNTER — ANESTHESIA EVENT (OUTPATIENT)
Dept: ENDOSCOPY | Age: 50
End: 2020-10-22
Payer: MEDICAID

## 2020-10-22 ENCOUNTER — HOSPITAL ENCOUNTER (OUTPATIENT)
Age: 50
Setting detail: OUTPATIENT SURGERY
Discharge: HOME | End: 2020-10-22
Attending: SPECIALIST | Admitting: SPECIALIST
Payer: MEDICAID

## 2020-10-22 VITALS
WEIGHT: 144 LBS | RESPIRATION RATE: 20 BRPM | DIASTOLIC BLOOD PRESSURE: 90 MMHG | BODY MASS INDEX: 22.6 KG/M2 | HEIGHT: 67 IN | OXYGEN SATURATION: 100 % | HEART RATE: 61 BPM | TEMPERATURE: 97.7 F | SYSTOLIC BLOOD PRESSURE: 129 MMHG

## 2020-10-22 LAB — SARS-COV-2, COV2NT: NOT DETECTED

## 2020-10-22 PROCEDURE — 74011000250 HC RX REV CODE- 250: Performed by: NURSE ANESTHETIST, CERTIFIED REGISTERED

## 2020-10-22 PROCEDURE — 74011250636 HC RX REV CODE- 250/636: Performed by: NURSE ANESTHETIST, CERTIFIED REGISTERED

## 2020-10-22 PROCEDURE — 74011250637 HC RX REV CODE- 250/637: Performed by: SPECIALIST

## 2020-10-22 PROCEDURE — 76060000031 HC ANESTHESIA FIRST 0.5 HR: Performed by: SPECIALIST

## 2020-10-22 PROCEDURE — 76040000019: Performed by: SPECIALIST

## 2020-10-22 RX ORDER — SODIUM CHLORIDE 9 MG/ML
50 INJECTION, SOLUTION INTRAVENOUS CONTINUOUS
Status: DISCONTINUED | OUTPATIENT
Start: 2020-10-22 | End: 2020-10-22 | Stop reason: HOSPADM

## 2020-10-22 RX ORDER — EPINEPHRINE 0.1 MG/ML
1 INJECTION INTRACARDIAC; INTRAVENOUS
Status: DISCONTINUED | OUTPATIENT
Start: 2020-10-22 | End: 2020-10-22 | Stop reason: HOSPADM

## 2020-10-22 RX ORDER — SODIUM CHLORIDE 0.9 % (FLUSH) 0.9 %
5-40 SYRINGE (ML) INJECTION EVERY 8 HOURS
Status: DISCONTINUED | OUTPATIENT
Start: 2020-10-22 | End: 2020-10-22 | Stop reason: HOSPADM

## 2020-10-22 RX ORDER — SODIUM CHLORIDE 0.9 % (FLUSH) 0.9 %
5-40 SYRINGE (ML) INJECTION AS NEEDED
Status: DISCONTINUED | OUTPATIENT
Start: 2020-10-22 | End: 2020-10-22 | Stop reason: HOSPADM

## 2020-10-22 RX ORDER — ATROPINE SULFATE 0.1 MG/ML
0.5 INJECTION INTRAVENOUS
Status: DISCONTINUED | OUTPATIENT
Start: 2020-10-22 | End: 2020-10-22 | Stop reason: HOSPADM

## 2020-10-22 RX ORDER — NALOXONE HYDROCHLORIDE 0.4 MG/ML
0.4 INJECTION, SOLUTION INTRAMUSCULAR; INTRAVENOUS; SUBCUTANEOUS
Status: DISCONTINUED | OUTPATIENT
Start: 2020-10-22 | End: 2020-10-22 | Stop reason: HOSPADM

## 2020-10-22 RX ORDER — SODIUM CHLORIDE 9 MG/ML
INJECTION, SOLUTION INTRAVENOUS
Status: DISCONTINUED | OUTPATIENT
Start: 2020-10-22 | End: 2020-10-22 | Stop reason: HOSPADM

## 2020-10-22 RX ORDER — PROPOFOL 10 MG/ML
INJECTION, EMULSION INTRAVENOUS AS NEEDED
Status: DISCONTINUED | OUTPATIENT
Start: 2020-10-22 | End: 2020-10-22 | Stop reason: HOSPADM

## 2020-10-22 RX ORDER — DEXTROMETHORPHAN/PSEUDOEPHED 2.5-7.5/.8
1.2 DROPS ORAL
Status: DISCONTINUED | OUTPATIENT
Start: 2020-10-22 | End: 2020-10-22 | Stop reason: HOSPADM

## 2020-10-22 RX ORDER — FLUMAZENIL 0.1 MG/ML
0.2 INJECTION INTRAVENOUS
Status: DISCONTINUED | OUTPATIENT
Start: 2020-10-22 | End: 2020-10-22 | Stop reason: HOSPADM

## 2020-10-22 RX ORDER — LIDOCAINE HYDROCHLORIDE 20 MG/ML
INJECTION, SOLUTION EPIDURAL; INFILTRATION; INTRACAUDAL; PERINEURAL AS NEEDED
Status: DISCONTINUED | OUTPATIENT
Start: 2020-10-22 | End: 2020-10-22 | Stop reason: HOSPADM

## 2020-10-22 RX ADMIN — LIDOCAINE HYDROCHLORIDE 50 MG: 20 INJECTION, SOLUTION EPIDURAL; INFILTRATION; INTRACAUDAL; PERINEURAL at 12:50

## 2020-10-22 RX ADMIN — PROPOFOL 50 MG: 10 INJECTION, EMULSION INTRAVENOUS at 12:51

## 2020-10-22 RX ADMIN — SODIUM CHLORIDE: 9 INJECTION, SOLUTION INTRAVENOUS at 12:46

## 2020-10-22 RX ADMIN — PROPOFOL 20 MG: 10 INJECTION, EMULSION INTRAVENOUS at 13:04

## 2020-10-22 RX ADMIN — PROPOFOL 30 MG: 10 INJECTION, EMULSION INTRAVENOUS at 13:02

## 2020-10-22 RX ADMIN — PROPOFOL 20 MG: 10 INJECTION, EMULSION INTRAVENOUS at 13:06

## 2020-10-22 RX ADMIN — PROPOFOL 20 MG: 10 INJECTION, EMULSION INTRAVENOUS at 12:59

## 2020-10-22 RX ADMIN — PROPOFOL 30 MG: 10 INJECTION, EMULSION INTRAVENOUS at 12:57

## 2020-10-22 RX ADMIN — PROPOFOL 30 MG: 10 INJECTION, EMULSION INTRAVENOUS at 12:53

## 2020-10-22 NOTE — DISCHARGE INSTRUCTIONS
Anjelica Holliday  [id number]  1970    COLON DISCHARGE INSTRUCTIONS  Discomfort:  Redness at IV site- apply warm compress to area; if redness or soreness persist- contact your physician  There may be a slight amount of blood passed from the rectum  Gaseous discomfort- walking, belching will help relieve any discomfort    DIET:   Regular diet. - however -  remember your colon is empty and a heavy meal will produce gas. Avoid these foods:  vegetables, fried / greasy foods, carbonated drinks for today. You may not drink alcoholic beverages for at least 12 hours    MEDICATIONS:   Regarding Aspirin or Nonsteroidal medications, please see below. ACTIVITY:  You may resume your normal daily activities it is recommended that you spend the remainder of the day resting -  avoid any strenuous activity. You may not operate a vehicle for 12 hours  You may not engage in an occupation involving machinery or appliances for rest of today  Avoid making any critical decisions for at least 24 hour    CALL M.D. ANY SIGN OF:  Increasing pain, nausea, vomiting  Abdominal distension (swelling)  New increased bleeding (oral or rectal)  Fever (chills)  Pain in chest area  Bloody discharge from nose or mouth  Shortness of breath    You may  take any Advil, Aspirin, Ibuprofen, Motrin, Aleve, or  Tylenol as needed for pain. Post procedure diagnosis: Normal      Follow-up Instructions:   Call Dr. Carbajal Heads  Results of procedure / biopsy in 10 days if biopsies were done  Telephone #  [phone]      DISCHARGE SUMMARY from Nurse    The following personal items collected during your admission are returned to you:   Dental Appliance: Dental Appliances: None  Vision: Visual Aid: None  Hearing Aid:    Jewelry:    Clothing:    Other Valuables:    Valuables sent to safe:      Learning About Coronavirus (COVID-19)  Coronavirus (COVID-19): Overview  What is coronavirus (VRXCL-24)? The coronavirus disease (COVID-19) is caused by a virus.  It is an illness that was first found in [place], [place], in December 2019. It has since spread worldwide. The virus can cause fever, cough, and trouble breathing. In severe cases, it can cause pneumonia and make it hard to breathe without help. It can cause death. Coronaviruses are a large group of viruses. They cause the common cold. They also cause more serious illnesses like Middle East respiratory syndrome (MERS) and severe acute respiratory syndrome (SARS). COVID-19 is caused by a novel coronavirus. That means it's a new type that has not been seen in people before. This virus spreads person-to-person through droplets from coughing and sneezing. It can also spread when you are close to someone who is infected. And it can spread when you touch something that has the virus on it, such as a doorknob or a tabletop. What can you do to protect yourself from coronavirus (COVID-19)? The best way to protect yourself from getting sick is to:  · Avoid areas where there is an outbreak. · Avoid contact with people who may be infected. · Wash your hands often with soap or alcohol-based hand sanitizers. · Avoid crowds and try to stay at least 6 feet away from other people. · Wash your hands often, especially after you cough or sneeze. Use soap and water, and scrub for at least 20 seconds. If soap and water aren't available, use an alcohol-based hand . · Avoid touching your mouth, nose, and eyes. What can you do to avoid spreading the virus to others? To help avoid spreading the virus to others:  · Cover your mouth with a tissue when you cough or sneeze. Then throw the tissue in the trash. · Use a disinfectant to clean things that you touch often. · Stay home if you are sick or have been exposed to the virus. Don't go to school, work, or public areas. And don't use public transportation. · If you are sick:  ? Leave your home only if you need to get medical care.  But call the doctor's office first so they know you're coming. And wear a face mask, if you have one.  ? If you have a face mask, wear it whenever you're around other people. It can help stop the spread of the virus when you cough or sneeze. ? Clean and disinfect your home every day. Use household  and disinfectant wipes or sprays. Take special care to clean things that you grab with your hands. These include doorknobs, remote controls, phones, and handles on your refrigerator and microwave. And don't forget countertops, tabletops, bathrooms, and computer keyboards. When to call for help  Call 911 anytime you think you may need emergency care. For example, call if:  · You have severe trouble breathing. (You can't talk at all.)  · You have constant chest pain or pressure. · You are severely dizzy or lightheaded. · You are confused or can't think clearly. · Your face and lips have a blue color. · You pass out (lose consciousness) or are very hard to wake up. Call your doctor now if you develop symptoms such as:  · Shortness of breath. · Fever. · Cough. If you need to get care, call ahead to the doctor's office for instructions before you go. Make sure you wear a face mask, if you have one, to prevent exposing other people to the virus. Where can you get the latest information? The following health organizations are tracking and studying this virus. Their websites contain the most up-to-date information. [employer] also learn what to do if you think you may have been exposed to the virus. · U.S. Centers for Disease Control and Prevention (CDC): The CDC provides updated news about the disease and travel advice. The website also tells you how to prevent the spread of infection. www.cdc.gov  · World Health Organization [facility]): WHO offers information about the virus outbreaks. WHO also has travel advice. www.who.int  Current as of: April 1, 2020               Content Version: 12.4  © 7484-8082 [employer].    Care instructions adapted under license by your healthcare professional. If you have questions about a medical condition or this instruction, always ask your healthcare professional. [place] [address] any warranty or liability for your use of this information.

## 2020-10-22 NOTE — ANESTHESIA POSTPROCEDURE EVALUATION
Post-Anesthesia Evaluation and Assessment    Patient: Meek Portillo MRN: [id number]  SSN: [id number]    YOB: 1970  Age: 45 y.o. Sex: male      I have evaluated the patient and they are stable and ready for discharge from the PACU. Cardiovascular Function/Vital Signs  Visit Vitals  BP (!) 129/90   Pulse 61   Temp 36.5 °C (97.7 °F)   Resp 20   Ht 5' 7\" (1.702 m)   Wt 65.3 kg (144 lb)   SpO2 100%   BMI 22.55 kg/m²       Patient is status post MAC anesthesia for Procedure(s):  COLONOSCOPY :-.    Nausea/Vomiting: None    Postoperative hydration reviewed and adequate. Pain:  Pain Scale 1: Numeric (0 - 10) (10/22/20 1332)  Pain Intensity 1: 0 (10/22/20 1332)   Managed    Neurological Status: At baseline    Mental Status, Level of Consciousness: Alert and  oriented to person, place, and time    Pulmonary Status:   O2 Device: Room air (10/22/20 1332)   Adequate oxygenation and airway patent    Complications related to anesthesia: None    Post-anesthesia assessment completed.  No concerns    Signed By: Samy Hendrix MD     October 22, 2020              Procedure(s):  COLONOSCOPY :-.    MAC    <BSHSIANPOST>    INITIAL Post-op Vital signs:   Vitals Value Taken Time   /90 10/22/2020  1:32 PM   Temp 36.5 °C (97.7 °F) 10/22/2020  1:15 PM   Pulse 61 10/22/2020  1:32 PM   Resp 20 10/22/2020  1:32 PM   SpO2 100 % 10/22/2020  1:32 PM

## 2020-10-22 NOTE — ANESTHESIA PREPROCEDURE EVALUATION
Relevant Problems   No relevant active problems       Anesthetic History   No history of anesthetic complications            Review of Systems / Medical History  Patient summary reviewed, nursing notes reviewed and pertinent labs reviewed    Pulmonary  Within defined limits                 Neuro/Psych   Within defined limits           Cardiovascular                       GI/Hepatic/Renal     GERD           Endo/Other        Arthritis     Other Findings   Comments: Hx rectal cancer and colectomy           Physical Exam    Airway  Mallampati: I  TM Distance: > 6 cm  Neck ROM: normal range of motion   Mouth opening: Normal     Cardiovascular    Rhythm: regular  Rate: normal         Dental  No notable dental hx       Pulmonary  Breath sounds clear to auscultation               Abdominal         Other Findings            Anesthetic Plan    ASA: 2  Anesthesia type: MAC          Induction: Intravenous  Anesthetic plan and risks discussed with: Patient

## 2020-10-22 NOTE — ROUTINE PROCESS
Marilu Kang 1970 
[id number] Situation: 
Verbal report received from: Darrell Brewster Procedure: Procedure(s): 
COLONOSCOPY :- 
 
Background: 
 
Preoperative diagnosis: MALIGNANT TUMOR OF RECTUM Postoperative diagnosis: Normal 
 
:  Dr. Rajan Garber Assistant(s): Endoscopy Technician-1: Dixon Ortiz Endoscopy RN-1: Jan Bills RN Specimen-no H. Pylori  -no Assessment: 
Intra-procedure medications Anesthesia gave intra-procedure sedation and medications, see anesthesia flow sheet Intravenous fluids: NS@ Daxa Dibbles Vital signs stable Abdominal assessment: round and soft Recommendation: 
Discharge patient per MD order Family -yes Permission to share finding with family -yes

## 2020-10-22 NOTE — PROGRESS NOTES

## 2020-10-22 NOTE — H&P
Colonoscopy History and Physical      The patient was seen and examined. Date of last colonoscopy: 2019, Polyps  Yes      The airway was assessed and documented. The problem list, past medical history, and medications were reviewed. Patient Active Problem List   Diagnosis Code    ACL tear S83.519A    Psoriatic arthritis ([facility] L40.50    Psoriasis L40.9    Gastrointestinal hemorrhage K92.2    Rectal cancer (HCC) C20    Thrombocytopenia (HCC) D69.6    Neutropenia ([facility] D70.9    Port-A-Cath in place Z95.828    Encounter for antineoplastic chemotherapy Z51.11    Ileostomy present ([facility] Z93.2     Social History     Socioeconomic History    Marital status:      Spouse name: Not on file    Number of children: Not on file    Years of education: Not on file    Highest education level: Not on file   Occupational History    Not on file   Social Needs    Financial resource strain: Not on file    Food insecurity     Worry: Not on file     Inability: Not on file    Transportation needs     Medical: Not on file     Non-medical: Not on file   Tobacco Use    Smoking status: Never Smoker    Smokeless tobacco: Never Used   Substance and Sexual Activity    Alcohol use:  Yes     Alcohol/week: 6.0 standard drinks     Types: 6 Cans of beer per week     Comment: 6 BEERS WEEKLY    Drug use: No    Sexual activity: Not Currently   Lifestyle    Physical activity     Days per week: Not on file     Minutes per session: Not on file    Stress: Not on file   Relationships    Social connections     Talks on phone: Not on file     Gets together: Not on file     Attends Restorationist service: Not on file     Active member of club or organization: Not on file     Attends meetings of clubs or organizations: Not on file     Relationship status: Not on file    Intimate partner violence     Fear of current or ex partner: Not on file     Emotionally abused: Not on file     Physically abused: Not on file     Forced sexual activity: Not on file   Other Topics Concern    Not on file   Social History Narrative    Not on file     Past Medical History:   Diagnosis Date    Adopted     GERD (gastroesophageal reflux disease)     Ill-defined condition     CHEMO-LAST ON 2/19/2020    Psoriasis     Psoriatic arthritis ([facility]     Rectal cancer ([facility]     Moderately differentiated [id number] adenocarcinoma of the rectum. Treated with neoadjuvant chemoradiation, resection, and adjuvant chemotherapy. Prior to Admission Medications   Prescriptions Last Dose Informant Patient Reported? Taking?   fluocinonide (VANOS) 0.1 % topical cream   No No   Sig: Apply  to affected area daily. hydroCHLOROthiazide (HYDRODIURIL) 12.5 mg tablet 10/21/2020 at Unknown time  No Yes   Sig: TAKE 1 TABLET BY MOUTH EVERY DAY   lidocaine-prilocaine (EMLA) topical cream   No No   Sig: Apply  to affected area as needed for Pain. oxybutynin (DITROPAN) 5 mg tablet 10/21/2020 at Unknown time  Yes Yes   Sig: Take 5 mg by mouth two (2) times a day. sulfaSALAzine EC (AZULFIDINE) 500 mg EC tablet 10/21/2020 at Unknown time  No Yes   Sig: Take 2 Tabs by mouth two (2) times a day for 30 days. tadalafil (CIALIS) 5 mg tablet 10/20/2020 at Unknown time  Yes Yes   Sig: Take 5 mg by mouth.   triamcinolone acetonide (KENALOG) 0.1 % ointment   Yes No   Sig: Apply  to affected area two (2) times daily as needed for Skin Irritation. use thin layer      Facility-Administered Medications: None       The patient was seen and examined in the endoscopy suite. The airway was assessed and documented. The problem list and medications were reviewed. Chief complaint, history of present illness, and review of systems and Past medical History are positive for: history of colon cancer    The heart, lungs and mental status were satisfactory for the administration of sedation and for the procedure.      I discussed with the patient the objectives, risks, consequences and alternatives to the procedure. The patient was counseled at length about the risks of brigitte Covid-19 in the karen-operative and post-operative states including the recovery window of their procedure. The patient was made aware that brigitte Covid-19 after a surgical procedure may worsen their prognosis for recovering from the virus and lend to a higher morbidity and or mortality risk. The patient was given the options of postponing their procedure. All of the risks, benefits, and alternatives were discussed. The patient does  wish to proceed with the procedure.     Plan: Endoscopic procedure with sedation     Cathryn Hill MD   10/22/2020  12:47 PM

## 2020-10-22 NOTE — PROCEDURES
[address], [address] [place] [address]                 Colonoscopy Procedure Note    Indications:   See Preoperative Diagnosis above  Referring Physician: Camryn Fournier MD  Anesthesia/Sedation: MAC anesthesia Propofol  Endoscopist:  Dr. Leonila Patterson  Assistant:  Endoscopy Technician-1: Fady PARISH  Endoscopy RN-1: Toshia Fine RN  Preoperative diagnosis: MALIGNANT TUMOR OF RECTUM  Postoperative diagnosis: Normal    Procedure in Detail:  Informed consent was obtained for the procedure, including sedation. Risks of perforation, hemorrhage, adverse drug reaction, and aspiration were discussed. The patient was placed in the left lateral decubitus position. Based on the pre-procedure assessment, including review of the patient's medical history, medications, allergies, and review of systems, he had been deemed to be an appropriate candidate for moderate sedation; he was therefore sedated with the medications listed above. The patient was monitored continuously with ECG tracing, pulse oximetry, blood pressure monitoring, and direct observations. A rectal examination was performed. The [id number] was inserted into the rectum and advanced under direct vision to the cecum, which was identified by the ileocecal valve and appendiceal orifice. The quality of the colonic preparation was fair. A careful inspection was made as the colonoscope was withdrawn, including a retroflexed view of the rectum; findings and interventions are described below. Appropriate photodocumentation was obtained. Findings:  Rectum: normal appearing anastomosis, no evidence of malignancy. Sigmoid: normal  Descending Colon: normal  Transverse Colon: normal  Ascending Colon: normal  Cecum: normal      Specimens:    none    EBL: None    Complications: None; patient tolerated the procedure well.     Recommendations:      - Colon in 2 years        Signed By: Leonila Patterson MD October 22, 2020

## 2020-10-28 ENCOUNTER — OFFICE VISIT (OUTPATIENT)
Dept: RHEUMATOLOGY | Age: 50
End: 2020-10-28
Payer: MEDICAID

## 2020-10-28 ENCOUNTER — APPOINTMENT (OUTPATIENT)
Dept: INFUSION THERAPY | Age: 50
End: 2020-10-28

## 2020-10-28 VITALS
TEMPERATURE: 98.3 F | DIASTOLIC BLOOD PRESSURE: 86 MMHG | HEIGHT: 67 IN | SYSTOLIC BLOOD PRESSURE: 124 MMHG | BODY MASS INDEX: 22.6 KG/M2 | HEART RATE: 74 BPM | RESPIRATION RATE: 18 BRPM | WEIGHT: 144 LBS | OXYGEN SATURATION: 97 %

## 2020-10-28 DIAGNOSIS — Z79.899 ONGOING USE OF POSSIBLY TOXIC MEDICATION: ICD-10-CM

## 2020-10-28 DIAGNOSIS — L40.9 PSORIASIS: ICD-10-CM

## 2020-10-28 DIAGNOSIS — L40.50 PSORIATIC ARTHRITIS (HCC): Primary | ICD-10-CM

## 2020-10-28 DIAGNOSIS — L40.0 PLAQUE PSORIASIS: ICD-10-CM

## 2020-10-28 PROCEDURE — 99215 OFFICE O/P EST HI 40 MIN: CPT | Performed by: INTERNAL MEDICINE

## 2020-10-28 RX ORDER — APREMILAST 30 MG
1 KIT ORAL SEE ADMIN INSTRUCTIONS
Qty: 55 TAB | Refills: 0 | Status: SHIPPED | OUTPATIENT
Start: 2020-10-28 | End: 2020-11-16

## 2020-10-28 RX ORDER — SULFASALAZINE 500 MG/1
1000 TABLET, DELAYED RELEASE ORAL 2 TIMES DAILY
Qty: 360 TAB | Refills: 1 | Status: SHIPPED | OUTPATIENT
Start: 2020-10-28 | End: 2021-02-15 | Stop reason: SDUPTHER

## 2020-10-28 RX ORDER — APREMILAST 30 MG
1 KIT ORAL DAILY
Qty: 27 TAB | Refills: 0 | Status: SHIPPED | OUTPATIENT
Start: 2020-10-28 | End: 2020-10-28 | Stop reason: SDUPTHER

## 2020-10-28 RX ORDER — APREMILAST 30 MG/1
30 TABLET, FILM COATED ORAL 2 TIMES DAILY
Qty: 60 TAB | Refills: 11 | Status: SHIPPED | OUTPATIENT
Start: 2020-10-28 | End: 2020-11-16 | Stop reason: SDUPTHER

## 2020-10-28 NOTE — PROGRESS NOTES
Chief Complaint   Patient presents with    Arthritis     1. Have you been to the ER, urgent care clinic since your last visit? Hospitalized since your last visit? No    2. Have you seen or consulted any other health care providers outside of the [address] [place] [address] since your last visit? Include any pap smears or colon screening.  No

## 2020-10-28 NOTE — PROGRESS NOTES
REASON FOR VISIT    Lanie Slater is a 45 y.o. male who was seen in-office on 10/28/2020. HISTORY OF DISEASE: Psoriatic Arthritis    Year of diagnosis: 2015  First visit with me: 10/2020  Cumulative disease manifestations:  Dactylitis, psoriasis, back pain  Positive serologies/labs:  Negative serologies/labs: HLA B27  Other co-morbidities: excessive alcohol use; rectal cancer on chemo + RT + surgery  Relapses:      Past treatment history:  Prednisone:   Non-biologic DMARD: Methotrexate (2015-4/2019), Sulfasalazine 2000 mg oral daily (6/2020-present)  Biologic:   Other:     HISTORY OF PRESENT ILLNESS     The patient is doing well, though psoriasis plaques worsening across shins and knees. Applying fluocinonide cream with little benefit. Tolerating sulfasalazine well, taking 1000mg bid without GI upset. Still drinking alcohol regularly. No extended morning stiffness or recent flare of prior dactylitis. 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 No inflammatory back pain. Says colonoscopy results were normal, recent CT without e/o rectal cancer recurrence. Done with chemotherapy. No visual changes, no breathing complaints. REVIEW OF SYSTEMS    A comprehensive review of systems was performed and pertinent results are documented in the HPI, review of systems is otherwise non-contributory. PAST MEDICAL HISTORY    Past Medical History:   Diagnosis Date    Adopted     GERD (gastroesophageal reflux disease)     Ill-defined condition     CHEMO-LAST ON 2/19/2020    Psoriasis     [facility]atic arthritis [facility]     Rectal cancer ([facility]     Moderately differentiated [id number] adenocarcinoma of the rectum. Treated with neoadjuvant chemoradiation, resection, and adjuvant chemotherapy.         Past Surgical History:   Procedure Laterality Date    COLONOSCOPY Left 4/26/2019    COLONOS[facility] performed by Denisse [facility]ero MD at [facility] ENDOSCOPY  COLONOSCOPY N/A 10/22/2020    COLONOSCOPY :- performed by Tito Pulliam MD at [address] [place] [address] N/A 5/2/2019    SIGMOIDOSCOPY FLEXIBLE performed by Nathen Aguila MD at [address] HX GI      EGD    HX HEENT      WISDOM TEETH    HX ORTHOPAEDIC Right     ACL REPLACEMENT    HX OTHER SURGICAL  09/24/2019    Hand-assisted laparoscopic low anterior resection, mobilization of the splenic flexure, coloproctostomy, and creation of loop ileostomy; Dr. Abisai Wade.  HX UROLOGICAL  12/2019    CYSTOSCOPY FOR REMOVAL OF KIDNEY STONES WITH HOLMIUM LAZER    HX UROLOGICAL  09/24/2019    Cystoscopy and placement of bilateral temporary ureteral catheters; Jenise Chau MD.    HX VASCULAR ACCESS      INSERTION OF PORT-A-CATH RT SIDE OF CHEST. FOR CHEMO-LAST ON 2/19/2020    IR INSERT TUNL CVC W PORT OVER 5 YEARS  10/15/2019    IR REMOVE TUNL CVAD W/O PORT / PUMP  7/16/2020       FAMILY HISTORY    Family History   Adopted: Yes   Problem Relation Age of Onset    Asthma Neg Hx     Cancer Neg Hx     Diabetes Neg Hx     Heart Disease Neg Hx     Hypertension Neg Hx     Stroke Neg Hx        SOCIAL HISTORY    Social History     Tobacco Use    Smoking status: Never Smoker    Smokeless tobacco: Never Used   Substance Use Topics    Alcohol use: Yes     Alcohol/week: 6.0 standard drinks     Types: 6 Cans of beer per week     Comment: 6 BEERS WEEKLY    Drug use: No     On vacations drinks more than 6 beers weekly    MEDICATIONS    Current Outpatient Medications   Medication Sig Dispense Refill    hydroCHLOROthiazide (HYDRODIURIL) 12.5 mg tablet TAKE 1 TABLET BY MOUTH EVERY DAY 90 Tab 0    sulfaSALAzine EC (AZULFIDINE) 500 mg EC tablet Take 2 Tabs by mouth two (2) times a day for 30 days. 120 Tab 0    fluocinonide (VANOS) 0.1 % topical cream Apply  to affected area daily. 30 g 5    tadalafil (CIALIS) 5 mg tablet Take 5 mg by mouth.       oxybutynin (DITROPAN) 5 mg tablet Take 5 mg by mouth two (2) times a day.  lidocaine-prilocaine (EMLA) topical cream Apply  to affected area as needed for Pain. 30 g 0    triamcinolone acetonide (KENALOG) 0.1 % ointment Apply  to affected area two (2) times daily as needed for Skin Irritation. use thin layer         ALLERGIES    No Known Allergies    PHYSICAL EXAMINATION  Visit Vitals  /86 (BP 1 Location: Right arm, BP Patient Position: Sitting)   Pulse 74   Temp 98.3 °F (36.8 °C) (Oral)   Resp 18   Ht 5' 7\" (1.702 m)   Wt 144 lb (65.3 kg)   SpO2 97%   BMI 22.55 kg/m²     Joint Count 10/28/2020   Patient pain (0-100) 5   MHAQ 0   Left shoulder - Tender 0   Left shoulder - Swollen 0   Right shoulder - Tender 0   Right shoulder - Swollen 0   Tender Joint Count (Total) 0   Swollen Joint Count (Total) 0   Physician Assessment (0-10) 5   Patient Assessment (0-10) 1.5   CDAI Total (calculated) 6.5     . General: NAD, looks well, normal respiratory effort  HEENT: PERRL, anicteric, non-injected sclerae; lids without inflammation  Pulmonary: Normal respirations, no retractions, coughing  Skin: extensive plaques across bilateral shins, knees, elbows. Neuro: Alert; able to carry normal conversation, cognition, affect normal    Musculoskeletal:   No active synovitis of hands. Negative MTP squeeze tenderness. DATA REVIEW    Prior medical records were reviewed and if applicable are summarized as below:    Labs:   10/2020: WBC 3.6 (ANC 2400, ), CBC ow WNL; CMP WNL  2019: cbc, cmp unremarkable, ESR, CRP normal, HEpB, C, quant gold negative  10/18: WBC 4.1, Hb 15.2, Plt 194, CMP Normal, CRP, ESR negative  : Hep C negative, HLA b27 negative, quant gold negative    Imagin/9/20 CT abd/pelvis: No evidence of recurrent or metastatic disease. SI Joints (2019): Personally reviewed images. Normal joints  Foot xrays (2019): Personally reviewed images. No erosions  Hand xrays (2019): Personally reviewed images. + DJD.  No erosions  : CXR normal    Pathology:  Rectal biopsy (4/2019): well differentiated invasive colonic adenocarcinoma    ASSESSMENT AND PLAN    A 45 y.o. male with hx of psoriatic arthritis on sulfasalazine 2000 mg oral daily, recent rectal adenocarcinoma s/p chemo/RT presents for a follow up visit. His joints are reasonably well-controlled with sulfasalazine, but skin has been worsening since he completed chemotherapy with now moderately extensive plaque psoriasis. He continues to drink regularly. Immunosuppression within 1 year of his last chemotherapy is relatively contraindicated with concern for increasing risk of cancer recurrence, but reviewed apremilast with him which would help skin, maintain arthritis control, and unlike Humira has not been linked to malignancy. # Psoriatic arthritis:    - continue sulfasalazine 2000 mg oral daily for now  - Applying for apremilast; would start with overlap, and can wean sulfasalazine as joints and skin tolerate once we have approval.    # Psoriasis:   - Apremilast as above  - sulfasalazine as above  - Continue fluocinonide topically  - counseled him to be more compliant with topical therapy    # Rectal cancer:   - in remission for now, cont to follow with oncology    # Alcohol use:   - he is not willing to cut down on his use of alcohol at this time. # Medication Toxicity Monitoring:  - cbc, cmp already ordered for January, adding acute phase reactants  - Hepatitis B, C: negative 2/2019  - Quant gold: negative 2/2019  - Immunizations: UTD  - bone health: to discuss upcoming visit    RTC in 3 months    The patient voiced understanding of the aforementioned assessment and plan. Summary of plan was provided in the After Visit Summary patient instructions. I also provided education about MyChart setup and utility.       TODAY'S ORDERS    Orders Placed This Encounter    C REACTIVE PROTEIN, QT    SED RATE (ESR)    sulfaSALAzine EC (AZULFIDINE) 500 mg EC tablet     Patient Instructions 1. For now, continue sulfasalazine 1000mg twice a day. We'll work on getting approval for [employer] and let you know once we get this. 2. Labs before next visit, I'll add a few others to draw with your January labs. 3. Call with joint pain or worsened rash in the meantime. 4. Return in 3-5 months, virtual visit OK. Future Appointments   Date Time Provider Shannan Rodriguez   10/28/2020 10:20 AM Yue Lima MD [facility] BS AMB   1/22/2021 10:30 AM Haresh Arguelles  [facility]     We discussed the expected course, resolution and complications of the diagnosis(es) in detail. Medication risks, benefits, costs, interactions, and alternatives were discussed as indicated. I advised him to contact the office if his condition worsens, changes or fails to improve as anticipated. He expressed understanding with the diagnosis(es) and plan.        Antonieta Dawson MD    Adult Rheumatology   [facility]  A Part of [facility], [address] [place] [address]   Phone [phone]  Fax [phone]

## 2020-10-28 NOTE — PATIENT INSTRUCTIONS
1. For now, continue sulfasalazine 1000mg twice a day. We'll work on getting approval for [employer] and let you know once we get this. 2. Labs before next visit, I'll add a few others to draw with your January labs. 3. Call with joint pain or worsened rash in the meantime. 4. Return in 3-5 months, virtual visit OK.

## 2020-11-04 ENCOUNTER — DOCUMENTATION ONLY (OUTPATIENT)
Dept: PHARMACY | Age: 50
End: 2020-11-04

## 2020-11-04 NOTE — PROGRESS NOTES
[facility] at [address] [facility] Update    Date: 11/04/20    Idalmis Herzog 1970    Medication: Debrah Cushing and Rahat Melvin MD    Prescription transferred to specialty pharmacy: [facility]    Phone: [phone]    Pharmacist counseled the patient and informed patient their prescription was transferred to the mandated specialty pharmacy and gave patient the specialty pharmacy's phone number. Pharmacist answered any questions that the patient had.     Blossom Perales, [facility] Manish Dominguez at [facility], [address]   [address] [place] [address]  phone: [phone]   fax: [phone]

## 2020-11-05 ENCOUNTER — DOCUMENTATION ONLY (OUTPATIENT)
Dept: RHEUMATOLOGY | Age: 50
End: 2020-11-05

## 2020-11-05 NOTE — PROGRESS NOTES
The office received a fax from [address] [place] [address] stating patient has been approved for [employer] from 10/28/2020 through 10/29/2021, Request # [id number].

## 2020-11-11 ENCOUNTER — APPOINTMENT (OUTPATIENT)
Dept: INFUSION THERAPY | Age: 50
End: 2020-11-11

## 2020-11-16 DIAGNOSIS — L40.50 PSORIATIC ARTHRITIS (HCC): Primary | ICD-10-CM

## 2020-11-16 DIAGNOSIS — L40.9 PSORIASIS: ICD-10-CM

## 2020-11-16 DIAGNOSIS — Z79.899 ONGOING USE OF POSSIBLY TOXIC MEDICATION: ICD-10-CM

## 2020-11-16 RX ORDER — APREMILAST 10-20-30MG
KIT ORAL
Qty: 55 TAB | Refills: 0 | Status: SHIPPED | OUTPATIENT
Start: 2020-11-16 | End: 2021-03-02

## 2020-11-16 RX ORDER — APREMILAST 30 MG/1
30 TABLET, FILM COATED ORAL 2 TIMES DAILY
Qty: 60 TAB | Refills: 11 | Status: SHIPPED | OUTPATIENT
Start: 2020-12-16 | End: 2021-11-10

## 2021-01-19 LAB
ALBUMIN SERPL-MCNC: 4.4 G/DL (ref 3.8–4.9)
ALBUMIN/GLOB SERPL: 2.1 {RATIO} (ref 1.2–2.2)
ALP SERPL-CCNC: 57 IU/L (ref 39–117)
ALT SERPL-CCNC: 42 IU/L (ref 0–44)
AST SERPL-CCNC: 42 IU/L (ref 0–40)
BASOPHILS # BLD AUTO: 0 X10E3/UL (ref 0–0.2)
BASOPHILS NFR BLD AUTO: 0 %
BILIRUB SERPL-MCNC: 0.5 MG/DL (ref 0–1.2)
BUN SERPL-MCNC: 10 MG/DL (ref 6–24)
BUN/CREAT SERPL: 11 (ref 9–20)
CALCIUM SERPL-MCNC: 9 MG/DL (ref 8.7–10.2)
CEA SERPL-MCNC: 3.7 NG/ML (ref 0–4.7)
CHLORIDE SERPL-SCNC: 102 MMOL/L (ref 96–106)
CO2 SERPL-SCNC: 24 MMOL/L (ref 20–29)
CREAT SERPL-MCNC: 0.9 MG/DL (ref 0.76–1.27)
EOSINOPHIL # BLD AUTO: 0.2 X10E3/UL (ref 0–0.4)
EOSINOPHIL NFR BLD AUTO: 4 %
ERYTHROCYTE [DISTWIDTH] IN BLOOD BY AUTOMATED COUNT: 12.1 % (ref 11.6–15.4)
GLOBULIN SER CALC-MCNC: 2.1 G/DL (ref 1.5–4.5)
GLUCOSE SERPL-MCNC: 103 MG/DL (ref 65–99)
HCT VFR BLD AUTO: 42.5 % (ref 37.5–51)
HGB BLD-MCNC: 14.5 G/DL (ref 13–17.7)
IMM GRANULOCYTES # BLD AUTO: 0 X10E3/UL (ref 0–0.1)
IMM GRANULOCYTES NFR BLD AUTO: 0 %
LYMPHOCYTES # BLD AUTO: 0.3 X10E3/UL (ref 0.7–3.1)
LYMPHOCYTES NFR BLD AUTO: 5 %
MCH RBC QN AUTO: 33.5 PG (ref 26.6–33)
MCHC RBC AUTO-ENTMCNC: 34.1 G/DL (ref 31.5–35.7)
MCV RBC AUTO: 98 FL (ref 79–97)
MONOCYTES # BLD AUTO: 0.5 X10E3/UL (ref 0.1–0.9)
MONOCYTES NFR BLD AUTO: 9 %
NEUTROPHILS # BLD AUTO: 4.2 X10E3/UL (ref 1.4–7)
NEUTROPHILS NFR BLD AUTO: 82 %
PLATELET # BLD AUTO: 159 X10E3/UL (ref 150–450)
POTASSIUM SERPL-SCNC: 3.9 MMOL/L (ref 3.5–5.2)
PROT SERPL-MCNC: 6.5 G/DL (ref 6–8.5)
RBC # BLD AUTO: 4.33 X10E6/UL (ref 4.14–5.8)
SODIUM SERPL-SCNC: 140 MMOL/L (ref 134–144)
WBC # BLD AUTO: 5.2 X10E3/UL (ref 3.4–10.8)

## 2021-01-20 ENCOUNTER — APPOINTMENT (OUTPATIENT)
Dept: INFUSION THERAPY | Age: 51
End: 2021-01-20

## 2021-01-22 ENCOUNTER — OFFICE VISIT (OUTPATIENT)
Dept: ONCOLOGY | Age: 51
End: 2021-01-22
Payer: MEDICAID

## 2021-01-22 VITALS
WEIGHT: 149 LBS | DIASTOLIC BLOOD PRESSURE: 90 MMHG | HEART RATE: 87 BPM | OXYGEN SATURATION: 98 % | TEMPERATURE: 96.3 F | SYSTOLIC BLOOD PRESSURE: 164 MMHG | BODY MASS INDEX: 23.34 KG/M2

## 2021-01-22 DIAGNOSIS — L40.50 PSORIATIC ARTHRITIS (HCC): ICD-10-CM

## 2021-01-22 DIAGNOSIS — C18.9 MALIGNANT NEOPLASM OF COLON, UNSPECIFIED PART OF COLON (HCC): Primary | ICD-10-CM

## 2021-01-22 DIAGNOSIS — C18.9 MALIGNANT NEOPLASM OF COLON, UNSPECIFIED PART OF COLON (HCC): ICD-10-CM

## 2021-01-22 PROCEDURE — 99213 OFFICE O/P EST LOW 20 MIN: CPT | Performed by: INTERNAL MEDICINE

## 2021-01-22 NOTE — PROGRESS NOTES
Gavino Herrera is a 45 y.o. male  Chief Complaint   Patient presents with    Follow-up     Rectal cancer- likely stage III- SOMMER     1. Have you been to the ER, urgent care clinic since your last visit? Hospitalized since your last visit? No.    2. Have you seen or consulted any other health care providers outside of the [address] [place] [address] since your last visit? Include any pap smears or colon screening. Yes, patient went to see Dr. Aramis Sutton (Urologist) and went to also see [facility] for genetic testing.

## 2021-01-22 NOTE — PROGRESS NOTES
[facility] [place] [address] [address], [address]  W: [phone]  F: [phone]    Reason for Visit:   Praveena Castro is a 45 y.o. male who is seen in follow-up for Rectal cancer- likely stage III- SOMMER    Treatment History:   · 4/26/19: Colonoscopy- 6-7 cm size malignant appearing mass seen at 10 cm from anal verge. This was adenocarcinoma. 3 polyps removed- all were tubular adenomas  · Rectal Ultrasound 5/2/19: ulcerated infiltrative mass lesion was noted in rectum at 10 cm. The lesion measured about 5 cm X 4 cm- T2, 13 mm X 8 mm oblong lymph node was noted immediately adjacent to the mass lesion  · 5/2/19: CT CAP- No metastasis, liver cysts. CEA 3.6  · 5/10/19: PET CT showed rectal malignancy and 3 small perirectal anibal metastatic focir  · 5/28/19-7/8/29: Xeloda + RT   · 9/24/19: LAR-  [id number], 2/6 positive nodes  · 10/14/19: CT CAP with no evidence of metastatic disease, liver cysts   · 10/23/19- 2/19/2020-: 8 cycles of  FOLFOX. Several delays and dose reductions due to cytopenias  · 12/11/19 CT AP: hypodensity in liver, otherwise KATHLEEN   · 1/6/20 MRI abd: no liver mets  · 3/30/2020: CT KATHLEEN  · 10/7/2020: CT KATHLEEN     History of Present Illness:   Patient is a 45 y.o. male seen for adenocarcinoma of the mid third of rectum    He had intermittent BRBPR x 1 year and then decided to have a colonoscopy. This led to above mentioned diagnosis. He received neoadjuvant xeloda+ RT followed by LAR. Completed 8 cycles of FOLFOX, had a colostomy and is on surveillance. He is well, has no new pain, cough, bleeding  He had a colonoscopy 10/2020, goes again in 2 years. He denies nausea/vomiting, diarrhea/constipation. Very slow to recover neuropathy    He uses 7 drinks a week.     Adopted    Past Medical History:   Diagnosis Date    Adopted     GERD (gastroesophageal reflux disease)     Ill-defined condition     CHEMO-LAST ON 2/19/2020    Psoriasis     [facility] ([facility] [address]     Rectal cancer ([facility] [address]     Moderately differentiated [id number] adenocarcinoma of the rectum. Treated with neoadjuvant chemoradiation, resection, and adjuvant chemotherapy. Past Surgical History:   Procedure Laterality Date    COLONOSCOPY Left 4/26/2019    COLONOSCOPY performed by Skinny Morgan MD at [address] COLONOSCOPY N/A 10/22/2020    COLONOSCOPY :- performed by Skinny Morgan MD at [place] [address] N/A 5/2/2019    SIGMOIDOSCOPY FLEXIBLE performed by Omar Dill MD at [address] HX GI      EGD    HX HEENT      WISDOM TEETH    HX ORTHOPAEDIC Right     ACL REPLACEMENT    HX OTHER SURGICAL  09/24/2019    Hand-assisted laparoscopic low anterior resection, mobilization of the splenic flexure, coloproctostomy, and creation of loop ileostomy; Dr. Marisa Stubbs.  HX UROLOGICAL  12/2019    CYSTOSCOPY FOR REMOVAL OF KIDNEY STONES WITH HOLMIUM LAZER    HX UROLOGICAL  09/24/2019    Cystoscopy and placement of bilateral temporary ureteral catheters; Chandan Holloway MD.    HX VASCULAR ACCESS      INSERTION OF PORT-A-CATH RT SIDE OF CHEST. FOR CHEMO-LAST ON 2/19/2020    IR INSERT TUNL CVC W PORT OVER 5 YEARS  10/15/2019    IR REMOVE TUNL CVAD W/O PORT / PUMP  7/16/2020      Social History     Tobacco Use    Smoking status: Never Smoker    Smokeless tobacco: Never Used   Substance Use Topics    Alcohol use:  Yes     Alcohol/week: 6.0 standard drinks     Types: 6 Cans of beer per week     Comment: 6 BEERS WEEKLY      Family History   Adopted: Yes   Problem Relation Age of Onset    Asthma Neg Hx     Cancer Neg Hx     Diabetes Neg Hx     Heart Disease Neg Hx     Hypertension Neg Hx     Stroke Neg Hx      Current Outpatient Medications   Medication Sig    Otezla Starter 10 mg (4)-20 mg (4)-30 mg (47) DsPk TAKE BY MOUTH AS DIRECTED. DAY 1: 10 MG IN AM. DAY 2: 10 MG AM AND PM. DAY 3: 10 MG AM AND 20 MG PM. DAY 4: 20 MG AM AND PM. DAY 5: 20 MG AM AND 30 MG PM. THEN 30 MG 2 TIMES A DAY    apremilast (Otezla) 30 mg tab Take 30 mg by mouth two (2) times a day. Indications: moderate to severe plaque psoriasis    sulfaSALAzine EC (AZULFIDINE) 500 mg EC tablet Take 2 Tabs by mouth two (2) times a day.  hydroCHLOROthiazide (HYDRODIURIL) 12.5 mg tablet TAKE 1 TABLET BY MOUTH EVERY DAY    fluocinonide (VANOS) 0.1 % topical cream Apply  to affected area daily.  tadalafil (CIALIS) 5 mg tablet Take 5 mg by mouth.  oxybutynin (DITROPAN) 5 mg tablet Take 5 mg by mouth two (2) times a day.  triamcinolone acetonide (KENALOG) 0.1 % ointment Apply  to affected area two (2) times daily as needed for Skin Irritation. use thin layer    lidocaine-prilocaine (EMLA) topical cream Apply  to affected area as needed for Pain. No current facility-administered medications for this visit. No Known Allergies     Review of Systems: A complete review of systems was obtained, negative except as described above. Physical Exam:     Constitutional: Appears well-developed and well-nourished in no apparent distress   Mental status: Alert and awake, Oriented to person/place/time, Able to follow commands  Eyes: EOM normal, Sclera normal, No visible ocular discharge  HENT: Normocephalic, atraumatic; Mouth/Throat: Moist mucous membranes, External Ears normal  Neck: No visualized mass  Skin: No significant exanthematous lesions or discoloration noted on facial skin  Psychiatric: Normal affect, normal judgment/insight. No hallucinations     Results:     Lab Results   Component Value Date/Time    WBC 5.2 01/18/2021 11:03 AM    HGB 14.5 01/18/2021 11:03 AM    HCT 42.5 01/18/2021 11:03 AM    PLATELET 912 86/72/1329 11:03 AM    MCV 98 (H) 01/18/2021 11:03 AM    ABS.  NEUTROPHILS 4.2 01/18/2021 11:03 AM     Lab Results   Component Value Date/Time    Sodium 140 01/18/2021 11:03 AM    Potassium 3.9 01/18/2021 11:03 AM    Chloride 102 01/18/2021 11:03 AM    CO2 24 01/18/2021 11:03 AM    Glucose 103 (H) 01/18/2021 11:03 AM    BUN 10 01/18/2021 11:03 AM    Creatinine 0.90 01/18/2021 11:03 AM    GFR est  01/18/2021 11:03 AM    GFR est non-AA 99 01/18/2021 11:03 AM    Calcium 9.0 01/18/2021 11:03 AM     Lab Results   Component Value Date/Time    Bilirubin, total 0.5 01/18/2021 11:03 AM    ALT (SGPT) 42 01/18/2021 11:03 AM    Alk. phosphatase 57 01/18/2021 11:03 AM    Protein, total 6.5 01/18/2021 11:03 AM    Albumin 4.4 01/18/2021 11:03 AM    Globulin 2.8 05/13/2020 08:32 AM     CEA:  Recent Labs     01/18/21  1103 10/07/20  1447 05/13/20  0832   CEA  --   --  1.4   [id number] 3.7 2.6  --        Records reviewed and summarized above. Pathology report(s) reviewed  FINAL PATHOLOGIC DIAGNOSIS   1. Rectum and sigmoid colon, laparoscopic low anterior resection:   SPECIMEN   Procedure: Low anterior resection   Macroscopic Intactness of Mesorectum: Complete   TUMOR   Tumor Site: Rectum   Histologic Type: Adenocarcinoma   Histologic Grade: G2: Moderately differentiated   Tumor Size: 2.6 cm   Tumor Deposits: Present   Number of Deposits: 1   Tumor Extension: Tumor invades through the muscularis propria into pericolorectal tissue   Macroscopic Tumor Perforation: Not identified   Lymphovascular Invasion: Not identified   Perineural Invasion: Not identified   Tumor Budding: Number of tumor buds in one hotspot field: 5   Tumor Budding Score: Intermediate score (5-9)   Treatment Effect: Present - Residual cancer with evident tumor regression, but more than single cells or   rare small groups of cancer cells (partial response, score 2)   MARGINS   Margins:  All margins are uninvolved by invasive carcinoma, high- grade dysplasia, intramucosal   adenocarcinoma, and adenoma   Margins Examined: Proximal, Distal, Radial or Mesenteric   Distance of Invasive Carcinoma from Closest Margin: 25 mm   Closest Margin: Radial or Mesenteric   LYMPH NODES   Number of Lymph Nodes Involved: 2   Number of Lymph Nodes Examined: 16   PATHOLOGIC STAGE CLASSIFICATION (pTNM, AJCC 8th Edition)   Primary Tumor (pT): pT3   Regional Lymph Nodes (pN): pN1b   2. Large bowel, donuts:   Fragments of large bowel wall, negative for microscopic pathologic abnormality. Radiology report(s) reviewed above. CT CAP 10/14/19: IMPRESSION:  1. There are scattered small hepatic cysts without definite evidence for  metastatic disease. 2. Otherwise negative CT chest abdomen pelvis    CT CAP 12/11/19: IMPRESSION:  1. Right hydroureteronephrosis due to an 8 mm calculus at the ureterovesicular  junction. 2. Vague areas of heterogeneous enhancement and hypodensity in the liver, raises  concern for metastases, though dedicated 3 phase liver CT or liver protocol MRI  may be considered as follow-up. Numerous small hypodensities in the liver likely  cysts, unchanged. 3. Rectal anastomosis and presacral edema, treatment related. Right lower  quadrant ileostomy. No bowel obstruction    MRI abd 1/6/20: no hepatic mets     CT abdomen 3/30/2020  IMPRESSION  IMPRESSION:     1. Mild dilatation, thinning of the wall, and poor enhancement of the bowel just  proximal to the loop ileostomy closure nearly to the distal anastomosis. Clinical significance is uncertain. Ischemic changes should be considered. 2. Small amount of postoperative fluid in the right paracolic gutter without  drainable fluid collection    CT AP 10/7/20: IMPRESSION:  No evidence of recurrent or metastatic disease.     Assessment:   1) Rectal adenocarcinoma- mid third- SOMMER  Genetic testing: negative     T2N1M0 disease- Clinical stage III  S/P John Adj chemoRT followed by LAR on 9/24/19  Pathology showed [id number] residual disease- stage IIIB  Had significant residual disease with practically no treatment effects  Adjuvant FOLFOX x 8 until 2/2020- several delays and reductions due to cytopenias  CT 10/7/20 KATHLEEN  Colonoscopy 10/2020 normal  ROS and labs unremarkable   CEA has trended up some- will monitor    Discussed surveillance    H NP and labs every 3-6 months for 2 years then every 6 months for 5 years  CT every 6-12 months for 5 years  Colonoscopy in 2022    2) Radha Frost age at diagnosis  Adopted  Had several adenomas removed  May have attenuated FAP and will require genetic testing  SOMMER  Referred to [facility] genetics     3) Psoriasis  Off Methotrexate  Is on sulphasalazine    4) Neutropenia/ leucopenia  resolved    5) Thrombocytopenia  Resolved    6) 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 Venita Aguayo care.         Signed By: Alonso Tavares MD

## 2021-02-18 ENCOUNTER — VIRTUAL VISIT (OUTPATIENT)
Dept: INTERNAL MEDICINE CLINIC | Age: 51
End: 2021-02-18

## 2021-02-18 ENCOUNTER — VIRTUAL VISIT (OUTPATIENT)
Dept: INTERNAL MEDICINE CLINIC | Age: 51
End: 2021-02-18
Payer: MEDICAID

## 2021-02-18 DIAGNOSIS — I10 BENIGN ESSENTIAL HTN: Primary | ICD-10-CM

## 2021-02-18 PROCEDURE — 99214 OFFICE O/P EST MOD 30 MIN: CPT | Performed by: FAMILY MEDICINE

## 2021-02-18 RX ORDER — HYDROCHLOROTHIAZIDE 25 MG/1
25 TABLET ORAL DAILY
Qty: 30 TAB | Refills: 1 | Status: SHIPPED | OUTPATIENT
Start: 2021-02-18 | End: 2021-03-15

## 2021-02-18 NOTE — PROGRESS NOTES
Jose Lyles is a 45 y.o. male who was seen by synchronous (real-time) audio-video technology on 2/18/2021 for Hypertension        Assessment & Plan:   Diagnoses and all orders for this visit:    1. Benign essential HTN    Will increase his hydrochlorothiazide to 25 mg, follow-up in 2 weeks with blood pressure log  DASH diet recommended        Subjective:   Patient is following up on blood pressure, states that his blood pressure has been running high at home in the 130s over 80s. Patient is taking his hydrochlorothiazide 12.5 mg with no side effects noted. He did accidentally take his hydrochlorothiazide twice the dosage recommended on Monday and states that his blood pressure dropped into the normal range. Denies any shortness of breath LESTER or chest pain. Prior to Admission medications    Medication Sig Start Date End Date Taking? Authorizing Provider   hydroCHLOROthiazide (HYDRODIURIL) 25 mg tablet Take 1 Tab by mouth daily. 2/18/21   Randy Milan MD   sulfaSALAzine EC (AZULFIDINE) 500 mg EC tablet Take 2 tabs (1000mg) in the morning, and 1 tab (500mg) in the evening. 2/15/21   MD Evin Molina Starter 10 mg (4)-20 mg (4)-30 mg (47) DsPk TAKE BY MOUTH AS DIRECTED. DAY 1: 10 MG IN AM. DAY 2: 10 MG AM AND PM. DAY 3: 10 MG AM AND 20 MG PM. DAY 4: 20 MG AM AND PM. DAY 5: 20 MG AM AND 30 MG PM. THEN 30 MG 2 TIMES A DAY 11/16/20   Martinez Garcia MD   apremilast Sacha Min) 30 mg tab Take 30 mg by mouth two (2) times a day. Indications: moderate to severe plaque psoriasis 12/16/20   Martinez Garcia MD   hydroCHLOROthiazide (HYDRODIURIL) 12.5 mg tablet TAKE 1 TABLET BY MOUTH EVERY DAY 10/2/20   Randy Milan MD   fluocinonide (VANOS) 0.1 % topical cream Apply  to affected area daily. 6/1/20   Wai Childs MD   tadalafil (CIALIS) 5 mg tablet Take 5 mg by mouth. Provider, Historical   oxybutynin (DITROPAN) 5 mg tablet Take 5 mg by mouth two (2) times a day.     Provider, Historical lidocaine-prilocaine (EMLA) topical cream Apply  to affected area as needed for Pain. 10/23/19   Ziyad Bear, HÉCTOR   triamcinolone acetonide (KENALOG) 0.1 % ointment Apply  to affected area two (2) times daily as needed for Skin Irritation. use thin layer    Provider, Historical     Current Outpatient Medications   Medication Sig Dispense Refill    hydroCHLOROthiazide (HYDRODIURIL) 25 mg tablet Take 1 Tab by mouth daily. 30 Tab 1    sulfaSALAzine EC (AZULFIDINE) 500 mg EC tablet Take 2 tabs (1000mg) in the morning, and 1 tab (500mg) in the evening. 360 Tab 1    Otezla Starter 10 mg (4)-20 mg (4)-30 mg (47) DsPk TAKE BY MOUTH AS DIRECTED. DAY 1: 10 MG IN AM. DAY 2: 10 MG AM AND PM. DAY 3: 10 MG AM AND 20 MG PM. DAY 4: 20 MG AM AND PM. DAY 5: 20 MG AM AND 30 MG PM. THEN 30 MG 2 TIMES A DAY 55 Tab 0    apremilast (Otezla) 30 mg tab Take 30 mg by mouth two (2) times a day. Indications: moderate to severe plaque psoriasis 60 Tab 11    hydroCHLOROthiazide (HYDRODIURIL) 12.5 mg tablet TAKE 1 TABLET BY MOUTH EVERY DAY 90 Tab 0    fluocinonide (VANOS) 0.1 % topical cream Apply  to affected area daily. 30 g 5    tadalafil (CIALIS) 5 mg tablet Take 5 mg by mouth.  oxybutynin (DITROPAN) 5 mg tablet Take 5 mg by mouth two (2) times a day.  lidocaine-prilocaine (EMLA) topical cream Apply  to affected area as needed for Pain. 30 g 0    triamcinolone acetonide (KENALOG) 0.1 % ointment Apply  to affected area two (2) times daily as needed for Skin Irritation. use thin layer       No Known Allergies  Past Medical History:   Diagnosis Date    Adopted     GERD (gastroesophageal reflux disease)     Ill-defined condition     CHEMO-LAST ON 2/19/2020    Psoriasis     P[facility]iatic arthritis ([facility] [address]     Rectal cancer ([facility])     Moderately differentiated [id number] adenocarcinoma of the rectum. Treated with neoadjuvant chemoradiation, resection, and adjuvant chemotherapy.      Past Surgical History:   Procedure Laterality Date  COLONOSCOPY Left 4/26/2019    COLONOSCOPY performed by Morris Larry MD at [address] COLONOSCOPY N/A 10/22/2020    COLONOSCOPY :- performed by Morris Larry MD at [place] [address] N/A 5/2/2019    SIGMOIDOSCOPY FLEXIBLE performed by Edi Frazier MD at [address] HX GI      EGD    HX HEENT      WISDOM TEETH    HX ORTHOPAEDIC Right     ACL REPLACEMENT    HX OTHER SURGICAL  09/24/2019    Hand-assisted laparoscopic low anterior resection, mobilization of the splenic flexure, coloproctostomy, and creation of loop ileostomy; Dr. Lety Shields.  HX UROLOGICAL  12/2019    CYSTOSCOPY FOR REMOVAL OF KIDNEY STONES WITH HOLMIUM LAZER    HX UROLOGICAL  09/24/2019    Cystoscopy and placement of bilateral temporary ureteral catheters; Rosalie Simmons MD.    HX VASCULAR ACCESS      INSERTION OF PORT-A-CATH RT SIDE OF CHEST. FOR CHEMO-LAST ON 2/19/2020    IR INSERT TUNL CVC W PORT OVER 5 YEARS  10/15/2019    IR REMOVE TUNL CVAD W/O PORT / PUMP  7/16/2020     Family History   Adopted: Yes   Problem Relation Age of Onset    Asthma Neg Hx     Cancer Neg Hx     Diabetes Neg Hx     Heart Disease Neg Hx     Hypertension Neg Hx     Stroke Neg Hx        ROS    Objective:   No flowsheet data found.      [INSTRUCTIONS:  \"[x]\" Indicates a positive item  \"[]\" Indicates a negative item  -- DELETE ALL ITEMS NOT EXAMINED]    Constitutional: [x] Appears well-developed and well-nourished [x] No apparent distress      [] Abnormal -     Mental status: [x] Alert and awake  [x] Oriented to person/place/time [x] Able to follow commands    [] Abnormal -     Eyes:   EOM    [x]  Normal    [] Abnormal -   Sclera  [x]  Normal    [] Abnormal -          Discharge [x]  None visible   [] Abnormal -     HENT: [x] Normocephalic, atraumatic  [] Abnormal -   [x] Mouth/Throat: Mucous membranes are moist    External Ears [x] Normal  [] Abnormal -    Neck: [x] No visualized mass [] Abnormal - Pulmonary/Chest: [x] Respiratory effort normal   [x] No visualized signs of difficulty breathing or respiratory distress        [] Abnormal -      Musculoskeletal:   [x] Normal gait with no signs of ataxia         [x] Normal range of motion of neck        [] Abnormal -     Neurological:        [x] No Facial Asymmetry (Cranial nerve 7 motor function) (limited exam due to video visit)          [x] No gaze palsy        [] Abnormal -          Skin:        [x] No significant exanthematous lesions or discoloration noted on facial skin         [] Abnormal -            Psychiatric:       [x] Normal Affect [] Abnormal -        [x] No Hallucinations    Other pertinent observable physical exam findings:-        We discussed the expected course, resolution and complications of the diagnosis(es) in detail. Medication risks, benefits, costs, interactions, and alternatives were discussed as indicated. I advised him to contact the office if his condition worsens, changes or fails to improve as anticipated. He expressed understanding with the diagnosis(es) and plan. Araceli Montero, who was evaluated through a patient-initiated, synchronous (real-time) audio-video encounter, and/or his healthcare decision maker, is aware that it is a billable service, with coverage as determined by his insurance carrier. He provided verbal consent to proceed: Yes, and patient identification was verified. It was conducted pursuant to the emergency declaration under the [address] [place] [address], [address] [place] [address] authority and the [employer] Act. A caregiver was present when appropriate. Ability to conduct physical exam was limited. I was at home. The patient was at home.       Ophelia Romeo MD

## 2021-03-02 DIAGNOSIS — L40.50 PSORIATIC ARTHRITIS (HCC): Primary | ICD-10-CM

## 2021-03-02 RX ORDER — SULFASALAZINE 500 MG/1
500 TABLET ORAL 4 TIMES DAILY
Qty: 90 TAB | Refills: 3 | Status: SHIPPED | OUTPATIENT
Start: 2021-03-02 | End: 2021-10-12

## 2021-03-04 ENCOUNTER — VIRTUAL VISIT (OUTPATIENT)
Dept: INTERNAL MEDICINE CLINIC | Age: 51
End: 2021-03-04
Payer: MEDICAID

## 2021-03-04 DIAGNOSIS — I10 BENIGN ESSENTIAL HTN: Primary | ICD-10-CM

## 2021-03-04 PROCEDURE — 99214 OFFICE O/P EST MOD 30 MIN: CPT | Performed by: FAMILY MEDICINE

## 2021-03-04 NOTE — PROGRESS NOTES
Nandini Hartman is a 45 y.o. male who was seen by synchronous (real-time) audio-video technology on 3/4/2021 for Hypertension        Assessment & Plan:   Diagnoses and all orders for this visit:    1. Benign essential HTN    DASH diet   Continue with diet and exercise changes, we will consider lisinopril at next visit if no change in blood pressures  Follow up 4 weeks for bp      Subjective:     Patient is a 62-year-old male who is following up on blood pressure. Patient states his blood pressures are averaging in the 130s over 80s with hydrochlorothiazide 25 mg. Patient states that he has had no side effects to this dosage change. Patient states that he is working on diet and exercise changes  Prior to Admission medications    Medication Sig Start Date End Date Taking? Authorizing Provider   sulfaSALAzine (AZULFIDINE) 500 mg tablet Take 1 Tab by mouth four (4) times daily. Take 2 tabs qAM, 1 tab qPM for 28 days. If no joint pain, decrease to 1 tab PO BID. Decrease daily dose by 1 pill every 30 days as long as joint pain well-controlled. 3/2/21   Kristy Bentley MD   hydroCHLOROthiazide (HYDRODIURIL) 25 mg tablet Take 1 Tab by mouth daily. 2/18/21   Keenan Payne MD   apremilast [facility]) 30 mg tab Take 30 mg by mouth two (2) times a day. Indications: moderate to severe plaque psoriasis 12/16/20   Kristy Bentley MD   fluocinonide (VANOS) 0.1 % topical cream Apply  to affected area daily. 6/1/20   [place] Renae MD   tadalafil (CIALIS) 5 mg tablet Take 5 mg by mouth. Provider, Historical   oxybutynin (DITROPAN) 5 mg tablet Take 5 mg by mouth two (2) times a day. Provider, Historical   triamcinolone acetonide (KENALOG) 0.1 % ointment Apply  to affected area two (2) times daily as needed for Skin Irritation. use thin layer    Provider, Historical     Current Outpatient Medications   Medication Sig Dispense Refill    sulfaSALAzine (AZULFIDINE) 500 mg tablet Take 1 Tab by mouth four (4) times daily.  Take 2 tabs qAM, 1 tab qPM for 28 days. If no joint pain, decrease to 1 tab PO BID. Decrease daily dose by 1 pill every 30 days as long as joint pain well-controlled. 90 Tab 3    hydroCHLOROthiazide (HYDRODIURIL) 25 mg tablet Take 1 Tab by mouth daily. 30 Tab 1    apremilast (Otezla) 30 mg tab Take 30 mg by mouth two (2) times a day. Indications: moderate to severe plaque psoriasis 60 Tab 11    fluocinonide (VANOS) 0.1 % topical cream Apply  to affected area daily. 30 g 5    tadalafil (CIALIS) 5 mg tablet Take 5 mg by mouth.  oxybutynin (DITROPAN) 5 mg tablet Take 5 mg by mouth two (2) times a day.  triamcinolone acetonide (KENALOG) 0.1 % ointment Apply  to affected area two (2) times daily as needed for Skin Irritation. 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 Treated with neoadjuvant chemoradiation, resection, and adjuvant chemotherapy. Past Surgical History:   Procedure Laterality Date    COLONOSCOPY Left 4/26/2019    COLONOSCOPY performed by Darlene Panchal MD at [address] COLONOSCOPY N/A 10/22/2020    COLONOSCOPY :- performed by Darlene Panchal MD at [place] [id number] N/A 5/2/2019    SIGMOIDOSCOPY FLEXIBLE performed by Marleni Lacey MD at [address] HX GI      EGD    HX HEENT      WISDOM TEETH    HX ORTHOPAEDIC Right     ACL REPLACEMENT    HX OTHER SURGICAL  09/24/2019    Hand-assisted laparoscopic low anterior resection, mobilization of the splenic flexure, coloproctostomy, and creation of loop ileostomy; Dr. Galileo Dorsey.     HX UROLOGICAL  12/2019    CYSTOSCOPY FOR REMOVAL OF KIDNEY STONES WITH HOLMIUM LAZER    HX UROLOGICAL  09/24/2019    Cystoscopy and placement of bilateral temporary ureteral catheters; Ysabel Mims MD.   [facility] HX VASCULAR ACCESS      INSERTION OF PORT-A-CATH RT SIDE OF CHEST. FOR CHEMO-LAST ON 2/19/2020    IR INSERT TUNL CVC W PORT OVER 5 YEARS  10/15/2019    IR REMOVE TUNL CVAD W/O PORT / PUMP  7/16/2020     Family History   Adopted: Yes   Problem Relation Age of Onset    Asthma Neg Hx     Cancer Neg Hx     Diabetes Neg Hx     Heart Disease Neg Hx     Hypertension Neg Hx     Stroke Neg Hx      Social History     Tobacco Use    Smoking status: Never Smoker    Smokeless tobacco: Never Used   Substance Use Topics    Alcohol use: Yes     Alcohol/week: 6.0 standard drinks     Types: 6 Cans of beer per week     Comment: 6 BEERS WEEKLY       ROS    Objective:   No flowsheet data found.      [INSTRUCTIONS:  \"[x]\" Indicates a positive item  \"[]\" Indicates a negative item  -- DELETE ALL ITEMS NOT EXAMINED]    Constitutional: [x] Appears well-developed and well-nourished [x] No apparent distress      [] Abnormal -     Mental status: [x] Alert and awake  [x] Oriented to person/place/time [x] Able to follow commands    [] Abnormal -     Eyes:   EOM    [x]  Normal    [] Abnormal -   Sclera  [x]  Normal    [] Abnormal -          Discharge [x]  None visible   [] Abnormal -     HENT: [x] Normocephalic, atraumatic  [] Abnormal -   [x] Mouth/Throat: Mucous membranes are moist    External Ears [x] Normal  [] Abnormal -    Neck: [x] No visualized mass [] Abnormal -     Pulmonary/Chest: [x] Respiratory effort normal   [x] No visualized signs of difficulty breathing or respiratory distress        [] Abnormal -      Musculoskeletal:   [x] Normal gait with no signs of ataxia         [x] Normal range of motion of neck        [] Abnormal -     Neurological:        [x] No Facial Asymmetry (Cranial nerve 7 motor function) (limited exam due to video visit)          [x] No gaze palsy        [] Abnormal -          Skin:        [x] No significant exanthematous lesions or discoloration noted on facial skin         [] Abnormal -            Psychiatric:       [x] Normal Affect [] Abnormal -        [x] No Hallucinations    Other pertinent observable physical exam findings:-        We discussed the expected course, resolution and complications of the diagnosis(es) in detail. Medication risks, benefits, costs, interactions, and alternatives were discussed as indicated. I advised him to contact the office if his condition worsens, changes or fails to improve as anticipated. He expressed understanding with the diagnosis(es) and plan. Araceli Montero, was evaluated through a synchronous (real-time) audio-video encounter. The patient (or guardian if applicable) is aware that this is a billable service. Verbal consent to proceed has been obtained within the past 12 months. The visit was conducted pursuant to the emergency declaration under the [address] [place] [address], [address] [place] [address] authority and the [employer] General Act. Patient identification was verified, and a caregiver was present when appropriate. The patient was located in a state where the provider was credentialed to provide care.       Ophelia Romeo MD

## 2021-03-04 NOTE — PATIENT INSTRUCTIONS
DASH Diet: Care Instructions Your Care Instructions The DASH diet is an eating plan that can help lower your blood pressure. DASH stands for Dietary Approaches to Stop Hypertension. Hypertension is high blood pressure. The DASH diet focuses on eating foods that are high in calcium, potassium, and magnesium. These nutrients can lower blood pressure. The foods that are highest in these nutrients are fruits, vegetables, low-fat dairy products, nuts, seeds, and legumes. But taking calcium, potassium, and magnesium supplements instead of eating foods that are high in those nutrients does not have the same effect. The DASH diet also includes whole grains, fish, and poultry. The DASH diet is one of several lifestyle changes your doctor may recommend to lower your high blood pressure. Your doctor may also want you to decrease the amount of sodium in your diet. Lowering sodium while following the DASH diet can lower blood pressure even further than just the DASH diet alone. Follow-up care is a key part of your treatment and safety. Be sure to make and go to all appointments, and call your doctor if you are having problems. It's also a good idea to know your test results and keep a list of the medicines you take. How can you care for yourself at home? Following the DASH diet · Eat 4 to 5 servings of fruit each day. A serving is 1 medium-sized piece of fruit, ½ cup chopped or canned fruit, 1/4 cup dried fruit, or 4 ounces (½ cup) of fruit juice. Choose fruit more often than fruit juice. · Eat 4 to 5 servings of vegetables each day. A serving is 1 cup of lettuce or raw leafy vegetables, ½ cup of chopped or cooked vegetables, or 4 ounces (½ cup) of vegetable juice. Choose vegetables more often than vegetable juice. · Get 2 to 3 servings of low-fat and fat-free dairy each day. A serving is 8 ounces of milk, 1 cup of yogurt, or 1 ½ ounces of cheese. · Eat 6 to 8 servings of grains each day.  A serving is 1 slice of bread, 1 ounce of dry cereal, or ½ cup of cooked rice, pasta, or cooked cereal. Try to choose whole-grain products as much as possible. 
· Limit lean meat, poultry, and fish to 2 servings each day. A serving is 3 ounces, about the size of a deck of cards. 
· Eat 4 to 5 servings of nuts, seeds, and legumes (cooked dried beans, lentils, and split peas) each week. A serving is 1/3 cup of nuts, 2 tablespoons of seeds, or ½ cup of cooked beans or peas. 
· Limit fats and oils to 2 to 3 servings each day. A serving is 1 teaspoon of vegetable oil or 2 tablespoons of salad dressing. 
· Limit sweets and added sugars to 5 servings or less a week. A serving is 1 tablespoon jelly or jam, ½ cup sorbet, or 1 cup of lemonade. 
· Eat less than 2,300 milligrams (mg) of sodium a day. If you limit your sodium to 1,500 mg a day, you can lower your blood pressure even more. 
Tips for success 
· Start small. Do not try to make dramatic changes to your diet all at once. You might feel that you are missing out on your favorite foods and then be more likely to not follow the plan. Make small changes, and stick with them. Once those changes become habit, add a few more changes. 
· Try some of the following: 
? Make it a goal to eat a fruit or vegetable at every meal and at snacks. This will make it easy to get the recommended amount of fruits and vegetables each day. 
? Try yogurt topped with fruit and nuts for a snack or healthy dessert. 
? Add lettuce, tomato, cucumber, and onion to sandwiches. 
? Combine a ready-made pizza crust with low-fat mozzarella cheese and lots of vegetable toppings. Try using tomatoes, squash, spinach, broccoli, carrots, cauliflower, and onions. 
? Have a variety of cut-up vegetables with a low-fat dip as an appetizer instead of chips and dip. 
? Sprinkle sunflower seeds or chopped almonds over salads. Or try adding chopped walnuts or almonds to cooked vegetables. 
? Try some vegetarian meals using beans and  peas. Add garbanzo or kidney beans to salads. Make burritos and tacos with mashed brambila beans or black beans. Where can you learn more? Go to http://www.gray.com/ Enter T037 in the search box to learn more about \"DASH Diet: Care Instructions. \" Current as of: December 16, 2019               Content Version: 12.6 © 5229-9978 [employer]. Care instructions adapted under license by [employer] (which disclaims liability or warranty for this information). If you have questions about a medical condition or this instruction, always ask your healthcare professional. [address] any warranty or liability for your use of this information.

## 2021-03-15 RX ORDER — HYDROCHLOROTHIAZIDE 25 MG/1
TABLET ORAL
Qty: 30 TAB | Refills: 1 | Status: SHIPPED | OUTPATIENT
Start: 2021-03-15 | End: 2021-04-13

## 2021-03-18 ENCOUNTER — HOSPITAL ENCOUNTER (OUTPATIENT)
Dept: CT IMAGING | Age: 51
Discharge: HOME | End: 2021-03-18
Attending: INTERNAL MEDICINE
Payer: MEDICAID

## 2021-03-18 DIAGNOSIS — C18.9 MALIGNANT NEOPLASM OF COLON, UNSPECIFIED PART OF COLON (HCC): ICD-10-CM

## 2021-03-18 PROCEDURE — 74177 CT ABD & PELVIS W/CONTRAST: CPT

## 2021-03-18 PROCEDURE — 74011000636 HC RX REV CODE- 636: Performed by: RADIOLOGY

## 2021-03-18 RX ORDER — SODIUM CHLORIDE 0.9 % (FLUSH) 0.9 %
10 SYRINGE (ML) INJECTION
Status: DISPENSED | OUTPATIENT
Start: 2021-03-18 | End: 2021-03-18

## 2021-03-18 RX ADMIN — IOPAMIDOL 100 ML: 755 INJECTION, SOLUTION INTRAVENOUS at 09:21

## 2021-03-19 LAB
ALBUMIN SERPL-MCNC: 4.5 G/DL (ref 3.8–4.9)
ALBUMIN/GLOB SERPL: 2 {RATIO} (ref 1.2–2.2)
ALP SERPL-CCNC: 56 IU/L (ref 39–117)
ALT SERPL-CCNC: 16 IU/L (ref 0–44)
AST SERPL-CCNC: 18 IU/L (ref 0–40)
BASOPHILS # BLD AUTO: 0 X10E3/UL (ref 0–0.2)
BASOPHILS NFR BLD AUTO: 1 %
BILIRUB SERPL-MCNC: 0.5 MG/DL (ref 0–1.2)
BUN SERPL-MCNC: 11 MG/DL (ref 6–24)
BUN/CREAT SERPL: 12 (ref 9–20)
CALCIUM SERPL-MCNC: 9.1 MG/DL (ref 8.7–10.2)
CEA SERPL-MCNC: 3 NG/ML (ref 0–4.7)
CHLORIDE SERPL-SCNC: 97 MMOL/L (ref 96–106)
CO2 SERPL-SCNC: 25 MMOL/L (ref 20–29)
CREAT SERPL-MCNC: 0.95 MG/DL (ref 0.76–1.27)
EOSINOPHIL # BLD AUTO: 0.1 X10E3/UL (ref 0–0.4)
EOSINOPHIL NFR BLD AUTO: 3 %
ERYTHROCYTE [DISTWIDTH] IN BLOOD BY AUTOMATED COUNT: 12.8 % (ref 11.6–15.4)
GLOBULIN SER CALC-MCNC: 2.2 G/DL (ref 1.5–4.5)
GLUCOSE SERPL-MCNC: 81 MG/DL (ref 65–99)
HCT VFR BLD AUTO: 44.4 % (ref 37.5–51)
HGB BLD-MCNC: 15.5 G/DL (ref 13–17.7)
IMM GRANULOCYTES # BLD AUTO: 0 X10E3/UL (ref 0–0.1)
IMM GRANULOCYTES NFR BLD AUTO: 0 %
LYMPHOCYTES # BLD AUTO: 0.3 X10E3/UL (ref 0.7–3.1)
LYMPHOCYTES NFR BLD AUTO: 8 %
MCH RBC QN AUTO: 33.9 PG (ref 26.6–33)
MCHC RBC AUTO-ENTMCNC: 34.9 G/DL (ref 31.5–35.7)
MCV RBC AUTO: 97 FL (ref 79–97)
MONOCYTES # BLD AUTO: 0.4 X10E3/UL (ref 0.1–0.9)
MONOCYTES NFR BLD AUTO: 9 %
NEUTROPHILS # BLD AUTO: 3.4 X10E3/UL (ref 1.4–7)
NEUTROPHILS NFR BLD AUTO: 79 %
PLATELET # BLD AUTO: 163 X10E3/UL (ref 150–450)
POTASSIUM SERPL-SCNC: 3.2 MMOL/L (ref 3.5–5.2)
PROT SERPL-MCNC: 6.7 G/DL (ref 6–8.5)
RBC # BLD AUTO: 4.57 X10E6/UL (ref 4.14–5.8)
SODIUM SERPL-SCNC: 135 MMOL/L (ref 134–144)
WBC # BLD AUTO: 4.3 X10E3/UL (ref 3.4–10.8)

## 2021-04-01 ENCOUNTER — VIRTUAL VISIT (OUTPATIENT)
Dept: INTERNAL MEDICINE CLINIC | Age: 51
End: 2021-04-01
Payer: MEDICAID

## 2021-04-01 DIAGNOSIS — R06.09 DOE (DYSPNEA ON EXERTION): ICD-10-CM

## 2021-04-01 DIAGNOSIS — I10 BENIGN ESSENTIAL HTN: Primary | ICD-10-CM

## 2021-04-01 PROCEDURE — 99214 OFFICE O/P EST MOD 30 MIN: CPT | Performed by: FAMILY MEDICINE

## 2021-04-01 RX ORDER — LOSARTAN POTASSIUM 50 MG/1
50 TABLET ORAL DAILY
Qty: 30 TAB | Refills: 3 | Status: SHIPPED | OUTPATIENT
Start: 2021-04-01 | End: 2021-04-01 | Stop reason: SDUPTHER

## 2021-04-01 RX ORDER — LOSARTAN POTASSIUM 50 MG/1
50 TABLET ORAL DAILY
Qty: 30 TAB | Refills: 3 | Status: SHIPPED | OUTPATIENT
Start: 2021-04-01 | End: 2021-05-12

## 2021-04-01 NOTE — PATIENT INSTRUCTIONS
DASH Diet: Care Instructions Your Care Instructions The DASH diet is an eating plan that can help lower your blood pressure. DASH stands for Dietary Approaches to Stop Hypertension. Hypertension is high blood pressure. The DASH diet focuses on eating foods that are high in calcium, potassium, and magnesium. These nutrients can lower blood pressure. The foods that are highest in these nutrients are fruits, vegetables, low-fat dairy products, nuts, seeds, and legumes. But taking calcium, potassium, and magnesium supplements instead of eating foods that are high in those nutrients does not have the same effect. The DASH diet also includes whole grains, fish, and poultry. The DASH diet is one of several lifestyle changes your doctor may recommend to lower your high blood pressure. Your doctor may also want you to decrease the amount of sodium in your diet. Lowering sodium while following the DASH diet can lower blood pressure even further than just the DASH diet alone. Follow-up care is a key part of your treatment and safety. Be sure to make and go to all appointments, and call your doctor if you are having problems. It's also a good idea to know your test results and keep a list of the medicines you take. How can you care for yourself at home? Following the DASH diet · Eat 4 to 5 servings of fruit each day. A serving is 1 medium-sized piece of fruit, ½ cup chopped or canned fruit, 1/4 cup dried fruit, or 4 ounces (½ cup) of fruit juice. Choose fruit more often than fruit juice. · Eat 4 to 5 servings of vegetables each day. A serving is 1 cup of lettuce or raw leafy vegetables, ½ cup of chopped or cooked vegetables, or 4 ounces (½ cup) of vegetable juice. Choose vegetables more often than vegetable juice. · Get 2 to 3 servings of low-fat and fat-free dairy each day. A serving is 8 ounces of milk, 1 cup of yogurt, or 1 ½ ounces of cheese. · Eat 6 to 8 servings of grains each day.  A serving is 1 slice of bread, 1 ounce of dry cereal, or ½ cup of cooked rice, pasta, or cooked cereal. Try to choose whole-grain products as much as possible. · Limit lean meat, poultry, and fish to 2 servings each day. A serving is 3 ounces, about the size of a deck of cards. · Eat 4 to 5 servings of nuts, seeds, and legumes (cooked dried beans, lentils, and split peas) each week. A serving is 1/3 cup of nuts, 2 tablespoons of seeds, or ½ cup of cooked beans or peas. · Limit fats and oils to 2 to 3 servings each day. A serving is 1 teaspoon of vegetable oil or 2 tablespoons of salad dressing. · Limit sweets and added sugars to 5 servings or less a week. A serving is 1 tablespoon jelly or jam, ½ cup sorbet, or 1 cup of lemonade. · Eat less than 2,300 milligrams (mg) of sodium a day. If you limit your sodium to 1,500 mg a day, you can lower your blood pressure even more. · Be aware that all of these are the suggested number of servings for people who eat 1,800 to 2,000 calories a day. Your recommended number of servings may be different if you need more or fewer calories. Tips for success · Start small. Do not try to make dramatic changes to your diet all at once. You might feel that you are missing out on your favorite foods and then be more likely to not follow the plan. Make small changes, and stick with them. Once those changes become habit, add a few more changes. · Try some of the following: ? Make it a goal to eat a fruit or vegetable at every meal and at snacks. This will make it easy to get the recommended amount of fruits and vegetables each day. ? Try yogurt topped with fruit and nuts for a snack or healthy dessert. ? Add lettuce, tomato, cucumber, and onion to sandwiches. ? Combine a ready-made pizza crust with low-fat mozzarella cheese and lots of vegetable toppings. Try using tomatoes, squash, spinach, broccoli, carrots, cauliflower, and onions. ?  Have a variety of cut-up vegetables with a low-fat dip as an appetizer instead of chips and dip. ? Sprinkle sunflower seeds or chopped almonds over salads. Or try adding chopped walnuts or almonds to cooked vegetables. ? Try some vegetarian meals using beans and peas. Add garbanzo or kidney beans to salads. Make burritos and tacos with mashed brambila beans or black beans. Where can you learn more? Go to http://www.gray.com/ Enter X232 in the search box to learn more about \"DASH Diet: Care Instructions. \" Current as of: August 31, 2020               Content Version: 12.8 © 5469-0857 [employer]. Care instructions adapted under license by [employer] (which disclaims liability or warranty for this information). If you have questions about a medical condition or this instruction, always ask your healthcare professional. [address] any warranty or liability for your use of this information.

## 2021-04-01 NOTE — PROGRESS NOTES
Shashi Jerome is a 45 y.o. male who was seen by synchronous (real-time) audio-video technology on 4/1/2021 for Hypertension        Assessment & Plan:   Diagnoses and all orders for this visit:    1. Benign essential HTN    2. LESTER (dyspnea on exertion)  -     EXERCISE CARDIAC STRESS TEST; Future    Other orders  -     losartan (COZAAR) 50 mg tablet; Take 1 Tab by mouth daily. We will add losartan to his hydrochlorothiazide to help with blood pressures down to 120 over 80s. Patient will follow up in 2 weeks continue home blood pressure log    With increasing amounts of LESTER, recommend stress test to patient. Seen that his CT scan of chest was normal back in March 2020, will defer imaging at this time. Await results of consider referral to pulmonology if continues      Subjective:     Patient is a 27-year-old male following up on blood pressure. Patient states his blood pressures at home with self-monitoring are running in the 140s over 90s with very few that are within normal range. Patient states that he is working on diet and exercise changes. Denies any chest pain or palpitations. He does state that he has noticed over the past year he has increasing amounts of dyspnea on exertion. Patient states that things that he was able to do before he feels more winded with currently. This can be from getting up from the chair and walking around the room to do going up and down staircases. Denies any chest pain and no radiation of symptoms. Prior to Admission medications    Medication Sig Start Date End Date Taking? Authorizing Provider   losartan (COZAAR) 50 mg tablet Take 1 Tab by mouth daily. 4/1/21  Yes Caden Zavala MD   hydroCHLOROthiazide (HYDRODIURIL) 25 mg tablet TAKE 1 TABLET BY MOUTH EVERY DAY 3/15/21   Caden Zavala MD   sulfaSALAzine (AZULFIDINE) 500 mg tablet Take 1 Tab by mouth four (4) times daily. Take 2 tabs qAM, 1 tab qPM for 28 days. If no joint pain, decrease to 1 tab PO BID.  Decrease daily dose by 1 pill every 30 days as long as joint pain well-controlled. 3/2/21   Ann Velasquez MD   apremilast [facility]) 30 mg tab Take 30 mg by mouth two (2) times a day. Indications: moderate to severe plaque psoriasis 12/16/20   Ann Velasquez MD   fluocinonide (VANOS) 0.1 % topical cream Apply  to affected area daily. 6/1/20   Carmelo Guillen MD   tadalafil (CIALIS) 5 mg tablet Take 5 mg by mouth. Provider, Historical   oxybutynin (DITROPAN) 5 mg tablet Take 5 mg by mouth two (2) times a day. Provider, Historical   triamcinolone acetonide (KENALOG) 0.1 % ointment Apply  to affected area two (2) times daily as needed for Skin Irritation. use thin layer    Provider, Historical     Patient Active Problem List    Diagnosis Date Noted    Malignant neoplasm of colon ([facility] 01/22/2021    Ileostomy present ([facility] 03/17/2020    Port-A-Cath in place 02/05/2020    Encounter for antineoplastic chemotherapy 02/05/2020    Neutropenia ([facility] 12/19/2019    Thrombocytopenia ([facility] 09/24/2019    Gastrointestinal hemorrhage 05/10/2019    [facility] cancer ([facility] 05/10/2019    Psoriatic arthritis ([facility] 03/27/2019    Psoriasis 03/27/2019    ACL tear 02/20/2013     Current Outpatient Medications   Medication Sig Dispense Refill    losartan (COZAAR) 50 mg tablet Take 1 Tab by mouth daily. 30 Tab 3    hydroCHLOROthiazide (HYDRODIURIL) 25 mg tablet TAKE 1 TABLET BY MOUTH EVERY DAY 30 Tab 1    sulfaSALAzine (AZULFIDINE) 500 mg tablet Take 1 Tab by mouth four (4) times daily. Take 2 tabs qAM, 1 tab qPM for 28 days. If no joint pain, decrease to 1 tab PO BID. Decrease daily dose by 1 pill every 30 days as long as joint pain well-controlled. 90 Tab 3    apremilast (Otezla) 30 mg tab Take 30 mg by mouth two (2) times a day. Indications: moderate to severe plaque psoriasis 60 Tab 11    fluocinonide (VANOS) 0.1 % topical cream Apply  to affected area daily. 30 g 5    tadalafil (CIALIS) 5 mg tablet Take 5 mg by mouth.       oxybutynin (DITROPAN) 5 mg tablet Take 5 mg by mouth two (2) times a day.  triamcinolone acetonide (KENALOG) 0.1 % ointment Apply  to affected area two (2) times daily as needed for Skin Irritation. use thin layer       No Known Allergies  Past Medical History:   Diagnosis Date    Adopted     GERD (gastroesophageal reflux disease)     Ill-defined condition     CHEMO-LAST ON 2/19/2020    Psoriasis     [facility]iatic arthritis ([facility]     Rectal cancer (HCC)     Moderately differentiated [id number] adenocarcinoma of the rectum. Treated with neoadjuvant chemoradiation, resection, and adjuvant chemotherapy. Past Surgical History:   Procedure Laterality Date    COLONOSCOPY Left 4/26/2019    COLONOSCOPY performed by Alfa Kitchen MD at [address] COLONOSCOPY N/A 10/22/2020    COLONOSCOPY :- performed by Alfa Kitchen MD at [address] N/A 5/2/2019    SIGMOIDOSCOPY FLEXIBLE performed by Laura Baugh MD at [address] HX GI      EGD    HX HEENT      WISDOM TEETH    HX ORTHOPAEDIC Right     ACL REPLACEMENT    HX OTHER SURGICAL  09/24/2019    Hand-assisted laparoscopic low anterior resection, mobilization of the splenic flexure, coloproctostomy, and creation of loop ileostomy; Dr. Raza Pringle.  HX UROLOGICAL  12/2019    CYSTOSCOPY FOR REMOVAL OF KIDNEY STONES WITH HOLMIUM LAZER    HX UROLOGICAL  09/24/2019    Cystoscopy and placement of bilateral temporary ureteral catheters; Jozef Jarrett MD.    HX VASCULAR ACCESS      INSERTION OF PORT-A-CATH RT SIDE OF CHEST. FOR CHEMO-LAST ON 2/19/2020    IR INSERT TUNL CVC W PORT OVER 5 YEARS  10/15/2019    IR REMOVE TUNL CVAD W/O PORT / PUMP  7/16/2020     Family History   Adopted: Yes   Problem Relation Age of Onset    Asthma Neg Hx     Cancer Neg Hx     Diabetes Neg Hx     Heart Disease Neg Hx     Hypertension Neg Hx     Stroke Neg Hx        ROS    Objective:   No flowsheet data found.      [INSTRUCTIONS: \"[x]\" Indicates a positive item  \"[]\" Indicates a negative item  -- DELETE ALL ITEMS NOT EXAMINED]    Constitutional: [x] Appears well-developed and well-nourished [x] No apparent distress      [] Abnormal -     Mental status: [x] Alert and awake  [x] Oriented to person/place/time [x] Able to follow commands    [] Abnormal -     Eyes:   EOM    [x]  Normal    [] Abnormal -   Sclera  [x]  Normal    [] Abnormal -          Discharge [x]  None visible   [] Abnormal -     HENT: [x] Normocephalic, atraumatic  [] Abnormal -   [x] Mouth/Throat: Mucous membranes are moist    External Ears [x] Normal  [] Abnormal -    Neck: [x] No visualized mass [] Abnormal -     Pulmonary/Chest: [x] Respiratory effort normal   [x] No visualized signs of difficulty breathing or respiratory distress        [] Abnormal -      Musculoskeletal:   [x] Normal gait with no signs of ataxia         [x] Normal range of motion of neck        [] Abnormal -     Neurological:        [x] No Facial Asymmetry (Cranial nerve 7 motor function) (limited exam due to video visit)          [x] No gaze palsy        [] Abnormal -          Skin:        [x] No significant exanthematous lesions or discoloration noted on facial skin         [] Abnormal -            Psychiatric:       [x] Normal Affect [] Abnormal -        [x] No Hallucinations    Other pertinent observable physical exam findings:-        We discussed the expected course, resolution and complications of the diagnosis(es) in detail. Medication risks, benefits, costs, interactions, and alternatives were discussed as indicated. I advised him to contact the office if his condition worsens, changes or fails to improve as anticipated. He expressed understanding with the diagnosis(es) and plan. Shagufta Abreu, was evaluated through a synchronous (real-time) audio-video encounter. The patient (or guardian if applicable) is aware that this is a billable service.  Verbal consent to proceed has been obtained within the past 12 months. The visit was conducted pursuant to the emergency declaration under the [address] [place] [address] and the [employer] [facility] General Act. Patient identification was verified, and a caregiver was present when appropriate. The patient was located in a state where the provider was credentialed to provide care.       Thad Marcano MD

## 2021-04-09 ENCOUNTER — HOSPITAL ENCOUNTER (OUTPATIENT)
Dept: NON INVASIVE DIAGNOSTICS | Age: 51
Discharge: HOME | End: 2021-04-09
Attending: FAMILY MEDICINE
Payer: MEDICAID

## 2021-04-09 VITALS
WEIGHT: 149 LBS | SYSTOLIC BLOOD PRESSURE: 144 MMHG | HEIGHT: 67 IN | DIASTOLIC BLOOD PRESSURE: 84 MMHG | BODY MASS INDEX: 23.39 KG/M2

## 2021-04-09 DIAGNOSIS — R06.09 DOE (DYSPNEA ON EXERTION): ICD-10-CM

## 2021-04-09 LAB
STRESS ANGINA INDEX: 0
STRESS BASELINE DIAS BP: 84 MMHG
STRESS BASELINE HR: 76 BPM
STRESS BASELINE SYS BP: 144 MMHG
STRESS ESTIMATED WORKLOAD: 13.4 METS
STRESS EXERCISE DUR MIN: NORMAL
STRESS PEAK DIAS BP: 90 MMHG
STRESS PEAK SYS BP: 200 MMHG
STRESS PERCENT HR ACHIEVED: 103 %
STRESS POST PEAK HR: 173 BPM
STRESS RATE PRESSURE PRODUCT: NORMAL BPM*MMHG
STRESS STAGE 1 BP: NORMAL MMHG
STRESS STAGE 1 DURATION: 3 MIN:SEC
STRESS STAGE 1 HR: 98 BPM
STRESS STAGE 2 BP: NORMAL MMHG
STRESS STAGE 2 DURATION: 3 MIN:SEC
STRESS STAGE 2 HR: 123 BPM
STRESS STAGE 3 BP: NORMAL MMHG
STRESS STAGE 3 DURATION: 3 MIN:SEC
STRESS STAGE 3 HR: 146 BPM
STRESS STAGE 4 DURATION: 2 MIN:SEC
STRESS STAGE 4 HR: 173 BPM
STRESS STAGE RECOVERY 1 DURATION: 1 MIN:SEC
STRESS STAGE RECOVERY 1 HR: 162 BPM
STRESS STAGE RECOVERY 2 BP: NORMAL MMHG
STRESS STAGE RECOVERY 2 DURATION: 1 MIN:SEC
STRESS STAGE RECOVERY 2 HR: 144 BPM
STRESS STAGE RECOVERY 3 DURATION: 1 MIN:SEC
STRESS STAGE RECOVERY 3 HR: 129 BPM
STRESS TARGET HR: 168 BPM

## 2021-04-09 PROCEDURE — 93017 CV STRESS TEST TRACING ONLY: CPT

## 2021-04-13 RX ORDER — HYDROCHLOROTHIAZIDE 25 MG/1
TABLET ORAL
Qty: 30 TAB | Refills: 1 | Status: SHIPPED | OUTPATIENT
Start: 2021-04-13 | End: 2021-05-06

## 2021-04-15 ENCOUNTER — VIRTUAL VISIT (OUTPATIENT)
Dept: INTERNAL MEDICINE CLINIC | Age: 51
End: 2021-04-15
Payer: MEDICAID

## 2021-04-15 DIAGNOSIS — I10 BENIGN ESSENTIAL HTN: Primary | ICD-10-CM

## 2021-04-15 DIAGNOSIS — R94.31 ST SEGMENT DEPRESSION: ICD-10-CM

## 2021-04-15 PROCEDURE — 99214 OFFICE O/P EST MOD 30 MIN: CPT | Performed by: FAMILY MEDICINE

## 2021-04-15 NOTE — PATIENT INSTRUCTIONS
DASH Diet: Care Instructions Your Care Instructions The DASH diet is an eating plan that can help lower your blood pressure. DASH stands for Dietary Approaches to Stop Hypertension. Hypertension is high blood pressure. The DASH diet focuses on eating foods that are high in calcium, potassium, and magnesium. These nutrients can lower blood pressure. The foods that are highest in these nutrients are fruits, vegetables, low-fat dairy products, nuts, seeds, and legumes. But taking calcium, potassium, and magnesium supplements instead of eating foods that are high in those nutrients does not have the same effect. The DASH diet also includes whole grains, fish, and poultry. The DASH diet is one of several lifestyle changes your doctor may recommend to lower your high blood pressure. Your doctor may also want you to decrease the amount of sodium in your diet. Lowering sodium while following the DASH diet can lower blood pressure even further than just the DASH diet alone. Follow-up care is a key part of your treatment and safety. Be sure to make and go to all appointments, and call your doctor if you are having problems. It's also a good idea to know your test results and keep a list of the medicines you take. How can you care for yourself at home? Following the DASH diet · Eat 4 to 5 servings of fruit each day. A serving is 1 medium-sized piece of fruit, ½ cup chopped or canned fruit, 1/4 cup dried fruit, or 4 ounces (½ cup) of fruit juice. Choose fruit more often than fruit juice. · Eat 4 to 5 servings of vegetables each day. A serving is 1 cup of lettuce or raw leafy vegetables, ½ cup of chopped or cooked vegetables, or 4 ounces (½ cup) of vegetable juice. Choose vegetables more often than vegetable juice. · Get 2 to 3 servings of low-fat and fat-free dairy each day. A serving is 8 ounces of milk, 1 cup of yogurt, or 1 ½ ounces of cheese. · Eat 6 to 8 servings of grains each day.  A serving is 1 slice of bread, 1 ounce of dry cereal, or ½ cup of cooked rice, pasta, or cooked cereal. Try to choose whole-grain products as much as possible. · Limit lean meat, poultry, and fish to 2 servings each day. A serving is 3 ounces, about the size of a deck of cards. · Eat 4 to 5 servings of nuts, seeds, and legumes (cooked dried beans, lentils, and split peas) each week. A serving is 1/3 cup of nuts, 2 tablespoons of seeds, or ½ cup of cooked beans or peas. · Limit fats and oils to 2 to 3 servings each day. A serving is 1 teaspoon of vegetable oil or 2 tablespoons of salad dressing. · Limit sweets and added sugars to 5 servings or less a week. A serving is 1 tablespoon jelly or jam, ½ cup sorbet, or 1 cup of lemonade. · Eat less than 2,300 milligrams (mg) of sodium a day. If you limit your sodium to 1,500 mg a day, you can lower your blood pressure even more. · Be aware that all of these are the suggested number of servings for people who eat 1,800 to 2,000 calories a day. Your recommended number of servings may be different if you need more or fewer calories. Tips for success · Start small. Do not try to make dramatic changes to your diet all at once. You might feel that you are missing out on your favorite foods and then be more likely to not follow the plan. Make small changes, and stick with them. Once those changes become habit, add a few more changes. · Try some of the following: ? Make it a goal to eat a fruit or vegetable at every meal and at snacks. This will make it easy to get the recommended amount of fruits and vegetables each day. ? Try yogurt topped with fruit and nuts for a snack or healthy dessert. ? Add lettuce, tomato, cucumber, and onion to sandwiches. ? Combine a ready-made pizza crust with low-fat mozzarella cheese and lots of vegetable toppings. Try using tomatoes, squash, spinach, broccoli, carrots, cauliflower, and onions. ?  Have a variety of cut-up vegetables with a low-fat dip as an appetizer instead of chips and dip. ? Sprinkle sunflower seeds or chopped almonds over salads. Or try adding chopped walnuts or almonds to cooked vegetables. ? Try some vegetarian meals using beans and peas. Add garbanzo or kidney beans to salads. Make burritos and tacos with mashed brambila beans or black beans. Where can you learn more? Go to http://www.gray.com/ Enter M644 in the search box to learn more about \"DASH Diet: Care Instructions. \" Current as of: August 31, 2020               Content Version: 12.8 © 3950-1423 [employer]. Care instructions adapted under license by [employer] (which disclaims liability or warranty for this information). If you have questions about a medical condition or this instruction, always ask your healthcare professional. [address] any warranty or liability for your use of this information.

## 2021-04-15 NOTE — PROGRESS NOTES
Barbara Buckner is a 45 y.o. male who was seen by synchronous (real-time) audio-video technology on 4/15/2021 for Hypertension        Assessment & Plan:   Diagnoses and all orders for this visit:    1. Benign essential HTN    2. ST segment depression  -     REFERRAL TO CARDIOLOGY            Subjective:     Patient is a 59-year-old male following up on blood pressures. Patient states that starting losartan his blood pressures are all in the 120s over 80s. He denies any chest pain or palpitations. He is still suffering from ongoing shortness of breath with exercise. He had his stress test done recently which showed ST depression and patient states that he did feel winded during his exercise stress test.  Currently he denies any chest pain shortness of breath but occasionally does have LESTER. Denies any radiation of pain or jaw pain down his left side  Prior to Admission medications    Medication Sig Start Date End Date Taking? Authorizing Provider   hydroCHLOROthiazide (HYDRODIURIL) 25 mg tablet TAKE 1 TABLET BY MOUTH EVERY DAY 4/13/21   Antoinette Rojas MD   losartan (COZAAR) 50 mg tablet Take 1 Tab by mouth daily. 4/1/21   Antoinette Rojas MD   sulfaSALAzine (AZULFIDINE) 500 mg tablet Take 1 Tab by mouth four (4) times daily. Take 2 tabs qAM, 1 tab qPM for 28 days. If no joint pain, decrease to 1 tab PO BID. Decrease daily dose by 1 pill every 30 days as long as joint pain well-controlled. 3/2/21   Momo Hart MD   apremilast [facility]) 30 mg tab Take 30 mg by mouth two (2) times a day. Indications: moderate to severe plaque psoriasis 12/16/20   Momo Hart MD   fluocinonide (VANOS) 0.1 % topical cream Apply  to affected area daily. 6/1/20   Christine Ortiz MD   tadalafil (CIALIS) 5 mg tablet Take 5 mg by mouth. Provider, Historical   triamcinolone acetonide (KENALOG) 0.1 % ointment Apply  to affected area two (2) times daily as needed for Skin Irritation.  use thin layer    Provider, Historical     Current Outpatient Medications   Medication Sig Dispense Refill    hydroCHLOROthiazide (HYDRODIURIL) 25 mg tablet TAKE 1 TABLET BY MOUTH EVERY DAY 30 Tab 1    losartan (COZAAR) 50 mg tablet Take 1 Tab by mouth daily. 30 Tab 3    sulfaSALAzine (AZULFIDINE) 500 mg tablet Take 1 Tab by mouth four (4) times daily. Take 2 tabs qAM, 1 tab qPM for 28 days. If no joint pain, decrease to 1 tab PO BID. Decrease daily dose by 1 pill every 30 days as long as joint pain well-controlled. 90 Tab 3    apremilast (Otezla) 30 mg tab Take 30 mg by mouth two (2) times a day. Indications: moderate to severe plaque psoriasis 60 Tab 11    fluocinonide (VANOS) 0.1 % topical cream Apply  to affected area daily. 30 g 5    tadalafil (CIALIS) 5 mg tablet Take 5 mg by mouth.  triamcinolone acetonide (KENALOG) 0.1 % ointment Apply  to affected area two (2) times daily as needed for Skin Irritation. use thin layer       No Known Allergies  Past Medical History:   Diagnosis Date    Adopted     GERD (gastroesophageal reflux disease)     Ill-defined condition     CHEMO-LAST ON 2/19/2020    Psoriasis     [facility]     Rectal cancer (HCC)     Moderately differentiated [id number] adenocarcinoma of the rectum. Treated with neoadjuvant chemoradiation, resection, and adjuvant chemotherapy. Past Surgical History:   Procedure Laterality Date    COLONOSCOPY Left 4/26/2019    COLONOSCOPY performed by Selene Chang MD at [address] COLONOSCOPY N/A 10/22/2020    COLONOSCOPY :- performed by Selene Chang MD at [address] N/A 5/2/2019    SIGMOIDOSCOPY FLEXIBLE performed by Marilou Puentes MD at [address] HX GI      EGD    HX HEENT      WISDOM TEETH    HX ORTHOPAEDIC Right     ACL REPLACEMENT    HX OTHER SURGICAL  09/24/2019    Hand-assisted laparoscopic low anterior resection, mobilization of the splenic flexure, coloproctostomy, and creation of loop ileostomy; Dr. Polo Galindo.  HX UROLOGICAL  12/2019    CYSTOSCOPY FOR REMOVAL OF KIDNEY STONES WITH HOLMIUM LAZER    HX UROLOGICAL  09/24/2019    Cystoscopy and placement of bilateral temporary ureteral catheters; Stephanie Davey MD.    HX VASCULAR ACCESS      INSERTION OF PORT-A-CATH RT SIDE OF CHEST. FOR CHEMO-LAST ON 2/19/2020    IR INSERT TUNL CVC W PORT OVER 5 YEARS  10/15/2019    IR REMOVE TUNL CVAD W/O PORT / PUMP  7/16/2020     Family History   Adopted: Yes   Problem Relation Age of Onset    Asthma Neg Hx     Cancer Neg Hx     Diabetes Neg Hx     Heart Disease Neg Hx     Hypertension Neg Hx     Stroke Neg Hx      Social History     Tobacco Use    Smoking status: Never Smoker    Smokeless tobacco: Never Used   Substance Use Topics    Alcohol use: Yes     Alcohol/week: 6.0 standard drinks     Types: 6 Cans of beer per week     Comment: 6 BEERS WEEKLY       ROS    Objective:   No flowsheet data found.      [INSTRUCTIONS:  \"[x]\" Indicates a positive item  \"[]\" Indicates a negative item  -- DELETE ALL ITEMS NOT EXAMINED]    Constitutional: [x] Appears well-developed and well-nourished [x] No apparent distress      [] Abnormal -     Mental status: [x] Alert and awake  [x] Oriented to person/place/time [x] Able to follow commands    [] Abnormal -     Eyes:   EOM    [x]  Normal    [] Abnormal -   Sclera  [x]  Normal    [] Abnormal -          Discharge [x]  None visible   [] Abnormal -     HENT: [x] Normocephalic, atraumatic  [] Abnormal -   [x] Mouth/Throat: Mucous membranes are moist    External Ears [x] Normal  [] Abnormal -    Neck: [x] No visualized mass [] Abnormal -     Pulmonary/Chest: [x] Respiratory effort normal   [x] No visualized signs of difficulty breathing or respiratory distress        [] Abnormal -      Musculoskeletal:   [x] Normal gait with no signs of ataxia         [x] Normal range of motion of neck        [] Abnormal -     Neurological:        [x] No Facial Asymmetry (Cranial nerve 7 motor function) (limited exam due to video visit)          [x] No gaze palsy        [] Abnormal -          Skin:        [x] No significant exanthematous lesions or discoloration noted on facial skin         [] Abnormal -            Psychiatric:       [x] Normal Affect [] Abnormal -        [x] No Hallucinations    Other pertinent observable physical exam findings:-        We discussed the expected course, resolution and complications of the diagnosis(es) in detail. Medication risks, benefits, costs, interactions, and alternatives were discussed as indicated. I advised him to contact the office if his condition worsens, changes or fails to improve as anticipated. He expressed understanding with the diagnosis(es) and plan. Gadiel Villareal, was evaluated through a synchronous (real-time) audio-video encounter. The patient (or guardian if applicable) is aware that this is a billable service. Verbal consent to proceed has been obtained within the past 12 months. The visit was conducted pursuant to the emergency declaration under the [address] [place] [address] authority and the [employer] General Act. Patient identification was verified, and a caregiver was present when appropriate. The patient was located in a state where the provider was credentialed to provide care.       Rohit White MD

## 2021-04-18 ENCOUNTER — APPOINTMENT (OUTPATIENT)
Dept: CT IMAGING | Age: 51
DRG: 247 | End: 2021-04-18
Attending: EMERGENCY MEDICINE
Payer: MEDICAID

## 2021-04-18 ENCOUNTER — HOSPITAL ENCOUNTER (INPATIENT)
Age: 51
LOS: 3 days | Discharge: HOME | DRG: 247 | End: 2021-04-21
Attending: EMERGENCY MEDICINE | Admitting: COLON & RECTAL SURGERY
Payer: MEDICAID

## 2021-04-18 ENCOUNTER — APPOINTMENT (OUTPATIENT)
Dept: GENERAL RADIOLOGY | Age: 51
DRG: 247 | End: 2021-04-18
Attending: COLON & RECTAL SURGERY
Payer: MEDICAID

## 2021-04-18 DIAGNOSIS — R11.0 NAUSEA WITHOUT VOMITING: ICD-10-CM

## 2021-04-18 DIAGNOSIS — K56.609 SMALL BOWEL OBSTRUCTION (HCC): Primary | ICD-10-CM

## 2021-04-18 DIAGNOSIS — R10.33 PERIUMBILICAL ABDOMINAL PAIN: ICD-10-CM

## 2021-04-18 LAB
ALBUMIN SERPL-MCNC: 4 G/DL (ref 3.5–5)
ALBUMIN/GLOB SERPL: 1.1 {RATIO} (ref 1.1–2.2)
ALP SERPL-CCNC: 71 U/L (ref 45–117)
ALT SERPL-CCNC: 21 U/L (ref 12–78)
ANION GAP SERPL CALC-SCNC: 7 MMOL/L (ref 5–15)
APPEARANCE UR: CLEAR
AST SERPL-CCNC: 11 U/L (ref 15–37)
BACTERIA URNS QL MICRO: NEGATIVE /HPF
BASOPHILS # BLD: 0 K/UL (ref 0–0.1)
BASOPHILS NFR BLD: 0 % (ref 0–1)
BILIRUB SERPL-MCNC: 0.6 MG/DL (ref 0.2–1)
BILIRUB UR QL CFM: NEGATIVE
BUN SERPL-MCNC: 11 MG/DL (ref 6–20)
BUN/CREAT SERPL: 11 (ref 12–20)
CALCIUM SERPL-MCNC: 9.5 MG/DL (ref 8.5–10.1)
CHLORIDE SERPL-SCNC: 102 MMOL/L (ref 97–108)
CO2 SERPL-SCNC: 27 MMOL/L (ref 21–32)
COLOR UR: ABNORMAL
COMMENT, HOLDF: NORMAL
COVID-19 RAPID TEST, COVR: NOT DETECTED
CREAT SERPL-MCNC: 0.98 MG/DL (ref 0.7–1.3)
DIFFERENTIAL METHOD BLD: ABNORMAL
EOSINOPHIL # BLD: 0.1 K/UL (ref 0–0.4)
EOSINOPHIL NFR BLD: 1 % (ref 0–7)
EPITH CASTS URNS QL MICRO: ABNORMAL /LPF
ERYTHROCYTE [DISTWIDTH] IN BLOOD BY AUTOMATED COUNT: 12.2 % (ref 11.5–14.5)
GLOBULIN SER CALC-MCNC: 3.6 G/DL (ref 2–4)
GLUCOSE SERPL-MCNC: 138 MG/DL (ref 65–100)
GLUCOSE UR STRIP.AUTO-MCNC: NEGATIVE MG/DL
HCT VFR BLD AUTO: 48.8 % (ref 36.6–50.3)
HGB BLD-MCNC: 16.6 G/DL (ref 12.1–17)
HGB UR QL STRIP: NEGATIVE
IMM GRANULOCYTES # BLD AUTO: 0 K/UL (ref 0–0.04)
IMM GRANULOCYTES NFR BLD AUTO: 0 % (ref 0–0.5)
KETONES UR QL STRIP.AUTO: 15 MG/DL
LEUKOCYTE ESTERASE UR QL STRIP.AUTO: NEGATIVE
LIPASE SERPL-CCNC: 75 U/L (ref 73–393)
LYMPHOCYTES # BLD: 0.3 K/UL (ref 0.8–3.5)
LYMPHOCYTES NFR BLD: 4 % (ref 12–49)
MCH RBC QN AUTO: 33 PG (ref 26–34)
MCHC RBC AUTO-ENTMCNC: 34 G/DL (ref 30–36.5)
MCV RBC AUTO: 97 FL (ref 80–99)
MONOCYTES # BLD: 0.4 K/UL (ref 0–1)
MONOCYTES NFR BLD: 5 % (ref 5–13)
NEUTS SEG # BLD: 6.9 K/UL (ref 1.8–8)
NEUTS SEG NFR BLD: 90 % (ref 32–75)
NITRITE UR QL STRIP.AUTO: NEGATIVE
NRBC # BLD: 0 K/UL (ref 0–0.01)
NRBC BLD-RTO: 0 PER 100 WBC
PH UR STRIP: 7 [PH] (ref 5–8)
PLATELET # BLD AUTO: 163 K/UL (ref 150–400)
PMV BLD AUTO: 12 FL (ref 8.9–12.9)
POTASSIUM SERPL-SCNC: 3.9 MMOL/L (ref 3.5–5.1)
PROT SERPL-MCNC: 7.6 G/DL (ref 6.4–8.2)
PROT UR STRIP-MCNC: ABNORMAL MG/DL
RBC # BLD AUTO: 5.03 M/UL (ref 4.1–5.7)
RBC #/AREA URNS HPF: ABNORMAL /HPF (ref 0–5)
RBC MORPH BLD: ABNORMAL
SAMPLES BEING HELD,HOLD: NORMAL
SARS-COV-2, COV2: NORMAL
SODIUM SERPL-SCNC: 136 MMOL/L (ref 136–145)
SOURCE, COVRS: NORMAL
SP GR UR REFRACTOMETRY: 1.01 (ref 1–1.03)
UR CULT HOLD, URHOLD: NORMAL
UROBILINOGEN UR QL STRIP.AUTO: 0.2 EU/DL (ref 0.2–1)
WBC # BLD AUTO: 7.7 K/UL (ref 4.1–11.1)
WBC URNS QL MICRO: ABNORMAL /HPF (ref 0–4)

## 2021-04-18 PROCEDURE — 36415 COLL VENOUS BLD VENIPUNCTURE: CPT

## 2021-04-18 PROCEDURE — 81001 URINALYSIS AUTO W/SCOPE: CPT

## 2021-04-18 PROCEDURE — 85025 COMPLETE CBC W/AUTO DIFF WBC: CPT

## 2021-04-18 PROCEDURE — 96375 TX/PRO/DX INJ NEW DRUG ADDON: CPT

## 2021-04-18 PROCEDURE — 0D988ZZ DRAINAGE OF SMALL INTESTINE, VIA NATURAL OR ARTIFICIAL OPENING ENDOSCOPIC: ICD-10-PCS | Performed by: COLON & RECTAL SURGERY

## 2021-04-18 PROCEDURE — 74011250636 HC RX REV CODE- 250/636: Performed by: EMERGENCY MEDICINE

## 2021-04-18 PROCEDURE — 74011250636 HC RX REV CODE- 250/636: Performed by: COLON & RECTAL SURGERY

## 2021-04-18 PROCEDURE — 87635 SARS-COV-2 COVID-19 AMP PRB: CPT

## 2021-04-18 PROCEDURE — 74177 CT ABD & PELVIS W/CONTRAST: CPT

## 2021-04-18 PROCEDURE — 96374 THER/PROPH/DIAG INJ IV PUSH: CPT

## 2021-04-18 PROCEDURE — 80053 COMPREHEN METABOLIC PANEL: CPT

## 2021-04-18 PROCEDURE — 74011000636 HC RX REV CODE- 636: Performed by: RADIOLOGY

## 2021-04-18 PROCEDURE — 99284 EMERGENCY DEPT VISIT MOD MDM: CPT

## 2021-04-18 PROCEDURE — 83690 ASSAY OF LIPASE: CPT

## 2021-04-18 PROCEDURE — 94760 N-INVAS EAR/PLS OXIMETRY 1: CPT

## 2021-04-18 PROCEDURE — 74018 RADEX ABDOMEN 1 VIEW: CPT

## 2021-04-18 PROCEDURE — 74011250637 HC RX REV CODE- 250/637: Performed by: COLON & RECTAL SURGERY

## 2021-04-18 PROCEDURE — 65270000029 HC RM PRIVATE

## 2021-04-18 RX ORDER — MORPHINE SULFATE 4 MG/ML
4 INJECTION INTRAVENOUS ONCE
Status: COMPLETED | OUTPATIENT
Start: 2021-04-18 | End: 2021-04-18

## 2021-04-18 RX ORDER — DEXTROSE MONOHYDRATE, SODIUM CHLORIDE, SODIUM LACTATE, POTASSIUM CHLORIDE, CALCIUM CHLORIDE 5; 600; 310; 179; 20 G/100ML; MG/100ML; MG/100ML; MG/100ML; MG/100ML
INJECTION, SOLUTION INTRAVENOUS CONTINUOUS
Status: DISCONTINUED | OUTPATIENT
Start: 2021-04-18 | End: 2021-04-21 | Stop reason: HOSPADM

## 2021-04-18 RX ORDER — NALOXONE HYDROCHLORIDE 0.4 MG/ML
0.4 INJECTION, SOLUTION INTRAMUSCULAR; INTRAVENOUS; SUBCUTANEOUS AS NEEDED
Status: DISCONTINUED | OUTPATIENT
Start: 2021-04-18 | End: 2021-04-21 | Stop reason: HOSPADM

## 2021-04-18 RX ORDER — ONDANSETRON 2 MG/ML
4 INJECTION INTRAMUSCULAR; INTRAVENOUS
Status: COMPLETED | OUTPATIENT
Start: 2021-04-18 | End: 2021-04-18

## 2021-04-18 RX ORDER — SODIUM CHLORIDE 0.9 % (FLUSH) 0.9 %
5-40 SYRINGE (ML) INJECTION AS NEEDED
Status: DISCONTINUED | OUTPATIENT
Start: 2021-04-18 | End: 2021-04-21 | Stop reason: HOSPADM

## 2021-04-18 RX ORDER — HYDRALAZINE HYDROCHLORIDE 20 MG/ML
10 INJECTION INTRAMUSCULAR; INTRAVENOUS
Status: DISCONTINUED | OUTPATIENT
Start: 2021-04-18 | End: 2021-04-21 | Stop reason: HOSPADM

## 2021-04-18 RX ORDER — SODIUM CHLORIDE 0.9 % (FLUSH) 0.9 %
5-40 SYRINGE (ML) INJECTION EVERY 8 HOURS
Status: DISCONTINUED | OUTPATIENT
Start: 2021-04-18 | End: 2021-04-21 | Stop reason: HOSPADM

## 2021-04-18 RX ORDER — KETOROLAC TROMETHAMINE 30 MG/ML
15 INJECTION, SOLUTION INTRAMUSCULAR; INTRAVENOUS
Status: COMPLETED | OUTPATIENT
Start: 2021-04-18 | End: 2021-04-18

## 2021-04-18 RX ORDER — HYDROMORPHONE HYDROCHLORIDE 1 MG/ML
0.5 INJECTION, SOLUTION INTRAMUSCULAR; INTRAVENOUS; SUBCUTANEOUS
Status: DISCONTINUED | OUTPATIENT
Start: 2021-04-18 | End: 2021-04-21 | Stop reason: HOSPADM

## 2021-04-18 RX ORDER — LOSARTAN POTASSIUM 50 MG/1
50 TABLET ORAL DAILY
Status: DISCONTINUED | OUTPATIENT
Start: 2021-04-18 | End: 2021-04-21 | Stop reason: HOSPADM

## 2021-04-18 RX ORDER — ONDANSETRON 2 MG/ML
4 INJECTION INTRAMUSCULAR; INTRAVENOUS
Status: DISCONTINUED | OUTPATIENT
Start: 2021-04-18 | End: 2021-04-21 | Stop reason: HOSPADM

## 2021-04-18 RX ADMIN — MORPHINE SULFATE 4 MG: 4 INJECTION, SOLUTION INTRAMUSCULAR; INTRAVENOUS at 07:08

## 2021-04-18 RX ADMIN — KETOROLAC TROMETHAMINE 15 MG: 30 INJECTION, SOLUTION INTRAMUSCULAR at 02:08

## 2021-04-18 RX ADMIN — DEXTROSE MONOHYDRATE, SODIUM CHLORIDE, SODIUM LACTATE, POTASSIUM CHLORIDE, CALCIUM CHLORIDE: 5; 600; 310; 179; 20 INJECTION, SOLUTION INTRAVENOUS at 09:15

## 2021-04-18 RX ADMIN — SODIUM CHLORIDE 1000 ML: 9 INJECTION, SOLUTION INTRAVENOUS at 02:09

## 2021-04-18 RX ADMIN — Medication 10 ML: at 13:34

## 2021-04-18 RX ADMIN — Medication 10 ML: at 09:16

## 2021-04-18 RX ADMIN — IOPAMIDOL 100 ML: 755 INJECTION, SOLUTION INTRAVENOUS at 01:34

## 2021-04-18 RX ADMIN — ONDANSETRON 4 MG: 2 INJECTION INTRAMUSCULAR; INTRAVENOUS at 02:09

## 2021-04-18 RX ADMIN — Medication 10 ML: at 23:42

## 2021-04-18 RX ADMIN — LOSARTAN POTASSIUM 50 MG: 50 TABLET, FILM COATED ORAL at 09:15

## 2021-04-18 RX ADMIN — DEXTROSE MONOHYDRATE, SODIUM CHLORIDE, SODIUM LACTATE, POTASSIUM CHLORIDE, CALCIUM CHLORIDE: 5; 600; 310; 179; 20 INJECTION, SOLUTION INTRAVENOUS at 23:41

## 2021-04-18 RX ADMIN — ACETAMINOPHEN ORAL SOLUTION 1000 MG: 650 SOLUTION ORAL at 13:40

## 2021-04-18 NOTE — ED PROVIDER NOTES
48-year-old male with history of GERD, psoriatic arthritis, and rectal cancer status post resection and chemotherapy, currently in reported remission, presents to the emergency department complaining of worsening periumbilical abdominal pain that started around lunchtime today. He has had some intermittent associated nausea but no vomiting, no diarrhea or constipation or blood in stool. Denies any dysuria, hematuria, suspicious food intake, recent travel, known sick contacts. Denies any cough or URI symptoms, fever, chills, or any other medical concerns. He also notes a history of prior kidney stone but states that this does not feel similar. He describes his pain as moderate in severity, and going in a intermittent cramping type sensation. Past Medical History:   Diagnosis Date    Adopted     GERD (gastroesophageal reflux disease)     Ill-defined condition     CHEMO-LAST ON 2/19/2020    Psoriasis     [facility] [address]     Rectal cancer ([facility] [address]     Moderately differentiated [id number] adenocarcinoma of the rectum. Treated with neoadjuvant chemoradiation, resection, and adjuvant chemotherapy. Past Surgical History:   Procedure Laterality Date    COLONOSCOPY Left 4/26/2019    COLONOSCOPY performed by Nilda Hill MD at [address] COLONOSCOPY N/A 10/22/2020    COLONOSCOPY :- performed by Nilda Hill MD at [place] [address] N/A 5/2/2019    SIGMOIDOSCOPY FLEXIBLE performed by Herminio Márquez MD at [address] HX GI      EGD    HX HEENT      WISDOM TEETH    HX ORTHOPAEDIC Right     ACL REPLACEMENT    HX OTHER SURGICAL  09/24/2019    Hand-assisted laparoscopic low anterior resection, mobilization of the splenic flexure, coloproctostomy, and creation of loop ileostomy; Dr. Dominick Portillo.     HX UROLOGICAL  12/2019    CYSTOSCOPY FOR REMOVAL OF KIDNEY STONES WITH HOLMIUM LAZER    HX UROLOGICAL  09/24/2019    Cystoscopy and placement of bilateral temporary ureteral catheters; Jammie Miramontes MD.   Camille Ying HX VASCULAR ACCESS      INSERTION OF PORT-A-CATH RT SIDE OF CHEST. FOR CHEMO-LAST ON 2/19/2020    IR INSERT TUNL CVC W PORT OVER 5 YEARS  10/15/2019    IR REMOVE TUNL CVAD W/O PORT / PUMP  7/16/2020         Family History:   Adopted: Yes   Problem Relation Age of Onset    Asthma Neg Hx     Cancer Neg Hx     Diabetes Neg Hx     Heart Disease Neg Hx     Hypertension Neg Hx     Stroke Neg Hx        Social History     Socioeconomic History    Marital status:      Spouse name: Not on file    Number of children: Not on file    Years of education: Not on file    Highest education level: Not on file   Occupational History    Not on file   Social Needs    Financial resource strain: Not on file    Food insecurity     Worry: Not on file     Inability: Not on file    Transportation needs     Medical: Not on file     Non-medical: Not on file   Tobacco Use    Smoking status: Never Smoker    Smokeless tobacco: Never Used   Substance and Sexual Activity    Alcohol use:  Yes     Alcohol/week: 6.0 standard drinks     Types: 6 Cans of beer per week     Comment: 6 BEERS WEEKLY    Drug use: No    Sexual activity: Not Currently   Lifestyle    Physical activity     Days per week: Not on file     Minutes per session: Not on file    Stress: Not on file   Relationships    Social connections     Talks on phone: Not on file     Gets together: Not on file     Attends Restorationism service: Not on file     Active member of club or organization: Not on file     Attends meetings of clubs or organizations: Not on file     Relationship status: Not on file    Intimate partner violence     Fear of current or ex partner: Not on file     Emotionally abused: Not on file     Physically abused: Not on file     Forced sexual activity: Not on file   Other Topics Concern    Not on file   Social History Narrative    Not on file         ALLERGIES: Patient has no known allergies. Review of Systems   Constitutional: Positive for activity change and appetite change. Negative for chills and fever. HENT: Negative for congestion, rhinorrhea, sinus pain, sneezing and sore throat. Eyes: Negative for photophobia and visual disturbance. Respiratory: Negative for cough and shortness of breath. Cardiovascular: Negative for chest pain. Gastrointestinal: Positive for abdominal pain and nausea. Negative for blood in stool, constipation, diarrhea and vomiting. Genitourinary: Negative for difficulty urinating, dysuria, flank pain, hematuria, penile pain and testicular pain. Musculoskeletal: Negative for arthralgias, back pain, myalgias and neck pain. Skin: Negative for rash and wound. Neurological: Negative for syncope, weakness, light-headedness, numbness and headaches. Psychiatric/Behavioral: Negative for self-injury and suicidal ideas. All other systems reviewed and are negative. Vitals:    04/18/21 0105 04/18/21 0115 04/18/21 0254   BP: (!) 155/100 (!) 161/97 (!) 140/102   Pulse: 84     Resp: 18     Temp: 98.4 °F (36.9 °C)     SpO2: 100%              Physical Exam  Vitals signs and nursing note reviewed. Constitutional:       General: He is not in acute distress. Appearance: Normal appearance. He is well-developed. He is not diaphoretic. HENT:      Head: Normocephalic and atraumatic. Nose: Nose normal.   Eyes:      Extraocular Movements: Extraocular movements intact. Conjunctiva/sclera: Conjunctivae normal.      Pupils: Pupils are equal, round, and reactive to light. Neck:      Musculoskeletal: Neck supple. Cardiovascular:      Rate and Rhythm: Normal rate and regular rhythm. Heart sounds: Normal heart sounds. Pulmonary:      Effort: Pulmonary effort is normal.      Breath sounds: Normal breath sounds. Abdominal:      General: A surgical scar is present. There is no distension. Palpations: Abdomen is soft.       Tenderness: There is abdominal tenderness in the epigastric area and periumbilical area. There is no right CVA tenderness, left CVA tenderness, guarding or rebound. Musculoskeletal:         General: No tenderness. Skin:     General: Skin is warm and dry. Neurological:      General: No focal deficit present. Mental Status: He is alert and oriented to person, place, and time. Cranial Nerves: No cranial nerve deficit. Sensory: No sensory deficit. Motor: No weakness. Coordination: Coordination normal.          MDM   50-year-old male with prior rectal cancer, status post operative management and chemotherapy, presents with mid abdominal pain, nausea since around lunchtime today. He is afebrile with vital signs stable in no acute distress. Treated symptomatically and had improvement in his symptoms. Labs returned reassuringly showing no significant abnormalities. UA neg   CT abdomen pelvis shows small bowel obstruction. Colorectal surgery was consulted, Dr. Cailin Chicas, and his case was discussed at 4:30 AM.  He recommends placing NG tube and will admit the patient to his service for further care and assessment.      Procedures

## 2021-04-18 NOTE — ED TRIAGE NOTES
Patient ambulatory to ED with CC of centralized abdominal pain since lunch time yesterday. Stated his stomach feels \"crampy\" and the pain is fluctuating. He stated he has a hx of kidney stones and this does not feel similar.

## 2021-04-18 NOTE — H&P
Colorectal Surgery Admission History and Physical Examination Note          NAME:  Karyn Whyte   :   1970   MRN:   [id number]     Admission Date: 2021    Chief Complaint:   Abdominal pain     History of Present Illness: The patient is a 58-year-old male underwent a hand-assisted laparoscopic low anterior resection, mobilization of the splenic flexure, coloproctostomy, and creation of a loop ileostomy on 2019 for treatment of what prove dto have been a moderately differentiated [id number] adenocarcinoma of the rectum for which he had also received neoadjuvant chemoradiation. Closure of the loop ileostomy was performed on 3/17/2020. He has been followed by Warren Morales MD (Oncology) and by me, and a CT scan performed on 3/18/2021 for routine surveillance was interpreted as revealing no evidence of metastatic disease. I saw him for routine follow-up in the office on 2021, and he seemed to be doing well then. He reports that he began to experience severe central abdominal pain shortly after eating lunch yesterday. Before that, he had been feeling normal and he had had a relatively normal bowel movement earlier in the day. He presented to the ER for evaluation, and a CT scan of the abdomen and pelvis (which I have reviewed) revealed small intestinal distension consistent with a small bowel obstruction. He has not had any vomiting excpet for a small amount that occurred during a difficult NG tube insertion this morning in the ER. In order to facilitate getting the NG tube down, he reportedly had to drink a lot of water. The output from the tube was documented as 900 mL, which according to the patient was mostly the water. Since then, there has been essentially no output. He reports that the morphine and Toradol that he received in the ER helped with the pain. He is not sure whether or not he has passed any flatus since yesterday morning.         PMH:  Past Medical History:   Diagnosis Date    Adopted     COVID-19 vaccine series completed 04/09/2021    Pfizer dose #2 administered on 4/9/2021.  GERD (gastroesophageal reflux disease)     Ill-defined condition     CHEMO-LAST ON 2/19/2020    Psoriasis     Psoriatic arthritis ([facility] [address]     Rectal cancer ([facility])     Moderately differentiated [id number] adenocarcinoma of the rectum. Treated with neoadjuvant chemoradiation, resection, and adjuvant chemotherapy. PSH:  Past Surgical History:   Procedure Laterality Date    COLONOSCOPY Left 4/26/2019    COLONOSCOPY performed by Mita Baez MD at [address] COLONOSCOPY N/A 10/22/2020    Normal post-resection anatomy; 2-year follow-up interval recommended; Dr. Jane De Anda.  FLEXIBLE SIGMOIDOSCOPY N/A 5/2/2019    SIGMOIDOSCOPY FLEXIBLE performed by Gutierrez Lira MD at [address] HX GI      EGD    HX ORTHOPAEDIC Right circa 2014    ACL repair    HX OTHER SURGICAL  09/24/2019    Hand-assisted laparoscopic low anterior resection, mobilization of the splenic flexure, coloproctostomy, and creation of loop ileostomy; Dr. Chanda Santos.  HX OTHER SURGICAL  03/17/2020    Ileostomy closure with resection and anastomosis; Dr. Chanda Santos.  HX UROLOGICAL  12/12/2019    CYSTOSCOPY FOR REMOVAL OF KIDNEY STONES WITH HOLMIUM LASER; Dr. Brendan Serna.  HX UROLOGICAL  09/24/2019    Cystoscopy and placement of bilateral temporary ureteral catheters; Nhan France MD.    HX VASCULAR ACCESS      INSERTION OF PORT-A-CATH RT SIDE OF CHEST. FOR CHEMO-LAST ON 2/19/2020    HX WISDOM TEETH EXTRACTION      IR INSERT TUNL CVC W PORT OVER 5 YEARS  10/15/2019    Right internal jugular vein Port-a-Cath placement.  IR REMOVE TUNL CVAD W/O PORT / PUMP  7/16/2020    Port-a-Cath removal.       Home Medications:  Prior to Admission Medications   Prescriptions Last Dose Informant Patient Reported? Taking? apremilast (Otezla) 30 mg tab   No No   Sig: Take 30 mg by mouth two (2) times a day.  Indications: moderate to severe plaque psoriasis   fluocinonide (VANOS) 0.1 % topical cream   No No   Sig: Apply  to affected area daily. hydroCHLOROthiazide (HYDRODIURIL) 25 mg tablet   No No   Sig: TAKE 1 TABLET BY MOUTH EVERY DAY   losartan (COZAAR) 50 mg tablet   No No   Sig: Take 1 Tab by mouth daily. sulfaSALAzine (AZULFIDINE) 500 mg tablet   No No   Sig: Take 1 Tab by mouth four (4) times daily. Take 2 tabs qAM, 1 tab qPM for 28 days. If no joint pain, decrease to 1 tab PO BID. Decrease daily dose by 1 pill every 30 days as long as joint pain well-controlled. tadalafil (CIALIS) 5 mg tablet   Yes No   Sig: Take 5 mg by mouth.   triamcinolone acetonide (KENALOG) 0.1 % ointment   Yes No   Sig: Apply  to affected area two (2) times daily as needed for Skin Irritation. use thin layer      Facility-Administered Medications: None       Hospital Medications:  Current Facility-Administered Medications   Medication Dose Route Frequency    losartan (COZAAR) tablet 50 mg  50 mg Oral DAILY    sodium chloride (NS) flush 5-40 mL  5-40 mL IntraVENous Q8H    sodium chloride (NS) flush 5-40 mL  5-40 mL IntraVENous PRN    acetaminophen (TYLENOL) solution 1,000 mg  1,000 mg Per NG tube Q6H PRN    naloxone (NARCAN) injection 0.4 mg  0.4 mg IntraVENous PRN    ondansetron (ZOFRAN) injection 4 mg  4 mg IntraVENous Q4H PRN    dextrose 5% - LR with KCl 20 mEq/L infusion   IntraVENous CONTINUOUS    HYDROmorphone (PF) (DILAUDID) injection 0.5 mg  0.5 mg IntraVENous Q3H PRN       Allergies:  No Known Allergies    Family History:  Family History   Adopted: Yes   Problem Relation Age of Onset    Asthma Neg Hx     Cancer Neg Hx     Diabetes Neg Hx     Heart Disease Neg Hx     Hypertension Neg Hx     Stroke Neg Hx        Social History:  Social History     Tobacco Use    Smoking status: Never Smoker    Smokeless tobacco: Never Used   Substance Use Topics    Alcohol use:  Yes     Alcohol/week: 6.0 standard drinks     Types: 6 Cans of beer per week     Comment: 6 BEERS WEEKLY       Review of Systems:    Symptom Y/N Comments  Symptom Y/N Comments   Fever/Chills N   Chest Pain N    Cough N   Abdominal Pain Y    Sputum N   Joint Pain Y Psoriatic arthritis   SOB/LESTER N   Pruritis/Rash     Nausea/vomit Y   Tolerating PT/OT     Diarrhea N   Tolerating Diet N    Constipation ? Other       Could NOT obtain due to:        Objective:     Patient Vitals for the past 8 hrs:   BP Temp Pulse Resp SpO2   04/18/21 0951     97 %   04/18/21 0854 (!) 143/90 98.9 °F (37.2 °C) 81 14 99 %   04/18/21 0730 (!) 138/92 98.3 °F (36.8 °C) 84 12 96 %   04/18/21 0700 (!) 148/101    100 %   04/18/21 0630 (!) 139/95       04/18/21 0600 (!) 147/99    96 %   04/18/21 0530 (!) 141/94    97 %   04/18/21 0500 (!) 126/91    94 %   04/18/21 0430 126/89       04/18/21 0400 131/87    97 %     No intake/output data recorded. 04/16 1901 - 04/18 0700  In: -   Out: 900     PHYSICAL EXAMINATION:    GENERAL:  No distress. HEENT:  Anicteric. Nasogastric tube in place; watery fluid in the tube with essentially non in the canister. ABDOMEN:  Soft. Somewhat distended. Mildly tender. Well healed scars. No palpable masses or hernias. NEURO:  Alert and oriented. Data Review     Recent Labs     04/18/21  0128   WBC 7.7   HGB 16.6   HCT 48.8        Recent Labs     04/18/21  0236      K 3.9      CO2 27   BUN 11   CREA 0.98   *   CA 9.5     Recent Labs     04/18/21  0236   AP 71   TP 7.6   ALB 4.0   GLOB 3.6   LPSE 75     No results for input(s): INR, PTP, APTT, INREXT in the last 72 hours. Assessment and Plan:      The patient appears to have a small bowel obstruction. It is too soon to now whether it is complete versus partial.    Continue bowel rest and NG tube decompression. Maintenance IV fluids. Tylenol and, if needed, small doses of Dilaudid. Repeat labs tomorrow morning.       Active Problems:    Small bowel obstruction ([facility] (4/18/2021)

## 2021-04-18 NOTE — ED NOTES
TRANSFER - OUT REPORT:    Verbal report given to [facility], RN(name) on Elva House  being transferred to (unit) for routine progression of care       Report consisted of patients Situation, Background, Assessment and   Recommendations(SBAR). Information from the following report(s) SBAR, MAR and Recent Results was reviewed with the receiving nurse. Lines:   Peripheral IV 04/18/21 Left Antecubital (Active)        Opportunity for questions and clarification was provided.       Patient transported with:   Monitor  Tech

## 2021-04-19 LAB
ANION GAP SERPL CALC-SCNC: 5 MMOL/L (ref 5–15)
BASOPHILS # BLD: 0 K/UL (ref 0–0.1)
BASOPHILS NFR BLD: 0 % (ref 0–1)
BUN SERPL-MCNC: 10 MG/DL (ref 6–20)
BUN/CREAT SERPL: 11 (ref 12–20)
CALCIUM SERPL-MCNC: 8.9 MG/DL (ref 8.5–10.1)
CHLORIDE SERPL-SCNC: 107 MMOL/L (ref 97–108)
CO2 SERPL-SCNC: 27 MMOL/L (ref 21–32)
CREAT SERPL-MCNC: 0.87 MG/DL (ref 0.7–1.3)
DIFFERENTIAL METHOD BLD: ABNORMAL
EOSINOPHIL # BLD: 0 K/UL (ref 0–0.4)
EOSINOPHIL NFR BLD: 0 % (ref 0–7)
ERYTHROCYTE [DISTWIDTH] IN BLOOD BY AUTOMATED COUNT: 12.2 % (ref 11.5–14.5)
GLUCOSE SERPL-MCNC: 137 MG/DL (ref 65–100)
HCT VFR BLD AUTO: 43 % (ref 36.6–50.3)
HGB BLD-MCNC: 14.7 G/DL (ref 12.1–17)
IMM GRANULOCYTES # BLD AUTO: 0 K/UL (ref 0–0.04)
IMM GRANULOCYTES NFR BLD AUTO: 0 % (ref 0–0.5)
LYMPHOCYTES # BLD: 0.3 K/UL (ref 0.8–3.5)
LYMPHOCYTES NFR BLD: 4 % (ref 12–49)
MAGNESIUM SERPL-MCNC: 2.3 MG/DL (ref 1.6–2.4)
MCH RBC QN AUTO: 32.7 PG (ref 26–34)
MCHC RBC AUTO-ENTMCNC: 34.2 G/DL (ref 30–36.5)
MCV RBC AUTO: 95.6 FL (ref 80–99)
MONOCYTES # BLD: 0.6 K/UL (ref 0–1)
MONOCYTES NFR BLD: 7 % (ref 5–13)
NEUTS SEG # BLD: 7.2 K/UL (ref 1.8–8)
NEUTS SEG NFR BLD: 89 % (ref 32–75)
NRBC # BLD: 0 K/UL (ref 0–0.01)
NRBC BLD-RTO: 0 PER 100 WBC
PHOSPHATE SERPL-MCNC: 2.5 MG/DL (ref 2.6–4.7)
PLATELET # BLD AUTO: 175 K/UL (ref 150–400)
PMV BLD AUTO: 10.5 FL (ref 8.9–12.9)
POTASSIUM SERPL-SCNC: 3.7 MMOL/L (ref 3.5–5.1)
RBC # BLD AUTO: 4.5 M/UL (ref 4.1–5.7)
RBC MORPH BLD: ABNORMAL
SODIUM SERPL-SCNC: 139 MMOL/L (ref 136–145)
WBC # BLD AUTO: 8.1 K/UL (ref 4.1–11.1)

## 2021-04-19 PROCEDURE — 65270000029 HC RM PRIVATE

## 2021-04-19 PROCEDURE — 36415 COLL VENOUS BLD VENIPUNCTURE: CPT

## 2021-04-19 PROCEDURE — 83735 ASSAY OF MAGNESIUM: CPT

## 2021-04-19 PROCEDURE — 74011250636 HC RX REV CODE- 250/636: Performed by: COLON & RECTAL SURGERY

## 2021-04-19 PROCEDURE — 74011000250 HC RX REV CODE- 250: Performed by: COLON & RECTAL SURGERY

## 2021-04-19 PROCEDURE — 94760 N-INVAS EAR/PLS OXIMETRY 1: CPT

## 2021-04-19 PROCEDURE — 85025 COMPLETE CBC W/AUTO DIFF WBC: CPT

## 2021-04-19 PROCEDURE — 80048 BASIC METABOLIC PNL TOTAL CA: CPT

## 2021-04-19 PROCEDURE — 84100 ASSAY OF PHOSPHORUS: CPT

## 2021-04-19 PROCEDURE — C9113 INJ PANTOPRAZOLE SODIUM, VIA: HCPCS | Performed by: COLON & RECTAL SURGERY

## 2021-04-19 PROCEDURE — 74011250637 HC RX REV CODE- 250/637: Performed by: COLON & RECTAL SURGERY

## 2021-04-19 RX ADMIN — Medication 10 ML: at 22:12

## 2021-04-19 RX ADMIN — SODIUM CHLORIDE 40 MG: 9 INJECTION INTRAMUSCULAR; INTRAVENOUS; SUBCUTANEOUS at 18:32

## 2021-04-19 RX ADMIN — DEXTROSE MONOHYDRATE, SODIUM CHLORIDE, SODIUM LACTATE, POTASSIUM CHLORIDE, CALCIUM CHLORIDE: 5; 600; 310; 179; 20 INJECTION, SOLUTION INTRAVENOUS at 09:06

## 2021-04-19 RX ADMIN — ACETAMINOPHEN ORAL SOLUTION 1000 MG: 650 SOLUTION ORAL at 09:04

## 2021-04-19 RX ADMIN — LOSARTAN POTASSIUM 50 MG: 50 TABLET, FILM COATED ORAL at 08:58

## 2021-04-19 RX ADMIN — DEXTROSE MONOHYDRATE, SODIUM CHLORIDE, SODIUM LACTATE, POTASSIUM CHLORIDE, CALCIUM CHLORIDE: 5; 600; 310; 179; 20 INJECTION, SOLUTION INTRAVENOUS at 18:31

## 2021-04-19 RX ADMIN — Medication 10 ML: at 06:49

## 2021-04-19 NOTE — PROGRESS NOTES
Problem: Small Bowel Obstruction: Day 1  Goal: Off Pathway (Use only if patient is Off Pathway)  Outcome: Progressing Towards Goal  Goal: Activity/Safety  Outcome: Progressing Towards Goal  Goal: Consults, if ordered  Outcome: Progressing Towards Goal  Goal: Diagnostic Test/Procedures  Outcome: Progressing Towards Goal  Goal: Nutrition/Diet  Outcome: Progressing Towards Goal

## 2021-04-19 NOTE — PROGRESS NOTES
General Daily Progress Note    Admission Date: 4/18/2021  Hospital Day 2  Post-Op Day Not Applicable  Subjective:   Less pain. No nausea. Hasn't passed any gas or stool since yesterday. Objective:     Patient Vitals for the past 24 hrs:   BP Temp Pulse Resp SpO2   04/19/21 0853 (!) 148/90 99.8 °F (37.7 °C) 70 14 96 %   04/19/21 0312 (!) 155/88 98.8 °F (37.1 °C) 78 16 94 %   04/18/21 1950 (!) 156/84 100.4 °F (38 °C) 79 16 98 %   04/18/21 1359 (!) 140/92 99 °F (37.2 °C) 81 16 97 %     No intake/output data recorded. 04/17 1901 - 04/19 0700  In: 848.3 [I.V.:848.3]  Out: 2050 [Urine:650]      PHYSICAL EXAMINATION:    GENERAL:  No distress. HEENT:  Anicteric. Nasogastric tube in place; thin bilious fluid in the canister. ABDOMEN:  Soft. Somewhat distended. Mildly tender. NEURO:  Alert and oriented.                Data Review   Recent Results (from the past 24 hour(s))   METABOLIC PANEL, BASIC    Collection Time: 04/19/21  3:19 AM   Result Value Ref Range    Sodium 139 136 - 145 mmol/L    Potassium 3.7 3.5 - 5.1 mmol/L    Chloride 107 97 - 108 mmol/L    CO2 27 21 - 32 mmol/L    Anion gap 5 5 - 15 mmol/L    Glucose 137 (H) 65 - 100 mg/dL    BUN 10 6 - 20 MG/DL    Creatinine 0.87 0.70 - 1.30 MG/DL    BUN/Creatinine ratio 11 (L) 12 - 20      GFR est AA >60 >60 ml/min/1.73m2    GFR est non-AA >60 >60 ml/min/1.73m2    Calcium 8.9 8.5 - 10.1 MG/DL   CBC WITH AUTOMATED DIFF    Collection Time: 04/19/21  3:19 AM   Result Value Ref Range    WBC 8.1 4.1 - 11.1 K/uL    RBC 4.50 4. 10 - 5.70 M/uL    HGB 14.7 12.1 - 17.0 g/dL    HCT 43.0 36.6 - 50.3 %    MCV 95.6 80.0 - 99.0 FL    MCH 32.7 26.0 - 34.0 PG    MCHC 34.2 30.0 - 36.5 g/dL    RDW 12.2 11.5 - 14.5 %    PLATELET 247 187 - 975 K/uL    MPV 10.5 8.9 - 12.9 FL    NRBC 0.0 0  WBC    ABSOLUTE NRBC 0.00 0.00 - 0.01 K/uL    NEUTROPHILS 89 (H) 32 - 75 %    LYMPHOCYTES 4 (L) 12 - 49 %    MONOCYTES 7 5 - 13 %    EOSINOPHILS 0 0 - 7 %    BASOPHILS 0 0 - 1 % IMMATURE GRANULOCYTES 0 0.0 - 0.5 %    ABS. NEUTROPHILS 7.2 1.8 - 8.0 K/UL    ABS. LYMPHOCYTES 0.3 (L) 0.8 - 3.5 K/UL    ABS. MONOCYTES 0.6 0.0 - 1.0 K/UL    ABS. EOSINOPHILS 0.0 0.0 - 0.4 K/UL    ABS. BASOPHILS 0.0 0.0 - 0.1 K/UL    ABS. IMM. GRANS. 0.0 0.00 - 0.04 K/UL    DF SMEAR SCANNED      RBC COMMENTS MACROCYTOSIS  1+       MAGNESIUM    Collection Time: 04/19/21  3:19 AM   Result Value Ref Range    Magnesium 2.3 1.6 - 2.4 mg/dL   PHOSPHORUS    Collection Time: 04/19/21  3:19 AM   Result Value Ref Range    Phosphorus 2.5 (L) 2.6 - 4.7 MG/DL           Assessment:     Active Problems:    Small bowel obstruction ([address] (4/18/2021)      Stable. No evidence of intestinal ischemia. NG tube appears to be still needed. Plan:   Continue NG tube decompression. Continue Maintenance IV fluids. Ambulate.

## 2021-04-20 ENCOUNTER — APPOINTMENT (OUTPATIENT)
Dept: GENERAL RADIOLOGY | Age: 51
DRG: 247 | End: 2021-04-20
Attending: COLON & RECTAL SURGERY
Payer: MEDICAID

## 2021-04-20 LAB
ANION GAP SERPL CALC-SCNC: 6 MMOL/L (ref 5–15)
BASOPHILS # BLD: 0 K/UL (ref 0–0.1)
BASOPHILS NFR BLD: 1 % (ref 0–1)
BUN SERPL-MCNC: 10 MG/DL (ref 6–20)
BUN/CREAT SERPL: 13 (ref 12–20)
CALCIUM SERPL-MCNC: 8.4 MG/DL (ref 8.5–10.1)
CHLORIDE SERPL-SCNC: 113 MMOL/L (ref 97–108)
CO2 SERPL-SCNC: 25 MMOL/L (ref 21–32)
CREAT SERPL-MCNC: 0.78 MG/DL (ref 0.7–1.3)
DIFFERENTIAL METHOD BLD: ABNORMAL
EOSINOPHIL # BLD: 0.2 K/UL (ref 0–0.4)
EOSINOPHIL NFR BLD: 4 % (ref 0–7)
ERYTHROCYTE [DISTWIDTH] IN BLOOD BY AUTOMATED COUNT: 12.2 % (ref 11.5–14.5)
GLUCOSE SERPL-MCNC: 105 MG/DL (ref 65–100)
HCT VFR BLD AUTO: 37.3 % (ref 36.6–50.3)
HGB BLD-MCNC: 12.2 G/DL (ref 12.1–17)
IMM GRANULOCYTES # BLD AUTO: 0 K/UL (ref 0–0.04)
IMM GRANULOCYTES NFR BLD AUTO: 0 % (ref 0–0.5)
LYMPHOCYTES # BLD: 0.5 K/UL (ref 0.8–3.5)
LYMPHOCYTES NFR BLD: 11 % (ref 12–49)
MCH RBC QN AUTO: 32.2 PG (ref 26–34)
MCHC RBC AUTO-ENTMCNC: 32.7 G/DL (ref 30–36.5)
MCV RBC AUTO: 98.4 FL (ref 80–99)
MONOCYTES # BLD: 0.5 K/UL (ref 0–1)
MONOCYTES NFR BLD: 11 % (ref 5–13)
NEUTS SEG # BLD: 2.9 K/UL (ref 1.8–8)
NEUTS SEG NFR BLD: 73 % (ref 32–75)
NRBC # BLD: 0 K/UL (ref 0–0.01)
NRBC BLD-RTO: 0 PER 100 WBC
PHOSPHATE SERPL-MCNC: 1.6 MG/DL (ref 2.6–4.7)
PLATELET # BLD AUTO: 127 K/UL (ref 150–400)
PMV BLD AUTO: 10.4 FL (ref 8.9–12.9)
POTASSIUM SERPL-SCNC: 3.8 MMOL/L (ref 3.5–5.1)
RBC # BLD AUTO: 3.79 M/UL (ref 4.1–5.7)
RBC MORPH BLD: ABNORMAL
SODIUM SERPL-SCNC: 144 MMOL/L (ref 136–145)
WBC # BLD AUTO: 4.1 K/UL (ref 4.1–11.1)

## 2021-04-20 PROCEDURE — 74019 RADEX ABDOMEN 2 VIEWS: CPT

## 2021-04-20 PROCEDURE — 84100 ASSAY OF PHOSPHORUS: CPT

## 2021-04-20 PROCEDURE — 74011250637 HC RX REV CODE- 250/637: Performed by: COLON & RECTAL SURGERY

## 2021-04-20 PROCEDURE — 85025 COMPLETE CBC W/AUTO DIFF WBC: CPT

## 2021-04-20 PROCEDURE — 65270000029 HC RM PRIVATE

## 2021-04-20 PROCEDURE — C9113 INJ PANTOPRAZOLE SODIUM, VIA: HCPCS | Performed by: COLON & RECTAL SURGERY

## 2021-04-20 PROCEDURE — 74011000250 HC RX REV CODE- 250: Performed by: COLON & RECTAL SURGERY

## 2021-04-20 PROCEDURE — 94760 N-INVAS EAR/PLS OXIMETRY 1: CPT

## 2021-04-20 PROCEDURE — 80048 BASIC METABOLIC PNL TOTAL CA: CPT

## 2021-04-20 PROCEDURE — 36415 COLL VENOUS BLD VENIPUNCTURE: CPT

## 2021-04-20 PROCEDURE — 74011250636 HC RX REV CODE- 250/636: Performed by: COLON & RECTAL SURGERY

## 2021-04-20 RX ADMIN — LOSARTAN POTASSIUM 50 MG: 50 TABLET, FILM COATED ORAL at 08:05

## 2021-04-20 RX ADMIN — DEXTROSE MONOHYDRATE, SODIUM CHLORIDE, SODIUM LACTATE, POTASSIUM CHLORIDE, CALCIUM CHLORIDE: 5; 600; 310; 179; 20 INJECTION, SOLUTION INTRAVENOUS at 04:12

## 2021-04-20 RX ADMIN — SODIUM CHLORIDE 40 MG: 9 INJECTION INTRAMUSCULAR; INTRAVENOUS; SUBCUTANEOUS at 08:05

## 2021-04-20 RX ADMIN — POTASSIUM PHOSPHATE, MONOBASIC AND POTASSIUM PHOSPHATE, DIBASIC: 224; 236 INJECTION, SOLUTION, CONCENTRATE INTRAVENOUS at 05:09

## 2021-04-20 RX ADMIN — Medication 10 ML: at 05:11

## 2021-04-20 NOTE — PROGRESS NOTES
2015: NGT clamped and suction turned off. Pt resting comfortably in bed. No complaints of pain at this time. Abd slightly distended. Cannister marked at 300 mL of yellow output.      Alo Santacruz RN

## 2021-04-20 NOTE — PROGRESS NOTES
4/19 2352: unclamped NGT and turned on continuous suction. Pt asleep. 4/20 0002: clamped NGT and turned off suction. Pt still asleep, suctioned 10 mL of clear yellow output. 4/20 0358: unclamped NGT and turned on continuous suction. Pt asleep. 4/20 0408: clamped NGT and turned off suction. 15 mL green output. Pt states he is in no pain and feels no pressure in abd.     Lissette Samuels RN

## 2021-04-20 NOTE — PROGRESS NOTES
General Daily Progress Note    Admission Date: 4/18/2021  Hospital Day 3  Post-Op Day Not Applicable  Subjective:   No distress with NG tube clamped. Passing flatus and soft stool. No significant abdominal pain. Objective:     Patient Vitals for the past 24 hrs:   BP Temp Pulse Resp SpO2   04/20/21 0846 135/87 98.2 °F (36.8 °C) 70 16 98 %   04/20/21 0803 113/76 97.8 °F (36.6 °C) 64 18 98 %   04/20/21 0227 112/68 98.1 °F (36.7 °C) 74 16 97 %   04/19/21 2205     98 %   04/19/21 2152 113/71 98 °F (36.7 °C) 67 16 99 %   04/19/21 1516 138/82 98.5 °F (36.9 °C) 86 16 97 %   04/19/21 1425 126/78 98.5 °F (36.9 °C) 80 18 99 %     No intake/output data recorded. 04/18 1901 - 04/20 0700  In: -   Out: 1125 [Urine:300]      PHYSICAL EXAMINATION:    GENERAL:  No distress. HEENT:  Nasogastric tube in place; thin fluid in the canister. NEURO:  Alert and oriented. Data Review   Recent Results (from the past 24 hour(s))   CBC WITH AUTOMATED DIFF    Collection Time: 04/20/21  2:46 AM   Result Value Ref Range    WBC 4.1 4.1 - 11.1 K/uL    RBC 3.79 (L) 4.10 - 5.70 M/uL    HGB 12.2 12.1 - 17.0 g/dL    HCT 37.3 36.6 - 50.3 %    MCV 98.4 80.0 - 99.0 FL    MCH 32.2 26.0 - 34.0 PG    MCHC 32.7 30.0 - 36.5 g/dL    RDW 12.2 11.5 - 14.5 %    PLATELET 140 (L) 482 - 400 K/uL    MPV 10.4 8.9 - 12.9 FL    NRBC 0.0 0  WBC    ABSOLUTE NRBC 0.00 0.00 - 0.01 K/uL    NEUTROPHILS 73 32 - 75 %    LYMPHOCYTES 11 (L) 12 - 49 %    MONOCYTES 11 5 - 13 %    EOSINOPHILS 4 0 - 7 %    BASOPHILS 1 0 - 1 %    IMMATURE GRANULOCYTES 0 0.0 - 0.5 %    ABS. NEUTROPHILS 2.9 1.8 - 8.0 K/UL    ABS. LYMPHOCYTES 0.5 (L) 0.8 - 3.5 K/UL    ABS. MONOCYTES 0.5 0.0 - 1.0 K/UL    ABS. EOSINOPHILS 0.2 0.0 - 0.4 K/UL    ABS. BASOPHILS 0.0 0.0 - 0.1 K/UL    ABS. IMM.  GRANS. 0.0 0.00 - 0.04 K/UL    DF SMEAR SCANNED      RBC COMMENTS NORMOCYTIC, NORMOCHROMIC     METABOLIC PANEL, BASIC    Collection Time: 04/20/21  2:46 AM   Result Value Ref Range Sodium 144 136 - 145 mmol/L    Potassium 3.8 3.5 - 5.1 mmol/L    Chloride 113 (H) 97 - 108 mmol/L    CO2 25 21 - 32 mmol/L    Anion gap 6 5 - 15 mmol/L    Glucose 105 (H) 65 - 100 mg/dL    BUN 10 6 - 20 MG/DL    Creatinine 0.78 0.70 - 1.30 MG/DL    BUN/Creatinine ratio 13 12 - 20      GFR est AA >60 >60 ml/min/1.73m2    GFR est non-AA >60 >60 ml/min/1.73m2    Calcium 8.4 (L) 8.5 - 10.1 MG/DL   PHOSPHORUS    Collection Time: 04/20/21  2:46 AM   Result Value Ref Range    Phosphorus 1.6 (L) 2.6 - 4.7 MG/DL       Abdominal Radiographs (4/20/2021):  Normal bowel gas pattern. Assessment:     Active Problems:    Small bowel obstruction ([address] (4/18/2021)      Stable. NG tube clamping trial successful. Hypophosphatemic. Plan:   Remove NG tube. Begin clear liquid diet and nutritional supplements. Replace phosphorus.

## 2021-04-20 NOTE — PROGRESS NOTES
Transition of Care: TBD; likely home with family and f/u with specialist/PCP    Transport Plan: likely in car with wife; Buzz Sung [phone]    RUR: 11%    Main contact is wife-Bela Lantigua [phone]    Discharge pending:  -pending medical progress and care recommendations  -pending advancement of diet    1130: this CM met with patient at bedside; he is alert and oriented x 4; he states he lives at home with his wife who was present at the bedside; he states he does not use a walker or cane or any assistive devices to ambulate; he is independent in his ADLs; his last televisit with his PCP, Luis Manuel Jarvis MD was in April 2021; preferred pharmacy is the [facility] on [address] [employer] is the insurance coverage    Reason for Admission:  SBO                     RUR Score:          11%           Plan for utilizing home health:    Likely none      PCP: First and Last name:  Alison Sandhoff, MD     Name of Practice:    Are you a current patient: Yes/No: yes   Approximate date of last visit: April 2021   Can you participate in a virtual visit with your PCP: yes                    Current Advanced Directive/Advance Care Plan: Full Code      Healthcare Decision Maker:   Click here to complete [address] including selection of the Healthcare Decision Maker Relationship (ie \"Primary\")                             Transition of Care Plan:  Likely home with wife and followup with specialist/PCP; transport by wife in car    Care Management Interventions  PCP Verified by CM: Braulio Bhagat MD)  Last Visit to PCP: 04/14/21  Mode of Transport at Discharge: Other (see comment)(in car with wife)  Transition of Care Consult (CM Consult):  Other(TBD; likely home with f/u with specialist/PCP)  Physical Therapy Consult: No  Occupational Therapy Consult: No  Current Support Network: Lives with Spouse  Discharge Location  Discharge Placement: Other:(TBD; likely home with f/u with specialist/PCP)     CM following  Zuri Mann RN, CRM

## 2021-04-21 VITALS
TEMPERATURE: 98.7 F | OXYGEN SATURATION: 98 % | SYSTOLIC BLOOD PRESSURE: 119 MMHG | RESPIRATION RATE: 19 BRPM | HEART RATE: 13 BPM | DIASTOLIC BLOOD PRESSURE: 79 MMHG

## 2021-04-21 LAB
ANION GAP SERPL CALC-SCNC: 5 MMOL/L (ref 5–15)
BUN SERPL-MCNC: 10 MG/DL (ref 6–20)
BUN/CREAT SERPL: 14 (ref 12–20)
CALCIUM SERPL-MCNC: 7.9 MG/DL (ref 8.5–10.1)
CHLORIDE SERPL-SCNC: 111 MMOL/L (ref 97–108)
CO2 SERPL-SCNC: 24 MMOL/L (ref 21–32)
CREAT SERPL-MCNC: 0.71 MG/DL (ref 0.7–1.3)
GLUCOSE SERPL-MCNC: 96 MG/DL (ref 65–100)
MAGNESIUM SERPL-MCNC: 2.3 MG/DL (ref 1.6–2.4)
PHOSPHATE SERPL-MCNC: 2.7 MG/DL (ref 2.6–4.7)
POTASSIUM SERPL-SCNC: 3.6 MMOL/L (ref 3.5–5.1)
SODIUM SERPL-SCNC: 140 MMOL/L (ref 136–145)

## 2021-04-21 PROCEDURE — 36415 COLL VENOUS BLD VENIPUNCTURE: CPT

## 2021-04-21 PROCEDURE — 74011250636 HC RX REV CODE- 250/636: Performed by: COLON & RECTAL SURGERY

## 2021-04-21 PROCEDURE — 83735 ASSAY OF MAGNESIUM: CPT

## 2021-04-21 PROCEDURE — 84100 ASSAY OF PHOSPHORUS: CPT

## 2021-04-21 PROCEDURE — 74011000250 HC RX REV CODE- 250: Performed by: COLON & RECTAL SURGERY

## 2021-04-21 PROCEDURE — 74011250637 HC RX REV CODE- 250/637: Performed by: COLON & RECTAL SURGERY

## 2021-04-21 PROCEDURE — 80048 BASIC METABOLIC PNL TOTAL CA: CPT

## 2021-04-21 PROCEDURE — C9113 INJ PANTOPRAZOLE SODIUM, VIA: HCPCS | Performed by: COLON & RECTAL SURGERY

## 2021-04-21 RX ADMIN — Medication 10 ML: at 05:17

## 2021-04-21 RX ADMIN — SODIUM CHLORIDE 40 MG: 9 INJECTION INTRAMUSCULAR; INTRAVENOUS; SUBCUTANEOUS at 11:19

## 2021-04-21 RX ADMIN — Medication 10 ML: at 00:23

## 2021-04-21 RX ADMIN — LOSARTAN POTASSIUM 50 MG: 50 TABLET, FILM COATED ORAL at 11:19

## 2021-04-21 NOTE — PROGRESS NOTES
Hospital follow-up Virtual PCP transitional care appointment has been scheduled with Dr. Osvaldo Lopez for Thursday, 4/29/21 at 4:15 p.m. Pending patient discharge.   Wendy Moore, Care Management Specialist.

## 2021-04-21 NOTE — PROGRESS NOTES
General Daily Progress Note    Admission Date: 4/18/2021  Hospital Day 4  Post-Op Day Not Applicable  Subjective:   No nausea without NG tube or with intake of small amount of solid food. Passing flatus and soft stool. No significant abdominal discomfort. Objective:     Patient Vitals for the past 24 hrs:   BP Temp Pulse Resp SpO2   04/21/21 0838 131/80 97.9 °F (36.6 °C) 70 18 100 %   04/21/21 0209 131/86 99 °F (37.2 °C) 63 18 96 %   04/20/21 2011 128/77 98.2 °F (36.8 °C) 68 18 97 %   04/20/21 1409 118/82 98.2 °F (36.8 °C) 72 18 99 %     No intake/output data recorded. 04/19 1901 - 04/21 0700  In: -   Out: 425     PHYSICAL EXAMINATION:    GENERAL:  No distress. ABDOMEN:  Soft. Mildly distended. Minimally tender. No masses. NEURO:  Alert and oriented. Data Review   Recent Results (from the past 24 hour(s))   METABOLIC PANEL, BASIC    Collection Time: 04/21/21  3:07 AM   Result Value Ref Range    Sodium 140 136 - 145 mmol/L    Potassium 3.6 3.5 - 5.1 mmol/L    Chloride 111 (H) 97 - 108 mmol/L    CO2 24 21 - 32 mmol/L    Anion gap 5 5 - 15 mmol/L    Glucose 96 65 - 100 mg/dL    BUN 10 6 - 20 MG/DL    Creatinine 0.71 0.70 - 1.30 MG/DL    BUN/Creatinine ratio 14 12 - 20      GFR est AA >60 >60 ml/min/1.73m2    GFR est non-AA >60 >60 ml/min/1.73m2    Calcium 7.9 (L) 8.5 - 10.1 MG/DL   PHOSPHORUS    Collection Time: 04/21/21  3:07 AM   Result Value Ref Range    Phosphorus 2.7 2.6 - 4.7 MG/DL   MAGNESIUM    Collection Time: 04/21/21  3:07 AM   Result Value Ref Range    Magnesium 2.3 1.6 - 2.4 mg/dL     Abdominal Radiographs (4/20/2021):  Normal bowel gas pattern. Assessment:     Active Problems:    Small bowel obstruction ([address] (4/18/2021)      Stable. Obstruction apparently resolving. Hypophosphatemia corrected. Plan:   Continue low fiber det begun yesterday evening. Discharge to home in the afternoon if no setback after having eaten lunch. Follow up with me on an as needed basis.

## 2021-04-21 NOTE — DISCHARGE SUMMARY
Discharge Summary     Patient ID:    Jamie Fields  [id number]  89 y.o.  1970    Admission Date: 4/18/2021    Discharge Date: 4/21/2021    Admission Diagnoses: Small bowel obstruction ([facility] [K56.609]    Chronic Diagnoses:    Patient Active Problem List   Diagnosis Code    ACL tear S83.519A    Psoriatic arthritis ([facility] L40.50    Psoriasis L40.9    Gastrointestinal hemorrhage K92.2    Rectal cancer (HCC) C20    Thrombocytopenia ([facility] D69.6    Neutropenia ([facility] D70.9    Port-A-Cath in place Z95.828    Encounter for antineoplastic chemotherapy Z51.11    Ileostomy present ([address] Z93.2    Malignant neoplasm of colon ([address] C18.9    Small bowel obstruction ([address] K56.609       Discharge Diagnoses:  1. Partial small bowel obstruction (resolved)  2. Hypophosphatemia (corrected)        Hospital Problems as of 4/21/2021 Date Reviewed: 4/15/2021          Codes Class Noted - Resolved POA    Small bowel obstruction ([address] ICD-10-CM: L81.539  ICD-9-CM: 560.9  4/18/2021 - Present Unknown              Procedures Performed:  None. Discharge Medications:  Resume pre-admission medications . Diet: Low fiber diet for 1 to 2 weeks. Activity: As tolerated. No restrictions. Wound Care:  None. Discharge Condition:  Improved compared to that upon admission. Disposition:  Home    Follow-up Care:  1. Dr. Janeht Jacobsen as needed. 2. Dr. El Postin on 4/23/2021 as already scheduled.       Significant Diagnostic Studies:   Recent Labs     04/20/21 0246 04/19/21 0319   WBC 4.1 8.1   HGB 12.2 14.7   HCT 37.3 43.0   * 175     Recent Labs     04/21/21  0307 04/20/21 0246 04/19/21 0319    144 139   K 3.6 3.8 3.7   * 113* 107   CO2 24 25 27   BUN 10 10 10   CREA 0.71 0.78 0.87   GLU 96 105* 137*   CA 7.9* 8.4* 8.9   MG 2.3  --  2.3   PHOS 2.7 1.6* 2.5*         HOSPITAL COURSE & TREATMENT RENDERED:   The patient was admitted on 4/18/2021 after presenting with abdominal pain and nausea and being diagnosed with a small bowel obstruction. He was treated with bowel rest, NG tube decompression, and parenteral fluids. His condition improved, and an NG tube clamping trial was successfully performed. The NG tube was removed, and he was started on a clear liquid diet. He tolerated the clear liquids and then a GI lite diet, and on 4/21/2021 he was judged to be fit for discharge to home. His hospital course was without complications, and his condition upon discharge was improved compared to that upon admission. He appeared to have understood all discharge and follow-up instrutions.       Signed:  Darcie Leung MD

## 2021-04-22 ENCOUNTER — TELEPHONE (OUTPATIENT)
Dept: CASE MANAGEMENT | Age: 51
End: 2021-04-22

## 2021-04-22 ENCOUNTER — PATIENT MESSAGE (OUTPATIENT)
Dept: CASE MANAGEMENT | Age: 51
End: 2021-04-22

## 2021-04-22 NOTE — TELEPHONE ENCOUNTER
21 2:37 PM    Patient contacted regarding recent discharge and COVID-19 risk. Discussed COVID-19 related testing which was available at this time. Test results were negative. Patient informed of results, if available? yes    Outreach made within 2 business days of discharge: Yes    Care Transition Nurse/ Ambulatory Care Manager/ LPN Care Coordinator contacted the patient by telephone to perform post discharge assessment. Verified name and  with patient as identifiers. Patient has following risk factors of: immunocompromised. CTN/ACM/LPN reviewed discharge instructions, medical action plan and red flags related to discharge diagnosis. No changes were made to pt's medications and he reports good understanding of his current regimen. He has multiple F/U and new pt appts throughout the end of this month and into May--some in-person and some virtual--and he confirms all of these as listed on his D/C AVS and states he also has F/U with his non-BS colorectal surgeon. Advance Care Planning:   Does patient have an Advance Directive: yes; reviewed and current        Primary Decision Maker: Ismaus Phillip - Spouse - [phone]    Secondary Decision Maker: Apolinar Martinez - Mother - [phone]    Education not provided regarding infection prevention, and signs and symptoms of COVID-19 and when to seek medical attention because patient, who was given an opportunity for questions and concerns, denies having any at this time. CTN/ACM/LPN provided contact information for future reference. Advised pt that since he has an active MyChart acct, I will send our COVID-19 information and phone resources to him via this pt portal for reference as needed. Thanked pt for his time and advised this is my only call to him before disconnecting. This concludes this episode of care.

## 2021-04-23 ENCOUNTER — OFFICE VISIT (OUTPATIENT)
Dept: ONCOLOGY | Age: 51
End: 2021-04-23
Payer: MEDICAID

## 2021-04-23 VITALS
SYSTOLIC BLOOD PRESSURE: 115 MMHG | TEMPERATURE: 98.7 F | OXYGEN SATURATION: 98 % | BODY MASS INDEX: 22.95 KG/M2 | HEART RATE: 68 BPM | DIASTOLIC BLOOD PRESSURE: 76 MMHG | WEIGHT: 146.5 LBS

## 2021-04-23 DIAGNOSIS — Z85.038 ENCOUNTER FOR FOLLOW-UP SURVEILLANCE OF COLON CANCER: ICD-10-CM

## 2021-04-23 DIAGNOSIS — C18.9 MALIGNANT NEOPLASM OF COLON, UNSPECIFIED PART OF COLON (HCC): Primary | ICD-10-CM

## 2021-04-23 DIAGNOSIS — Z08 ENCOUNTER FOR FOLLOW-UP SURVEILLANCE OF COLON CANCER: ICD-10-CM

## 2021-04-23 PROCEDURE — 99214 OFFICE O/P EST MOD 30 MIN: CPT | Performed by: INTERNAL MEDICINE

## 2021-04-23 NOTE — PROGRESS NOTES
[facility] [place] [facility] [place] [address] [address], [address]  W: [phone]  F: [phone]    Reason for Visit:   Iwona Goodwin is a 45 y.o. male who is seen in follow-up for Rectal cancer- likely stage III- SOMMER    Treatment History:   · 4/26/19: Colonoscopy- 6-7 cm size malignant appearing mass seen at 10 cm from anal verge. This was adenocarcinoma. 3 polyps removed- all were tubular adenomas  · Rectal Ultrasound 5/2/19: ulcerated infiltrative mass lesion was noted in rectum at 10 cm. The lesion measured about 5 cm X 4 cm- T2, 13 mm X 8 mm oblong lymph node was noted immediately adjacent to the mass lesion  · 5/2/19: CT CAP- No metastasis, liver cysts. CEA 3.6  · 5/10/19: PET CT showed rectal malignancy and 3 small perirectal anibal metastatic focir  · 5/28/19-7/8/29: Xeloda + RT   · 9/24/19: LAR-  [id number], 2/6 positive nodes  · 10/14/19: CT CAP with no evidence of metastatic disease, liver cysts   · 10/23/19- 2/19/2020-: 8 cycles of  FOLFOX. Several delays and dose reductions due to cytopenias  · 12/11/19 CT AP: hypodensity in liver, otherwise KATHLEEN   · 1/6/20 MRI abd: no liver mets  · 3/30/2020: CT KATHLEEN  · 10/7/2020: CT KATHLEEN   · 3/2021 CT : KATHLEEN     History of Present Illness:   Patient is a 45 y.o. male seen for adenocarcinoma of the mid third of rectum    He had intermittent BRBPR x 1 year and then decided to have a colonoscopy. This led to above mentioned diagnosis. He received neoadjuvant xeloda+ RT followed by [facility]. Completed 8 cycles of FOLFOX, had a colostomy and is on surveillance. He comes today for follow-up. He was recently discharged from the hospital due to SBO which has since resolved. He has otherwise felt well. BMs are at baseline. No bleeding. No new pain. No nausea/vomiting. Failed a stress test so going to see a cardiologist. No other complaints today. He uses 7 drinks a week.     Adopted    Past Medical History:   Diagnosis Date    Adopted     COVID-19 vaccine series completed 04/09/2021    Pfizer dose #2 administered on 4/9/2021.  GERD (gastroesophageal reflux disease)     Ill-defined condition     CHEMO-LAST ON 2/19/2020    Psoriasis     Psoriatic arthritis ([facility]     Rectal cancer (HCC)     Moderately differentiated [id number] adenocarcinoma of the rectum. Treated with neoadjuvant chemoradiation, resection, and adjuvant chemotherapy. Past Surgical History:   Procedure Laterality Date    COLONOSCOPY Left 4/26/2019    COLONOSCOPY performed by Girish Lima MD at [address] COLONOSCOPY N/A 10/22/2020    Normal post-resection anatomy; 2-year follow-up interval recommended; Dr. Li Caceres.  FLEXIBLE SIGMOIDOSCOPY N/A 5/2/2019    SIGMOIDOSCOPY FLEXIBLE performed by Abiola Garcia MD at [address] HX GI      EGD    HX ORTHOPAEDIC Right circa 2014    ACL repair    HX OTHER SURGICAL  09/24/2019    Hand-assisted laparoscopic low anterior resection, mobilization of the splenic flexure, coloproctostomy, and creation of loop ileostomy; Dr. Twyla Aj.  HX OTHER SURGICAL  03/17/2020    Ileostomy closure with resection and anastomosis; Dr. Twyla Aj.  HX UROLOGICAL  12/12/2019    CYSTOSCOPY FOR REMOVAL OF KIDNEY STONES WITH HOLMIUM LASER; Dr. Krunal Louis.  HX UROLOGICAL  09/24/2019    Cystoscopy and placement of bilateral temporary ureteral catheters; Belem Pickett MD.    HX VASCULAR ACCESS      INSERTION OF PORT-A-CATH RT SIDE OF CHEST. FOR CHEMO-LAST ON 2/19/2020    HX WISDOM TEETH EXTRACTION      IR INSERT TUNL CVC W PORT OVER 5 YEARS  10/15/2019    Right internal jugular vein Port-a-Cath placement.  IR REMOVE TUNL CVAD W/O PORT / PUMP  7/16/2020    Port-a-Cath removal.      Social History     Tobacco Use    Smoking status: Never Smoker    Smokeless tobacco: Never Used   Substance Use Topics    Alcohol use:  Yes     Alcohol/week: 6.0 standard drinks     Types: 6 Cans of beer per week     Comment: 6 BEERS WEEKLY Family History   Adopted: Yes   Problem Relation Age of Onset    Asthma Neg Hx     Cancer Neg Hx     Diabetes Neg Hx     Heart Disease Neg Hx     Hypertension Neg Hx     Stroke Neg Hx      Current Outpatient Medications   Medication Sig    hydroCHLOROthiazide (HYDRODIURIL) 25 mg tablet TAKE 1 TABLET BY MOUTH EVERY DAY    losartan (COZAAR) 50 mg tablet Take 1 Tab by mouth daily.  sulfaSALAzine (AZULFIDINE) 500 mg tablet Take 1 Tab by mouth four (4) times daily. Take 2 tabs qAM, 1 tab qPM for 28 days. If no joint pain, decrease to 1 tab PO BID. Decrease daily dose by 1 pill every 30 days as long as joint pain well-controlled.  apremilast (Otezla) 30 mg tab Take 30 mg by mouth two (2) times a day. Indications: moderate to severe plaque psoriasis    fluocinonide (VANOS) 0.1 % topical cream Apply  to affected area daily.  tadalafil (CIALIS) 5 mg tablet Take 5 mg by mouth.  triamcinolone acetonide (KENALOG) 0.1 % ointment Apply  to affected area two (2) times daily as needed for Skin Irritation. use thin layer     No current facility-administered medications for this visit. No Known Allergies     Review of Systems: A complete review of systems was obtained, negative except as described above. Physical Exam:     Constitutional: Appears well-developed and well-nourished in no apparent distress   Mental status: Alert and awake, Oriented to person/place/time, Able to follow commands  Eyes: EOM normal, Sclera normal, No visible ocular discharge  HENT: Normocephalic, atraumatic; Mouth/Throat: Moist mucous membranes, External Ears normal  Neck: No visualized mass  Skin: No significant exanthematous lesions or discoloration noted on facial skin  Psychiatric: Normal affect, normal judgment/insight.  No hallucinations     Results:     Lab Results   Component Value Date/Time    WBC 4.1 04/20/2021 02:46 AM    HGB 12.2 04/20/2021 02:46 AM    HCT 37.3 04/20/2021 02:46 AM    PLATELET 254 (L) 38/40/2876 02:46 AM    MCV 98.4 04/20/2021 02:46 AM    ABS. NEUTROPHILS 2.9 04/20/2021 02:46 AM     Lab Results   Component Value Date/Time    Sodium 140 04/21/2021 03:07 AM    Potassium 3.6 04/21/2021 03:07 AM    Chloride 111 (H) 04/21/2021 03:07 AM    CO2 24 04/21/2021 03:07 AM    Glucose 96 04/21/2021 03:07 AM    BUN 10 04/21/2021 03:07 AM    Creatinine 0.71 04/21/2021 03:07 AM    GFR est AA >60 04/21/2021 03:07 AM    GFR est non-AA >60 04/21/2021 03:07 AM    Calcium 7.9 (L) 04/21/2021 03:07 AM     Lab Results   Component Value Date/Time    Bilirubin, total 0.6 04/18/2021 02:36 AM    ALT (SGPT) 21 04/18/2021 02:36 AM    Alk. phosphatase 71 04/18/2021 02:36 AM    Protein, total 7.6 04/18/2021 02:36 AM    Albumin 4.0 04/18/2021 02:36 AM    Globulin 3.6 04/18/2021 02:36 AM     CEA:  Recent Labs     03/18/21  0940 01/18/21  1103 10/07/20  1447   [id number] 3.0 3.7 2.6       Records reviewed and summarized above. Pathology report(s) reviewed  FINAL PATHOLOGIC DIAGNOSIS   1. Rectum and sigmoid colon, laparoscopic low anterior resection:   SPECIMEN   Procedure: Low anterior resection   Macroscopic Intactness of Mesorectum: Complete   TUMOR   Tumor Site: Rectum   Histologic Type: Adenocarcinoma   Histologic Grade: G2: Moderately differentiated   Tumor Size: 2.6 cm   Tumor Deposits: Present   Number of Deposits: 1   Tumor Extension: Tumor invades through the muscularis propria into pericolorectal tissue   Macroscopic Tumor Perforation: Not identified   Lymphovascular Invasion: Not identified   Perineural Invasion: Not identified   Tumor Budding: Number of tumor buds in one hotspot field: 5   Tumor Budding Score: Intermediate score (5-9)   Treatment Effect: Present - Residual cancer with evident tumor regression, but more than single cells or   rare small groups of cancer cells (partial response, score 2)   MARGINS   Margins:  All margins are uninvolved by invasive carcinoma, high- grade dysplasia, intramucosal   adenocarcinoma, and adenoma   Margins Examined: Proximal, Distal, Radial or Mesenteric   Distance of Invasive Carcinoma from Closest Margin: 25 mm   Closest Margin: Radial or Mesenteric   LYMPH NODES   Number of Lymph Nodes Involved: 2   Number of Lymph Nodes Examined: 16   PATHOLOGIC STAGE CLASSIFICATION (pTNM, AJCC 8th Edition)   Primary Tumor (pT): pT3   Regional Lymph Nodes (pN): pN1b   2. Large bowel, donuts:   Fragments of large bowel wall, negative for microscopic pathologic abnormality. Radiology report(s) reviewed above. CT CAP 10/14/19: IMPRESSION:  1. There are scattered small hepatic cysts without definite evidence for  metastatic disease. 2. Otherwise negative CT chest abdomen pelvis    CT CAP 12/11/19: IMPRESSION:  1. Right hydroureteronephrosis due to an 8 mm calculus at the ureterovesicular  junction. 2. Vague areas of heterogeneous enhancement and hypodensity in the liver, raises  concern for metastases, though dedicated 3 phase liver CT or liver protocol MRI  may be considered as follow-up. Numerous small hypodensities in the liver likely  cysts, unchanged. 3. Rectal anastomosis and presacral edema, treatment related. Right lower  quadrant ileostomy. No bowel obstruction    MRI abd 1/6/20: no hepatic mets     CT abdomen 3/30/2020  IMPRESSION  IMPRESSION:     1. Mild dilatation, thinning of the wall, and poor enhancement of the bowel just  proximal to the loop ileostomy closure nearly to the distal anastomosis. Clinical significance is uncertain. Ischemic changes should be considered. 2. Small amount of postoperative fluid in the right paracolic gutter without  drainable fluid collection    CT AP 10/7/20: IMPRESSION:  No evidence of recurrent or metastatic disease.     CT AP 3/2021:  IMPRESSION  No evidence for metastatic disease     Assessment:   1) Rectal adenocarcinoma- mid third- SOMMER  Genetic testing: negative     T2N1M0 disease- Clinical stage III  S/P Jhon Adj chemoRT followed by [facility] on 9/24/19  Pathology showed [id number] residual disease- stage IIIB  Had significant residual disease with practically no treatment effects  Adjuvant FOLFOX x 8 until 2/2020- several delays and reductions due to cytopenias    CT 3/2021 KATHLEEN   Colonoscopy 10/2020 normal  ROS and labs unremarkable   CEA stable     Discussed surveillance    H NP and labs every 3-6 months for 2 years then every 6 months for 5 years  CT every 6-12 months for 5 years  Colonoscopy in 2022    2) Aman June age at diagnosis  Adopted  Had several adenomas removed  May have attenuated FAP and will require genetic testing  SOMMER  Referred to [facility] genetics     3) Psoriasis  Off Methotrexate  Is on sulphasalazine    4) 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 Rah Quintanilla:     · Surveillance- RTC 6 months with labs and scans CT CAP    RTC in 6 months     I appreciate the opportunity to participate in Mr. Venessa Ramos care. I performed a history and physical examination of the patient and discussed his management with the NPP. I reviewed the NPP note and agree with the documented findings and plan of care  Colon cancer on surveillance  Exam and labs reassuring  Discussed surveillance   Will get scans every 6 months, CT chest added annually.  He did not have a CT chest 3/18/2021 and hence will suggest we do one now as well    Signed By: Flory Thompson MD

## 2021-04-23 NOTE — PROGRESS NOTES
Iwona Goodwin is a 45 y.o. male  Chief Complaint   Patient presents with    Follow-up     Rectal cancer- likely stage III- SOMMER     1. Have you been to the ER, urgent care clinic since your last visit? Hospitalized since your last visit? Yes, patient went to the ER for an upper bowel obstruction. 2. Have you seen or consulted any other health care providers outside of the [address] [place] [address] since your last visit? Include any pap smears or colon screening. Yes, patient went to see Lisa Logan for a follow-up and was seen by him this week. Patient now is fully vaccinated with the Fernandez Peter covid-19 vaccine.

## 2021-04-23 NOTE — Clinical Note
Michael Girard, looking at his scans I do think I would like to get a CT chest now and then in 6 months  Can you arrange

## 2021-04-26 ENCOUNTER — TELEPHONE (OUTPATIENT)
Dept: ONCOLOGY | Age: 51
End: 2021-04-26

## 2021-04-27 DIAGNOSIS — C18.9 MALIGNANT NEOPLASM OF COLON, UNSPECIFIED PART OF COLON (HCC): Primary | ICD-10-CM

## 2021-04-27 DIAGNOSIS — R06.02 SOB (SHORTNESS OF BREATH): ICD-10-CM

## 2021-04-29 ENCOUNTER — VIRTUAL VISIT (OUTPATIENT)
Dept: INTERNAL MEDICINE CLINIC | Age: 51
End: 2021-04-29
Payer: MEDICAID

## 2021-04-29 DIAGNOSIS — C18.9 MALIGNANT NEOPLASM OF COLON, UNSPECIFIED PART OF COLON (HCC): ICD-10-CM

## 2021-04-29 DIAGNOSIS — K56.609 SMALL BOWEL OBSTRUCTION (HCC): Primary | ICD-10-CM

## 2021-04-29 PROCEDURE — 99213 OFFICE O/P EST LOW 20 MIN: CPT | Performed by: FAMILY MEDICINE

## 2021-04-29 NOTE — PROGRESS NOTES
Onita Cowden is a 45 y.o. male who was seen by synchronous (real-time) audio-video technology on 4/29/2021 for No chief complaint on file. Assessment & Plan:   Diagnoses and all orders for this visit:    1. Small bowel obstruction ([address]    2. Malignant neoplasm of colon, unspecified part of colon [facility])    Patient will continue to manage and monitor symptoms for small bowel obstruction, will proceed to ER with any worsening symptoms. Patient understands that any decrease in stool or change in caliber will be notified. Subjective:   Patient is a 50-year-old male who is following up hospitalization for small bowel obstruction on April 18 and was discharged on April 21. Patient states that since then he has been doing well and has had no issues. He is followed up with oncology and we will continue to monitor symptoms. Patient states that he has had no stomach pain and has been staying well-hydrated. Blood pressures have been in the 120s over 80s since his discharge. Prior to Admission medications    Medication Sig Start Date End Date Taking? Authorizing Provider   hydroCHLOROthiazide (HYDRODIURIL) 25 mg tablet TAKE 1 TABLET BY MOUTH EVERY DAY 4/13/21   Lizbet Archer MD   losartan (COZAAR) 50 mg tablet Take 1 Tab by mouth daily. 4/1/21   Lizbet Archer MD   sulfaSALAzine (AZULFIDINE) 500 mg tablet Take 1 Tab by mouth four (4) times daily. Take 2 tabs qAM, 1 tab qPM for 28 days. If no joint pain, decrease to 1 tab PO BID. Decrease daily dose by 1 pill every 30 days as long as joint pain well-controlled. 3/2/21   Mesha Dominguez MD   apremilast Ca Haji) 30 mg tab Take 30 mg by mouth two (2) times a day. Indications: moderate to severe plaque psoriasis 12/16/20   Mesha Dominguez MD   fluocinonide (VANOS) 0.1 % topical cream Apply  to affected area daily. 6/1/20   Stone Mauricio MD   tadalafil (CIALIS) 5 mg tablet Take 5 mg by mouth.     Provider, Historical   triamcinolone acetonide (KENALOG) 0.1 % ointment Apply  to affected area two (2) times daily as needed for Skin Irritation. use thin layer    Provider, Historical     Current Outpatient Medications   Medication Sig Dispense Refill    hydroCHLOROthiazide (HYDRODIURIL) 25 mg tablet TAKE 1 TABLET BY MOUTH EVERY DAY 30 Tab 1    losartan (COZAAR) 50 mg tablet Take 1 Tab by mouth daily. 30 Tab 3    sulfaSALAzine (AZULFIDINE) 500 mg tablet Take 1 Tab by mouth four (4) times daily. Take 2 tabs qAM, 1 tab qPM for 28 days. If no joint pain, decrease to 1 tab PO BID. Decrease daily dose by 1 pill every 30 days as long as joint pain well-controlled. 90 Tab 3    apremilast (Otezla) 30 mg tab Take 30 mg by mouth two (2) times a day. Indications: moderate to severe plaque psoriasis 60 Tab 11    fluocinonide (VANOS) 0.1 % topical cream Apply  to affected area daily. 30 g 5    tadalafil (CIALIS) 5 mg tablet Take 5 mg by mouth.  triamcinolone acetonide (KENALOG) 0.1 % ointment Apply  to affected area two (2) times daily as needed for Skin Irritation. use thin layer       No Known Allergies  Past Medical History:   Diagnosis Date    Adopted     COVID-19 vaccine series completed 04/09/2021    Pfizer dose #2 administered on 4/9/2021.  GERD (gastroesophageal reflux disease)     Ill-defined condition     CHEMO-LAST ON 2/19/2020    Psoriasis     [facility]atic [facility] [address]     Rectal cancer (HCC)     Moderately differentiated [id number] adenocarcinoma of the rectum. Treated with neoadjuvant chemoradiation, resection, and adjuvant chemotherapy. Past Surgical History:   Procedure Laterality Date    COLONOSCOPY Left 4/26/2019    COLONOSCOPY performed by Girish Lima MD at [address] COLONOSCOPY N/A 10/22/2020    Normal post-resection anatomy; 2-year follow-up interval recommended; Dr. Li Caceres.     FLEXIBLE SIGMOIDOSCOPY N/A 5/2/2019    SIGMOIDOSCOPY FLEXIBLE performed by Abiola Garcia MD at [address] HX GI      EGD    HX ORTHOPAEDIC Right circa 2014    ACL repair    HX OTHER SURGICAL  09/24/2019    Hand-assisted laparoscopic low anterior resection, mobilization of the splenic flexure, coloproctostomy, and creation of loop ileostomy; Dr. Diony Shelton.  HX OTHER SURGICAL  03/17/2020    Ileostomy closure with resection and anastomosis; Dr. Diony Shelton.  HX UROLOGICAL  12/12/2019    CYSTOSCOPY FOR REMOVAL OF KIDNEY STONES WITH HOLMIUM LASER; Dr. Johnie Becker.  HX UROLOGICAL  09/24/2019    Cystoscopy and placement of bilateral temporary ureteral catheters; Tesfaye Sears MD.    HX VASCULAR ACCESS      INSERTION OF PORT-A-CATH RT SIDE OF CHEST. FOR CHEMO-LAST ON 2/19/2020    HX WISDOM TEETH EXTRACTION      IR INSERT TUNL CVC W PORT OVER 5 YEARS  10/15/2019    Right internal jugular vein Port-a-Cath placement.  IR REMOVE TUNL CVAD W/O PORT / PUMP  7/16/2020    Port-a-Cath removal.     Family History   Adopted: Yes   Problem Relation Age of Onset    Asthma Neg Hx     Cancer Neg Hx     Diabetes Neg Hx     Heart Disease Neg Hx     Hypertension Neg Hx     Stroke Neg Hx        ROS    Objective:   No flowsheet data found.      [INSTRUCTIONS:  \"[x]\" Indicates a positive item  \"[]\" Indicates a negative item  -- DELETE ALL ITEMS NOT EXAMINED]    Constitutional: [x] Appears well-developed and well-nourished [x] No apparent distress      [] Abnormal -     Mental status: [x] Alert and awake  [x] Oriented to person/place/time [x] Able to follow commands    [] Abnormal -     Eyes:   EOM    [x]  Normal    [] Abnormal -   Sclera  [x]  Normal    [] Abnormal -          Discharge [x]  None visible   [] Abnormal -     HENT: [x] Normocephalic, atraumatic  [] Abnormal -   [x] Mouth/Throat: Mucous membranes are moist    External Ears [x] Normal  [] Abnormal -    Neck: [x] No visualized mass [] Abnormal -     Pulmonary/Chest: [x] Respiratory effort normal   [x] No visualized signs of difficulty breathing or respiratory distress        [] Abnormal - Musculoskeletal:   [x] Normal gait with no signs of ataxia         [x] Normal range of motion of neck        [] Abnormal -     Neurological:        [x] No Facial Asymmetry (Cranial nerve 7 motor function) (limited exam due to video visit)          [x] No gaze palsy        [] Abnormal -          Skin:        [x] No significant exanthematous lesions or discoloration noted on facial skin         [] Abnormal -            Psychiatric:       [x] Normal Affect [] Abnormal -        [x] No Hallucinations    Other pertinent observable physical exam findings:-        We discussed the expected course, resolution and complications of the diagnosis(es) in detail. Medication risks, benefits, costs, interactions, and alternatives were discussed as indicated. I advised him to contact the office if his condition worsens, changes or fails to improve as anticipated. He expressed understanding with the diagnosis(es) and plan. Gricelda Rose, was evaluated through a synchronous (real-time) audio-video encounter. The patient (or guardian if applicable) is aware that this is a billable service. Verbal consent to proceed has been obtained within the past 12 months. The visit was conducted pursuant to the emergency declaration under the [facility], [address] [place] [address] authority and the [employer] Act. Patient identification was verified, and a caregiver was present when appropriate. The patient was located in a state where the provider was credentialed to provide care.       Annemarie So MD

## 2021-05-03 ENCOUNTER — HOSPITAL ENCOUNTER (OUTPATIENT)
Dept: CT IMAGING | Age: 51
Discharge: HOME | End: 2021-05-03
Attending: REGISTERED NURSE
Payer: MEDICAID

## 2021-05-03 DIAGNOSIS — C18.9 MALIGNANT NEOPLASM OF COLON, UNSPECIFIED PART OF COLON (HCC): ICD-10-CM

## 2021-05-03 DIAGNOSIS — R06.02 SOB (SHORTNESS OF BREATH): ICD-10-CM

## 2021-05-03 PROCEDURE — 74011000636 HC RX REV CODE- 636: Performed by: RADIOLOGY

## 2021-05-03 PROCEDURE — 71260 CT THORAX DX C+: CPT

## 2021-05-03 RX ADMIN — IOPAMIDOL 100 ML: 612 INJECTION, SOLUTION INTRAVENOUS at 16:18

## 2021-05-04 ENCOUNTER — TELEPHONE (OUTPATIENT)
Dept: ONCOLOGY | Age: 51
End: 2021-05-04

## 2021-05-04 NOTE — TELEPHONE ENCOUNTER
[address] verified x2. Informed pt of CT scan showing no cancer per Calos Humphries NP. Patient voiced understanding and has no further concerns at this time.

## 2021-05-06 RX ORDER — HYDROCHLOROTHIAZIDE 25 MG/1
TABLET ORAL
Qty: 30 TAB | Refills: 1 | Status: SHIPPED | OUTPATIENT
Start: 2021-05-06 | End: 2021-05-12 | Stop reason: ALTCHOICE

## 2021-05-12 ENCOUNTER — OFFICE VISIT (OUTPATIENT)
Dept: CARDIOLOGY CLINIC | Age: 51
End: 2021-05-12
Payer: MEDICAID

## 2021-05-12 VITALS
DIASTOLIC BLOOD PRESSURE: 70 MMHG | HEIGHT: 67 IN | SYSTOLIC BLOOD PRESSURE: 120 MMHG | OXYGEN SATURATION: 98 % | BODY MASS INDEX: 23.17 KG/M2 | HEART RATE: 70 BPM | RESPIRATION RATE: 13 BRPM | WEIGHT: 147.6 LBS

## 2021-05-12 DIAGNOSIS — R06.89 DYSPNEA AND RESPIRATORY ABNORMALITY: ICD-10-CM

## 2021-05-12 DIAGNOSIS — R94.31 ABNORMAL EKG: ICD-10-CM

## 2021-05-12 DIAGNOSIS — R94.31 ABNORMAL ECG DURING EXERCISE STRESS TEST: Primary | ICD-10-CM

## 2021-05-12 DIAGNOSIS — C20 RECTAL CANCER (HCC): ICD-10-CM

## 2021-05-12 DIAGNOSIS — I10 BENIGN ESSENTIAL HYPERTENSION: ICD-10-CM

## 2021-05-12 DIAGNOSIS — R07.9 CHEST PAIN, UNSPECIFIED TYPE: ICD-10-CM

## 2021-05-12 DIAGNOSIS — R06.09 DOE (DYSPNEA ON EXERTION): ICD-10-CM

## 2021-05-12 DIAGNOSIS — R06.00 DYSPNEA AND RESPIRATORY ABNORMALITY: ICD-10-CM

## 2021-05-12 DIAGNOSIS — R53.83 FATIGUE, UNSPECIFIED TYPE: ICD-10-CM

## 2021-05-12 PROCEDURE — 99244 OFF/OP CNSLTJ NEW/EST MOD 40: CPT | Performed by: INTERNAL MEDICINE

## 2021-05-12 RX ORDER — LOSARTAN POTASSIUM 100 MG/1
100 TABLET ORAL DAILY
Qty: 90 TAB | Refills: 3 | Status: SHIPPED | OUTPATIENT
Start: 2021-05-12 | End: 2022-08-05 | Stop reason: SDUPTHER

## 2021-05-12 NOTE — PROGRESS NOTES
[place] [address] [place]: Alfredito Delacruz  [phone]  Requesting/referring provider: Dr. Tevin Garber  Reason for Consult: chest discomfort, dyspnea    HPI: Mery Hatch, a 45y.o. year-old who presents for evaluation of abnormal stress test, HTN, colon cancer. A bit winded here and there, working outside less stamina. [place] he cant keep up with other members of his [place] with riding the bike etc.   Despite the fact he feels like he is recovered from his surgeries and illnesses. At times a bit of tightness in the chest etc. he has been concerned that something else is going on  He had a EKG stress test which was significantly abnormal.  He has baseline ST segment abnormalities which look like pericarditis/early repolarization. He had good functional capacity on an EKG stress test but he developed significant ST segment depression suggestive of ischemia. Further stress testing should be performed with imaging. Because of the ST segment abnormalities I will perform nuclear stress testing rather than stress echocardiography as it is more likely to be accurate in him for the detection or exclusion of coronary artery disease. In addition he needs to have an echo to look at his right heart function left heart function and valves for any other reason that he would be experiencing shortness of breath and chest discomfort. We reviewed his blood pressure management and although his blood pressure is at goal he has hypokalemia likely exacerbated by hydrochlorothiazide and which was treated with IV potassium during his hospital admissions so to simplify things and can increase his losartan and stop hydrochlorothiazide and then he will need repeat labs in 6 to 8 weeks to see how that is suiting him. Murmur as a kid  Adopted family history unknown    A/P:  1. Chest discomfort shortness of breath proceed with nuclear stress test and echo  2. Hypertension at goal drop hydrochlorothiazide increase losartan  3.   Hypokalemia stop hydrochlorothiazide  4. Followed by oncology for adenocarcinoma of the mid third of rectum  He had intermittent BRBPR x 1 year and then decided to have a colonoscopy. This led to above mentioned diagnosis. He received neoadjuvant xeloda+ RT followed by [facility]. Completed 8 cycles of FOLFOX, had a colostomy and is on surveillance. 4/26/19: Colonoscopy- 6-7 cm size malignant appearing mass seen at 10 cm from anal verge. This was adenocarcinoma. 3 polyps removed- all were tubular adenomas  · Rectal Ultrasound 5/2/19: ulcerated infiltrative mass lesion was noted in rectum at 10 cm. The lesion measured about 5 cm X 4 cm- T2, 13 mm X 8 mm oblong lymph node was noted immediately adjacent to the mass lesion  · 5/2/19: CT CAP- No metastasis, liver cysts. CEA 3.6  · 5/10/19: PET CT showed rectal malignancy and 3 small perirectal anibal metastatic focir  · 5/28/19-7/8/29: Xeloda + RT   · 9/24/19: [facility]-  [id number], 2/6 positive nodes  · 10/14/19: CT CAP with no evidence of metastatic disease, liver cysts   · 10/23/19- 2/19/2020-: 8 cycles of  FOLFOX. Several delays and dose reductions due to cytopenias  · 3/2021 CT : KATHLEEN     He  has a past medical history of Adopted, COVID-19 vaccine series completed (04/09/2021), GERD (gastroesophageal reflux disease), Ill-defined condition, Psoriasis, Psoriatic arthritis ([facility] [address] and Rectal cancer ([facility] [address] Cardiovascular ROS: positive for - chest pain and dyspnea on exertion  Respiratory ROS: no cough, shortness of breath, or wheezing  Neurological ROS: no TIA or stroke symptoms  All other systems negative except as above. PE  Vitals:    05/12/21 1400   BP: 120/70   Pulse: 70   Resp: 13   SpO2: 98%   Weight: 147 lb 9.6 oz (67 kg)   Height: 5' 7\" (1.702 m)    Body mass index is 23.12 kg/m².    General appearance - alert, well appearing, and in no distress  Mental status - affect appropriate to mood  Eyes - sclera anicteric, moist mucous membranes  Neck - supple, no significant adenopathy  Lymphatics - no  lymphadenopathy  Chest - clear to auscultation, no wheezes, rales or rhonchi  Heart - normal rate, regular rhythm, normal S1, S2, no murmurs, rubs, clicks or gallops  Abdomen - soft, nontender, nondistended, no masses or organomegaly  Back exam - full range of motion, no tenderness  Neurological - cranial nerves II through XII grossly intact, no focal deficit  Musculoskeletal - no muscular tenderness noted, normal strength  Extremities - peripheral pulses normal, no pedal edema  Skin - normal coloration  no rashes    Recent Labs:  Lab Results   Component Value Date/Time    Cholesterol, total 217 (H) 03/25/2019 03:16 PM    HDL Cholesterol 82 03/25/2019 03:16 PM    LDL, calculated 116 (H) 03/25/2019 03:16 PM    Triglyceride 94 03/25/2019 03:16 PM     Lab Results   Component Value Date/Time    Creatinine 0.71 04/21/2021 03:07 AM     Lab Results   Component Value Date/Time    BUN 10 04/21/2021 03:07 AM     Lab Results   Component Value Date/Time    Potassium 3.6 04/21/2021 03:07 AM     Lab Results   Component Value Date/Time    Hemoglobin A1c 5.4 09/10/2019 12:00 PM     Lab Results   Component Value Date/Time    HGB 12.2 04/20/2021 02:46 AM     Lab Results   Component Value Date/Time    PLATELET 132 (L) 75/87/9141 02:46 AM       Reviewed:  Past Medical History:   Diagnosis Date    Adopted     COVID-19 vaccine series completed 04/09/2021    Pfizer dose #2 administered on 4/9/2021.  GERD (gastroesophageal reflux disease)     Ill-defined condition     CHEMO-LAST ON 2/19/2020    Psoriasis     P[facility]iatic arthritis ([facility] [address] [facility]     Rectal cancer (HCC)     Moderately differentiated [id number] adenocarcinoma of the rectum. Treated with neoadjuvant chemoradiation, resection, and adjuvant chemotherapy.      Social History     Tobacco Use   Smoking Status Never Smoker   Smokeless Tobacco Never Used     Social History     Substance and Sexual Activity   Alcohol Use Yes    Alcohol/week: 6.0 standard drinks    Types: 6 Cans of beer per week    Comment: 6 BEERS WEEKLY     No Known Allergies    Current Outpatient Medications   Medication Sig    hydroCHLOROthiazide (HYDRODIURIL) 25 mg tablet TAKE 1 TABLET BY MOUTH EVERY DAY    losartan (COZAAR) 50 mg tablet Take 1 Tab by mouth daily.  sulfaSALAzine (AZULFIDINE) 500 mg tablet Take 1 Tab by mouth four (4) times daily. Take 2 tabs qAM, 1 tab qPM for 28 days. If no joint pain, decrease to 1 tab PO BID. Decrease daily dose by 1 pill every 30 days as long as joint pain well-controlled.  apremilast (Otezla) 30 mg tab Take 30 mg by mouth two (2) times a day. Indications: moderate to severe plaque psoriasis    fluocinonide (VANOS) 0.1 % topical cream Apply  to affected area daily.  tadalafil (CIALIS) 5 mg tablet Take 5 mg by mouth.  triamcinolone acetonide (KENALOG) 0.1 % ointment Apply  to affected area two (2) times daily as needed for Skin Irritation. use thin layer     No current facility-administered medications for this visit.         Zak Black MD  [facility] [place]  [address] [facility] [address]  [address]

## 2021-05-14 ENCOUNTER — VIRTUAL VISIT (OUTPATIENT)
Dept: RHEUMATOLOGY | Age: 51
End: 2021-05-14
Payer: MEDICAID

## 2021-05-14 DIAGNOSIS — R74.01 TRANSAMINITIS: ICD-10-CM

## 2021-05-14 DIAGNOSIS — Z79.899 ONGOING USE OF POSSIBLY TOXIC MEDICATION: ICD-10-CM

## 2021-05-14 DIAGNOSIS — L40.50 PSORIATIC ARTHRITIS (HCC): Primary | ICD-10-CM

## 2021-05-14 PROCEDURE — 99214 OFFICE O/P EST MOD 30 MIN: CPT | Performed by: INTERNAL MEDICINE

## 2021-05-14 NOTE — Clinical Note
Could you reach out to schedule a 6mo in-person visit (our last 2 have been virtual, it's getting time for hands-on exam). . Thank you!

## 2021-05-14 NOTE — PROGRESS NOTES
REASON FOR VISIT    Iwona Goodwin is a 45 y.o. male who was seen virtually using audiovisual telehealth on 5/14/2021. HISTORY OF DISEASE: Psoriatic Arthritis    Year of diagnosis: 2015  First visit with me: 10/2020  Cumulative disease manifestations:  Dactylitis, psoriasis, back pain  Positive serologies/labs:  Negative serologies/labs: HLA B27  Other co-morbidities: heavy past alcohol use; rectal cancer on chemo + RT + surgery  Relapses:      Past treatment history:  Prednisone:   Non-biologic DMARD: Methotrexate (2015-4/2019 stopped 2/2 alcohol), Sulfasalazine 2000 mg oral daily (6/2020-present)  Biologic: Marcia Letters (11/20- )  Other:     HISTORY OF PRESENT ILLNESS       Now taking 1000mg+500mg sulfasalazine, Otezla twice a day    No stomach upset or infections    Skin doing better. Admits somewhat inconsistent with steroid cream use but still mild involvement of the knees     No new rashes. No eye problems. No cough or dyspnea. REVIEW OF SYSTEMS    A comprehensive review of systems was performed and pertinent results are documented in the HPI, review of systems is otherwise non-contributory. PAST MEDICAL HISTORY    Past Medical History:   Diagnosis Date    Adopted     COVID-19 vaccine series completed 04/09/2021    Pfizer dose #2 administered on 4/9/2021.  GERD (gastroesophageal reflux disease)     Ill-defined condition     CHEMO-LAST ON 2/19/2020    Psoriasis     Psoriatic arthritis ([facility] [address]     Rectal cancer (HCC)     Moderately differentiated [id number] adenocarcinoma of the rectum. Treated with neoadjuvant chemoradiation, resection, and adjuvant chemotherapy. Past Surgical History:   Procedure Laterality Date    COLONOSCOPY Left 4/26/2019    COLONOSCOPY performed by Estephania Garcia MD at [address] COLONOSCOPY N/A 10/22/2020    Normal post-resection anatomy; 2-year follow-up interval recommended; Dr. Antionette Arreguin.    2021 Remi Peguero N/A 5/2/2019    SIGMOIDOSCOPY FLEXIBLE performed by Franny Fabian MD at [address] HX GI      EGD    HX ORTHOPAEDIC Right circa 2014    ACL repair    HX OTHER SURGICAL  09/24/2019    Hand-assisted laparoscopic low anterior resection, mobilization of the splenic flexure, coloproctostomy, and creation of loop ileostomy; Dr. Zander Moore.  HX OTHER SURGICAL  03/17/2020    Ileostomy closure with resection and anastomosis; Dr. Zander Moore.  HX UROLOGICAL  12/12/2019    CYSTOSCOPY FOR REMOVAL OF KIDNEY STONES WITH HOLMIUM LASER; Dr. Jennifer Antoine.  HX UROLOGICAL  09/24/2019    Cystoscopy and placement of bilateral temporary ureteral catheters; Kenrick Sánchez MD.    HX VASCULAR ACCESS      INSERTION OF PORT-A-CATH RT SIDE OF CHEST. FOR CHEMO-LAST ON 2/19/2020    HX WISDOM TEETH EXTRACTION      IR INSERT TUNL CVC W PORT OVER 5 YEARS  10/15/2019    Right internal jugular vein Port-a-Cath placement.  IR REMOVE TUNL CVAD W/O PORT / PUMP  7/16/2020    Port-a-Cath removal.       FAMILY HISTORY    Family History   Adopted: Yes   Problem Relation Age of Onset    Asthma Neg Hx     Cancer Neg Hx     Diabetes Neg Hx     Heart Disease Neg Hx     Hypertension Neg Hx     Stroke Neg Hx        SOCIAL HISTORY    Social History     Tobacco Use    Smoking status: Never Smoker    Smokeless tobacco: Never Used   Substance Use Topics    Alcohol use: Yes     Alcohol/week: 6.0 standard drinks     Types: 6 Cans of beer per week     Comment: 6 BEERS WEEKLY    Drug use: No     On vacations drinks more than 6 beers weekly    MEDICATIONS    Current Outpatient Medications   Medication Sig Dispense Refill    losartan (COZAAR) 100 mg tablet Take 1 Tab by mouth daily. 90 Tab 3    sulfaSALAzine (AZULFIDINE) 500 mg tablet Take 1 Tab by mouth four (4) times daily. Take 2 tabs qAM, 1 tab qPM for 28 days. If no joint pain, decrease to 1 tab PO BID. Decrease daily dose by 1 pill every 30 days as long as joint pain well-controlled.  90 Tab 3    apremilast (Otezla) 30 mg tab Take 30 mg by mouth two (2) times a day. Indications: moderate to severe plaque psoriasis 60 Tab 11    fluocinonide (VANOS) 0.1 % topical cream Apply  to affected area daily. 30 g 5    tadalafil (CIALIS) 5 mg tablet Take 5 mg by mouth.  triamcinolone acetonide (KENALOG) 0.1 % ointment Apply  to affected area two (2) times daily as needed for Skin Irritation. use thin layer         ALLERGIES    No Known Allergies    PHYSICAL EXAMINATION  There were no vitals taken for this visit. (telehealth virtual visit)    General: NAD, looks well, normal respiratory effort  HEENT: PERRL, anicteric, non-injected sclerae; lids without inflammation  Pulmonary: Normal respirations, no retractions, coughing  Skin: No facial lesions, extremities not reexamined today  Neuro: Alert; able to carry normal conversation, cognition, affect normal  Musculoskeletal:   Still no active synovitis or rajat swelling of hands. DATA REVIEW    Prior medical records were reviewed and if applicable are summarized as below:    Labs:   21: WBC 8.1, Hgb 14.7, Plt 175; Cr 0.87  21: Tbili 0.6, AST 11, ALT 21, AlkP 71  21: Cr 0.90,  AST 42, ALT 42, AlkP 57, Tbili 0.5, Alb 4.4  10/2020: WBC 3.6 (ANC 2400, ), CBC ow WNL; CMP WNL  2019: cbc, cmp unremarkable, ESR, CRP normal, HEpB, C, quant gold negative  10/18: WBC 4.1, Hb 15.2, Plt 194, CMP Normal, CRP, ESR negative  : Hep C negative, HLA b27 negative, quant gold negative    Imagin/3/21: CT chest with: No typical CT evidence of metastatic colon neoplasm in the thorax or visualized upper abdomen. Clear lungs, no hilar LAD.  20 CT abd/pelvis: No evidence of recurrent or metastatic disease. SI Joints (2019): Personally reviewed images. Normal joints  Foot xrays (2019): Personally reviewed images. No erosions  Hand xrays (2019): Personally reviewed images. + DJD.  No erosions  : CXR normal    Pathology:  Rectal biopsy (2019): well differentiated invasive colonic adenocarcinoma    ASSESSMENT AND PLAN    A 45 y.o. male with hx of psoriatic arthritis on sulfasalazine and now apremilast, recent rectal adenocarcinoma s/p chemo/RT presents for a follow up visit. His arthritis remains well controlled since the addition of apremilast, will see if we can fully wean off of sulfasalazine. # Psoriatic arthritis:    - Reduce sulfasalazine to 500mg bid  - Cont apremilast 30mg bid    # Psoriasis:   - Apremilast as above  - sulfasalazine as above  - Continue fluocinonide topically    #Transaminitis  -April LFTs normalized with reduced alcohol, continue monitoring while on sulfasalazine    # Rectal cancer:   - in remission for now, cont to follow with oncology    # Medication Toxicity Monitoring:  - cbc, cmp [id number] to date through oncology currently  - Hepatitis B, C: negative 2/2019  - Quant gold: negative 2/2019  - Immunizations: UTD  - bone health: to discuss upcoming visit    RTC in 3 months    The patient voiced understanding of the aforementioned assessment and plan. Summary of plan was provided in the After Visit Summary patient instructions. I also provided education about MyChart setup and utility. TODAY'S ORDERS    Patient Instructions   1. Reduce sulfasalazine to 1 pill twice a day with meals, continue the HÜTTEN twice a day. 2. Send me a message in 3 months once you've done labs for your oncologist and can update me on how you're doing on the low-dose sulfasalazine. .. if you're doing well then, we may just stop the suflasalazine entirely. 3. We'll call you to schedule a 6 month followup in person.         Future Appointments   Date Time Provider Shannan Rodriguez   6/10/2021  9:00 AM PATTY FRAGOSO BS AMB   6/10/2021 11:00 AM PATTY SHEFFIELD BS AMB   10/15/2021 10:30 AM Christella Hammans, MD [facility] MAIN BS AMB   10/18/2021  9:00 AM [facility] PS[facility]IATRIC [place] CT ER [facility]   10/18/2021  9:30 AM [facility] CT ER 1 [facility]   10/26/2021 11:30 AM Edward Arguelles  N Melissa Russell BS AMB     We discussed the expected course, resolution and complications of the diagnosis(es) in detail. Medication risks, benefits, costs, interactions, and alternatives were discussed as indicated. I advised him to contact the office if his condition worsens, changes or fails to improve as anticipated. He expressed understanding with the diagnosis(es) and plan. Pursuant to the emergency declaration under the [address] [place] [address], [facility] waiver authority and the [employer] Act, this Virtual  Visit was conducted, with patient's consent, to reduce the patient's risk of exposure to COVID-19 and provide continuity of care for an established patient. Services were provided through a video synchronous discussion virtually to substitute for in-person clinic visit.     Total provider time day of service: 34 minutes      Nisa Zavala MD    Adult Rheumatology   [facility]  A Part of [facility], [address] [place] [address]   Phone [phone]  Fax [phone]

## 2021-05-24 ENCOUNTER — TELEPHONE (OUTPATIENT)
Dept: RHEUMATOLOGY | Age: 51
End: 2021-05-24

## 2021-05-24 NOTE — TELEPHONE ENCOUNTER
Called patient left voice message for patient to call back into the office to schedule 6 months in office visit per doc. sdh

## 2021-06-10 ENCOUNTER — ANCILLARY PROCEDURE (OUTPATIENT)
Dept: CARDIOLOGY CLINIC | Age: 51
End: 2021-06-10

## 2021-06-10 ENCOUNTER — ANCILLARY PROCEDURE (OUTPATIENT)
Dept: CARDIOLOGY CLINIC | Age: 51
End: 2021-06-10
Payer: MEDICAID

## 2021-06-10 VITALS
DIASTOLIC BLOOD PRESSURE: 76 MMHG | HEIGHT: 67 IN | WEIGHT: 147 LBS | SYSTOLIC BLOOD PRESSURE: 114 MMHG | BODY MASS INDEX: 23.07 KG/M2

## 2021-06-10 VITALS
SYSTOLIC BLOOD PRESSURE: 120 MMHG | BODY MASS INDEX: 23.07 KG/M2 | WEIGHT: 147 LBS | DIASTOLIC BLOOD PRESSURE: 70 MMHG | HEIGHT: 67 IN

## 2021-06-10 DIAGNOSIS — R06.09 DOE (DYSPNEA ON EXERTION): ICD-10-CM

## 2021-06-10 DIAGNOSIS — R94.31 ABNORMAL EKG: ICD-10-CM

## 2021-06-10 DIAGNOSIS — R07.9 CHEST PAIN, UNSPECIFIED TYPE: ICD-10-CM

## 2021-06-10 PROCEDURE — 78452 HT MUSCLE IMAGE SPECT MULT: CPT | Performed by: INTERNAL MEDICINE

## 2021-06-10 PROCEDURE — 93306 TTE W/DOPPLER COMPLETE: CPT | Performed by: INTERNAL MEDICINE

## 2021-06-10 PROCEDURE — 93015 CV STRESS TEST SUPVJ I&R: CPT | Performed by: INTERNAL MEDICINE

## 2021-06-10 PROCEDURE — A9500 TC99M SESTAMIBI: HCPCS | Performed by: INTERNAL MEDICINE

## 2021-06-10 RX ORDER — TETRAKIS(2-METHOXYISOBUTYLISOCYANIDE)COPPER(I) TETRAFLUOROBORATE 1 MG/ML
30 INJECTION, POWDER, LYOPHILIZED, FOR SOLUTION INTRAVENOUS ONCE
Status: COMPLETED | OUTPATIENT
Start: 2021-06-10 | End: 2021-06-10

## 2021-06-10 RX ORDER — TETRAKIS(2-METHOXYISOBUTYLISOCYANIDE)COPPER(I) TETRAFLUOROBORATE 1 MG/ML
10 INJECTION, POWDER, LYOPHILIZED, FOR SOLUTION INTRAVENOUS ONCE
Status: COMPLETED | OUTPATIENT
Start: 2021-06-10 | End: 2021-06-10

## 2021-06-10 RX ADMIN — TETRAKIS(2-METHOXYISOBUTYLISOCYANIDE)COPPER(I) TETRAFLUOROBORATE 25.3 MILLICURIE: 1 INJECTION, POWDER, LYOPHILIZED, FOR SOLUTION INTRAVENOUS at 10:35

## 2021-06-10 RX ADMIN — TETRAKIS(2-METHOXYISOBUTYLISOCYANIDE)COPPER(I) TETRAFLUOROBORATE 7.9 MILLICURIE: 1 INJECTION, POWDER, LYOPHILIZED, FOR SOLUTION INTRAVENOUS at 09:05

## 2021-06-13 LAB
ECHO AO ASC DIAM: 3.06 CM
ECHO AO ROOT DIAM: 3.21 CM
ECHO AV AREA PEAK VELOCITY: 2.61 CM2
ECHO AV AREA VTI: 2.71 CM2
ECHO AV AREA/BSA PEAK VELOCITY: 1.5 CM2/M2
ECHO AV AREA/BSA VTI: 1.5 CM2/M2
ECHO AV MEAN GRADIENT: 2.23 MMHG
ECHO AV PEAK GRADIENT: 4.66 MMHG
ECHO AV PEAK VELOCITY: 107.96 CM/S
ECHO AV VTI: 20.4 CM
ECHO EST RA PRESSURE: 3 MMHG
ECHO IVC PROX: 1.78 CM
ECHO LA AREA 4C: 14.8 CM2
ECHO LA MAJOR AXIS: 3.38 CM
ECHO LA MINOR AXIS: 1.91 CM
ECHO LA VOL 2C: 54.05 ML (ref 18–58)
ECHO LA VOL 4C: 35.77 ML (ref 18–58)
ECHO LA VOL BP: 46.87 ML (ref 18–58)
ECHO LA VOL/BSA BIPLANE: 26.48 ML/M2 (ref 16–28)
ECHO LA VOLUME INDEX A2C: 30.54 ML/M2 (ref 16–28)
ECHO LA VOLUME INDEX A4C: 20.21 ML/M2 (ref 16–28)
ECHO LV E' LATERAL VELOCITY: 11.51 CM/S
ECHO LV E' SEPTAL VELOCITY: 7.51 CM/S
ECHO LV EDV A2C: 95.22 ML
ECHO LV EDV A4C: 110.77 ML
ECHO LV EDV BP: 103.2 ML (ref 67–155)
ECHO LV EDV INDEX A4C: 62.6 ML/M2
ECHO LV EDV INDEX BP: 58.3 ML/M2
ECHO LV EDV NDEX A2C: 53.8 ML/M2
ECHO LV EJECTION FRACTION A2C: 52 PERCENT
ECHO LV EJECTION FRACTION A4C: 56 PERCENT
ECHO LV EJECTION FRACTION BIPLANE: 52.2 PERCENT (ref 55–100)
ECHO LV ESV A2C: 45.67 ML
ECHO LV ESV A4C: 48.94 ML
ECHO LV ESV BP: 49.34 ML (ref 22–58)
ECHO LV ESV INDEX A2C: 25.8 ML/M2
ECHO LV ESV INDEX A4C: 27.6 ML/M2
ECHO LV ESV INDEX BP: 27.9 ML/M2
ECHO LV INTERNAL DIMENSION DIASTOLIC: 4.63 CM (ref 4.2–5.9)
ECHO LV INTERNAL DIMENSION SYSTOLIC: 3.4 CM
ECHO LV IVSD: 0.87 CM (ref 0.6–1)
ECHO LV MASS 2D: 151.8 G (ref 88–224)
ECHO LV MASS INDEX 2D: 85.8 G/M2 (ref 49–115)
ECHO LV POSTERIOR WALL DIASTOLIC: 1.05 CM (ref 0.6–1)
ECHO LVOT DIAM: 2.09 CM
ECHO LVOT PEAK GRADIENT: 2.7 MMHG
ECHO LVOT PEAK VELOCITY: 82.16 CM/S
ECHO LVOT SV: 55.2 ML
ECHO LVOT VTI: 16.07 CM
ECHO MV A VELOCITY: 74.74 CM/S
ECHO MV AREA PHT: 4.84 CM2
ECHO MV E DECELERATION TIME (DT): 156.82 MS
ECHO MV E VELOCITY: 62.01 CM/S
ECHO MV E/A RATIO: 0.83
ECHO MV E/E' LATERAL: 5.39
ECHO MV E/E' RATIO (AVERAGED): 6.82
ECHO MV E/E' SEPTAL: 8.26
ECHO MV PRESSURE HALF TIME (PHT): 45.48 MS
ECHO RIGHT VENTRICULAR SYSTOLIC PRESSURE (RVSP): 17.68 MMHG
ECHO RV TAPSE: 1.78 CM (ref 1.5–2)
ECHO TV REGURGITANT MAX VELOCITY: 191.6 CM/S
ECHO TV REGURGITANT PEAK GRADIENT: 14.68 MMHG
LA VOL DISK BP: 44.11 ML (ref 18–58)
LVOT MG: 1.33 MMHG

## 2021-06-15 LAB
STRESS BASELINE DIAS BP: 76 MMHG
STRESS BASELINE HR: 59 BPM
STRESS BASELINE SYS BP: 114 MMHG
STRESS O2 SAT PEAK: 100 %
STRESS O2 SAT REST: 100 %
STRESS PEAK DIAS BP: 52 MMHG
STRESS PEAK SYS BP: 122 MMHG
STRESS PERCENT HR ACHIEVED: 61 %
STRESS POST PEAK HR: 103 BPM
STRESS RATE PRESSURE PRODUCT: NORMAL BPM*MMHG
STRESS TARGET HR: 168 BPM

## 2021-06-23 ENCOUNTER — TELEPHONE (OUTPATIENT)
Dept: CARDIOLOGY CLINIC | Age: 51
End: 2021-06-23

## 2021-06-23 NOTE — TELEPHONE ENCOUNTER
----- Message from Ap Stockton MD sent at 6/21/2021  4:21 PM EDT -----  EF probably artificially low, echo was normal same day      [facility] sent to patient with results.

## 2021-10-12 ENCOUNTER — OFFICE VISIT (OUTPATIENT)
Dept: RHEUMATOLOGY | Age: 51
End: 2021-10-12
Payer: MEDICAID

## 2021-10-12 VITALS
WEIGHT: 143 LBS | BODY MASS INDEX: 22.44 KG/M2 | SYSTOLIC BLOOD PRESSURE: 116 MMHG | HEART RATE: 78 BPM | DIASTOLIC BLOOD PRESSURE: 76 MMHG | HEIGHT: 67 IN | OXYGEN SATURATION: 96 % | TEMPERATURE: 98.2 F | RESPIRATION RATE: 18 BRPM

## 2021-10-12 DIAGNOSIS — L40.50 PSORIATIC ARTHRITIS (HCC): Primary | ICD-10-CM

## 2021-10-12 DIAGNOSIS — Z79.899 ONGOING USE OF POSSIBLY TOXIC MEDICATION: ICD-10-CM

## 2021-10-12 PROCEDURE — 99215 OFFICE O/P EST HI 40 MIN: CPT | Performed by: INTERNAL MEDICINE

## 2021-10-12 RX ORDER — FLUOCINONIDE 1 MG/G
CREAM TOPICAL DAILY
Qty: 30 G | Refills: 5 | Status: SHIPPED | OUTPATIENT
Start: 2021-10-12 | End: 2021-10-19 | Stop reason: SDUPTHER

## 2021-10-12 NOTE — LETTER
10/20/2021    Patient: Brina Gallardo   YOB: 1970   Date of Visit: 10/12/2021     Willi Marroquin MD  [address] [facility] [address]  Via In Basket    Dear Willi Marroquin MD,      We recently saw Mr. Maryjo Wilkinson in the [facility] for evaluation. My notes for this consultation are attached. If you have questions, please do not hesitate to call me. I look forward to following your patient along with you.       Sincerely,    Marilu Apodaca MD [facility]  Cell: [phone]

## 2021-10-12 NOTE — PROGRESS NOTES
REASON FOR VISIT    Silvia Moreno is a 45 y.o. male who was seen in-office on 10/12/2021. HISTORY OF DISEASE: Psoriatic Arthritis    Year of diagnosis: 2015  First visit with me: 10/2020  Cumulative disease manifestations:  Dactylitis, psoriasis, back pain  Positive serologies/labs:  Negative serologies/labs: HLA B27  Other co-morbidities: heavy past alcohol use; rectal cancer on chemo + RT + surgery  Relapses:      Past treatment history:  Prednisone:   Non-biologic DMARD: Methotrexate (2015-4/2019 stopped 2/2 alcohol), Sulfasalazine 2000 mg oral daily (6/2020-9/2021)  Biologic: Lorriane Santos (11/20- )  Other:     HISTORY OF PRESENT ILLNESS     Has been off sulfasalazine 2-3 weeks. Still taking Otezla twice a day. No GI upset, mood has been good. No joint pain currently. Feels expected soreness with activity. No regular extended morning stiffness now. No interval infections. Nuclear stress in June done for dyspnea on exertion (declining endurance on mountain biking) showed EF 41%. Planning to get another opinion from another cardiologist since his last one left [address] [place] [address]. Reminds me he typically drinks 1-2 drinks/day on average. REVIEW OF SYSTEMS    A comprehensive review of systems was performed and pertinent results are documented in the HPI, review of systems is otherwise non-contributory. PAST MEDICAL HISTORY    Past Medical History:   Diagnosis Date    Adopted     COVID-19 vaccine series completed 04/09/2021    Pfizer dose #2 administered on 4/9/2021.  GERD (gastroesophageal reflux disease)     Ill-defined condition     CHEMO-LAST ON 2/19/2020    Psoriasis     Psoriatic arthritis ([facility] [address]     Rectal cancer (HCC)     Moderately differentiated [id number] adenocarcinoma of the rectum. Treated with neoadjuvant chemoradiation, resection, and adjuvant chemotherapy.         Past Surgical History:   Procedure Laterality Date    COLONOSCOPY Left 4/26/2019    COLONOSCOPY performed by Juanis Skinner MD Sukhdeep at [address] COLONOSCOPY N/A 10/22/2020    Normal post-resection anatomy; 2-year follow-up interval recommended; Dr. Hernan Uribe.  FLEXIBLE SIGMOIDOSCOPY N/A 5/2/2019    SIGMOIDOSCOPY FLEXIBLE performed by Primitivo Snow MD at [address] HX GI      EGD    HX ORTHOPAEDIC Right circa 2014    ACL repair    HX OTHER SURGICAL  09/24/2019    Hand-assisted laparoscopic low anterior resection, mobilization of the splenic flexure, coloproctostomy, and creation of loop ileostomy; Dr. Lopez Patel.  HX OTHER SURGICAL  03/17/2020    Ileostomy closure with resection and anastomosis; Dr. Lopez Patel.  HX UROLOGICAL  12/12/2019    CYSTOSCOPY FOR REMOVAL OF KIDNEY STONES WITH HOLMIUM LASER; Dr. Maryjane Rushing.  HX UROLOGICAL  09/24/2019    Cystoscopy and placement of bilateral temporary ureteral catheters; Lori Mckeon MD.    HX VASCULAR ACCESS      INSERTION OF PORT-A-CATH RT SIDE OF CHEST. FOR CHEMO-LAST ON 2/19/2020    HX WISDOM TEETH EXTRACTION      IR INSERT TUNL CVC W PORT OVER 5 YEARS  10/15/2019    Right internal jugular vein Port-a-Cath placement.  IR REMOVE TUNL CVAD W/O PORT / PUMP  7/16/2020    Port-a-Cath removal.       FAMILY HISTORY    Family History   Adopted: Yes   Problem Relation Age of Onset    Asthma Neg Hx     Cancer Neg Hx     Diabetes Neg Hx     Heart Disease Neg Hx     Hypertension Neg Hx     Stroke Neg Hx        SOCIAL HISTORY    Social History     Tobacco Use    Smoking status: Never Smoker    Smokeless tobacco: Never Used   Substance Use Topics    Alcohol use: Yes     Alcohol/week: 6.0 standard drinks     Types: 6 Cans of beer per week     Comment: 6 BEERS WEEKLY    Drug use: No     On vacations drinks more than 6 beers weekly    MEDICATIONS    Current Outpatient Medications   Medication Sig Dispense Refill    losartan (COZAAR) 100 mg tablet Take 1 Tab by mouth daily. 90 Tab 3    apremilast (Otezla) 30 mg tab Take 30 mg by mouth two (2) times a day. Indications: moderate to severe plaque psoriasis 60 Tab 11    fluocinonide (VANOS) 0.1 % topical cream Apply  to affected area daily. 30 g 5    tadalafil (CIALIS) 5 mg tablet Take 5 mg by mouth.  triamcinolone acetonide (KENALOG) 0.1 % ointment Apply  to affected area two (2) times daily as needed for Skin Irritation. use thin layer      sulfaSALAzine (AZULFIDINE) 500 mg tablet Take 1 Tab by mouth four (4) times daily. Take 2 tabs qAM, 1 tab qPM for 28 days. If no joint pain, decrease to 1 tab PO BID. Decrease daily dose by 1 pill every 30 days as long as joint pain well-controlled. (Patient not taking: Reported on 10/12/2021) 90 Tab 3       ALLERGIES    No Known Allergies    PHYSICAL EXAMINATION  Visit Vitals  /76 (BP 1 Location: Right arm, BP Patient Position: Sitting)   Pulse 78   Temp 98.2 °F (36.8 °C) (Oral)   Resp 18   Ht 5' 7\" (1.702 m)   Wt 143 lb (64.9 kg)   SpO2 96%   BMI 22.40 kg/m²     General:  The patient is well developed, well nourished, alert, and in no apparent distress. Eyes: Sclera are anicteric. No conjunctival injection. HEENT:  Oropharynx is clear. No oral ulcers. Adequate salivary pooling. No cervical or supraclavicular lymphadenopathy. Lungs:  Clear to auscultation bilaterally, without wheeze or stridor. Normal respiratory effort. Cor:  Regular rate and rhythm. No murmur rub or gallop. Abdomen: Soft, non-tender, without hepatomegaly or masses. Extremities: No calf tenderness or edema. Warm and well perfused. Skin: Bilateral knee plaques, left shin plaque  Neuro: Nonfocal, no foot or wrist drop. Normal unassisted gait. Musculoskeletal:    A comprehensive musculoskeletal exam was performed for all joints of each upper and lower extremity and assessed for swelling, tenderness and range of motion. Results are documented as below:  No evidence of synovitis in the small joints of the hands, wrists, shoulders, elbows, hips, knees or ankles.       DATA REVIEW    Prior medical records were reviewed and if applicable are summarized as below:    Labs:   21: WBC 8.1, Hgb 14.7, Plt 175; Cr 0.87  21: Tbili 0.6, AST 11, ALT 21, AlkP 71  21: Cr 0.90,  AST 42, ALT 42, AlkP 57, Tbili 0.5, Alb 4.4  10/2020: WBC 3.6 (ANC 2400, ), CBC ow WNL; CMP WNL  2019: cbc, cmp unremarkable, ESR, CRP normal, HEpB, C, quant gold negative  10/18: WBC 4.1, Hb 15.2, Plt 194, CMP Normal, CRP, ESR negative  : Hep C negative, HLA b27 negative, quant gold negative    Imagin/10/21 Nuclear stress:  · SPECT: Left ventricular function post-stress was abnormal. Calculated ejection fraction is 41%. There is no evidence of transient ischemic dilation (TID). The TID ratio is 1.03.  · Baseline ECG: Normal EKG. · SPECT: Left ventricular perfusion is probably normal. Myocardial perfusion imaging supports a low risk stress test.  5/3/21: CT chest with: No typical CT evidence of metastatic colon neoplasm in the thorax or visualized upper abdomen. Clear lungs, no hilar LAD.  20 CT abd/pelvis: No evidence of recurrent or metastatic disease. SI Joints (2019): Personally reviewed images. Normal joints  Foot xrays (2019): Personally reviewed images. No erosions  Hand xrays (2019): Personally reviewed images. + DJD. No erosions  : CXR normal    Pathology:  Rectal biopsy (2019): well differentiated invasive colonic adenocarcinoma    ASSESSMENT AND PLAN    A 45 y.o. male with hx of psoriatic arthritis on sulfasalazine and now apremilast, recent rectal adenocarcinoma s/p chemo/RT presents for a follow up visit. His arthritis remains well controlled since the addition of apremilast, remaining off of sulfasalazine. He has had some breakthrough plaques on the lower body but admits imperfect adherence with the apremilast, he plans to improve consistency with this and use topical CS more regularly.     # Psoriatic arthritis:    - Cont apremilast 30mg bid, redoubling efforts at consistent adherence    # Psoriasis:   - Apremilast as above  - Refilled fluocinonide for topical application    #Transaminitis  -April LFTs normalized with reduced alcohol, updating CMP    #Borderline nonischemic cardiomyopathy  -Pt following with cardiology, most likely alcohol-related, encouraged further reduction    # Rectal cancer:   - in remission for now, cont to follow with oncology    # Medication Toxicity Monitoring:  - cbc, cmp [id number] to date through oncology currently  - Hepatitis B, C: negative 2/2019  - Quant gold: negative 2/2019  - Immunizations: UTD  - bone health: to discuss upcoming visit    RTC in 3 months    The patient voiced understanding of the aforementioned assessment and plan. Summary of plan was provided in the After Visit Summary patient instructions. I also provided education about MyChart setup and utility. TODAY'S ORDERS    Patient Instructions   1. Labs ASAP, take [facility]'s orders and mine to have them both done. 2. If the sed rate or CRP are high, then we can discuss risk/benefit of changing to a drug like Tremfya or Francy Pares in place of the HÜTTEN. 3. For now, continue Otezla twice a day, and if shin/knee rashes worsen try more regular application of the steroid cream.    4. Return in 6 months.         Future Appointments   Date Time Provider Shannan Rodriguez   10/15/2021 10:30 AM MD Maryam Cortes [facility] MAIN BS AMB   10/18/2021  9:00 AM [facility] ER 1 [facility]   10/26/2021 11:30 AM Angelica Guevara  [facility] BS AMB     Face to face time: 25 minutes  Note preparation and records review day of service: 20 minutes  Total provider time day of service: 39 minutes        Do Zhao MD    Adult Rheumatology   [facility]  A Part of [facility], [address] [place] [address] Road   Phone [phone]  Fax [phone]

## 2021-10-12 NOTE — PATIENT INSTRUCTIONS
1. Labs ASAP, take Harry Song's orders and mine to have them both done. 2. If the sed rate or CRP are high, then we can discuss risk/benefit of changing to a drug like Tremfya or Mahamed Grade in place of the HÜTTEN. 3. For now, continue Otezla twice a day, and if shin/knee rashes worsen try more regular application of the steroid cream.    4. Return in 6 months.

## 2021-10-12 NOTE — PROGRESS NOTES
Chief Complaint   Patient presents with    Arthritis     1. Have you been to the ER, urgent care clinic since your last visit? Hospitalized since your last visit? No    2. Have you seen or consulted any other health care providers outside of the [address] [place] [address] since your last visit? Include any pap smears or colon screening.  No

## 2021-10-15 ENCOUNTER — VIRTUAL VISIT (OUTPATIENT)
Dept: INTERNAL MEDICINE CLINIC | Age: 51
End: 2021-10-15
Payer: MEDICAID

## 2021-10-15 DIAGNOSIS — R06.09 DOE (DYSPNEA ON EXERTION): ICD-10-CM

## 2021-10-15 DIAGNOSIS — I10 BENIGN ESSENTIAL HTN: Primary | ICD-10-CM

## 2021-10-15 PROCEDURE — 99214 OFFICE O/P EST MOD 30 MIN: CPT | Performed by: FAMILY MEDICINE

## 2021-10-15 NOTE — PROGRESS NOTES
Jemal Rodriguez is a 45 y.o. male who was seen by synchronous (real-time) audio-video technology on 10/15/2021 for Hypertension        Assessment & Plan:   Diagnoses and all orders for this visit:    1. Benign essential HTN    2. LESTER (dyspnea on exertion)  -     REFERRAL TO CARDIOLOGY      Patient will follow up in 3 months for blood pressure and physical      Subjective:     Patient is a 50-year-old male who is following up on blood pressure. Patient states that his blood pressures have been right at multiple doctors offices and are always in the 120s over 80s. He denies any chest pain shortness of breath but does have dyspnea on exertion. Patient states that he is somewhat concerned as his ejection fraction was mildly low on his echocardiogram last year. He would like to follow-up with cardiology about this. He has been trying to work more on his bike rides and gets more exercise and states that this definitely could be deconditioning but he would like to get this looked into. Prior to Admission medications    Medication Sig Start Date End Date Taking? Authorizing Provider   fluocinonide (VANOS) 0.1 % topical cream Apply  to affected area daily. 10/12/21   Melonie De Jesus MD   losartan (COZAAR) 100 mg tablet Take 1 Tab by mouth daily. 5/12/21   Juliana Duval MD   apremilast Misael Ly) 30 mg tab Take 30 mg by mouth two (2) times a day. Indications: moderate to severe plaque psoriasis 12/16/20   Melonie De Jesus MD   tadalafil (CIALIS) 5 mg tablet Take 5 mg by mouth. Provider, Historical   triamcinolone acetonide (KENALOG) 0.1 % ointment Apply  to affected area two (2) times daily as needed for Skin Irritation.  use thin layer    Provider, Historical     Patient Active Problem List    Diagnosis Date Noted    Small bowel obstruction ([facility] [address] 04/18/2021    Malignant neoplasm of colon ([facility] [address] 01/22/2021    Ileostomy present ([facility] [address] 03/17/2020    Port-A-Cath in place 02/05/2020    Encounter for antineoplastic chemotherapy 02/05/2020    Neutropenia ([facility] 12/19/2019    Thrombocytopenia ([facility] 09/24/2019    Gastrointestinal hemorrhage 05/10/2019    Rectal cancer ([facility] 05/10/2019    Psoriatic arthritis ([facility] 03/27/2019    Psoriasis 03/27/2019    ACL tear 02/20/2013     Current Outpatient Medications   Medication Sig Dispense Refill    fluocinonide (VANOS) 0.1 % topical cream Apply  to affected area daily. 30 g 5    losartan (COZAAR) 100 mg tablet Take 1 Tab by mouth daily. 90 Tab 3    apremilast (Otezla) 30 mg tab Take 30 mg by mouth two (2) times a day. Indications: moderate to severe plaque psoriasis 60 Tab 11    tadalafil (CIALIS) 5 mg tablet Take 5 mg by mouth.  triamcinolone acetonide (KENALOG) 0.1 % ointment Apply  to affected area two (2) times daily as needed for Skin Irritation. use thin layer       No Known Allergies  Past Medical History:   Diagnosis Date    Adopted     COVID-19 vaccine series completed 04/09/2021    Pfizer dose #2 administered on 4/9/2021.  GERD (gastroesophageal reflux disease)     Ill-defined condition     CHEMO-LAST ON 2/19/2020    Psoriasis     Psoriatic arthritis ([facility]     Rectal cancer (HCC)     Moderately differentiated [id number] adenocarcinoma of the rectum. Treated with neoadjuvant chemoradiation, resection, and adjuvant chemotherapy. Past Surgical History:   Procedure Laterality Date    COLONOSCOPY Left 4/26/2019    COLONOSCOPY performed by Kailash King MD at [address] COLONOSCOPY N/A 10/22/2020    Normal post-resection anatomy; 2-year follow-up interval recommended; Dr. Rosa Chavez.     FLEXIBLE SIGMOIDOSCOPY N/A 5/2/2019    SIGMOIDOSCOPY FLEXIBLE performed by Shira Perla MD at [address] HX GI      EGD    HX ORTHOPAEDIC Right circa 2014    ACL repair    HX OTHER SURGICAL  09/24/2019    Hand-assisted laparoscopic low anterior resection, mobilization of the splenic flexure, coloproctostomy, and creation of loop ileostomy;  Ninfa Bonine.  HX OTHER SURGICAL  03/17/2020    Ileostomy closure with resection and anastomosis; Dr. Ninfa Marx.  HX UROLOGICAL  12/12/2019    CYSTOSCOPY FOR REMOVAL OF KIDNEY STONES WITH HOLMIUM LASER; Dr. Keila Varela.  HX UROLOGICAL  09/24/2019    Cystoscopy and placement of bilateral temporary ureteral catheters; Harrison David MD.    HX VASCULAR ACCESS      INSERTION OF PORT-A-CATH RT SIDE OF CHEST. FOR CHEMO-LAST ON 2/19/2020    HX WISDOM TEETH EXTRACTION      IR INSERT TUNL CVC W PORT OVER 5 YEARS  10/15/2019    Right internal jugular vein Port-a-Cath placement.  IR REMOVE TUNL CVAD W/O PORT / PUMP  7/16/2020    Port-a-Cath removal.     Family History   Adopted: Yes   Problem Relation Age of Onset    Asthma Neg Hx     Cancer Neg Hx     Diabetes Neg Hx     Heart Disease Neg Hx     Hypertension Neg Hx     Stroke Neg Hx        ROS    Objective:   No flowsheet data found.      [INSTRUCTIONS:  \"[x]\" Indicates a positive item  \"[]\" Indicates a negative item  -- DELETE ALL ITEMS NOT EXAMINED]    Constitutional: [x] Appears well-developed and well-nourished [x] No apparent distress      [] Abnormal -     Mental status: [x] Alert and awake  [x] Oriented to person/place/time [x] Able to follow commands    [] Abnormal -     Eyes:   EOM    [x]  Normal    [] Abnormal -   Sclera  [x]  Normal    [] Abnormal -          Discharge [x]  None visible   [] Abnormal -     HENT: [x] Normocephalic, atraumatic  [] Abnormal -   [x] Mouth/Throat: Mucous membranes are moist    External Ears [x] Normal  [] Abnormal -    Neck: [x] No visualized mass [] Abnormal -     Pulmonary/Chest: [x] Respiratory effort normal   [x] No visualized signs of difficulty breathing or respiratory distress        [] Abnormal -      Musculoskeletal:   [x] Normal gait with no signs of ataxia         [x] Normal range of motion of neck        [] Abnormal -     Neurological:        [x] No Facial Asymmetry (Cranial nerve 7 motor function) (limited exam due to video visit)          [x] No gaze palsy        [] Abnormal -          Skin:        [x] No significant exanthematous lesions or discoloration noted on facial skin         [] Abnormal -            Psychiatric:       [x] Normal Affect [] Abnormal -        [x] No Hallucinations    Other pertinent observable physical exam findings:-        We discussed the expected course, resolution and complications of the diagnosis(es) in detail. Medication risks, benefits, costs, interactions, and alternatives were discussed as indicated. I advised him to contact the office if his condition worsens, changes or fails to improve as anticipated. He expressed understanding with the diagnosis(es) and plan. Renato Kirstie, was evaluated through a synchronous (real-time) audio-video encounter. The patient (or guardian if applicable) is aware that this is a billable service. Verbal consent to proceed has been obtained within the past 12 months. The visit was conducted pursuant to the emergency declaration under the [facility], [id number] waiver authority and the [employer] Act. Patient identification was verified, and a caregiver was present when appropriate. The patient was located in a state where the provider was credentialed to provide care.       Yesenia Dodd MD

## 2021-10-18 ENCOUNTER — HOSPITAL ENCOUNTER (OUTPATIENT)
Dept: CT IMAGING | Age: 51
Discharge: HOME | End: 2021-10-18
Attending: REGISTERED NURSE
Payer: MEDICAID

## 2021-10-18 ENCOUNTER — HOSPITAL ENCOUNTER (OUTPATIENT)
Dept: CT IMAGING | Age: 51
End: 2021-10-18
Attending: REGISTERED NURSE
Payer: MEDICAID

## 2021-10-18 DIAGNOSIS — C18.9 MALIGNANT NEOPLASM OF COLON, UNSPECIFIED PART OF COLON (HCC): ICD-10-CM

## 2021-10-18 PROCEDURE — 74177 CT ABD & PELVIS W/CONTRAST: CPT

## 2021-10-18 PROCEDURE — 74011000636 HC RX REV CODE- 636: Performed by: REGISTERED NURSE

## 2021-10-18 PROCEDURE — 71260 CT THORAX DX C+: CPT

## 2021-10-18 RX ADMIN — IOPAMIDOL 100 ML: 755 INJECTION, SOLUTION INTRAVENOUS at 09:21

## 2021-10-19 DIAGNOSIS — L40.50 PSORIATIC ARTHRITIS (HCC): ICD-10-CM

## 2021-10-19 LAB
ALBUMIN SERPL-MCNC: 4.5 G/DL (ref 3.8–4.9)
ALBUMIN/GLOB SERPL: 2.1 {RATIO} (ref 1.2–2.2)
ALP SERPL-CCNC: 56 IU/L (ref 44–121)
ALT SERPL-CCNC: 14 IU/L (ref 0–44)
AST SERPL-CCNC: 21 IU/L (ref 0–40)
BASOPHILS # BLD AUTO: 0 X10E3/UL (ref 0–0.2)
BASOPHILS NFR BLD AUTO: 1 %
BILIRUB SERPL-MCNC: 0.4 MG/DL (ref 0–1.2)
BUN SERPL-MCNC: 11 MG/DL (ref 6–24)
BUN/CREAT SERPL: 12 (ref 9–20)
CALCIUM SERPL-MCNC: 9.4 MG/DL (ref 8.7–10.2)
CEA SERPL-MCNC: 1.6 NG/ML (ref 0–4.7)
CHLORIDE SERPL-SCNC: 99 MMOL/L (ref 96–106)
CO2 SERPL-SCNC: 27 MMOL/L (ref 20–29)
CREAT SERPL-MCNC: 0.94 MG/DL (ref 0.76–1.27)
CRP SERPL-MCNC: <1 MG/L (ref 0–10)
EOSINOPHIL # BLD AUTO: 0.2 X10E3/UL (ref 0–0.4)
EOSINOPHIL NFR BLD AUTO: 6 %
ERYTHROCYTE [DISTWIDTH] IN BLOOD BY AUTOMATED COUNT: 12.5 % (ref 11.6–15.4)
ERYTHROCYTE [SEDIMENTATION RATE] IN BLOOD BY WESTERGREN METHOD: 2 MM/HR (ref 0–30)
GLOBULIN SER CALC-MCNC: 2.1 G/DL (ref 1.5–4.5)
GLUCOSE SERPL-MCNC: 97 MG/DL (ref 65–99)
HCT VFR BLD AUTO: 41.8 % (ref 37.5–51)
HGB BLD-MCNC: 14.6 G/DL (ref 13–17.7)
IMM GRANULOCYTES # BLD AUTO: 0 X10E3/UL (ref 0–0.1)
IMM GRANULOCYTES NFR BLD AUTO: 0 %
LYMPHOCYTES # BLD AUTO: 0.4 X10E3/UL (ref 0.7–3.1)
LYMPHOCYTES NFR BLD AUTO: 10 %
MCH RBC QN AUTO: 33.5 PG (ref 26.6–33)
MCHC RBC AUTO-ENTMCNC: 34.9 G/DL (ref 31.5–35.7)
MCV RBC AUTO: 96 FL (ref 79–97)
MONOCYTES # BLD AUTO: 0.4 X10E3/UL (ref 0.1–0.9)
MONOCYTES NFR BLD AUTO: 10 %
NEUTROPHILS # BLD AUTO: 2.7 X10E3/UL (ref 1.4–7)
NEUTROPHILS NFR BLD AUTO: 73 %
PLATELET # BLD AUTO: 158 X10E3/UL (ref 150–450)
POTASSIUM SERPL-SCNC: 4.4 MMOL/L (ref 3.5–5.2)
PROT SERPL-MCNC: 6.6 G/DL (ref 6–8.5)
RBC # BLD AUTO: 4.36 X10E6/UL (ref 4.14–5.8)
SODIUM SERPL-SCNC: 137 MMOL/L (ref 134–144)
WBC # BLD AUTO: 3.7 X10E3/UL (ref 3.4–10.8)

## 2021-10-19 RX ORDER — FLUOCINONIDE 1 MG/G
CREAM TOPICAL DAILY
Qty: 30 G | Refills: 5 | Status: SHIPPED | OUTPATIENT
Start: 2021-10-19 | End: 2022-01-14 | Stop reason: ALTCHOICE

## 2021-10-26 ENCOUNTER — OFFICE VISIT (OUTPATIENT)
Dept: ONCOLOGY | Age: 51
End: 2021-10-26
Payer: MEDICAID

## 2021-10-26 VITALS
HEART RATE: 73 BPM | DIASTOLIC BLOOD PRESSURE: 67 MMHG | TEMPERATURE: 98.3 F | BODY MASS INDEX: 23.02 KG/M2 | WEIGHT: 147 LBS | SYSTOLIC BLOOD PRESSURE: 99 MMHG | OXYGEN SATURATION: 97 % | RESPIRATION RATE: 18 BRPM

## 2021-10-26 DIAGNOSIS — Z08 ENCOUNTER FOR FOLLOW-UP SURVEILLANCE OF COLON CANCER: Primary | ICD-10-CM

## 2021-10-26 DIAGNOSIS — Z85.038 ENCOUNTER FOR FOLLOW-UP SURVEILLANCE OF COLON CANCER: Primary | ICD-10-CM

## 2021-10-26 DIAGNOSIS — C20 RECTAL CANCER (HCC): ICD-10-CM

## 2021-10-26 PROCEDURE — 99214 OFFICE O/P EST MOD 30 MIN: CPT | Performed by: INTERNAL MEDICINE

## 2021-10-26 NOTE — PROGRESS NOTES
Sander Hanna is a 45 y.o. male    Chief Complaint   Patient presents with    Follow-up     Rectal cancer- likely stage III- SOMMER       1. Have you been to the ER, urgent care clinic since your last visit? Hospitalized since your last visit? No    2. Have you seen or consulted any other health care providers outside of the [address] [place] [address] since your last visit? Include any pap smears or colon screening.  Yes, Dr Kennedi Noriega Patient is a 66y Female s/p L3-S2 PSF Stable POD 9    -Med comanagement appreciated   -ID recs appreciated   -c/w Abx plan for long term course of abx per ID  -Endo Recs appreciated   -FU OR Cx  -Incentive Spirometry  -Multimodal Analgesia  -DVT PE ppx - A81  -SCDs  -PT/OT OOB  -WBAT  -Wound care recs per vascular surgery team  -Discharge to [facility]

## 2021-10-26 NOTE — PROGRESS NOTES
[facility] [address] [address] [facility], [address]  W: [phone]  F: [phone]    Reason for Visit:   Shraddha Morgan is a 45 y.o. male who is seen in follow-up for Rectal cancer- likely stage III- SOMMER    Treatment History:   · 4/26/19: Colonoscopy- 6-7 cm size malignant appearing mass seen at 10 cm from anal verge. This was adenocarcinoma. 3 polyps removed- all were tubular adenomas  · Rectal Ultrasound 5/2/19: ulcerated infiltrative mass lesion was noted in rectum at 10 cm. The lesion measured about 5 cm X 4 cm- T2, 13 mm X 8 mm oblong lymph node was noted immediately adjacent to the mass lesion  · 5/2/19: CT CAP- No metastasis, liver cysts. CEA 3.6  · 5/10/19: PET CT showed rectal malignancy and 3 small perirectal anibal metastatic focir  · 5/28/19-7/8/29: Xeloda + RT   · 9/24/19: LAR-  [id number], 2/6 positive nodes  · 10/14/19: CT CAP with no evidence of metastatic disease, liver cysts   · 10/23/19- 2/19/2020-: 8 cycles of  FOLFOX. Several delays and dose reductions due to cytopenias  · 12/11/19 CT AP: hypodensity in liver, otherwise KATHLEEN   · 1/6/20 MRI abd: no liver mets  · 3/30/2020: CT KATHLEEN  · 10/7/2020: CT KATHLEEN   · 3/2021 CT : KATHLEEN   · CT CAP 10/2021: KATHLEEN     History of Present Illness:   Patient is a 45 y.o. male seen for adenocarcinoma of the mid third of rectum    He had intermittent BRBPR x 1 year and then decided to have a colonoscopy. This led to above mentioned diagnosis. He received neoadjuvant xeloda+ RT followed by LAR. Completed 8 cycles of FOLFOX, had a colostomy and is on surveillance. He comes today for follow-up. He feels well. Neuropathy has resolved. No unexplained weight loss. He has no pain. Denies nausea/vomiting. Normal BMs. No complaints. He uses 7 drinks a week. Adopted    Past Medical History:   Diagnosis Date    Adopted     COVID-19 vaccine series completed 04/09/2021    Pfizer dose #2 administered on 4/9/2021.     GERD (gastroesophageal reflux disease)     Ill-defined condition     CHEMO-LAST ON 2/19/2020    Psoriasis     Psoriatic arthritis ([facility] [address]     Rectal cancer (HCC)     Moderately differentiated [id number] adenocarcinoma of the rectum. Treated with neoadjuvant chemoradiation, resection, and adjuvant chemotherapy. Past Surgical History:   Procedure Laterality Date    COLONOSCOPY Left 4/26/2019    COLONOSCOPY performed by Tonny Hernández MD at [address] COLONOSCOPY N/A 10/22/2020    Normal post-resection anatomy; 2-year follow-up interval recommended; Dr. Kristina Sloan.  FLEXIBLE SIGMOIDOSCOPY N/A 5/2/2019    SIGMOIDOSCOPY FLEXIBLE performed by Carlos Enrique Allison MD at [address] HX GI      EGD    HX ORTHOPAEDIC Right circa 2014    ACL repair    HX OTHER SURGICAL  09/24/2019    Hand-assisted laparoscopic low anterior resection, mobilization of the splenic flexure, coloproctostomy, and creation of loop ileostomy; Dr. Dixie Dueñas.  HX OTHER SURGICAL  03/17/2020    Ileostomy closure with resection and anastomosis; Dr. Dixie Dueñas.  HX UROLOGICAL  12/12/2019    CYSTOSCOPY FOR REMOVAL OF KIDNEY STONES WITH HOLMIUM LASER; Dr. Colton Ortiz.  HX UROLOGICAL  09/24/2019    Cystoscopy and placement of bilateral temporary ureteral catheters; Natasha Robledo MD.    HX VASCULAR ACCESS      INSERTION OF PORT-A-CATH RT SIDE OF CHEST. FOR CHEMO-LAST ON 2/19/2020    HX WISDOM TEETH EXTRACTION      IR INSERT TUNL CVC W PORT OVER 5 YEARS  10/15/2019    Right internal jugular vein Port-a-Cath placement.  IR REMOVE TUNL CVAD W/O PORT / PUMP  7/16/2020    Port-a-Cath removal.      Social History     Tobacco Use    Smoking status: Never Smoker    Smokeless tobacco: Never Used   Substance Use Topics    Alcohol use:  Yes     Alcohol/week: 6.0 standard drinks     Types: 6 Cans of beer per week     Comment: 6 BEERS WEEKLY      Family History   Adopted: Yes   Problem Relation Age of Onset    Asthma Neg Hx     Cancer Neg Hx  Diabetes Neg Hx     Heart Disease Neg Hx     Hypertension Neg Hx     Stroke Neg Hx      Current Outpatient Medications   Medication Sig    fluocinonide (VANOS) 0.1 % topical cream Apply  to affected area daily.  losartan (COZAAR) 100 mg tablet Take 1 Tab by mouth daily.  apremilast (Otezla) 30 mg tab Take 30 mg by mouth two (2) times a day. Indications: moderate to severe plaque psoriasis    tadalafil (CIALIS) 5 mg tablet Take 5 mg by mouth.  triamcinolone acetonide (KENALOG) 0.1 % ointment Apply  to affected area two (2) times daily as needed for Skin Irritation. use thin layer     No current facility-administered medications for this visit. No Known Allergies     Review of Systems: A complete review of systems was obtained, negative except as described above. Physical Exam:     Constitutional: Appears well-developed and well-nourished in no apparent distress   Mental status: Alert and awake, Oriented to person/place/time, Able to follow commands  Eyes: EOM normal, Sclera normal, No visible ocular discharge  HENT: Normocephalic, atraumatic; Mouth/Throat: Moist mucous membranes, External Ears normal  Neck: No visualized mass  Skin: No significant exanthematous lesions or discoloration noted on facial skin  Psychiatric: Normal affect, normal judgment/insight. No hallucinations     Results:     Lab Results   Component Value Date/Time    WBC 3.7 10/18/2021 09:25 AM    HGB 14.6 10/18/2021 09:25 AM    HCT 41.8 10/18/2021 09:25 AM    PLATELET 001 03/60/5797 09:25 AM    MCV 96 10/18/2021 09:25 AM    ABS.  NEUTROPHILS 2.7 10/18/2021 09:25 AM     Lab Results   Component Value Date/Time    Sodium 137 10/18/2021 09:25 AM    Potassium 4.4 10/18/2021 09:25 AM    Chloride 99 10/18/2021 09:25 AM    CO2 27 10/18/2021 09:25 AM    Glucose 97 10/18/2021 09:25 AM    BUN 11 10/18/2021 09:25 AM    Creatinine 0.94 10/18/2021 09:25 AM    GFR est  10/18/2021 09:25 AM    GFR est non-AA 93 10/18/2021 09:25 AM Calcium 9.4 10/18/2021 09:25 AM     Lab Results   Component Value Date/Time    Bilirubin, total 0.4 10/18/2021 09:25 AM    ALT (SGPT) 14 10/18/2021 09:25 AM    Alk. phosphatase 56 10/18/2021 09:25 AM    Protein, total 6.6 10/18/2021 09:25 AM    Albumin 4.5 10/18/2021 09:25 AM    Globulin 3.6 04/18/2021 02:36 AM     CEA:  Recent Labs     10/18/21  0925 03/18/21  0940 01/18/21  1103   [id number] 1.6 3.0 3.7       Records reviewed and summarized above. Pathology report(s) reviewed  FINAL PATHOLOGIC DIAGNOSIS   1. Rectum and sigmoid colon, laparoscopic low anterior resection:   SPECIMEN   Procedure: Low anterior resection   Macroscopic Intactness of Mesorectum: Complete   TUMOR   Tumor Site: Rectum   Histologic Type: Adenocarcinoma   Histologic Grade: G2: Moderately differentiated   Tumor Size: 2.6 cm   Tumor Deposits: Present   Number of Deposits: 1   Tumor Extension: Tumor invades through the muscularis propria into pericolorectal tissue   Macroscopic Tumor Perforation: Not identified   Lymphovascular Invasion: Not identified   Perineural Invasion: Not identified   Tumor Budding: Number of tumor buds in one hotspot field: 5   Tumor Budding Score: Intermediate score (5-9)   Treatment Effect: Present - Residual cancer with evident tumor regression, but more than single cells or   rare small groups of cancer cells (partial response, score 2)   MARGINS   Margins: All margins are uninvolved by invasive carcinoma, high- grade dysplasia, intramucosal   adenocarcinoma, and adenoma   Margins Examined: Proximal, Distal, Radial or Mesenteric   Distance of Invasive Carcinoma from Closest Margin: 25 mm   Closest Margin: Radial or Mesenteric   LYMPH NODES   Number of Lymph Nodes Involved: 2   Number of Lymph Nodes Examined: 16   PATHOLOGIC STAGE CLASSIFICATION (pTNM, AJCC 8th Edition)   Primary Tumor (pT): pT3   Regional Lymph Nodes (pN): pN1b   2.  Large bowel, donuts:   Fragments of large bowel wall, negative for microscopic pathologic abnormality. Radiology report(s) reviewed above. CT CAP 10/14/19: IMPRESSION:  1. There are scattered small hepatic cysts without definite evidence for  metastatic disease. 2. Otherwise negative CT chest abdomen pelvis    CT CAP 12/11/19: IMPRESSION:  1. Right hydroureteronephrosis due to an 8 mm calculus at the ureterovesicular  junction. 2. Vague areas of heterogeneous enhancement and hypodensity in the liver, raises  concern for metastases, though dedicated 3 phase liver CT or liver protocol MRI  may be considered as follow-up. Numerous small hypodensities in the liver likely  cysts, unchanged. 3. Rectal anastomosis and presacral edema, treatment related. Right lower  quadrant ileostomy. No bowel obstruction    MRI abd 1/6/20: no hepatic mets     CT abdomen 3/30/2020  IMPRESSION  IMPRESSION:     1. Mild dilatation, thinning of the wall, and poor enhancement of the bowel just  proximal to the loop ileostomy closure nearly to the distal anastomosis. Clinical significance is uncertain. Ischemic changes should be considered. 2. Small amount of postoperative fluid in the right paracolic gutter without  drainable fluid collection    CT AP 10/7/20: IMPRESSION:  No evidence of recurrent or metastatic disease.     CT AP 3/2021:  IMPRESSION  No evidence for metastatic disease     CT CAP 10/2021: KATHLEEN     Assessment:   1) Rectal adenocarcinoma- mid third- SOMMER  Genetic testing: negative     T2N1M0 disease- Clinical stage III  S/P John Adj chemoRT followed by LAR on 9/24/19  Pathology showed [id number] residual disease- stage IIIB  Had significant residual disease with practically no treatment effects  Adjuvant FOLFOX x 8 until 2/2020- several delays and reductions due to cytopenias    CT 10/2021 KATHLEEN   Colonoscopy 10/2020 normal  ROS and labs unremarkable   CEA stable     Discussed surveillance    H NP and labs every 3-6 months for 2 years then every 6 months for 5 years  CT every 6-12 months for 5 years  Colonoscopy in October 2022    2) Wang Holguin age at diagnosis  Adopted  Had several adenomas removed  May have attenuated FAP and will require genetic testing  SOMMER  Referred to [facility]     3) Psoriasis  Off Methotrexate  Is on sulphasalazine    4) Neuropathy  Secondary to Oxaliplatin  Resolved     5) SBO  Has since resolved   Follows with Dr. Cherelle Chaudhry:     · Surveillance- RTC 6 months with labs and scans CT CAP    RTC in 6 months     I appreciate the opportunity to participate in Mr. Willy gardner. I performed a history and physical examination of the patient and discussed his management with the NPP.  I reviewed the NPP note and agree with the documented findings and plan of care    H/O Rectal cancer  On surveillance  Scans KATHLEEN  No supraclav adenopathy  CEA not elevated  Will come in 6 months with scans   Signed By: Indu Mcnulty MD

## 2021-11-10 DIAGNOSIS — Z79.899 ONGOING USE OF POSSIBLY TOXIC MEDICATION: ICD-10-CM

## 2021-11-10 DIAGNOSIS — L40.9 PSORIASIS: ICD-10-CM

## 2021-11-10 RX ORDER — APREMILAST 30 MG/1
TABLET, FILM COATED ORAL
Qty: 60 TABLET | Refills: 10 | Status: SHIPPED | OUTPATIENT
Start: 2021-11-10 | End: 2021-12-13 | Stop reason: SDUPTHER

## 2021-12-13 ENCOUNTER — TELEPHONE (OUTPATIENT)
Dept: RHEUMATOLOGY | Age: 51
End: 2021-12-13

## 2021-12-13 DIAGNOSIS — Z79.899 ONGOING USE OF POSSIBLY TOXIC MEDICATION: ICD-10-CM

## 2021-12-13 DIAGNOSIS — L40.9 PSORIASIS: ICD-10-CM

## 2021-12-13 RX ORDER — APREMILAST 30 MG/1
TABLET, FILM COATED ORAL
Qty: 60 TABLET | Refills: 10 | Status: SHIPPED | OUTPATIENT
Start: 2021-12-13 | End: 2021-12-15 | Stop reason: SDUPTHER

## 2021-12-13 NOTE — TELEPHONE ENCOUNTER
[address] called 12/13/2021 and left vm stating that patient needs a PA for Susy Chavez. They are faxing the forms to our office.       Any questions, they can be reached at:  [phone]

## 2021-12-14 ENCOUNTER — DOCUMENTATION ONLY (OUTPATIENT)
Dept: PHARMACY | Age: 51
End: 2021-12-14

## 2021-12-14 NOTE — PROGRESS NOTES
[facility] at [address] [facility] Update    Date: 12/14/21    Hiral Score 1970    Medication: Odetta Slider 30 mg    Prior Authorization: through 12/14/2022    Prescription needs to be transferred to [address] (phone: [phone]). [facility] ([phone]) was notified that this specialty prescription needs to be transferred to the insurance mandated specialty pharmacy above.      Rufina Garcia, [address] at [address] [facility],  Laura Mccartney, [address]  phone: [phone]   fax: [phone]

## 2021-12-15 DIAGNOSIS — Z79.899 ONGOING USE OF POSSIBLY TOXIC MEDICATION: ICD-10-CM

## 2021-12-15 DIAGNOSIS — L40.9 PSORIASIS: ICD-10-CM

## 2021-12-15 RX ORDER — APREMILAST 30 MG/1
TABLET, FILM COATED ORAL
Qty: 60 TABLET | Refills: 10 | Status: SHIPPED | OUTPATIENT
Start: 2021-12-15

## 2022-01-11 ENCOUNTER — OFFICE VISIT (OUTPATIENT)
Dept: CARDIOLOGY CLINIC | Age: 52
End: 2022-01-11
Payer: MEDICAID

## 2022-01-11 VITALS
WEIGHT: 150.2 LBS | SYSTOLIC BLOOD PRESSURE: 120 MMHG | DIASTOLIC BLOOD PRESSURE: 84 MMHG | HEIGHT: 67 IN | HEART RATE: 84 BPM | OXYGEN SATURATION: 99 % | RESPIRATION RATE: 14 BRPM | BODY MASS INDEX: 23.57 KG/M2

## 2022-01-11 DIAGNOSIS — R94.31 ABNORMAL EKG: Primary | ICD-10-CM

## 2022-01-11 DIAGNOSIS — R06.02 SOB (SHORTNESS OF BREATH): ICD-10-CM

## 2022-01-11 PROCEDURE — 99214 OFFICE O/P EST MOD 30 MIN: CPT | Performed by: SPECIALIST

## 2022-01-11 PROCEDURE — 93000 ELECTROCARDIOGRAM COMPLETE: CPT | Performed by: SPECIALIST

## 2022-01-11 NOTE — PATIENT INSTRUCTIONS
You have been scheduled for a stress echocardiogram. Please arrive 15 minutes prior to your appointment. Wear comfortable clothes and shoes. Please do not eat or drink anything other than water two hours prior to test. We will call with results.

## 2022-01-11 NOTE — PROGRESS NOTES
1/11/2022   Heather Dumont MD  [facility]    . Lalitha Doty Lina Gonzalez is a 45 y.o. male   Patient new to the practice May 2021 for evaluation of :  · shortness of breath and abnormal stress test   · patient has hypertension and colon cancer. EKG stress test had shown baseline ST abnormalities with possible early repolarization. Was recommend the patient underwent a nuclear stress test and echocardiogram for the abnormal resting EKG and shortness of breath. Patient has follow-up with oncology for adenocarcinoma of the rectum and has been on Xeloda and FOLFOX  · Echo 6/10/2021 EF 50-55% normal study  · Nuclear EF 41% normal perfusion  Sees Dr. Langston Boas for psoriatic arthritis and Dr. Arnold Huynh for rectal cancer  He generally feels well is riding his bike but feels like he cannot keep up with some of the other cohort that he had. He feels like this more than he would expect after chemotherapy he reports moderate shortness of breath. He seen his rheumatologist oncologist and surgeon regularly. He has no allergies. EKG sinus rhythm within normal limits, resolution of previous ST abnormalities    Discussion/Plans/Recs  1. Abnormal EKG- stress test normal ,STTs now normal  2. Decrease exercise tolerance-testing normal, he feels he notes this on long bike rides, suspect this is post chemo , aging and mild deconditioning after being away from biking for 8 weeks, does not appear to be pathologic , pt remains perplexed by these changes    He is mainly concerned about his exercise tolerance as he bike rides. We went over the ejection fraction being on the 50 to 55%. We do not tend to think the nuclear EF is accurate when compared to echo but there is a discrepency. His EKG interestingly enough is changed. He has no chest pain edema or other symptoms.   Recommend that he go back to his exercise and evaluate how he is doing we will tentatively plan him for stress echocardiogram 1 year past his previous testing     I explained him his EKG improved and could have been resolution of pericarditis or just changes in early repolarization. Ischemia seems unlikely. As terms of his exercise tolerance I think this would not be big surprise after being sedentary undergoing chemotherapy but there is no pathological finding we try to draw that distinction. Patient Instructions   You have been scheduled for a stress echocardiogram. Please arrive 15 minutes prior to your appointment. Wear comfortable clothes and shoes. Please do not eat or drink anything other than water two hours prior to test. We will call with results. Future Appointments   Date Time Provider Shannan Rodriguez   4/8/2022  9:00 AM [facility] ER 1 [facility]   4/11/2022 11:00 AM Charlynn Lanes, MD [facility] BS AMB   4/26/2022 10:30 AM Lora Mckee  [address] BS AMB   6/13/2022 10:00 AM STRESSECHPATTY REICH BS AMB   6/13/2022 10:00 AM VASCULAR, PATTY GRIMALDO BS AMB   1/16/2023  9:45 AM Nicole Hopkins MD [facility] MAIN BS AMB     Patient Care Team:  Rafat Houston MD as PCP - General (Family Medicine)  Rafat Houston MD as PCP - [facility] Provider  Magdalena Quintanilla MD (Colon and Rectal Surgery)  Charlynn Lanes, MD (Rheumatology)  Lora Mckee MD (Hematology and Oncology)  Melodie Sharp MD as Consulting Provider (Cardiology)       Cardiac Studies/Hx:  06/10/21ECHO ADULT COMPLETE · LV: Estimated LVEF is 50 - 55%. Biplane method used to measure ejection fraction. Normal cavity size, wall thickness and diastolic function. Low normal systolic function. Wall motion: normal.  · TV: Right Ventricular Arterial Pressure (RVSP) is 18 mmHg. Pulmonary hypertension not suggested by Doppler findings. · Normal study. 06/10/21NUCLEAR CARDIAC STRESS TEST · SPECT: Left ventricular function post-stress was abnormal. Calculated ejection fraction is 41%. There is no evidence of transient ischemic dilation (TID). The TID ratio is 1.03.  · Baseline ECG: Normal EKG. · SPECT: Left ventricular perfusion is probably normal. Myocardial perfusion imaging supports a low risk stress test.   .      Past Medical History:   Diagnosis Date    Adopted     COVID-19 vaccine series completed 04/09/2021    Pfizer dose #2 administered on 4/9/2021.  GERD (gastroesophageal reflux disease)     Ill-defined condition     CHEMO-LAST ON 2/19/2020    Psoriasis     P[facility]iatic arthritis ([facility] [address]     Rectal cancer (HCC)     Moderately differentiated [id number] adenocarcinoma of the rectum. Treated with neoadjuvant chemoradiation, resection, and adjuvant chemotherapy. ROS-pertinents  negative except as above  The pertinent portions of the medical history,physician and nursing notes, meds,vitals , labs and Ins/Outs,are reviewed in the electronic record. Results for orders placed or performed during the hospital encounter of 03/02/20   EKG, 12 LEAD, INITIAL   Result Value Ref Range    Ventricular Rate 62 BPM    Atrial Rate 62 BPM    P-R Interval 178 ms    QRS Duration 96 ms    Q-T Interval 380 ms    QTC Calculation (Bezet) 385 ms    Calculated P Axis 18 degrees    Calculated R Axis 39 degrees    Calculated T Axis 25 degrees    Diagnosis       Normal sinus rhythm  Voltage criteria for left ventricular hypertrophy  Early repolarization    When compared with ECG of 10-SEP-2019 11:54,  No significant change was found  Confirmed by Shant Lewis M.D., [place] ([id number]) on 3/3/2020 8:28:01 AM        06/10/21    ECHO ADULT COMPLETE 06/13/2021 6/13/2021    Interpretation Summary  · LV: Estimated LVEF is 50 - 55%. Biplane method used to measure ejection fraction. Normal cavity size, wall thickness and diastolic function. Low normal systolic function. Wall motion: normal.  · TV: Right Ventricular Arterial Pressure (RVSP) is 18 mmHg. Pulmonary hypertension not suggested by Doppler findings. · Normal study.     Signed by: Diego Barron MD on 6/13/2021  4:23 PM Objective:    Physical Exam:   /84 (BP 1 Location: Right arm, BP Patient Position: Sitting, BP Cuff Size: Adult)   Pulse 84   Resp 14   Ht 5' 7\" (1.702 m)   Wt 150 lb 3.2 oz (68.1 kg)   SpO2 99%   BMI 23.52 kg/m²    General:  alert, cooperative, no distress, appears stated age   ENT, Neck:  no jvd   Chest Wall: inspection normal - no chest wall deformities or tenderness, respiratory effort normal   Lung: clear to auscultation bilaterally   Heart:  normal rate, regular rhythm, normal S1, S2, no murmurs, rubs, clicks or gallops   Abdomen: nondistended   Extremities: extremities normal, atraumatic, no cyanosis or edema     @IPVITALS(12:6,8,9,5,10)@   Lab Results   Component Value Date/Time    WBC 3.7 10/18/2021 09:25 AM    HGB 14.6 10/18/2021 09:25 AM    HCT 41.8 10/18/2021 09:25 AM    PLATELET 301 87/48/0778 09:25 AM    MCV 96 10/18/2021 09:25 AM     Lab Results   Component Value Date/Time    Sodium 137 10/18/2021 09:25 AM    Potassium 4.4 10/18/2021 09:25 AM    Chloride 99 10/18/2021 09:25 AM    CO2 27 10/18/2021 09:25 AM    Anion gap 5 04/21/2021 03:07 AM    Glucose 97 10/18/2021 09:25 AM    BUN 11 10/18/2021 09:25 AM    Creatinine 0.94 10/18/2021 09:25 AM    BUN/Creatinine ratio 12 10/18/2021 09:25 AM    GFR est  10/18/2021 09:25 AM    GFR est non-AA 93 10/18/2021 09:25 AM    Calcium 9.4 10/18/2021 09:25 AM     No results found for: CPK, RCK1, RCK2, RCK3, RCK4, CKMB, CKNDX, CKND1, TROPT, TROIQ, BNPP, BNP  No results found for: BNP, BNPP, BNPPPOC, XBNPT, BNPNT    reports that he has never smoked. He has never used smokeless tobacco. He reports current alcohol use of about 6.0 standard drinks of alcohol per week. He reports that he does not use drugs. family history is not on file. He was adopted.    Last 24hr Input/Output:  [unfilled]     Data Review:   Lab Results   Component Value Date/Time    WBC 3.7 10/18/2021 09:25 AM    HGB 14.6 10/18/2021 09:25 AM    HCT 41.8 10/18/2021 09:25 AM PLATELET 287 41/54/5053 09:25 AM    MCV 96 10/18/2021 09:25 AM     Lab Results   Component Value Date/Time    Sodium 137 10/18/2021 09:25 AM    Potassium 4.4 10/18/2021 09:25 AM    Chloride 99 10/18/2021 09:25 AM    CO2 27 10/18/2021 09:25 AM    Anion gap 5 04/21/2021 03:07 AM    Glucose 97 10/18/2021 09:25 AM    BUN 11 10/18/2021 09:25 AM    Creatinine 0.94 10/18/2021 09:25 AM    BUN/Creatinine ratio 12 10/18/2021 09:25 AM    GFR est  10/18/2021 09:25 AM    GFR est non-AA 93 10/18/2021 09:25 AM    Calcium 9.4 10/18/2021 09:25 AM    Bilirubin, total 0.4 10/18/2021 09:25 AM    Alk. phosphatase 56 10/18/2021 09:25 AM    Protein, total 6.6 10/18/2021 09:25 AM    Albumin 4.5 10/18/2021 09:25 AM    Globulin 3.6 04/18/2021 02:36 AM    A-G Ratio 2.1 10/18/2021 09:25 AM    ALT (SGPT) 14 10/18/2021 09:25 AM    AST (SGOT) 21 10/18/2021 09:25 AM     No results found for: CPK, RCK1, RCK2, RCK3, RCK4, CKMB, CKNDX, CKND1, TROPT, TROIQ, BNPP, BNP  No results found for: BNP, BNPP, BNPPPOC, XBNPT, BNPNT    No results found for this or any previous visit (from the past 24 hour(s)).        Future Appointments   Date Time Provider Shannan Jennifer   1/14/2022 10:30 AM Rafat Houston MD [facility] MAIN BS AMB   4/8/2022  9:00 AM [facility] ER 1 [facility]   4/11/2022 11:00 AM Charlynn Lanes, MD [facility] BS AMB   4/26/2022 10:30 AM Lora Mckee  [address]  BS AMB        Aayush Hoffman MD 1/11/2022

## 2022-01-14 ENCOUNTER — OFFICE VISIT (OUTPATIENT)
Dept: INTERNAL MEDICINE CLINIC | Age: 52
End: 2022-01-14
Payer: MEDICAID

## 2022-01-14 VITALS
DIASTOLIC BLOOD PRESSURE: 72 MMHG | BODY MASS INDEX: 23.54 KG/M2 | HEART RATE: 73 BPM | RESPIRATION RATE: 16 BRPM | OXYGEN SATURATION: 99 % | HEIGHT: 67 IN | TEMPERATURE: 98.8 F | SYSTOLIC BLOOD PRESSURE: 108 MMHG | WEIGHT: 150 LBS

## 2022-01-14 DIAGNOSIS — I10 BENIGN ESSENTIAL HTN: ICD-10-CM

## 2022-01-14 DIAGNOSIS — Z23 ENCOUNTER FOR IMMUNIZATION: ICD-10-CM

## 2022-01-14 DIAGNOSIS — Z00.00 VISIT FOR WELL MAN HEALTH CHECK: Primary | ICD-10-CM

## 2022-01-14 PROCEDURE — 90715 TDAP VACCINE 7 YRS/> IM: CPT | Performed by: FAMILY MEDICINE

## 2022-01-14 PROCEDURE — 99396 PREV VISIT EST AGE 40-64: CPT | Performed by: FAMILY MEDICINE

## 2022-01-14 PROCEDURE — 90750 HZV VACC RECOMBINANT IM: CPT | Performed by: FAMILY MEDICINE

## 2022-01-14 NOTE — PROGRESS NOTES
Chief Complaint   Patient presents with    Complete Physical     he is a 45y.o. year old male who presents for CPE. Complete Physical Exam Questions:    1. Do you follow a low fat diet?  no  2. Are you up to date on your Tdap (<10 years)? Unknown  3. Have you ever had a Pneumovax vaccine (>65)? No   PCV13 No   PPSV23 No  4. Have you had Zoster vaccine (>60)? Yes  5. Have you had the HPV - Gardasil (13- 26)? No  6. Do you follow an exercise program?  no  7. Do you smoke?  no If > 65 and smoker, have you had a abdominal aortic aneurysm ultrasound screen? No  8. Do you consider yourself overweight?  no  9. Is there a family history of CAD< age 48? No  10. Is there a family history of Cancer? Unknown  11. Do you know your Cancer risks? Unknown  12. Have you had a colonoscopy? Yes  13. Have you been tested for HIV or other STI's? No HIV today(18-64 y/o)? No   14. Have you had an EKG in the last five years(>50)? Yes  15. Have you had a PSA test done this year (50-69)? No    Other complaints: none    Reviewed and agree with Nurse Note and duplicated in this note. Reviewed PmHx, RxHx, FmHx, SocHx, AllgHx and updated and dated in the chart. Family History   Adopted: Yes   Problem Relation Age of Onset    Asthma Neg Hx     Cancer Neg Hx     Diabetes Neg Hx     Heart Disease Neg Hx     Hypertension Neg Hx     Stroke Neg Hx        Past Medical History:   Diagnosis Date    Adopted     COVID-19 vaccine series completed 04/09/2021    Pfizer dose #2 administered on 4/9/2021.  GERD (gastroesophageal reflux disease)     Ill-defined condition     CHEMO-LAST ON 2/19/2020    Psoriasis     Psoriatic arthritis ([facility] [address]     Rectal cancer (HCC)     Moderately differentiated [id number] adenocarcinoma of the rectum. Treated with neoadjuvant chemoradiation, resection, and adjuvant chemotherapy.       Social History     Socioeconomic History    Marital status:    Tobacco Use    Smoking status: Never Smoker    Smokeless tobacco: Never Used   Substance and Sexual Activity    Alcohol use: Yes     Alcohol/week: 6.0 standard drinks     Types: 6 Cans of beer per week     Comment: 6 BEERS WEEKLY    Drug use: No    Sexual activity: Not Currently        Review of Systems - negative except as listed above      Objective:     Vitals:    01/14/22 1116   Resp: 16   Weight: 150 lb (68 kg)   Height: 5' 7\" (1.702 m)       Physical Examination: General appearance - alert, well appearing, and in no distress  Eyes - pupils equal and reactive, extraocular eye movements intact  Ears - bilateral TM's and external ear canals normal  Nose - normal and patent, no erythema, discharge or polyps  Mouth - mucous membranes moist, pharynx normal without lesions  Neck - supple, no significant adenopathy  Chest - clear to auscultation, no wheezes, rales or rhonchi, symmetric air entry  Heart - normal rate, regular rhythm, normal S1, S2, no murmurs, rubs, clicks or gallops  Abdomen - soft, nontender, nondistended, no masses or organomegaly  Musculoskeletal - no joint tenderness, deformity or swelling  Extremities - peripheral pulses normal, no pedal edema, no clubbing or cyanosis  Skin - normal coloration and turgor, no rashes, no suspicious skin lesions noted       Assessment/ Plan:   Diagnoses and all orders for this visit:    1. Visit for well man health check  -     CBC W/O DIFF; Future  -     LIPID PANEL; Future  -     METABOLIC PANEL, COMPREHENSIVE; Future  -     PSA W/ REFLX FREE PSA; Future    2. Benign essential HTN           Labs to be drawn: CBC, CMP, Lipid            I have discussed the diagnosis with the patient and the intended plan as seen in the above orders. The patient has received an after-visit summary and questions were answered concerning future plans.        Medication Side Effects and Warnings were discussed with patient,  Patient Labs were reviewed and or requested, and  Patient Past Records were reviewed and or requested  yes         Pt agrees to call or return to clinic and/or go to closest ER with any worsening of symptoms. This may include, but not limited to increased fever (>100.4) with NSAIDS or Tylenol, increased edema, confusion, rash, worsening of presenting symptoms. Please note that this dictation was completed with Nor1, the computer voice recognition software. Quite often unanticipated grammatical, syntax, homophones, and other interpretive errors are inadvertently transcribed by the computer software. Please disregard these errors. Please excuse any errors that have escaped final proofreading. Thank you.

## 2022-01-15 LAB
ALBUMIN SERPL-MCNC: 4.3 G/DL (ref 3.8–4.9)
ALBUMIN/GLOB SERPL: 1.7 {RATIO} (ref 1.2–2.2)
ALP SERPL-CCNC: 63 IU/L (ref 44–121)
ALT SERPL-CCNC: 15 IU/L (ref 0–44)
AST SERPL-CCNC: 23 IU/L (ref 0–40)
BILIRUB SERPL-MCNC: 0.4 MG/DL (ref 0–1.2)
BUN SERPL-MCNC: 8 MG/DL (ref 6–24)
BUN/CREAT SERPL: 9 (ref 9–20)
CALCIUM SERPL-MCNC: 9.3 MG/DL (ref 8.7–10.2)
CHLORIDE SERPL-SCNC: 103 MMOL/L (ref 96–106)
CHOLEST SERPL-MCNC: 210 MG/DL (ref 100–199)
CO2 SERPL-SCNC: 23 MMOL/L (ref 20–29)
CREAT SERPL-MCNC: 0.89 MG/DL (ref 0.76–1.27)
ERYTHROCYTE [DISTWIDTH] IN BLOOD BY AUTOMATED COUNT: 12.4 % (ref 11.6–15.4)
GLOBULIN SER CALC-MCNC: 2.5 G/DL (ref 1.5–4.5)
GLUCOSE SERPL-MCNC: 91 MG/DL (ref 65–99)
HCT VFR BLD AUTO: 45 % (ref 37.5–51)
HDLC SERPL-MCNC: 79 MG/DL
HGB BLD-MCNC: 15.4 G/DL (ref 13–17.7)
LDLC SERPL CALC-MCNC: 115 MG/DL (ref 0–99)
MCH RBC QN AUTO: 32.6 PG (ref 26.6–33)
MCHC RBC AUTO-ENTMCNC: 34.2 G/DL (ref 31.5–35.7)
MCV RBC AUTO: 95 FL (ref 79–97)
PLATELET # BLD AUTO: 162 X10E3/UL (ref 150–450)
POTASSIUM SERPL-SCNC: 4.6 MMOL/L (ref 3.5–5.2)
PROT SERPL-MCNC: 6.8 G/DL (ref 6–8.5)
PSA SERPL-MCNC: 1.6 NG/ML (ref 0–4)
RBC # BLD AUTO: 4.73 X10E6/UL (ref 4.14–5.8)
REFLEX CRITERIA: NORMAL
SODIUM SERPL-SCNC: 140 MMOL/L (ref 134–144)
TRIGL SERPL-MCNC: 89 MG/DL (ref 0–149)
VLDLC SERPL CALC-MCNC: 16 MG/DL (ref 5–40)
WBC # BLD AUTO: 3.5 X10E3/UL (ref 3.4–10.8)

## 2022-01-17 NOTE — PROGRESS NOTES
.Your \"Bad\" cholesterol (LDL and/or triglycerides) are elevated. Please eat a healthier diet as described below. In particular avoid fried, fatty and junk foods, while increasing fiber (fruits and vegetables). If you cannot increase fiber through diet, you can supplement with metamucil as directed on bottle daily. Also, make sure you are taking 1 to 2 grams of over the counter fish oil. Increase exercise to 5 times per week of cardio lasting at least 30 min's each (biking, walking, elliptical, swimming). Lets recheck the fasting (atleast eight hours) in 6 months. Mediterranean diet: Choose this heart-healthy diet option  The Mediterranean diet is a heart-healthy eating plan combining elements of Mediterranean-style cooking. Here's how to adopt the Mediterranean diet. If you're looking for a heart-healthy eating plan, the Mediterranean diet might be right for you. The Mediterranean diet incorporates the basics of healthy eating - plus a splash of flavorful olive oil and perhaps a glass of red wine - among other components characterizing the traditional cooking style of countries bordering the [facility]. Most healthy diets include fruits, vegetables, fish and whole grains, and limit unhealthy fats. While these parts of a healthy diet remain tried-and-true, subtle variations or differences in proportions of certain foods may make a difference in your risk of heart disease. Benefits of the [address] has shown that the traditional Mediterranean diet reduces the risk of heart disease. In fact, a recent analysis of more than 1.5 million healthy adults demonstrated that following a Mediterranean diet was associated with a reduced risk of overall and cardiovascular mortality, a reduced incidence of cancer and cancer mortality, and a reduced incidence of Parkinson's and Alzheimer's diseases.    For this reason, most if not all major scientific organizations encourage healthy adults to adapt a style of eating like that of the [address] [facility] for prevention of major chronic diseases. Key components of the Mediterranean diet  The Mediterranean diet emphasizes:   Getting plenty of exercise   Eating primarily plant-based foods, such as fruits and vegetables, whole grains, legumes and nuts   Replacing butter with healthy fats such as olive oil and canola oil   Using herbs and spices instead of salt to flavor foods   Limiting red meat to no more than a few times a month   Eating fish and poultry at least twice a week   Drinking red wine in moderation (optional)   The diet also recognizes the importance of enjoying meals with family and friends. Fruits, vegetables, nuts and grains  The Mediterranean diet traditionally includes fruits, vegetables, pasta and rice. For example, residents of [facility] eat very little red meat and average nine servings a day of antioxidant-rich fruits and vegetables. The Mediterranean diet has been associated with a lower level of oxidized low-density lipoprotein (LDL) cholesterol - the \"bad\" cholesterol that's more likely to build up deposits in your arteries. Nuts are another part of a healthy Mediterranean diet. Nuts are high in fat (approximately 80 percent of their calories come from fat), but most of the fat is not saturated. Because nuts are high in calories, they should not be eaten in large amounts - generally no more than a handful a day. For the best nutrition, avoid candied or honey-roasted and heavily salted nuts. Grains in the [address] [place] [address] region are typically whole grain and usually contain very few unhealthy trans fats, and bread is an important part of the diet there. However, throughout the [address] [place] [address] region, bread is eaten plain or dipped in olive oil - not eaten with butter or margarines, which contain saturated or trans fats.

## 2022-03-18 PROBLEM — L40.50 PSORIATIC ARTHRITIS (HCC): Status: ACTIVE | Noted: 2019-03-27

## 2022-03-18 PROBLEM — Z93.2 ILEOSTOMY PRESENT (HCC): Status: ACTIVE | Noted: 2020-03-17

## 2022-03-18 PROBLEM — D70.9 NEUTROPENIA (HCC): Status: ACTIVE | Noted: 2019-12-19

## 2022-03-19 PROBLEM — L40.9 PSORIASIS: Status: ACTIVE | Noted: 2019-03-27

## 2022-03-19 PROBLEM — K92.2 GASTROINTESTINAL HEMORRHAGE: Status: ACTIVE | Noted: 2019-05-10

## 2022-03-19 PROBLEM — C18.9 MALIGNANT NEOPLASM OF COLON (HCC): Status: ACTIVE | Noted: 2021-01-22

## 2022-03-19 PROBLEM — C20 RECTAL CANCER (HCC): Status: ACTIVE | Noted: 2019-05-10

## 2022-03-19 PROBLEM — K56.609 SMALL BOWEL OBSTRUCTION (HCC): Status: ACTIVE | Noted: 2021-04-18

## 2022-03-19 PROBLEM — Z95.828 PORT-A-CATH IN PLACE: Status: ACTIVE | Noted: 2020-02-05

## 2022-03-19 PROBLEM — Z51.11 ENCOUNTER FOR ANTINEOPLASTIC CHEMOTHERAPY: Status: ACTIVE | Noted: 2020-02-05

## 2022-03-19 PROBLEM — D69.6 THROMBOCYTOPENIA (HCC): Status: ACTIVE | Noted: 2019-09-24

## 2022-04-01 LAB
ALBUMIN SERPL-MCNC: 4.6 G/DL (ref 3.8–4.9)
ALBUMIN/GLOB SERPL: 2.1 {RATIO} (ref 1.2–2.2)
ALP SERPL-CCNC: 60 IU/L (ref 44–121)
ALT SERPL-CCNC: 18 IU/L (ref 0–44)
AST SERPL-CCNC: 19 IU/L (ref 0–40)
BASOPHILS # BLD AUTO: 0 X10E3/UL (ref 0–0.2)
BASOPHILS NFR BLD AUTO: 1 %
BILIRUB SERPL-MCNC: 0.5 MG/DL (ref 0–1.2)
BUN SERPL-MCNC: 9 MG/DL (ref 6–24)
BUN/CREAT SERPL: 10 (ref 9–20)
CALCIUM SERPL-MCNC: 9.6 MG/DL (ref 8.7–10.2)
CEA SERPL-MCNC: 2.4 NG/ML (ref 0–4.7)
CHLORIDE SERPL-SCNC: 106 MMOL/L (ref 96–106)
CO2 SERPL-SCNC: 20 MMOL/L (ref 20–29)
CREAT SERPL-MCNC: 0.93 MG/DL (ref 0.76–1.27)
EGFR: 98 ML/MIN/1.73
EOSINOPHIL # BLD AUTO: 0.1 X10E3/UL (ref 0–0.4)
EOSINOPHIL NFR BLD AUTO: 4 %
ERYTHROCYTE [DISTWIDTH] IN BLOOD BY AUTOMATED COUNT: 12.8 % (ref 11.6–15.4)
GLOBULIN SER CALC-MCNC: 2.2 G/DL (ref 1.5–4.5)
GLUCOSE SERPL-MCNC: 85 MG/DL (ref 65–99)
HCT VFR BLD AUTO: 44.6 % (ref 37.5–51)
HGB BLD-MCNC: 15.2 G/DL (ref 13–17.7)
IMM GRANULOCYTES # BLD AUTO: 0 X10E3/UL (ref 0–0.1)
IMM GRANULOCYTES NFR BLD AUTO: 0 %
LYMPHOCYTES # BLD AUTO: 0.5 X10E3/UL (ref 0.7–3.1)
LYMPHOCYTES NFR BLD AUTO: 16 %
MCH RBC QN AUTO: 32.2 PG (ref 26.6–33)
MCHC RBC AUTO-ENTMCNC: 34.1 G/DL (ref 31.5–35.7)
MCV RBC AUTO: 95 FL (ref 79–97)
MONOCYTES # BLD AUTO: 0.3 X10E3/UL (ref 0.1–0.9)
MONOCYTES NFR BLD AUTO: 9 %
NEUTROPHILS # BLD AUTO: 2.1 X10E3/UL (ref 1.4–7)
NEUTROPHILS NFR BLD AUTO: 70 %
PLATELET # BLD AUTO: 167 X10E3/UL (ref 150–450)
POTASSIUM SERPL-SCNC: 4.4 MMOL/L (ref 3.5–5.2)
PROT SERPL-MCNC: 6.8 G/DL (ref 6–8.5)
RBC # BLD AUTO: 4.72 X10E6/UL (ref 4.14–5.8)
SODIUM SERPL-SCNC: 142 MMOL/L (ref 134–144)
WBC # BLD AUTO: 3 X10E3/UL (ref 3.4–10.8)

## 2022-04-08 ENCOUNTER — HOSPITAL ENCOUNTER (OUTPATIENT)
Dept: CT IMAGING | Age: 52
Discharge: HOME | End: 2022-04-08
Attending: REGISTERED NURSE
Payer: MEDICAID

## 2022-04-08 ENCOUNTER — APPOINTMENT (OUTPATIENT)
Dept: CT IMAGING | Age: 52
End: 2022-04-08
Attending: REGISTERED NURSE
Payer: MEDICAID

## 2022-04-08 DIAGNOSIS — C20 RECTAL CANCER (HCC): ICD-10-CM

## 2022-04-08 PROCEDURE — 71260 CT THORAX DX C+: CPT

## 2022-04-08 PROCEDURE — 74177 CT ABD & PELVIS W/CONTRAST: CPT

## 2022-04-08 PROCEDURE — 74011000636 HC RX REV CODE- 636: Performed by: RADIOLOGY

## 2022-04-08 RX ADMIN — IOPAMIDOL 100 ML: 755 INJECTION, SOLUTION INTRAVENOUS at 09:07

## 2022-04-11 ENCOUNTER — OFFICE VISIT (OUTPATIENT)
Dept: RHEUMATOLOGY | Age: 52
End: 2022-04-11
Payer: MEDICAID

## 2022-04-11 VITALS
BODY MASS INDEX: 23.54 KG/M2 | TEMPERATURE: 98.3 F | OXYGEN SATURATION: 97 % | DIASTOLIC BLOOD PRESSURE: 83 MMHG | RESPIRATION RATE: 18 BRPM | SYSTOLIC BLOOD PRESSURE: 128 MMHG | HEIGHT: 67 IN | WEIGHT: 150 LBS | HEART RATE: 64 BPM

## 2022-04-11 DIAGNOSIS — L40.50 PSORIATIC ARTHRITIS (HCC): Primary | ICD-10-CM

## 2022-04-11 DIAGNOSIS — Z79.899 ONGOING USE OF POSSIBLY TOXIC MEDICATION: ICD-10-CM

## 2022-04-11 DIAGNOSIS — L40.0 PLAQUE PSORIASIS: ICD-10-CM

## 2022-04-11 PROCEDURE — 99215 OFFICE O/P EST HI 40 MIN: CPT | Performed by: INTERNAL MEDICINE

## 2022-04-11 RX ORDER — FLUOCINONIDE 1 MG/G
CREAM TOPICAL DAILY
Qty: 30 G | Refills: 0 | Status: SHIPPED | OUTPATIENT
Start: 2022-04-11 | End: 2022-04-12

## 2022-04-11 NOTE — LETTER
4/11/2022    Patient: Kang Ocasio   YOB: 1970   Date of Visit: 4/11/2022     Jamal Marin MD  [address] [facility] [address]  [address]  [address] [facility]  [address] [facility],[address] [address]  [address] MD  [address] [facility] [address] [place] [address] [address] [facility] [address]  Via Fax: [phone]    Dear MD Dante Reyes MD Elise Pies, MD,    We recently saw Mr. Shayan Sood in the [facility] for evaluation. My notes for this consultation are attached. If you have questions, please do not hesitate to call me. I look forward to following your patient along with you.       Sincerely,  Pebbles Medina MD S  Cell: [phone]

## 2022-04-11 NOTE — PROGRESS NOTES
Chief Complaint   Patient presents with    Arthritis     1. Have you been to the ER, urgent care clinic since your last visit? Hospitalized since your last visit? No    2. Have you seen or consulted any other health care providers outside of the [address] [place] [address] since your last visit? Include any pap smears or colon screening.  No

## 2022-04-11 NOTE — PROGRESS NOTES
REASON FOR VISIT    Ap Trujillo is a 47 y.o. male who was seen in-office on 4/11/2022. HISTORY OF DISEASE: Psoriatic Arthritis     Year of diagnosis: 2015  First visit with me: 10/2020  Cumulative disease manifestations:  Dactylitis, psoriasis, back pain  Positive serologies/labs:  Negative serologies/labs: HLA B27  Other co-morbidities: heavy past alcohol use; rectal cancer on chemo + RT + surgery  Relapses:      Past treatment history:  Prednisone:   Non-biologic DMARD: Methotrexate (2015-4/2019 stopped 2/2 alcohol), Sulfasalazine 2000 mg oral daily (6/2020-9/2021)  Biologic: Cleotha Oiler (11/20- )  Other:     HISTORY OF PRESENT ILLNESS     Pt reports that he is doing well with the Cleotha Oiler twice a day. He denies that the otezla is causing noticeable GI issues. He also denies any changes in mood. Pt notes that the plaque psoriasis on his shins does not bother him much but it just itches sometimes. He treats it with topical steroids (Fluocinonide) several times a week. He reports that when he does use his cream his rash will improve. Pt is finished with his cancer treatment, and he is being monitored once every six months. REVIEW OF SYSTEMS    A comprehensive review of systems was performed and pertinent results are documented in the HPI, review of systems is otherwise non-contributory. PAST MEDICAL HISTORY    Past Medical History:   Diagnosis Date    Adopted     COVID-19 vaccine series completed 04/09/2021    Pfizer dose #2 administered on 4/9/2021.  GERD (gastroesophageal reflux disease)     Ill-defined condition     CHEMO-LAST ON 2/19/2020    Psoriasis     Psoriatic arthritis ([facility] [address]     Rectal cancer (HCC)     Moderately differentiated [id number] adenocarcinoma of the rectum. Treated with neoadjuvant chemoradiation, resection, and adjuvant chemotherapy.         Past Surgical History:   Procedure Laterality Date    COLONOSCOPY Left 4/26/2019    COLONOSCOPY performed by Catalina Hendrix MD at [facility]COPY    COLONOSCOPY N/A 10/22/2020    Normal post-resection anatomy; 2-year follow-up interval recommended; Dr. Elizabeth Alatorre.  FLEXIBLE SIGMOIDOSCOPY N/A 5/2/2019    SIGMOIDOSCOPY FLEXIBLE performed by Angie Aviles MD at [address] HX GI      EGD    HX ORTHOPAEDIC Right circa 2014    ACL repair    HX OTHER SURGICAL  09/24/2019    Hand-assisted laparoscopic low anterior resection, mobilization of the splenic flexure, coloproctostomy, and creation of loop ileostomy; Dr. Colletta Crick.  HX OTHER SURGICAL  03/17/2020    Ileostomy closure with resection and anastomosis; Dr. Colletta Crick.  HX UROLOGICAL  12/12/2019    CYSTOSCOPY FOR REMOVAL OF KIDNEY STONES WITH HOLMIUM LASER; Dr. Nunu Griggs.  HX UROLOGICAL  09/24/2019    Cystoscopy and placement of bilateral temporary ureteral catheters; Mando Box MD.    HX VASCULAR ACCESS      INSERTION OF PORT-A-CATH RT SIDE OF CHEST. FOR CHEMO-LAST ON 2/19/2020    HX WISDOM TEETH EXTRACTION      IR INSERT TUNL CVC W PORT OVER 5 YEARS  10/15/2019    Right internal jugular vein Port-a-Cath placement.  IR REMOVE TUNL CVAD W/O PORT / PUMP  7/16/2020    Port-a-Cath removal.       FAMILY HISTORY    Family History   Adopted: Yes   Problem Relation Age of Onset    Asthma Neg Hx     Cancer Neg Hx     Diabetes Neg Hx     Heart Disease Neg Hx     Hypertension Neg Hx     Stroke Neg Hx        SOCIAL HISTORY    Social History     Tobacco Use    Smoking status: Never Smoker    Smokeless tobacco: Never Used   Substance Use Topics    Alcohol use: Yes     Alcohol/week: 6.0 standard drinks     Types: 6 Cans of beer per week     Comment: 6 BEERS WEEKLY    Drug use: No     On vacations drinks more than 6 beers weekly    MEDICATIONS    Current Outpatient Medications   Medication Sig Dispense Refill    apremilast (Otezla) 30 mg tab TAKE 1 TABLET BY MOUTH 2 TIMES A DAY. 60 Tablet 10    losartan (COZAAR) 100 mg tablet Take 1 Tab by mouth daily.  90 Tab 3    tadalafil (CIALIS) 5 mg tablet Take 5 mg by mouth daily.  triamcinolone acetonide (KENALOG) 0.1 % ointment Apply  to affected area two (2) times daily as needed for Skin Irritation. use thin layer         ALLERGIES    No Known Allergies    PHYSICAL EXAMINATION  Visit Vitals  /83 (BP 1 Location: Left upper arm, BP Patient Position: Sitting)   Pulse 64   Temp 98.3 °F (36.8 °C) (Oral)   Resp 18   Ht 5' 7\" (1.702 m)   Wt 150 lb (68 kg)   SpO2 97%   BMI 23.49 kg/m²     General:  The patient is well developed, well nourished, alert, and in no apparent distress. Eyes: Sclera are anicteric. No conjunctival injection. HEENT:  Oropharynx is clear. No oral ulcers. Adequate salivary pooling. No cervical or supraclavicular lymphadenopathy. Lungs:  Clear to auscultation bilaterally, without wheeze or stridor. Normal respiratory effort. Cor:  Regular rate and rhythm. No murmur rub or gallop. Abdomen: Soft, non-tender, without hepatomegaly or masses. Extremities: No calf tenderness or edema. Warm and well perfused. Skin:  ~8 by 6 cm plaque on dorsum right hand between thumb and index finger. Dime-size plaques on R shin. Bilateral scaly knee plaques, left shin plaque greater than right. Dominant L pretibial plaque covers the distal L shin. No nail pitting. Neuro: Nonfocal, no foot or wrist drop  Musculoskeletal:    A comprehensive musculoskeletal exam was performed for all joints of each upper and lower extremity and assessed for swelling, tenderness and range of motion. Results are documented as below:  No evidence of synovitis in the small joints of the hands, wrists, shoulders, elbows, hips, knees or ankles.      DATA REVIEW    Prior medical records were reviewed and if applicable are summarized as below:    Labs:   3/31/22: Cr 0.93, LFT WNL, WBC 3, HGB 15.2, Plt 167  1/14/22: WBC 3.5, HGB 15.4, Plt 162, HDL 79, , Cholesterol 210, Cr 0.89, LFT WNL, Prostate specific Arg 1.6  10/18/21: CRP<1  21: WBC 8.1, Hgb 14.7, Plt 175; Cr 0.87  21: Tbili 0.6, AST 11, ALT 21, AlkP 71  21: Cr 0.90,  AST 42, ALT 42, AlkP 57, Tbili 0.5, Alb 4.4  10/2020: WBC 3.6 (ANC 2400, ), CBC ow WNL; CMP WNL  2019: cbc, cmp unremarkable, ESR, CRP normal, HEpB, C, quant gold negative  10/18: WBC 4.1, Hb 15.2, Plt 194, CMP Normal, CRP, ESR negative  : Hep C negative, HLA b27 negative, quant gold negative    Imagin/8/22: CT ABD PELV W CONT: No evidence of local tumor recurrence or distant metastatic disease in the chest, abdomen, or pelvis. Small hepatic and right renal cysts demonstrate no significant change; these do not require imaging follow-up. 22: CT Chest W CONT: No evidence of local tumor recurrence or distant metastatic disease in the chest, abdomen, or pelvis. Small hepatic and right renal cysts demonstrate nosignificant change; these do not require imaging follow-up. 6/10/21 Nuclear stress:  · SPECT: Left ventricular function post-stress was abnormal. Calculated ejection fraction is 41%. There is no evidence of transient ischemic dilation (TID). The TID ratio is 1.03.  · Baseline ECG: Normal EKG. · SPECT: Left ventricular perfusion is probably normal. Myocardial perfusion imaging supports a low risk stress test.  5/3/21: CT chest with: No typical CT evidence of metastatic colon neoplasm in the thorax or visualized upper abdomen. Clear lungs, no hilar LAD.  20 CT abd/pelvis: No evidence of recurrent or metastatic disease. SI Joints (2019): Personally reviewed images. Normal joints  Foot xrays (2019): Personally reviewed images. No erosions  Hand xrays (2019): Personally reviewed images. + DJD.  No erosions  : CXR normal    Pathology:  Rectal biopsy (2019): well differentiated invasive colonic adenocarcinoma    ASSESSMENT AND PLAN    A 47 y.o. male with hx of psoriatic arthritis on sulfasalazine and now apremilast, recent rectal adenocarcinoma s/p chemo/RT presents for a follow up visit. His arthritis remains well controlled since the addition of apremilast; he has moderate residual skin plaques particularly over the knees and shins, though doesn't generally find these bothersome. We reviewed potentially transitioning to an IL-17 or IL-23 inhibitor, but due to at least the theoretical risk of impaired cancer response and his nonbothersome skin involvement, he is comfortable continuing current Otezla and applying more consistent fluocinonide topically. # Psoriatic arthritis:    - Cont apremilast 30mg bid\    # Psoriasis:   - Apremilast as above  - Refilled fluocinonide for topical application, pt will try to use this twice a day more consistently    #Transaminitis  -April LFTs normalized with reduced alcohol, trending LFTs q6mo    #Borderline nonischemic cardiomyopathy  -Pt following with cardiology, most likely alcohol-related, encouraged further reduction    # Rectal cancer:   - in remission for now, cont to follow with oncology now q6mo    # Medication Toxicity Monitoring:  - cbc, cmp p3ba--va to date through oncology currently  - Hepatitis B, C: negative 2/2019  - Quant gold: negative 2/2019  - Immunizations: UTD    Patient Instructions   1) Ask your oncologist at next follow up to see if she has concerns about you starting an IL-17 or IL-20 inhibitor. 2) Obtain labs. 3) Follow up in 6 months. The patient voiced understanding of the aforementioned assessment and plan. Summary of plan was provided in the After Visit Summary patient instructions. I also provided education about MyChart setup and utility. TODAY'S ORDERS    Patient Instructions   1) Ask your oncologist at next follow up to see if she has concerns about you starting an IL-17 or IL-20 inhibitor. 2) Obtain labs. 3) Follow up in 6 months.         Future Appointments   Date Time Provider Shannan Rodriguez   4/26/2022 10:30 AM Alana Todd  N Melissa Lewis AMB   6/13/2022 10:00 AM STRESSECHOPATTY BS AMB   6/13/2022 10:00 AM VASCULARPATTY BS AMB   1/16/2023  9:45 AM Juan José Hopkins MD [facility] MAIN BS AMB     Face to face time: 20 minutes  Note preparation and records review day of service: 22 minutes  Total provider time day of service: 42 minutes    This was scribed by Alanna Johnson in the presence of Dr. Fanta Fletcher MD    Adult Rheumatology   [address] [place] [address], [address] [place] [address]   Phone [phone]  Fax [phone]

## 2022-04-11 NOTE — PATIENT INSTRUCTIONS
1) Ask your oncologist at next follow up to see if she has concerns about you starting an IL-17 or IL-23 inhibitor. For now, continue apremilast twice a day. 2) Obtain my labs with your next blood work for your oncologist or PCP    3) Follow up in 6 months. Call if worsening rashes or joint pain in the meantime.

## 2022-04-12 DIAGNOSIS — L40.0 PLAQUE PSORIASIS: ICD-10-CM

## 2022-04-12 DIAGNOSIS — L40.50 PSORIATIC ARTHRITIS (HCC): ICD-10-CM

## 2022-04-12 RX ORDER — FLUOCINONIDE 1 MG/G
CREAM TOPICAL DAILY
Qty: 30 G | Refills: 0 | Status: CANCELLED | OUTPATIENT
Start: 2022-04-12

## 2022-04-12 RX ORDER — FLUOCINONIDE 0.5 MG/G
OINTMENT TOPICAL 2 TIMES DAILY
Qty: 60 G | Refills: 1 | Status: SHIPPED | OUTPATIENT
Start: 2022-04-12

## 2022-04-13 DIAGNOSIS — L40.0 PLAQUE PSORIASIS: ICD-10-CM

## 2022-04-13 RX ORDER — CLOBETASOL PROPIONATE 0.5 MG/G
2 OINTMENT TOPICAL 2 TIMES DAILY
Qty: 60 G | Refills: 1 | Status: SHIPPED | OUTPATIENT
Start: 2022-04-13

## 2022-04-13 RX ORDER — FLUOCINONIDE 0.5 MG/G
OINTMENT TOPICAL 2 TIMES DAILY
Qty: 60 G | Refills: 1 | OUTPATIENT
Start: 2022-04-13

## 2022-04-25 NOTE — PROGRESS NOTES
[facility] [place] [facility] [place] [address] [address], [address]  W: [phone]  F: [phone]    Reason for Visit:   Ap Trujillo is a 47 y.o. male who is seen in follow-up for Rectal cancer- likely stage III- SOMMER    Treatment History:   · 4/26/19: Colonoscopy- 6-7 cm size malignant appearing mass seen at 10 cm from anal verge. This was adenocarcinoma. 3 polyps removed- all were tubular adenomas  · Rectal Ultrasound 5/2/19: ulcerated infiltrative mass lesion was noted in rectum at 10 cm. The lesion measured about 5 cm X 4 cm- T2, 13 mm X 8 mm oblong lymph node was noted immediately adjacent to the mass lesion  · 5/2/19: CT CAP- No metastasis, liver cysts. CEA 3.6  · 5/10/19: PET CT showed rectal malignancy and 3 small perirectal anibal metastatic focir  · 5/28/19-7/8/29: Xeloda + RT   · 9/24/19: LAR-  ndR0H2a, 2/6 positive nodes  · 10/14/19: CT CAP with no evidence of metastatic disease, liver cysts   · 10/23/19- 2/19/2020-: 8 cycles of  FOLFOX. Several delays and dose reductions due to cytopenias  · 12/11/19 CT AP: hypodensity in liver, otherwise KATHLEEN   · 1/6/20 MRI abd: no liver mets  · 3/30/2020: CT KATHLEEN  · CT CAP 10/2021: KATHLEEN , CT 4/8/2022-no evidence of disease    History of Present Illness:   Patient is a 47 y.o. male seen for adenocarcinoma of the mid third of rectum    He had intermittent BRBPR x 1 year and then decided to have a colonoscopy. This led to above mentioned diagnosis. He received neoadjuvant xeloda+ RT followed by [facility]. Completed 8 cycles of FOLFOX, had a colostomy and is on surveillance. He comes today for follow-up. He feels well. He has had some more fatigue. He feels like he aged 8 years since dx. Mentally he is still sharp. Recent ECHO was normal.    Denies nausea/vomiting. Normal BMs. No complaints. Gets bouts of Psoriasis. He uses 7 drinks a week.     Adopted    Past Medical History:   Diagnosis Date    Adopted     COVID-19 vaccine series completed 04/09/2021    Pfizer dose #2 administered on 4/9/2021.  GERD (gastroesophageal reflux disease)     Ill-defined condition     CHEMO-LAST ON 2/19/2020    Psoriasis     Psoriatic arthritis ([facility]     Rectal cancer (HCC)     Moderately differentiated [id number] adenocarcinoma of the rectum. Treated with neoadjuvant chemoradiation, resection, and adjuvant chemotherapy. Past Surgical History:   Procedure Laterality Date    COLONOSCOPY Left 4/26/2019    COLONOSCOPY performed by Kvng Carvalho MD at [address] COLONOSCOPY N/A 10/22/2020    Normal post-resection anatomy; 2-year follow-up interval recommended; Dr. Amador Ellington.  FLEXIBLE SIGMOIDOSCOPY N/A 5/2/2019    SIGMOIDOSCOPY FLEXIBLE performed by Leslie Goodson MD at [address] HX GI      EGD    HX ORTHOPAEDIC Right circa 2014    ACL repair    HX OTHER SURGICAL  09/24/2019    Hand-assisted laparoscopic low anterior resection, mobilization of the splenic flexure, coloproctostomy, and creation of loop ileostomy; Dr. Georgina Michelle.  HX OTHER SURGICAL  03/17/2020    Ileostomy closure with resection and anastomosis; Dr. Georgina Michelle.  HX UROLOGICAL  12/12/2019    CYSTOSCOPY FOR REMOVAL OF KIDNEY STONES WITH HOLMIUM LASER; Dr. Artis Harris.  HX UROLOGICAL  09/24/2019    Cystoscopy and placement of bilateral temporary ureteral catheters; Rene Angel MD.    HX VASCULAR ACCESS      INSERTION OF PORT-A-CATH RT SIDE OF CHEST. FOR CHEMO-LAST ON 2/19/2020    HX WISDOM TEETH EXTRACTION      IR INSERT TUNL CVC W PORT OVER 5 YEARS  10/15/2019    Right internal jugular vein Port-a-Cath placement.  IR REMOVE TUNL CVAD W/O PORT / PUMP  7/16/2020    Port-a-Cath removal.      Social History     Tobacco Use    Smoking status: Never Smoker    Smokeless tobacco: Never Used   Substance Use Topics    Alcohol use:  Yes     Alcohol/week: 6.0 standard drinks     Types: 6 Cans of beer per week     Comment: 6 BEERS WEEKLY      Family History Adopted: Yes   Problem Relation Age of Onset    Asthma Neg Hx     Cancer Neg Hx     Diabetes Neg Hx     Heart Disease Neg Hx     Hypertension Neg Hx     Stroke Neg Hx      Current Outpatient Medications   Medication Sig    clobetasoL (TEMOVATE) 0.05 % ointment Apply 2 mg to affected area two (2) times a day. Not for use on face, groin, or under arms    fluocinoNIDE (LIDEX) 0.05 % ointment Apply  to affected area two (2) times a day.  apremilast (Otezla) 30 mg tab TAKE 1 TABLET BY MOUTH 2 TIMES A DAY.  losartan (COZAAR) 100 mg tablet Take 1 Tab by mouth daily.  tadalafil (CIALIS) 5 mg tablet Take 5 mg by mouth daily.  triamcinolone acetonide (KENALOG) 0.1 % ointment Apply  to affected area two (2) times daily as needed for Skin Irritation. use thin layer     No current facility-administered medications for this visit. No Known Allergies     Review of Systems: A complete review of systems was obtained, negative except as described above. Physical Exam:     Constitutional: Appears well-developed and well-nourished in no apparent distress   Mental status: Alert and awake, Oriented to person/place/time, Able to follow commands  Eyes: EOM normal, Sclera normal, No visible ocular discharge  HENT: Normocephalic, atraumatic; Mouth/Throat: Moist mucous membranes, External Ears normal  Neck: No visualized mass  Skin: Psoriasis  Psychiatric: Normal affect, normal judgment/insight. No hallucinations     Results:     Lab Results   Component Value Date/Time    WBC 3.0 (L) 03/31/2022 09:05 AM    HGB 15.2 03/31/2022 09:05 AM    HCT 44.6 03/31/2022 09:05 AM    PLATELET 842 31/07/3339 09:05 AM    MCV 95 03/31/2022 09:05 AM    ABS.  NEUTROPHILS 2.1 03/31/2022 09:05 AM     Lab Results   Component Value Date/Time    Sodium 142 03/31/2022 09:05 AM    Potassium 4.4 03/31/2022 09:05 AM    Chloride 106 03/31/2022 09:05 AM    CO2 20 03/31/2022 09:05 AM    Glucose 85 03/31/2022 09:05 AM    BUN 9 03/31/2022 09:05 AM Creatinine 0.93 03/31/2022 09:05 AM    GFR est  01/14/2022 12:00 AM    GFR est non-AA 98 01/14/2022 12:00 AM    Calcium 9.6 03/31/2022 09:05 AM     Lab Results   Component Value Date/Time    Bilirubin, total 0.5 03/31/2022 09:05 AM    ALT (SGPT) 18 03/31/2022 09:05 AM    Alk. phosphatase 60 03/31/2022 09:05 AM    Protein, total 6.8 03/31/2022 09:05 AM    Albumin 4.6 03/31/2022 09:05 AM    Globulin 3.6 04/18/2021 02:36 AM     CEA:  Recent Labs     03/31/22  0905 10/18/21  0925   [id number] 2.4 1.6       Records reviewed and summarized above. Pathology report(s) reviewed  FINAL PATHOLOGIC DIAGNOSIS   1. Rectum and sigmoid colon, laparoscopic low anterior resection:   SPECIMEN   Procedure: Low anterior resection   Macroscopic Intactness of Mesorectum: Complete   TUMOR   Tumor Site: Rectum   Histologic Type: Adenocarcinoma   Histologic Grade: G2: Moderately differentiated   Tumor Size: 2.6 cm   Tumor Deposits: Present   Number of Deposits: 1   Tumor Extension: Tumor invades through the muscularis propria into pericolorectal tissue   Macroscopic Tumor Perforation: Not identified   Lymphovascular Invasion: Not identified   Perineural Invasion: Not identified   Tumor Budding: Number of tumor buds in one hotspot field: 5   Tumor Budding Score: Intermediate score (5-9)   Treatment Effect: Present - Residual cancer with evident tumor regression, but more than single cells or   rare small groups of cancer cells (partial response, score 2)   MARGINS   Margins:  All margins are uninvolved by invasive carcinoma, high- grade dysplasia, intramucosal   adenocarcinoma, and adenoma   Margins Examined: Proximal, Distal, Radial or Mesenteric   Distance of Invasive Carcinoma from Closest Margin: 25 mm   Closest Margin: Radial or Mesenteric   LYMPH NODES   Number of Lymph Nodes Involved: 2   Number of Lymph Nodes Examined: 16   PATHOLOGIC STAGE CLASSIFICATION (pTNM, AJCC 8th Edition)   Primary Tumor (pT): pT3   Regional Lymph Nodes (pN): pN1b   2. Large bowel, donuts:   Fragments of large bowel wall, negative for microscopic pathologic abnormality. Radiology report(s) reviewed above. CT CAP 10/14/19: IMPRESSION:  1. There are scattered small hepatic cysts without definite evidence for  metastatic disease. 2. Otherwise negative CT chest abdomen pelvis    CT CAP 12/11/19: IMPRESSION:  1. Right hydroureteronephrosis due to an 8 mm calculus at the ureterovesicular  junction. 2. Vague areas of heterogeneous enhancement and hypodensity in the liver, raises  concern for metastases, though dedicated 3 phase liver CT or liver protocol MRI  may be considered as follow-up. Numerous small hypodensities in the liver likely  cysts, unchanged. 3. Rectal anastomosis and presacral edema, treatment related. Right lower  quadrant ileostomy. No bowel obstruction    MRI abd 1/6/20: no hepatic mets     CT abdomen 3/30/2020  IMPRESSION  IMPRESSION:     1. Mild dilatation, thinning of the wall, and poor enhancement of the bowel just  proximal to the loop ileostomy closure nearly to the distal anastomosis. Clinical significance is uncertain. Ischemic changes should be considered. 2. Small amount of postoperative fluid in the right paracolic gutter without  drainable fluid collection    CT AP 10/7/20: IMPRESSION:  No evidence of recurrent or metastatic disease. CT AP 3/2021:  IMPRESSION  No evidence for metastatic disease     CT CAP 10/2021: KATHLEEN     CT chest abdomen and pelvis 4/2022  IMPRESSION  No evidence of local tumor recurrence or distant metastatic disease in the  chest, abdomen, or pelvis. Small hepatic and right renal cysts demonstrate no  significant change; these do not require imaging follow-up.   Assessment:   1) Rectal adenocarcinoma- mid third- [facility]  Genetic testing: negative     T2N1M0 disease- Clinical stage III  S/P John Adj chemoRT followed by [facility] on 9/24/19  Pathology showed [id number] residual disease- stage IIIB  Had significant residual disease with practically no treatment effects  Adjuvant FOLFOX x 8 until 2/2020- several delays and reductions due to cytopenias  On surveillance since then  CT scans 4/2022 reviewed personally and are KATHLEEN, CEA is not elevated labs look reassuring. He is 3 years out from diagnosis. 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 Valeri gardner.       Signed By: Tolu Cardenas MD

## 2022-04-26 ENCOUNTER — OFFICE VISIT (OUTPATIENT)
Dept: ONCOLOGY | Age: 52
End: 2022-04-26
Payer: MEDICAID

## 2022-04-26 VITALS
WEIGHT: 148 LBS | SYSTOLIC BLOOD PRESSURE: 122 MMHG | BODY MASS INDEX: 23.18 KG/M2 | HEART RATE: 81 BPM | TEMPERATURE: 98.2 F | DIASTOLIC BLOOD PRESSURE: 62 MMHG | RESPIRATION RATE: 18 BRPM | OXYGEN SATURATION: 97 %

## 2022-04-26 DIAGNOSIS — C20 RECTAL CANCER (HCC): Primary | ICD-10-CM

## 2022-04-26 PROCEDURE — 99214 OFFICE O/P EST MOD 30 MIN: CPT | Performed by: INTERNAL MEDICINE

## 2022-04-26 NOTE — PROGRESS NOTES
Zoey Osborne is a 47 y.o. male    Chief Complaint   Patient presents with    Follow-up      Rectal cancer- likely stage III- SOMMER       1. Have you been to the ER, urgent care clinic since your last visit? Hospitalized since your last visit? No    2. Have you seen or consulted any other health care providers outside of the [address] [place] [address] since your last visit? Include any pap smears or colon screening.  No

## 2022-05-16 ENCOUNTER — NURSE TRIAGE (OUTPATIENT)
Dept: OTHER | Facility: CLINIC | Age: 52
End: 2022-05-16

## 2022-05-16 NOTE — TELEPHONE ENCOUNTER
Received call from [employer] at [facility] with Red Flag Complaint. Subjective: Caller states \"I have a Rash all over. It started on my flank and now on my arms and legs. It looks like welts. I woke up Saturday morning is when I noticed it. By the night and into Sunday it became worse and miserable. It is itchy. \"     Current Symptoms: Rash/Hives    Onset: 3 days ago; improving    Associated Symptoms: NA    Pain Severity: 0/10; radiating; none    Temperature: Denies Fever    What has been tried: Benadryl    LMP: NA Pregnant: NA    Recommended disposition: Home Care. Care advice provided to patient. Apply a cold compress to help with itching. Also using 1% Hydrocortisone cream 3 times a day to help decrease the itching. Continue antihistamine such as Benadryl, Claritin, Alavert. Advised not to drive, drink alcohol or operate machinery while on medication. To call back with new or worsening symptoms    Care advice provided, patient verbalizes understanding; denies any other questions or concerns; instructed to call back for any new or worsening symptoms. Patient agrees with the Home care disposition and will call back if home care advice doesnt help or if symptoms worsenen    Attention Provider: Thank you for allowing me to participate in the care of your patient. The patient was connected to triage in response to information provided to the [facility]. Please do not respond through this encounter as the response is not directed to a shared pool.       Reason for Disposition   Widespread hives    Protocols used: HIVES-ADULT-OH

## 2022-06-13 ENCOUNTER — ANCILLARY PROCEDURE (OUTPATIENT)
Dept: CARDIOLOGY CLINIC | Age: 52
End: 2022-06-13
Payer: MEDICAID

## 2022-06-13 ENCOUNTER — APPOINTMENT (OUTPATIENT)
Dept: CARDIOLOGY CLINIC | Age: 52
End: 2022-06-13

## 2022-06-13 VITALS
DIASTOLIC BLOOD PRESSURE: 62 MMHG | HEIGHT: 67 IN | WEIGHT: 148 LBS | SYSTOLIC BLOOD PRESSURE: 100 MMHG | BODY MASS INDEX: 23.23 KG/M2

## 2022-06-13 DIAGNOSIS — R06.02 SOB (SHORTNESS OF BREATH): ICD-10-CM

## 2022-06-13 DIAGNOSIS — R94.31 ABNORMAL EKG: ICD-10-CM

## 2022-06-13 PROCEDURE — 93351 STRESS TTE COMPLETE: CPT | Performed by: SPECIALIST

## 2022-06-14 LAB
STRESS ANGINA INDEX: 0
STRESS BASELINE DIAS BP: 62 MMHG
STRESS BASELINE HR: 75 BPM
STRESS BASELINE ST DEPRESSION: 0 MM
STRESS BASELINE SYS BP: 100 MMHG
STRESS ESTIMATED WORKLOAD: 13.4 METS
STRESS EXERCISE DUR MIN: 10 MIN
STRESS EXERCISE DUR SEC: 56 SEC
STRESS O2 SAT PEAK: 96 %
STRESS O2 SAT REST: 98 %
STRESS PEAK DIAS BP: 84 MMHG
STRESS PEAK SYS BP: 182 MMHG
STRESS PERCENT HR ACHIEVED: 105 %
STRESS POST PEAK HR: 176 BPM
STRESS RATE PRESSURE PRODUCT: NORMAL BPM*MMHG
STRESS TARGET HR: 167 BPM

## 2022-06-15 NOTE — PROGRESS NOTES
Exercise stress echo is normal.   Everything looks, good, EF, stress and the EKG as expected   Fu one year then likely PRN  Future Appointments  10/26/2022 10:30 AM   Jessa Hughes MD          [address]               BS AMB  11/9/2022  9:30 AM    Linda Kat MD           AO                BS AMB  1/16/2023  9:45 AM    Madi Connell MD          [facility] MAIN           BS AMB

## 2022-06-17 NOTE — PROGRESS NOTES
Two patient identifiers verified. Per MD patient called and given results. Confirmed follow up. Patient verbalized understanding and denies any further questions or concerns at this time.      Future Appointments  10/26/2022 10:30 AM   Beatriz Hughes MD          [address]              BS AMB  11/9/2022  9:30 AM    Tevin Zarate MD           [facility]                BS AMB  1/9/2023   10:20 AM   MD DILLAN Block              BS AMB  1/16/2023  9:45 AM    Chevy Samuels MD          [facility]           BS AMB

## 2022-08-05 RX ORDER — LOSARTAN POTASSIUM 100 MG/1
100 TABLET ORAL DAILY
Qty: 90 TABLET | Refills: 1 | Status: SHIPPED | OUTPATIENT
Start: 2022-08-05

## 2022-08-05 NOTE — TELEPHONE ENCOUNTER
Cardiologist: Dr. Margarita Soto    Last appt: 6/13/2022  Future Appointments   Date Time Provider Shannan Rodriguez   10/26/2022 10:30 AM Jing Slater  [address] BS AMB   11/9/2022  9:30 AM Vianney Pride MD [facility] BS AMB   1/9/2023 10:20 AM Esther Sweeney MD [facility] BS [facility]   1/16/2023  9:45 AM Mansoor Duffy MD [facility] MAIN BS AMB       Requested Prescriptions     Signed Prescriptions Disp Refills    losartan (COZAAR) 100 mg tablet 90 Tablet 1     Sig: Take 1 Tablet by mouth in the morning. Authorizing Provider: Naomi Duffy     Ordering User: EDMOND NOWAK         Refmary VO per Dr. Mana Choi.

## 2022-10-12 NOTE — PROGRESS NOTES
General Daily Progress Note    Admission Date: 9/24/2019  Hospital Day 3  Post-Op Day 2  Subjective:   Having more pain. No nausea, but feels bloated. Had some issues with Rogers catheter dysfunction. It did not seem to be draining properly for a while, and that was associated with more pain. No output of gas from ileostomy yet. Has been ambulating. Leakage around the MIGUEL ANGEL drain has decreased. Objective:     Patient Vitals for the past 24 hrs:   BP Temp Pulse Resp SpO2 Weight   09/26/19 0751 165/90 99.1 °F (37.3 °C) 75 14 97 %    09/26/19 0718      69.8 kg (153 lb 14.4 oz)   09/25/19 2346 (!) 154/98 99 °F (37.2 °C) 83 16 94 %    09/25/19 1923 156/90 99.6 °F (37.6 °C) 66 18 97 %    09/25/19 1523 (!) 140/97 98.8 °F (37.1 °C) 66 18 97 %    09/25/19 1104 (!) 137/93 98.6 °F (37 °C) 69 18 97 %      No intake/output data recorded. 09/24 1901 - 09/26 0700  In: 700 [I.V.:700]  Out: 3545 [Urine:3265; Drains:280]       Physical Examination:  General Appearance - Somewhat uncomfortable. Chest - Lungs clear to auscultation. Heart - Normal rate and regular rhythm. Abdomen -  Distended. Incisions clean, dry, intact, and without erythema. Ileostomy edematous, but viable. MIGUEL ANGEL drainage serosanguinous.  - Rogers catheter in place and draining blood-tinged urine. Extremities - Pneumatic compression stockings in use.             Data Review   Recent Results (from the past 24 hour(s))   CBC WITH AUTOMATED DIFF    Collection Time: 09/26/19  4:21 AM   Result Value Ref Range    WBC 4.9 4.1 - 11.1 K/uL    RBC 3.86 (L) 4.10 - 5.70 M/uL    HGB 13.2 12.1 - 17.0 g/dL    HCT 39.6 36.6 - 50.3 %    .6 (H) 80.0 - 99.0 FL    MCH 34.2 (H) 26.0 - 34.0 PG    MCHC 33.3 30.0 - 36.5 g/dL    RDW 11.9 11.5 - 14.5 %    PLATELET 647 (L) 101 - 400 K/uL    MPV 10.6 8.9 - 12.9 FL    NRBC 0.0 0  WBC    ABSOLUTE NRBC 0.00 0.00 - 0.01 K/uL    NEUTROPHILS 88 (H) 32 - 75 %    LYMPHOCYTES 4 (L) 12 - 49 %    MONOCYTES 8 5 - 13 % EOSINOPHILS 0 0 - 7 %    BASOPHILS 0 0 - 1 %    IMMATURE GRANULOCYTES 0 %    ABS. NEUTROPHILS 4.3 1.8 - 8.0 K/UL    ABS. LYMPHOCYTES 0.2 (L) 0.8 - 3.5 K/UL    ABS. MONOCYTES 0.4 0.0 - 1.0 K/UL    ABS. EOSINOPHILS 0.0 0.0 - 0.4 K/UL    ABS. BASOPHILS 0.0 0.0 - 0.1 K/UL    ABS. IMM. GRANS. 0.0 K/UL    DF MANUAL      RBC COMMENTS NORMOCYTIC, NORMOCHROMIC     METABOLIC PANEL, BASIC    Collection Time: 09/26/19  4:21 AM   Result Value Ref Range    Sodium 141 136 - 145 mmol/L    Potassium 3.9 3.5 - 5.1 mmol/L    Chloride 108 97 - 108 mmol/L    CO2 25 21 - 32 mmol/L    Anion gap 8 5 - 15 mmol/L    Glucose 112 (H) 65 - 100 mg/dL    BUN 11 6 - 20 MG/DL    Creatinine 1.17 0.70 - 1.30 MG/DL    BUN/Creatinine ratio 9 (L) 12 - 20      GFR est AA >60 >60 ml/min/1.73m2    GFR est non-AA >60 >60 ml/min/1.73m2    Calcium 8.7 8.5 - 10.1 MG/DL   MAGNESIUM    Collection Time: 09/26/19  4:21 AM   Result Value Ref Range    Magnesium 2.4 1.6 - 2.4 mg/dL   PHOSPHORUS    Collection Time: 09/26/19  4:21 AM   Result Value Ref Range    Phosphorus 1.9 (L) 2.6 - 4.7 MG/DL           Assessment:     Principal Problem:    Rectal cancer ([facility] (5/10/2019)    Active Problems: [facility] (9/24/2019)        2 Days Post-Op s/p hand-assisted laparoscopic low anterior resection, mobilization of the splenic flexure, coloproctostomy, and creation of loop ileostomy.     Hemodynamically stable. Hemoglobin stable. Thrombocytopenic.     Good urine output. Creatinine decreased compared to yesterday. Hypophosphatemic. Awaiting return of GI function.       Plan:   Continue low fiber diet with caution. Begin scheduledToradol. Hold Lovenox secondary to thrombocytopenia. Replace phosphorus. Remove Rogers catheter. Monitor for urinary retention. Ostomy education. Ambulation. Physical therapy. Incentive spirometer. parents

## 2022-10-25 LAB
ALBUMIN SERPL-MCNC: 4.4 G/DL (ref 3.8–4.9)
ALBUMIN/GLOB SERPL: 1.8 {RATIO} (ref 1.2–2.2)
ALP SERPL-CCNC: 52 IU/L (ref 44–121)
ALT SERPL-CCNC: 12 IU/L (ref 0–44)
AST SERPL-CCNC: 15 IU/L (ref 0–40)
BASOPHILS # BLD AUTO: 0 X10E3/UL (ref 0–0.2)
BASOPHILS NFR BLD AUTO: 1 %
BILIRUB SERPL-MCNC: 0.5 MG/DL (ref 0–1.2)
BUN SERPL-MCNC: 11 MG/DL (ref 6–24)
BUN/CREAT SERPL: 12 (ref 9–20)
CALCIUM SERPL-MCNC: 9.5 MG/DL (ref 8.7–10.2)
CEA SERPL-MCNC: 2 NG/ML (ref 0–4.7)
CHLORIDE SERPL-SCNC: 103 MMOL/L (ref 96–106)
CO2 SERPL-SCNC: 22 MMOL/L (ref 20–29)
CREAT SERPL-MCNC: 0.9 MG/DL (ref 0.76–1.27)
EGFR: 102 ML/MIN/1.73
EOSINOPHIL # BLD AUTO: 0.1 X10E3/UL (ref 0–0.4)
EOSINOPHIL NFR BLD AUTO: 3 %
ERYTHROCYTE [DISTWIDTH] IN BLOOD BY AUTOMATED COUNT: 13.1 % (ref 11.6–15.4)
GLOBULIN SER CALC-MCNC: 2.4 G/DL (ref 1.5–4.5)
GLUCOSE SERPL-MCNC: 97 MG/DL (ref 70–99)
HCT VFR BLD AUTO: 45.1 % (ref 37.5–51)
HGB BLD-MCNC: 15.2 G/DL (ref 13–17.7)
IMM GRANULOCYTES # BLD AUTO: 0 X10E3/UL (ref 0–0.1)
IMM GRANULOCYTES NFR BLD AUTO: 0 %
LYMPHOCYTES # BLD AUTO: 0.5 X10E3/UL (ref 0.7–3.1)
LYMPHOCYTES NFR BLD AUTO: 18 %
MCH RBC QN AUTO: 32.4 PG (ref 26.6–33)
MCHC RBC AUTO-ENTMCNC: 33.7 G/DL (ref 31.5–35.7)
MCV RBC AUTO: 96 FL (ref 79–97)
MONOCYTES # BLD AUTO: 0.3 X10E3/UL (ref 0.1–0.9)
MONOCYTES NFR BLD AUTO: 9 %
NEUTROPHILS # BLD AUTO: 2.1 X10E3/UL (ref 1.4–7)
NEUTROPHILS NFR BLD AUTO: 69 %
PLATELET # BLD AUTO: 161 X10E3/UL (ref 150–450)
POTASSIUM SERPL-SCNC: 4.5 MMOL/L (ref 3.5–5.2)
PROT SERPL-MCNC: 6.8 G/DL (ref 6–8.5)
RBC # BLD AUTO: 4.69 X10E6/UL (ref 4.14–5.8)
SODIUM SERPL-SCNC: 141 MMOL/L (ref 134–144)
WBC # BLD AUTO: 3 X10E3/UL (ref 3.4–10.8)

## 2022-10-26 ENCOUNTER — OFFICE VISIT (OUTPATIENT)
Dept: ONCOLOGY | Age: 52
End: 2022-10-26
Payer: MEDICAID

## 2022-10-26 VITALS
WEIGHT: 151 LBS | DIASTOLIC BLOOD PRESSURE: 70 MMHG | HEIGHT: 67 IN | TEMPERATURE: 97 F | BODY MASS INDEX: 23.7 KG/M2 | HEART RATE: 72 BPM | OXYGEN SATURATION: 99 % | SYSTOLIC BLOOD PRESSURE: 106 MMHG

## 2022-10-26 DIAGNOSIS — C20 RECTAL CANCER (HCC): Primary | ICD-10-CM

## 2022-10-26 DIAGNOSIS — L40.50 PSORIATIC ARTHRITIS (HCC): ICD-10-CM

## 2022-10-26 PROCEDURE — 99213 OFFICE O/P EST LOW 20 MIN: CPT | Performed by: INTERNAL MEDICINE

## 2022-10-26 NOTE — PROGRESS NOTES
Kelli Casiano is a 47 y.o. male  Chief Complaint   Patient presents with    Follow-up     Rectal cancer- likely stage III- SOMMER   1. Have you been to the ER, urgent care clinic since your last visit? Hospitalized since your last visit? No    2. Have you seen or consulted any other health care providers outside of the [address] [place] [address] since your last visit? Include any pap smears or colon screening.  No

## 2022-10-26 NOTE — PROGRESS NOTES
[facility] [address], [address] [place] [address] Road, [address] [place] [address]  W: [phone]  F: [phone]    Reason for Visit:   Madeleine Cagle is a 47 y.o. male who is seen in follow-up for Rectal cancer- likely stage III- SOMMER    Treatment History:   4/26/19: Colonoscopy- 6-7 cm size malignant appearing mass seen at 10 cm from anal verge. This was adenocarcinoma. 3 polyps removed- all were tubular adenomas  Rectal Ultrasound 5/2/19: ulcerated infiltrative mass lesion was noted in rectum at 10 cm. The lesion measured about 5 cm X 4 cm- T2, 13 mm X 8 mm oblong lymph node was noted immediately adjacent to the mass lesion  5/2/19: CT CAP- No metastasis, liver cysts. CEA 3.6  5/10/19: PET CT showed rectal malignancy and 3 small perirectal anibal metastatic focir  5/28/19-7/8/29: Xeloda + RT   9/24/19: LAR-  [id number], 2/6 positive nodes  10/14/19: CT CAP with no evidence of metastatic disease, liver cysts   10/23/19- 2/19/2020-: 8 cycles of  FOLFOX. Several delays and dose reductions due to cytopenias  12/11/19 CT AP: hypodensity in liver, otherwise KATHLEEN   1/6/20 MRI abd: no liver mets  3/30/2020: CT KATHLEEN  CT CAP 10/2021: KATHLEEN , CT 4/8/2022-no evidence of disease    History of Present Illness:   Patient is a 47 y.o. male seen for adenocarcinoma of the mid third of rectum    He had intermittent BRBPR x 1 year and then decided to have a colonoscopy. This led to above mentioned diagnosis. He received neoadjuvant xeloda+ RT followed by LAR. Completed 8 cycles of FOLFOX, had a colostomy and is on surveillance. He has his Colonoscopy 1/2023. He is ok. No new pain, bleeding, no cough, he has had erratic BMs. He has found that some types of food will cause flatulence. Salads and cheeses mostly. No weight changes      He uses 7 drinks a week. Adopted    Past Medical History:   Diagnosis Date    Adopted     COVID-19 vaccine series completed 04/09/2021    Pfizer dose #2 administered on 4/9/2021. GERD (gastroesophageal reflux disease)     Ill-defined condition     CHEMO-LAST ON 2/19/2020    Psoriasis     Psoriatic arthritis ([facility] [address]     Rectal cancer (HCC)     Moderately differentiated [id number] adenocarcinoma of the rectum. Treated with neoadjuvant chemoradiation, resection, and adjuvant chemotherapy. Past Surgical History:   Procedure Laterality Date    COLONOSCOPY Left 4/26/2019    COLONOSCOPY performed by Vickie Conner MD at [facility] ENDOSCOPY    COLONOSCOPY N/A 10/22/2020    Normal post-resection anatomy; 2-year follow-up interval recommended; Dr. Rico Shaffer. FLEXIBLE SIGMOIDOSCOPY N/A 5/2/2019    SIGMOIDOSCOPY FLEXIBLE performed by Kesha Shaver MD at [facility] ENDOSCOPY    HX GI      EGD    HX ORTHOPAEDIC Right circa 2014    ACL repair    HX OTHER SURGICAL  09/24/2019    Hand-assisted laparoscopic low anterior resection, mobilization of the splenic flexure, coloproctostomy, and creation of loop ileostomy; Dr. Candace Lizarraga. HX OTHER SURGICAL  03/17/2020    Ileostomy closure with resection and anastomosis; Dr. Candace Lizarraga. HX UROLOGICAL  12/12/2019    CYSTOSCOPY FOR REMOVAL OF KIDNEY STONES WITH HOLMIUM LASER; Dr. Lloyd Moore. HX UROLOGICAL  09/24/2019    Cystoscopy and placement of bilateral temporary ureteral catheters; Ho Villa MD.    HX VASCULAR ACCESS      INSERTION OF PORT-A-CATH RT SIDE OF CHEST. FOR CHEMO-LAST ON 2/19/2020    HX WISDOM TEETH EXTRACTION      IR INSERT TUNL CVC W PORT OVER 5 YEARS  10/15/2019    Right internal jugular vein Port-a-Cath placement. IR REMOVE TUNL CVAD W/O PORT / PUMP  7/16/2020    Port-a-Cath removal.      Social History     Tobacco Use    Smoking status: Never    Smokeless tobacco: Never   Substance Use Topics    Alcohol use:  Yes     Alcohol/week: 6.0 standard drinks     Types: 6 Cans of beer per week     Comment: 6 BEERS WEEKLY      Family History   Adopted: Yes   Problem Relation Age of Onset    Asthma Neg Hx     Cancer Neg Hx     Diabetes Neg Hx Heart Disease Neg Hx     Hypertension Neg Hx     Stroke Neg Hx      Current Outpatient Medications   Medication Sig    losartan (COZAAR) 100 mg tablet Take 1 Tablet by mouth in the morning. clobetasoL (TEMOVATE) 0.05 % ointment Apply 2 mg to affected area two (2) times a day. Not for use on face, groin, or under arms    fluocinoNIDE (LIDEX) 0.05 % ointment Apply  to affected area two (2) times a day. apremilast (Otezla) 30 mg tab TAKE 1 TABLET BY MOUTH 2 TIMES A DAY. tadalafiL (CIALIS) 5 mg tablet Take 5 mg by mouth daily. triamcinolone acetonide (KENALOG) 0.1 % ointment Apply  to affected area two (2) times daily as needed for Skin Irritation. use thin layer     No current facility-administered medications for this visit. No Known Allergies     Review of Systems: A complete review of systems was obtained, negative except as described above. Physical Exam:     Constitutional: Appears well-developed and well-nourished in no apparent distress   Mental status: Alert and awake, Oriented to person/place/time, Able to follow commands  Eyes: EOM normal, Sclera normal, No visible ocular discharge  HENT: Normocephalic, atraumatic; Mouth/Throat: Moist mucous membranes, External Ears normal  Neck: No visualized mass  Skin: Psoriasis  Psychiatric: Normal affect, normal judgment/insight. No hallucinations     Results:     Lab Results   Component Value Date/Time    WBC 3.0 (L) 10/24/2022 10:47 AM    HGB 15.2 10/24/2022 10:47 AM    HCT 45.1 10/24/2022 10:47 AM    PLATELET 027 34/04/4647 10:47 AM    MCV 96 10/24/2022 10:47 AM    ABS.  NEUTROPHILS 2.1 10/24/2022 10:47 AM     Lab Results   Component Value Date/Time    Sodium 141 10/24/2022 10:47 AM    Potassium 4.5 10/24/2022 10:47 AM    Chloride 103 10/24/2022 10:47 AM    CO2 22 10/24/2022 10:47 AM    Glucose 97 10/24/2022 10:47 AM    BUN 11 10/24/2022 10:47 AM    Creatinine 0.90 10/24/2022 10:47 AM    GFR est  01/14/2022 12:00 AM    GFR est non-AA 98 01/14/2022 12:00 AM    Calcium 9.5 10/24/2022 10:47 AM     Lab Results   Component Value Date/Time    Bilirubin, total 0.5 10/24/2022 10:47 AM    ALT (SGPT) 12 10/24/2022 10:47 AM    Alk. phosphatase 52 10/24/2022 10:47 AM    Protein, total 6.8 10/24/2022 10:47 AM    Albumin 4.4 10/24/2022 10:47 AM    Globulin 3.6 04/18/2021 02:36 AM     CEA:  Recent Labs     10/24/22  1047 03/31/22  0905   [id number] 2.0 2.4       Records reviewed and summarized above. Pathology report(s) reviewed  FINAL PATHOLOGIC DIAGNOSIS   1. Rectum and sigmoid colon, laparoscopic low anterior resection:   SPECIMEN   Procedure: Low anterior resection   Macroscopic Intactness of Mesorectum: Complete   TUMOR   Tumor Site: Rectum   Histologic Type: Adenocarcinoma   Histologic Grade: G2: Moderately differentiated   Tumor Size: 2.6 cm   Tumor Deposits: Present   Number of Deposits: 1   Tumor Extension: Tumor invades through the muscularis propria into pericolorectal tissue   Macroscopic Tumor Perforation: Not identified   Lymphovascular Invasion: Not identified   Perineural Invasion: Not identified   Tumor Budding: Number of tumor buds in one hotspot field: 5   Tumor Budding Score: Intermediate score (5-9)   Treatment Effect: Present - Residual cancer with evident tumor regression, but more than single cells or   rare small groups of cancer cells (partial response, score 2)   MARGINS   Margins: All margins are uninvolved by invasive carcinoma, high- grade dysplasia, intramucosal   adenocarcinoma, and adenoma   Margins Examined: Proximal, Distal, Radial or Mesenteric   Distance of Invasive Carcinoma from Closest Margin: 25 mm   Closest Margin: Radial or Mesenteric   LYMPH NODES   Number of Lymph Nodes Involved: 2   Number of Lymph Nodes Examined: 16   PATHOLOGIC STAGE CLASSIFICATION (pTNM, AJCC 8th Edition)   Primary Tumor (pT): pT3   Regional Lymph Nodes (pN): pN1b   2.  Large bowel, donuts:   Fragments of large bowel wall, negative for microscopic pathologic abnormality. Radiology report(s) reviewed above. CT CAP 10/14/19: IMPRESSION:  1. There are scattered small hepatic cysts without definite evidence for  metastatic disease. 2. Otherwise negative CT chest abdomen pelvis    CT CAP 12/11/19: IMPRESSION:  1. Right hydroureteronephrosis due to an 8 mm calculus at the ureterovesicular  junction. 2. Vague areas of heterogeneous enhancement and hypodensity in the liver, raises  concern for metastases, though dedicated 3 phase liver CT or liver protocol MRI  may be considered as follow-up. Numerous small hypodensities in the liver likely  cysts, unchanged. 3. Rectal anastomosis and presacral edema, treatment related. Right lower  quadrant ileostomy. No bowel obstruction    MRI abd 1/6/20: no hepatic mets     CT abdomen 3/30/2020  IMPRESSION  IMPRESSION:     1. Mild dilatation, thinning of the wall, and poor enhancement of the bowel just  proximal to the loop ileostomy closure nearly to the distal anastomosis. Clinical significance is uncertain. Ischemic changes should be considered. 2. Small amount of postoperative fluid in the right paracolic gutter without  drainable fluid collection    CT AP 10/7/20: IMPRESSION:  No evidence of recurrent or metastatic disease. CT AP 3/2021:  IMPRESSION  No evidence for metastatic disease     CT CAP 10/2021: KATHLEEN     CT chest abdomen and pelvis 4/2022  IMPRESSION  No evidence of local tumor recurrence or distant metastatic disease in the  chest, abdomen, or pelvis. Small hepatic and right renal cysts demonstrate no  significant change; these do not require imaging follow-up.   Assessment:   1) Rectal adenocarcinoma- mid third- SOMMER  Genetic testing: negative     T2N1M0 disease- Clinical stage III  S/P John Adj chemoRT followed by LAR on 9/24/19  Pathology showed [id number] residual disease- stage IIIB  Had significant residual disease with practically no treatment effects  Adjuvant FOLFOX x 8 until 2/2020- several delays and reductions due to cytopenias  On surveillance since then  CT scans 4/2022  KATHLEEN, CEA is not elevated labs look reassuring on 10/2022  He is > 3 years out from diagnosis. Colonoscopy in Jan 2023    2) Joe Fair age at diagnosis  Adopted  Had several adenomas removed  May have attenuated FAP and will require genetic testing  SOMMER  Referred to [facility] genetics     3) Psoriasis  Off Methotrexate  Is on sulphasalazine    He has had stable leukopenia secondary to this    4) Neuropathy  Secondary to Oxaliplatin  Resolved     5) Leucopenia  Mild , stable, due to Psoriasis      Plan:     Surveillance- RTC 6 months with labs and scans . Annual scans till 9/ 2024      I appreciate the opportunity to participate in Mr. Wiley gardner.       Signed By: Miguel Ansari MD

## 2022-11-01 ENCOUNTER — TELEPHONE (OUTPATIENT)
Dept: SURGERY | Age: 52
End: 2022-11-01

## 2022-11-01 ENCOUNTER — OFFICE VISIT (OUTPATIENT)
Dept: SURGERY | Age: 52
End: 2022-11-01
Payer: MEDICAID

## 2022-11-01 VITALS
RESPIRATION RATE: 16 BRPM | WEIGHT: 148 LBS | SYSTOLIC BLOOD PRESSURE: 101 MMHG | OXYGEN SATURATION: 98 % | HEART RATE: 71 BPM | DIASTOLIC BLOOD PRESSURE: 65 MMHG | TEMPERATURE: 98.5 F | BODY MASS INDEX: 23.23 KG/M2 | HEIGHT: 67 IN

## 2022-11-01 DIAGNOSIS — K43.2 INCISIONAL HERNIA, WITHOUT OBSTRUCTION OR GANGRENE: Primary | ICD-10-CM

## 2022-11-01 PROCEDURE — 99204 OFFICE O/P NEW MOD 45 MIN: CPT | Performed by: SURGERY

## 2022-11-01 NOTE — TELEPHONE ENCOUNTER
----- Message from Lord Malagon sent at 11/1/2022  2:32 PM EDT -----  Regarding: surgery recover question  Hoda Lerner    Patient is scheduled for surgery 12/2/22. He is wanting to know if he could do light skiing on 12/20. If you could please call him and let him know.     Thanks     Derek Gong

## 2022-11-01 NOTE — TELEPHONE ENCOUNTER
I spoke with the doctor and then called the patient. I informed the patient that he will need four weeks to recover. The doctor mentioned you moving the surgery date up or moving the skiing trip back. The patient would like to move the date of surgery up. I told him I will speak with Dr. Nohelia Oropeza and call you back. He acknowledged understanding and thanked me for the call.

## 2022-11-01 NOTE — PROGRESS NOTES
General Surgery Office Consultation / H & P    CC: Hernia  History of Present Illness:      Polo Tariq is a 47 y.o. male who presents with an incisional hernia. Patient underwent an LAR in 2019 by Dr. Delphine Watts.  He had rectal cancer. Underwent radiation preoperatively and postoperative chemotherapy. Had a diverting loop ileostomy that was reversed. He has been cancer free. He has developed an incisional bulge. Pain is 0 out of 10. No provoking or relieving factors. No radiation. He tries to live an active lifestyle and has concerns about this getting bigger with time or having a problem. No heart or lung problems. Past Medical History:   Diagnosis Date    Adopted     COVID-19 vaccine series completed 04/09/2021    Pfizer dose #2 administered on 4/9/2021. GERD (gastroesophageal reflux disease)     Ill-defined condition     CHEMO-LAST ON 2/19/2020    Psoriasis     Psoriatic arthritis ([facility] [address]     Rectal cancer (HCC)     Moderately differentiated [id number] adenocarcinoma of the rectum. Treated with neoadjuvant chemoradiation, resection, and adjuvant chemotherapy. Past Surgical History:   Procedure Laterality Date    COLONOSCOPY Left 4/26/2019    COLONOSCOPY performed by Carly Diamond MD at [facility] ENDOSCOPY    COLONOSCOPY N/A 10/22/2020    Normal post-resection anatomy; 2-year follow-up interval recommended; Dr. Edson Clay. FLEXIBLE SIGMOIDOSCOPY N/A 5/2/2019    SIGMOIDOSCOPY FLEXIBLE performed by Gael Haro MD at [facility] ENDOSCOPY    HX GI      EGD    HX ORTHOPAEDIC Right circa 2014    ACL repair    HX OTHER SURGICAL  09/24/2019    Hand-assisted laparoscopic low anterior resection, mobilization of the splenic flexure, coloproctostomy, and creation of loop ileostomy; Dr. Jareth Srinivasan. HX OTHER SURGICAL  03/17/2020    Ileostomy closure with resection and anastomosis; Dr. Jareth Srinivasan. HX UROLOGICAL  12/12/2019    CYSTOSCOPY FOR REMOVAL OF KIDNEY STONES WITH HOLMIUM LASER; Dr. Francisco Cameron.     HX UROLOGICAL  09/24/2019    Cystoscopy and placement of bilateral temporary ureteral catheters; Olaf Luna MD.    HX VASCULAR ACCESS      INSERTION OF PORT-A-CATH RT SIDE OF CHEST. FOR CHEMO-LAST ON 2/19/2020    HX WISDOM TEETH EXTRACTION      IR INSERT TUNL CVC W PORT OVER 5 YEARS  10/15/2019    Right internal jugular vein Port-a-Cath placement. IR REMOVE TUNL CVAD W/O PORT / PUMP  7/16/2020    Port-a-Cath removal.      Family History   Adopted: Yes   Problem Relation Age of Onset    Asthma Neg Hx     Cancer Neg Hx     Diabetes Neg Hx     Heart Disease Neg Hx     Hypertension Neg Hx     Stroke Neg Hx      Social History     Socioeconomic History    Marital status:    Tobacco Use    Smoking status: Never    Smokeless tobacco: Never   Substance and Sexual Activity    Alcohol use: Yes     Alcohol/week: 6.0 standard drinks     Types: 6 Cans of beer per week     Comment: 6 BEERS WEEKLY    Drug use: No    Sexual activity: Not Currently      Prior to Admission medications    Medication Sig Start Date End Date Taking? Authorizing Provider   ergocalciferol, vitamin D2, (VITAMIN D2 PO) Take  by mouth. Yes Provider, Historical   losartan (COZAAR) 100 mg tablet Take 1 Tablet by mouth in the morning. 8/5/22  Yes Regan Monteiro MD   clobetasoL (TEMOVATE) 0.05 % ointment Apply 2 mg to affected area two (2) times a day. Not for use on face, groin, or under arms 4/13/22  Yes Loren Hendrickson MD   fluocinoNIDE (LIDEX) 0.05 % ointment Apply  to affected area two (2) times a day. 4/12/22  Yes Loren Hendrickson MD   apremilast Tianna Begin) 30 mg tab TAKE 1 TABLET BY MOUTH 2 TIMES A DAY. 12/15/21  Yes Loren Hendrickson MD   tadalafiL (CIALIS) 5 mg tablet Take 5 mg by mouth daily. Yes Provider, Historical   triamcinolone acetonide (KENALOG) 0.1 % ointment Apply  to affected area two (2) times daily as needed for Skin Irritation.  use thin layer   Yes Provider, Historical     No Known Allergies    Review of Systems:  Constitutional: No fever or chills  Neurologic: No headache  Eyes: No scleral icterus or irritated eyes  Nose: No nasal pain or drainage  Mouth: No oral lesions or sore throat  Cardiac: No palpations or chest pain  Pulmonary: No cough or shortness of breath  Gastrointestinal: Bulge in abdomen, no nausea, emesis, diarrhea, or constipation  Genitourinary: No dysuria  Musculoskeletal: No muscle or joint tenderness  Skin: No rashes or lesions  Psychiatric: No anxiety or depressed mood    Physical Exam:   Visit Vitals  /65 (BP 1 Location: Left upper arm)   Pulse 71   Temp 98.5 °F (36.9 °C)   Resp 16   Ht 5' 7\" (1.702 m)   Wt 148 lb (67.1 kg)   SpO2 98%   BMI 23.18 kg/m²     General: No acute distress, conversant  Eyes: PERRLA, no scleral icterus  HENT: Normocephalic without oral lesions  Neck: Trachea midline without LAD  Cardiac: Normal pulse rate and rhythm  Pulmonary: Symmetric chest rise with normal effort  GI: Soft, NT, ND, 3 to 4 cm incisional hernia around umbilicus, no splenomegaly  Skin: Warm without rash  Extremities: No edema or joint stiffness  Psych: Appropriate mood and affect    Assessment:     54-year-old male with incisional hernia after partial proctocolectomy for rectal cancer    Plan:     Imaging reviewed from most recent CT scan. Discussed robotic repair with extended totally extraperitoneal approach with retrorectus mesh placement. We discussed the risk of bleeding, infection, bowel injury, hernia recurrence, mesh complication, the risk of anesthesia, and the hospital length of stay. The patient understands these risks. In order to prevent increasing size of hernia and possible future complications recommend repair. Patient understands this and elects to proceed. We will call him to arrange a time that works for him.     Signed By: Angie Wiseman MD  Bariatric and General Surgeon  [facility]    November 1, 2022

## 2022-11-01 NOTE — TELEPHONE ENCOUNTER
Spoke to patient and offered 12/2 and he said that work. I told him I didn't have a time,  I let him know once his surgery posted I would schedule his two week follow up and put all the information in a letter that will include preoperative instruction. He asked about recovery and wanted to know if he could do light skiing on 12/20. I told him I would get a message to the nurse due to I wasn't sure if that was a good idea or not.

## 2022-11-01 NOTE — H&P (VIEW-ONLY)
General Surgery Office Consultation / H & P    CC: Hernia  History of Present Illness:      Pk Cary is a 47 y.o. male who presents with an incisional hernia. Patient underwent an LAR in 2019 by Dr. Jose Patiño.  He had rectal cancer. Underwent radiation preoperatively and postoperative chemotherapy. Had a diverting loop ileostomy that was reversed. He has been cancer free. He has developed an incisional bulge. Pain is 0 out of 10. No provoking or relieving factors. No radiation. He tries to live an active lifestyle and has concerns about this getting bigger with time or having a problem. No heart or lung problems. Past Medical History:   Diagnosis Date    Adopted     COVID-19 vaccine series completed 04/09/2021    Pfizer dose #2 administered on 4/9/2021. GERD (gastroesophageal reflux disease)     Ill-defined condition     CHEMO-LAST ON 2/19/2020    Psoriasis     Psoriatic arthritis ([facility] [address]     Rectal cancer (HCC)     Moderately differentiated [id number] adenocarcinoma of the rectum. Treated with neoadjuvant chemoradiation, resection, and adjuvant chemotherapy. Past Surgical History:   Procedure Laterality Date    COLONOSCOPY Left 4/26/2019    COLONOSCOPY performed by Annalisa Santamaria MD at [facility] ENDOSCOPY    COLONOSCOPY N/A 10/22/2020    Normal post-resection anatomy; 2-year follow-up interval recommended; Dr. Rodney Armando. FLEXIBLE SIGMOIDOSCOPY N/A 5/2/2019    SIGMOIDOSCOPY FLEXIBLE performed by Palmer Main MD at [facility] ENDOSCOPY    HX GI      EGD    HX ORTHOPAEDIC Right circa 2014    ACL repair    HX OTHER SURGICAL  09/24/2019    Hand-assisted laparoscopic low anterior resection, mobilization of the splenic flexure, coloproctostomy, and creation of loop ileostomy; Dr. Lazarus Gasmen. HX OTHER SURGICAL  03/17/2020    Ileostomy closure with resection and anastomosis; Dr. Lazarus Gasmen. HX UROLOGICAL  12/12/2019    CYSTOSCOPY FOR REMOVAL OF KIDNEY STONES WITH HOLMIUM LASER; Dr. Marisol Nguyễn.     HX UROLOGICAL  09/24/2019    Cystoscopy and placement of bilateral temporary ureteral catheters; Bee Danielle MD.    HX VASCULAR ACCESS      INSERTION OF PORT-A-CATH RT SIDE OF CHEST. FOR CHEMO-LAST ON 2/19/2020    HX WISDOM TEETH EXTRACTION      IR INSERT TUNL CVC W PORT OVER 5 YEARS  10/15/2019    Right internal jugular vein Port-a-Cath placement. IR REMOVE TUNL CVAD W/O PORT / PUMP  7/16/2020    Port-a-Cath removal.      Family History   Adopted: Yes   Problem Relation Age of Onset    Asthma Neg Hx     Cancer Neg Hx     Diabetes Neg Hx     Heart Disease Neg Hx     Hypertension Neg Hx     Stroke Neg Hx      Social History     Socioeconomic History    Marital status:    Tobacco Use    Smoking status: Never    Smokeless tobacco: Never   Substance and Sexual Activity    Alcohol use: Yes     Alcohol/week: 6.0 standard drinks     Types: 6 Cans of beer per week     Comment: 6 BEERS WEEKLY    Drug use: No    Sexual activity: Not Currently      Prior to Admission medications    Medication Sig Start Date End Date Taking? Authorizing Provider   ergocalciferol, vitamin D2, (VITAMIN D2 PO) Take  by mouth. Yes Provider, Historical   losartan (COZAAR) 100 mg tablet Take 1 Tablet by mouth in the morning. 8/5/22  Yes Kelly Cade MD   clobetasoL (TEMOVATE) 0.05 % ointment Apply 2 mg to affected area two (2) times a day. Not for use on face, groin, or under arms 4/13/22  Yes Angy Koch MD   fluocinoNIDE (LIDEX) 0.05 % ointment Apply  to affected area two (2) times a day. 4/12/22  Yes Angy Koch MD   apremilast Dmitri Arellano) 30 mg tab TAKE 1 TABLET BY MOUTH 2 TIMES A DAY. 12/15/21  Yes Angy Koch MD   tadalafiL (CIALIS) 5 mg tablet Take 5 mg by mouth daily. Yes Provider, Historical   triamcinolone acetonide (KENALOG) 0.1 % ointment Apply  to affected area two (2) times daily as needed for Skin Irritation.  use thin layer   Yes Provider, Historical     No Known Allergies    Review of Systems:  Constitutional: No fever or chills  Neurologic: No headache  Eyes: No scleral icterus or irritated eyes  Nose: No nasal pain or drainage  Mouth: No oral lesions or sore throat  Cardiac: No palpations or chest pain  Pulmonary: No cough or shortness of breath  Gastrointestinal: Bulge in abdomen, no nausea, emesis, diarrhea, or constipation  Genitourinary: No dysuria  Musculoskeletal: No muscle or joint tenderness  Skin: No rashes or lesions  Psychiatric: No anxiety or depressed mood    Physical Exam:   Visit Vitals  /65 (BP 1 Location: Left upper arm)   Pulse 71   Temp 98.5 °F (36.9 °C)   Resp 16   Ht 5' 7\" (1.702 m)   Wt 148 lb (67.1 kg)   SpO2 98%   BMI 23.18 kg/m²     General: No acute distress, conversant  Eyes: PERRLA, no scleral icterus  HENT: Normocephalic without oral lesions  Neck: Trachea midline without LAD  Cardiac: Normal pulse rate and rhythm  Pulmonary: Symmetric chest rise with normal effort  GI: Soft, NT, ND, 3 to 4 cm incisional hernia around umbilicus, no splenomegaly  Skin: Warm without rash  Extremities: No edema or joint stiffness  Psych: Appropriate mood and affect    Assessment:     25-year-old male with incisional hernia after partial proctocolectomy for rectal cancer    Plan:     Imaging reviewed from most recent CT scan. Discussed robotic repair with extended totally extraperitoneal approach with retrorectus mesh placement. We discussed the risk of bleeding, infection, bowel injury, hernia recurrence, mesh complication, the risk of anesthesia, and the hospital length of stay. The patient understands these risks. In order to prevent increasing size of hernia and possible future complications recommend repair. Patient understands this and elects to proceed. We will call him to arrange a time that works for him.     Signed By: Hill James MD  Bariatric and General Surgeon  [facility]    November 1, 2022

## 2022-11-01 NOTE — PROGRESS NOTES
Identified pt with two pt identifiers (name and ). Reviewed chart in preparation for visit and have obtained necessary documentation. Pebbles Beckwith is a 47 y.o. male  Chief Complaint   Patient presents with    New Patient    Abdominal Pain     Eval Incisional Hernia     Visit Vitals  /65 (BP 1 Location: Left upper arm)   Pulse 71   Temp 98.5 °F (36.9 °C)   Resp 16   Ht 5' 7\" (1.702 m)   Wt 148 lb (67.1 kg)   SpO2 98%   BMI 23.18 kg/m²       1. Have you been to the ER, urgent care clinic since your last visit? Hospitalized since your last visit? No    2. Have you seen or consulted any other health care providers outside of the [address] [place] [address] since your last visit? Include any pap smears or colon screening.  No

## 2022-11-02 ENCOUNTER — TELEPHONE (OUTPATIENT)
Dept: SURGERY | Age: 52
End: 2022-11-02

## 2022-11-02 NOTE — TELEPHONE ENCOUNTER
ANGELINA for patient with new surgery date of 11/22/22 @ 2:30 with arrival time of 12:30. I let him know I would send out a new letter showing the change. To please call me back to let me know that he received the message.   I left my direct phone number

## 2022-11-09 ENCOUNTER — OFFICE VISIT (OUTPATIENT)
Dept: RHEUMATOLOGY | Age: 52
End: 2022-11-09
Payer: MEDICAID

## 2022-11-09 VITALS
HEART RATE: 62 BPM | SYSTOLIC BLOOD PRESSURE: 133 MMHG | RESPIRATION RATE: 16 BRPM | OXYGEN SATURATION: 99 % | DIASTOLIC BLOOD PRESSURE: 82 MMHG | WEIGHT: 148 LBS | TEMPERATURE: 98.1 F | BODY MASS INDEX: 23.18 KG/M2

## 2022-11-09 DIAGNOSIS — M05.9 SEROPOSITIVE RHEUMATOID ARTHRITIS (HCC): ICD-10-CM

## 2022-11-09 DIAGNOSIS — Z79.899 ONGOING USE OF POSSIBLY TOXIC MEDICATION: ICD-10-CM

## 2022-11-09 DIAGNOSIS — L40.50 PSORIATIC ARTHRITIS (HCC): Primary | ICD-10-CM

## 2022-11-09 PROCEDURE — 99215 OFFICE O/P EST HI 40 MIN: CPT | Performed by: INTERNAL MEDICINE

## 2022-11-09 NOTE — PROGRESS NOTES
Chief Complaint   Patient presents with    Joint Pain     1. Have you been to the ER, urgent care clinic since your last visit? Hospitalized since your last visit? No    2. Have you seen or consulted any other health care providers outside of the [address] [place] [address] since your last visit? Include any pap smears or colon screening.  No

## 2022-11-09 NOTE — PROGRESS NOTES
REASON FOR VISIT    Margarita Oh is a 47 y.o. male who was seen in-office on 11/9/2022. HISTORY OF DISEASE: Psoriatic Arthritis     Year of diagnosis: 2015  First visit with me: 10/2020  Cumulative disease manifestations:  Dactylitis, psoriasis, back pain  Positive serologies/labs:  Negative serologies/labs: HLA B27  Other co-morbidities: heavy past alcohol use; rectal cancer on chemo + RT + surgery  Relapses:      Past treatment history:  Prednisone:   Non-biologic DMARD: Methotrexate (2015-4/2019 stopped 2/2 alcohol), Sulfasalazine 2000 mg oral daily (6/2020-9/2021, ineffective)  Biologic: Theadore Angry (11/20- )  Other:     HISTORY OF PRESENT ILLNESS     Pt returns for a follow up. Pt still takes Otezla twice per day. He notes that he has some stomach upset but he thinks that this is related to the foods he eats and not Theadore Angry. He says that the medication has been doing what it is supposed to do. Pt recalls tolerating Sulfasalazine in the past, but it did not help him much. Pt says that he does not have much joint pain today. No extended morning stiffness, no consistent rashes. Recently sore after picking up pickle ball. Pt still has psoriasis on his knees. He denies psoriasis on his feet or palms. He says that his psoriasis does not usually bother him unless it is extra active; with more consistent use of topical clobetasol or fluocinonide, the rash and itch are not bothersome. Pt had rectal cancer surgery 3 years ago now. His surveillance colonoscopy is scheduled for January. Pt says that he has not been running or biking as much as he used to. 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 His HDL has been downtrending in the face of decreased activity, as of January though still high at 78, when his LDL was 115.     REVIEW OF SYSTEMS    A comprehensive review of systems was performed and pertinent results are documented in the HPI, review of systems is otherwise non-contributory. PAST MEDICAL HISTORY    Past Medical History:   Diagnosis Date    Adopted     COVID-19 vaccine series completed 04/09/2021    Pfizer dose #2 administered on 4/9/2021. GERD (gastroesophageal reflux disease)     Ill-defined condition     CHEMO-LAST ON 2/19/2020    Psoriasis     Psoriatic arthritis ([facility] [address]     Rectal cancer (HCC)     Moderately differentiated [id number] adenocarcinoma of the rectum. Treated with neoadjuvant chemoradiation, resection, and adjuvant chemotherapy. Past Surgical History:   Procedure Laterality Date    COLONOSCOPY Left 4/26/2019    COLONOSCOPY performed by Jany Kathleen MD at [facility] ENDOSCOPY    COLONOSCOPY N/A 10/22/2020    Normal post-resection anatomy; 2-year follow-up interval recommended; Dr. Mei Mays. FLEXIBLE SIGMOIDOSCOPY N/A 5/2/2019    SIGMOIDOSCOPY FLEXIBLE performed by May Doty MD at [facility] ENDOSCOPY    HX GI      EGD    HX ORTHOPAEDIC Right circa 2014    ACL repair    HX OTHER SURGICAL  09/24/2019    Hand-assisted laparoscopic low anterior resection, mobilization of the splenic flexure, coloproctostomy, and creation of loop ileostomy; Dr. Rishabh Garcia. HX OTHER SURGICAL  03/17/2020    Ileostomy closure with resection and anastomosis; Dr. Rishabh Garcia. HX UROLOGICAL  12/12/2019    CYSTOSCOPY FOR REMOVAL OF KIDNEY STONES WITH HOLMIUM LASER; Dr. Cuca Meza. HX UROLOGICAL  09/24/2019    Cystoscopy and placement of bilateral temporary ureteral catheters; Oj Clancy MD.    HX VASCULAR ACCESS      INSERTION OF PORT-A-CATH RT SIDE OF CHEST. FOR CHEMO-LAST ON 2/19/2020    HX WISDOM TEETH EXTRACTION      IR INSERT TUNL CVC W PORT OVER 5 YEARS  10/15/2019    Right internal jugular vein Port-a-Cath placement.     IR REMOVE TUNL CVAD W/O PORT / PUMP  7/16/2020    Port-a-Cath removal.       FAMILY HISTORY    Family History   Adopted: Yes   Problem Relation Age of Onset    Asthma Neg Hx     Cancer Neg Hx     Diabetes Neg Hx     Heart Disease Neg Hx     Hypertension Neg Hx     Stroke Neg Hx        SOCIAL HISTORY    Social History     Tobacco Use    Smoking status: Never    Smokeless tobacco: Never   Substance Use Topics    Alcohol use: Yes     Alcohol/week: 6.0 standard drinks     Types: 6 Cans of beer per week     Comment: 6 BEERS WEEKLY    Drug use: No     On vacations drinks more than 6 beers weekly    MEDICATIONS    Current Outpatient Medications   Medication Sig Dispense Refill    ergocalciferol, vitamin D2, (VITAMIN D2 PO) Take  by mouth.      losartan (COZAAR) 100 mg tablet Take 1 Tablet by mouth in the morning. 90 Tablet 1    clobetasoL (TEMOVATE) 0.05 % ointment Apply 2 mg to affected area two (2) times a day. Not for use on face, groin, or under arms 60 g 1    fluocinoNIDE (LIDEX) 0.05 % ointment Apply  to affected area two (2) times a day. 60 g 1    apremilast (Otezla) 30 mg tab TAKE 1 TABLET BY MOUTH 2 TIMES A DAY. 60 Tablet 10    tadalafiL (CIALIS) 5 mg tablet Take 5 mg by mouth daily. triamcinolone acetonide (KENALOG) 0.1 % ointment Apply  to affected area two (2) times daily as needed for Skin Irritation. use thin layer         ALLERGIES    No Known Allergies    PHYSICAL EXAMINATION  Visit Vitals  /82 (BP 1 Location: Right upper arm, BP Patient Position: Sitting, BP Cuff Size: Adult)   Pulse 62   Temp 98.1 °F (36.7 °C) (Oral)   Resp 16   Wt 148 lb (67.1 kg)   SpO2 99%   BMI 23.18 kg/m²     General:  The patient is well developed, well nourished, alert, and in no apparent distress. Eyes: Sclera are anicteric. No conjunctival injection. HEENT:  Oropharynx is clear. No oral ulcers. Adequate salivary pooling. No cervical or supraclavicular lymphadenopathy. Lungs:  Clear to auscultation bilaterally, without wheeze or stridor. Normal respiratory effort. Cor:  Regular rate and rhythm. No murmur rub or gallop. Abdomen: Soft, non-tender, without hepatomegaly or masses.    Extremities: No calf tenderness or edema. Warm and well perfused. Skin:  Dime-sized elbow plaques. Interval faded ~8 by 6 cm plaque on dorsum right hand between thumb and index finger. Quarter-sized plaques over bilateral knees. Dominant L pretibial plaque has now largely faded without overlying scale. Pitting limited to left index finger  Neuro: Nonfocal, no foot or wrist drop  Musculoskeletal:    A comprehensive musculoskeletal exam was performed for all joints of each upper and lower extremity and assessed for swelling, tenderness and range of motion. Results are documented as below:  Still no evidence of synovitis in the small joints of the hands, wrists, shoulders, elbows, hips, knees or ankles. DATA REVIEW    Prior medical records were reviewed and if applicable are summarized as below:    Labs:   10/24/22: CEA 2, Cr 0.9, LFT WNL, WBC 3, HGB 15.2, Plt 161, CRP <1 mg/L, QuantiFERON plus neg, ESR 2  3/31/22: Cr 0.93, LFT WNL, WBC 3, HGB 15.2, Plt 167  22: WBC 3.5, HGB 15.4, Plt 162, HDL 79, , Cholesterol 210, Cr 0.89, LFT WNL, Prostate specific Arg 1.6  10/18/21: CRP<1  21: WBC 8.1, Hgb 14.7, Plt 175; Cr 0.87  21: Tbili 0.6, AST 11, ALT 21, AlkP 71  21: Cr 0.90,  AST 42, ALT 42, AlkP 57, Tbili 0.5, Alb 4.4  10/2020: WBC 3.6 (ANC 2400, ), CBC ow WNL; CMP WNL  2019: cbc, cmp unremarkable, ESR, CRP normal, HEpB, C, quant gold negative  10/18: WBC 4.1, Hb 15.2, Plt 194, CMP Normal, CRP, ESR negative  : Hep C negative, HLA b27 negative, quant gold negative    Imagin/8/22: CT ABD PELV W CONT: No evidence of local tumor recurrence or distant metastatic disease in the chest, abdomen, or pelvis. Small hepatic and right renal cysts demonstrate no significant change; these do not require imaging follow-up. 22: CT Chest W CONT: No evidence of local tumor recurrence or distant metastatic disease in the chest, abdomen, or pelvis.  Small hepatic and right renal cysts demonstrate nosignificant change; these do not require imaging follow-up. 6/10/21 Nuclear stress:  SPECT: Left ventricular function post-stress was abnormal. Calculated ejection fraction is 41%. There is no evidence of transient ischemic dilation (TID). The TID ratio is 1.03. Baseline ECG: Normal EKG. SPECT: Left ventricular perfusion is probably normal. Myocardial perfusion imaging supports a low risk stress test.  5/3/21: CT chest with: No typical CT evidence of metastatic colon neoplasm in the thorax or visualized upper abdomen. Clear lungs, no hilar LAD.  7/9/20 CT abd/pelvis: No evidence of recurrent or metastatic disease. SI Joints (2/2019): Personally reviewed images. Normal joints  Foot xrays (2/2019): Personally reviewed images. No erosions  Hand xrays (2/2019): Personally reviewed images. + DJD. No erosions  1/16: CXR normal    Pathology:  Rectal biopsy (4/2019): well differentiated invasive colonic adenocarcinoma    ASSESSMENT AND PLAN    A 47 y.o. male with hx of psoriatic arthritis on apremilast, recent rectal adenocarcinoma s/p chemo/RT presents for a follow up visit. His arthritis remains well controlled since the addition of apremilast even with stopping sulfasalazine; he has a few active plaques on elbows and knees which he finds nonbothersome, and he is comfortable avoiding escalation of immunosuppression at his current levels of breakthrough skin involvement. Barring interval changes, 5 years from his rectal cancer we can revisit whether transitioning to an IL-23 inhibitor may be worthwhile.     # Psoriatic arthritis:    - Cont apremilast 30mg bid    # Psoriasis:   - Apremilast as above  - Continue fluocinonide or clobetasol as per insurance preferences, as needed for breakthrough plaques    #Transaminitis  -April LFTs normalized with reduced alcohol, trending LFTs q6mo    # Rectal cancer:   - in remission for now, cont to follow with Dr. Ann Hinton in oncology    # Medication Toxicity Monitoring:  - cbc, cmp b8ef--az to date through oncology currently  - Hepatitis B, C: negative 2/2019  - Quant gold: negative 2/2019  - Immunizations: UTD    Patient Instructions   1) Continue to take Otezla twice per day. 2) I you are ever having a lot of stomach upset you can stop Otezla for 3-4 days to see if this is what is causing the problem. Let me know if you find out that Delmas Percy is what is causing your GI issues. 3) Continue to use topical treatments for your psoriasis as you have been. 4) Check labs in 5 months. 5) Follow up in 6 months. Let me know if you have any questions or concerns in the meantime. The patient voiced understanding of the aforementioned assessment and plan. Summary of plan was provided in the After Visit Summary patient instructions. I also provided education about MyChart setup and utility. TODAY'S ORDERS    Patient Instructions   1) Continue to take Otezla twice per day. 2) I you are ever having a lot of stomach upset you can stop Otezla for 3-4 days to see if this is what is causing the problem. Let me know if you find out that Delmas Percy is what is causing your GI issues. 3) Continue to use topical treatments for your psoriasis as you have been. 4) Check labs in 5 months. 5) Follow up in 6 months. Let me know if you have any questions or concerns in the meantime. Future Appointments   Date Time Provider Shannan Rodriguez   12/6/2022  9:30 AM Fanta Serrano MD [facility] BS AMB   1/9/2023 10:20 AM MD DILLAN Lozada BS AMB   1/16/2023  9:45 AM Effie Torres MD [facility]IN BS AMB   4/19/2023 10:45 AM [facility] CT ER 2 [facility]   4/26/2023  8:45 AM Dari Mccarthy  [address] BS AMB   5/1/2023  9:40 AM Eileen Santos MD [facility] BS AMB     Face to face time: 25 minutes  Note preparation and records review day of service: 22 minutes  Total provider time day of service: 47 minutes    This was scribed by Gordon Titus in the presence of Dr. Andressa Paz.  The note was reviewed and amended personally, and I agree with the above information.     Lexi Jerome MD    Adult Rheumatology   [facility]  A Part of [facility], [address] [place] [address] Road   Phone [phone]  Fax [phone]

## 2022-11-09 NOTE — PATIENT INSTRUCTIONS
1) Continue to take Otezla twice per day. 2) I you are ever having a lot of stomach upset you can stop Otezla for 3-4 days to see if this is what is causing the problem. Let me know if you find out that Ben Hammond is what is causing your GI issues. 3) Continue to use topical treatments for your psoriasis as you have been. 4) Check labs in 5 months. 5) Follow up in 6 months. Let me know if you have any questions or concerns in the meantime.

## 2022-11-21 NOTE — PERIOP NOTES
[address] INSTRUCTIONS    Surgery Date:   11-22-22    Your surgeon's office or [facility] staff will call you between 4 PM- 8 PM the day before surgery with your arrival time. If your surgery is on a Monday, you will receive a call the preceding Friday. Please report to [facility] Patient Access/Admitting on the 1st floor. Bring your insurance card, photo identification, and any copayment ( if applicable). If you are going home the same day of your surgery, you must have a responsible adult to drive you home. You need to have a responsible adult to stay with you the first 24 hours after surgery and you should not drive a car for 24 hours following your surgery. Do NOT eat any solid foods after midnight the night before surgery including candy, mint or gum. You may drink clear liquids from midnight until 1 hour prior to your arrival. You may drink up to 12 ounces at one time every 4 hours. Please note special instructions, if applicable, below for medications. Do NOT drink alcohol or smoke 24 hours before surgery. STOP smoking for 14 days prior as it helps with breathing and healing after surgery. If you are being admitted to the hospital, please leave personal belongings/luggage in your car until you have an assigned hospital room number. Please wear comfortable clothes. Wear your glasses instead of contacts. We ask that all money, jewelry and valuables be left at home. Wear no make up, particularly mascara, the day of surgery. All body piercings, rings, and jewelry need to be removed and left at home. Please remove any nail polish or artifical nails from your fingernails. Please wear your hair loose or down. Please no pony-tails, buns, or any metal hair accessories. If you shower the morning of surgery, please do not apply any lotions or powders afterwards. You may wear deodorant, unless having breast surgery. Do not shave any body area within 24 hours of your surgery.   Please follow all instructions to avoid any potential surgical cancellation. Should your physical condition change, (i.e. fever, cold, flu, etc.) please notify your surgeon as soon as possible. It is important to be on time. If a situation occurs where you may be delayed, please call:  [phone] / [phone] on the day of surgery. The Preadmission Testing staff can be reached at [phone]. Special instructions: NONE      No current facility-administered medications for this encounter. Current Outpatient Medications   Medication Sig    ergocalciferol, vitamin D2, (VITAMIN D2 PO) Take  by mouth.    losartan (COZAAR) 100 mg tablet Take 1 Tablet by mouth in the morning. clobetasoL (TEMOVATE) 0.05 % ointment Apply 2 mg to affected area two (2) times a day. Not for use on face, groin, or under arms    fluocinoNIDE (LIDEX) 0.05 % ointment Apply  to affected area two (2) times a day. (Patient taking differently: Apply  to affected area as needed.)    apremilast (Otezla) 30 mg tab TAKE 1 TABLET BY MOUTH 2 TIMES A DAY. tadalafiL (CIALIS) 5 mg tablet Take 5 mg by mouth daily. triamcinolone acetonide (KENALOG) 0.1 % ointment Apply  to affected area two (2) times daily as needed for Skin Irritation. use thin layer        YOU MUST ONLY TAKE THESE MEDICATIONS THE MORNING OF SURGERY WITH A SIP OF WATER: NONE  MEDICATIONS TO TAKE THE MORNING OF SURGERY ONLY IF NEEDED: N/A  HOLD these prescription medications DAY OF Surgery: LOSARTAN  HOLD CIALIS 24 HOURS PRIOR TO SURGERY  Ask your surgeon/prescribing physician about when/if to STOP taking these medications: N/A  Stop all vitamins, herbal medicines and Aspirin containing products 7 days prior to surgery. Stop any non-steroidal anti-inflammatory drugs (i.e. Ibuprofen, Naproxen, Advil, Aleve) 3 days before surgery. You may take Tylenol.     If you are currently taking Plavix, Coumadin,or any other blood-thinning/anticoagulant medication contact your prescribing physician for instructions. Eating and Drinking Before Surgery    You may eat a regular dinner at the usual time on the day before your surgery. Do NOT eat any solid foods after midnight unless your arrival time at the hospital is 3pm or later. You may drink clear liquids only from 12 midnight until 1 hours prior to your arrival time at the hospital on the day of your surgery. Do NOT drink alcohol. Clear liquids include:  Water  Fruit juices without pulp( i.e. apple juice)  Carbonated beverages  Black coffee (no cream/milk)  Tea (no cream/milk)  Gatorade  You may drink up to 12-16 ounces at one time every 4 hours between the hours of midnight and 1 hour before your arrival time at the hospital. Example- if your arrival time at the hospital is 6am, you may drink 12-16 ounces of clear liquids no later than 5am.  If your arrival time at the hospital is 3pm or later, you may eat a light breakfast before 8am.  A light breakfast includes: Toast or bagel (no butter)  Black coffee (no cream/milk)  Tea (no cream/milk)  Fruit juices without pulp ( i.e. apple juice)  Do NOT eat meat, eggs, vegetables or fruit  If you have any questions, please contact your surgeon's office. Preventing Infections Before and After - Your Surgery    IMPORTANT INSTRUCTIONS    You play an important role in your health and preparation for surgery. To reduce the germs on your skin you will need to shower with CHG soap (Chorhexidine gluconate 4%) two times before surgery. CHG soap (Hibiclens, Hex-A-Clens or store brand)  CHG soap will be provided at your Preadmission Testing (PAT) appointment. If you do not have a PAT appointment before surgery, you may arrange to  CHG soap from our office or purchase CHG soap at a pharmacy, grocery or department store. You need to purchase TWO 4 ounce bottles to use for your 2 showers.     Steps to follow:  Wash your hair with your normal shampoo and your body with regular soap and rinse well to remove shampoo and soap from your skin. Wet a clean washcloth and turn off the shower. Put CHG soap on washcloth and apply to your entire body from the neck down. Do not use on your head, face or private parts(genitals). Do not use CHG soap on open sores, wounds or areas of skin irritation. Wash you body gently for 5 minutes. Do not wash your skin too hard. This soap does not create lather. Pay special attention to your underarms and from your belly button to your feet. Turn the shower back on and rinse well to get CHG soap off your body. Pat your skin dry with a clean, dry towel. Do not apply lotions or moisturizer. Put on clean clothes and sleep on fresh bed sheets and do not allow pets to sleep with you. Shower with CHG soap 2 times before your surgery  The evening before your surgery  The morning of your surgery      Tips to help prevent infections after your surgery:  Protect your surgical wound from germs:  Hand washing is the most important thing you and your caregivers can do to prevent infections. Keep your bandage clean and dry! Do not touch your surgical wound. Use clean, freshly washed towels and washcloths every time you shower; do not share bath linens with others. Until your surgical wound is healed, wear clothing and sleep on bed linens each day that are clean and freshly washed. Do not allow pets to sleep in your bed with you or touch your surgical wound. Do not smoke - smoking delays wound healing. This may be a good time to stop smoking. If you have diabetes, it is important for you to manage your blood sugar levels properly before your surgery as well as after your surgery. Poorly managed blood sugar levels slow down wound healing and prevent you from healing completely. Patient Information Regarding COVID Restrictions      Day of Procedure    Please park in the parking deck or any designated visitor parking lot.   Enter the facility through the Twitch of the hospital.  On the day of surgery, please provide the cell phone number of the person who will be waiting for you to the Patient Access representative at the time of registration. Masks are highly recommended in the hospital, but not required. Once your procedure and the immediate recovery period is completed, a nurse in the recovery area will contact your designated visitor to inform them of your room number or to otherwise review other pertinent information regarding your care. Social distancing practices are strongly encouraged in waiting areas and the cafeteria. The patient was contacted  via phone. He verbalized understanding of all instructions does not  need reinforcement.

## 2022-11-22 ENCOUNTER — ANESTHESIA (OUTPATIENT)
Dept: SURGERY | Age: 52
End: 2022-11-22
Payer: MEDICAID

## 2022-11-22 ENCOUNTER — HOSPITAL ENCOUNTER (OUTPATIENT)
Age: 52
Discharge: HOME | End: 2022-11-23
Attending: SURGERY | Admitting: SURGERY
Payer: MEDICAID

## 2022-11-22 ENCOUNTER — ANESTHESIA EVENT (OUTPATIENT)
Dept: SURGERY | Age: 52
End: 2022-11-22
Payer: MEDICAID

## 2022-11-22 DIAGNOSIS — K43.2 INCISIONAL HERNIA, WITHOUT OBSTRUCTION OR GANGRENE: Primary | ICD-10-CM

## 2022-11-22 PROCEDURE — 77030026438 HC STYL ET INTUB CARD -A: Performed by: ANESTHESIOLOGY

## 2022-11-22 PROCEDURE — 77030035277 HC OBTRTR BLDELSS DISP INTU -B: Performed by: SURGERY

## 2022-11-22 PROCEDURE — 74011250637 HC RX REV CODE- 250/637: Performed by: SURGERY

## 2022-11-22 PROCEDURE — 2709999900 HC NON-CHARGEABLE SUPPLY: Performed by: SURGERY

## 2022-11-22 PROCEDURE — 74011000250 HC RX REV CODE- 250: Performed by: ANESTHESIOLOGY

## 2022-11-22 PROCEDURE — 76010000876 HC OR TIME 2 TO 2.5HR INTENSV - TIER 2: Performed by: SURGERY

## 2022-11-22 PROCEDURE — 76060000036 HC ANESTHESIA 2.5 TO 3 HR: Performed by: SURGERY

## 2022-11-22 PROCEDURE — 77030010507 HC ADH SKN DERMBND J&J -B: Performed by: SURGERY

## 2022-11-22 PROCEDURE — 77030008756 HC TU IRR SUC STRY -B: Performed by: SURGERY

## 2022-11-22 PROCEDURE — 76210000006 HC OR PH I REC 0.5 TO 1 HR: Performed by: SURGERY

## 2022-11-22 PROCEDURE — 77030022704 HC SUT VLOC COVD -B: Performed by: SURGERY

## 2022-11-22 PROCEDURE — 74011250637 HC RX REV CODE- 250/637: Performed by: ANESTHESIOLOGY

## 2022-11-22 PROCEDURE — 77030039895 HC SYST SMK EVAC LAP COVD -B: Performed by: SURGERY

## 2022-11-22 PROCEDURE — 74011000250 HC RX REV CODE- 250: Performed by: SURGERY

## 2022-11-22 PROCEDURE — 74011250636 HC RX REV CODE- 250/636: Performed by: SURGERY

## 2022-11-22 PROCEDURE — 77030008684 HC TU ET CUF COVD -B: Performed by: ANESTHESIOLOGY

## 2022-11-22 PROCEDURE — 77030031139 HC SUT VCRL2 J&J -A: Performed by: SURGERY

## 2022-11-22 PROCEDURE — C1781 MESH (IMPLANTABLE): HCPCS | Performed by: SURGERY

## 2022-11-22 PROCEDURE — G0378 HOSPITAL OBSERVATION PER HR: HCPCS

## 2022-11-22 PROCEDURE — 77030013079 HC BLNKT BAIR HGGR 3M -A: Performed by: ANESTHESIOLOGY

## 2022-11-22 PROCEDURE — 77030008603 HC TRCR ENDOSC EPATH J&J -C: Performed by: SURGERY

## 2022-11-22 PROCEDURE — 77030014008 HC SPNG HEMSTAT J&J -C: Performed by: SURGERY

## 2022-11-22 PROCEDURE — 77030016151 HC PROTCTR LNS DFOG COVD -B: Performed by: SURGERY

## 2022-11-22 PROCEDURE — 77030034628 HC LIGASURE LAP SEAL DIV MD COVD -F: Performed by: SURGERY

## 2022-11-22 PROCEDURE — 74011000250 HC RX REV CODE- 250: Performed by: NURSE ANESTHETIST, CERTIFIED REGISTERED

## 2022-11-22 PROCEDURE — 74011250636 HC RX REV CODE- 250/636: Performed by: ANESTHESIOLOGY

## 2022-11-22 PROCEDURE — 77030002916 HC SUT ETHLN J&J -A: Performed by: SURGERY

## 2022-11-22 PROCEDURE — 77030036554: Performed by: SURGERY

## 2022-11-22 PROCEDURE — 77030002933 HC SUT MCRYL J&J -A: Performed by: SURGERY

## 2022-11-22 PROCEDURE — 77030040361 HC SLV COMPR DVT MDII -B: Performed by: SURGERY

## 2022-11-22 PROCEDURE — 77030012064: Performed by: SURGERY

## 2022-11-22 PROCEDURE — 74011250636 HC RX REV CODE- 250/636: Performed by: NURSE ANESTHETIST, CERTIFIED REGISTERED

## 2022-11-22 DEVICE — BARD SOFT MESH, 12" X 12" (30. 5 CM X 30.5 CM)
Type: IMPLANTABLE DEVICE | Site: ABDOMEN | Status: FUNCTIONAL
Brand: BARD

## 2022-11-22 RX ORDER — LIDOCAINE HYDROCHLORIDE 20 MG/ML
INJECTION, SOLUTION EPIDURAL; INFILTRATION; INTRACAUDAL; PERINEURAL AS NEEDED
Status: DISCONTINUED | OUTPATIENT
Start: 2022-11-22 | End: 2022-11-22 | Stop reason: HOSPADM

## 2022-11-22 RX ORDER — DEXAMETHASONE SODIUM PHOSPHATE 4 MG/ML
INJECTION, SOLUTION INTRA-ARTICULAR; INTRALESIONAL; INTRAMUSCULAR; INTRAVENOUS; SOFT TISSUE AS NEEDED
Status: DISCONTINUED | OUTPATIENT
Start: 2022-11-22 | End: 2022-11-22 | Stop reason: HOSPADM

## 2022-11-22 RX ORDER — KETOROLAC TROMETHAMINE 30 MG/ML
INJECTION, SOLUTION INTRAMUSCULAR; INTRAVENOUS AS NEEDED
Status: DISCONTINUED | OUTPATIENT
Start: 2022-11-22 | End: 2022-11-22 | Stop reason: HOSPADM

## 2022-11-22 RX ORDER — SODIUM CHLORIDE 0.9 % (FLUSH) 0.9 %
5-40 SYRINGE (ML) INJECTION EVERY 8 HOURS
Status: DISCONTINUED | OUTPATIENT
Start: 2022-11-22 | End: 2022-11-22 | Stop reason: HOSPADM

## 2022-11-22 RX ORDER — ACETAMINOPHEN 650 MG/1
650 SUPPOSITORY RECTAL
Status: DISCONTINUED | OUTPATIENT
Start: 2022-11-22 | End: 2022-11-23 | Stop reason: HOSPADM

## 2022-11-22 RX ORDER — OXYCODONE AND ACETAMINOPHEN 5; 325 MG/1; MG/1
1 TABLET ORAL AS NEEDED
Status: DISCONTINUED | OUTPATIENT
Start: 2022-11-22 | End: 2022-11-22 | Stop reason: HOSPADM

## 2022-11-22 RX ORDER — ENOXAPARIN SODIUM 100 MG/ML
40 INJECTION SUBCUTANEOUS DAILY
Status: DISCONTINUED | OUTPATIENT
Start: 2022-11-23 | End: 2022-11-23 | Stop reason: HOSPADM

## 2022-11-22 RX ORDER — SODIUM CHLORIDE 0.9 % (FLUSH) 0.9 %
5-40 SYRINGE (ML) INJECTION EVERY 8 HOURS
Status: DISCONTINUED | OUTPATIENT
Start: 2022-11-23 | End: 2022-11-23 | Stop reason: HOSPADM

## 2022-11-22 RX ORDER — CYCLOBENZAPRINE HCL 10 MG
10 TABLET ORAL EVERY 8 HOURS
Status: DISCONTINUED | OUTPATIENT
Start: 2022-11-23 | End: 2022-11-23 | Stop reason: HOSPADM

## 2022-11-22 RX ORDER — SODIUM CHLORIDE, SODIUM LACTATE, POTASSIUM CHLORIDE, CALCIUM CHLORIDE 600; 310; 30; 20 MG/100ML; MG/100ML; MG/100ML; MG/100ML
75 INJECTION, SOLUTION INTRAVENOUS CONTINUOUS
Status: DISCONTINUED | OUTPATIENT
Start: 2022-11-22 | End: 2022-11-22 | Stop reason: HOSPADM

## 2022-11-22 RX ORDER — CEFAZOLIN SODIUM 1 G/3ML
INJECTION, POWDER, FOR SOLUTION INTRAMUSCULAR; INTRAVENOUS AS NEEDED
Status: DISCONTINUED | OUTPATIENT
Start: 2022-11-22 | End: 2022-11-22 | Stop reason: HOSPADM

## 2022-11-22 RX ORDER — DIPHENHYDRAMINE HYDROCHLORIDE 50 MG/ML
12.5 INJECTION, SOLUTION INTRAMUSCULAR; INTRAVENOUS AS NEEDED
Status: DISCONTINUED | OUTPATIENT
Start: 2022-11-22 | End: 2022-11-22 | Stop reason: HOSPADM

## 2022-11-22 RX ORDER — DEXTROSE, SODIUM CHLORIDE, AND POTASSIUM CHLORIDE 5; .45; .15 G/100ML; G/100ML; G/100ML
125 INJECTION INTRAVENOUS CONTINUOUS
Status: DISCONTINUED | OUTPATIENT
Start: 2022-11-22 | End: 2022-11-23 | Stop reason: HOSPADM

## 2022-11-22 RX ORDER — MIDAZOLAM HYDROCHLORIDE 1 MG/ML
1 INJECTION, SOLUTION INTRAMUSCULAR; INTRAVENOUS AS NEEDED
Status: DISCONTINUED | OUTPATIENT
Start: 2022-11-22 | End: 2022-11-22 | Stop reason: HOSPADM

## 2022-11-22 RX ORDER — HYDROMORPHONE HYDROCHLORIDE 1 MG/ML
1 INJECTION, SOLUTION INTRAMUSCULAR; INTRAVENOUS; SUBCUTANEOUS
Status: DISCONTINUED | OUTPATIENT
Start: 2022-11-22 | End: 2022-11-23 | Stop reason: HOSPADM

## 2022-11-22 RX ORDER — HYDROMORPHONE HYDROCHLORIDE 1 MG/ML
0.2 INJECTION, SOLUTION INTRAMUSCULAR; INTRAVENOUS; SUBCUTANEOUS
Status: DISCONTINUED | OUTPATIENT
Start: 2022-11-22 | End: 2022-11-22 | Stop reason: HOSPADM

## 2022-11-22 RX ORDER — PROPOFOL 10 MG/ML
INJECTION, EMULSION INTRAVENOUS AS NEEDED
Status: DISCONTINUED | OUTPATIENT
Start: 2022-11-22 | End: 2022-11-22 | Stop reason: HOSPADM

## 2022-11-22 RX ORDER — FENTANYL CITRATE 50 UG/ML
50 INJECTION, SOLUTION INTRAMUSCULAR; INTRAVENOUS AS NEEDED
Status: DISCONTINUED | OUTPATIENT
Start: 2022-11-22 | End: 2022-11-22 | Stop reason: HOSPADM

## 2022-11-22 RX ORDER — POLYETHYLENE GLYCOL 3350 17 G/17G
17 POWDER, FOR SOLUTION ORAL DAILY PRN
Status: DISCONTINUED | OUTPATIENT
Start: 2022-11-22 | End: 2022-11-23 | Stop reason: HOSPADM

## 2022-11-22 RX ORDER — SODIUM CHLORIDE, SODIUM LACTATE, POTASSIUM CHLORIDE, CALCIUM CHLORIDE 600; 310; 30; 20 MG/100ML; MG/100ML; MG/100ML; MG/100ML
INJECTION, SOLUTION INTRAVENOUS
Status: DISCONTINUED | OUTPATIENT
Start: 2022-11-22 | End: 2022-11-22 | Stop reason: HOSPADM

## 2022-11-22 RX ORDER — MIDAZOLAM HYDROCHLORIDE 1 MG/ML
INJECTION, SOLUTION INTRAMUSCULAR; INTRAVENOUS AS NEEDED
Status: DISCONTINUED | OUTPATIENT
Start: 2022-11-22 | End: 2022-11-22 | Stop reason: HOSPADM

## 2022-11-22 RX ORDER — MORPHINE SULFATE 2 MG/ML
2 INJECTION, SOLUTION INTRAMUSCULAR; INTRAVENOUS
Status: DISCONTINUED | OUTPATIENT
Start: 2022-11-22 | End: 2022-11-22 | Stop reason: HOSPADM

## 2022-11-22 RX ORDER — SODIUM CHLORIDE 0.9 % (FLUSH) 0.9 %
5-40 SYRINGE (ML) INJECTION AS NEEDED
Status: DISCONTINUED | OUTPATIENT
Start: 2022-11-22 | End: 2022-11-23 | Stop reason: HOSPADM

## 2022-11-22 RX ORDER — SODIUM CHLORIDE 0.9 % (FLUSH) 0.9 %
5-40 SYRINGE (ML) INJECTION AS NEEDED
Status: DISCONTINUED | OUTPATIENT
Start: 2022-11-22 | End: 2022-11-22 | Stop reason: HOSPADM

## 2022-11-22 RX ORDER — FENTANYL CITRATE 50 UG/ML
INJECTION, SOLUTION INTRAMUSCULAR; INTRAVENOUS AS NEEDED
Status: DISCONTINUED | OUTPATIENT
Start: 2022-11-22 | End: 2022-11-22 | Stop reason: HOSPADM

## 2022-11-22 RX ORDER — HYDROMORPHONE HYDROCHLORIDE 2 MG/1
2-4 TABLET ORAL
Status: DISCONTINUED | OUTPATIENT
Start: 2022-11-22 | End: 2022-11-23 | Stop reason: HOSPADM

## 2022-11-22 RX ORDER — ONDANSETRON 2 MG/ML
INJECTION INTRAMUSCULAR; INTRAVENOUS AS NEEDED
Status: DISCONTINUED | OUTPATIENT
Start: 2022-11-22 | End: 2022-11-22 | Stop reason: HOSPADM

## 2022-11-22 RX ORDER — FENTANYL CITRATE 50 UG/ML
25 INJECTION, SOLUTION INTRAMUSCULAR; INTRAVENOUS
Status: DISCONTINUED | OUTPATIENT
Start: 2022-11-22 | End: 2022-11-22 | Stop reason: HOSPADM

## 2022-11-22 RX ORDER — NORETHINDRONE AND ETHINYL ESTRADIOL 0.5-0.035
KIT ORAL AS NEEDED
Status: DISCONTINUED | OUTPATIENT
Start: 2022-11-22 | End: 2022-11-22 | Stop reason: HOSPADM

## 2022-11-22 RX ORDER — LIDOCAINE HYDROCHLORIDE 10 MG/ML
0.1 INJECTION, SOLUTION EPIDURAL; INFILTRATION; INTRACAUDAL; PERINEURAL AS NEEDED
Status: DISCONTINUED | OUTPATIENT
Start: 2022-11-22 | End: 2022-11-22 | Stop reason: HOSPADM

## 2022-11-22 RX ORDER — ONDANSETRON 2 MG/ML
4 INJECTION INTRAMUSCULAR; INTRAVENOUS AS NEEDED
Status: DISCONTINUED | OUTPATIENT
Start: 2022-11-22 | End: 2022-11-22 | Stop reason: HOSPADM

## 2022-11-22 RX ORDER — BUPIVACAINE HYDROCHLORIDE AND EPINEPHRINE 5; 5 MG/ML; UG/ML
INJECTION, SOLUTION EPIDURAL; INTRACAUDAL; PERINEURAL AS NEEDED
Status: DISCONTINUED | OUTPATIENT
Start: 2022-11-22 | End: 2022-11-22 | Stop reason: HOSPADM

## 2022-11-22 RX ORDER — PROMETHAZINE HYDROCHLORIDE 25 MG/1
12.5 TABLET ORAL
Status: DISCONTINUED | OUTPATIENT
Start: 2022-11-22 | End: 2022-11-23 | Stop reason: HOSPADM

## 2022-11-22 RX ORDER — SODIUM CHLORIDE 9 MG/ML
50 INJECTION, SOLUTION INTRAVENOUS CONTINUOUS
Status: DISCONTINUED | OUTPATIENT
Start: 2022-11-22 | End: 2022-11-22 | Stop reason: HOSPADM

## 2022-11-22 RX ORDER — ONDANSETRON 2 MG/ML
4 INJECTION INTRAMUSCULAR; INTRAVENOUS
Status: DISCONTINUED | OUTPATIENT
Start: 2022-11-22 | End: 2022-11-23 | Stop reason: HOSPADM

## 2022-11-22 RX ORDER — MIDAZOLAM HYDROCHLORIDE 1 MG/ML
0.5 INJECTION, SOLUTION INTRAMUSCULAR; INTRAVENOUS
Status: DISCONTINUED | OUTPATIENT
Start: 2022-11-22 | End: 2022-11-22 | Stop reason: HOSPADM

## 2022-11-22 RX ORDER — ACETAMINOPHEN 500 MG
1000 TABLET ORAL EVERY 6 HOURS
Status: DISCONTINUED | OUTPATIENT
Start: 2022-11-23 | End: 2022-11-23 | Stop reason: HOSPADM

## 2022-11-22 RX ORDER — ACETAMINOPHEN 325 MG/1
650 TABLET ORAL
Status: DISCONTINUED | OUTPATIENT
Start: 2022-11-22 | End: 2022-11-23 | Stop reason: HOSPADM

## 2022-11-22 RX ORDER — LOSARTAN POTASSIUM 50 MG/1
100 TABLET ORAL DAILY
Status: DISCONTINUED | OUTPATIENT
Start: 2022-11-23 | End: 2022-11-23 | Stop reason: HOSPADM

## 2022-11-22 RX ORDER — ROCURONIUM BROMIDE 10 MG/ML
INJECTION, SOLUTION INTRAVENOUS AS NEEDED
Status: DISCONTINUED | OUTPATIENT
Start: 2022-11-22 | End: 2022-11-22 | Stop reason: HOSPADM

## 2022-11-22 RX ADMIN — LIDOCAINE HYDROCHLORIDE 50 MG: 20 INJECTION, SOLUTION EPIDURAL; INFILTRATION; INTRACAUDAL; PERINEURAL at 15:40

## 2022-11-22 RX ADMIN — MEPERIDINE HYDROCHLORIDE 25 MG: 50 INJECTION INTRAMUSCULAR; INTRAVENOUS; SUBCUTANEOUS at 17:45

## 2022-11-22 RX ADMIN — FENTANYL CITRATE 100 MCG: 50 INJECTION INTRAMUSCULAR; INTRAVENOUS at 15:38

## 2022-11-22 RX ADMIN — SODIUM CHLORIDE, POTASSIUM CHLORIDE, SODIUM LACTATE AND CALCIUM CHLORIDE 75 ML/HR: 600; 310; 30; 20 INJECTION, SOLUTION INTRAVENOUS at 14:42

## 2022-11-22 RX ADMIN — MIDAZOLAM 2 MG: 1 INJECTION INTRAMUSCULAR; INTRAVENOUS at 15:26

## 2022-11-22 RX ADMIN — DEXAMETHASONE SODIUM PHOSPHATE 4 MG: 4 INJECTION, SOLUTION INTRAMUSCULAR; INTRAVENOUS at 16:14

## 2022-11-22 RX ADMIN — EPHEDRINE SULFATE 10 MG: 50 INJECTION INTRAVENOUS at 16:05

## 2022-11-22 RX ADMIN — CYCLOBENZAPRINE 10 MG: 10 TABLET, FILM COATED ORAL at 23:41

## 2022-11-22 RX ADMIN — PROPOFOL 150 MG: 10 INJECTION, EMULSION INTRAVENOUS at 15:40

## 2022-11-22 RX ADMIN — ROCURONIUM BROMIDE 40 MG: 10 SOLUTION INTRAVENOUS at 15:41

## 2022-11-22 RX ADMIN — PROPOFOL 30 MG: 10 INJECTION, EMULSION INTRAVENOUS at 15:26

## 2022-11-22 RX ADMIN — SODIUM CHLORIDE, POTASSIUM CHLORIDE, SODIUM LACTATE AND CALCIUM CHLORIDE: 600; 310; 30; 20 INJECTION, SOLUTION INTRAVENOUS at 15:01

## 2022-11-22 RX ADMIN — POTASSIUM CHLORIDE, DEXTROSE MONOHYDRATE AND SODIUM CHLORIDE 125 ML/HR: 150; 5; 450 INJECTION, SOLUTION INTRAVENOUS at 19:16

## 2022-11-22 RX ADMIN — ROCURONIUM BROMIDE 20 MG: 10 SOLUTION INTRAVENOUS at 16:34

## 2022-11-22 RX ADMIN — ONDANSETRON HYDROCHLORIDE 4 MG: 2 INJECTION, SOLUTION INTRAMUSCULAR; INTRAVENOUS at 17:38

## 2022-11-22 RX ADMIN — CEFAZOLIN 2 G: 330 INJECTION, POWDER, FOR SOLUTION INTRAMUSCULAR; INTRAVENOUS at 15:54

## 2022-11-22 RX ADMIN — ROCURONIUM BROMIDE 10 MG: 10 SOLUTION INTRAVENOUS at 16:14

## 2022-11-22 RX ADMIN — OXYCODONE AND ACETAMINOPHEN 1 TABLET: 5; 325 TABLET ORAL at 19:47

## 2022-11-22 RX ADMIN — SUGAMMADEX 150 MG: 100 INJECTION, SOLUTION INTRAVENOUS at 17:45

## 2022-11-22 RX ADMIN — KETOROLAC TROMETHAMINE 30 MG: 30 INJECTION, SOLUTION INTRAMUSCULAR; INTRAVENOUS at 17:49

## 2022-11-22 RX ADMIN — SODIUM CHLORIDE, POTASSIUM CHLORIDE, SODIUM LACTATE AND CALCIUM CHLORIDE: 600; 310; 30; 20 INJECTION, SOLUTION INTRAVENOUS at 17:36

## 2022-11-22 NOTE — ANESTHESIA PREPROCEDURE EVALUATION
Relevant Problems   ENDOCRINE   (+) Psoriatic arthritis (HCC)      PERSONAL HX & FAMILY HX OF CANCER   (+) Malignant neoplasm of colon (HCC)   (+) Rectal cancer (HCC)       Anesthetic History   No history of anesthetic complications            Review of Systems / Medical History  Patient summary reviewed, nursing notes reviewed and pertinent labs reviewed    Pulmonary  Within defined limits                 Neuro/Psych   Within defined limits           Cardiovascular    Hypertension              Exercise tolerance: >4 METS     GI/Hepatic/Renal     GERD          Comments: H/O Malignant neoplasm of colon ([address]  Small bowel obstruction (HCC)   Endo/Other        Arthritis     Other Findings   Comments: Hx rectal cancer and colectomy           Physical Exam    Airway  Mallampati: II  TM Distance: 4 - 6 cm  Neck ROM: normal range of motion   Mouth opening: Normal     Cardiovascular    Rhythm: regular  Rate: normal         Dental    Dentition: Caps/crowns     Pulmonary  Breath sounds clear to auscultation               Abdominal  GI exam deferred       Other Findings            Anesthetic Plan    ASA: 3  Anesthesia type: general          Induction: Intravenous  Anesthetic plan and risks discussed with: Patient

## 2022-11-22 NOTE — ANESTHESIA POSTPROCEDURE EVALUATION
Procedure(s):  ROBOTIC EXTENDED TOTALLY EXTRAPERITONEAL INCISIONAL HERNIA REPAIR WITH MESH. general    Anesthesia Post Evaluation      Multimodal analgesia: multimodal analgesia used between 6 hours prior to anesthesia start to PACU discharge  Patient location during evaluation: PACU  Patient participation: complete - patient participated  Level of consciousness: awake and alert  Pain management: adequate  Airway patency: patent  Anesthetic complications: no  Cardiovascular status: acceptable  Respiratory status: acceptable  Hydration status: acceptable  Comments: Seen, no complaints   Post anesthesia nausea and vomiting:  none  Final Post Anesthesia Temperature Assessment:  Normothermia (36.0-37.5 degrees C)      INITIAL Post-op Vital signs:   Vitals Value Taken Time   /75 11/22/22 1830   Temp 36.8 °C (98.3 °F) 11/22/22 1815   Pulse 64 11/22/22 1832   Resp 16 11/22/22 1832   SpO2 98 % 11/22/22 1832   Vitals shown include unvalidated device data.

## 2022-11-22 NOTE — INTERVAL H&P NOTE
Update History & Physical    The Patient's History and Physical of 11/1/22 was reviewed with the patient and I examined the patient. There was no change. The surgical site was confirmed by the patient and me. Plan:  The risk, benefits, expected outcome, and alternative to the recommended procedure have been discussed with the patient. Patient understands and wants to proceed with the procedure.     Electronically signed by Joe Sosa MD on 11/22/2022 at 3:01 PM

## 2022-11-22 NOTE — BRIEF OP NOTE
Brief Postoperative Note    Patient: Patricia Thomas  YOB: 1970  MRN: [id number]    Date of Procedure: 11/22/2022     Pre-Op Diagnosis: INCISIONAL HERNIA    Post-Op Diagnosis: Same as preoperative diagnosis. Procedure(s):  ROBOTIC EXTENDED TOTALLY EXTRAPERITONEAL INCISIONAL HERNIA REPAIR WITH MESH    Surgeon(s):  Fanta Serrano MD    Surgical Assistant: Surg Asst-1: Tre Ponce    Anesthesia: General     Estimated Blood Loss (mL): less than 50     Complications: None    Specimens: * No specimens in log *     Implants:   Implant Name Type Inv. Item Serial No.  Lot No. LRB No. Used Action   MESH SINDY [id number] INGUINAL POLYPR SQ L PORE MFIL SF - SN/A  MESH SINDY [id number] INGUINAL POLYPR SQ L PORE MFIL SF N/A BARD DAVOL_WD [id number] N/A 1 Implanted       Drains:   [REMOVED] Gus-Tran Drain 09/24/19 Left; Lower Abdomen (Removed)       [REMOVED] Nasogastric Tube 04/18/21 (Removed)       [REMOVED] Ureteral Catheter 5 (Removed)       [REMOVED] Ureteral Catheter 4 (Removed)       Findings: large supraumbilical reducible incisional hernia with upper midline diastasis. Closure and plication with 0 V loc. Soft mesh 15 X 19cm placed.     Electronically Signed by Shyla Miranda MD on 11/22/2022 at 5:43 PM

## 2022-11-22 NOTE — ROUTINE PROCESS
Patient: Filiberto Johnson MRN: [id number]  SSN: [id number]   YOB: 1970  Age: 47 y.o. Sex: male     Patient is status post Procedure(s):  ROBOTIC EXTENDED TOTALLY EXTRAPERITONEAL INCISIONAL HERNIA REPAIR WITH MESH. Surgeon(s) and Role:     * Ele Chin MD - Primary    Local/Dose/Irrigation:  see [place]                  Peripheral IV 11/22/22 Anterior; Left Forearm (Active)            Airway - Endotracheal Tube 11/22/22 Oral (Active)                   Dressing/Packing:  Incision 11/22/22 Abdomen-Dressing/Treatment: Skin glue (11/22/22 2265)    Splint/Cast:  ]    Other:

## 2022-11-23 VITALS
SYSTOLIC BLOOD PRESSURE: 144 MMHG | TEMPERATURE: 99.1 F | HEIGHT: 67 IN | RESPIRATION RATE: 16 BRPM | DIASTOLIC BLOOD PRESSURE: 81 MMHG | OXYGEN SATURATION: 95 % | HEART RATE: 76 BPM | WEIGHT: 148 LBS | BODY MASS INDEX: 23.23 KG/M2

## 2022-11-23 LAB
ANION GAP SERPL CALC-SCNC: 5 MMOL/L (ref 5–15)
BASOPHILS # BLD: 0 K/UL (ref 0–0.1)
BASOPHILS NFR BLD: 0 % (ref 0–1)
BUN SERPL-MCNC: 9 MG/DL (ref 6–20)
BUN/CREAT SERPL: 9 (ref 12–20)
CALCIUM SERPL-MCNC: 8.6 MG/DL (ref 8.5–10.1)
CHLORIDE SERPL-SCNC: 108 MMOL/L (ref 97–108)
CO2 SERPL-SCNC: 27 MMOL/L (ref 21–32)
CREAT SERPL-MCNC: 0.95 MG/DL (ref 0.7–1.3)
DIFFERENTIAL METHOD BLD: ABNORMAL
EOSINOPHIL # BLD: 0 K/UL (ref 0–0.4)
EOSINOPHIL NFR BLD: 0 % (ref 0–7)
ERYTHROCYTE [DISTWIDTH] IN BLOOD BY AUTOMATED COUNT: 12.5 % (ref 11.5–14.5)
GLUCOSE SERPL-MCNC: 106 MG/DL (ref 65–100)
HCT VFR BLD AUTO: 37.2 % (ref 36.6–50.3)
HGB BLD-MCNC: 12.6 G/DL (ref 12.1–17)
IMM GRANULOCYTES # BLD AUTO: 0 K/UL (ref 0–0.04)
IMM GRANULOCYTES NFR BLD AUTO: 0 % (ref 0–0.5)
LYMPHOCYTES # BLD: 0.4 K/UL (ref 0.8–3.5)
LYMPHOCYTES NFR BLD: 6 % (ref 12–49)
MCH RBC QN AUTO: 32.9 PG (ref 26–34)
MCHC RBC AUTO-ENTMCNC: 33.9 G/DL (ref 30–36.5)
MCV RBC AUTO: 97.1 FL (ref 80–99)
MONOCYTES # BLD: 0.6 K/UL (ref 0–1)
MONOCYTES NFR BLD: 9 % (ref 5–13)
NEUTS SEG # BLD: 6.1 K/UL (ref 1.8–8)
NEUTS SEG NFR BLD: 85 % (ref 32–75)
NRBC # BLD: 0 K/UL (ref 0–0.01)
NRBC BLD-RTO: 0 PER 100 WBC
PLATELET # BLD AUTO: 158 K/UL (ref 150–400)
PMV BLD AUTO: 10.1 FL (ref 8.9–12.9)
POTASSIUM SERPL-SCNC: 4.7 MMOL/L (ref 3.5–5.1)
RBC # BLD AUTO: 3.83 M/UL (ref 4.1–5.7)
RBC MORPH BLD: ABNORMAL
SODIUM SERPL-SCNC: 140 MMOL/L (ref 136–145)
WBC # BLD AUTO: 7.1 K/UL (ref 4.1–11.1)

## 2022-11-23 PROCEDURE — 36415 COLL VENOUS BLD VENIPUNCTURE: CPT

## 2022-11-23 PROCEDURE — 74011000250 HC RX REV CODE- 250: Performed by: SURGERY

## 2022-11-23 PROCEDURE — 85025 COMPLETE CBC W/AUTO DIFF WBC: CPT

## 2022-11-23 PROCEDURE — S2900 ROBOTIC SURGICAL SYSTEM: HCPCS | Performed by: SURGERY

## 2022-11-23 PROCEDURE — 49654 PR LAP, INCISIONAL HERNIA REPAIR,REDUCIBLE: CPT | Performed by: SURGERY

## 2022-11-23 PROCEDURE — 74011250636 HC RX REV CODE- 250/636: Performed by: SURGERY

## 2022-11-23 PROCEDURE — 15734 MUSCLE-SKIN GRAFT TRUNK: CPT | Performed by: SURGERY

## 2022-11-23 PROCEDURE — 80048 BASIC METABOLIC PNL TOTAL CA: CPT

## 2022-11-23 PROCEDURE — 99024 POSTOP FOLLOW-UP VISIT: CPT | Performed by: SURGERY

## 2022-11-23 PROCEDURE — 74011250637 HC RX REV CODE- 250/637: Performed by: SURGERY

## 2022-11-23 RX ORDER — CYCLOBENZAPRINE HCL 10 MG
10 TABLET ORAL
Qty: 20 TABLET | Refills: 0 | Status: SHIPPED | OUTPATIENT
Start: 2022-11-23

## 2022-11-23 RX ORDER — POLYETHYLENE GLYCOL 3350 17 G/17G
17 POWDER, FOR SOLUTION ORAL DAILY
Qty: 14 PACKET | Refills: 1 | Status: SHIPPED | OUTPATIENT
Start: 2022-11-23

## 2022-11-23 RX ORDER — ACETAMINOPHEN 500 MG
500 TABLET ORAL
Qty: 40 TABLET | Refills: 1 | Status: SHIPPED | OUTPATIENT
Start: 2022-11-23

## 2022-11-23 RX ORDER — HYDROMORPHONE HYDROCHLORIDE 2 MG/1
2 TABLET ORAL
Qty: 18 TABLET | Refills: 0 | Status: SHIPPED | OUTPATIENT
Start: 2022-11-23 | End: 2022-11-28

## 2022-11-23 RX ORDER — IBUPROFEN 200 MG
600 TABLET ORAL
Qty: 60 TABLET | Refills: 0 | Status: SHIPPED | OUTPATIENT
Start: 2022-11-23

## 2022-11-23 RX ADMIN — POTASSIUM CHLORIDE, DEXTROSE MONOHYDRATE AND SODIUM CHLORIDE 125 ML/HR: 150; 5; 450 INJECTION, SOLUTION INTRAVENOUS at 02:41

## 2022-11-23 RX ADMIN — LOSARTAN POTASSIUM 100 MG: 50 TABLET, FILM COATED ORAL at 11:07

## 2022-11-23 RX ADMIN — SODIUM CHLORIDE, PRESERVATIVE FREE 10 ML: 5 INJECTION INTRAVENOUS at 05:18

## 2022-11-23 RX ADMIN — SODIUM CHLORIDE, PRESERVATIVE FREE 10 ML: 5 INJECTION INTRAVENOUS at 00:00

## 2022-11-23 RX ADMIN — CYCLOBENZAPRINE 10 MG: 10 TABLET, FILM COATED ORAL at 05:22

## 2022-11-23 RX ADMIN — ACETAMINOPHEN 1000 MG: 500 TABLET ORAL at 00:00

## 2022-11-23 RX ADMIN — ACETAMINOPHEN 1000 MG: 500 TABLET ORAL at 05:22

## 2022-11-23 NOTE — OP NOTES
OPERATIVE NOTE    Date of Procedure: 11/22/2022     Preoperative Diagnosis: INCISIONAL HERNIA  Postoperative Diagnosis: INCISIONAL HERNIA      Procedure: Procedure(s):  ROBOTIC EXTENDED TOTALLY EXTRAPERITONEAL INCISIONAL HERNIA REPAIR WITH MESH    Surgeon: Daniel Batres MD    Surgical Staff: Circ-1: Dore Merlin  Scrub Tech-1: Phyllis Ayala Tech-Relief: Anne Dalal  Surg Asst-1: Neelam Maldonado    Anesthesia: General   Indications: 59-year-old male presents with reducible incisional hernia from prior proctocolectomy. Here for robotic repair. Findings: Moderate size defect with Swiss cheese fascia cephalad to umbilicus, small rectus diastases in the cephalad portion of the abdomen    Media Information      Description of Operation: Kaz Olvera was identified in the pre-operative holding area. Informed consent was obtained after a complete discussion of risks, benefits and alternatives to surgery were had with the patient. The patient was brought back to the operating room and placed under general endotracheal anesthesia in the supine position on the operating room table. The left arm was tucked. The patient was then prepped and draped in the usual sterile fashion. A timeout was performed. Preoperatively, the patient was marked for her lateral border of her left rectus. We then marked the left costal margin. We attempted to enter the left posterior rectus space using a 5 mm Optiview trocar. Unfortunately, I passed through the posterior sheath and into the abdomen. I then aborted this approach and went about 7 cm caudad along the same semilunar line and entered the posterior rectus space with the 5 mm trocar. We insufflated this pocket. We then used the laparoscopic camera to bluntly developed the space. I then placed the 8 mm trocar under direct visualization in the left upper posterior rectus space. I then upsized the entry 5 mm trocar to an 8 mm trocar.   I had to place the 5 mm trocar in the right upper abdomen to decompress the abdomen to allow for insufflation of our posterior rectus space. We then used a laparoscopic ligasure to further develop the space towards the feet and placed a  third 8 mm robotic trocar near the arcuate line. We then docked the robot. Using monopolar scissors I released all further attachments between the left posterior rectus and the left posterior rectus sheath. We then used monopolar scissors to incise the posterior rectus sheath 1 cm medial to the linea alba. The previous dissection in the left retrorectus space along with the release of the left posterior sheath provided a complete myofascial release of the left rectus abdominis off of the left posterior rectus sheath allowing for medialization of the left rectus abdominis/anterior rectus sheath bundle separate from the left posterior rectus sheath. I entered the preperitoneal space and using combination of electrocautery and blunt dissection I worked cephalad to the hernia defect over towards the right posterior rectus sheath. I then used the monopolar scissors to incise and cross over into the right posterior sheath and entered the right retrorectus space. I then worked caudad taking down the posterior rectus sheath towards the hernia defect. In doing this I performed a myofascial release of the right rectus muscle and anterior rectus sheath from the underlying posterior rectus sheath fascia. This allowed for medialization of the posterior rectus sheath and separate rectus muscle/anterior rectus sheath component for future closure. I then used the monopolar scissors to fully incise the left and right posterior rectus sheaths down towards the arcuate line. We encountered the hernia defect during this dissection. The hernia was not incarcerated. The patient was very thin and the hernia directly involved the skin. The hernia sac was very thin and friable.   I carefully worked from the cephalad and caudad direction to connect the dots. The defect was about 6 or 7 cm in cephalocaudal direction and about 4 to 5 cm left to right. I very carefully teased away the peritoneal lining using blunt dissection, energy, and scissors. This tore in multiple places but is able to maintain a fair amount of intact peritoneum. We further released the posterior rectus sheath on the right side and dissected all the way out to the right semilunar line. We encountered the previous colostomy site. I carefully released scarred down rectus abdominis musculature from the peritoneum. There were a few small holes created. We were able to free up this posterior rectus space. I ensured hemostasis. I dissected all the way down past the umbilicus by roughly 5 cm distal to the hernia defect. At this point there was good approximation of the rectus at the midline. There was a slight upper midline rectus diastases of about 2 cm. We then inspected the bowel and made sure there was no obvious injury or hemorrhage. We then used 3-0 v loc suture absorbable to close the small defects at the old colostomy site closure and then to close the posterior rectus sheath / peritoneal lining at the hernia site for our retrorectus repair. I then used three 0 V-Loc absorbable sutures to close the midline hernia defect and plicate the 2 cm rectus diastases. Of note, at the hernia site the skin was very thin. I took a few small bites incorporating the skin to plicated down to the fascia. The sutures were passed back on themselves to ensure they were properly secured. All needles were removed. During this closure we encountered bleeding from likely the right posterior epigastric vessel. I placed a 3-0 Vicryl suture cephalad and caudad to the active bleeding and tied these down to suture ligate the bleeding vessel. This achieved excellent hemostasis. I then measured the hernia defect and dissection space and we cut the mesh to 19 x 15 cm. The mesh was then introduced into the abdomen. It was laid in the retrorectus space. This had appropriate coverage of the hernia defects and also coverage of our trocar sites. We ensured hemostasis. We then undocked the robot. We injected additional local in to the trocar sites. The dermis was closed with 4-0 Monocryl followed by Dermabond for the skin. We applied a binder. At the end of the procedure all instrument, needle, and sponge counts were correct. I was present and scrubbed throughout the entirety of the case. The patient awoke from anesthesia and was extubated without complication and sent to PACU in stable condition. Of note, this procedure required 2 separate, complex myofascial releases and myofascial advancement flaps. I performed bilateral release of the left and right rectus abdominal muscles off of the right and left posterior rectus sheath which allowed for medialization of the rectus musculature/anterior rectus sheath separate from the posterior rectus sheath for closure for the hernia defect and posterior rectus sheath to allow for mesh coverage. Estimated Blood Loss: 20 mL    Specimens: * No specimens in log *     Complications: None    Implants:   Implant Name Type Inv.  Item Serial No.  Lot No. LRB No. Used Action   MESH SINDY [id number] INGUINAL POLYPR SQ L PORE MFIL SF - SN/A  MESH SINDY [id number] INGUINAL POLYPR SQ L PORE MFIL SF N/A BARD DAVOL_WD [id number] N/A 1 Implanted         Melodie Marshall MD  Bariatric and General Surgeon  [facility]  11/23/2022

## 2022-11-23 NOTE — PROGRESS NOTES
Physical Therapy 11/23/2022    PT order acknowledged, chart reviewed, pt up ad gray ambulating - dressed - ready for discharge - no skilled PT needs.       Iveth Ruiz, PT

## 2022-11-23 NOTE — DISCHARGE INSTRUCTIONS
Discharge Instructions for General Surgery Patients     Wear binder as needed for comfort    Do not lift any objects weighing more than 15 pounds for 2 weeks. Do not do any housework including vacuuming, scrubbing or yardwork for 4 weeks. Do not drive or operate machinery while taking sedating or narcotic medications. Post operative pain is expected. Try to wean off narcotics as soon as able and take tylenol or NSAIDS. Do not take tylenol with Norco or Percocet as this may harm your liver. No NSAIDS if you are bariatric surgery patient. You may walk as desired and go up and down stairs as needed. Walking is encouraged. You may shower the day after surgery. Do not take tub baths, swim or use hot tubs for 2 weeks. Pat dry wounds after with a towel. Leave glue on wounds. It will fall off with time. Do not scrub around incisions. If redness develops around the glue okay to peel off in hot shower. Regular diet. Take Miralax once or twice a day as needed for constipation. Follow up with provider as scheduled. If you experience fever (greater than 101.5), chills, vomiting or redness or drainage at surgical site, please contact your surgeons office. If you have further questions or concerns, please call your surgeons office at [phone].

## 2022-11-23 NOTE — PERIOP NOTES
TRANSFER - OUT REPORT:    Verbal report given to Joel(name) on Vipul Ship  being transferred to [facility](unit) for routine post - op       Report consisted of patients Situation, Background, Assessment and   Recommendations(SBAR). Time Pre op antibiotic given:1554  Anesthesia Stop time: 4024    Information from the following report(s) SBAR, OR Summary, Intake/Output, MAR, and Cardiac Rhythm SR.  was reviewed with the receiving nurse. Opportunity for questions and clarification was provided. Is the patient on 02? NO    Is the patient on a monitor? NO    Is the nurse transporting with the patient? NO    Surgical Waiting Area notified of patient's transfer from PACU?  YES      The following personal items collected during your admission accompanied patient upon transfer:   Dental Appliance: Dental Appliances: None  Vision:    Hearing Aid:    Jewelry:    Clothing: Clothing:  (belonging bag sent to [employer])  Other Valuables:    Valuables sent to safe:

## 2022-11-23 NOTE — PROGRESS NOTES
Attempted to deliver and verbally explain the Makenzie Beath with patient. Patient was resting.  Roddy General, Care Management Assistant

## 2022-11-23 NOTE — DISCHARGE SUMMARY
Admit date: 11/22/2022   Admitting Provider: Tito Sandoval MD    Discharge date: 11/23/2022  Discharging Provider: Tito Sandoval MD      * Admission Diagnoses: Incisional hernia [K43.2]    * Discharge Diagnoses:    Hospital Problems as of 11/23/2022 Date Reviewed: 11/9/2022            Codes Class Noted - Resolved POA    Incisional hernia ICD-10-CM: K43.2  ICD-9-CM: 553.21  11/22/2022 - Present Unknown           * Hospital Course: 58-year-old male presents for reducible incisional hernia. Underwent robotic repair with preperitoneal mesh. Postoperatively his pain was controlled and he was discharged home postop day 1.    * Procedures:   Procedure(s):  ROBOTIC EXTENDED TOTALLY EXTRAPERITONEAL INCISIONAL HERNIA REPAIR WITH MESH      Discharge Exam:  Visit Vitals  /77   Pulse 67   Temp 98.8 °F (37.1 °C)   Resp 17   Ht 5' 7\" (1.702 m)   Wt 148 lb (67.1 kg)   SpO2 94%   BMI 23.18 kg/m²     General: No acute distress, conversant  Eyes: PERRLA, no scleral icterus  HENT: Normocephalic without oral lesions  Neck: Trachea midline without LAD  Cardiac: Normal pulse rate and rhythm  Pulmonary: Symmetric chest rise with normal effort  GI: Soft, ATTP, binder in place  Skin: Warm without rash  Extremities: No edema or joint stiffness  Psych: Appropriate mood and affect      * Discharge Condition: good  * Disposition: Home    Discharge Medications:  Current Discharge Medication List        START taking these medications    Details   HYDROmorphone (DILAUDID) 2 mg tablet Take 1 Tablet by mouth every six (6) hours as needed for Pain for up to 5 days. Max Daily Amount: 8 mg. Okay to take 2 tablets if needed for breakthrough pain. Qty: 18 Tablet, Refills: 0  Start date: 11/23/2022, End date: 11/28/2022    Associated Diagnoses: Incisional hernia, without obstruction or gangrene      cyclobenzaprine (FLEXERIL) 10 mg tablet Take 1 Tablet by mouth three (3) times daily as needed for Muscle Spasm(s).   Qty: 20 Tablet, Refills: 0  Start date: 11/23/2022      polyethylene glycol (MIRALAX) 17 gram packet Take 1 Packet by mouth daily. May take 1-2 packets as needed daily for constipation. Qty: 14 Packet, Refills: 1  Start date: 11/23/2022      acetaminophen (Tylenol Extra Strength) 500 mg tablet Take 1 Tablet by mouth every six (6) hours as needed for Pain. Qty: 40 Tablet, Refills: 1  Start date: 11/23/2022      ibuprofen (MOTRIN) 200 mg tablet Take 3 Tablets by mouth every six (6) hours as needed for Pain (Can use with narcotics or alternate). Qty: 60 Tablet, Refills: 0  Start date: 11/23/2022           CONTINUE these medications which have NOT CHANGED    Details   ergocalciferol, vitamin D2, (VITAMIN D2 PO) Take  by mouth.      losartan (COZAAR) 100 mg tablet Take 1 Tablet by mouth in the morning. Qty: 90 Tablet, Refills: 1      clobetasoL (TEMOVATE) 0.05 % ointment Apply 2 mg to affected area two (2) times a day. Not for use on face, groin, or under arms  Qty: 60 g, Refills: 1    Associated Diagnoses: Plaque psoriasis      fluocinoNIDE (LIDEX) 0.05 % ointment Apply  to affected area two (2) times a day. Qty: 60 g, Refills: 1    Associated Diagnoses: Plaque psoriasis      apremilast (Otezla) 30 mg tab TAKE 1 TABLET BY MOUTH 2 TIMES A DAY. Qty: 60 Tablet, Refills: 10    Associated Diagnoses: Ongoing use of possibly toxic medication; Psoriasis      tadalafiL (CIALIS) 5 mg tablet Take 5 mg by mouth daily. triamcinolone acetonide (KENALOG) 0.1 % ointment Apply  to affected area two (2) times daily as needed for Skin Irritation. use thin layer             * Follow-up Care/Patient Instructions:   Activity: Activity as tolerated  Diet: Regular Diet  Wound Care: Keep wound clean and dry     Follow-up Information       Follow up With Specialties Details Why [address], Ryder Ferrari MD Family Medicine, Sports Medicine Physician   [address] [phone]              Signed:  Melodie Marshall MD  11/23/2022  8:28 AM

## 2022-11-29 NOTE — PROGRESS NOTES
Outpatient Infusion Center Short Visit Progress Note    7214 Pt admit to [facility] for 5FU pump removal ambulatory in stable condition. Assessment completed. No new concerns voiced. Patient Vitals for the past 12 hrs:   Temp Pulse Resp BP SpO2   02/21/20 1421 97.6 °F (36.4 °C) 82 16 141/74 97 %       5FU pump displaying 0.0 mL left to infuse upon arrival to [facility]. 5FU pump disconnected per protocol. Port flushed with positive blood return, heparinized and de-accessed per protocol. Medications:  NS flushes  Heparin    1425 Pt tolerated treatment well. D/c home ambulatory in no distress. Pt aware of next appointment scheduled for 2/23/20. 14:45

## 2022-12-06 ENCOUNTER — OFFICE VISIT (OUTPATIENT)
Dept: SURGERY | Age: 52
End: 2022-12-06
Payer: MEDICAID

## 2022-12-06 VITALS
BODY MASS INDEX: 23.7 KG/M2 | DIASTOLIC BLOOD PRESSURE: 70 MMHG | HEART RATE: 73 BPM | HEIGHT: 67 IN | TEMPERATURE: 98.1 F | RESPIRATION RATE: 15 BRPM | SYSTOLIC BLOOD PRESSURE: 118 MMHG | OXYGEN SATURATION: 98 % | WEIGHT: 151 LBS

## 2022-12-06 DIAGNOSIS — Z09 POSTOPERATIVE EXAMINATION: Primary | ICD-10-CM

## 2022-12-06 PROCEDURE — 99024 POSTOP FOLLOW-UP VISIT: CPT | Performed by: SURGERY

## 2022-12-06 NOTE — PROGRESS NOTES
Identified pt with two pt identifiers (name and ). Reviewed chart in preparation for visit and have obtained necessary documentation. Deborah Truong is a 47 y.o. male  Chief Complaint   Patient presents with    Post OP Follow Up     2 week s/p robotic extended totally extraperitoneal incisional hernia. Visit Vitals  /70 (BP 1 Location: Left upper arm, BP Patient Position: Sitting, BP Cuff Size: Adult)   Pulse 73   Temp 98.1 °F (36.7 °C) (Oral)   Resp 15   Ht 5' 7\" (1.702 m)   Wt 151 lb (68.5 kg)   SpO2 98%   BMI 23.65 kg/m²       1. Have you been to the ER, urgent care clinic since your last visit? Hospitalized since your last visit? No    2. Have you seen or consulted any other health care providers outside of the [address] [place] [address] since your last visit? Include any pap smears or colon screening.  No

## 2022-12-06 NOTE — PROGRESS NOTES
Surgery Progress Note    12/6/2022    CC: Post op    Subjective:     Patient doing well postoperatively. Some twinges here and there. Feels good overall    Constitutional: No fever or chills  Neurologic: No headache  Eyes: No scleral icterus or irritated eyes  Nose: No nasal pain or drainage  Mouth: No oral lesions or sore throat  Cardiac: No palpations or chest pain  Pulmonary: No cough or shortness of breath  Gastrointestinal: No nausea, emesis, diarrhea, or constipation  Genitourinary: No dysuria  Musculoskeletal: Mild abdominal wall tenderness  Skin: No rashes or lesions  Psychiatric: No anxiety or depressed mood    Objective:   Visit Vitals  /70 (BP 1 Location: Left upper arm, BP Patient Position: Sitting, BP Cuff Size: Adult)   Pulse 73   Temp 98.1 °F (36.7 °C) (Oral)   Resp 15   Ht 5' 7\" (1.702 m)   Wt 151 lb (68.5 kg)   SpO2 98%   BMI 23.65 kg/m²       General: No acute distress, conversant  Eyes: PERRLA, no scleral icterus  HENT: Normocephalic without oral lesions  Neck: Trachea midline without LAD  Cardiac: Normal pulse rate and rhythm  Pulmonary: Symmetric chest rise with normal effort  GI: Soft, no hernia, wounds healing nicely  Skin: Warm without rash  Extremities: No edema or joint stiffness  Psych: Appropriate mood and affect    Assessment:     14-year-old male doing well after robotic ETEP    Plan:     No more than 20 pounds lifting for the next 2 weeks. Can slowly resume activity. Follow-up as needed.       Mamta Paz MD  Bariatric and General Surgeon  [facility]

## 2023-01-05 RX ORDER — LOSARTAN POTASSIUM 100 MG/1
TABLET ORAL
Qty: 90 TABLET | Refills: 1 | Status: SHIPPED | OUTPATIENT
Start: 2023-01-05

## 2023-01-05 NOTE — TELEPHONE ENCOUNTER
Requested Prescriptions     Signed Prescriptions Disp Refills    losartan (COZAAR) 100 mg tablet 90 Tablet 1     Sig: TAKE 1 TABLET BY MOUTH EVERY DAY IN THE MORNING     Authorizing Provider: Sissy Rodriguez     Ordering User: Jennifer David     Verbal order per Dr. Uyen Kline.      Future Appointments   Date Time Provider Shannan Monreali   1/9/2023 10:20 AM MD DILLAN Kulkarni BS AMB   1/16/2023  9:45 AM Filippo Drummond MD [facility] MAIN BS AMB   4/19/2023 10:45 AM [facility] CT ER 2 [facility]   4/26/2023  8:45 AM Manoj Redmond  N Melissa  BS AMB   5/1/2023  9:40 AM Rose Reyes MD [facility] BS AMB

## 2023-01-09 ENCOUNTER — OFFICE VISIT (OUTPATIENT)
Dept: CARDIOLOGY CLINIC | Age: 53
End: 2023-01-09
Payer: MEDICAID

## 2023-01-09 VITALS
RESPIRATION RATE: 16 BRPM | HEIGHT: 67 IN | OXYGEN SATURATION: 98 % | HEART RATE: 77 BPM | WEIGHT: 152 LBS | SYSTOLIC BLOOD PRESSURE: 120 MMHG | DIASTOLIC BLOOD PRESSURE: 70 MMHG | BODY MASS INDEX: 23.86 KG/M2

## 2023-01-09 DIAGNOSIS — R94.31 ABNORMAL EKG: ICD-10-CM

## 2023-01-09 DIAGNOSIS — I10 BENIGN ESSENTIAL HYPERTENSION: ICD-10-CM

## 2023-01-09 DIAGNOSIS — C18.9 MALIGNANT NEOPLASM OF COLON, UNSPECIFIED PART OF COLON (HCC): ICD-10-CM

## 2023-01-09 DIAGNOSIS — R68.89 IMPAIRED EXERCISE TOLERANCE: Primary | ICD-10-CM

## 2023-01-09 NOTE — PROGRESS NOTES
1/9/2023   Won Andersen MD  [facility]    . Doretha Worley Nina Gallery Nina Gallery Gus Gottron is a 47 y.o. male   One year follow up   A little fullness noted when he eats [employer] or has something bloaty as a meal but has no other symptoms runs regularly or any difficulty had hernia surgery which all went well feels well no chest pain or shortness of breath. EKG is normal.  Reviewed previous EKG that showed early repull the last several EKGs have been normal his echo showed normal EF and his exercise stress test showed no problems with nuclear perfusion? ?    We will see back as needed went over and things are getting back to us doing well            shortness of breath and abnormal stress test   patient has hypertension and colon cancer. EKG stress test had shown baseline ST abnormalities with possible early repolarization. Was recommend the patient underwent a nuclear stress test and echocardiogram for the abnormal resting EKG and shortness of breath. Patient has follow-up with oncology for adenocarcinoma of the rectum and has been on Xeloda and FOLFOX  Echo 6/10/2021 EF 50-55% normal study  Nuclear EF 41% normal perfusion  Sees Dr. Monae Aparicio for psoriatic arthritis and Dr. Tyra Flores for rectal cancer  He generally feels well is riding his bike but feels like he cannot keep up with some of the other cohort that he had. He feels like this more than he would expect after chemotherapy he reports moderate shortness of breath. He seen his rheumatologist oncologist and surgeon regularly. He has no allergies. EKG sinus rhythm within normal limits, resolution of previous ST abnormalities    Discussion/Plans/Recs  1. Abnormal EKG- stress test normal ,STTs now normal  2.  Decrease exercise tolerance-testing normal, he feels he notes this on long bike rides, suspect this is post chemo , aging and mild deconditioning after being away from biking for 8 weeks, does not appear to be pathologic , pt remains perplexed by these changes    He is mainly concerned about his exercise tolerance as he bike rides. We went over the ejection fraction being on the 50 to 55%. We do not tend to think the nuclear EF is accurate when compared to echo but there is a discrepency. His EKG interestingly enough is changed. He has no chest pain edema or other symptoms. Recommend that he go back to his exercise and evaluate how he is doing we will tentatively plan him for stress echocardiogram 1 year past his previous testing     I explained him his EKG improved and could have been resolution of pericarditis or just changes in early repolarization. Ischemia seems unlikely. As terms of his exercise tolerance I think this would not be big surprise after being sedentary undergoing chemotherapy but there is no pathological finding we try to draw that distinction. There are no Patient Instructions on file for this visit. Future Appointments   Date Time Provider Shannan Rodriguez   1/16/2023  9:45 AM Fernando Mitchell MD [facility] MAIN BS AMB   4/19/2023 10:45 AM [facility] CT ER 2 [facility]CT [facility]   4/26/2023  8:45 AM Rl Short  [address] BS AMB   5/1/2023  9:40 AM Young Montemayor MD [facility] BS AMB     Patient Care Team:  Fernando Mitchell MD as PCP - General (Family Medicine)  Fernando Mitchell MD as PCP - [facility] Empaneled Provider  Yisel Mcleod MD (Colon and Rectal Surgery)  Young Montemayor MD (Rheumatology Internal Medicine)  Rl Short MD (Hematology and Oncology)  Desire Son MD as Consulting Provider (Cardiovascular Disease Physician)       Cardiac Studies/Hx:  06/10/21ECHO ADULT COMPLETE · LV: Estimated LVEF is 50 - 55%. Biplane method used to measure ejection fraction. Normal cavity size, wall thickness and diastolic function. Low normal systolic function. Wall motion: normal.  · TV: Right Ventricular Arterial Pressure (RVSP) is 18 mmHg. Pulmonary hypertension not suggested by Doppler findings.   · Normal study.    06/10/21NUCLEAR CARDIAC STRESS TEST · SPECT: Left ventricular function post-stress was abnormal. Calculated ejection fraction is 41%. There is no evidence of transient ischemic dilation (TID). The TID ratio is 1.03.  · Baseline ECG: Normal EKG. · SPECT: Left ventricular perfusion is probably normal. Myocardial perfusion imaging supports a low risk stress test.   .      Past Medical History:   Diagnosis Date    Adopted     COVID-19 vaccine series completed 04/09/2021    Pfizer dose #2 administered on 4/9/2021. GERD (gastroesophageal reflux disease)     Hypertension     Ill-defined condition     CHEMO-LAST ON 2/19/2020    Psoriasis     Psoriatic arthritis ([facility] [address]     Rectal cancer ([facility] [address] 2019    Moderately differentiated [id number] adenocarcinoma of the rectum. Treated with neoadjuvant chemoradiation, resection, and adjuvant chemotherapy. ROS-pertinents  negative except as above  The pertinent portions of the medical history,physician and nursing notes, meds,vitals , labs and Ins/Outs,are reviewed in the electronic record. Results for orders placed or performed during the hospital encounter of 03/02/20   EKG, 12 LEAD, INITIAL   Result Value Ref Range    Ventricular Rate 62 BPM    Atrial Rate 62 BPM    P-R Interval 178 ms    QRS Duration 96 ms    Q-T Interval 380 ms    QTC Calculation (Bezet) 385 ms    Calculated P Axis 18 degrees    Calculated R Axis 39 degrees    Calculated T Axis 25 degrees    Diagnosis       Normal sinus rhythm  Voltage criteria for left ventricular hypertrophy  Early repolarization    When compared with ECG of 10-SEP-2019 11:54,  No significant change was found  Confirmed by Jose Berman M.D., [place] ([id number]) on 3/3/2020 8:28:01 AM        06/10/21    ECHO ADULT COMPLETE 06/13/2021 6/13/2021    Interpretation Summary  · LV: Estimated LVEF is 50 - 55%. Biplane method used to measure ejection fraction. Normal cavity size, wall thickness and diastolic function. Low normal systolic function.  Chilango Moran motion: normal.  · TV: Right Ventricular Arterial Pressure (RVSP) is 18 mmHg. Pulmonary hypertension not suggested by Doppler findings. · Normal study. Signed by: Rajesh Brown MD on 6/13/2021  4:23 PM         Objective:    Physical Exam:   /70   Pulse 77   Resp 16   Ht 5' 7\" (1.702 m)   Wt 152 lb (68.9 kg)   SpO2 98%   BMI 23.81 kg/m²    General:  alert, cooperative, no distress, appears stated age   ENT, Neck:  no jvd   Chest Wall: inspection normal - no chest wall deformities or tenderness, respiratory effort normal   Lung: clear to auscultation bilaterally   Heart:  normal rate, regular rhythm, normal S1, S2, no murmurs, rubs, clicks or gallops   Abdomen: nondistended   Extremities: extremities normal, atraumatic, no cyanosis or edema     @IPVITALS(12:6,8,9,5,10)@   Lab Results   Component Value Date/Time    WBC 7.1 11/23/2022 02:47 AM    HGB 12.6 11/23/2022 02:47 AM    HCT 37.2 11/23/2022 02:47 AM    PLATELET 182 09/66/5205 02:47 AM    MCV 97.1 11/23/2022 02:47 AM     Lab Results   Component Value Date/Time    Sodium 140 11/23/2022 02:47 AM    Potassium 4.7 11/23/2022 02:47 AM    Chloride 108 11/23/2022 02:47 AM    CO2 27 11/23/2022 02:47 AM    Anion gap 5 11/23/2022 02:47 AM    Glucose 106 (H) 11/23/2022 02:47 AM    BUN 9 11/23/2022 02:47 AM    Creatinine 0.95 11/23/2022 02:47 AM    BUN/Creatinine ratio 9 (L) 11/23/2022 02:47 AM    GFR est  01/14/2022 12:00 AM    GFR est non-AA 98 01/14/2022 12:00 AM    Calcium 8.6 11/23/2022 02:47 AM     No results found for: CPK, RCK1, RCK2, RCK3, RCK4, CKMB, CKNDX, CKND1, TROPT, TROIQ, BNPP, BNP  No results found for: BNP, BNPP, BNPPPOC, XBNPT, BNPNT    reports that he has never smoked. He has never used smokeless tobacco. He reports current alcohol use of about 6.0 standard drinks per week. He reports that he does not use drugs. family history is not on file. He was adopted.    Last 24hr Input/Output:  @[facility]@     Data Review:   Lab Results Component Value Date/Time    WBC 7.1 11/23/2022 02:47 AM    HGB 12.6 11/23/2022 02:47 AM    HCT 37.2 11/23/2022 02:47 AM    PLATELET 985 98/15/2610 02:47 AM    MCV 97.1 11/23/2022 02:47 AM     Lab Results   Component Value Date/Time    Sodium 140 11/23/2022 02:47 AM    Potassium 4.7 11/23/2022 02:47 AM    Chloride 108 11/23/2022 02:47 AM    CO2 27 11/23/2022 02:47 AM    Anion gap 5 11/23/2022 02:47 AM    Glucose 106 (H) 11/23/2022 02:47 AM    BUN 9 11/23/2022 02:47 AM    Creatinine 0.95 11/23/2022 02:47 AM    BUN/Creatinine ratio 9 (L) 11/23/2022 02:47 AM    GFR est  01/14/2022 12:00 AM    GFR est non-AA 98 01/14/2022 12:00 AM    Calcium 8.6 11/23/2022 02:47 AM    Bilirubin, total 0.5 10/24/2022 10:47 AM    Alk. phosphatase 52 10/24/2022 10:47 AM    Protein, total 6.8 10/24/2022 10:47 AM    Albumin 4.4 10/24/2022 10:47 AM    Globulin 3.6 04/18/2021 02:36 AM    A-G Ratio 1.8 10/24/2022 10:47 AM    ALT (SGPT) 12 10/24/2022 10:47 AM    AST (SGOT) 15 10/24/2022 10:47 AM     No results found for: CPK, RCK1, RCK2, RCK3, RCK4, CKMB, CKNDX, CKND1, TROPT, TROIQ, BNPP, BNP  No results found for: BNP, BNPP, BNPPPOC, XBNPT, BNPNT    No results found for this or any previous visit (from the past 24 hour(s)).        Future Appointments   Date Time Provider Shannan Rodriguez   1/16/2023  9:45 AM Kailey Melvin MD [facility] MAIN BS AMB   4/19/2023 10:45 AM [facility] CT ER 2 HRCT [facility] H   4/26/2023  8:45 AM Domo Barrera  [address] BS AMB   5/1/2023  9:40 AM Loren Hendrickson MD [facility] BS Buddy Douglas MD 1/9/2023 this or any previous visit (from the past 24 hour(s)).        Future Appointments   Date Time Provider Shannan Monreali   1/16/2023  9:45 AM Sheryl Kirby MD [facility] MAIN BS AMB   4/19/2023 10:45 AM [facility] PSY[facility]IC [place] CT ER 2 HRCT [facility]   4/26/2023  8:45 AM Rogers Quezada  [address] BS AMB   5/1/2023  9:40 AM China Craven MD [facility] BS Telma Avina MD 1/9/2023

## 2023-01-09 NOTE — Clinical Note
1/29/2023    Patient: Keiry Abreu   YOB: 1970   Date of Visit: 1/9/2023     Wilder Newell MD  [address] [place] [id number]  [place]    Dear Wilder Newell MD,      Thank you for referring Mr. Rufino Martell to [address] [place] [address] for evaluation. My notes for this consultation are attached. If you have questions, please do not hesitate to call me. I look forward to following your patient along with you.       Sincerely,    Carlton Meneses MD

## 2023-01-12 ENCOUNTER — DOCUMENTATION ONLY (OUTPATIENT)
Dept: SURGERY | Age: 53
End: 2023-01-12

## 2023-01-12 ENCOUNTER — IMMUNIZATION (OUTPATIENT)
Dept: SURGERY | Age: 53
End: 2023-01-12

## 2023-01-12 NOTE — PROGRESS NOTES
Received documentation from [facility] patient received Influenza vaccine and COVID-19 Bivalent booster.

## 2023-01-16 ENCOUNTER — OFFICE VISIT (OUTPATIENT)
Dept: INTERNAL MEDICINE CLINIC | Age: 53
End: 2023-01-16
Payer: MEDICAID

## 2023-01-16 VITALS
DIASTOLIC BLOOD PRESSURE: 93 MMHG | OXYGEN SATURATION: 100 % | RESPIRATION RATE: 16 BRPM | SYSTOLIC BLOOD PRESSURE: 139 MMHG | BODY MASS INDEX: 23.86 KG/M2 | HEART RATE: 74 BPM | TEMPERATURE: 98.3 F | HEIGHT: 67 IN | WEIGHT: 152 LBS

## 2023-01-16 DIAGNOSIS — M62.529 ATROPHY OF MUSCLE OF UPPER ARM, UNSPECIFIED LATERALITY: ICD-10-CM

## 2023-01-16 DIAGNOSIS — Z00.00 VISIT FOR WELL MAN HEALTH CHECK: Primary | ICD-10-CM

## 2023-01-16 DIAGNOSIS — Z23 ENCOUNTER FOR IMMUNIZATION: ICD-10-CM

## 2023-01-16 DIAGNOSIS — I10 BENIGN ESSENTIAL HTN: ICD-10-CM

## 2023-01-16 PROCEDURE — 3080F DIAST BP >= 90 MM HG: CPT | Performed by: FAMILY MEDICINE

## 2023-01-16 PROCEDURE — 3075F SYST BP GE 130 - 139MM HG: CPT | Performed by: FAMILY MEDICINE

## 2023-01-16 PROCEDURE — 99214 OFFICE O/P EST MOD 30 MIN: CPT | Performed by: FAMILY MEDICINE

## 2023-01-16 NOTE — PROGRESS NOTES
Chief Complaint   Patient presents with    Complete Physical     Patient is here for a wellness visit. he is a 47y.o. year old male who presents for CPE. Complete Physical Exam Questions:    1. Do you follow a low fat diet? yes  2. Are you up to date on your Tdap (<10 years)? Yes  3. Have you ever had a Pneumovax vaccine (>65)? No   PCV13 No   PPSV23 No  4. Have you had Zoster vaccine (>60)? Yes  5. Have you had the HPV - Gardasil (13- 26)? No  6. Do you follow an exercise program?  no  7. Do you smoke?  no If > 65 and smoker, have you had a abdominal aortic aneurysm ultrasound screen? No  8. Do you consider yourself overweight?  no  9. Is there a family history of CAD< age 48? Unknown  10. Is there a family history of Cancer? Unknown  11. Do you know your Cancer risks? No  12. Have you had a colonoscopy? Yes  13. Have you been tested for HIV or other STI's? No HIV today(18-64 y/o)? No   14. Have you had an EKG in the last five years(>50)? Yes  15. Have you had a PSA test done this year (50-69)? No    Other complaints: none    Reviewed and agree with Nurse Note and duplicated in this note. Reviewed PmHx, RxHx, FmHx, SocHx, AllgHx and updated and dated in the chart. Family History   Adopted: Yes   Problem Relation Age of Onset    Asthma Neg Hx     Cancer Neg Hx     Diabetes Neg Hx     Heart Disease Neg Hx     Hypertension Neg Hx     Stroke Neg Hx        Past Medical History:   Diagnosis Date    Adopted     COVID-19 vaccine series completed 04/09/2021    Pfizer dose #2 administered on 4/9/2021. GERD (gastroesophageal reflux disease)     Hypertension     Ill-defined condition     CHEMO-LAST ON 2/19/2020    Psoriasis     Psoriatic arthritis ([address]     Rectal cancer ([facility] [address] 2019    Moderately differentiated [id number] adenocarcinoma of the rectum. Treated with neoadjuvant chemoradiation, resection, and adjuvant chemotherapy.       Social History     Socioeconomic History    Marital status:    Tobacco Use    Smoking status: Never    Smokeless tobacco: Never   Vaping Use    Vaping Use: Never used   Substance and Sexual Activity    Alcohol use: Yes     Alcohol/week: 6.0 standard drinks     Types: 6 Cans of beer per week     Comment: 6 BEERS WEEKLY    Drug use: No    Sexual activity: Not Currently        Review of Systems - negative except as listed above      Objective:     Vitals:    01/16/23 0958   BP: (!) 139/93   Pulse: 74   Resp: 16   Temp: 98.3 °F (36.8 °C)   SpO2: 100%   Weight: 152 lb (68.9 kg)   Height: 5' 7\" (1.702 m)       Physical Examination: General appearance - alert, well appearing, and in no distress  Eyes - pupils equal and reactive, extraocular eye movements intact  Ears - bilateral TM's and external ear canals normal  Nose - normal and patent, no erythema, discharge or polyps  Mouth - mucous membranes moist, pharynx normal without lesions  Neck - supple, no significant adenopathy  Chest - clear to auscultation, no wheezes, rales or rhonchi, symmetric air entry  Heart - normal rate, regular rhythm, normal S1, S2, no murmurs, rubs, clicks or gallops  Abdomen - soft, nontender, nondistended, no masses or organomegaly  Extremities - peripheral pulses normal, no pedal edema, no clubbing or cyanosis  Skin - normal coloration and turgor, no rashes, no suspicious skin lesions noted       Assessment/ Plan:   Diagnoses and all orders for this visit:    1. Visit for [facility] health check  -     CBC W/O DIFF; Future  -     LIPID PANEL; Future  -     PSA W/ REFLX FREE PSA; Future  -     METABOLIC PANEL, COMPREHENSIVE; Future    2. Benign essential HTN         Labs to be drawn: CBC, CMP, Lipid            I have discussed the diagnosis with the patient and the intended plan as seen in the above orders. The patient has received an after-visit summary and questions were answered concerning future plans.        Medication Side Effects and Warnings were discussed with patient,  Patient Labs were reviewed and or requested, and  Patient Past Records were reviewed and or requested  yes     Chief Complaint   Patient presents with    Complete Physical     Patient is here for a wellness visit. Pt who presents for follow up of a pre-existing problem of hypertension. Diet and Lifestyle: generally follows a low fat low cholesterol diet  Home BP Monitoring: is not measured at home  Use of agents associated with hypertension: none. Cardiovascular ROS: taking medications as instructed, no medication side effects noted, no TIA's, no chest pain on exertion, no dyspnea on exertion, no swelling of ankles. New concerns: Patient also states that he has noticed that he has decreased muscle mass over the last several years. Most notably after he developed cancer of the colon and he believes this may have something to do with all the radiation that he had done. Does not work out currently. .     Reviewed and agree with Nurse Note and duplicated in this note. Reviewed PmHx, RxHx, FmHx, SocHx, AllgHx and updated and dated in the chart. Family History   Adopted: Yes   Problem Relation Age of Onset    Asthma Neg Hx     Cancer Neg Hx     Diabetes Neg Hx     Heart Disease Neg Hx     Hypertension Neg Hx     Stroke Neg Hx        Past Medical History:   Diagnosis Date    Adopted     COVID-19 vaccine series completed 04/09/2021    Pfizer dose #2 administered on 4/9/2021. GERD (gastroesophageal reflux disease)     Hypertension     Ill-defined condition     CHEMO-LAST ON 2/19/2020    Psoriasis     Psoriatic arthritis ([facility] [address]     Rectal cancer ([facility] [address] 2019    Moderately differentiated [id number] adenocarcinoma of the rectum. Treated with neoadjuvant chemoradiation, resection, and adjuvant chemotherapy.       Social History     Socioeconomic History    Marital status:    Tobacco Use    Smoking status: Never    Smokeless tobacco: Never   Vaping Use    Vaping Use: Never used   Substance and Sexual Activity    Alcohol use: Yes     Alcohol/week: 6.0 standard drinks     Types: 6 Cans of beer per week     Comment: 6 BEERS WEEKLY    Drug use: No    Sexual activity: Not Currently        Review of Systems - negative except as listed above      Objective:     Vitals:    01/16/23 0958   BP: (!) 139/93   Pulse: 74   Resp: 16   Temp: 98.3 °F (36.8 °C)   SpO2: 100%   Weight: 152 lb (68.9 kg)   Height: 5' 7\" (1.702 m)       Physical Examination: General appearance - alert, well appearing, and in no distress  Mental status - alert, oriented to person, place, and time  Eyes - pupils equal and reactive, extraocular eye movements intact  Ears - bilateral TM's and external ear canals normal  Nose - normal and patent, no erythema, discharge or polyps  Mouth - mucous membranes moist, pharynx normal without lesions  Neck - supple, no significant adenopathy  Chest - clear to auscultation, no wheezes, rales or rhonchi, symmetric air entry  Heart - normal rate, regular rhythm, normal S1, S2, no murmurs, rubs, clicks or gallops  Abdomen - soft, nontender, nondistended, no masses or organomegaly  Musculoskeletal - no joint tenderness, deformity or swelling  Extremities - peripheral pulses normal, no pedal edema, no clubbing or cyanosis  Skin - normal coloration and turgor, no rashes, no suspicious skin lesions noted     Assessment/ Plan:   Diagnoses and all orders for this visit:    1. Visit for well [place] health check  -     CBC W/O DIFF; Future  -     LIPID PANEL; Future  -     PSA W/ REFLX FREE PSA; Future  -     METABOLIC PANEL, COMPREHENSIVE; Future    2. Benign essential HTN    3. Atrophy of muscle of upper arm, unspecified laterality  -     TESTOSTERONE, FREE & TOTAL;  Future               Medication Side Effects and Warnings were discussed with patient,  Patient Labs were reviewed and or requested, and  Patient Past Records were reviewed and or requested  yes       I have discussed the diagnosis with the patient and the intended plan as seen in the above orders. The patient has received an after-visit summary and questions were answered concerning future plans. Pt agrees to call or return to clinic and/or go to closest ER with any worsening of symptoms. This may include, but not limited to increased fever (>100.4) with NSAIDS or Tylenol, increased edema, confusion, rash, worsening of presenting symptoms. Please note that this dictation was completed with [employer], the computer voice recognition software. Quite often unanticipated grammatical, syntax, homophones, and other interpretive errors are inadvertently transcribed by the computer software. Please disregard these errors. Please excuse any errors that have escaped final proofreading. Thank you.

## 2023-01-16 NOTE — PATIENT INSTRUCTIONS
DASH Diet: Care Instructions  Your Care Instructions     The DASH diet is an eating plan that can help lower your blood pressure. DASH stands for Dietary Approaches to Stop Hypertension. Hypertension is high blood pressure. The DASH diet focuses on eating foods that are high in calcium, potassium, and magnesium. These nutrients can lower blood pressure. The foods that are highest in these nutrients are fruits, vegetables, low-fat dairy products, nuts, seeds, and legumes. But taking calcium, potassium, and magnesium supplements instead of eating foods that are high in those nutrients does not have the same effect. The DASH diet also includes whole grains, fish, and poultry. The DASH diet is one of several lifestyle changes your doctor may recommend to lower your high blood pressure. Your doctor may also want you to decrease the amount of sodium in your diet. Lowering sodium while following the DASH diet can lower blood pressure even further than just the DASH diet alone. Follow-up care is a key part of your treatment and safety. Be sure to make and go to all appointments, and call your doctor if you are having problems. It's also a good idea to know your test results and keep a list of the medicines you take. How can you care for yourself at home? Following the DASH diet  Eat 4 to 5 servings of fruit each day. A serving is 1 medium-sized piece of fruit, ½ cup chopped or canned fruit, 1/4 cup dried fruit, or 4 ounces (½ cup) of fruit juice. Choose fruit more often than fruit juice. Eat 4 to 5 servings of vegetables each day. A serving is 1 cup of lettuce or raw leafy vegetables, ½ cup of chopped or cooked vegetables, or 4 ounces (½ cup) of vegetable juice. Choose vegetables more often than vegetable juice. Get 2 to 3 servings of low-fat and fat-free dairy each day. A serving is 8 ounces of milk, 1 cup of yogurt, or 1 ½ ounces of cheese. Eat 6 to 8 servings of grains each day.  A serving is 1 slice of bread, 1 ounce of dry cereal, or ½ cup of cooked rice, pasta, or cooked cereal. Try to choose whole-grain products as much as possible. Limit lean meat, poultry, and fish to 2 servings each day. A serving is 3 ounces, about the size of a deck of cards. Eat 4 to 5 servings of nuts, seeds, and legumes (cooked dried beans, lentils, and split peas) each week. A serving is 1/3 cup of nuts, 2 tablespoons of seeds, or ½ cup of cooked beans or peas. Limit fats and oils to 2 to 3 servings each day. A serving is 1 teaspoon of vegetable oil or 2 tablespoons of salad dressing. Limit sweets and added sugars to 5 servings or less a week. A serving is 1 tablespoon jelly or jam, ½ cup sorbet, or 1 cup of lemonade. Eat less than 2,300 milligrams (mg) of sodium a day. If you limit your sodium to 1,500 mg a day, you can lower your blood pressure even more. Be aware that all of these are the suggested number of servings for people who eat 1,800 to 2,000 calories a day. Your recommended number of servings may be different if you need more or fewer calories. Tips for success  Start small. Do not try to make dramatic changes to your diet all at once. You might feel that you are missing out on your favorite foods and then be more likely to not follow the plan. Make small changes, and stick with them. Once those changes become habit, add a few more changes. Try some of the following:  Make it a goal to eat a fruit or vegetable at every meal and at snacks. This will make it easy to get the recommended amount of fruits and vegetables each day. Try yogurt topped with fruit and nuts for a snack or healthy dessert. Add lettuce, tomato, cucumber, and onion to sandwiches. Combine a ready-made pizza crust with low-fat mozzarella cheese and lots of vegetable toppings. Try using tomatoes, squash, spinach, broccoli, carrots, cauliflower, and onions.   Have a variety of cut-up vegetables with a low-fat dip as an appetizer instead of chips and dip.  Sprinkle sunflower seeds or chopped almonds over salads. Or try adding chopped walnuts or almonds to cooked vegetables. Try some vegetarian meals using beans and peas. Add garbanzo or kidney beans to salads. Make burritos and tacos with mashed brambila beans or black beans. Where can you learn more? Go to http://www.rivas.com/  Enter H967 in the search box to learn more about \"DASH Diet: Care Instructions. \"  Current as of: January 10, 2022               Content Version: 13.4  © 4827-7587 [employer] Care instructions adapted under license by [employer] (which disclaims liability or warranty for this information). If you have questions about a medical condition or this instruction, always ask your healthcare professional. [address] any warranty or liability for your use of this information.

## 2023-01-21 LAB
ALBUMIN SERPL-MCNC: 4.6 G/DL (ref 3.8–4.9)
ALBUMIN/GLOB SERPL: 1.9 {RATIO} (ref 1.2–2.2)
ALP SERPL-CCNC: 67 IU/L (ref 44–121)
ALT SERPL-CCNC: 13 IU/L (ref 0–44)
AST SERPL-CCNC: 14 IU/L (ref 0–40)
BILIRUB SERPL-MCNC: 0.6 MG/DL (ref 0–1.2)
BUN SERPL-MCNC: 12 MG/DL (ref 6–24)
BUN/CREAT SERPL: 11 (ref 9–20)
CALCIUM SERPL-MCNC: 10 MG/DL (ref 8.7–10.2)
CHLORIDE SERPL-SCNC: 101 MMOL/L (ref 96–106)
CHOLEST SERPL-MCNC: 221 MG/DL (ref 100–199)
CO2 SERPL-SCNC: 26 MMOL/L (ref 20–29)
CREAT SERPL-MCNC: 1.11 MG/DL (ref 0.76–1.27)
EGFR: 79 ML/MIN/1.73
ERYTHROCYTE [DISTWIDTH] IN BLOOD BY AUTOMATED COUNT: 12.5 % (ref 11.6–15.4)
GLOBULIN SER CALC-MCNC: 2.4 G/DL (ref 1.5–4.5)
GLUCOSE SERPL-MCNC: 115 MG/DL (ref 70–99)
HCT VFR BLD AUTO: 46.4 % (ref 37.5–51)
HDLC SERPL-MCNC: 102 MG/DL
HGB BLD-MCNC: 15.8 G/DL (ref 13–17.7)
LDLC SERPL CALC-MCNC: 98 MG/DL (ref 0–99)
MCH RBC QN AUTO: 32.2 PG (ref 26.6–33)
MCHC RBC AUTO-ENTMCNC: 34.1 G/DL (ref 31.5–35.7)
MCV RBC AUTO: 95 FL (ref 79–97)
PLATELET # BLD AUTO: 164 X10E3/UL (ref 150–450)
POTASSIUM SERPL-SCNC: 4.3 MMOL/L (ref 3.5–5.2)
PROT SERPL-MCNC: 7 G/DL (ref 6–8.5)
PSA SERPL-MCNC: 2.4 NG/ML (ref 0–4)
RBC # BLD AUTO: 4.9 X10E6/UL (ref 4.14–5.8)
REFLEX CRITERIA: NORMAL
SODIUM SERPL-SCNC: 140 MMOL/L (ref 134–144)
TESTOST FREE SERPL-MCNC: 8.4 PG/ML (ref 7.2–24)
TESTOST SERPL-MCNC: 295 NG/DL (ref 264–916)
TRIGL SERPL-MCNC: 123 MG/DL (ref 0–149)
VLDLC SERPL CALC-MCNC: 21 MG/DL (ref 5–40)
WBC # BLD AUTO: 5 X10E3/UL (ref 3.4–10.8)

## 2023-01-23 DIAGNOSIS — R73.9 ELEVATED BLOOD SUGAR: Primary | ICD-10-CM

## 2023-01-26 ENCOUNTER — HOSPITAL ENCOUNTER (OUTPATIENT)
Age: 53
Setting detail: OUTPATIENT SURGERY
Discharge: HOME | End: 2023-01-26
Attending: SPECIALIST | Admitting: SPECIALIST
Payer: MEDICAID

## 2023-01-26 ENCOUNTER — ANESTHESIA (OUTPATIENT)
Dept: ENDOSCOPY | Age: 53
End: 2023-01-26
Payer: MEDICAID

## 2023-01-26 ENCOUNTER — ANESTHESIA EVENT (OUTPATIENT)
Dept: ENDOSCOPY | Age: 53
End: 2023-01-26
Payer: MEDICAID

## 2023-01-26 VITALS
RESPIRATION RATE: 14 BRPM | OXYGEN SATURATION: 100 % | WEIGHT: 152 LBS | BODY MASS INDEX: 23.86 KG/M2 | TEMPERATURE: 98.6 F | SYSTOLIC BLOOD PRESSURE: 122 MMHG | HEART RATE: 58 BPM | HEIGHT: 67 IN | DIASTOLIC BLOOD PRESSURE: 70 MMHG

## 2023-01-26 PROCEDURE — 74011000250 HC RX REV CODE- 250: Performed by: NURSE ANESTHETIST, CERTIFIED REGISTERED

## 2023-01-26 PROCEDURE — 76060000031 HC ANESTHESIA FIRST 0.5 HR: Performed by: SPECIALIST

## 2023-01-26 PROCEDURE — 76040000019: Performed by: SPECIALIST

## 2023-01-26 PROCEDURE — 74011250636 HC RX REV CODE- 250/636: Performed by: NURSE ANESTHETIST, CERTIFIED REGISTERED

## 2023-01-26 PROCEDURE — 74011250637 HC RX REV CODE- 250/637: Performed by: SPECIALIST

## 2023-01-26 RX ORDER — EPINEPHRINE 0.1 MG/ML
1 INJECTION INTRACARDIAC; INTRAVENOUS
Status: DISCONTINUED | OUTPATIENT
Start: 2023-01-26 | End: 2023-01-26 | Stop reason: HOSPADM

## 2023-01-26 RX ORDER — LIDOCAINE HYDROCHLORIDE 20 MG/ML
INJECTION, SOLUTION EPIDURAL; INFILTRATION; INTRACAUDAL; PERINEURAL AS NEEDED
Status: DISCONTINUED | OUTPATIENT
Start: 2023-01-26 | End: 2023-01-26 | Stop reason: HOSPADM

## 2023-01-26 RX ORDER — SODIUM CHLORIDE 0.9 % (FLUSH) 0.9 %
5-40 SYRINGE (ML) INJECTION AS NEEDED
Status: DISCONTINUED | OUTPATIENT
Start: 2023-01-26 | End: 2023-01-26 | Stop reason: HOSPADM

## 2023-01-26 RX ORDER — SODIUM CHLORIDE 0.9 % (FLUSH) 0.9 %
5-40 SYRINGE (ML) INJECTION EVERY 8 HOURS
Status: DISCONTINUED | OUTPATIENT
Start: 2023-01-26 | End: 2023-01-26 | Stop reason: HOSPADM

## 2023-01-26 RX ORDER — FLUMAZENIL 0.1 MG/ML
0.2 INJECTION INTRAVENOUS
Status: DISCONTINUED | OUTPATIENT
Start: 2023-01-26 | End: 2023-01-26 | Stop reason: HOSPADM

## 2023-01-26 RX ORDER — DEXTROMETHORPHAN/PSEUDOEPHED 2.5-7.5/.8
1.2 DROPS ORAL
Status: DISCONTINUED | OUTPATIENT
Start: 2023-01-26 | End: 2023-01-26 | Stop reason: HOSPADM

## 2023-01-26 RX ORDER — PROPOFOL 10 MG/ML
INJECTION, EMULSION INTRAVENOUS AS NEEDED
Status: DISCONTINUED | OUTPATIENT
Start: 2023-01-26 | End: 2023-01-26 | Stop reason: HOSPADM

## 2023-01-26 RX ORDER — SODIUM CHLORIDE 9 MG/ML
INJECTION, SOLUTION INTRAVENOUS
Status: DISCONTINUED | OUTPATIENT
Start: 2023-01-26 | End: 2023-01-26 | Stop reason: HOSPADM

## 2023-01-26 RX ORDER — SODIUM CHLORIDE 9 MG/ML
50 INJECTION, SOLUTION INTRAVENOUS CONTINUOUS
Status: DISCONTINUED | OUTPATIENT
Start: 2023-01-26 | End: 2023-01-26 | Stop reason: HOSPADM

## 2023-01-26 RX ORDER — ATROPINE SULFATE 0.1 MG/ML
0.5 INJECTION INTRAVENOUS
Status: DISCONTINUED | OUTPATIENT
Start: 2023-01-26 | End: 2023-01-26 | Stop reason: HOSPADM

## 2023-01-26 RX ORDER — NALOXONE HYDROCHLORIDE 0.4 MG/ML
0.4 INJECTION, SOLUTION INTRAMUSCULAR; INTRAVENOUS; SUBCUTANEOUS
Status: DISCONTINUED | OUTPATIENT
Start: 2023-01-26 | End: 2023-01-26 | Stop reason: HOSPADM

## 2023-01-26 RX ADMIN — PROPOFOL 25 MG: 10 INJECTION, EMULSION INTRAVENOUS at 12:13

## 2023-01-26 RX ADMIN — PROPOFOL 25 MG: 10 INJECTION, EMULSION INTRAVENOUS at 12:18

## 2023-01-26 RX ADMIN — SODIUM CHLORIDE: 900 INJECTION, SOLUTION INTRAVENOUS at 12:00

## 2023-01-26 RX ADMIN — PROPOFOL 25 MG: 10 INJECTION, EMULSION INTRAVENOUS at 12:15

## 2023-01-26 RX ADMIN — LIDOCAINE HYDROCHLORIDE 60 MG: 20 INJECTION, SOLUTION EPIDURAL; INFILTRATION; INTRACAUDAL; PERINEURAL at 12:10

## 2023-01-26 RX ADMIN — PROPOFOL 25 MG: 10 INJECTION, EMULSION INTRAVENOUS at 12:21

## 2023-01-26 RX ADMIN — PROPOFOL 100 MG: 10 INJECTION, EMULSION INTRAVENOUS at 12:10

## 2023-01-26 RX ADMIN — PROPOFOL 50 MG: 10 INJECTION, EMULSION INTRAVENOUS at 12:11

## 2023-01-26 NOTE — H&P
Colonoscopy History and Physical      The patient was seen and examined. Date of last colonoscopy: 2020, Polyps  No      The airway was assessed and documented. The problem list, past medical history, and medications were reviewed. Patient Active Problem List   Diagnosis Code    ACL tear S83.519A    Psoriatic arthritis ([facility] [address] L40.50    Psoriasis L40.9    Gastrointestinal hemorrhage K92.2    Rectal cancer (HCC) C20    Thrombocytopenia (HCC) D69.6    Neutropenia ([facility] [address] D70.9    Port-A-Cath in place Z95.828    Encounter for antineoplastic chemotherapy Z51.11    Ileostomy present ([facility] Z93.2    Malignant neoplasm of colon ([facility] C18.9    [facility] K56.609    Incisional hernia K43.2     Social History     Socioeconomic History    Marital status:      Spouse name: Not on file    Number of children: Not on file    Years of education: Not on file    Highest education level: Not on file   Occupational History    Not on file   Tobacco Use    Smoking status: Never    Smokeless tobacco: Never   Vaping Use    Vaping Use: Never used   Substance and Sexual Activity    Alcohol use: Yes     Alcohol/week: 6.0 standard drinks     Types: 6 Cans of beer per week     Comment: 6 BEERS WEEKLY    Drug use: No    Sexual activity: Not Currently   Other Topics Concern    Not on file   Social History Narrative    Not on file     Social Determinants of Health     Financial Resource Strain: Not on file   Food Insecurity: Not on file   Transportation Needs: Not on file   Physical Activity: Not on file   Stress: Not on file   Social Connections: Not on file   Intimate Partner Violence: Not on file   Housing Stability: Not on file     Past Medical History:   Diagnosis Date    Adopted     COVID-19 vaccine series completed 04/09/2021    Pfizer dose #2 administered on 4/9/2021.     GERD (gastroesophageal reflux disease)     Hypertension     Ill-defined condition     CHEMO-LAST ON 2/19/2020    Psoriasis     Psoriatic arthritis [facility])     Rectal cancer ([facility] 2019    Moderately differentiated [id number] adenocarcinoma of the rectum. Treated with neoadjuvant chemoradiation, resection, and adjuvant chemotherapy. Prior to Admission Medications   Prescriptions Last Dose Informant Patient Reported? Taking? Otezla 30 mg tab 1/26/2023  No Yes   Sig: TAKE 1 TABLET BY MOUTH 2 TIMES A DAY. ergocalciferol, vitamin D2, (VITAMIN D2 PO) 1/25/2023  Yes Yes   Sig: Take  by mouth. ibuprofen (MOTRIN) 200 mg tablet   No No   Sig: Take 3 Tablets by mouth every six (6) hours as needed for Pain (Can use with narcotics or alternate). losartan (COZAAR) 100 mg tablet 1/26/2023  No Yes   Sig: TAKE 1 TABLET BY MOUTH EVERY DAY IN THE MORNING   tadalafiL (CIALIS) 5 mg tablet   Yes No   Sig: Take 5 mg by mouth daily. triamcinolone acetonide (KENALOG) 0.1 % ointment   Yes No   Sig: Apply  to affected area two (2) times daily as needed for Skin Irritation. use thin layer      Facility-Administered Medications: None       The patient was seen and examined in the endoscopy suite. The airway was assessed and documented. The problem list and medications were reviewed. Chief complaint, history of present illness, and review of systems and Past medical History are positive for: rectal cancer    The heart, lungs and mental status were satisfactory for the administration of sedation and for the procedure. I discussed with the patient the objectives, risks, consequences and alternatives to the procedure. The patient was counseled at length about the risks of brigitte Covid-19 in the karen-operative and post-operative states including the recovery window of their procedure. The patient was made aware that brigitte Covid-19 after a surgical procedure may worsen their prognosis for recovering from the virus and lend to a higher morbidity and or mortality risk. The patient was given the options of postponing their procedure.  All of the risks, benefits, and alternatives were discussed. The patient does  wish to proceed with the procedure.     Plan: Endoscopic procedure with sedation     Isidra Salmon MD   1/26/2023  11:59 AM

## 2023-01-26 NOTE — DISCHARGE INSTRUCTIONS
Eyad Matt  [id number]  1970    COLON DISCHARGE INSTRUCTIONS  Discomfort:  Redness at IV site- apply warm compress to area; if redness or soreness persist- contact your physician  There may be a slight amount of blood passed from the rectum  Gaseous discomfort- walking, belching will help relieve any discomfort    DIET:   Regular diet. - however -  remember your colon is empty and a heavy meal will produce gas. Avoid these foods:  vegetables, fried / greasy foods, carbonated drinks for today. You may not drink alcoholic beverages for at least 12 hours    MEDICATIONS:   Regarding Aspirin or Nonsteroidal medications, please see below. ACTIVITY:  You may resume your normal daily activities it is recommended that you spend the remainder of the day resting -  avoid any strenuous activity. You may not operate a vehicle for 12 hours  You may not engage in an occupation involving machinery or appliances for rest of today  Avoid making any critical decisions for at least 24 hour    CALL M.D. ANY SIGN OF:  Increasing pain, nausea, vomiting  Abdominal distension (swelling)  New increased bleeding (oral or rectal)  Fever (chills)  Pain in chest area  Bloody discharge from nose or mouth  Shortness of breath    You may  take any Advil, Aspirin, Ibuprofen, Motrin, Aleve, or Goodys for 10 days, ONLY  Tylenol as needed for pain. Post procedure diagnosis: Normal    Post-procedure recommendations: colon in 2 years    Follow-up Instructions:   Call Dr. Supriya Salazar  Results of procedure / biopsy in 10 days  Telephone #  [phone]      DISCHARGE SUMMARY from Nurse    The following personal items collected during your admission are returned to you:   Dental Appliance: Dental Appliances: None  Vision: Visual Aid: Glasses  Hearing Aid:    Jewelry:    Clothing:    Other Valuables:    Valuables sent to safe:      Learning About Coronavirus (COVID-19)  Coronavirus (COVID-19): Overview  What is coronavirus (OXMCY-15)?   The coronavirus disease (COVID-19) is caused by a virus. It is an illness that was first found in [place], [employer] [place] [employer], in December 2019. It has since spread worldwide. The virus can cause fever, cough, and trouble breathing. In severe cases, it can cause pneumonia and make it hard to breathe without help. It can cause death. Coronaviruses are a large group of viruses. They cause the common cold. They also cause more serious illnesses like Middle East respiratory syndrome (MERS) and severe acute respiratory syndrome (SARS). COVID-19 is caused by a novel coronavirus. That means it's a new type that has not been seen in people before. This virus spreads person-to-person through droplets from coughing and sneezing. It can also spread when you are close to someone who is infected. And it can spread when you touch something that has the virus on it, such as a doorknob or a tabletop. What can you do to protect yourself from coronavirus (COVID-19)? The best way to protect yourself from getting sick is to: Avoid areas where there is an outbreak. Avoid contact with people who may be infected. Wash your hands often with soap or alcohol-based hand sanitizers. Avoid crowds and try to stay at least 6 feet away from other people. Wash your hands often, especially after you cough or sneeze. Use soap and water, and scrub for at least 20 seconds. If soap and water aren't available, use an alcohol-based hand . Avoid touching your mouth, nose, and eyes. What can you do to avoid spreading the virus to others? To help avoid spreading the virus to others:  Cover your mouth with a tissue when you cough or sneeze. Then throw the tissue in the trash. Use a disinfectant to clean things that you touch often. Stay home if you are sick or have been exposed to the virus. Don't go to school, work, or public areas. And don't use public transportation. If you are sick:  Leave your home only if you need to get medical care.  But call the doctor's office first so they know you're coming. And wear a face mask, if you have one. If you have a face mask, wear it whenever you're around other people. It can help stop the spread of the virus when you cough or sneeze. Clean and disinfect your home every day. Use household  and disinfectant wipes or sprays. Take special care to clean things that you grab with your hands. These include doorknobs, remote controls, phones, and handles on your refrigerator and microwave. And don't forget countertops, tabletops, bathrooms, and computer keyboards. When to call for help  Call 911 anytime you think you may need emergency care. For example, call if:  You have severe trouble breathing. (You can't talk at all.)  You have constant chest pain or pressure. You are severely dizzy or lightheaded. You are confused or can't think clearly. Your face and lips have a blue color. You pass out (lose consciousness) or are very hard to wake up. Call your doctor now if you develop symptoms such as:  Shortness of breath. Fever. Cough. If you need to get care, call ahead to the doctor's office for instructions before you go. Make sure you wear a face mask, if you have one, to prevent exposing other people to the virus. Where can you get the latest information? The following health organizations are tracking and studying this virus. Their websites contain the most up-to-date information. Anurag Jennings also learn what to do if you think you may have been exposed to the virus. U.S. Centers for Disease Control and Prevention (CDC): The CDC provides updated news about the disease and travel advice. The website also tells you how to prevent the spread of infection. www.cdc.gov  World Health Organization [facility]): WHO offers information about the virus outbreaks. WHO also has travel advice. www.who.int  Current as of: April 1, 2020               Content Version: 12.4  © 7920-5877 [employer].    Care instructions adapted under license by your healthcare professional. If you have questions about a medical condition or this instruction, always ask your healthcare professional. [place] [address] any warranty or liability for your use of this information.

## 2023-01-26 NOTE — PROCEDURES
[facility] [facility]  [address]                 Colonoscopy Procedure Note    Indications:   See Preoperative Diagnosis above  Referring Physician: Zaki De La Cruz MD  Anesthesia/Sedation: MAC anesthesia Propofol  Endoscopist:  Dr. Lizandro Cazares  Assistant:  Endoscopy RN-1: Jayme Braswell RN  Endoscopy RN-2: Stevie Ba RN  Preoperative diagnosis: PERSONAL HX OF COLONIC POLYPS  Postoperative diagnosis: Normal    Procedure in Detail:  Informed consent was obtained for the procedure, including sedation. Risks of perforation, hemorrhage, adverse drug reaction, and aspiration were discussed. The patient was placed in the left lateral decubitus position. Based on the pre-procedure assessment, including review of the patient's medical history, medications, allergies, and review of systems, he had been deemed to be an appropriate candidate for  sedation; he was therefore sedated with the medications listed above. The patient was monitored continuously with ECG tracing, pulse oximetry, blood pressure monitoring, and direct observations. A rectal examination was performed. The [id number] was inserted into the rectum and advanced under direct vision to the terminal ileum, which was identified by the ileocecal valve and appendiceal orifice. The quality of the colonic preparation was good. A careful inspection was made as the colonoscope was withdrawn, including a retroflexed view of the rectum; findings and interventions are described below. Appropriate photodocumentation was obtained. Findings:  Rectum: normal  Sigmoid: normal  Descending Colon: normal  Transverse Colon: normal  Ascending Colon: normal  Cecum: normal  Terminal Ileum: normal    Specimens:    none    EBL: None    Complications: None; patient tolerated the procedure well.     Recommendations:      - Colon in 2 years      Signed By: Lizandro Cazares MD                        January 26, 2023

## 2023-01-26 NOTE — PROGRESS NOTES

## 2023-01-26 NOTE — ANESTHESIA POSTPROCEDURE EVALUATION
Procedure(s):  COLONOSCOPY. general    Anesthesia Post Evaluation        Patient location during evaluation: PACU  Patient participation: complete - patient participated  Level of consciousness: awake and alert  Pain management: adequate  Airway patency: patent  Anesthetic complications: no  Cardiovascular status: acceptable  Respiratory status: acceptable  Hydration status: acceptable  Comments: I have seen and evaluated the patient and is ready for discharge. Yaritza Horan MD    Post anesthesia nausea and vomiting:  none      INITIAL Post-op Vital signs:   Vitals Value Taken Time   /70 01/26/23 1250   Temp 37 °C (98.6 °F) 01/26/23 1230   Pulse 52 01/26/23 1250   Resp 14 01/26/23 1250   SpO2 100 % 01/26/23 1250   Vitals shown include unvalidated device data.

## 2023-01-29 PROBLEM — I10 BENIGN ESSENTIAL HYPERTENSION: Status: ACTIVE | Noted: 2023-01-29

## 2023-04-19 ENCOUNTER — HOSPITAL ENCOUNTER (OUTPATIENT)
Dept: CT IMAGING | Age: 53
Discharge: HOME | End: 2023-04-19
Attending: INTERNAL MEDICINE
Payer: MEDICAID

## 2023-04-19 DIAGNOSIS — C20 RECTAL CANCER (HCC): ICD-10-CM

## 2023-04-19 PROCEDURE — 74177 CT ABD & PELVIS W/CONTRAST: CPT

## 2023-04-19 PROCEDURE — 74011000636 HC RX REV CODE- 636: Performed by: INTERNAL MEDICINE

## 2023-04-19 RX ADMIN — IOHEXOL 100 ML: 350 INJECTION, SOLUTION INTRAVENOUS at 11:08

## 2023-04-22 DIAGNOSIS — L40.50 PSORIATIC ARTHRITIS (HCC): Primary | ICD-10-CM

## 2023-04-23 DIAGNOSIS — Z79.899 ONGOING USE OF POSSIBLY TOXIC MEDICATION: Primary | ICD-10-CM

## 2023-04-23 DIAGNOSIS — L40.50 PSORIATIC ARTHRITIS (HCC): ICD-10-CM

## 2023-04-23 DIAGNOSIS — L40.50 PSORIATIC ARTHRITIS (HCC): Primary | ICD-10-CM

## 2023-04-26 ENCOUNTER — OFFICE VISIT (OUTPATIENT)
Dept: ONCOLOGY | Age: 53
End: 2023-04-26
Payer: MEDICAID

## 2023-04-26 VITALS
BODY MASS INDEX: 23.81 KG/M2 | OXYGEN SATURATION: 98 % | SYSTOLIC BLOOD PRESSURE: 119 MMHG | HEART RATE: 70 BPM | RESPIRATION RATE: 18 BRPM | TEMPERATURE: 97.9 F | DIASTOLIC BLOOD PRESSURE: 69 MMHG | WEIGHT: 152 LBS

## 2023-04-26 DIAGNOSIS — C20 RECTAL CANCER (HCC): Primary | ICD-10-CM

## 2023-04-26 PROCEDURE — 3074F SYST BP LT 130 MM HG: CPT | Performed by: INTERNAL MEDICINE

## 2023-04-26 PROCEDURE — 99213 OFFICE O/P EST LOW 20 MIN: CPT | Performed by: INTERNAL MEDICINE

## 2023-04-26 PROCEDURE — 3078F DIAST BP <80 MM HG: CPT | Performed by: INTERNAL MEDICINE

## 2023-04-26 RX ORDER — FEXOFENADINE HCL AND PSEUDOEPHEDRINE HCI 60; 120 MG/1; MG/1
1 TABLET, EXTENDED RELEASE ORAL EVERY 12 HOURS
COMMUNITY

## 2023-04-26 NOTE — PROGRESS NOTES
Garrison Snyder is a 46 y.o. male    Chief Complaint   Patient presents with    Follow-up     Rectal cancer- likely stage III- SOMMER          1. Have you been to the ER, urgent care clinic since your last visit? Hospitalized since your last visit? No    2. Have you seen or consulted any other health care providers outside of the [address] [place] [address] since your last visit? Include any pap smears or colon screening.  No

## 2023-04-26 NOTE — PROGRESS NOTES
[facility] [place] [facility] [place] [address] [address], [address]  W: [phone]  F: [phone]    Reason for Visit:   Edgardo Wilson is a 46 y.o. male who is seen in follow-up for Rectal cancer- likely stage III- SOMMER    Treatment History:   4/26/19: Colonoscopy- 6-7 cm size malignant appearing mass seen at 10 cm from anal verge. This was adenocarcinoma. 3 polyps removed- all were tubular adenomas  Rectal Ultrasound 5/2/19: ulcerated infiltrative mass lesion was noted in rectum at 10 cm. The lesion measured about 5 cm X 4 cm- T2, 13 mm X 8 mm oblong lymph node was noted immediately adjacent to the mass lesion  5/2/19: CT CAP- No metastasis, liver cysts. CEA 3.6  5/10/19: PET CT showed rectal malignancy and 3 small perirectal anibal metastatic focir  5/28/19-7/8/29: Xeloda + RT   9/24/19: LAR-  [id number], 2/6 positive nodes  10/14/19: CT CAP with no evidence of metastatic disease, liver cysts   10/23/19- 2/19/2020-: 8 cycles of  FOLFOX. Several delays and dose reductions due to cytopenias  12/11/19 CT AP: hypodensity in liver, otherwise KATHLEEN   1/6/20 MRI abd: no liver mets  3/30/2020: CT KATHLEEN  CT CAP 10/2021: KATHLEEN , CT 4/8/2022-no evidence of disease    History of Present Illness:   Patient is a 46 y.o. male seen for adenocarcinoma of the mid third of rectum    He had intermittent BRBPR x 1 year and then decided to have a colonoscopy. This led to above mentioned diagnosis. He received neoadjuvant xeloda+ RT followed by LAR. Completed 8 cycles of FOLFOX, had a colostomy and is on surveillance. He has his Colonoscopy 1/2023. He is ok. Had a hernia surgery. He has no new pain, bleeding, cough. He uses 7 drinks a week. Adopted    Past Medical History:   Diagnosis Date    Adopted     COVID-19 vaccine series completed 04/09/2021    Pfizer dose #2 administered on 4/9/2021.     GERD (gastroesophageal reflux disease)     Hypertension     Ill-defined condition     CHEMO-LAST ON 2/19/2020    Psoriasis     Psoriatic arthritis ([facility] [address]     Rectal cancer ([facility] [address] 2019    Moderately differentiated [id number] adenocarcinoma of the rectum. Treated with neoadjuvant chemoradiation, resection, and adjuvant chemotherapy. Past Surgical History:   Procedure Laterality Date    COLONOSCOPY Left 4/26/2019    COLONOSCOPY performed by Bebo Yarbrough MD at [facility] ENDOSCOPY    COLONOSCOPY N/A 10/22/2020    Normal post-resection anatomy; 2-year follow-up interval recommended; Dr. Jazmín Solorzano. COLONOSCOPY N/A 1/26/2023    COLONOSCOPY performed by Bebo Yarbrough MD at [place] [address] N/A 5/2/2019    SIGMOIDOSCOPY FLEXIBLE performed by Johny Eisenberg MD at [facility] ENDOSCOPY    HX GI      EGD    HX ORTHOPAEDIC Right circa 2014    ACL repair    HX OTHER SURGICAL  09/24/2019    Hand-assisted laparoscopic low anterior resection, mobilization of the splenic flexure, coloproctostomy, and creation of loop ileostomy; Dr. Moncada. HX OTHER SURGICAL  03/17/2020    Ileostomy closure with resection and anastomosis; Dr. Moncada. HX UROLOGICAL  12/12/2019    CYSTOSCOPY FOR REMOVAL OF KIDNEY STONES WITH HOLMIUM LASER; Dr. Colonel Strong. HX UROLOGICAL  09/24/2019    Cystoscopy and placement of bilateral temporary ureteral catheters; Milton Fournier MD.    HX VASCULAR ACCESS      INSERTION OF PORT-A-CATH RT SIDE OF CHEST. FOR CHEMO-LAST ON 2/19/2020    HX WISDOM TEETH EXTRACTION      IR INSERT TUNL CVC W PORT OVER 5 YEARS  10/15/2019    Right internal jugular vein Port-a-Cath placement. IR REMOVE TUNL CVAD W/O PORT / PUMP  7/16/2020    Port-a-Cath removal.      Social History     Tobacco Use    Smoking status: Never    Smokeless tobacco: Never   Substance Use Topics    Alcohol use:  Yes     Alcohol/week: 6.0 standard drinks     Types: 6 Cans of beer per week     Comment: 6 BEERS WEEKLY      Family History   Adopted: Yes   Problem Relation Age of Onset    Asthma Neg Hx     Cancer Neg Hx     Diabetes Neg Hx     Heart Disease Neg Hx     Hypertension Neg Hx     Stroke Neg Hx      Current Outpatient Medications   Medication Sig    fexofenadine-pseudoephedrine (Allegra-D 12 Hour)  mg Tb12 Take 1 Tablet by mouth every twelve (12) hours. losartan (COZAAR) 100 mg tablet TAKE 1 TABLET BY MOUTH EVERY DAY IN THE MORNING    Otezla 30 mg tab TAKE 1 TABLET BY MOUTH 2 TIMES A DAY. ibuprofen (MOTRIN) 200 mg tablet Take 3 Tablets by mouth every six (6) hours as needed for Pain (Can use with narcotics or alternate). ergocalciferol, vitamin D2, (VITAMIN D2 PO) Take  by mouth.    tadalafiL (CIALIS) 5 mg tablet Take 1 Tablet by mouth daily. triamcinolone acetonide (KENALOG) 0.1 % ointment Apply  to affected area two (2) times daily as needed for Skin Irritation. use thin layer     No current facility-administered medications for this visit. No Known Allergies     Review of Systems: A complete review of systems was obtained, negative except as described above. Physical Exam:     Constitutional: Appears well-developed and well-nourished in no apparent distress   Mental status: Alert and awake, Oriented to person/place/time, Able to follow commands  Eyes: EOM normal, Sclera normal, No visible ocular discharge  HENT: Normocephalic, atraumatic; Mouth/Throat: Moist mucous membranes, External Ears normal  Neck: No visualized mass  Skin: Psoriasis  Psychiatric: Normal affect, normal judgment/insight. No hallucinations     Results:     Lab Results   Component Value Date/Time    WBC 5.0 01/16/2023 10:40 AM    HGB 15.8 01/16/2023 10:40 AM    HCT 46.4 01/16/2023 10:40 AM    PLATELET 604 15/71/6643 10:40 AM    MCV 95 01/16/2023 10:40 AM    ABS.  NEUTROPHILS 6.1 11/23/2022 02:47 AM     Lab Results   Component Value Date/Time    Sodium 140 01/16/2023 10:40 AM    Potassium 4.3 01/16/2023 10:40 AM    Chloride 101 01/16/2023 10:40 AM    CO2 26 01/16/2023 10:40 AM    Glucose 115 (H) 01/16/2023 10:40 AM    BUN 12 01/16/2023 10:40 AM    Creatinine 1.11 01/16/2023 10:40 AM    GFR est  01/14/2022 12:00 AM    GFR est non-AA 98 01/14/2022 12:00 AM    Calcium 10.0 01/16/2023 10:40 AM     Lab Results   Component Value Date/Time    Bilirubin, total 0.6 01/16/2023 10:40 AM    ALT (SGPT) 13 01/16/2023 10:40 AM    Alk. phosphatase 67 01/16/2023 10:40 AM    Protein, total 7.0 01/16/2023 10:40 AM    Albumin 4.6 01/16/2023 10:40 AM    Globulin 3.6 04/18/2021 02:36 AM     CEA:  Recent Labs     10/24/22  1047   [id number] 2.0       Records reviewed and summarized above. Pathology report(s) reviewed  FINAL PATHOLOGIC DIAGNOSIS   1. Rectum and sigmoid colon, laparoscopic low anterior resection:   SPECIMEN   Procedure: Low anterior resection   Macroscopic Intactness of Mesorectum: Complete   TUMOR   Tumor Site: Rectum   Histologic Type: Adenocarcinoma   Histologic Grade: G2: Moderately differentiated   Tumor Size: 2.6 cm   Tumor Deposits: Present   Number of Deposits: 1   Tumor Extension: Tumor invades through the muscularis propria into pericolorectal tissue   Macroscopic Tumor Perforation: Not identified   Lymphovascular Invasion: Not identified   Perineural Invasion: Not identified   Tumor Budding: Number of tumor buds in one hotspot field: 5   Tumor Budding Score: Intermediate score (5-9)   Treatment Effect: Present - Residual cancer with evident tumor regression, but more than single cells or   rare small groups of cancer cells (partial response, score 2)   MARGINS   Margins:  All margins are uninvolved by invasive carcinoma, high- grade dysplasia, intramucosal   adenocarcinoma, and adenoma   Margins Examined: Proximal, Distal, Radial or Mesenteric   Distance of Invasive Carcinoma from Closest Margin: 25 mm   Closest Margin: Radial or Mesenteric   LYMPH NODES   Number of Lymph Nodes Involved: 2   Number of Lymph Nodes Examined: 16   PATHOLOGIC STAGE CLASSIFICATION (pTNM, AJCC 8th Edition)   Primary Tumor (pT): pT3   Regional Lymph Nodes (pN): pN1b 2. Large bowel, donuts:   Fragments of large bowel wall, negative for microscopic pathologic abnormality. Radiology report(s) reviewed above. CT CAP 10/14/19: IMPRESSION:  1. There are scattered small hepatic cysts without definite evidence for  metastatic disease. 2. Otherwise negative CT chest abdomen pelvis    CT CAP 12/11/19: IMPRESSION:  1. Right hydroureteronephrosis due to an 8 mm calculus at the ureterovesicular  junction. 2. Vague areas of heterogeneous enhancement and hypodensity in the liver, raises  concern for metastases, though dedicated 3 phase liver CT or liver protocol MRI  may be considered as follow-up. Numerous small hypodensities in the liver likely  cysts, unchanged. 3. Rectal anastomosis and presacral edema, treatment related. Right lower  quadrant ileostomy. No bowel obstruction    MRI abd 1/6/20: no hepatic mets     CT abdomen 3/30/2020  IMPRESSION  IMPRESSION:     1. Mild dilatation, thinning of the wall, and poor enhancement of the bowel just  proximal to the loop ileostomy closure nearly to the distal anastomosis. Clinical significance is uncertain. Ischemic changes should be considered. 2. Small amount of postoperative fluid in the right paracolic gutter without  drainable fluid collection    CT AP 10/7/20: IMPRESSION:  No evidence of recurrent or metastatic disease. CT AP 3/2021:  IMPRESSION  No evidence for metastatic disease     CT CAP 10/2021: KATHLEEN     CT chest abdomen and pelvis 4/2022  IMPRESSION  No evidence of local tumor recurrence or distant metastatic disease in the  chest, abdomen, or pelvis. Small hepatic and right renal cysts demonstrate no  significant change; these do not require imaging follow-up.   Assessment:   1) Rectal adenocarcinoma- mid third- [facility]  Genetic testing: negative     T2N1M0 disease- Clinical stage III  S/P John Adj chemoRT followed by [facility] on 9/24/19  Pathology showed [id number] residual disease- stage IIIB  Had significant residual disease with practically no treatment effects  Adjuvant FOLFOX x 8 until 2/2020- several delays and reductions due to cytopenias  On surveillance since then  CT scans 4/2023  KATHLEEN  He is > 4 years out from diagnosis. 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 Scans annually till 9/2024        I appreciate the opportunity to participate in [facility].       Signed By: Shayy Chiang MD

## 2023-04-29 DIAGNOSIS — C20 RECTAL CANCER (HCC): Primary | ICD-10-CM

## 2023-05-01 ENCOUNTER — OFFICE VISIT (OUTPATIENT)
Dept: RHEUMATOLOGY | Age: 53
End: 2023-05-01
Payer: MEDICAID

## 2023-05-01 VITALS
DIASTOLIC BLOOD PRESSURE: 73 MMHG | RESPIRATION RATE: 16 BRPM | SYSTOLIC BLOOD PRESSURE: 119 MMHG | WEIGHT: 150.6 LBS | OXYGEN SATURATION: 98 % | HEART RATE: 71 BPM | TEMPERATURE: 98 F | BODY MASS INDEX: 23.59 KG/M2

## 2023-05-01 DIAGNOSIS — L40.9 PSORIASIS: Primary | ICD-10-CM

## 2023-05-01 DIAGNOSIS — Z79.899 ONGOING USE OF POSSIBLY TOXIC MEDICATION: ICD-10-CM

## 2023-05-01 DIAGNOSIS — L40.50 PSORIATIC ARTHRITIS (HCC): ICD-10-CM

## 2023-05-01 PROCEDURE — 3078F DIAST BP <80 MM HG: CPT | Performed by: INTERNAL MEDICINE

## 2023-05-01 PROCEDURE — 99214 OFFICE O/P EST MOD 30 MIN: CPT | Performed by: INTERNAL MEDICINE

## 2023-05-01 PROCEDURE — 3074F SYST BP LT 130 MM HG: CPT | Performed by: INTERNAL MEDICINE

## 2023-05-01 RX ORDER — CLOBETASOL PROPIONATE 0.5 MG/G
OINTMENT TOPICAL 2 TIMES DAILY
Qty: 30 G | Refills: 2 | Status: SHIPPED | OUTPATIENT
Start: 2023-05-01

## 2023-05-01 RX ORDER — APREMILAST 30 MG/1
1 TABLET, FILM COATED ORAL 2 TIMES DAILY
Qty: 60 TABLET | Refills: 5 | Status: ACTIVE | OUTPATIENT
Start: 2023-05-01

## 2023-05-01 NOTE — PROGRESS NOTES
Chief Complaint   Patient presents with    Joint Pain     1. Have you been to the ER, urgent care clinic since your last visit? Hospitalized since your last visit? No    2. Have you seen or consulted any other health care providers outside of the [address] [place] [address] since your last visit? Include any pap smears or colon screening.  No

## 2023-05-01 NOTE — PROGRESS NOTES
[facility] Update    Date: 05/01/23    Rios Knox was routed to the mandated specialty pharmacy [address] [place] [address]. Prior authorization has been approved through 12-. Pharmacy will inform patient and provide them with dispensing pharmacy's phone number. Please call us with any questions at  [phone].

## 2023-05-01 NOTE — PATIENT INSTRUCTIONS
Continue clobetasol to plaques twice a day as needed. Avoid use on face, armpits, or genitals. Reach out to the practices listed in our letter, to schedule ASAP for continued followup. You are getting far enough out from your cancer treatment, that you may be comfortable exploring more effective options for psoriatic arthritis treatment. Call if you have any problems before July and we can help troubleshoot.

## 2023-05-01 NOTE — PROGRESS NOTES
REASON FOR VISIT    Moises Park is a 46 y.o. male who was seen in-office on 5/1/2023. HISTORY OF DISEASE: Psoriatic Arthritis     Year of diagnosis: 2015  First visit with me: 10/2020  Cumulative disease manifestations:  Dactylitis, psoriasis, back pain  Positive serologies/labs:  Negative serologies/labs: HLA B27  Other co-morbidities: heavy past alcohol use; rectal cancer on chemo + RT + surgery  Relapses:      Past treatment history:  Prednisone:   Non-biologic DMARD: Methotrexate (2015-4/2019 stopped 2/2 alcohol), Sulfasalazine 2000 mg oral daily (6/2020-9/2021, ineffective)  Biologic: Yuliet November (11/20- )  Other:     HISTORY OF PRESENT ILLNESS     Pt returns for a follow up. Remains on Otezla twice a day. Minimal stiffness with first waking, occasional aches in the right knee with running more. No change in knee and shin psoriasis. If he misses a few days of the topical .    No changes in his chronically loose stools. No problems with depressed mood. REVIEW OF SYSTEMS    A comprehensive review of systems was performed and pertinent results are documented in the HPI, review of systems is otherwise non-contributory. PAST MEDICAL HISTORY    Past Medical History:   Diagnosis Date    Adopted     COVID-19 vaccine series completed 04/09/2021    Pfizer dose #2 administered on 4/9/2021. GERD (gastroesophageal reflux disease)     Hypertension     Ill-defined condition     CHEMO-LAST ON 2/19/2020    Psoriasis     Psoriatic arthritis ([facility] [address]     Rectal cancer ([facility] [address] 2019    Moderately differentiated [id number] adenocarcinoma of the rectum. Treated with neoadjuvant chemoradiation, resection, and adjuvant chemotherapy. Past Surgical History:   Procedure Laterality Date    COLONOSCOPY Left 4/26/2019    COLONOSCOPY performed by Jonah Card MD at [facility] ENDOSCOPY    COLONOSCOPY N/A 10/22/2020    Normal post-resection anatomy; 2-year follow-up interval recommended; Dr. Tacos Aguilar.     COLONOSCOPY N/A 1/26/2023    COLONOSCOPY performed by Apolinar Libman, MD at [place] [address] N/A 5/2/2019    SIGMOIDOSCOPY FLEXIBLE performed by Robbi Mayo MD at [facility] ENDOSCOPY    HX GI      EGD    HX ORTHOPAEDIC Right circa 2014    ACL repair    HX OTHER SURGICAL  09/24/2019    Hand-assisted laparoscopic low anterior resection, mobilization of the splenic flexure, coloproctostomy, and creation of loop ileostomy; Dr. Ramya Garcia. HX OTHER SURGICAL  03/17/2020    Ileostomy closure with resection and anastomosis; Dr. Ramya Garcia. HX UROLOGICAL  12/12/2019    CYSTOSCOPY FOR REMOVAL OF KIDNEY STONES WITH HOLMIUM LASER; Dr. Raina Montgomery. HX UROLOGICAL  09/24/2019    Cystoscopy and placement of bilateral temporary ureteral catheters; Ashley Lantigua MD.    HX VASCULAR ACCESS      INSERTION OF PORT-A-CATH RT SIDE OF CHEST. FOR CHEMO-LAST ON 2/19/2020    HX WISDOM TEETH EXTRACTION      IR INSERT TUNL CVC W PORT OVER 5 YEARS  10/15/2019    Right internal jugular vein Port-a-Cath placement. IR REMOVE TUNL CVAD W/O PORT / PUMP  7/16/2020    Port-a-Cath removal.       FAMILY HISTORY    Family History   Adopted: Yes   Problem Relation Age of Onset    Asthma Neg Hx     Cancer Neg Hx     Diabetes Neg Hx     Heart Disease Neg Hx     Hypertension Neg Hx     Stroke Neg Hx        SOCIAL HISTORY    Social History     Tobacco Use    Smoking status: Never    Smokeless tobacco: Never   Vaping Use    Vaping Use: Never used   Substance Use Topics    Alcohol use: Yes     Alcohol/week: 6.0 standard drinks     Types: 6 Cans of beer per week     Comment: 6 BEERS WEEKLY    Drug use: No     On vacations drinks more than 6 beers weekly    MEDICATIONS    Current Outpatient Medications   Medication Sig Dispense Refill    fexofenadine-pseudoephedrine (ALLEGRA-D)  mg Tb12 Take 1 Tablet by mouth every twelve (12) hours.       losartan (COZAAR) 100 mg tablet TAKE 1 TABLET BY MOUTH EVERY DAY IN THE MORNING 90 Tablet 1    Otezla 30 mg tab TAKE 1 TABLET BY MOUTH 2 TIMES A DAY. 60 Tablet 5    ibuprofen (MOTRIN) 200 mg tablet Take 3 Tablets by mouth every six (6) hours as needed for Pain (Can use with narcotics or alternate). 60 Tablet 0    tadalafiL (CIALIS) 5 mg tablet Take 1 Tablet by mouth daily. triamcinolone acetonide (KENALOG) 0.1 % ointment Apply  to affected area two (2) times daily as needed for Skin Irritation. use thin layer      ergocalciferol, vitamin D2, (VITAMIN D2 PO) Take  by mouth. (Patient not taking: Reported on 5/1/2023)         ALLERGIES    No Known Allergies    PHYSICAL EXAMINATION  Visit Vitals  /73 (BP 1 Location: Left upper arm, BP Patient Position: Sitting, BP Cuff Size: Adult)   Pulse 71   Temp 98 °F (36.7 °C) (Temporal)   Resp 16   Wt 150 lb 9.6 oz (68.3 kg)   SpO2 98%   BMI 23.59 kg/m²     General:  The patient is well developed, well nourished, alert, and in no apparent distress. Eyes: Sclera are anicteric. No conjunctival injection. HEENT:  Oropharynx is clear. No oral ulcers. Adequate salivary pooling. No cervical or supraclavicular lymphadenopathy. Lungs:  Clear to auscultation bilaterally, without wheeze or stridor. Normal respiratory effort. Cor:  Regular rate and rhythm. No murmur rub or gallop. Abdomen: Soft, non-tender, without hepatomegaly or masses. Extremities: No calf tenderness or edema. Warm and well perfused. Skin:  Faded elbow plaques. Interval faded ~8 by 6 cm plaque on dorsum right hand between thumb and index finger. Interval worsening of knee plaques, bilateral shin plaques. Pitting limited to right index finger today  Neuro: Nonfocal, no foot or wrist drop  Musculoskeletal:    A comprehensive musculoskeletal exam was performed for all joints of each upper and lower extremity and assessed for swelling, tenderness and range of motion.  Results are documented as below:  Still no evidence of synovitis in the small joints of the hands, wrists, shoulders, elbows, hips, knees or ankles. DATA REVIEW    Prior medical records were reviewed and if applicable are summarized as below:    Labs:   23: CBC WNL; Cr 1.1, LFTs WNL; Tchol 221, , LDL 98  10/24/22: CEA 2, Cr 0.9, LFT WNL, WBC 3, HGB 15.2, Plt 161, CRP <1 mg/L, QuantiFERON plus neg, ESR 2  3/31/22: Cr 0.93, LFT WNL, WBC 3, HGB 15.2, Plt 167  22: WBC 3.5, HGB 15.4, Plt 162, HDL 79, , Cholesterol 210, Cr 0.89, LFT WNL, Prostate specific Arg 1.6  10/18/21: CRP<1  21: WBC 8.1, Hgb 14.7, Plt 175; Cr 0.87  21: Tbili 0.6, AST 11, ALT 21, AlkP 71  21: Cr 0.90,  AST 42, ALT 42, AlkP 57, Tbili 0.5, Alb 4.4  10/2020: WBC 3.6 (ANC 2400, ), CBC ow WNL; CMP WNL  2019: cbc, cmp unremarkable, ESR, CRP normal, HEpB, C, quant gold negative  10/18: WBC 4.1, Hb 15.2, Plt 194, CMP Normal, CRP, ESR negative  : Hep C negative, HLA b27 negative, quant gold negative    Imagin/8/22: CT ABD PELV W CONT: No evidence of local tumor recurrence or distant metastatic disease in the chest, abdomen, or pelvis. Small hepatic and right renal cysts demonstrate no significant change; these do not require imaging follow-up. 22: CT Chest W CONT: No evidence of local tumor recurrence or distant metastatic disease in the chest, abdomen, or pelvis. Small hepatic and right renal cysts demonstrate nosignificant change; these do not require imaging follow-up. 6/10/21 Nuclear stress:  SPECT: Left ventricular function post-stress was abnormal. Calculated ejection fraction is 41%. There is no evidence of transient ischemic dilation (TID). The TID ratio is 1.03. Baseline ECG: Normal EKG. SPECT: Left ventricular perfusion is probably normal. Myocardial perfusion imaging supports a low risk stress test.  5/3/21: CT chest with: No typical CT evidence of metastatic colon neoplasm in the thorax or visualized upper abdomen.  Clear lungs, no hilar LAD.  20 CT abd/pelvis: No evidence of recurrent or metastatic disease. SI Joints (2/2019): Personally reviewed images. Normal joints  Foot xrays (2/2019): Personally reviewed images. No erosions  Hand xrays (2/2019): Personally reviewed images. + DJD. No erosions  1/16: CXR normal    Pathology:  Rectal biopsy (4/2019): well differentiated invasive colonic adenocarcinoma    ASSESSMENT AND PLAN    A 46 y.o. male with hx of psoriatic arthritis on apremilast, rectal adenocarcinoma s/p chemo/RT a little over 3 years ago presents for a follow up visit. His arthritis remains well controlled since the addition of apremilast even with stopping sulfasalazine, and he does not find the residual knee and shin plaques bothersome. We discussed some of the alternative biologics such as IL-17 inhibitors which would be relatively low risk in terms of cancer recurrence at this point, but we will hold off on changing his biologic until he can establish with his new Rheumatologist with my moving out of the area in July. # Psoriatic arthritis:    - Cont apremilast 30mg bid    # Psoriasis:   - Apremilast as above  - Continue fluocinonide or clobetasol as per insurance preferences, as needed for breakthrough plaques    # Rectal cancer:   - in remission for now, cont follow-up with Dr. Ramon Hewitt in oncology    # Medication Toxicity Monitoring:  - cbc, cmp u2aq--ei to date through oncology currently  - Hepatitis B, C: negative 2/2019  - Quant gold: negative 2/2019  - Immunizations: UTD    Patient Instructions   Continue clobetasol to plaques twice a day as needed. Avoid use on face, armpits, or genitals. Reach out to the practices listed in our letter, to schedule ASAP for continued followup. You are getting far enough out from your cancer treatment, that you may be comfortable exploring more effective options for psoriatic arthritis treatment. Call if you have any problems before July and we can help troubleshoot.          TODAY'S ORDERS  Orders Placed This Encounter    clobetasoL (TEMOVATE) 0.05 % ointment     Sig: Apply  to affected area two (2) times a day. Dispense:  30 g     Refill:  2    apremilast (Otezla) 30 mg tab     Sig: Take 1 Tablet by mouth two (2) times a day. Dispense:  60 Tablet     Refill:  5       No future appointments.     Face to face time: 20 minutes  Note preparation and records review day of service: 12 minutes  Total provider time day of service: 32 minutes    Ben Holliday MD    Adult Rheumatology   [facility] [address], [address] [place] [address]   Phone [phone]  Fax [phone]

## 2023-05-09 DIAGNOSIS — C20 RECTAL CANCER (HCC): Primary | ICD-10-CM

## 2023-05-22 DIAGNOSIS — M05.9 RHEUMATOID ARTHRITIS WITH RHEUMATOID FACTOR, UNSPECIFIED (HCC): ICD-10-CM

## 2023-05-22 DIAGNOSIS — L40.9 PSORIASIS, UNSPECIFIED: Primary | ICD-10-CM

## 2023-05-22 DIAGNOSIS — L40.50 ARTHROPATHIC PSORIASIS, UNSPECIFIED (HCC): ICD-10-CM

## 2023-05-22 DIAGNOSIS — Z79.899 OTHER LONG TERM (CURRENT) DRUG THERAPY: ICD-10-CM

## 2023-06-20 RX ORDER — LOSARTAN POTASSIUM 100 MG/1
TABLET ORAL
Qty: 90 TABLET | Refills: 3 | Status: SHIPPED | OUTPATIENT
Start: 2023-06-20

## 2023-07-24 ENCOUNTER — OFFICE VISIT (OUTPATIENT)
Facility: CLINIC | Age: 53
End: 2023-07-24
Payer: MEDICAID

## 2023-07-24 VITALS
RESPIRATION RATE: 16 BRPM | OXYGEN SATURATION: 98 % | HEART RATE: 69 BPM | WEIGHT: 149 LBS | DIASTOLIC BLOOD PRESSURE: 74 MMHG | HEIGHT: 67 IN | BODY MASS INDEX: 23.39 KG/M2 | TEMPERATURE: 98.2 F | SYSTOLIC BLOOD PRESSURE: 113 MMHG

## 2023-07-24 DIAGNOSIS — E78.00 ELEVATED CHOLESTEROL: ICD-10-CM

## 2023-07-24 DIAGNOSIS — S63.692A OTHER SPRAIN OF RIGHT MIDDLE FINGER, INITIAL ENCOUNTER: Primary | ICD-10-CM

## 2023-07-24 PROCEDURE — 3078F DIAST BP <80 MM HG: CPT | Performed by: FAMILY MEDICINE

## 2023-07-24 PROCEDURE — 99214 OFFICE O/P EST MOD 30 MIN: CPT | Performed by: FAMILY MEDICINE

## 2023-07-24 PROCEDURE — 3074F SYST BP LT 130 MM HG: CPT | Performed by: FAMILY MEDICINE

## 2023-07-24 RX ORDER — NAPROXEN 500 MG/1
500 TABLET ORAL 2 TIMES DAILY WITH MEALS
Qty: 60 TABLET | Refills: 3 | Status: SHIPPED | OUTPATIENT
Start: 2023-07-24

## 2023-07-24 SDOH — ECONOMIC STABILITY: FOOD INSECURITY: WITHIN THE PAST 12 MONTHS, THE FOOD YOU BOUGHT JUST DIDN'T LAST AND YOU DIDN'T HAVE MONEY TO GET MORE.: NEVER TRUE

## 2023-07-24 SDOH — ECONOMIC STABILITY: INCOME INSECURITY: HOW HARD IS IT FOR YOU TO PAY FOR THE VERY BASICS LIKE FOOD, HOUSING, MEDICAL CARE, AND HEATING?: NOT VERY HARD

## 2023-07-24 SDOH — ECONOMIC STABILITY: HOUSING INSECURITY
IN THE LAST 12 MONTHS, WAS THERE A TIME WHEN YOU DID NOT HAVE A STEADY PLACE TO SLEEP OR SLEPT IN A SHELTER (INCLUDING NOW)?: NO

## 2023-07-24 SDOH — ECONOMIC STABILITY: FOOD INSECURITY: WITHIN THE PAST 12 MONTHS, YOU WORRIED THAT YOUR FOOD WOULD RUN OUT BEFORE YOU GOT MONEY TO BUY MORE.: NEVER TRUE

## 2023-07-24 SDOH — ECONOMIC STABILITY: TRANSPORTATION INSECURITY
IN THE PAST 12 MONTHS, HAS LACK OF TRANSPORTATION KEPT YOU FROM MEETINGS, WORK, OR FROM GETTING THINGS NEEDED FOR DAILY LIVING?: NO

## 2023-07-24 NOTE — PROGRESS NOTES
Chief Complaint   Patient presents with    Abnormal Lab    Shoulder Pain     Patient is here for left shoulder pain     Finger Pain     Patient is here for right middle finger pain. he is a 46y.o. year old male who presents for evaluation of right middle finger   Pain Assessment Encounter      True Standard  7/24/2023  Onset of Symptoms: Patient states he has had pain for weeks. Patient states that he was pulling aquatic plants and felt pain in his right middle finger  ________________________________________________________________________  Description:Patient states no injures     Frequency: 4-5 times a day  Pain Scale:(1-10): 3  Trauma Hx: none  Hx of similar symptoms: No:   Radiation: No: ,   Duration:  continuous      Progression: has worsened  What makes it better?: heat and OTC meds  What makes it worse?:strecthing  Medications tried: acetaminophen    Reviewed and agree with Nurse Note and duplicated in this note. Reviewed PmHx, RxHx, FmHx, SocHx, AllgHx and updated and dated in the chart. Family History   Adopted: Yes   Problem Relation Age of Onset    Hypertension Neg Hx     Asthma Neg Hx     Cancer Neg Hx     Diabetes Neg Hx     Heart Disease Neg Hx     Stroke Neg Hx        Past Medical History:   Diagnosis Date    Adopted     COVID-19 vaccine series completed 04/09/2021    Pfizer dose #2 administered on 4/9/2021. GERD (gastroesophageal reflux disease)     Hypertension     Ill-defined condition     CHEMO-LAST ON 2/19/2020    Psoriasis     Psoriatic arthritis ([address])     Rectal cancer ([address]) 2019    Moderately differentiated [id number] adenocarcinoma of the rectum. Treated with neoadjuvant chemoradiation, resection, and adjuvant chemotherapy. Social History     Socioeconomic History    Marital status:    Tobacco Use    Smoking status: Never    Smokeless tobacco: Never   Substance and Sexual Activity    Alcohol use: Yes     Alcohol/week: 6.0 standard drinks    Drug use:  No

## 2023-07-25 LAB
CHOLEST SERPL-MCNC: 189 MG/DL (ref 100–199)
HDLC SERPL-MCNC: 80 MG/DL
LDLC SERPL CALC-MCNC: 95 MG/DL (ref 0–99)
TRIGL SERPL-MCNC: 80 MG/DL (ref 0–149)
VLDLC SERPL CALC-MCNC: 14 MG/DL (ref 5–40)

## 2023-10-23 ENCOUNTER — TELEPHONE (OUTPATIENT)
Age: 53
End: 2023-10-23

## 2023-10-23 NOTE — TELEPHONE ENCOUNTER
Reach out to patient.   He has not done his lab work but needs labs sent to [facility] in the [place] of [address]

## 2023-11-07 LAB
ALBUMIN SERPL-MCNC: 4.3 G/DL (ref 3.8–4.9)
ALBUMIN/GLOB SERPL: 1.8 {RATIO} (ref 1.2–2.2)
ALP SERPL-CCNC: 51 IU/L (ref 44–121)
ALT SERPL-CCNC: 30 IU/L (ref 0–44)
AST SERPL-CCNC: 30 IU/L (ref 0–40)
BASOPHILS # BLD AUTO: 0 X10E3/UL (ref 0–0.2)
BASOPHILS NFR BLD AUTO: 0 %
BILIRUB SERPL-MCNC: 0.4 MG/DL (ref 0–1.2)
BUN SERPL-MCNC: 12 MG/DL (ref 6–24)
BUN/CREAT SERPL: 13 (ref 9–20)
CALCIUM SERPL-MCNC: 9.1 MG/DL (ref 8.7–10.2)
CEA SERPL-MCNC: 2.1 NG/ML (ref 0–4.7)
CHLORIDE SERPL-SCNC: 103 MMOL/L (ref 96–106)
CO2 SERPL-SCNC: 22 MMOL/L (ref 20–29)
CREAT SERPL-MCNC: 0.91 MG/DL (ref 0.76–1.27)
EGFRCR SERPLBLD CKD-EPI 2021: 100 ML/MIN/1.73
EOSINOPHIL # BLD AUTO: 0.1 X10E3/UL (ref 0–0.4)
EOSINOPHIL NFR BLD AUTO: 2 %
ERYTHROCYTE [DISTWIDTH] IN BLOOD BY AUTOMATED COUNT: 12.9 % (ref 11.6–15.4)
GLOBULIN SER CALC-MCNC: 2.4 G/DL (ref 1.5–4.5)
GLUCOSE SERPL-MCNC: 152 MG/DL (ref 70–99)
HCT VFR BLD AUTO: 42.2 % (ref 37.5–51)
HGB BLD-MCNC: 14.9 G/DL (ref 13–17.7)
IMM GRANULOCYTES # BLD AUTO: 0 X10E3/UL (ref 0–0.1)
IMM GRANULOCYTES NFR BLD AUTO: 0 %
LYMPHOCYTES # BLD AUTO: 0.5 X10E3/UL (ref 0.7–3.1)
LYMPHOCYTES NFR BLD AUTO: 14 %
MCH RBC QN AUTO: 33.1 PG (ref 26.6–33)
MCHC RBC AUTO-ENTMCNC: 35.3 G/DL (ref 31.5–35.7)
MCV RBC AUTO: 94 FL (ref 79–97)
MONOCYTES # BLD AUTO: 0.4 X10E3/UL (ref 0.1–0.9)
MONOCYTES NFR BLD AUTO: 11 %
NEUTROPHILS # BLD AUTO: 2.4 X10E3/UL (ref 1.4–7)
NEUTROPHILS NFR BLD AUTO: 73 %
PLATELET # BLD AUTO: 157 X10E3/UL (ref 150–450)
POTASSIUM SERPL-SCNC: 4.1 MMOL/L (ref 3.5–5.2)
PROT SERPL-MCNC: 6.7 G/DL (ref 6–8.5)
RBC # BLD AUTO: 4.5 X10E6/UL (ref 4.14–5.8)
SODIUM SERPL-SCNC: 141 MMOL/L (ref 134–144)
WBC # BLD AUTO: 3.3 X10E3/UL (ref 3.4–10.8)

## 2023-11-09 ENCOUNTER — OFFICE VISIT (OUTPATIENT)
Age: 53
End: 2023-11-09
Payer: MEDICARE

## 2023-11-09 VITALS
OXYGEN SATURATION: 98 % | HEIGHT: 67 IN | DIASTOLIC BLOOD PRESSURE: 71 MMHG | BODY MASS INDEX: 23.7 KG/M2 | SYSTOLIC BLOOD PRESSURE: 109 MMHG | WEIGHT: 151 LBS | RESPIRATION RATE: 18 BRPM | HEART RATE: 76 BPM | TEMPERATURE: 98.1 F

## 2023-11-09 DIAGNOSIS — C20 MALIGNANT NEOPLASM OF RECTUM (HCC): Primary | ICD-10-CM

## 2023-11-09 PROCEDURE — 3017F COLORECTAL CA SCREEN DOC REV: CPT | Performed by: INTERNAL MEDICINE

## 2023-11-09 PROCEDURE — 1036F TOBACCO NON-USER: CPT | Performed by: INTERNAL MEDICINE

## 2023-11-09 PROCEDURE — G8420 CALC BMI NORM PARAMETERS: HCPCS | Performed by: INTERNAL MEDICINE

## 2023-11-09 PROCEDURE — 99213 OFFICE O/P EST LOW 20 MIN: CPT | Performed by: INTERNAL MEDICINE

## 2023-11-09 PROCEDURE — 3078F DIAST BP <80 MM HG: CPT | Performed by: INTERNAL MEDICINE

## 2023-11-09 PROCEDURE — 3074F SYST BP LT 130 MM HG: CPT | Performed by: INTERNAL MEDICINE

## 2023-11-09 PROCEDURE — G8484 FLU IMMUNIZE NO ADMIN: HCPCS | Performed by: INTERNAL MEDICINE

## 2023-11-09 PROCEDURE — G8427 DOCREV CUR MEDS BY ELIG CLIN: HCPCS | Performed by: INTERNAL MEDICINE

## 2023-11-09 ASSESSMENT — PATIENT HEALTH QUESTIONNAIRE - PHQ9
2. FEELING DOWN, DEPRESSED OR HOPELESS: 0
1. LITTLE INTEREST OR PLEASURE IN DOING THINGS: 0
SUM OF ALL RESPONSES TO PHQ QUESTIONS 1-9: 0
SUM OF ALL RESPONSES TO PHQ9 QUESTIONS 1 & 2: 0
SUM OF ALL RESPONSES TO PHQ QUESTIONS 1-9: 0

## 2023-11-09 NOTE — PROGRESS NOTES
[facility] at [address] , [address]   W: [phone]  F: [phone]          Reason for Visit:     Kelsey Sorto is a 46 y.o.  male who is seen in follow-up for Rectal cancer- likely stage III- GOLDIE          Treatment History:      4/26/19: Colonoscopy- 6-7 cm size malignant appearing mass seen at 10 cm from anal verge. This was adenocarcinoma. 3 polyps removed- all were  tubular adenomas    Rectal Ultrasound 5/2/19: ulcerated infiltrative mass lesion was noted in rectum at  10 cm. The lesion measured about 5 cm X 4 cm- T2, 13 mm X 8 mm oblong lymph node was noted immediately adjacent to the mass lesion    5/2/19: CT CAP- No metastasis, liver cysts. CEA 3.6    5/10/19: PET CT showed rectal malignancy and 3 small perirectal liz metastatic focir    5/28/19-7/8/29: Xeloda + RT     9/24/19: LAR-  [id number], 2/6 positive nodes    10/14/19: CT CAP with no evidence of metastatic disease, liver cysts     10/23/19- 2/19/2020-: 8 cycles of  FOLFOX. Several delays and dose reductions due to cytopenias    12/11/19 CT AP: hypodensity in liver, otherwise ANNE MARIE     1/6/20 MRI abd: no liver mets    3/30/2020: CT ANNE MARIE    CT CAP 10/2021: ANNE MARIE , CT 4/8/2022-no evidence of disease          History of Present Illness:     Patient is a 46 y.o. male seen for adenocarcinoma of the mid third of  rectum      He had intermittent BRBPR x 1 year and then decided to have a colonoscopy. This led to above mentioned diagnosis. He received neoadjuvant xeloda+ RT followed by LAR. Completed 8 cycles of FOLFOX, had a colostomy and is on surveillance. He has his Colonoscopy 1/2023. He is ok. Had a hernia surgery. He has no new pain, bleeding, cough. He uses 7 drinks a week. Adopted            Review of Systems: A complete review of systems was obtained, negative except as described above.           Physical Exam:        Constitutional: Appears well-developed and

## 2024-02-19 ENCOUNTER — OFFICE VISIT (OUTPATIENT)
Facility: CLINIC | Age: 54
End: 2024-02-19
Payer: MEDICAID

## 2024-02-19 VITALS
DIASTOLIC BLOOD PRESSURE: 89 MMHG | BODY MASS INDEX: 24.96 KG/M2 | TEMPERATURE: 98 F | RESPIRATION RATE: 16 BRPM | HEIGHT: 67 IN | SYSTOLIC BLOOD PRESSURE: 131 MMHG | WEIGHT: 159 LBS | HEART RATE: 75 BPM | OXYGEN SATURATION: 100 %

## 2024-02-19 DIAGNOSIS — Z00.00 VISIT FOR WELL MAN HEALTH CHECK: Primary | ICD-10-CM

## 2024-02-19 DIAGNOSIS — I10 BENIGN ESSENTIAL HYPERTENSION: ICD-10-CM

## 2024-02-19 LAB
ALBUMIN SERPL-MCNC: 3.8 G/DL (ref 3.5–5)
ALBUMIN/GLOB SERPL: 1.2 (ref 1.1–2.2)
ALP SERPL-CCNC: 48 U/L (ref 45–117)
ALT SERPL-CCNC: 26 U/L (ref 12–78)
ANION GAP SERPL CALC-SCNC: 5 MMOL/L (ref 5–15)
AST SERPL-CCNC: 14 U/L (ref 15–37)
BASOPHILS # BLD: 0 K/UL (ref 0–0.1)
BASOPHILS NFR BLD: 1 % (ref 0–1)
BILIRUB SERPL-MCNC: 0.5 MG/DL (ref 0.2–1)
BUN SERPL-MCNC: 12 MG/DL (ref 6–20)
BUN/CREAT SERPL: 12 (ref 12–20)
CALCIUM SERPL-MCNC: 9.4 MG/DL (ref 8.5–10.1)
CHLORIDE SERPL-SCNC: 106 MMOL/L (ref 97–108)
CHOLEST SERPL-MCNC: 189 MG/DL
CO2 SERPL-SCNC: 28 MMOL/L (ref 21–32)
CREAT SERPL-MCNC: 1.02 MG/DL (ref 0.7–1.3)
DIFFERENTIAL METHOD BLD: ABNORMAL
EOSINOPHIL # BLD: 0.1 K/UL (ref 0–0.4)
EOSINOPHIL NFR BLD: 3 % (ref 0–7)
ERYTHROCYTE [DISTWIDTH] IN BLOOD BY AUTOMATED COUNT: 12.1 % (ref 11.5–14.5)
GLOBULIN SER CALC-MCNC: 3.1 G/DL (ref 2–4)
GLUCOSE SERPL-MCNC: 124 MG/DL (ref 65–100)
HCT VFR BLD AUTO: 45.5 % (ref 36.6–50.3)
HDLC SERPL-MCNC: 89 MG/DL
HDLC SERPL: 2.1 (ref 0–5)
HGB BLD-MCNC: 15.2 G/DL (ref 12.1–17)
IMM GRANULOCYTES # BLD AUTO: 0 K/UL (ref 0–0.04)
IMM GRANULOCYTES NFR BLD AUTO: 0 % (ref 0–0.5)
LDLC SERPL CALC-MCNC: 81.2 MG/DL (ref 0–100)
LYMPHOCYTES # BLD: 0.7 K/UL (ref 0.8–3.5)
LYMPHOCYTES NFR BLD: 17 % (ref 12–49)
MCH RBC QN AUTO: 33.1 PG (ref 26–34)
MCHC RBC AUTO-ENTMCNC: 33.4 G/DL (ref 30–36.5)
MCV RBC AUTO: 99.1 FL (ref 80–99)
MONOCYTES # BLD: 0.4 K/UL (ref 0–1)
MONOCYTES NFR BLD: 9 % (ref 5–13)
NEUTS SEG # BLD: 2.7 K/UL (ref 1.8–8)
NEUTS SEG NFR BLD: 70 % (ref 32–75)
NRBC # BLD: 0 K/UL (ref 0–0.01)
NRBC BLD-RTO: 0 PER 100 WBC
PLATELET # BLD AUTO: 171 K/UL (ref 150–400)
PMV BLD AUTO: 10.8 FL (ref 8.9–12.9)
POTASSIUM SERPL-SCNC: 4.3 MMOL/L (ref 3.5–5.1)
PROT SERPL-MCNC: 6.9 G/DL (ref 6.4–8.2)
PSA SERPL-MCNC: 2 NG/ML (ref 0.01–4)
RBC # BLD AUTO: 4.59 M/UL (ref 4.1–5.7)
RBC MORPH BLD: ABNORMAL
SODIUM SERPL-SCNC: 139 MMOL/L (ref 136–145)
TRIGL SERPL-MCNC: 94 MG/DL
VLDLC SERPL CALC-MCNC: 18.8 MG/DL
WBC # BLD AUTO: 3.9 K/UL (ref 4.1–11.1)

## 2024-02-19 PROCEDURE — 99213 OFFICE O/P EST LOW 20 MIN: CPT | Performed by: FAMILY MEDICINE

## 2024-02-19 PROCEDURE — 3075F SYST BP GE 130 - 139MM HG: CPT | Performed by: FAMILY MEDICINE

## 2024-02-19 PROCEDURE — 3079F DIAST BP 80-89 MM HG: CPT | Performed by: FAMILY MEDICINE

## 2024-02-19 PROCEDURE — 99396 PREV VISIT EST AGE 40-64: CPT | Performed by: FAMILY MEDICINE

## 2024-02-19 ASSESSMENT — PATIENT HEALTH QUESTIONNAIRE - PHQ9
SUM OF ALL RESPONSES TO PHQ QUESTIONS 1-9: 0
SUM OF ALL RESPONSES TO PHQ QUESTIONS 1-9: 0
1. LITTLE INTEREST OR PLEASURE IN DOING THINGS: 0
SUM OF ALL RESPONSES TO PHQ QUESTIONS 1-9: 0
2. FEELING DOWN, DEPRESSED OR HOPELESS: 0
SUM OF ALL RESPONSES TO PHQ9 QUESTIONS 1 & 2: 0
SUM OF ALL RESPONSES TO PHQ QUESTIONS 1-9: 0

## 2024-02-19 NOTE — PROGRESS NOTES
Chief Complaint   Patient presents with    Annual Exam     Patient is here for a wellness visit.      he is a 53 y.o. year old male who presents for CPE.  Complete Physical Exam Questions:    1.  Do you follow a low fat diet?  yes  2.  Are you up to date on your Tdap (<10 years)?  Yes  3.  Have you ever had a Pneumovax vaccine (>65)?  Unknown   PCV13 Unknown   PPSV23 Unknown  4.  Have you had Zoster vaccine (>60)? Yes  5.  Have you had the HPV - Gardasil (13- 26)? No  6.  Do you follow an exercise program?  Yes  7.  Do you smoke?  No If > 65 and smoker, have you had a abdominal aortic aneurysm ultrasound screen?  No  8.  Do you consider yourself overweight?  No  9.  Is there a family history of CAD< age 50?  Unknown  10.  Is there a family history of Cancer?  Unknown  11.  Do you know your Cancer risks? Unknown  12.  Have you had a colonoscopy?  Yes  13. Have you been tested for HIV or other STI's? No HIV today(18-66 y/o)?No   14.  Have you had an EKG in the last five years(>50)?Yes  15.  Have you had a PSA test done this year (50-69)? No    Other complaints: none    Reviewed and agree with Nurse Note and duplicated in this note.  Reviewed PmHx, RxHx, FmHx, SocHx, AllgHx and updated and dated in the chart.    Family History   Adopted: Yes   Problem Relation Age of Onset    Unknown Mother         Adopted    Hypertension Neg Hx     Asthma Neg Hx     Cancer Neg Hx     Diabetes Neg Hx     Heart Disease Neg Hx     Stroke Neg Hx        Past Medical History:   Diagnosis Date    Adopted     Cancer (HCC) April 2019    Rectal    COVID-19 vaccine series completed 04/09/2021    Pfizer dose #2 administered on 4/9/2021.    GERD (gastroesophageal reflux disease)     Hypertension     Ill-defined condition     CHEMO-LAST ON 2/19/2020    Psoriasis     Psoriatic arthritis (HCC)     Rectal cancer (HCC) 2019    Moderately differentiated [id number] adenocarcinoma of the rectum. Treated with neoadjuvant chemoradiation, resection, and

## 2024-02-20 DIAGNOSIS — R73.9 ELEVATED BLOOD SUGAR: Primary | ICD-10-CM

## 2024-05-07 ENCOUNTER — TELEPHONE (OUTPATIENT)
Age: 54
End: 2024-05-07

## 2024-05-08 DIAGNOSIS — C20 MALIGNANT NEOPLASM OF RECTUM (HCC): ICD-10-CM

## 2024-05-20 NOTE — PROGRESS NOTES
Pt aware. As certified below, I, or a nurse practitioner or physician assistant working with me, had a face-to-face encounter that meets the physician face-to-face encounter requirements.

## 2024-05-30 ENCOUNTER — TELEPHONE (OUTPATIENT)
Age: 54
End: 2024-05-30

## 2024-05-30 ENCOUNTER — HOSPITAL ENCOUNTER (OUTPATIENT)
Facility: HOSPITAL | Age: 54
Discharge: HOME | End: 2024-05-30
Attending: INTERNAL MEDICINE
Payer: MEDICAID

## 2024-05-30 DIAGNOSIS — C20 MALIGNANT NEOPLASM OF RECTUM (HCC): ICD-10-CM

## 2024-05-30 LAB
BASOPHILS # BLD AUTO: 0 X10E3/UL (ref 0–0.2)
BASOPHILS NFR BLD AUTO: 0 %
EOSINOPHIL # BLD AUTO: 0.1 X10E3/UL (ref 0–0.4)
EOSINOPHIL NFR BLD AUTO: 2 %
ERYTHROCYTE [DISTWIDTH] IN BLOOD BY AUTOMATED COUNT: 13 % (ref 11.6–15.4)
HCT VFR BLD AUTO: 44.6 % (ref 37.5–51)
HGB BLD-MCNC: 14.7 G/DL (ref 13–17.7)
IMM GRANULOCYTES # BLD AUTO: 0 X10E3/UL (ref 0–0.1)
IMM GRANULOCYTES NFR BLD AUTO: 0 %
LYMPHOCYTES # BLD AUTO: 0.6 X10E3/UL (ref 0.7–3.1)
LYMPHOCYTES NFR BLD AUTO: 21 %
MCH RBC QN AUTO: 32.7 PG (ref 26.6–33)
MCHC RBC AUTO-ENTMCNC: 33 G/DL (ref 31.5–35.7)
MCV RBC AUTO: 99 FL (ref 79–97)
MONOCYTES # BLD AUTO: 0.3 X10E3/UL (ref 0.1–0.9)
MONOCYTES NFR BLD AUTO: 9 %
NEUTROPHILS # BLD AUTO: 2.1 X10E3/UL (ref 1.4–7)
NEUTROPHILS NFR BLD AUTO: 68 %
PLATELET # BLD AUTO: 165 X10E3/UL (ref 150–450)
RBC # BLD AUTO: 4.5 X10E6/UL (ref 4.14–5.8)
WBC # BLD AUTO: 3 X10E3/UL (ref 3.4–10.8)

## 2024-05-30 PROCEDURE — 6360000004 HC RX CONTRAST MEDICATION: Performed by: INTERNAL MEDICINE

## 2024-05-30 PROCEDURE — 71260 CT THORAX DX C+: CPT

## 2024-05-30 RX ADMIN — IOPAMIDOL 100 ML: 755 INJECTION, SOLUTION INTRAVENOUS at 11:30

## 2024-05-30 NOTE — TELEPHONE ENCOUNTER
Patient LVM. Stated he is scheduled for a CT scan this morning. Pt stated that the scan was only ordered with IV contrast. Stated he's always gotten a CT scan with oral contrast. Pt stated he wanted to make sure that it was not an oversight, and that it would not affect him being able to get the CT scan done.    #[phone]

## 2024-05-30 NOTE — TELEPHONE ENCOUNTER
[id number]  R/t call to pt HIPAA verified x2  Made pt aware this will not alter his results. Pt stated they made the decision to also use oral contrast as well. Made pt aware that is fine and if he needs anything before his next appt to contact the office with any questions or concerns. Pt voiced understanding and was thankful for my call.

## 2024-05-31 LAB
ALBUMIN SERPL-MCNC: 4.4 G/DL (ref 3.8–4.9)
ALBUMIN/GLOB SERPL: 1.8 {RATIO} (ref 1.2–2.2)
ALP SERPL-CCNC: 57 IU/L (ref 44–121)
ALT SERPL-CCNC: 14 IU/L (ref 0–44)
AST SERPL-CCNC: 19 IU/L (ref 0–40)
BILIRUB SERPL-MCNC: 0.5 MG/DL (ref 0–1.2)
BUN SERPL-MCNC: 10 MG/DL (ref 6–24)
BUN/CREAT SERPL: 11 (ref 9–20)
CALCIUM SERPL-MCNC: 9.1 MG/DL (ref 8.7–10.2)
CEA SERPL-MCNC: 2.6 NG/ML (ref 0–4.7)
CHLORIDE SERPL-SCNC: 100 MMOL/L (ref 96–106)
CO2 SERPL-SCNC: 24 MMOL/L (ref 20–29)
CREAT SERPL-MCNC: 0.88 MG/DL (ref 0.76–1.27)
EGFRCR SERPLBLD CKD-EPI 2021: 102 ML/MIN/1.73
GLOBULIN SER CALC-MCNC: 2.5 G/DL (ref 1.5–4.5)
GLUCOSE SERPL-MCNC: 84 MG/DL (ref 70–99)
POTASSIUM SERPL-SCNC: 4.2 MMOL/L (ref 3.5–5.2)
PROT SERPL-MCNC: 6.9 G/DL (ref 6–8.5)
SODIUM SERPL-SCNC: 138 MMOL/L (ref 134–144)

## 2024-06-12 ENCOUNTER — OFFICE VISIT (OUTPATIENT)
Age: 54
End: 2024-06-12
Payer: MEDICAID

## 2024-06-12 VITALS
RESPIRATION RATE: 18 BRPM | BODY MASS INDEX: 23.18 KG/M2 | OXYGEN SATURATION: 97 % | HEART RATE: 69 BPM | SYSTOLIC BLOOD PRESSURE: 110 MMHG | TEMPERATURE: 98.4 F | DIASTOLIC BLOOD PRESSURE: 67 MMHG | WEIGHT: 148 LBS

## 2024-06-12 DIAGNOSIS — C20 MALIGNANT NEOPLASM OF RECTUM (HCC): Primary | ICD-10-CM

## 2024-06-12 PROCEDURE — 3074F SYST BP LT 130 MM HG: CPT | Performed by: INTERNAL MEDICINE

## 2024-06-12 PROCEDURE — 99214 OFFICE O/P EST MOD 30 MIN: CPT | Performed by: INTERNAL MEDICINE

## 2024-06-12 PROCEDURE — 3078F DIAST BP <80 MM HG: CPT | Performed by: INTERNAL MEDICINE

## 2024-06-12 RX ORDER — CLOBETASOL PROPIONATE 0.5 MG/G
OINTMENT TOPICAL 2 TIMES DAILY
COMMUNITY
Start: 2023-05-01

## 2024-06-12 NOTE — PROGRESS NOTES
[facility] [place] at [facility]   [address], [address] [place] [address]   W: [phone]  F: [phone]          Reason for Visit:     Tom Perry is a 53 y.o.  male who is seen in follow-up for Rectal cancer- likely stage III- GOLDIE          Treatment History:      4/26/19: Colonoscopy- 6-7 cm size malignant appearing mass seen at 10 cm from anal verge. This was adenocarcinoma. 3 polyps removed- all were  tubular adenomas    Rectal Ultrasound 5/2/19: ulcerated infiltrative mass lesion was noted in rectum at  10 cm. The lesion measured about 5 cm X 4 cm- T2, 13 mm X 8 mm oblong lymph node was noted immediately adjacent to the mass lesion    5/2/19: CT CAP- No metastasis, liver cysts. CEA 3.6    5/10/19: PET CT showed rectal malignancy and 3 small perirectal liz metastatic focir    5/28/19-7/8/29: Xeloda + RT     9/24/19: LAR-  [id number], 2/6 positive nodes    10/14/19: CT CAP with no evidence of metastatic disease, liver cysts     10/23/19- 2/19/2020-: 8 cycles of  FOLFOX. Several delays and dose reductions due to cytopenias    12/11/19 CT AP: hypodensity in liver, otherwise ANNE MARIE     1/6/20 MRI abd: no liver mets    3/30/2020: CT ANNE MARIE    CT CAP 10/2021: ANNE MARIE , CT 4/8/2022-no evidence of disease          History of Present Illness:     Patient is a 53 y.o. male seen for adenocarcinoma of the mid third of  rectum      He had intermittent BRBPR x 1 year and then decided to have a colonoscopy. This led to above mentioned diagnosis. He received neoadjuvant xeloda+ RT followed by LAR. Completed 8 cycles of FOLFOX, had a colostomy and is on surveillance.       He has his Colonoscopy 1/2023.   He is ok. Had a hernia surgery. He has no new pain, bleeding, cough.      He uses 7 drinks a week.      Adopted            Review of Systems: A complete review of systems was obtained, negative except as described above.          Physical Exam:        Constitutional: Appears well-developed and

## 2024-06-12 NOTE — PROGRESS NOTES
Tom Perry is a 54 y.o. male    Chief Complaint   Patient presents with    Follow-up     Rectal cancer- likely stage III- GOLDIE       1. Have you been to the ER, urgent care clinic since your last visit?  Hospitalized since your last visit?No    2. Have you seen or consulted any other health care providers outside of the [facility] since your last visit?  Include any pap smears or colon screening. Yes, Rheumatology

## 2024-06-13 RX ORDER — NAPROXEN 500 MG/1
500 TABLET ORAL 2 TIMES DAILY WITH MEALS
Qty: 60 TABLET | Refills: 3 | Status: SHIPPED | OUTPATIENT
Start: 2024-06-13

## 2024-07-08 RX ORDER — LOSARTAN POTASSIUM 100 MG/1
TABLET ORAL
Qty: 90 TABLET | Refills: 0 | Status: SHIPPED | OUTPATIENT
Start: 2024-07-08

## 2024-09-13 ENCOUNTER — OFFICE VISIT (OUTPATIENT)
Facility: CLINIC | Age: 54
End: 2024-09-13
Payer: MEDICAID

## 2024-09-13 VITALS
DIASTOLIC BLOOD PRESSURE: 76 MMHG | OXYGEN SATURATION: 98 % | SYSTOLIC BLOOD PRESSURE: 115 MMHG | RESPIRATION RATE: 16 BRPM | WEIGHT: 145 LBS | HEART RATE: 67 BPM | HEIGHT: 67 IN | TEMPERATURE: 98.5 F | BODY MASS INDEX: 22.76 KG/M2

## 2024-09-13 DIAGNOSIS — I10 BENIGN ESSENTIAL HYPERTENSION: Primary | ICD-10-CM

## 2024-09-13 LAB
ALBUMIN SERPL-MCNC: 4 G/DL (ref 3.5–5)
ALBUMIN/GLOB SERPL: 1.4 (ref 1.1–2.2)
ALP SERPL-CCNC: 59 U/L (ref 45–117)
ALT SERPL-CCNC: 22 U/L (ref 12–78)
ANION GAP SERPL CALC-SCNC: 3 MMOL/L (ref 2–12)
AST SERPL-CCNC: 12 U/L (ref 15–37)
BILIRUB SERPL-MCNC: 0.4 MG/DL (ref 0.2–1)
BUN SERPL-MCNC: 10 MG/DL (ref 6–20)
BUN/CREAT SERPL: 9 (ref 12–20)
CALCIUM SERPL-MCNC: 9.4 MG/DL (ref 8.5–10.1)
CHLORIDE SERPL-SCNC: 107 MMOL/L (ref 97–108)
CO2 SERPL-SCNC: 29 MMOL/L (ref 21–32)
CREAT SERPL-MCNC: 1.07 MG/DL (ref 0.7–1.3)
EST. AVERAGE GLUCOSE BLD GHB EST-MCNC: 111 MG/DL
GLOBULIN SER CALC-MCNC: 2.8 G/DL (ref 2–4)
GLUCOSE SERPL-MCNC: 78 MG/DL (ref 65–100)
HBA1C MFR BLD: 5.5 % (ref 4–5.6)
POTASSIUM SERPL-SCNC: 4.2 MMOL/L (ref 3.5–5.1)
PROT SERPL-MCNC: 6.8 G/DL (ref 6.4–8.2)
SODIUM SERPL-SCNC: 139 MMOL/L (ref 136–145)

## 2024-09-13 PROCEDURE — 3074F SYST BP LT 130 MM HG: CPT | Performed by: FAMILY MEDICINE

## 2024-09-13 PROCEDURE — 99213 OFFICE O/P EST LOW 20 MIN: CPT | Performed by: FAMILY MEDICINE

## 2024-09-13 PROCEDURE — 3078F DIAST BP <80 MM HG: CPT | Performed by: FAMILY MEDICINE

## 2024-09-13 SDOH — ECONOMIC STABILITY: FOOD INSECURITY: WITHIN THE PAST 12 MONTHS, YOU WORRIED THAT YOUR FOOD WOULD RUN OUT BEFORE YOU GOT MONEY TO BUY MORE.: NEVER TRUE

## 2024-09-13 SDOH — ECONOMIC STABILITY: INCOME INSECURITY: HOW HARD IS IT FOR YOU TO PAY FOR THE VERY BASICS LIKE FOOD, HOUSING, MEDICAL CARE, AND HEATING?: NOT HARD AT ALL

## 2024-09-13 SDOH — ECONOMIC STABILITY: FOOD INSECURITY: WITHIN THE PAST 12 MONTHS, THE FOOD YOU BOUGHT JUST DIDN'T LAST AND YOU DIDN'T HAVE MONEY TO GET MORE.: NEVER TRUE

## 2024-10-14 RX ORDER — LOSARTAN POTASSIUM 100 MG/1
TABLET ORAL
Qty: 90 TABLET | Refills: 0 | Status: SHIPPED | OUTPATIENT
Start: 2024-10-14

## 2025-01-06 RX ORDER — LOSARTAN POTASSIUM 100 MG/1
TABLET ORAL
Qty: 90 TABLET | Refills: 0 | Status: SHIPPED | OUTPATIENT
Start: 2025-01-06

## 2025-01-15 ENCOUNTER — ANESTHESIA EVENT (OUTPATIENT)
Facility: HOSPITAL | Age: 55
End: 2025-01-15
Payer: MEDICAID

## 2025-01-16 ENCOUNTER — HOSPITAL ENCOUNTER (OUTPATIENT)
Facility: HOSPITAL | Age: 55
Setting detail: OUTPATIENT SURGERY
Discharge: HOME | End: 2025-01-16
Attending: SPECIALIST | Admitting: SPECIALIST
Payer: MEDICAID

## 2025-01-16 ENCOUNTER — ANESTHESIA (OUTPATIENT)
Facility: HOSPITAL | Age: 55
End: 2025-01-16
Payer: MEDICAID

## 2025-01-16 VITALS
BODY MASS INDEX: 23.78 KG/M2 | WEIGHT: 148 LBS | DIASTOLIC BLOOD PRESSURE: 71 MMHG | TEMPERATURE: 97.5 F | HEART RATE: 60 BPM | OXYGEN SATURATION: 99 % | RESPIRATION RATE: 20 BRPM | SYSTOLIC BLOOD PRESSURE: 105 MMHG | HEIGHT: 66 IN

## 2025-01-16 PROCEDURE — 6360000002 HC RX W HCPCS: Performed by: NURSE ANESTHETIST, CERTIFIED REGISTERED

## 2025-01-16 PROCEDURE — 2709999900 HC NON-CHARGEABLE SUPPLY: Performed by: SPECIALIST

## 2025-01-16 PROCEDURE — 2580000003 HC RX 258: Performed by: SPECIALIST

## 2025-01-16 PROCEDURE — 3600007502: Performed by: SPECIALIST

## 2025-01-16 PROCEDURE — 7100000010 HC PHASE II RECOVERY - FIRST 15 MIN: Performed by: SPECIALIST

## 2025-01-16 PROCEDURE — 3700000000 HC ANESTHESIA ATTENDED CARE: Performed by: SPECIALIST

## 2025-01-16 PROCEDURE — 7100000011 HC PHASE II RECOVERY - ADDTL 15 MIN: Performed by: SPECIALIST

## 2025-01-16 PROCEDURE — 3600007512: Performed by: SPECIALIST

## 2025-01-16 PROCEDURE — 88305 TISSUE EXAM BY PATHOLOGIST: CPT

## 2025-01-16 PROCEDURE — 3700000001 HC ADD 15 MINUTES (ANESTHESIA): Performed by: SPECIALIST

## 2025-01-16 RX ORDER — SODIUM CHLORIDE 9 MG/ML
INJECTION, SOLUTION INTRAVENOUS PRN
Status: DISCONTINUED | OUTPATIENT
Start: 2025-01-16 | End: 2025-01-16 | Stop reason: HOSPADM

## 2025-01-16 RX ORDER — SODIUM CHLORIDE 0.9 % (FLUSH) 0.9 %
5-40 SYRINGE (ML) INJECTION EVERY 12 HOURS SCHEDULED
Status: DISCONTINUED | OUTPATIENT
Start: 2025-01-16 | End: 2025-01-16 | Stop reason: HOSPADM

## 2025-01-16 RX ORDER — SODIUM CHLORIDE 9 MG/ML
INJECTION, SOLUTION INTRAVENOUS CONTINUOUS
Status: DISCONTINUED | OUTPATIENT
Start: 2025-01-16 | End: 2025-01-16 | Stop reason: HOSPADM

## 2025-01-16 RX ORDER — SODIUM CHLORIDE 0.9 % (FLUSH) 0.9 %
5-40 SYRINGE (ML) INJECTION PRN
Status: DISCONTINUED | OUTPATIENT
Start: 2025-01-16 | End: 2025-01-16 | Stop reason: HOSPADM

## 2025-01-16 RX ADMIN — SODIUM CHLORIDE: 9 INJECTION, SOLUTION INTRAVENOUS at 15:45

## 2025-01-16 RX ADMIN — PROPOFOL 75 MG: 10 INJECTION, EMULSION INTRAVENOUS at 16:01

## 2025-01-16 RX ADMIN — PROPOFOL 25 MG: 10 INJECTION, EMULSION INTRAVENOUS at 16:05

## 2025-01-16 RX ADMIN — PROPOFOL 25 MG: 10 INJECTION, EMULSION INTRAVENOUS at 16:08

## 2025-01-16 RX ADMIN — PROPOFOL 25 MG: 10 INJECTION, EMULSION INTRAVENOUS at 16:18

## 2025-01-16 RX ADMIN — PROPOFOL 25 MG: 10 INJECTION, EMULSION INTRAVENOUS at 16:03

## 2025-01-16 RX ADMIN — PROPOFOL 25 MG: 10 INJECTION, EMULSION INTRAVENOUS at 16:15

## 2025-01-16 RX ADMIN — PROPOFOL 25 MG: 10 INJECTION, EMULSION INTRAVENOUS at 16:11

## 2025-01-16 ASSESSMENT — PAIN - FUNCTIONAL ASSESSMENT: PAIN_FUNCTIONAL_ASSESSMENT: NONE - DENIES PAIN

## 2025-01-16 NOTE — OP NOTE
[facility]  [address], [place] [address]                 Colonoscopy Procedure Note    Indications:   See Preoperative Diagnosis above  Referring Physician: Gamal Deleon MD  Anesthesia/Sedation: MAC anesthesia Propofol  Endoscopist:  Dr. Ramesh Koehler  Assistant:  Circulator: Kelly Johnson, RN  Endoscopy Technician: Won Woody  Preoperative diagnosis: Hx of colonic polyps [Z86.0100]  Rectal cancer (HCC) [C20]    Procedure in Detail:  Informed consent was obtained for the procedure, including sedation.  Risks of perforation, hemorrhage, adverse drug reaction, and aspiration were discussed. The patient was placed in the left lateral decubitus position.  Based on the pre-procedure assessment, including review of the patient's medical history, medications, allergies, and review of systems, he had been deemed to be an appropriate candidate for  sedation; he was therefore sedated with the medications listed above.   The patient was monitored continuously with ECG tracing, pulse oximetry, blood pressure monitoring, and direct observations.      A rectal examination was performed. The [id number] was inserted into the rectum and advanced under direct vision to the cecum, which was identified by the ileocecal valve and appendiceal orifice.  The quality of the colonic preparation was good.  A careful inspection was made as the colonoscope was withdrawn, including a retroflexed view of the rectum; findings and interventions are described below.  Appropriate photodocumentation was obtained.    Findings:  Rectum: 2 mm polyp removed with cold biopsy. Normal appearing anastomosis.  Sigmoid: normal  Descending Colon: normal  Transverse Colon: normal  Ascending Colon: normal  Cecum: normal    Specimens:      ID Type Source Tests Collected by Time Destination   1 : rectal polyp Tissue Rectum SURGICAL PATHOLOGY Ramesh Koehler MD 1/16/2025 [id number]        EBL: None    Complications: None; patient  tolerated the procedure well.    Recommendations:     - Await pathology.    - Repeat colonoscopy in 3 years.      Signed By: Ramesh Koehler MD                        January 16, 2025

## 2025-01-16 NOTE — ANESTHESIA POSTPROCEDURE EVALUATION
Department of Anesthesiology  Postprocedure Note    Patient: Tom Perry  MRN: [id number]  YOB: 1970  Date of evaluation: 1/16/2025    Procedure Summary       Date: 01/16/25 Room / Location: [facility] ENDO 04 / [facility] ENDOSCOPY    Anesthesia Start: 1556 Anesthesia Stop: 1625    Procedure: COLONOSCOPY Diagnosis:       Hx of colonic polyps      Rectal cancer (HCC)      (Hx of colonic polyps [Z86.0100])      (Rectal cancer (HCC) [C20])    Surgeons: Ramesh oKehler MD Responsible Provider: Mian Washington MD    Anesthesia Type: MAC ASA Status: 2            Anesthesia Type: MAC    Rian Phase I: Rian Score: 10    Rian Phase II:      Anesthesia Post Evaluation    Patient location during evaluation: bedside  Nausea & Vomiting: no nausea  Cardiovascular status: blood pressure returned to baseline  Respiratory status: acceptable  Hydration status: euvolemic    No notable events documented.

## 2025-01-16 NOTE — ANESTHESIA PRE PROCEDURE
Department of Anesthesiology  Preprocedure Note       Name:  Tom Perry   Age:  54 y.o.  :  1970                                          MRN:  [id number]         Date:  2025      Surgeon: Surgeon(s):  Ramesh Koehler MD    Procedure: Procedure(s):  COLONOSCOPY    Medications prior to admission:   Prior to Admission medications    Medication Sig Start Date End Date Taking? Authorizing Provider   losartan (COZAAR) 100 MG tablet TAKE 1 TABLET BY MOUTH EVERY DAY IN THE MORNING 25  Yes Gamal Deleon MD   clobetasol (TEMOVATE) 0.05 % ointment Apply topically 2 times daily 23  Yes Provider, MD Dexter   Apremilast (OTEZLA) 30 MG TABS Take 1 tablet by mouth 2 times daily 22  Yes Automatic Reconciliation, Ar   tadalafil (CIALIS) 5 MG tablet Take 1 tablet by mouth daily   Yes Automatic Reconciliation, Ar   triamcinolone (KENALOG) 0.1 % ointment Apply topically 2 times daily as needed   Yes Automatic Reconciliation, Ar   naproxen (NAPROSYN) 500 MG tablet TAKE 1 TABLET BY MOUTH TWICE A DAY WITH MEALS 24   Gamal Deleon MD       Current medications:    Current Facility-Administered Medications   Medication Dose Route Frequency Provider Last Rate Last Admin   • 0.9 % sodium chloride infusion   IntraVENous Continuous Ramesh Koehler MD       • sodium chloride flush 0.9 % injection 5-40 mL  5-40 mL IntraVENous 2 times per day Ramesh Koehler MD       • sodium chloride flush 0.9 % injection 5-40 mL  5-40 mL IntraVENous PRN Raemsh Koehler MD       • 0.9 % sodium chloride infusion   IntraVENous PRN Ramesh Koehler MD           Allergies:  No Known Allergies    Problem List:    Patient Active Problem List   Diagnosis Code   • ACL tear S83.519A   • Psoriatic arthritis (HCC) L40.50   • Neutropenia (HCC) D70.9   • Ileostomy present (HCC) Z93.2   • Port-A-Cath in place Z95.828   • Malignant neoplasm of colon (HCC) C18.9   • Small bowel obstruction (HCC) K56.609   • Psoriasis L40.9   • Gastrointestinal

## 2025-01-16 NOTE — H&P
Pre-endoscopy H and P for Colonoscopy    The patient was seen and examined.Date of last colonoscopy: 2023, Polyps  No      The airway was assessed and documented.  The problem list, past medical history, and medications were reviewed.     Patient Active Problem List   Diagnosis    ACL tear    Psoriatic arthritis (HCC)    Neutropenia (HCC)    Ileostomy present (HCC)    Port-A-Cath in place    Malignant neoplasm of colon (HCC)    Small bowel obstruction (HCC)    Psoriasis    Gastrointestinal hemorrhage    Encounter for antineoplastic chemotherapy    Thrombocytopenia (HCC)    Malignant neoplasm of rectum (HCC)    Incisional hernia    Benign essential hypertension     Social History     Socioeconomic History    Marital status:      Spouse name: Not on file    Number of children: Not on file    Years of education: Not on file    Highest education level: Not on file   Occupational History    Not on file   Tobacco Use    Smoking status: Never    Smokeless tobacco: Never   Vaping Use    Vaping status: Never Used   Substance and Sexual Activity    Alcohol use: Yes     Alcohol/week: 12.0 standard drinks of alcohol     Types: 6 Cans of beer, 6 Shots of liquor per week    Drug use: Never    Sexual activity: Yes     Partners: Female   Other Topics Concern    Not on file   Social History Narrative    Not on file     Social Determinants of Health     Financial Resource Strain: Low Risk  (9/13/2024)    Overall Financial Resource Strain (CARDIA)     Difficulty of Paying Living Expenses: Not hard at all   Food Insecurity: No Food Insecurity (9/13/2024)    Hunger Vital Sign     Worried About Running Out of Food in the Last Year: Never true     Ran Out of Food in the Last Year: Never true   Transportation Needs: Unknown (9/13/2024)    PRAPARE - Transportation     Lack of Transportation (Medical): Not on file     Lack of Transportation (Non-Medical): No   Physical Activity: Not on file   Stress: Not on file   Social Connections:      Plan: Endoscopic procedure with MAC sedation.    Ramesh Koehler MD  1/16/2025  3:52 PM

## 2025-01-16 NOTE — PROGRESS NOTES
Verified patient name and date of birth, scheduled procedure, and informed consent. Reviewed general discharge instructions and  information.  Assessed patient. Awake, alert, and oriented per baseline. Vital signs stable (see vital sign flowsheet). Respiratory status within defined limits, abdomen soft and non tender. Skin with in defined limits.      Initial RN admission and assessment performed and documented in Endoscopy navigator.     Patient evaluated by anesthesia in pre-procedure holding.     All procedural vital signs, airway assessment, and level of consciousness information monitored and recorded by anesthesia staff on the anesthesia record.     Report received from CRNA post procedure.  Patient transported to recovery area by RN.    Endoscopy post procedure time out was performed and specimens were verified with physician.    Endoscope was pre-cleaned at bedside immediately following procedure by Won.

## 2025-01-16 NOTE — DISCHARGE INSTRUCTIONS
Tom Perry  [id number]  1970    COLON DISCHARGE INSTRUCTIONS  Discomfort:  Redness at IV site- apply warm compress to area; if redness or soreness persist- contact your physician  There may be a slight amount of blood passed from the rectum  Gaseous discomfort- walking, belching will help relieve any discomfort    DIET:   Regular diet.   - however -  remember your colon is empty and a heavy meal will produce gas.   Avoid these foods:  vegetables, fried / greasy foods, carbonated drinks for today.   You may not drink alcoholic beverages for at least 12 hours    MEDICATIONS:   Regarding Aspirin or Nonsteroidal medications, please see below.    ACTIVITY:  You may resume your normal daily activities it is recommended that you spend the remainder of the day resting -  avoid any strenuous activity.  You may not operate a vehicle for 12 hours  You may not engage in an occupation involving machinery or appliances for rest of today  Avoid making any critical decisions for at least 24 hour    CALL M.D.  ANY SIGN OF:  Increasing pain, nausea, vomiting  Abdominal distension (swelling)  New increased bleeding (oral or rectal)  Fever (chills)  Pain in chest area  Bloody discharge from nose or mouth  Shortness of breath    You may take Tylenol as needed for pain. You may  take any Advil, Aspirin, Ibuprofen, Motrin, Aleve, or Goody’s for 10 days,      Post procedure diagnosis: rectal polyp  Post-procedure recommendations: Await biopsy results    Follow-up Instructions:   Call Dr. Koehler  Results of procedure / biopsy in 10 days  Telephone #  [phone]

## 2025-02-14 ENCOUNTER — OFFICE VISIT (OUTPATIENT)
Facility: CLINIC | Age: 55
End: 2025-02-14

## 2025-02-14 VITALS
HEART RATE: 66 BPM | RESPIRATION RATE: 16 BRPM | BODY MASS INDEX: 24.27 KG/M2 | OXYGEN SATURATION: 99 % | HEIGHT: 66 IN | TEMPERATURE: 98.5 F | SYSTOLIC BLOOD PRESSURE: 125 MMHG | WEIGHT: 151 LBS | DIASTOLIC BLOOD PRESSURE: 80 MMHG

## 2025-02-14 DIAGNOSIS — I10 BENIGN ESSENTIAL HYPERTENSION: ICD-10-CM

## 2025-02-14 DIAGNOSIS — E78.00 ELEVATED CHOLESTEROL: ICD-10-CM

## 2025-02-14 DIAGNOSIS — Z00.00 VISIT FOR WELL MAN HEALTH CHECK: Primary | ICD-10-CM

## 2025-02-14 LAB
ALBUMIN SERPL-MCNC: 3.7 G/DL (ref 3.5–5)
ALBUMIN/GLOB SERPL: 1.1 (ref 1.1–2.2)
ALP SERPL-CCNC: 61 U/L (ref 45–117)
ALT SERPL-CCNC: 24 U/L (ref 12–78)
ANION GAP SERPL CALC-SCNC: 5 MMOL/L (ref 2–12)
AST SERPL-CCNC: 16 U/L (ref 15–37)
BASOPHILS # BLD: 0.03 K/UL (ref 0–0.1)
BASOPHILS NFR BLD: 1.1 % (ref 0–1)
BILIRUB SERPL-MCNC: 0.6 MG/DL (ref 0.2–1)
BUN SERPL-MCNC: 11 MG/DL (ref 6–20)
BUN/CREAT SERPL: 11 (ref 12–20)
CALCIUM SERPL-MCNC: 9.3 MG/DL (ref 8.5–10.1)
CHLORIDE SERPL-SCNC: 105 MMOL/L (ref 97–108)
CHOLEST SERPL-MCNC: 199 MG/DL
CO2 SERPL-SCNC: 28 MMOL/L (ref 21–32)
CREAT SERPL-MCNC: 1.01 MG/DL (ref 0.7–1.3)
DIFFERENTIAL METHOD BLD: ABNORMAL
EOSINOPHIL # BLD: 0.19 K/UL (ref 0–0.4)
EOSINOPHIL NFR BLD: 6.7 % (ref 0–7)
ERYTHROCYTE [DISTWIDTH] IN BLOOD BY AUTOMATED COUNT: 12.1 % (ref 11.5–14.5)
GLOBULIN SER CALC-MCNC: 3.4 G/DL (ref 2–4)
GLUCOSE SERPL-MCNC: 112 MG/DL (ref 65–100)
HCT VFR BLD AUTO: 45.3 % (ref 36.6–50.3)
HDLC SERPL-MCNC: 98 MG/DL
HDLC SERPL: 2 (ref 0–5)
HGB BLD-MCNC: 15.1 G/DL (ref 12.1–17)
IMM GRANULOCYTES # BLD AUTO: 0.01 K/UL (ref 0–0.04)
IMM GRANULOCYTES NFR BLD AUTO: 0.4 % (ref 0–0.5)
LDLC SERPL CALC-MCNC: 85.8 MG/DL (ref 0–100)
LYMPHOCYTES # BLD: 0.6 K/UL (ref 0.8–3.5)
LYMPHOCYTES NFR BLD: 20.7 % (ref 12–49)
MCH RBC QN AUTO: 32.5 PG (ref 26–34)
MCHC RBC AUTO-ENTMCNC: 33.3 G/DL (ref 30–36.5)
MCV RBC AUTO: 97.6 FL (ref 80–99)
MONOCYTES # BLD: 0.35 K/UL (ref 0–1)
MONOCYTES NFR BLD: 11.9 % (ref 5–13)
NEUTS SEG # BLD: 1.72 K/UL (ref 1.8–8)
NEUTS SEG NFR BLD: 59.2 % (ref 32–75)
NRBC # BLD: 0 K/UL (ref 0–0.01)
NRBC BLD-RTO: 0 PER 100 WBC
PLATELET # BLD AUTO: 146 K/UL (ref 150–400)
PMV BLD AUTO: 10.6 FL (ref 8.9–12.9)
POTASSIUM SERPL-SCNC: 4.1 MMOL/L (ref 3.5–5.1)
PROT SERPL-MCNC: 7.1 G/DL (ref 6.4–8.2)
PSA SERPL-MCNC: 2.6 NG/ML (ref 0.01–4)
RBC # BLD AUTO: 4.64 M/UL (ref 4.1–5.7)
RBC MORPH BLD: ABNORMAL
SODIUM SERPL-SCNC: 138 MMOL/L (ref 136–145)
TRIGL SERPL-MCNC: 76 MG/DL
VLDLC SERPL CALC-MCNC: 15.2 MG/DL
WBC # BLD AUTO: 2.9 K/UL (ref 4.1–11.1)

## 2025-02-14 SDOH — ECONOMIC STABILITY: FOOD INSECURITY: WITHIN THE PAST 12 MONTHS, YOU WORRIED THAT YOUR FOOD WOULD RUN OUT BEFORE YOU GOT MONEY TO BUY MORE.: NEVER TRUE

## 2025-02-14 SDOH — ECONOMIC STABILITY: FOOD INSECURITY: WITHIN THE PAST 12 MONTHS, THE FOOD YOU BOUGHT JUST DIDN'T LAST AND YOU DIDN'T HAVE MONEY TO GET MORE.: NEVER TRUE

## 2025-02-14 ASSESSMENT — PATIENT HEALTH QUESTIONNAIRE - PHQ9
SUM OF ALL RESPONSES TO PHQ QUESTIONS 1-9: 0
SUM OF ALL RESPONSES TO PHQ QUESTIONS 1-9: 0
1. LITTLE INTEREST OR PLEASURE IN DOING THINGS: NOT AT ALL
2. FEELING DOWN, DEPRESSED OR HOPELESS: NOT AT ALL
SUM OF ALL RESPONSES TO PHQ QUESTIONS 1-9: 0
SUM OF ALL RESPONSES TO PHQ9 QUESTIONS 1 & 2: 0
SUM OF ALL RESPONSES TO PHQ QUESTIONS 1-9: 0

## 2025-02-14 NOTE — PROGRESS NOTES
Chief Complaint   Patient presents with    Annual Exam     Patient is here for wellness visit.      he is a 54 y.o. year old male who presents for CPE.  Complete Physical Exam Questions:    1.  Do you follow a low fat diet?  no  2.  Are you up to date on your Tdap (<10 years)?  Yes  3.  Have you ever had a Pneumovax vaccine (>65)?  No   PCV13 No   PPSV23 No  4.  Have you had Zoster vaccine (>60)? Yes  5.  Have you had the HPV - Gardasil (13- 26)? No  6.  Do you follow an exercise program?  No  7.  Do you smoke?  No If > 65 and smoker, have you had a abdominal aortic aneurysm ultrasound screen?  No  8.  Do you consider yourself overweight?  No  9.  Is there a family history of CAD< age 50?  Unknown  10.  Is there a family history of Cancer?  Unknown  11.  Do you know your Cancer risks? Unknown  12.  Have you had a colonoscopy?  Yes  13. Have you been tested for HIV or other STI's? No HIV today(18-64 y/o)?No   14.  Have you had an EKG in the last five years(>50)?Yes  15.  Have you had a PSA test done this year (50-69)? No    Other complaints: none    Reviewed and agree with Nurse Note and duplicated in this note.  Reviewed PmHx, RxHx, FmHx, SocHx, AllgHx and updated and dated in the chart.    Family History   Adopted: Yes   Problem Relation Age of Onset    Unknown Mother         Adopted    Hypertension Neg Hx     Asthma Neg Hx     Cancer Neg Hx     Diabetes Neg Hx     Heart Disease Neg Hx     Stroke Neg Hx        Past Medical History:   Diagnosis Date    Adopted     Cancer (HCC) April 2019    Rectal    COVID-19 vaccine series completed 04/09/2021    Pfizer dose #2 administered on 4/9/2021.    GERD (gastroesophageal reflux disease)     Hypertension     Ill-defined condition     CHEMO-LAST ON 2/19/2020    Psoriasis     Psoriatic arthritis (HCC)     Rectal cancer (HCC) 2019    Moderately differentiated [id number] adenocarcinoma of the rectum. Treated with neoadjuvant chemoradiation, resection, and adjuvant chemotherapy.

## 2025-04-24 RX ORDER — LOSARTAN POTASSIUM 100 MG/1
100 TABLET ORAL EVERY MORNING
Qty: 90 TABLET | Refills: 0 | OUTPATIENT
Start: 2025-04-24

## 2025-04-28 RX ORDER — LOSARTAN POTASSIUM 100 MG/1
100 TABLET ORAL EVERY MORNING
Qty: 90 TABLET | Refills: 0 | Status: SHIPPED | OUTPATIENT
Start: 2025-04-28

## 2025-07-03 ENCOUNTER — OFFICE VISIT (OUTPATIENT)
Facility: CLINIC | Age: 55
End: 2025-07-03
Payer: MEDICAID

## 2025-07-03 VITALS
HEART RATE: 80 BPM | TEMPERATURE: 98.3 F | DIASTOLIC BLOOD PRESSURE: 85 MMHG | RESPIRATION RATE: 16 BRPM | WEIGHT: 146 LBS | BODY MASS INDEX: 23.46 KG/M2 | OXYGEN SATURATION: 97 % | HEIGHT: 66 IN | SYSTOLIC BLOOD PRESSURE: 123 MMHG

## 2025-07-03 DIAGNOSIS — M54.2 NECK PAIN ON RIGHT SIDE: ICD-10-CM

## 2025-07-03 DIAGNOSIS — M75.41 ROTATOR CUFF IMPINGEMENT SYNDROME, RIGHT: Primary | ICD-10-CM

## 2025-07-03 PROCEDURE — 3079F DIAST BP 80-89 MM HG: CPT | Performed by: FAMILY MEDICINE

## 2025-07-03 PROCEDURE — 3074F SYST BP LT 130 MM HG: CPT | Performed by: FAMILY MEDICINE

## 2025-07-03 PROCEDURE — 99214 OFFICE O/P EST MOD 30 MIN: CPT | Performed by: FAMILY MEDICINE

## 2025-07-03 NOTE — PROGRESS NOTES
Chief Complaint   Patient presents with    Shoulder Pain     Patient is here for right shoulder pain      he is a 55 y.o. year old male who presents for evaluation of shoulder pain   Pain Assessment Encounter      Tom Perry  7/3/2025  Onset of Symptoms: patient states he has had pain for a week  ________________________________________________________________________  Description:Patient states no injures     Frequency: more than 5 times a day  Pain Scale:(1-10): 5  Trauma Hx: none  Hx of similar symptoms: Yes  Radiation: No: ,   Duration:  continuous      Progression: has worsened  What makes it better?: OTC meds and rest  What makes it worse?:strecthing  Medications tried: ibuprofen    Reviewed and agree with Nurse Note and duplicated in this note.  Reviewed PmHx, RxHx, FmHx, SocHx, AllgHx and updated and dated in the chart.    Family History   Adopted: Yes   Problem Relation Age of Onset    Unknown Mother         Adopted    Hypertension Neg Hx     Asthma Neg Hx     Cancer Neg Hx     Diabetes Neg Hx     Heart Disease Neg Hx     Stroke Neg Hx        Past Medical History:   Diagnosis Date    Adopted     Cancer (HCC) April 2019    Rectal    COVID-19 vaccine series completed 04/09/2021    Pfizer dose #2 administered on 4/9/2021.    GERD (gastroesophageal reflux disease)     Hypertension     Ill-defined condition     CHEMO-LAST ON 2/19/2020    Psoriasis     Psoriatic arthritis (HCC)     Rectal cancer (HCC) 2019    Moderately differentiated [id number] adenocarcinoma of the rectum. Treated with neoadjuvant chemoradiation, resection, and adjuvant chemotherapy.      Social History     Socioeconomic History    Marital status:    Tobacco Use    Smoking status: Never    Smokeless tobacco: Never   Vaping Use    Vaping status: Never Used   Substance and Sexual Activity    Alcohol use: Yes     Alcohol/week: 12.0 standard drinks of alcohol     Types: 6 Cans of beer, 6 Shots of liquor per week    Drug use: Never

## 2025-07-14 ENCOUNTER — HOSPITAL ENCOUNTER (OUTPATIENT)
Dept: PHYSICAL THERAPY | Facility: HOSPITAL | Age: 55
Setting detail: RECURRING SERIES
Discharge: HOME | End: 2025-07-17
Attending: FAMILY MEDICINE
Payer: MEDICAID

## 2025-07-14 PROCEDURE — 97110 THERAPEUTIC EXERCISES: CPT | Performed by: PHYSICAL THERAPIST

## 2025-07-14 PROCEDURE — 97161 PT EVAL LOW COMPLEX 20 MIN: CPT | Performed by: PHYSICAL THERAPIST

## 2025-07-14 PROCEDURE — 97535 SELF CARE MNGMENT TRAINING: CPT | Performed by: PHYSICAL THERAPIST

## 2025-07-14 NOTE — THERAPY EVALUATION
[facility], [facility],   a part of [address], [address]  [place] [address]  Phone: [phone]  Fax: [phone]         PHYSICAL THERAPY - EVALUATION/PLAN OF CARE NOTE (updated 3/23)      Date: 2025          Patient Name:  Tom Perry :  1970   Medical   Diagnosis:  Rotator cuff impingement syndrome, right [M75.41]  Neck pain on right side [M54.2] Treatment Diagnosis:  M25.511  RIGHT SHOULDER PAIN  and M54.2  NECK PAIN    Referral Source:  Gamal Deleon MD Provider #:  [id number]                Insurance: Payor: [facility] / Plan: [employer] [place] / Product Type: *No Product type* /      Patient  verified yes     Visit #   Current  / Total 1 16   Time   In / Out 1010a 1100a   Total Treatment Time 50   Total Timed Codes 20     SUBJECTIVE  If an interpreting service was utilized for treatment of this patient, the contents of this document represent the material reviewed with the patient via the .     Pain Level (0-10 scale): 1  []constant []intermittent []improving []worsening []no change since onset    Any medication changes, allergies to medications, adverse drug reactions, diagnosis change, or new procedure performed?: [x] No    [] Yes (see summary sheet for update)  Medications: Verified on Patient Summary List    Subjective functional status/changes:     Patient reports with R shoulder pain that is worsened primarily with overhead movements. Began around a few weeks ago. No known JAYCEE. Patient is R handed. .    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 Pain is primarily over the upper trap region. He has had some issues with the shoulder in the past, but has been able to manage it. He got an anti-inflammatory from Dr. Deleon, but hasn't noticed much benefit yet.     He is able to reach overhead, but when he tries to push (like with

## 2025-07-21 RX ORDER — LOSARTAN POTASSIUM 100 MG/1
100 TABLET ORAL EVERY MORNING
Qty: 90 TABLET | Refills: 0 | Status: SHIPPED | OUTPATIENT
Start: 2025-07-21

## 2025-07-25 ENCOUNTER — HOSPITAL ENCOUNTER (OUTPATIENT)
Dept: PHYSICAL THERAPY | Facility: HOSPITAL | Age: 55
Setting detail: RECURRING SERIES
Discharge: HOME | End: 2025-07-28
Attending: FAMILY MEDICINE
Payer: MEDICAID

## 2025-07-25 PROCEDURE — 97110 THERAPEUTIC EXERCISES: CPT | Performed by: PHYSICAL THERAPIST

## 2025-07-25 PROCEDURE — 97140 MANUAL THERAPY 1/> REGIONS: CPT | Performed by: PHYSICAL THERAPIST

## 2025-07-25 PROCEDURE — 97112 NEUROMUSCULAR REEDUCATION: CPT | Performed by: PHYSICAL THERAPIST

## 2025-07-25 NOTE — PROGRESS NOTES
PHYSICAL THERAPY - DAILY TREATMENT NOTE (updated 3/23)      Date: 2025        Patient Name:  Tom Perry :  1970   Medical   Diagnosis:  Rotator cuff impingement syndrome, right [M75.41]  Neck pain on right side [M54.2] Treatment Diagnosis:  M25.511  RIGHT SHOULDER PAIN  and M54.2  NECK PAIN    Referral Source:  Gamal Deleon MD Insurance:   Payor: [facility] / Plan: [employer] [place] / Product Type: *No Product type* /                   Patient  verified yes     Visit #   Current  / Total 2 16   Time   In / Out 1027a 1106a   Total Treatment Time 39   Total Timed Codes 39     SUBJECTIVE  If an interpreting service was utilized for treatment of this patient, the contents of this document represent the material reviewed with the patient via the .     Pain Level (0-10 scale): \"tight\"    Any medication changes, allergies to medications, adverse drug reactions, diagnosis change, or new procedure performed?: [x] No    [] Yes (see summary sheet for update)  Medications: Verified on Patient Summary List    Subjective functional status/changes:     Been doing better overall. Neck is more flexible, and shoulder is doing well. Does notice some tingling intermittently down his arm and into digits 1 and 2.     OBJECTIVE    Therapeutic Procedures:  Tx Min Billable or 1:1 Min (if diff from Tx Min) Procedure, Rationale, Specifics   23  [id number] Therapeutic Exercise (timed):  increase ROM, strength, coordination, balance, and proprioception to improve patient's ability to progress to PLOF and address remaining functional goals. (see flow sheet as applicable)     Details if applicable:     8  [id number] Neuromuscular Re-Education (timed):  improve balance, coordination, kinesthetic sense, posture, core stability and proprioception to improve patient's ability to develop conscious control of individual muscles and awareness of position of extremities in order to progress

## 2025-08-01 ENCOUNTER — HOSPITAL ENCOUNTER (OUTPATIENT)
Dept: PHYSICAL THERAPY | Facility: HOSPITAL | Age: 55
Setting detail: RECURRING SERIES
Discharge: HOME | End: 2025-08-04
Attending: FAMILY MEDICINE
Payer: MEDICAID

## 2025-08-01 PROCEDURE — 97140 MANUAL THERAPY 1/> REGIONS: CPT | Performed by: PHYSICAL THERAPIST

## 2025-08-01 PROCEDURE — 97112 NEUROMUSCULAR REEDUCATION: CPT | Performed by: PHYSICAL THERAPIST

## 2025-08-01 PROCEDURE — 97110 THERAPEUTIC EXERCISES: CPT | Performed by: PHYSICAL THERAPIST

## 2025-08-08 ENCOUNTER — HOSPITAL ENCOUNTER (OUTPATIENT)
Dept: PHYSICAL THERAPY | Facility: HOSPITAL | Age: 55
Setting detail: RECURRING SERIES
Discharge: HOME | End: 2025-08-11
Attending: FAMILY MEDICINE
Payer: MEDICAID

## 2025-08-08 PROCEDURE — 97110 THERAPEUTIC EXERCISES: CPT | Performed by: PHYSICAL THERAPIST

## 2025-08-08 PROCEDURE — 97140 MANUAL THERAPY 1/> REGIONS: CPT | Performed by: PHYSICAL THERAPIST

## 2025-08-15 ENCOUNTER — OFFICE VISIT (OUTPATIENT)
Facility: CLINIC | Age: 55
End: 2025-08-15
Payer: MEDICAID

## 2025-08-15 VITALS
SYSTOLIC BLOOD PRESSURE: 114 MMHG | BODY MASS INDEX: 23.95 KG/M2 | HEART RATE: 68 BPM | WEIGHT: 149 LBS | HEIGHT: 66 IN | OXYGEN SATURATION: 96 % | DIASTOLIC BLOOD PRESSURE: 70 MMHG | TEMPERATURE: 98 F | RESPIRATION RATE: 16 BRPM

## 2025-08-15 DIAGNOSIS — I10 BENIGN ESSENTIAL HYPERTENSION: Primary | ICD-10-CM

## 2025-08-15 DIAGNOSIS — R73.9 ELEVATED BLOOD SUGAR: ICD-10-CM

## 2025-08-15 DIAGNOSIS — R06.09 DOE (DYSPNEA ON EXERTION): ICD-10-CM

## 2025-08-15 DIAGNOSIS — M75.41 ROTATOR CUFF IMPINGEMENT SYNDROME, RIGHT: ICD-10-CM

## 2025-08-15 DIAGNOSIS — L40.50 PSORIATIC ARTHRITIS (HCC): ICD-10-CM

## 2025-08-15 LAB
ALBUMIN SERPL-MCNC: 4.1 G/DL (ref 3.5–5.2)
ALBUMIN/GLOB SERPL: 1.6 (ref 1.1–2.2)
ALP SERPL-CCNC: 51 U/L (ref 40–129)
ALT SERPL-CCNC: 19 U/L (ref 10–50)
ANION GAP SERPL CALC-SCNC: 13 MMOL/L (ref 2–14)
AST SERPL-CCNC: 21 U/L (ref 10–50)
BASOPHILS # BLD: 0.02 K/UL (ref 0–0.1)
BASOPHILS NFR BLD: 0.5 % (ref 0–1)
BILIRUB SERPL-MCNC: 0.6 MG/DL (ref 0–1.2)
BUN SERPL-MCNC: 13 MG/DL (ref 6–20)
BUN/CREAT SERPL: 14 (ref 12–20)
CALCIUM SERPL-MCNC: 9.2 MG/DL (ref 8.6–10)
CHLORIDE SERPL-SCNC: 105 MMOL/L (ref 98–107)
CO2 SERPL-SCNC: 23 MMOL/L (ref 20–29)
CREAT SERPL-MCNC: 0.98 MG/DL (ref 0.7–1.2)
CRP SERPL HS-MCNC: 0.5 MG/L (ref 0–5)
DIFFERENTIAL METHOD BLD: ABNORMAL
EOSINOPHIL # BLD: 0.13 K/UL (ref 0–0.4)
EOSINOPHIL NFR BLD: 3.5 % (ref 0–7)
ERYTHROCYTE [DISTWIDTH] IN BLOOD BY AUTOMATED COUNT: 11.9 % (ref 11.5–14.5)
ERYTHROCYTE [SEDIMENTATION RATE] IN BLOOD: 10 MM/HR (ref 0–20)
EST. AVERAGE GLUCOSE BLD GHB EST-MCNC: 114 MG/DL
GLOBULIN SER CALC-MCNC: 2.5 G/DL (ref 2–4)
GLUCOSE SERPL-MCNC: 98 MG/DL (ref 65–100)
HBA1C MFR BLD: 5.6 % (ref 4–5.6)
HCT VFR BLD AUTO: 38.3 % (ref 36.6–50.3)
HGB BLD-MCNC: 13.5 G/DL (ref 12.1–17)
IMM GRANULOCYTES # BLD AUTO: 0.01 K/UL (ref 0–0.04)
IMM GRANULOCYTES NFR BLD AUTO: 0.3 % (ref 0–0.5)
LYMPHOCYTES # BLD: 0.65 K/UL (ref 0.8–3.5)
LYMPHOCYTES NFR BLD: 17.6 % (ref 12–49)
MCH RBC QN AUTO: 32.8 PG (ref 26–34)
MCHC RBC AUTO-ENTMCNC: 35.2 G/DL (ref 30–36.5)
MCV RBC AUTO: 93 FL (ref 80–99)
MONOCYTES # BLD: 0.28 K/UL (ref 0–1)
MONOCYTES NFR BLD: 7.6 % (ref 5–13)
NEUTS SEG # BLD: 2.61 K/UL (ref 1.8–8)
NEUTS SEG NFR BLD: 70.5 % (ref 32–75)
NRBC # BLD: 0 K/UL (ref 0–0.01)
NRBC BLD-RTO: 0 PER 100 WBC
NT PRO BNP: <36 PG/ML (ref 0–125)
PLATELET # BLD AUTO: 148 K/UL (ref 150–400)
PMV BLD AUTO: 10.9 FL (ref 8.9–12.9)
POTASSIUM SERPL-SCNC: 4.2 MMOL/L (ref 3.5–5.1)
PROT SERPL-MCNC: 6.6 G/DL (ref 6.4–8.3)
RBC # BLD AUTO: 4.12 M/UL (ref 4.1–5.7)
RBC MORPH BLD: ABNORMAL
SODIUM SERPL-SCNC: 140 MMOL/L (ref 136–145)
WBC # BLD AUTO: 3.7 K/UL (ref 4.1–11.1)

## 2025-08-15 PROCEDURE — 3074F SYST BP LT 130 MM HG: CPT | Performed by: FAMILY MEDICINE

## 2025-08-15 PROCEDURE — 3078F DIAST BP <80 MM HG: CPT | Performed by: FAMILY MEDICINE

## 2025-08-15 PROCEDURE — 99214 OFFICE O/P EST MOD 30 MIN: CPT | Performed by: FAMILY MEDICINE

## 2025-08-21 ENCOUNTER — HOSPITAL ENCOUNTER (OUTPATIENT)
Dept: PHYSICAL THERAPY | Facility: HOSPITAL | Age: 55
Setting detail: RECURRING SERIES
Discharge: HOME | End: 2025-08-24
Attending: FAMILY MEDICINE
Payer: MEDICAID

## 2025-08-21 PROCEDURE — 97110 THERAPEUTIC EXERCISES: CPT | Performed by: PHYSICAL THERAPIST

## 2025-08-21 PROCEDURE — 97140 MANUAL THERAPY 1/> REGIONS: CPT | Performed by: PHYSICAL THERAPIST

## (undated) DEVICE — SET ADMIN 16ML TBNG L100IN 2 Y INJ SITE IV PIGGY BK DISP

## (undated) DEVICE — REM POLYHESIVE ADULT PATIENT RETURN ELECTRODE: Brand: VALLEYLAB

## (undated) DEVICE — TRAY,URINE METER,100% SILICONE,16FR10ML: Brand: MEDLINE

## (undated) DEVICE — CONNECTOR TBNG AUX H2O JET DISP FOR OLY 160/180 SER

## (undated) DEVICE — BINDER ABD S/M 12X30-45IN --

## (undated) DEVICE — CATH IV AUTOGRD BC BLU 22GA 25 -- INSYTE

## (undated) DEVICE — STRAP,POSITIONING,KNEE/BODY,FOAM,4X60": Brand: MEDLINE

## (undated) DEVICE — TRAY PREP DRY W/ PREM GLV 2 APPL 6 SPNG 2 UNDPD 1 OVERWRAP

## (undated) DEVICE — SOLUTION IRRIG 1000ML H2O STRL BLT

## (undated) DEVICE — CATHETER URETH 18FR RED RUB INTMIT ALL PURP

## (undated) DEVICE — NEEDLE HYPO 22GA L1.5IN BLK S STL HUB POLYPR SHLD REG BVL

## (undated) DEVICE — KENDALL RADIOLUCENT FOAM MONITORING ELECTRODE -RECTANGULAR SHAPE: Brand: KENDALL

## (undated) DEVICE — SUTURE PDS II SZ 1 L27IN ABSRB VLT CT-1 L36MM 1/2 CIR Z341H

## (undated) DEVICE — NEEDLE HYPO 18GA L1.5IN PNK S STL HUB POLYPR SHLD REG BVL

## (undated) DEVICE — CANISTER, RIGID, 3000CC: Brand: MEDLINE INDUSTRIES, INC.

## (undated) DEVICE — CLIP MED L235CM L2.8MM 11MM OPN HEMSTAT FIX RESOL

## (undated) DEVICE — SOLIDIFIER FLUID 3000 CC ABSORB

## (undated) DEVICE — JELLY,LUBE,STERILE,FLIP TOP,TUBE,4-OZ: Brand: MEDLINE

## (undated) DEVICE — Device: Brand: MEDICAL ACTION INDUSTRIES

## (undated) DEVICE — Device

## (undated) DEVICE — ENDO CARRY-ON PROCEDURE KIT INCLUDES ENZYMATIC SPONGE, GAUZE, BIOHAZARD LABEL, TRAY, LUBRICANT, DIRTY SCOPE LABEL, WATER LABEL, TRAY, DRAWSTRING PAD, AND DEFENDO 4-PIECE KIT.: Brand: ENDO CARRY-ON PROCEDURE KIT

## (undated) DEVICE — ROCKER SWITCH PENCIL BLADE ELECTRODE, HOLSTER: Brand: EDGE

## (undated) DEVICE — CANN NASAL O2 CAPNOGRAPHY AD -- FILTERLINE

## (undated) DEVICE — SUTURE MCRYL SZ 4-0 L27IN ABSRB UD L19MM PS-2 1/2 CIR PRIM Y426H

## (undated) DEVICE — TIDISHIELD UROLOGY DRAIN BAGS FROSTY VINYL STERILE FITS SIEMENS UROSKOP ACCESS 20 PER CASE: Brand: TIDISHIELD

## (undated) DEVICE — TOWEL SURG W17XL27IN STD BLU COT NONFENESTRATED PREWASHED

## (undated) DEVICE — QUILTED PREMIUM COMFORT UNDERPAD,EXTRA HEAVY: Brand: WINGS

## (undated) DEVICE — SUTURE VCRL SZ 3-0 L27IN ABSRB VLT L26MM SH 1/2 CIR J316H

## (undated) DEVICE — ADPT CATH URETH 2IN LF --

## (undated) DEVICE — 1200 GUARD II KIT W/5MM TUBE W/O VAC TUBE: Brand: GUARDIAN

## (undated) DEVICE — SUT MCRYL 4-0 27IN PS2 UD --

## (undated) DEVICE — TROCAR: Brand: KII SLEEVE

## (undated) DEVICE — VISUALIZATION SYSTEM: Brand: CLEARIFY

## (undated) DEVICE — BW-400L DISP SNGL-END CLEANINGBRUSH: Brand: OLYMPUS

## (undated) DEVICE — SUT ETHLN 3-0 18IN PS1 BLK --

## (undated) DEVICE — NEONATAL-ADULT SPO2 SENSOR: Brand: NELLCOR

## (undated) DEVICE — SUPPLEMENT DIGESTIVE H2O SOL GI-EASE

## (undated) DEVICE — GENERAL LAPAROSCOPY - SMH: Brand: MEDLINE INDUSTRIES, INC.

## (undated) DEVICE — GLOVE SURG SZ 75 L1212IN FNGR THK138MIL BRN LTX FREE

## (undated) DEVICE — CYSTO/BLADDER IRRIGATION SET, REGULATING CLAMP

## (undated) DEVICE — GARMENT,MEDLINE,DVT,INT,CALF,MED, GEN2: Brand: MEDLINE

## (undated) DEVICE — SUTURE PERMAHAND SZ 3-0 L18IN NONABSORBABLE BLK L26MM SH C013D

## (undated) DEVICE — SURGICAL PROCEDURE PACK BASIN MAJ SET CUST NO CAUT

## (undated) DEVICE — BAG BELONG PT PERS CLEAR HANDL

## (undated) DEVICE — Device: Brand: SINGLE USE SOFT BRUSH

## (undated) DEVICE — NEEDLE HYPO 22GA L1.5IN BLK POLYPR HUB S STL REG BVL STR

## (undated) DEVICE — COVER MPLR TIP CRV SCIS ACC DA VINCI

## (undated) DEVICE — SUTURE NONABSORBABLE MONOFILAMENT 2-0 FS 18 IN ETHILON 664H

## (undated) DEVICE — ACCESS PLATFORM FOR MINIMALLY INVASIVE SURGERY.: Brand: GELPORT® LAPAROSCOPIC  SYSTEM

## (undated) DEVICE — DRAPE FLD WRM W44XL66IN C6L FOR INTRATEMP SYS THERMABASIN

## (undated) DEVICE — KIT DISPOSABLE ACC 4ARM ENDOWRIST DAVINCI

## (undated) DEVICE — PACK,CYSTOSCOPY,PK III,SIRUS: Brand: MEDLINE

## (undated) DEVICE — STERILE-Z MAYO STAND COVERS CLEAR POLYETHYLENE STERILE UNIVERSAL FIT 20 PER CASE: Brand: STERILE-Z

## (undated) DEVICE — GDWIRE UROL STR 150CM FLX TP -- BX/5 SENSOR

## (undated) DEVICE — BW-412T DISP COMBO CLEANING BRUSH: Brand: SINGLE USE COMBINATION CLEANING BRUSH

## (undated) DEVICE — RELOAD STPL 45MM CRV TIP ARTC FOR MEDIUM/THICK TISS

## (undated) DEVICE — (D)PREP SKN CHLRAPRP APPL 26ML -- CONVERT TO ITEM 371833

## (undated) DEVICE — SYR 50ML SLIP TIP NSAF LF STRL --

## (undated) DEVICE — TUBING INSUFLTN 10FT LUER -- CONVERT TO ITEM 368568

## (undated) DEVICE — SOLUTION IV 1000ML 0.9% SOD CHL

## (undated) DEVICE — SUTURE PERMAHAND SZ 2-0 L30IN NONABSORBABLE BLK SILK W/O A305H

## (undated) DEVICE — Z DISCONTINUEDSOLUTION PREP 2OZ 10% POVIDONE IOD SCR CAP BTL

## (undated) DEVICE — TROCAR: Brand: KII® SLEEVE

## (undated) DEVICE — BINDER ABD LG 46-62IN LF --

## (undated) DEVICE — YANKAUER,POOLE TIP,STERILE,50/CS: Brand: MEDLINE

## (undated) DEVICE — SOLUTION IRRIGATION H2O 0797305] ICU MEDICAL INC]

## (undated) DEVICE — APPLICATOR BNDG 1MM ADH PREMIERPRO EXOFIN

## (undated) DEVICE — PAD PT POS 36 IN SURGYPAD DISP

## (undated) DEVICE — CATHETER URETH 26FR 30CC BLLN F 3 W SPEC M RND TIP TWO

## (undated) DEVICE — RELOAD STPL L75MM OPN H3.8MM CLS 1.5MM WIRE DIA0.2MM REG

## (undated) DEVICE — TUBING O2 PED L13FT NSL ORAL PT SAMP LN NONINTUBATED SMRT

## (undated) DEVICE — SNARE ENDOSCP M L240CM W27MM SHTH DIA2.4MM CHN 2.8MM OVL

## (undated) DEVICE — SKIN TEMPERATURE SENSOR: Brand: DEROYAL

## (undated) DEVICE — SUTURE VCRL SZ 3-0 L27IN ABSRB UD L26MM SH 1/2 CIR J416H

## (undated) DEVICE — SYRINGE MED 20ML STD CLR PLAS LUERLOCK TIP N CTRL DISP

## (undated) DEVICE — SUTURE VCRL SZ 2-0 L27IN ABSRB UD L26MM SH 1/2 CIR J417H

## (undated) DEVICE — DRAIN SURG W10MMXL20CM SIL FULL PERF HUBLESS FLAT RADPQ

## (undated) DEVICE — DRAPE,ROBOTICS,STERILE: Brand: MEDLINE

## (undated) DEVICE — SUTURE V-LOC 180 SZ 3-0 L6IN ABSRB VLT CV-23 L17MM 1/2 CIR VLOCM0804

## (undated) DEVICE — OBTRTR BLDELSS 8MM DISP -- DA VINCI - SNGL USE

## (undated) DEVICE — SUTURE V-LOC 180 SZ 0 L12IN ABSRB GRN L37MM GS-21 1/2 CIR VLOCL0316

## (undated) DEVICE — FORCEPS BX L240CM JAW DIA2.4MM ORNG L CAP W/ NDL DISP RAD

## (undated) DEVICE — TROCAR ENDOSCP L100MM DIA5MM BLDELSS STBL SL THRD OPT VW

## (undated) DEVICE — MESH GAUZE BANDAGE ROLL: Brand: CURITY

## (undated) DEVICE — SEALER TISS L35CM DIA5MM CRV JAW ARTC ADV BPLR ENSEAL G2

## (undated) DEVICE — BAG DRAIN URIN 2000ML LF STRL -- CONVERT TO ITEM 363123

## (undated) DEVICE — FCPS BX HOT RJ4 2.2MMX240CM -- RADIAL JAW 4 BX/40

## (undated) DEVICE — Z CONVERTED USE 2274299 CUFF BLD PRESSURE LNG MED AD 25-35 CM ARM FLEXIPORT DISP

## (undated) DEVICE — SUTURE V-LOC 180 SZ 0 L18IN ABSRB GRN GS-21 L37MM 1/2 CIR VLOCL0326

## (undated) DEVICE — SUTURE PROL SZ 2-0 L36IN NONABSORBABLE BLU SH L26MM 1/2 CIR 8523H

## (undated) DEVICE — FILTER SMK EVAC FLO CLR MEGADYNE

## (undated) DEVICE — SPONGE HEMOSTAT CELLULS 2X14IN -- SURGICEL

## (undated) DEVICE — CONTAINER SPEC 20 ML LID NEUT BUFF FORMALIN 10 % POLYPR STS

## (undated) DEVICE — MARKER,SKIN,WI/RULER AND LABELS: Brand: MEDLINE

## (undated) DEVICE — SYR LR LCK 1ML GRAD NSAF 30ML --

## (undated) DEVICE — BAG SPEC BIOHZD LF 2MIL 6X10IN -- CONVERT TO ITEM 357326

## (undated) DEVICE — TRAP SURG QUAD PARABOLA SLOT DSGN SFTY SCRN TRAPEASE

## (undated) DEVICE — TROCAR: Brand: KII FIOS FIRST ENTRY

## (undated) DEVICE — INFECTION CONTROL KIT SYS

## (undated) DEVICE — STAPLER INT L75MM CUT LN L73MM STPL LN L77MM BLU B FRM 8

## (undated) DEVICE — FORCEPS BX L240CM JAW DIA2.8MM L CAP W/ NDL MIC MESH TOOTH

## (undated) DEVICE — GUIDEWIRE URO L150CM DIA0.035IN STD NIT HYDRPHLC STR TIP

## (undated) DEVICE — SUT SLK 2-0SH 30IN BLK --

## (undated) DEVICE — GOWN,SIRUS,NONRNF,SETINSLV,XL,20/CS: Brand: MEDLINE

## (undated) DEVICE — DERMABOND SKIN ADH 0.7ML -- DERMABOND ADVANCED 12/BX

## (undated) DEVICE — PACK,BASIC,SIRUS,V: Brand: MEDLINE

## (undated) DEVICE — OPEN-END URETERAL CATHETER: Brand: COOK

## (undated) DEVICE — Z DISCONTINUED NO SUB IDED SET EXTN W/ 4 W STPCOCK M SPIN LOK 36IN

## (undated) DEVICE — SUTURE PERMAHAND SZ 2-0 L18IN NONABSORBABLE BLK L26MM SH C012D

## (undated) DEVICE — SOLUTION IRRIG 1000ML 0.9% SOD CHL USP POUR PLAS BTL

## (undated) DEVICE — TOTAL TRAY, 16FR 10ML SIL FOLEY, URN: Brand: MEDLINE

## (undated) DEVICE — LAPAROSCOPIC TROCAR SLEEVE/SINGLE USE: Brand: KII® OPTICAL ACCESS SYSTEM

## (undated) DEVICE — SURGICAL PROCEDURE PACK TISS 3X5 IN ABSORBABLE SEPRAFILM

## (undated) DEVICE — SPONGE LAP 18X18IN STRL -- 5/PK

## (undated) DEVICE — WRAP SURG W1.31XL1.34M CARD FOR PT 165-172CM THERMOWRP

## (undated) DEVICE — SUTURE DEV SZ 3-0 V-LOC 90 L12IN TO L18IN CV-23 VLT VLOCM0844

## (undated) DEVICE — CURVED TIP ARTICULATING RELOAD WITH TRI-STAPLE TECHNOLOGY: Brand: ENDO GIA

## (undated) DEVICE — COLON CLOSING PACK: Brand: MEDLINE INDUSTRIES, INC.

## (undated) DEVICE — Z DISCONTINUED USE 2751540 TUBING IRRIG L10IN DISP PMP ENDOGATOR

## (undated) DEVICE — DBD-PACK,LAPAROTOMY,2 REINFORCED GOWNS: Brand: MEDLINE

## (undated) DEVICE — AIRLIFE™ U/CONNECT-IT OXYGEN TUBING 7 FEET (2.1 M) CRUSH-RESISTANT OXYGEN TUBING, VINYL TIPPED: Brand: AIRLIFE™

## (undated) DEVICE — SYSTEM EVAC SMOKE LAPARSCOPIC

## (undated) DEVICE — APPLIER CLP L SHFT DIA12MM 20 ROT MULT LIGACLP

## (undated) DEVICE — Z INACTIVE USE 2527070 DRAPE SURG W40XL44IN UNDERBUTTOCK SMS POLYPR W/ PCH BK DISP

## (undated) DEVICE — KIT COMPLIANCE W ENDOGLDE + 11 NO BRSH ENDOKT

## (undated) DEVICE — SEALER LAP L37CM MARYLAND JAW OPN NANO COAT MULTIFUNCTIONAL